# Patient Record
Sex: FEMALE | Race: BLACK OR AFRICAN AMERICAN | NOT HISPANIC OR LATINO | Employment: OTHER | URBAN - METROPOLITAN AREA
[De-identification: names, ages, dates, MRNs, and addresses within clinical notes are randomized per-mention and may not be internally consistent; named-entity substitution may affect disease eponyms.]

---

## 2017-01-01 ENCOUNTER — APPOINTMENT (OUTPATIENT)
Dept: CARDIOLOGY | Facility: HOSPITAL | Age: 67
End: 2017-01-01
Attending: SURGERY

## 2017-01-01 ENCOUNTER — ANESTHESIA (OUTPATIENT)
Dept: PERIOP | Facility: HOSPITAL | Age: 67
End: 2017-01-01

## 2017-01-01 ENCOUNTER — ANESTHESIA EVENT (OUTPATIENT)
Dept: PERIOP | Facility: HOSPITAL | Age: 67
End: 2017-01-01

## 2017-01-01 PROCEDURE — 25010000003 CEFAZOLIN IN DEXTROSE 2-4 GM/100ML-% SOLUTION: Performed by: SURGERY

## 2017-01-01 PROCEDURE — 93880 EXTRACRANIAL BILAT STUDY: CPT

## 2017-01-01 PROCEDURE — 25010000002 PROPOFOL 10 MG/ML EMULSION: Performed by: ANESTHESIOLOGY

## 2017-01-01 RX ORDER — PROPOFOL 10 MG/ML
VIAL (ML) INTRAVENOUS CONTINUOUS PRN
Status: DISCONTINUED | OUTPATIENT
Start: 2017-01-01 | End: 2017-01-01 | Stop reason: SURG

## 2017-01-01 RX ORDER — LIDOCAINE HYDROCHLORIDE 10 MG/ML
INJECTION, SOLUTION INFILTRATION; PERINEURAL AS NEEDED
Status: DISCONTINUED | OUTPATIENT
Start: 2017-01-01 | End: 2017-01-01 | Stop reason: SURG

## 2017-01-01 RX ADMIN — LIDOCAINE HYDROCHLORIDE 40 MG: 10 INJECTION, SOLUTION INFILTRATION; PERINEURAL at 09:55

## 2017-01-01 RX ADMIN — CEFAZOLIN SODIUM 2 G: 2 INJECTION, SOLUTION INTRAVENOUS at 09:53

## 2017-01-01 RX ADMIN — PROPOFOL 100 MCG/KG/MIN: 10 INJECTION, EMULSION INTRAVENOUS at 09:55

## 2017-01-01 RX ADMIN — SODIUM CHLORIDE, POTASSIUM CHLORIDE, SODIUM LACTATE AND CALCIUM CHLORIDE: 600; 310; 30; 20 INJECTION, SOLUTION INTRAVENOUS at 09:51

## 2017-01-01 NOTE — ANESTHESIA POSTPROCEDURE EVALUATION
Patient: Feli Del Toro    Procedure Summary     Date Anesthesia Start Anesthesia Stop Room / Location    01/01/17 0945 1046  [facility]       Procedure Diagnosis Surgeon Provider    LEFT SECOND TOE AMPUTATION (Left Fingers) No diagnosis on file. MD Good Webster MD          Anesthesia Type: regional  Last vitals  BP      Temp      Pulse     Resp      SpO2        Post Anesthesia Care and Evaluation    Patient location during evaluation: PACU  Patient participation: complete - patient participated  Level of consciousness: awake and alert  Anesthetic complications: no

## 2017-01-01 NOTE — ANESTHESIA PREPROCEDURE EVALUATION
Anesthesia Evaluation      No history of anesthetic complications   Airway   Mallampati: I  TM distance: >3 FB  Neck ROM: full  no difficulty expected  Dental - normal exam     Pulmonary    Cardiovascular   (+) hypertension (high vholesterol and diabetes),   (-) dysrhythmias, angina, orthopnea    Neuro/Psych  (+) numbness (diabetic neuropathy),    GI/Hepatic/Renal/Endo    (+) obesity,  chronic renal disease, hypothyroidism,     ROS Comment: Colon Ca 2 years ago    Musculoskeletal     Abdominal    Substance History      OB/GYN          Other                           Anesthesia Plan    ASA 3     regional   (Plan ankle block, low level sedation , patient had ornage juice 3 hrs ago foe low glucose)  intravenous induction   Anesthetic plan and risks discussed with patient.

## 2017-01-01 NOTE — ANESTHESIA PROCEDURE NOTES
Peripheral Block    Patient location during procedure: holding area  Start time: 1/1/2017 9:11 AM  Stop time: 1/1/2017 9:39 AM  Reason for block: at surgeon's request, post-op pain management and primary anesthetic  Performed by  Anesthesiologist: MARY MAYES  Preanesthetic Checklist  Completed: patient identified, site marked, surgical consent, pre-op evaluation, timeout performed, IV checked, risks and benefits discussed and monitors and equipment checked  Peripheral Block Prep:  Sterile barriers:cap, gloves and sterile barriers  Prep: ChloraPrep  Patient monitoring: blood pressure monitoring, continuous pulse oximetry and EKG  Peripheral Procedure  Sedation:yes  Guidance:landmark technique  Laterality:leftBlock Type:ankle  Injection Technique:single-shotNeedle Gauge:25 G    Medications  Comment:ankleblock placed , pt eexperiened no pain  Local Injected:ropivacaine 0.5% without epinephrine and lidocaine 2% Local Amount Injected:30mL  Post Assessment  Patient Tolerance:comfortable throughout block  Complications:no

## 2017-01-15 DIAGNOSIS — E11.9 DIABETES TYPE 2, CONTROLLED (HCC): ICD-10-CM

## 2017-01-16 RX ORDER — DAPAGLIFLOZIN AND METFORMIN HYDROCHLORIDE 10; 1000 MG/1; MG/1
TABLET, FILM COATED, EXTENDED RELEASE ORAL
Qty: 90 TABLET | Refills: 0 | Status: SHIPPED | OUTPATIENT
Start: 2017-01-16 | End: 2017-04-14 | Stop reason: SDUPTHER

## 2017-01-16 RX ORDER — SAXAGLIPTIN 5 MG/1
TABLET, FILM COATED ORAL
Qty: 90 TABLET | Refills: 0 | Status: SHIPPED | OUTPATIENT
Start: 2017-01-16 | End: 2017-04-14 | Stop reason: SDUPTHER

## 2017-02-02 DIAGNOSIS — E11.9 DIABETES TYPE 2, CONTROLLED (HCC): ICD-10-CM

## 2017-02-02 RX ORDER — INSULIN DETEMIR 100 [IU]/ML
INJECTION, SOLUTION SUBCUTANEOUS
Qty: 60 ML | Refills: 0 | Status: SHIPPED | OUTPATIENT
Start: 2017-02-02 | End: 2017-07-14 | Stop reason: SDUPTHER

## 2017-02-07 DIAGNOSIS — E11.9 DIABETES TYPE 2, CONTROLLED (HCC): ICD-10-CM

## 2017-02-07 RX ORDER — LANCETS 33 GAUGE
EACH MISCELLANEOUS
Qty: 120 EACH | Refills: 5 | Status: SHIPPED | OUTPATIENT
Start: 2017-02-07 | End: 2017-08-09 | Stop reason: SDUPTHER

## 2017-02-10 ENCOUNTER — APPOINTMENT (OUTPATIENT)
Dept: ONCOLOGY | Facility: HOSPITAL | Age: 67
End: 2017-02-10

## 2017-02-10 ENCOUNTER — OFFICE VISIT (OUTPATIENT)
Dept: ONCOLOGY | Facility: CLINIC | Age: 67
End: 2017-02-10

## 2017-02-10 ENCOUNTER — INFUSION (OUTPATIENT)
Dept: ONCOLOGY | Facility: HOSPITAL | Age: 67
End: 2017-02-10

## 2017-02-10 VITALS
OXYGEN SATURATION: 98 % | HEIGHT: 66 IN | WEIGHT: 258.6 LBS | RESPIRATION RATE: 14 BRPM | SYSTOLIC BLOOD PRESSURE: 138 MMHG | TEMPERATURE: 97.4 F | BODY MASS INDEX: 41.56 KG/M2 | HEART RATE: 76 BPM | DIASTOLIC BLOOD PRESSURE: 80 MMHG

## 2017-02-10 DIAGNOSIS — C18.9 PRIMARY MALIGNANT NEOPLASM OF COLON (HCC): Primary | ICD-10-CM

## 2017-02-10 DIAGNOSIS — Z45.2 ENCOUNTER FOR ADJUSTMENT OR MANAGEMENT OF VASCULAR ACCESS DEVICE: ICD-10-CM

## 2017-02-10 DIAGNOSIS — C18.9 METASTATIC COLON CANCER TO LIVER (HCC): Primary | ICD-10-CM

## 2017-02-10 DIAGNOSIS — C78.7 LIVER METASTASIS: ICD-10-CM

## 2017-02-10 DIAGNOSIS — D50.9 IRON DEFICIENCY ANEMIA, UNSPECIFIED IRON DEFICIENCY ANEMIA TYPE: ICD-10-CM

## 2017-02-10 DIAGNOSIS — C78.7 METASTATIC COLON CANCER TO LIVER (HCC): Primary | ICD-10-CM

## 2017-02-10 LAB
ALBUMIN SERPL-MCNC: 3.9 G/DL (ref 3.5–5.2)
ALBUMIN/GLOB SERPL: 1 G/DL (ref 1.1–2.4)
ALP SERPL-CCNC: 148 U/L (ref 38–116)
ALT SERPL W P-5'-P-CCNC: 52 U/L (ref 0–33)
ANION GAP SERPL CALCULATED.3IONS-SCNC: 12.6 MMOL/L
AST SERPL-CCNC: 43 U/L (ref 0–32)
BASOPHILS # BLD AUTO: 0.05 10*3/MM3 (ref 0–0.1)
BASOPHILS NFR BLD AUTO: 0.7 % (ref 0–1.1)
BILIRUB SERPL-MCNC: 0.3 MG/DL (ref 0.1–1.2)
BUN BLD-MCNC: 20 MG/DL (ref 6–20)
BUN/CREAT SERPL: 23 (ref 7.3–30)
CALCIUM SPEC-SCNC: 9.2 MG/DL (ref 8.5–10.2)
CEA SERPL-MCNC: 4.01 NG/ML
CHLORIDE SERPL-SCNC: 101 MMOL/L (ref 98–107)
CO2 SERPL-SCNC: 27.4 MMOL/L (ref 22–29)
CREAT BLD-MCNC: 0.87 MG/DL (ref 0.6–1.1)
DEPRECATED RDW RBC AUTO: 50.7 FL (ref 37–49)
EOSINOPHIL # BLD AUTO: 0.45 10*3/MM3 (ref 0–0.36)
EOSINOPHIL NFR BLD AUTO: 6.1 % (ref 1–5)
ERYTHROCYTE [DISTWIDTH] IN BLOOD BY AUTOMATED COUNT: 16.8 % (ref 11.7–14.5)
GFR SERPL CREATININE-BSD FRML MDRD: 79 ML/MIN/1.73
GLOBULIN UR ELPH-MCNC: 4 GM/DL (ref 1.8–3.5)
GLUCOSE BLD-MCNC: 104 MG/DL (ref 74–124)
HCT VFR BLD AUTO: 38.5 % (ref 34–45)
HGB BLD-MCNC: 12.6 G/DL (ref 11.5–14.9)
IMM GRANULOCYTES # BLD: 0.06 10*3/MM3 (ref 0–0.03)
IMM GRANULOCYTES NFR BLD: 0.8 % (ref 0–0.5)
LYMPHOCYTES # BLD AUTO: 1.99 10*3/MM3 (ref 1–3.5)
LYMPHOCYTES NFR BLD AUTO: 26.8 % (ref 20–49)
MCH RBC QN AUTO: 27.4 PG (ref 27–33)
MCHC RBC AUTO-ENTMCNC: 32.7 G/DL (ref 32–35)
MCV RBC AUTO: 83.7 FL (ref 83–97)
MONOCYTES # BLD AUTO: 0.99 10*3/MM3 (ref 0.25–0.8)
MONOCYTES NFR BLD AUTO: 13.3 % (ref 4–12)
NEUTROPHILS # BLD AUTO: 3.88 10*3/MM3 (ref 1.5–7)
NEUTROPHILS NFR BLD AUTO: 52.3 % (ref 39–75)
NRBC BLD MANUAL-RTO: 0 /100 WBC (ref 0–0)
PLATELET # BLD AUTO: 370 10*3/MM3 (ref 150–375)
PMV BLD AUTO: 8.9 FL (ref 8.9–12.1)
POTASSIUM BLD-SCNC: 4.1 MMOL/L (ref 3.5–4.7)
PROT SERPL-MCNC: 7.9 G/DL (ref 6.3–8)
RBC # BLD AUTO: 4.6 10*6/MM3 (ref 3.9–5)
SODIUM BLD-SCNC: 141 MMOL/L (ref 134–145)
WBC NRBC COR # BLD: 7.42 10*3/MM3 (ref 4–10)

## 2017-02-10 PROCEDURE — 25010000002 HEPARIN FLUSH (PORCINE) 100 UNIT/ML SOLUTION: Performed by: INTERNAL MEDICINE

## 2017-02-10 PROCEDURE — 82378 CARCINOEMBRYONIC ANTIGEN: CPT | Performed by: INTERNAL MEDICINE

## 2017-02-10 PROCEDURE — 96523 IRRIG DRUG DELIVERY DEVICE: CPT

## 2017-02-10 PROCEDURE — 85025 COMPLETE CBC W/AUTO DIFF WBC: CPT | Performed by: INTERNAL MEDICINE

## 2017-02-10 PROCEDURE — 80053 COMPREHEN METABOLIC PANEL: CPT | Performed by: INTERNAL MEDICINE

## 2017-02-10 PROCEDURE — 99214 OFFICE O/P EST MOD 30 MIN: CPT | Performed by: INTERNAL MEDICINE

## 2017-02-10 PROCEDURE — 36415 COLL VENOUS BLD VENIPUNCTURE: CPT | Performed by: INTERNAL MEDICINE

## 2017-02-10 RX ORDER — 0.9 % SODIUM CHLORIDE 0.9 %
10 VIAL (ML) INJECTION AS NEEDED
Status: DISCONTINUED | OUTPATIENT
Start: 2017-02-10 | End: 2017-02-10 | Stop reason: HOSPADM

## 2017-02-10 RX ORDER — FLUTICASONE PROPIONATE 50 MCG
2 SPRAY, SUSPENSION (ML) NASAL AS NEEDED
COMMUNITY
Start: 2016-12-14 | End: 2020-02-27 | Stop reason: SDDI

## 2017-02-10 RX ORDER — 0.9 % SODIUM CHLORIDE 0.9 %
10 VIAL (ML) INJECTION AS NEEDED
Status: CANCELLED | OUTPATIENT
Start: 2017-02-10

## 2017-02-10 RX ADMIN — SODIUM CHLORIDE, PRESERVATIVE FREE 500 UNITS: 5 INJECTION INTRAVENOUS at 09:17

## 2017-02-10 RX ADMIN — SODIUM CHLORIDE 10 ML: 9 INJECTION, SOLUTION INTRAMUSCULAR; INTRAVENOUS; SUBCUTANEOUS at 09:17

## 2017-02-10 NOTE — PROGRESS NOTES
Subjective     CHIEF COMPLAINT:    1. Metastatic colon cancer (KRAS mutation positive) with liver metastasis, biopsy confirmed. No resection of primary sigmoid colon cancer.   2. Recurrent Iron deficiency anemia   3. Palliative chemotherapy with FOLFOX-6 was given from June 2013 to November 2013.   4. The patient underwent left partial colectomy, splenectomy, resection of left diaphragm with repair and partial hepatectomy with microwave ablation of metastasis on 01/14/2014.   5. The patient received FOLFIRI x12 cycles between 03/07/2014 through 08/07/2014.   6. CT scan on 05/28/2015 revealed a new liver lesion. The patient was referred back to Dr. Ruano. Stereotactic radiation therapy with CyberKnife was recommended and was delivered in 3 fractions, completed on 07/14/2015.     HISTORY OF PRESENT ILLNESS:     Feli Del Toro is a 66 y.o. patient with the above medical history.  She returns today for follow-up.  She was recently hospitalized at [facility] with infected toe with suspected osteomyelitis that required surgery.  Wound cultures revealed staph aureus.  Blood cultures were negative.  She is recovering from the infection and denies having fever or chills.    Patient Is not experiencing abdominal pain.  Bowel movements are normal.  No pain at the site of the port.    Past Medical History   Diagnosis Date   • Anemia    • Cancer 06/04/2013   • Diabetes mellitus    • History of transfusion    • Hyperlipidemia    • Hypertension    • Retinopathy    • Type 2 diabetes mellitus        Past Surgical History   Procedure Laterality Date   • Cataract extraction     • Salpingo oophorectomy Bilateral    • Amputation of replicated toes       right distal second toe    • Portacath placement  06/2013   • Colectomy partial / total  01/14/2014   • Splenectomy     • Hysterectomy  1998   • Amputation digit Left 1/1/2017     Procedure: LEFT SECOND TOE AMPUTATION;  Surgeon: Farzad Nichols MD;  Location: [facility]  MAIN OR;  Service:        Cancer-related family history includes Breast cancer in her sister; Cancer in her other.    SCHEDULED MEDS:       Current Outpatient Prescriptions:   •  aspirin 81 MG tablet, Take 81 mg by mouth daily., Disp: , Rfl:   •  calcium carbonate (TUMS) 500 MG chewable tablet, Chew., Disp: , Rfl:   •  fluticasone (FLONASE) 50 MCG/ACT nasal spray, , Disp: , Rfl:   •  glucose blood test strip, Check blood glucose 3-4 times daily, Disp: 150 each, Rfl: 5  •  insulin detemir (LEVEMIR FLEXTOUCH) 100 UNIT/ML injection, Inject 22 Units under the skin Every Night., Disp: , Rfl:   •  Insulin Lispro, Human, (HUMALOG KWIKPEN) 100 UNIT/ML solution pen-injector, 15u AC and sliding scale (Patient taking differently: Takes 10 units with breakfast, 12 units with lunch, and 14 units with dinner.), Disp: 6 pen, Rfl: 1  •  Insulin Pen Needle 32G X 6 MM misc, Use as directed five times daily and as needed., Disp: 180 each, Rfl: 3  •  LEVEMIR FLEXTOUCH 100 UNIT/ML injection, INJECT 50 UNITS DAILY AND TITRATE AS INSTRUCTED, Disp: 60 mL, Rfl: 0  •  MAGNESIUM OXIDE PO, Take 400 mg by mouth daily., Disp: , Rfl:   •  meclizine (ANTIVERT) 25 MG tablet, Take 25 mg by mouth 3 (Three) Times a Day As Needed., Disp: , Rfl:   •  ONDANSETRON HCL PO, Take 8 mg by mouth every 8 (eight) hours as needed., Disp: , Rfl:   •  ONETOUCH DELICA LANCETS 33G misc, USE TO TEST BLOOD SUGAR 3 TO 4 TIMES DAILY, Disp: 120 each, Rfl: 5  •  ONGLYZA 5 MG tablet, TAKE 1 TABLET DAILY, Disp: 90 tablet, Rfl: 0  •  telmisartan-hydrochlorothiazide (MICARDIS HCT) 40-12.5 MG per tablet, Take 1 tablet by mouth daily, Disp: 90 tablet, Rfl: 3  •  vitamin D (ERGOCALCIFEROL) 93001 UNITS capsule capsule, Take 1 capsule by mouth 1 (One) Time Per Week., Disp: 13 capsule, Rfl: 3  •  XIGDUO XR  MG tablet sustained-release 24 hour, TAKE 1 TABLET DAILY, Disp: 90 tablet, Rfl: 0  •  atorvastatin (LIPITOR) 40 MG tablet, Take 1 tablet (40 mg total) by mouth daily for  "90 days, Disp: 90 tablet, Rfl: 3  No current facility-administered medications for this visit.     REVIEW OF SYSTEMS:  GENERAL:  No fever or chills.  Weight gain.    SKIN:  See history of present illness.  HEME/LYMPH: No anemia, easy bruisability or enlarged lymph nodes.  RESPIRATORY:  No cough, shortness of breath, hemoptysis or wheezing.  CVS:  No chest pain, palpitations or lower extremity swelling.  GI:  No abdominal pain, nausea, vomiting, constipation, diarrhea, melena or hematochezia.  :  No dysuria, hematuria or increased frequency.   MUSCULOSKELETAL:  See history of present illness.  NEUROLOGICAL:  See history of present illness.  No dizziness, global weakness, loss of consciousness or seizures.  PSYCHIATRIC:  No anxiety, mood changes or depression.  .  Objective   VITAL SIGNS:     Vitals:    02/10/17 0920   BP: 138/80   Pulse: 76   Resp: 14   Temp: 97.4 °F (36.3 °C)   TempSrc: Oral   SpO2: 98%   Weight: 258 lb 9.6 oz (117 kg)   Height: 66.14\" (168 cm)   PainSc: 0-No pain     Body mass index is 41.56 kg/(m^2).     Wt Readings from Last 3 Encounters:   02/10/17 258 lb 9.6 oz (117 kg)   12/30/16 251 lb 1.6 oz (114 kg)   12/31/16 251 lb (114 kg)       PHYSICAL EXAMINATION:  GENERAL:  The patient appears in good general condition, not in acute distress.  SKIN:  No skin rashes or lesions. No ecchymosis or petechiae.  HEAD:  Normocephalic.  EYES:  No jaundice or pallor.   NECK:  Supple with good ROM. No thyromegaly or masses.  LYMPHATICS:  No cervical or supraclavicular lymphadenopathy.  CHEST:  Lungs clear to auscultation. No added sounds.  Port in the left upper chest is benign with no redness or swelling.  ABDOMEN:  Soft and non-tender. No hepato- or splenomegaly. No masses.  EXTREMITIES:  No cyanosis or edema. No calf tenderness.  .  RESULT REVIEW:       Results from last 7 days  Lab Units 02/10/17  0907   WBC 10*3/mm3 7.42   NEUTROS ABS 10*3/mm3 3.88   HEMOGLOBIN g/dL 12.6   HEMATOCRIT % 38.5   PLATELETS " 10*3/mm3 370             Assessment/Plan    1.  Metastatic colon cancer. She had metastases to the liver. She received 12 cycles of FOLFOX-6 and then had resection of t he primary tumor along with metastasectomy and splenectomy with thermal ablation of lesions in the liver in January 2014. This was followed by 12 cycles of the FOLFIRI regimen completed on 08/07/2014. CT scans on 05/28/2015 revealed a new lesion in the liver.  Dr. Ruano referred the patient to Radiation Therapy and she received CyberKnife radiation that was completed on 07/14/2015.  The patient is doing well with no evidence of recurrence on the CT scan from 11/11/16.  CEA level has decreased.  I recommended a repeat CT scan in 3 months.  If there is no evidence of progression, we would plan on removing the port.    2.  Recent hospital stay for infection of a toe and suspected osteomyelitis that required amputation.  Cultures were positive for Staph aureus but blood cultures were negative.    3.  Patient has a port.  It will be flushed today and we will continue to maintain it every 6 weeks.  If the patient's upcoming scans in 3 months do not show any evidence of recurrence, we will consider removal of the port.    4.  History of iron deficiency anemia.  Her hemoglobin is currently normal.    5.  Obesity.  We discussed today the need to lose weight.    Patient will return in 6 weeks for a port flush.  We'll see her in follow-up in 3 months with a CT scan of the chest abdomen pelvis, CBC, CMP and CEA 1 week before her clinic visit.        Marivel Brown MD  02/10/17

## 2017-02-11 ENCOUNTER — HOSPITAL ENCOUNTER (EMERGENCY)
Facility: HOSPITAL | Age: 67
Discharge: HOME | End: 2017-02-11
Attending: EMERGENCY MEDICINE | Admitting: EMERGENCY MEDICINE

## 2017-02-11 ENCOUNTER — APPOINTMENT (OUTPATIENT)
Dept: GENERAL RADIOLOGY | Facility: HOSPITAL | Age: 67
End: 2017-02-11

## 2017-02-11 VITALS
OXYGEN SATURATION: 97 % | WEIGHT: 254 LBS | DIASTOLIC BLOOD PRESSURE: 74 MMHG | HEIGHT: 69 IN | HEART RATE: 91 BPM | SYSTOLIC BLOOD PRESSURE: 136 MMHG | RESPIRATION RATE: 17 BRPM | TEMPERATURE: 97.5 F | BODY MASS INDEX: 37.62 KG/M2

## 2017-02-11 DIAGNOSIS — S82.831A CLOSED FRACTURE OF DISTAL END OF RIGHT FIBULA, UNSPECIFIED FRACTURE MORPHOLOGY, INITIAL ENCOUNTER: Primary | ICD-10-CM

## 2017-02-11 LAB — GLUCOSE BLDC GLUCOMTR-MCNC: 91 MG/DL (ref 70–130)

## 2017-02-11 PROCEDURE — 73610 X-RAY EXAM OF ANKLE: CPT

## 2017-02-11 PROCEDURE — 99284 EMERGENCY DEPT VISIT MOD MDM: CPT

## 2017-02-11 PROCEDURE — 82962 GLUCOSE BLOOD TEST: CPT

## 2017-02-11 RX ORDER — OXYCODONE HYDROCHLORIDE AND ACETAMINOPHEN 5; 325 MG/1; MG/1
1 TABLET ORAL EVERY 6 HOURS PRN
Qty: 15 TABLET | Refills: 0 | Status: SHIPPED | OUTPATIENT
Start: 2017-02-11 | End: 2017-05-12

## 2017-02-11 NOTE — ED PROVIDER NOTES
" EMERGENCY DEPARTMENT ENCOUNTER    CHIEF COMPLAINT  Chief Complaint: ankle pain  History given by: patient  History limited by: none  Room Number: 23/23  PMD: MD Dr. Simone Gagnon (vascular surgeon)     HPI:  Pt is a 66 y.o. female who presents complaining of R ankle pain s/p a fall downstairs onset last night at 7:30PM. The pt states that the pain is moderate. The pt denies fever, LOC, HA, CP, SOA, V/D, abd pain or any additional injury.    Duration:  Last night  Onset: sudden   Timing: constant   Location: R ankle  Radiation: none  Quality: \"pain\"  Intensity/Severity: moderate  Progression: unchanged  Associated Symptoms: R ankle swelling  Aggravating Factors: none  Alleviating Factors: none  Previous Episodes: none  Treatment before arrival: none    PAST MEDICAL HISTORY  Active Ambulatory Problems     Diagnosis Date Noted   • Hyperlipidemia 12/31/2015   • Essential hypertension 12/31/2015   • Allergic rhinitis due to pollen 12/31/2015   • Vitamin D deficiency 03/31/2016   • Morbid obesity due to excess calories 03/31/2016   • Encounter for adjustment or management of vascular access device 04/13/2016   • Primary malignant neoplasm of colon 05/26/2016   • Iron deficiency anemia 05/26/2016   • Breast lesion 05/26/2016   • Peripheral neuropathy due to chemotherapy 05/26/2016   • Nonproliferative diabetic retinopathy 12/01/2016   • Type 2 diabetes mellitus with microalbuminuria 12/01/2016   • Osteomyelitis of toe of left foot 12/30/2016   • Sepsis 12/30/2016   • Type 2 diabetes mellitus with peripheral neuropathy 12/30/2016   • Type 2 diabetes mellitus with complication, with long-term current use of insulin 12/30/2016   • Obesity (BMI 30-39.9) 12/31/2016     Resolved Ambulatory Problems     Diagnosis Date Noted   • No Resolved Ambulatory Problems     Past Medical History   Diagnosis Date   • Anemia    • Cancer 06/04/2013   • Diabetes mellitus    • History of transfusion    • Hypertension    • Retinopathy    • " Type 2 diabetes mellitus        PAST SURGICAL HISTORY  Past Surgical History   Procedure Laterality Date   • Cataract extraction     • Salpingo oophorectomy Bilateral    • Amputation of replicated toes       right distal second toe    • Portacath placement  06/2013   • Colectomy partial / total  01/14/2014   • Splenectomy     • Hysterectomy  1998   • Amputation digit Left 1/1/2017     Procedure: LEFT SECOND TOE AMPUTATION;  Surgeon: Farzad Nichols MD;  Location: [facility] OR;  Service:        FAMILY HISTORY  Family History   Problem Relation Age of Onset   • Heart attack Other    • Cancer Other    • Diabetes Other    • Hypertension Other    • Hypertension Father    • Diabetes Father    • Breast cancer Sister    • Diabetes Sister    • Stroke Paternal Grandmother    • Diabetes Paternal Grandmother    • Lupus Paternal Grandfather    • Stroke Paternal Grandfather    • Diabetes Sister        SOCIAL HISTORY  Social History     Social History   • Marital status:      Spouse name: Jesus   • Number of children: N/A   • Years of education: College     Occupational History   • Middle School Counselor  - RETIRED      [employer]     Social History Main Topics   • Smoking status: Never Smoker   • Smokeless tobacco: Never Used   • Alcohol use No   • Drug use: No   • Sexual activity: Defer     Other Topics Concern   • Not on file     Social History Narrative       ALLERGIES  Compazine [prochlorperazine edisylate]    REVIEW OF SYSTEMS  Review of Systems   Constitutional: Negative.  Negative for fever and unexpected weight change.   HENT: Negative.    Respiratory: Negative.  Negative for shortness of breath.    Cardiovascular: Negative.  Negative for chest pain.   Gastrointestinal: Negative.  Negative for abdominal pain, diarrhea and vomiting.   Genitourinary: Negative.    Musculoskeletal: Positive for arthralgias (R ankle) and joint swelling (R ankle).   Neurological: Negative for syncope and headaches.   All other systems  reviewed and are negative.      PHYSICAL EXAM  ED Triage Vitals   Temp Heart Rate Resp BP SpO2   02/11/17 1114 02/11/17 1114 02/11/17 1114 02/11/17 1114 02/11/17 1114   97.5 °F (36.4 °C) 89 16 154/85 96 %      Temp src Heart Rate Source Patient Position BP Location FiO2 (%)   02/11/17 1114 02/11/17 1114 02/11/17 1114 -- --   Tympanic Monitor Sitting         Physical Exam   Constitutional: She is oriented to person, place, and time and well-developed, well-nourished, and in no distress. No distress.   HENT:   Head: Normocephalic and atraumatic.   Eyes: EOM are normal. Pupils are equal, round, and reactive to light.   Neck: Normal range of motion. Neck supple.   Cardiovascular: Normal rate, regular rhythm and normal heart sounds.    Pulmonary/Chest: Effort normal and breath sounds normal. No respiratory distress.   Abdominal: Soft. There is no tenderness. There is no rebound and no guarding.   Musculoskeletal: Normal range of motion.        Right ankle: She exhibits swelling (moderate to severe). Tenderness. Lateral malleolus tenderness found.        Right foot: There is no tenderness.   Neurological: She is alert and oriented to person, place, and time. She has normal sensation and normal strength.   Skin: Skin is warm and dry. Bruising (to medial right ankle) noted. No rash noted.   Psychiatric: Mood and affect normal.   Nursing note and vitals reviewed.    RADIOLOGY  XR Ankle 3+ View Right   Final Result         1330- R ankle XR shows a transverse fracture distal right fibula without displacement and a one half x 2 mm bone fragment at the tip of the medial malleolus consistent with avulsion injury.    I ordered the above noted radiological studies. Interpreted by radiologist.  Reviewed by me in PACS.       PROCEDURES  Splint Application  Date/Time: 2/11/2017 1:35 PM  Performed by: PREETHI PHAN  Authorized by: PREETHI PHAN   Consent: Verbal consent obtained.  Risks and benefits: risks, benefits and  alternatives were discussed  Consent given by: patient  Required items: required blood products, implants, devices, and special equipment available  Patient identity confirmed: verbally with patient and arm band  Location details: right ankle  Splint type: ankle stirrup  Supplies used: cotton padding and Ortho-Glass  Post-procedure: The splinted body part was neurovascularly unchanged following the procedure.  Patient tolerance: Patient tolerated the procedure well with no immediate complications            PROGRESS AND CONSULTS  ED Course     1249- Ordered XR R ankle for further evaluation. Pt refused pain medication at this time.    1332- Rechecked pt who is resting comfortably. Informed the pt of the XR results that show a fractured fibula. D/w my plan of administering a splint. Advised pt to minimize wt bearing and to use crutches. D/w pt of plan to discharge with pain meds. Pt and family understand and agree with the plan. All questions answered.       MEDICAL DECISION MAKING  Results were reviewed/discussed with the patient and they were also made aware of online access. Pt also made aware that follow up with PMD is necessary.     MDM  Number of Diagnoses or Management Options  Closed fracture of distal end of right fibula, unspecified fracture morphology, initial encounter:      Amount and/or Complexity of Data Reviewed  Tests in the radiology section of CPT®: ordered and reviewed ( R ankle XR shows a transverse fracture distal right fibula without displacement and a one half x 2 mm bone fragment at the tip of the medial malleolus consistent with avulsion injury.)  Independent visualization of images, tracings, or specimens: yes           DIAGNOSIS  Final diagnoses:   Closed fracture of distal end of right fibula, unspecified fracture morphology, initial encounter       DISPOSITION  DISCHARGE    Patient discharged in stable condition.    Reviewed implications of results, diagnosis, meds, responsibility to  follow up, warning signs and symptoms of possible worsening, potential complications and reasons to return to ER, including any worsening symptoms.    Patient/Family voiced understanding of above instructions.    Discussed plan for discharge, as there is no emergent indication for admission.  Pt/family is agreeable and understands need for follow up and repeat testing.  Pt is aware that discharge does not mean that nothing is wrong but it indicates no emergency is present that requires admission and they must continue care with follow-up as given below or physician of their choice.     FOLLOW-UP  Jesus Hernandez Jr., MD  [address] [facility] [address]  [place] [address]  [phone]    In 3 days  Call for Appointment         Medication List      New Prescriptions          oxyCODONE-acetaminophen 5-325 MG per tablet   Commonly known as:  ROXICET   Take 1 tablet by mouth Every 6 (Six) Hours As Needed for moderate pain   (4-6).         Changed          Insulin Lispro 100 UNIT/ML solution pen-injector   Commonly known as:  HUMALOG KWIKPEN   15u AC and sliding scale   What changed:  additional instructions         Stop          ONDANSETRON HCL PO               Latest Documented Vital Signs:  As of 1:53 PM  BP- 136/74 HR- 88 Temp- 97.5 °F (36.4 °C) (Tympanic) O2 sat- 97%    --  Documentation assistance provided by scribjarred Liu and Eliza Louis for Dr. Dorman.  Information recorded by the scribe was done at my direction and has been verified and validated by me.       Toby Liu  02/11/17 1348       Eliza Louis  02/11/17 [id number]       Cecil Dorman MD  02/11/17 [id number]

## 2017-02-11 NOTE — ED NOTES
Patient states she was going down her basement steps, missed a step, and fell, hurting her right ankle.      Katia Snow RN  02/11/17 [id number]

## 2017-02-13 ENCOUNTER — TELEPHONE (OUTPATIENT)
Dept: SOCIAL WORK | Facility: HOSPITAL | Age: 67
End: 2017-02-13

## 2017-02-27 ENCOUNTER — TRANSCRIBE ORDERS (OUTPATIENT)
Dept: ADMINISTRATIVE | Facility: HOSPITAL | Age: 67
End: 2017-02-27

## 2017-02-27 ENCOUNTER — HOSPITAL ENCOUNTER (OUTPATIENT)
Dept: CARDIOLOGY | Facility: HOSPITAL | Age: 67
Discharge: HOME | End: 2017-02-27
Attending: ORTHOPAEDIC SURGERY | Admitting: ORTHOPAEDIC SURGERY

## 2017-02-27 ENCOUNTER — LAB (OUTPATIENT)
Dept: LAB | Facility: HOSPITAL | Age: 67
End: 2017-02-27
Attending: ORTHOPAEDIC SURGERY

## 2017-02-27 DIAGNOSIS — Z01.818 PRE-OP EXAM: ICD-10-CM

## 2017-02-27 DIAGNOSIS — Z01.818 PRE-OP EXAM: Primary | ICD-10-CM

## 2017-02-27 LAB
ALBUMIN SERPL-MCNC: 3.9 G/DL (ref 3.5–5.2)
ALBUMIN/GLOB SERPL: 1 G/DL
ALP SERPL-CCNC: 135 U/L (ref 39–117)
ALT SERPL W P-5'-P-CCNC: 17 U/L (ref 1–33)
ANION GAP SERPL CALCULATED.3IONS-SCNC: 14.6 MMOL/L
AST SERPL-CCNC: 18 U/L (ref 1–32)
BASOPHILS # BLD AUTO: 0.04 10*3/MM3 (ref 0–0.2)
BASOPHILS NFR BLD AUTO: 0.5 % (ref 0–1.5)
BILIRUB SERPL-MCNC: 0.2 MG/DL (ref 0.1–1.2)
BUN BLD-MCNC: 28 MG/DL (ref 8–23)
BUN/CREAT SERPL: 24.6 (ref 7–25)
CALCIUM SPEC-SCNC: 9.4 MG/DL (ref 8.6–10.5)
CHLORIDE SERPL-SCNC: 101 MMOL/L (ref 98–107)
CO2 SERPL-SCNC: 25.4 MMOL/L (ref 22–29)
CREAT BLD-MCNC: 1.14 MG/DL (ref 0.57–1)
DEPRECATED RDW RBC AUTO: 51.8 FL (ref 37–54)
EOSINOPHIL # BLD AUTO: 0.45 10*3/MM3 (ref 0–0.7)
EOSINOPHIL NFR BLD AUTO: 5.7 % (ref 0.3–6.2)
ERYTHROCYTE [DISTWIDTH] IN BLOOD BY AUTOMATED COUNT: 16.7 % (ref 11.7–13)
GFR SERPL CREATININE-BSD FRML MDRD: 58 ML/MIN/1.73
GLOBULIN UR ELPH-MCNC: 4.1 GM/DL
GLUCOSE BLD-MCNC: 170 MG/DL (ref 65–99)
HCT VFR BLD AUTO: 40.3 % (ref 35.6–45.5)
HGB BLD-MCNC: 13.1 G/DL (ref 11.9–15.5)
IMM GRANULOCYTES # BLD: 0.04 10*3/MM3 (ref 0–0.03)
IMM GRANULOCYTES NFR BLD: 0.5 % (ref 0–0.5)
LYMPHOCYTES # BLD AUTO: 2.84 10*3/MM3 (ref 0.9–4.8)
LYMPHOCYTES NFR BLD AUTO: 36.2 % (ref 19.6–45.3)
MCH RBC QN AUTO: 27.6 PG (ref 26.9–32)
MCHC RBC AUTO-ENTMCNC: 32.5 G/DL (ref 32.4–36.3)
MCV RBC AUTO: 85 FL (ref 80.5–98.2)
MONOCYTES # BLD AUTO: 0.92 10*3/MM3 (ref 0.2–1.2)
MONOCYTES NFR BLD AUTO: 11.7 % (ref 5–12)
NEUTROPHILS # BLD AUTO: 3.55 10*3/MM3 (ref 1.9–8.1)
NEUTROPHILS NFR BLD AUTO: 45.4 % (ref 42.7–76)
PLATELET # BLD AUTO: 414 10*3/MM3 (ref 140–500)
PMV BLD AUTO: 9.4 FL (ref 6–12)
POTASSIUM BLD-SCNC: 4.2 MMOL/L (ref 3.5–5.2)
PROT SERPL-MCNC: 8 G/DL (ref 6–8.5)
RBC # BLD AUTO: 4.74 10*6/MM3 (ref 3.9–5.2)
SODIUM BLD-SCNC: 141 MMOL/L (ref 136–145)
WBC NRBC COR # BLD: 7.84 10*3/MM3 (ref 4.5–10.7)

## 2017-02-27 PROCEDURE — 93010 ELECTROCARDIOGRAM REPORT: CPT | Performed by: INTERNAL MEDICINE

## 2017-02-27 PROCEDURE — 80053 COMPREHEN METABOLIC PANEL: CPT

## 2017-02-27 PROCEDURE — 85025 COMPLETE CBC W/AUTO DIFF WBC: CPT

## 2017-02-27 PROCEDURE — 36415 COLL VENOUS BLD VENIPUNCTURE: CPT

## 2017-02-27 PROCEDURE — 93005 ELECTROCARDIOGRAM TRACING: CPT | Performed by: ORTHOPAEDIC SURGERY

## 2017-03-11 DIAGNOSIS — E11.9 DIABETES TYPE 2, CONTROLLED (HCC): ICD-10-CM

## 2017-03-11 DIAGNOSIS — E55.9 VITAMIN D DEFICIENCY: ICD-10-CM

## 2017-03-11 DIAGNOSIS — E78.5 HYPERLIPIDEMIA: ICD-10-CM

## 2017-03-11 DIAGNOSIS — I10 ESSENTIAL HYPERTENSION: ICD-10-CM

## 2017-03-11 DIAGNOSIS — E66.01 MORBID OBESITY DUE TO EXCESS CALORIES (HCC): ICD-10-CM

## 2017-03-13 RX ORDER — EXENATIDE 2 MG/.65ML
INJECTION, SUSPENSION, EXTENDED RELEASE SUBCUTANEOUS
Qty: 12 EACH | Refills: 2 | Status: SHIPPED | OUTPATIENT
Start: 2017-03-13 | End: 2017-04-11

## 2017-03-24 ENCOUNTER — INFUSION (OUTPATIENT)
Dept: ONCOLOGY | Facility: HOSPITAL | Age: 67
End: 2017-03-24

## 2017-03-24 DIAGNOSIS — C18.9 PRIMARY MALIGNANT NEOPLASM OF COLON (HCC): Primary | ICD-10-CM

## 2017-03-24 DIAGNOSIS — Z45.2 ENCOUNTER FOR ADJUSTMENT OR MANAGEMENT OF VASCULAR ACCESS DEVICE: ICD-10-CM

## 2017-03-24 PROCEDURE — 25010000002 HEPARIN FLUSH (PORCINE) 100 UNIT/ML SOLUTION: Performed by: INTERNAL MEDICINE

## 2017-03-24 PROCEDURE — 96523 IRRIG DRUG DELIVERY DEVICE: CPT | Performed by: INTERNAL MEDICINE

## 2017-03-24 RX ORDER — 0.9 % SODIUM CHLORIDE 0.9 %
10 VIAL (ML) INJECTION AS NEEDED
Status: CANCELLED | OUTPATIENT
Start: 2017-03-24

## 2017-03-24 RX ORDER — 0.9 % SODIUM CHLORIDE 0.9 %
10 VIAL (ML) INJECTION AS NEEDED
Status: DISCONTINUED | OUTPATIENT
Start: 2017-03-24 | End: 2017-03-24 | Stop reason: HOSPADM

## 2017-03-24 RX ADMIN — SODIUM CHLORIDE, PRESERVATIVE FREE 500 UNITS: 5 INJECTION INTRAVENOUS at 07:37

## 2017-03-24 RX ADMIN — SODIUM CHLORIDE 10 ML: 9 INJECTION, SOLUTION INTRAMUSCULAR; INTRAVENOUS; SUBCUTANEOUS at 07:36

## 2017-03-27 RX ORDER — BLOOD SUGAR DIAGNOSTIC
STRIP MISCELLANEOUS
Qty: 150 EACH | Refills: 4 | Status: SHIPPED | OUTPATIENT
Start: 2017-03-27 | End: 2017-08-24

## 2017-04-01 ENCOUNTER — RESULTS ENCOUNTER (OUTPATIENT)
Dept: ENDOCRINOLOGY | Age: 67
End: 2017-04-01

## 2017-04-01 DIAGNOSIS — E55.9 VITAMIN D DEFICIENCY: ICD-10-CM

## 2017-04-01 DIAGNOSIS — IMO0002 UNCONTROLLED TYPE 2 DIABETES MELLITUS WITH OTHER SPECIFIED COMPLICATION: ICD-10-CM

## 2017-04-01 DIAGNOSIS — I10 ESSENTIAL HYPERTENSION: ICD-10-CM

## 2017-04-01 DIAGNOSIS — E78.5 HYPERLIPIDEMIA, UNSPECIFIED HYPERLIPIDEMIA TYPE: ICD-10-CM

## 2017-04-04 ENCOUNTER — LAB (OUTPATIENT)
Dept: ENDOCRINOLOGY | Age: 67
End: 2017-04-04

## 2017-04-04 DIAGNOSIS — E11.9 DIABETES TYPE 2, CONTROLLED (HCC): ICD-10-CM

## 2017-04-05 LAB
ALBUMIN SERPL-MCNC: 4.2 G/DL (ref 3.5–5.2)
ALBUMIN/GLOB SERPL: 1.1 G/DL
ALP SERPL-CCNC: 148 U/L (ref 39–117)
ALT SERPL-CCNC: 24 U/L (ref 1–33)
AST SERPL-CCNC: 20 U/L (ref 1–32)
BILIRUB SERPL-MCNC: 0.2 MG/DL (ref 0.1–1.2)
BUN SERPL-MCNC: 21 MG/DL (ref 8–23)
BUN/CREAT SERPL: 20.4 (ref 7–25)
C PEPTIDE SERPL-MCNC: 4.5 NG/ML (ref 1.1–4.4)
CALCIUM SERPL-MCNC: 10.3 MG/DL (ref 8.6–10.5)
CHLORIDE SERPL-SCNC: 97 MMOL/L (ref 98–107)
CHOLEST SERPL-MCNC: 189 MG/DL (ref 0–200)
CO2 SERPL-SCNC: 29 MMOL/L (ref 22–29)
CREAT SERPL-MCNC: 1.03 MG/DL (ref 0.57–1)
GLOBULIN SER CALC-MCNC: 3.7 GM/DL
GLUCOSE SERPL-MCNC: 136 MG/DL (ref 65–99)
HBA1C MFR BLD: 6.3 % (ref 4.8–5.6)
HDLC SERPL-MCNC: 91 MG/DL (ref 40–60)
LDLC SERPL CALC-MCNC: 80 MG/DL (ref 0–100)
MICROALBUMIN UR-MCNC: 30 UG/ML
POTASSIUM SERPL-SCNC: 4.2 MMOL/L (ref 3.5–5.2)
PROT SERPL-MCNC: 7.9 G/DL (ref 6–8.5)
SODIUM SERPL-SCNC: 141 MMOL/L (ref 136–145)
T4 SERPL-MCNC: 9.14 MCG/DL (ref 4.5–11.7)
TRIGL SERPL-MCNC: 88 MG/DL (ref 0–150)
TSH SERPL DL<=0.005 MIU/L-ACNC: 1.8 MIU/ML (ref 0.27–4.2)
URATE SERPL-MCNC: 6.1 MG/DL (ref 2.4–5.7)
VLDLC SERPL CALC-MCNC: 17.6 MG/DL (ref 5–40)

## 2017-04-11 ENCOUNTER — OFFICE VISIT (OUTPATIENT)
Dept: ENDOCRINOLOGY | Age: 67
End: 2017-04-11

## 2017-04-11 VITALS — HEIGHT: 69 IN | SYSTOLIC BLOOD PRESSURE: 132 MMHG | DIASTOLIC BLOOD PRESSURE: 78 MMHG

## 2017-04-11 DIAGNOSIS — E55.9 VITAMIN D DEFICIENCY: ICD-10-CM

## 2017-04-11 DIAGNOSIS — Z79.4 CONTROLLED TYPE 2 DIABETES MELLITUS WITHOUT COMPLICATION, WITH LONG-TERM CURRENT USE OF INSULIN (HCC): ICD-10-CM

## 2017-04-11 DIAGNOSIS — I10 ESSENTIAL HYPERTENSION: ICD-10-CM

## 2017-04-11 DIAGNOSIS — E79.0 ELEVATED BLOOD URIC ACID LEVEL: ICD-10-CM

## 2017-04-11 DIAGNOSIS — E78.5 HYPERLIPIDEMIA, UNSPECIFIED HYPERLIPIDEMIA TYPE: ICD-10-CM

## 2017-04-11 DIAGNOSIS — E11.9 CONTROLLED TYPE 2 DIABETES MELLITUS WITHOUT COMPLICATION, WITH LONG-TERM CURRENT USE OF INSULIN (HCC): ICD-10-CM

## 2017-04-11 DIAGNOSIS — IMO0002 UNCONTROLLED TYPE 2 DIABETES MELLITUS WITH OTHER SPECIFIED COMPLICATION: Primary | ICD-10-CM

## 2017-04-11 PROCEDURE — 99214 OFFICE O/P EST MOD 30 MIN: CPT | Performed by: NURSE PRACTITIONER

## 2017-04-11 RX ORDER — ALLOPURINOL 100 MG/1
100 TABLET ORAL DAILY
Qty: 90 TABLET | Refills: 1 | Status: SHIPPED | OUTPATIENT
Start: 2017-04-11 | End: 2017-09-20 | Stop reason: SDUPTHER

## 2017-04-11 NOTE — PROGRESS NOTES
Feli Del Toro  presents to the office  for the follow-up appointment for Type 2 diabetes mellitus.     The diabete's condition location is throughout the system with the  clinical course has been stable, the severity is Mild, and the modifying/allievating factors are insulin.  Medications and  Dosages were  reviewed with Feli Del Toro and suggested that compliance most of the time.    Associated symptoms of hyperglycemia have been itchy fingers and uneasy feeling.  Associated symptoms of hypoglycemia have been jitteriness. Patient reports  hypoglycemia threshold for symptoms is 80 mg/dl .     The patient is currently on insulin.    Compliance with blood glucose monitoring: good.     Meal panning: The patient is using avoidance of concentrated sweets.    The patient is currently taking home blood tests - Blood glucose testing: 3 times daily, that are:  fasting- 1st thing in morning before eating or drinking  before each meal   basal insulIn  Levemir U100 32 units in PM  Humalog U100  10 units at breakfast, 12 units at lunch, 14 units at dinner     Last instructions given were:      in summary I saw and examined this 65-year-old female for above-mentioned problems. I reviewed her laboratory evaluation of 2016 and provided her with a hard copy of it. She is clinically and metabolically stable and therefore we will go ahead and continue all her current prescriptions. Her vitamin D level is however very low and we will go ahead and start her on 50,000 units per week.    Home blood glucose testing daily: 3  FB to 104 mg/dl  before lunch 104 to 130 mg/dl  before dinner/supper 100 to 150 mg/dl    Last reported blood glucose readings are: None.    Patterns reported per patient are that her B/S are doing pretty good right now. Patient does state she needs a refill of humalog.         The following portions of the patient's history were reviewed and updated as appropriate: current medications, past family history,  past medical history, past social history, past surgical history and problem list.      Current Outpatient Prescriptions:   •  aspirin 81 MG tablet, Take 81 mg by mouth daily., Disp: , Rfl:   •  calcium carbonate (TUMS) 500 MG chewable tablet, Chew., Disp: , Rfl:   •  fluticasone (FLONASE) 50 MCG/ACT nasal spray, , Disp: , Rfl:   •  insulin detemir (LEVEMIR FLEXTOUCH) 100 UNIT/ML injection, Inject 22 Units under the skin Every Night., Disp: , Rfl:   •  Insulin Lispro (HUMALOG KWIKPEN) 100 UNIT/ML solution pen-injector, 15u AC and sliding scale, Disp: 6 pen, Rfl: 1  •  Insulin Pen Needle 32G X 6 MM misc, Use as directed five times daily and as needed., Disp: 180 each, Rfl: 3  •  LEVEMIR FLEXTOUCH 100 UNIT/ML injection, INJECT 50 UNITS DAILY AND TITRATE AS INSTRUCTED, Disp: 60 mL, Rfl: 0  •  MAGNESIUM OXIDE PO, Take 400 mg by mouth daily., Disp: , Rfl:   •  meclizine (ANTIVERT) 25 MG tablet, Take 25 mg by mouth 3 (Three) Times a Day As Needed., Disp: , Rfl:   •  ONETOUCH DELICA LANCETS 33G misc, USE TO TEST BLOOD SUGAR 3 TO 4 TIMES DAILY, Disp: 120 each, Rfl: 5  •  ONETOUCH VERIO test strip, TEST BLOOD GLUCOSE 3 TO 4 TIMES DAILY, Disp: 150 each, Rfl: 4  •  ONGLYZA 5 MG tablet, TAKE 1 TABLET DAILY, Disp: 90 tablet, Rfl: 0  •  oxyCODONE-acetaminophen (ROXICET) 5-325 MG per tablet, Take 1 tablet by mouth Every 6 (Six) Hours As Needed for moderate pain (4-6)., Disp: 15 tablet, Rfl: 0  •  telmisartan-hydrochlorothiazide (MICARDIS HCT) 40-12.5 MG per tablet, Take 1 tablet by mouth daily, Disp: 90 tablet, Rfl: 3  •  vitamin D (ERGOCALCIFEROL) 55323 UNITS capsule capsule, Take 1 capsule by mouth 1 (One) Time Per Week., Disp: 13 capsule, Rfl: 3  •  XIGDUO XR  MG tablet sustained-release 24 hour, TAKE 1 TABLET DAILY, Disp: 90 tablet, Rfl: 0  •  allopurinol (ZYLOPRIM) 100 MG tablet, Take 1 tablet by mouth Daily., Disp: 90 tablet, Rfl: 1  •  atorvastatin (LIPITOR) 40 MG tablet, Take 1 tablet (40 mg total) by mouth daily  "for 90 days, Disp: 90 tablet, Rfl: 3    Patient Active Problem List    Diagnosis   • Obesity (BMI 30-39.9) [E66.9]   • Osteomyelitis of toe of left foot [M86.9]   • Sepsis [A41.9]   • Type 2 diabetes mellitus with peripheral neuropathy [E11.42]   • Type 2 diabetes mellitus with complication, with long-term current use of insulin [E11.8, Z79.4]   • Nonproliferative diabetic retinopathy [E11.3299]   • Type 2 diabetes mellitus with microalbuminuria [E11.29, R80.9]   • Primary malignant neoplasm of colon [C18.9]   • Iron deficiency anemia [D50.9]   • Breast lesion [N64.9]   • 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, T45.1X5A]   • Encounter for adjustment or management of vascular access device [Z45.2]   • Vitamin D deficiency [E55.9]   • Morbid obesity due to excess calories [E66.01]   • Hyperlipidemia [E78.5]   • Essential hypertension [I10]   • Allergic rhinitis due to pollen [J30.1]       Review of Systems   A comprehensive review of the 14 systems was negative except of listed below:  Endocrine: hyperglycemia     Objective:     Wt Readings from Last 3 Encounters:   02/11/17 254 lb (115 kg)   02/10/17 258 lb 9.6 oz (117 kg)   12/30/16 251 lb 1.6 oz (114 kg)     Temp Readings from Last 3 Encounters:   02/11/17 97.5 °F (36.4 °C) (Tympanic)   02/10/17 97.4 °F (36.3 °C) (Oral)   01/03/17 97.5 °F (36.4 °C) (Oral)     BP Readings from Last 3 Encounters:   04/11/17 132/78   02/11/17 136/74   02/10/17 138/80     Pulse Readings from Last 3 Encounters:   02/11/17 91   02/10/17 76   01/03/17 75        /78  Ht 69\" (175.3 cm)  LMP  (LMP Unknown)    General appearance:  alert, cooperative, delirious, fatigued, nourished\" \"appears stated age and cooperative\" \"NAD   Neck: no carotid bruit, supple, symmetrical, trachea midline and thyroid not enlarged, symmetric, no tenderness/mass/nodules   Thyroid:  no palpable nodule, no enlargement, no tenderness   Lung:  \"clear to auscultation bilaterally\" \"no abnormal breath " "sounds  \" effort was normal, no labored breath, no use of accessory muscles.   Heart: regular rate and rhythm, S1, S2 normal, no murmur, click, rub or gallop      Abdomen:  normal bowel sounds- 4 quads, soft non-tender, contour - rounded and contour - obese      Extremities: extremities normal, atraumatic, no cyanosis or edema extremities normal, atraumatic, no cyanosis or edema\" WNL - gait and station, strength and stability\"       Skin:   Pulses:  warm and dry, no hyperpigmentation, normal skin coloring, or suspicious lesions   2+ and symmetric   Neuro: Alert and oriented x3. Gait normal. Reflexes and motor strength normal and symmetric. Cranial nerves 2-12 and sensation grossly intact.      Psych: behavior - normal, judgement - normal, mood - normal, affect - normal                 Lab Review  Results for orders placed or performed in visit on 04/01/17   T4 & TSH ([facility])   Result Value Ref Range    TSH 1.800 0.270 - 4.200 mIU/mL    T4, Total 9.14 4.50 - 11.70 mcg/dL   Uric Acid   Result Value Ref Range    Uric Acid 6.1 (H) 2.4 - 5.7 mg/dL   Lipid Panel   Result Value Ref Range    Total Cholesterol 189 0 - 200 mg/dL    Triglycerides 88 0 - 150 mg/dL    HDL Cholesterol 91 (H) 40 - 60 mg/dL    VLDL Cholesterol 17.6 5 - 40 mg/dL    LDL Cholesterol  80 0 - 100 mg/dL   Hemoglobin A1c   Result Value Ref Range    Hemoglobin A1C 6.30 (H) 4.80 - 5.60 %   C-Peptide   Result Value Ref Range    C-Peptide 4.5 (H) 1.1 - 4.4 ng/mL   Comprehensive Metabolic Panel   Result Value Ref Range    Glucose 136 (H) 65 - 99 mg/dL    BUN 21 8 - 23 mg/dL    Creatinine 1.03 (H) 0.57 - 1.00 mg/dL    eGFR Non African Am 54 (L) >60 mL/min/1.73    eGFR African Am 65 >60 mL/min/1.73    BUN/Creatinine Ratio 20.4 7.0 - 25.0    Sodium 141 136 - 145 mmol/L    Potassium 4.2 3.5 - 5.2 mmol/L    Chloride 97 (L) 98 - 107 mmol/L    Total CO2 29.0 22.0 - 29.0 mmol/L    Calcium 10.3 8.6 - 10.5 mg/dL    Total Protein 7.9 6.0 - 8.5 g/dL    Albumin 4.20 3.50 " - 5.20 g/dL    Globulin 3.7 gm/dL    A/G Ratio 1.1 g/dL    Total Bilirubin 0.2 0.1 - 1.2 mg/dL    Alkaline Phosphatase 148 (H) 39 - 117 U/L    AST (SGOT) 20 1 - 32 U/L    ALT (SGPT) 24 1 - 33 U/L   MicroAlbumin, Urine, Random   Result Value Ref Range    Microalbumin, Urine 30.0 Not Estab. ug/mL           Assessment:   Feli was seen today for follow-up.    Diagnoses and all orders for this visit:    Uncontrolled type 2 diabetes mellitus with other specified complication    Essential hypertension    Vitamin D deficiency    Hyperlipidemia, unspecified hyperlipidemia type    Elevated blood uric acid level    Controlled type 2 diabetes mellitus without complication, with long-term current use of insulin  -     Insulin Lispro (HUMALOG KWIKPEN) 100 UNIT/ML solution pen-injector; 15u AC and sliding scale    Other orders  -     allopurinol (ZYLOPRIM) 100 MG tablet; Take 1 tablet by mouth Daily.          Plan:    Education:  interpretation of lab results, blood sugar goals, complications of diabetes mellitus, hypoglycemia prevention and treatment, exercise, illness management, self-monitoring of blood glucose skills, nutrition, carbohydrate counting, site rotation, use of insulin pen, insulin adjustments, self-injection of insulin, use of sliding scale/correction formula and use of insulin: carb ratio    home blood tests -  Blood glucose testin times daily, that are:  fasting- 1st thing in morning before eating or drinking  before each meal and 1 or 2 hours after meal  bedtime  anytime you feel symptoms of hyperglycemia or hypoglycemia (high or low blood sugars)         Office visit 25  minutes with additional education given 13 minutes - SMBG with goals, medication changes with purposes and s/e profiles.       Return in about 4 months (around 2017). 3- months with Tara-1 week prior for labs 7 months with Dr. Moran-one week prior for labs        Dragon transcription disclaimer     Much of this encounter note is an  electronic transcription/translation of spoken language to printed text. The electronic translation of spoken language may permit erroneous, or at times, nonsensical words or phrases to be inadvertently transcribed. Although I have reviewed the note for such errors, some may still exist.

## 2017-04-12 ENCOUNTER — TELEPHONE (OUTPATIENT)
Dept: ENDOCRINOLOGY | Age: 67
End: 2017-04-12

## 2017-04-12 NOTE — TELEPHONE ENCOUNTER
----- Message from Gege Greenberg sent at 4/11/2017 10:59 AM EDT -----  Patient has concerns about her paperwork, as she was leaving she noticed on her check out paper work that two of her medications Might be the same and would like to discuss this with either Tara Olivares or her medical assistant She would like a call on her home number

## 2017-04-14 ENCOUNTER — TELEPHONE (OUTPATIENT)
Dept: ENDOCRINOLOGY | Age: 67
End: 2017-04-14

## 2017-04-14 DIAGNOSIS — E11.9 DIABETES TYPE 2, CONTROLLED (HCC): ICD-10-CM

## 2017-04-14 RX ORDER — SAXAGLIPTIN 5 MG/1
TABLET, FILM COATED ORAL
Qty: 90 TABLET | Refills: 0 | Status: SHIPPED | OUTPATIENT
Start: 2017-04-14 | End: 2017-07-13 | Stop reason: SDUPTHER

## 2017-04-14 RX ORDER — DAPAGLIFLOZIN AND METFORMIN HYDROCHLORIDE 10; 1000 MG/1; MG/1
TABLET, FILM COATED, EXTENDED RELEASE ORAL
Qty: 90 TABLET | Refills: 0 | Status: SHIPPED | OUTPATIENT
Start: 2017-04-14 | End: 2017-07-13 | Stop reason: SDUPTHER

## 2017-04-14 NOTE — TELEPHONE ENCOUNTER
----- Message from Ting Rodas sent at 4/13/2017 11:34 AM EDT -----  Contact: patient  Returned your call

## 2017-04-27 RX ORDER — SODIUM CHLORIDE 0.9 % (FLUSH) 0.9 %
10 SYRINGE (ML) INJECTION AS NEEDED
Status: CANCELLED | OUTPATIENT
Start: 2017-05-05

## 2017-05-05 ENCOUNTER — HOSPITAL ENCOUNTER (OUTPATIENT)
Dept: PET IMAGING | Facility: HOSPITAL | Age: 67
Discharge: HOME | End: 2017-05-05
Attending: INTERNAL MEDICINE | Admitting: INTERNAL MEDICINE

## 2017-05-05 ENCOUNTER — INFUSION (OUTPATIENT)
Dept: ONCOLOGY | Facility: HOSPITAL | Age: 67
End: 2017-05-05

## 2017-05-05 DIAGNOSIS — C78.7 LIVER METASTASIS: ICD-10-CM

## 2017-05-05 DIAGNOSIS — Z45.2 ENCOUNTER FOR ADJUSTMENT OR MANAGEMENT OF VASCULAR ACCESS DEVICE: ICD-10-CM

## 2017-05-05 DIAGNOSIS — C18.9 PRIMARY MALIGNANT NEOPLASM OF COLON (HCC): ICD-10-CM

## 2017-05-05 DIAGNOSIS — C18.9 PRIMARY MALIGNANT NEOPLASM OF COLON (HCC): Primary | ICD-10-CM

## 2017-05-05 LAB
ALBUMIN SERPL-MCNC: 3.8 G/DL (ref 3.5–5.2)
ALBUMIN/GLOB SERPL: 1 G/DL (ref 1.1–2.4)
ALP SERPL-CCNC: 140 U/L (ref 38–116)
ALT SERPL W P-5'-P-CCNC: 28 U/L (ref 0–33)
ANION GAP SERPL CALCULATED.3IONS-SCNC: 13.3 MMOL/L
AST SERPL-CCNC: 30 U/L (ref 0–32)
BASOPHILS # BLD AUTO: 0.04 10*3/MM3 (ref 0–0.1)
BASOPHILS NFR BLD AUTO: 0.5 % (ref 0–1.1)
BILIRUB SERPL-MCNC: 0.3 MG/DL (ref 0.1–1.2)
BUN BLD-MCNC: 20 MG/DL (ref 6–20)
BUN/CREAT SERPL: 21.5 (ref 7.3–30)
CALCIUM SPEC-SCNC: 9.1 MG/DL (ref 8.5–10.2)
CEA SERPL-MCNC: 3.54 NG/ML
CHLORIDE SERPL-SCNC: 99 MMOL/L (ref 98–107)
CO2 SERPL-SCNC: 28.7 MMOL/L (ref 22–29)
CREAT BLD-MCNC: 0.93 MG/DL (ref 0.6–1.1)
CREAT BLDA-MCNC: 0.9 MG/DL (ref 0.6–1.3)
DEPRECATED RDW RBC AUTO: 51.5 FL (ref 37–49)
EOSINOPHIL # BLD AUTO: 0.56 10*3/MM3 (ref 0–0.36)
EOSINOPHIL NFR BLD AUTO: 6.4 % (ref 1–5)
ERYTHROCYTE [DISTWIDTH] IN BLOOD BY AUTOMATED COUNT: 16.6 % (ref 11.7–14.5)
GFR SERPL CREATININE-BSD FRML MDRD: 73 ML/MIN/1.73
GLOBULIN UR ELPH-MCNC: 3.8 GM/DL (ref 1.8–3.5)
GLUCOSE BLD-MCNC: 109 MG/DL (ref 74–124)
HCT VFR BLD AUTO: 40.2 % (ref 34–45)
HGB BLD-MCNC: 12.9 G/DL (ref 11.5–14.9)
IMM GRANULOCYTES # BLD: 0.05 10*3/MM3 (ref 0–0.03)
IMM GRANULOCYTES NFR BLD: 0.6 % (ref 0–0.5)
LYMPHOCYTES # BLD AUTO: 2.03 10*3/MM3 (ref 1–3.5)
LYMPHOCYTES NFR BLD AUTO: 23.3 % (ref 20–49)
MCH RBC QN AUTO: 27.3 PG (ref 27–33)
MCHC RBC AUTO-ENTMCNC: 32.1 G/DL (ref 32–35)
MCV RBC AUTO: 85 FL (ref 83–97)
MONOCYTES # BLD AUTO: 1.04 10*3/MM3 (ref 0.25–0.8)
MONOCYTES NFR BLD AUTO: 11.9 % (ref 4–12)
NEUTROPHILS # BLD AUTO: 5 10*3/MM3 (ref 1.5–7)
NEUTROPHILS NFR BLD AUTO: 57.3 % (ref 39–75)
NRBC BLD MANUAL-RTO: 0 /100 WBC (ref 0–0)
PLATELET # BLD AUTO: 358 10*3/MM3 (ref 150–375)
PMV BLD AUTO: 8.5 FL (ref 8.9–12.1)
POTASSIUM BLD-SCNC: 4.1 MMOL/L (ref 3.5–4.7)
PROT SERPL-MCNC: 7.6 G/DL (ref 6.3–8)
RBC # BLD AUTO: 4.73 10*6/MM3 (ref 3.9–5)
SODIUM BLD-SCNC: 141 MMOL/L (ref 134–145)
WBC NRBC COR # BLD: 8.72 10*3/MM3 (ref 4–10)

## 2017-05-05 PROCEDURE — 0 IOPAMIDOL 61 % SOLUTION: Performed by: INTERNAL MEDICINE

## 2017-05-05 PROCEDURE — 74177 CT ABD & PELVIS W/CONTRAST: CPT

## 2017-05-05 PROCEDURE — 0 DIATRIZOATE MEGLUMINE & SODIUM PER 1 ML: Performed by: INTERNAL MEDICINE

## 2017-05-05 PROCEDURE — 82378 CARCINOEMBRYONIC ANTIGEN: CPT | Performed by: INTERNAL MEDICINE

## 2017-05-05 PROCEDURE — 85025 COMPLETE CBC W/AUTO DIFF WBC: CPT | Performed by: INTERNAL MEDICINE

## 2017-05-05 PROCEDURE — 96523 IRRIG DRUG DELIVERY DEVICE: CPT | Performed by: INTERNAL MEDICINE

## 2017-05-05 PROCEDURE — 80053 COMPREHEN METABOLIC PANEL: CPT | Performed by: INTERNAL MEDICINE

## 2017-05-05 PROCEDURE — 71260 CT THORAX DX C+: CPT

## 2017-05-05 PROCEDURE — 82565 ASSAY OF CREATININE: CPT

## 2017-05-05 RX ORDER — SODIUM CHLORIDE 0.9 % (FLUSH) 0.9 %
10 SYRINGE (ML) INJECTION AS NEEDED
Status: DISCONTINUED | OUTPATIENT
Start: 2017-05-05 | End: 2017-05-05 | Stop reason: HOSPADM

## 2017-05-05 RX ORDER — SODIUM CHLORIDE 0.9 % (FLUSH) 0.9 %
10 SYRINGE (ML) INJECTION AS NEEDED
Status: CANCELLED | OUTPATIENT
Start: 2017-05-05

## 2017-05-05 RX ADMIN — IOPAMIDOL 85 ML: 612 INJECTION, SOLUTION INTRAVENOUS at 08:45

## 2017-05-05 RX ADMIN — DIATRIZOATE MEGLUMINE AND DIATRIZOATE SODIUM 30 ML: 660; 100 LIQUID ORAL; RECTAL at 08:00

## 2017-05-05 RX ADMIN — Medication 10 ML: at 07:57

## 2017-05-12 ENCOUNTER — APPOINTMENT (OUTPATIENT)
Dept: LAB | Facility: HOSPITAL | Age: 67
End: 2017-05-12

## 2017-05-12 ENCOUNTER — OFFICE VISIT (OUTPATIENT)
Dept: ONCOLOGY | Facility: CLINIC | Age: 67
End: 2017-05-12

## 2017-05-12 VITALS
WEIGHT: 262.8 LBS | HEIGHT: 66 IN | DIASTOLIC BLOOD PRESSURE: 76 MMHG | HEART RATE: 74 BPM | TEMPERATURE: 97.7 F | SYSTOLIC BLOOD PRESSURE: 140 MMHG | RESPIRATION RATE: 16 BRPM | BODY MASS INDEX: 42.23 KG/M2 | OXYGEN SATURATION: 96 %

## 2017-05-12 DIAGNOSIS — Z45.2 ENCOUNTER FOR ADJUSTMENT OR MANAGEMENT OF VASCULAR ACCESS DEVICE: ICD-10-CM

## 2017-05-12 DIAGNOSIS — D50.9 IRON DEFICIENCY ANEMIA, UNSPECIFIED IRON DEFICIENCY ANEMIA TYPE: ICD-10-CM

## 2017-05-12 DIAGNOSIS — C78.7 LIVER METASTASIS: ICD-10-CM

## 2017-05-12 DIAGNOSIS — C18.9 PRIMARY MALIGNANT NEOPLASM OF COLON (HCC): Primary | ICD-10-CM

## 2017-05-12 PROCEDURE — 99215 OFFICE O/P EST HI 40 MIN: CPT | Performed by: INTERNAL MEDICINE

## 2017-05-12 PROCEDURE — G0463 HOSPITAL OUTPT CLINIC VISIT: HCPCS | Performed by: INTERNAL MEDICINE

## 2017-05-26 ENCOUNTER — TELEPHONE (OUTPATIENT)
Dept: ONCOLOGY | Facility: CLINIC | Age: 67
End: 2017-05-26

## 2017-06-06 ENCOUNTER — TELEPHONE (OUTPATIENT)
Dept: ENDOCRINOLOGY | Age: 67
End: 2017-06-06

## 2017-06-06 NOTE — TELEPHONE ENCOUNTER
----- Message from EMMA Gaspar sent at 6/6/2017 12:39 PM EDT -----  Contact: PATIENT  Have the  patient take half of Levemir dose the night before.   the basal insulin  will not drop her blood glucose.  After she gets home she can take the other half.  The next day continue to take her full dose basal insulin as previously prescribed.      ----- Message -----     From: Christy Storm MA     Sent: 6/6/2017  11:38 AM       To: EMMA Gaspar    Please advise.     ----- Message -----     From: Ree Turcios     Sent: 6/6/2017  11:18 AM       To: Christy Storm MA    THE PATIENT IS HAVING A CT MICROWAVE ABLATION DONE AT [facility] THIS Thursday. THEY AVE TOLD HER TO NOT TAKE HER 45 UNITS OF LEVEMIR THE NIGHT BEFORE AND SHE WANTS TO MAKE SURE THIS WOULD BE OK TO DO. PLEASE CALL TO ADVISE.

## 2017-06-08 DIAGNOSIS — C18.9 PRIMARY MALIGNANT NEOPLASM OF COLON (HCC): Primary | ICD-10-CM

## 2017-06-09 ENCOUNTER — TELEPHONE (OUTPATIENT)
Dept: ONCOLOGY | Facility: CLINIC | Age: 67
End: 2017-06-09

## 2017-06-12 ENCOUNTER — TELEPHONE (OUTPATIENT)
Dept: ONCOLOGY | Facility: CLINIC | Age: 67
End: 2017-06-12

## 2017-06-12 ENCOUNTER — TELEPHONE (OUTPATIENT)
Dept: GENERAL RADIOLOGY | Facility: HOSPITAL | Age: 67
End: 2017-06-12

## 2017-06-12 NOTE — TELEPHONE ENCOUNTER
----- Message from Valery Zhang RN sent at 6/12/2017 11:56 AM EDT -----  Per Dr. Brown, reschedule tomorrows appt to next week with him.  # [phone]

## 2017-06-12 NOTE — TELEPHONE ENCOUNTER
Patient called and said her appt with Dr. Ruano got rescheduled to this Friday.  Per Dr. Brown, will reschedule tomorrows appt with him to sometime next week.  Message sent to appt desk to reschedule.

## 2017-06-13 ENCOUNTER — APPOINTMENT (OUTPATIENT)
Dept: ONCOLOGY | Facility: HOSPITAL | Age: 67
End: 2017-06-13

## 2017-06-13 ENCOUNTER — APPOINTMENT (OUTPATIENT)
Dept: ONCOLOGY | Facility: CLINIC | Age: 67
End: 2017-06-13

## 2017-06-21 ENCOUNTER — INFUSION (OUTPATIENT)
Dept: ONCOLOGY | Facility: HOSPITAL | Age: 67
End: 2017-06-21

## 2017-06-21 ENCOUNTER — OFFICE VISIT (OUTPATIENT)
Dept: ONCOLOGY | Facility: CLINIC | Age: 67
End: 2017-06-21

## 2017-06-21 VITALS
SYSTOLIC BLOOD PRESSURE: 112 MMHG | OXYGEN SATURATION: 97 % | TEMPERATURE: 98.7 F | DIASTOLIC BLOOD PRESSURE: 62 MMHG | HEART RATE: 91 BPM | BODY MASS INDEX: 41.53 KG/M2 | HEIGHT: 66 IN | WEIGHT: 258.4 LBS | RESPIRATION RATE: 16 BRPM

## 2017-06-21 DIAGNOSIS — Z45.2 ENCOUNTER FOR ADJUSTMENT OR MANAGEMENT OF VASCULAR ACCESS DEVICE: ICD-10-CM

## 2017-06-21 DIAGNOSIS — C78.7 LIVER METASTASIS: ICD-10-CM

## 2017-06-21 DIAGNOSIS — C18.9 PRIMARY MALIGNANT NEOPLASM OF COLON (HCC): Primary | ICD-10-CM

## 2017-06-21 LAB
BASOPHILS # BLD AUTO: 0.06 10*3/MM3 (ref 0–0.1)
BASOPHILS NFR BLD AUTO: 0.5 % (ref 0–1.1)
DEPRECATED RDW RBC AUTO: 53.4 FL (ref 37–49)
EOSINOPHIL # BLD AUTO: 0.27 10*3/MM3 (ref 0–0.36)
EOSINOPHIL NFR BLD AUTO: 2 % (ref 1–5)
ERYTHROCYTE [DISTWIDTH] IN BLOOD BY AUTOMATED COUNT: 16.6 % (ref 11.7–14.5)
HCT VFR BLD AUTO: 38.6 % (ref 34–45)
HGB BLD-MCNC: 12.1 G/DL (ref 11.5–14.9)
HOLD SPECIMEN: NORMAL
IMM GRANULOCYTES # BLD: 0.21 10*3/MM3 (ref 0–0.03)
IMM GRANULOCYTES NFR BLD: 1.6 % (ref 0–0.5)
LYMPHOCYTES # BLD AUTO: 2.09 10*3/MM3 (ref 1–3.5)
LYMPHOCYTES NFR BLD AUTO: 15.8 % (ref 20–49)
MCH RBC QN AUTO: 27.4 PG (ref 27–33)
MCHC RBC AUTO-ENTMCNC: 31.3 G/DL (ref 32–35)
MCV RBC AUTO: 87.3 FL (ref 83–97)
MONOCYTES # BLD AUTO: 1.74 10*3/MM3 (ref 0.25–0.8)
MONOCYTES NFR BLD AUTO: 13.2 % (ref 4–12)
NEUTROPHILS # BLD AUTO: 8.85 10*3/MM3 (ref 1.5–7)
NEUTROPHILS NFR BLD AUTO: 66.9 % (ref 39–75)
NRBC BLD MANUAL-RTO: 0.2 /100 WBC (ref 0–0)
PLATELET # BLD AUTO: 184 10*3/MM3 (ref 150–375)
PMV BLD AUTO: 9.8 FL (ref 8.9–12.1)
RBC # BLD AUTO: 4.42 10*6/MM3 (ref 3.9–5)
WBC NRBC COR # BLD: 13.22 10*3/MM3 (ref 4–10)

## 2017-06-21 PROCEDURE — 99215 OFFICE O/P EST HI 40 MIN: CPT | Performed by: INTERNAL MEDICINE

## 2017-06-21 PROCEDURE — 96523 IRRIG DRUG DELIVERY DEVICE: CPT | Performed by: INTERNAL MEDICINE

## 2017-06-21 PROCEDURE — 25010000002 HEPARIN FLUSH (PORCINE) 100 UNIT/ML SOLUTION: Performed by: INTERNAL MEDICINE

## 2017-06-21 PROCEDURE — 85025 COMPLETE CBC W/AUTO DIFF WBC: CPT | Performed by: INTERNAL MEDICINE

## 2017-06-21 RX ORDER — SODIUM CHLORIDE 9 MG/ML
250 INJECTION, SOLUTION INTRAVENOUS ONCE
Status: CANCELLED | OUTPATIENT
Start: 2017-06-29

## 2017-06-21 RX ORDER — SODIUM CHLORIDE 0.9 % (FLUSH) 0.9 %
10 SYRINGE (ML) INJECTION AS NEEDED
Status: DISCONTINUED | OUTPATIENT
Start: 2017-06-21 | End: 2017-06-21 | Stop reason: HOSPADM

## 2017-06-21 RX ORDER — SODIUM CHLORIDE 0.9 % (FLUSH) 0.9 %
10 SYRINGE (ML) INJECTION AS NEEDED
Status: CANCELLED | OUTPATIENT
Start: 2017-06-21

## 2017-06-21 RX ORDER — ATROPINE SULFATE 1 MG/ML
0.25 INJECTION, SOLUTION INTRAMUSCULAR; INTRAVENOUS; SUBCUTANEOUS
Status: CANCELLED | OUTPATIENT
Start: 2017-06-29

## 2017-06-21 RX ORDER — ONDANSETRON HYDROCHLORIDE 8 MG/1
8 TABLET, FILM COATED ORAL 3 TIMES DAILY PRN
Qty: 30 TABLET | Refills: 5 | Status: SHIPPED | OUTPATIENT
Start: 2017-06-21 | End: 2018-03-16 | Stop reason: HOSPADM

## 2017-06-21 RX ORDER — FLUOROURACIL 50 MG/ML
400 INJECTION, SOLUTION INTRAVENOUS ONCE
Status: CANCELLED | OUTPATIENT
Start: 2017-06-29

## 2017-06-21 RX ORDER — PALONOSETRON 0.05 MG/ML
0.25 INJECTION, SOLUTION INTRAVENOUS ONCE
Status: CANCELLED | OUTPATIENT
Start: 2017-06-29

## 2017-06-21 RX ADMIN — Medication 10 ML: at 08:56

## 2017-06-21 RX ADMIN — SODIUM CHLORIDE, PRESERVATIVE FREE 500 UNITS: 5 INJECTION INTRAVENOUS at 08:57

## 2017-06-21 NOTE — PROGRESS NOTES
Subjective     CHIEF COMPLAINT:    1. Metastatic colon cancer (KRAS mutation positive) with liver metastasis, biopsy confirmed. No resection of primary sigmoid colon cancer.   2. Recurrent Iron deficiency anemia   3. Palliative chemotherapy with FOLFOX-6 was given from June 2013 to November 2013.   4. The patient underwent left partial colectomy, splenectomy, resection of left diaphragm with repair and partial hepatectomy with microwave ablation of metastasis on 01/14/2014.   5. The patient received FOLFIRI x12 cycles between 03/07/2014 through 08/07/2014.   6. CT scan on 05/28/2015 revealed a new liver lesion. Stereotactic radiation therapy with CyberKnife was delivered in 3 fractions, completed on 07/14/2015.        7.  New liver lesion was identified on CT on 5/5/17. CT guided microwave ablation of liver mass on 6/16/17    HISTORY OF PRESENT ILLNESS:     Feli Del Toro is a 66 y.o. patient with the above medical history.  She returns today for follow-up.  She underwent CT-guided microwave ablation of the liver metastasis on 6/6/17.  She tolerated the procedure except for fatigue.  She did not develop abdominal pain.  She noticed a change in the urine color for 2 days but it is now back to normal.      Past Medical History:   Diagnosis Date   • Anemia    • Cancer 06/04/2013   • Diabetes mellitus    • History of transfusion    • Hyperlipidemia    • Hypertension    • Metastatic colon cancer to liver    • Retinopathy    • Type 2 diabetes mellitus        Past Surgical History:   Procedure Laterality Date   • AMPUTATION DIGIT Left 1/1/2017    Procedure: LEFT SECOND TOE AMPUTATION;  Surgeon: Farzad Nichols MD;  Location: [facility];  Service:    • AMPUTATION OF REPLICATED TOES      right distal second toe    • CATARACT EXTRACTION     • COLECTOMY PARTIAL / TOTAL  01/14/2014   • HYSTERECTOMY  1998   • PORTACATH PLACEMENT  06/2013   • SALPINGO OOPHORECTOMY Bilateral    • SPLENECTOMY         Cancer-related family  history includes Breast cancer in her sister; Cancer in her other.    SCHEDULED MEDS:       Current Outpatient Prescriptions:   •  allopurinol (ZYLOPRIM) 100 MG tablet, Take 1 tablet by mouth Daily., Disp: 90 tablet, Rfl: 1  •  aspirin 81 MG tablet, Take 81 mg by mouth daily., Disp: , Rfl:   •  calcium carbonate (TUMS) 500 MG chewable tablet, Chew., Disp: , Rfl:   •  fluticasone (FLONASE) 50 MCG/ACT nasal spray, , Disp: , Rfl:   •  insulin detemir (LEVEMIR FLEXTOUCH) 100 UNIT/ML injection, Inject 22 Units under the skin Every Night., Disp: , Rfl:   •  Insulin Lispro (HUMALOG KWIKPEN) 100 UNIT/ML solution pen-injector, 15u AC and sliding scale, Disp: 6 pen, Rfl: 1  •  Insulin Pen Needle 32G X 6 MM misc, Use as directed five times daily and as needed., Disp: 180 each, Rfl: 3  •  LEVEMIR FLEXTOUCH 100 UNIT/ML injection, INJECT 50 UNITS DAILY AND TITRATE AS INSTRUCTED, Disp: 60 mL, Rfl: 0  •  MAGNESIUM OXIDE PO, Take 400 mg by mouth daily., Disp: , Rfl:   •  meclizine (ANTIVERT) 25 MG tablet, Take 25 mg by mouth 3 (Three) Times a Day As Needed., Disp: , Rfl:   •  ONETOUCH DELICA LANCETS 33G misc, USE TO TEST BLOOD SUGAR 3 TO 4 TIMES DAILY, Disp: 120 each, Rfl: 5  •  ONETOUCH VERIO test strip, TEST BLOOD GLUCOSE 3 TO 4 TIMES DAILY, Disp: 150 each, Rfl: 4  •  ONGLYZA 5 MG tablet, TAKE 1 TABLET DAILY, Disp: 90 tablet, Rfl: 0  •  telmisartan-hydrochlorothiazide (MICARDIS HCT) 40-12.5 MG per tablet, Take 1 tablet by mouth daily, Disp: 90 tablet, Rfl: 3  •  vitamin D (ERGOCALCIFEROL) 10242 UNITS capsule capsule, Take 1 capsule by mouth 1 (One) Time Per Week., Disp: 13 capsule, Rfl: 3  •  XIGDUO XR  MG tablet sustained-release 24 hour, TAKE 1 TABLET DAILY, Disp: 90 tablet, Rfl: 0  •  atorvastatin (LIPITOR) 40 MG tablet, Take 1 tablet (40 mg total) by mouth daily for 90 days, Disp: 90 tablet, Rfl: 3  No current facility-administered medications for this visit.     REVIEW OF SYSTEMS:  GENERAL:  No fever or chills.  Weight  "gain.  Fatigue.  SKIN:  See history of present illness.  HEME/LYMPH: No anemia, easy bruisability or enlarged lymph nodes.  RESPIRATORY:  No cough, shortness of breath, hemoptysis or wheezing.  CVS:  No chest pain, palpitations or lower extremity swelling.  GI:  Occasional nausea due to the pain medicine.  No abdominal pain, vomiting, constipation, diarrhea, melena or hematochezia.  :  Urine became orange in color after the recent procedure.   MUSCULOSKELETAL:  See history of present illness.  NEUROLOGICAL:  Patient has mild numbness in the fingertips.    Objective   VITAL SIGNS:     Vitals:    06/21/17 0908   BP: 112/62   Pulse: 91   Resp: 16   Temp: 98.7 °F (37.1 °C)   TempSrc: Oral   SpO2: 97%   Weight: 258 lb 6.4 oz (117 kg)   Height: 66.14\" (168 cm)   PainSc: 0-No pain     Body mass index is 41.53 kg/(m^2).     Wt Readings from Last 3 Encounters:   06/21/17 258 lb 6.4 oz (117 kg)   05/12/17 262 lb 12.8 oz (119 kg)   02/11/17 254 lb (115 kg)       PHYSICAL EXAMINATION:  GENERAL:  The patient appears in good general condition, not in acute distress.  SKIN:  No skin rashes or lesions. No ecchymosis or petechiae.  HEAD:  Normocephalic.  EYES:  No jaundice or pallor.   NECK:  Supple with good ROM. No thyromegaly or masses.  LYMPHATICS:  No cervical or supraclavicular lymphadenopathy.  CHEST:  Lungs clear to auscultation. No added sounds.  Port in the left upper chest is benign.  ABDOMEN:  Soft and non-tender. No hepatomegaly. No masses.  No tenderness in the right upper quadrant.  EXTREMITIES:  Right leg is in a brace. No calf tenderness.  .  RESULT REVIEW:       Results from last 7 days  Lab Units 06/21/17  0843   WBC 10*3/mm3 13.22*   NEUTROS ABS 10*3/mm3 8.85*   HEMOGLOBIN g/dL 12.1   HEMATOCRIT % 38.6   PLATELETS 10*3/mm3 184           Lab Results   Component Value Date    CEA 3.54 05/05/2017    CEA 4.01 02/10/2017    CEA 3.94 11/11/2016        Assessment/Plan    1.  Metastatic colon cancer. She had metastases " to the liver.   · She received 12 cycles of FOLFOX-6 and then had resection of the primary tumor along with metastasectomy and splenectomy with thermal ablation of lesions in the liver in January 2014.   · This was followed by 12 cycles of the FOLFIRI regimen completed on 08/07/2014.   · CT scans on 05/28/2015 revealed a new lesion in the liver.  Dr. Ruano referred the patient to Radiation Therapy and she received CyberKnife radiation, completed on 07/14/2015.    · The CT scan from 5/5/17 revealed a new 2.2 cm lesion within the anterior hepatic segment.  CT guided microwave ablation of liver mass on 6/16/17    Due to the development of the new metastatic lesion in the liver, I didn't recommend a six-month course of systemic chemotherapy with FOLFIRI.  I explained the rationale to the patient which is mainly to treat any possible micrometastasis in the liver or elsewhere in the body.  She previously tolerated the FOLFIRI regimen quite well.  She did not have problems with diarrhea or significant cytopenias.  She agreed with the recommendation to start back on chemotherapy with FOLFIRI.  I recommended 12 cycles to be given over 6 months.    2.   History of iron deficiency anemia.  Her hemoglobin remains normal.    We will schedule the patient to start on file in one week.  We'll see her in follow-up in 3 weeks when she is due for cycle #2.      Marivel Brown MD  06/21/17

## 2017-06-29 ENCOUNTER — INFUSION (OUTPATIENT)
Dept: ONCOLOGY | Facility: HOSPITAL | Age: 67
End: 2017-06-29

## 2017-06-29 VITALS
BODY MASS INDEX: 41.53 KG/M2 | WEIGHT: 258.4 LBS | SYSTOLIC BLOOD PRESSURE: 128 MMHG | DIASTOLIC BLOOD PRESSURE: 74 MMHG | HEART RATE: 102 BPM

## 2017-06-29 DIAGNOSIS — C78.7 LIVER METASTASIS: ICD-10-CM

## 2017-06-29 DIAGNOSIS — C18.9 PRIMARY MALIGNANT NEOPLASM OF COLON (HCC): ICD-10-CM

## 2017-06-29 DIAGNOSIS — C78.7 LIVER METASTASIS: Primary | ICD-10-CM

## 2017-06-29 LAB
ALBUMIN SERPL-MCNC: 3 G/DL (ref 3.5–5.2)
ALBUMIN/GLOB SERPL: 0.6 G/DL (ref 1.1–2.4)
ALP SERPL-CCNC: 378 U/L (ref 38–116)
ALT SERPL W P-5'-P-CCNC: 151 U/L (ref 0–33)
ANION GAP SERPL CALCULATED.3IONS-SCNC: 14 MMOL/L
AST SERPL-CCNC: 140 U/L (ref 0–32)
BASOPHILS # BLD AUTO: 0.07 10*3/MM3 (ref 0–0.1)
BASOPHILS NFR BLD AUTO: 0.6 % (ref 0–1.1)
BILIRUB SERPL-MCNC: 0.4 MG/DL (ref 0.1–1.2)
BUN BLD-MCNC: 17 MG/DL (ref 6–20)
BUN/CREAT SERPL: 18.9 (ref 7.3–30)
CALCIUM SPEC-SCNC: 9.2 MG/DL (ref 8.5–10.2)
CEA SERPL-MCNC: 3.77 NG/ML
CHLORIDE SERPL-SCNC: 97 MMOL/L (ref 98–107)
CO2 SERPL-SCNC: 27 MMOL/L (ref 22–29)
CREAT BLD-MCNC: 0.9 MG/DL (ref 0.6–1.1)
DEPRECATED RDW RBC AUTO: 50.2 FL (ref 37–49)
EOSINOPHIL # BLD AUTO: 0.42 10*3/MM3 (ref 0–0.36)
EOSINOPHIL NFR BLD AUTO: 3.7 % (ref 1–5)
ERYTHROCYTE [DISTWIDTH] IN BLOOD BY AUTOMATED COUNT: 16.2 % (ref 11.7–14.5)
GFR SERPL CREATININE-BSD FRML MDRD: 76 ML/MIN/1.73
GLOBULIN UR ELPH-MCNC: 4.8 GM/DL (ref 1.8–3.5)
GLUCOSE BLD-MCNC: 170 MG/DL (ref 74–124)
HCT VFR BLD AUTO: 37 % (ref 34–45)
HGB BLD-MCNC: 12 G/DL (ref 11.5–14.9)
IMM GRANULOCYTES # BLD: 0.21 10*3/MM3 (ref 0–0.03)
IMM GRANULOCYTES NFR BLD: 1.9 % (ref 0–0.5)
LYMPHOCYTES # BLD AUTO: 2.46 10*3/MM3 (ref 1–3.5)
LYMPHOCYTES NFR BLD AUTO: 21.7 % (ref 20–49)
MCH RBC QN AUTO: 27.4 PG (ref 27–33)
MCHC RBC AUTO-ENTMCNC: 32.4 G/DL (ref 32–35)
MCV RBC AUTO: 84.5 FL (ref 83–97)
MONOCYTES # BLD AUTO: 1.05 10*3/MM3 (ref 0.25–0.8)
MONOCYTES NFR BLD AUTO: 9.3 % (ref 4–12)
NEUTROPHILS # BLD AUTO: 7.14 10*3/MM3 (ref 1.5–7)
NEUTROPHILS NFR BLD AUTO: 62.8 % (ref 39–75)
NRBC BLD MANUAL-RTO: 0 /100 WBC (ref 0–0)
PLATELET # BLD AUTO: 586 10*3/MM3 (ref 150–375)
PMV BLD AUTO: 8.4 FL (ref 8.9–12.1)
POTASSIUM BLD-SCNC: 4 MMOL/L (ref 3.5–4.7)
PROT SERPL-MCNC: 7.8 G/DL (ref 6.3–8)
RBC # BLD AUTO: 4.38 10*6/MM3 (ref 3.9–5)
SODIUM BLD-SCNC: 138 MMOL/L (ref 134–145)
WBC NRBC COR # BLD: 11.35 10*3/MM3 (ref 4–10)

## 2017-06-29 PROCEDURE — 25010000002 PALONOSETRON PER 25 MCG: Performed by: INTERNAL MEDICINE

## 2017-06-29 PROCEDURE — 96416 CHEMO PROLONG INFUSE W/PUMP: CPT | Performed by: INTERNAL MEDICINE

## 2017-06-29 PROCEDURE — 96411 CHEMO IV PUSH ADDL DRUG: CPT | Performed by: INTERNAL MEDICINE

## 2017-06-29 PROCEDURE — 82378 CARCINOEMBRYONIC ANTIGEN: CPT | Performed by: INTERNAL MEDICINE

## 2017-06-29 PROCEDURE — 85025 COMPLETE CBC W/AUTO DIFF WBC: CPT

## 2017-06-29 PROCEDURE — 96375 TX/PRO/DX INJ NEW DRUG ADDON: CPT | Performed by: INTERNAL MEDICINE

## 2017-06-29 PROCEDURE — 96415 CHEMO IV INFUSION ADDL HR: CPT | Performed by: INTERNAL MEDICINE

## 2017-06-29 PROCEDURE — 25010000002 DEXAMETHASONE SODIUM PHOSPHATE 10 MG/ML SOLUTION 1 ML VIAL: Performed by: INTERNAL MEDICINE

## 2017-06-29 PROCEDURE — 25010000002 FLUOROURACIL PER 500 MG: Performed by: INTERNAL MEDICINE

## 2017-06-29 PROCEDURE — 80053 COMPREHEN METABOLIC PANEL: CPT

## 2017-06-29 PROCEDURE — 96413 CHEMO IV INFUSION 1 HR: CPT | Performed by: INTERNAL MEDICINE

## 2017-06-29 PROCEDURE — 25010000002 LEUCOVORIN CALCIUM PER 50 MG: Performed by: INTERNAL MEDICINE

## 2017-06-29 PROCEDURE — 96368 THER/DIAG CONCURRENT INF: CPT | Performed by: INTERNAL MEDICINE

## 2017-06-29 PROCEDURE — 25010000002 IRINOTECAN PER 20 MG: Performed by: INTERNAL MEDICINE

## 2017-06-29 RX ORDER — ATROPINE SULFATE 0.4 MG/ML
0.25 AMPUL (ML) INJECTION
Status: DISCONTINUED | OUTPATIENT
Start: 2017-06-29 | End: 2017-06-29 | Stop reason: HOSPADM

## 2017-06-29 RX ORDER — PALONOSETRON 0.05 MG/ML
0.25 INJECTION, SOLUTION INTRAVENOUS ONCE
Status: COMPLETED | OUTPATIENT
Start: 2017-06-29 | End: 2017-06-29

## 2017-06-29 RX ORDER — FLUOROURACIL 50 MG/ML
400 INJECTION, SOLUTION INTRAVENOUS ONCE
Status: COMPLETED | OUTPATIENT
Start: 2017-06-29 | End: 2017-06-29

## 2017-06-29 RX ORDER — SODIUM CHLORIDE 9 MG/ML
250 INJECTION, SOLUTION INTRAVENOUS ONCE
Status: COMPLETED | OUTPATIENT
Start: 2017-06-29 | End: 2017-06-29

## 2017-06-29 RX ADMIN — FLUOROURACIL 890 MG: 50 INJECTION, SOLUTION INTRAVENOUS at 13:17

## 2017-06-29 RX ADMIN — FLUOROURACIL 5350 MG: 50 INJECTION, SOLUTION INTRAVENOUS at 13:17

## 2017-06-29 RX ADMIN — DEXTROSE MONOHYDRATE 400 MG: 5 INJECTION, SOLUTION INTRAVENOUS at 11:37

## 2017-06-29 RX ADMIN — SODIUM CHLORIDE 250 ML: 900 INJECTION, SOLUTION INTRAVENOUS at 11:07

## 2017-06-29 RX ADMIN — PALONOSETRON HYDROCHLORIDE 0.25 MG: 0.25 INJECTION INTRAVENOUS at 11:08

## 2017-06-29 RX ADMIN — ATROPINE SULFATE 0.25 MG: 0.4 INJECTION, SOLUTION INTRAMUSCULAR; INTRAVENOUS; SUBCUTANEOUS at 11:09

## 2017-06-29 RX ADMIN — LEUCOVORIN CALCIUM 890 MG: 350 INJECTION, POWDER, LYOPHILIZED, FOR SOLUTION INTRAMUSCULAR; INTRAVENOUS at 11:37

## 2017-06-29 RX ADMIN — DEXAMETHASONE SODIUM PHOSPHATE 12 MG: 10 INJECTION, SOLUTION INTRAMUSCULAR; INTRAVENOUS at 11:10

## 2017-07-01 ENCOUNTER — INFUSION (OUTPATIENT)
Dept: ONCOLOGY | Facility: HOSPITAL | Age: 67
End: 2017-07-01

## 2017-07-01 VITALS
RESPIRATION RATE: 18 BRPM | SYSTOLIC BLOOD PRESSURE: 112 MMHG | HEART RATE: 91 BPM | DIASTOLIC BLOOD PRESSURE: 70 MMHG | TEMPERATURE: 97.5 F | OXYGEN SATURATION: 95 %

## 2017-07-01 DIAGNOSIS — C78.7 LIVER METASTASIS: Primary | ICD-10-CM

## 2017-07-01 DIAGNOSIS — C18.9 PRIMARY MALIGNANT NEOPLASM OF COLON (HCC): ICD-10-CM

## 2017-07-01 PROCEDURE — 25010000002 HEPARIN FLUSH (PORCINE) 100 UNIT/ML SOLUTION: Performed by: INTERNAL MEDICINE

## 2017-07-01 PROCEDURE — 96523 IRRIG DRUG DELIVERY DEVICE: CPT

## 2017-07-01 RX ADMIN — Medication 500 UNITS: at 12:14

## 2017-07-06 DIAGNOSIS — E11.8 TYPE 2 DIABETES MELLITUS WITH COMPLICATION, WITH LONG-TERM CURRENT USE OF INSULIN (HCC): ICD-10-CM

## 2017-07-06 DIAGNOSIS — E55.9 VITAMIN D DEFICIENCY: ICD-10-CM

## 2017-07-06 DIAGNOSIS — I10 ESSENTIAL HYPERTENSION: ICD-10-CM

## 2017-07-06 DIAGNOSIS — E78.5 HYPERLIPIDEMIA, UNSPECIFIED HYPERLIPIDEMIA TYPE: Primary | ICD-10-CM

## 2017-07-06 DIAGNOSIS — Z79.4 TYPE 2 DIABETES MELLITUS WITH COMPLICATION, WITH LONG-TERM CURRENT USE OF INSULIN (HCC): ICD-10-CM

## 2017-07-07 ENCOUNTER — LAB (OUTPATIENT)
Dept: ENDOCRINOLOGY | Age: 67
End: 2017-07-07

## 2017-07-07 DIAGNOSIS — E55.9 VITAMIN D DEFICIENCY: ICD-10-CM

## 2017-07-07 DIAGNOSIS — E11.8 TYPE 2 DIABETES MELLITUS WITH COMPLICATION, WITH LONG-TERM CURRENT USE OF INSULIN (HCC): ICD-10-CM

## 2017-07-07 DIAGNOSIS — I10 ESSENTIAL HYPERTENSION: ICD-10-CM

## 2017-07-07 DIAGNOSIS — E78.5 HYPERLIPIDEMIA, UNSPECIFIED HYPERLIPIDEMIA TYPE: ICD-10-CM

## 2017-07-07 DIAGNOSIS — Z79.4 TYPE 2 DIABETES MELLITUS WITH COMPLICATION, WITH LONG-TERM CURRENT USE OF INSULIN (HCC): ICD-10-CM

## 2017-07-08 LAB
25(OH)D3+25(OH)D2 SERPL-MCNC: 43 NG/ML (ref 30–100)
ALBUMIN SERPL-MCNC: 3.7 G/DL (ref 3.5–5.2)
ALBUMIN/GLOB SERPL: 0.9 G/DL
ALP SERPL-CCNC: 531 U/L (ref 39–117)
ALT SERPL-CCNC: 74 U/L (ref 1–33)
AST SERPL-CCNC: 59 U/L (ref 1–32)
BILIRUB SERPL-MCNC: 0.4 MG/DL (ref 0.1–1.2)
BUN SERPL-MCNC: 17 MG/DL (ref 8–23)
BUN/CREAT SERPL: 16.7 (ref 7–25)
C PEPTIDE SERPL-MCNC: 2.8 NG/ML (ref 1.1–4.4)
CALCIUM SERPL-MCNC: 9.8 MG/DL (ref 8.6–10.5)
CHLORIDE SERPL-SCNC: 96 MMOL/L (ref 98–107)
CHOLEST SERPL-MCNC: 166 MG/DL (ref 0–200)
CO2 SERPL-SCNC: 27.4 MMOL/L (ref 22–29)
CREAT SERPL-MCNC: 1.02 MG/DL (ref 0.57–1)
GLOBULIN SER CALC-MCNC: 4.2 GM/DL
GLUCOSE SERPL-MCNC: 144 MG/DL (ref 65–99)
HBA1C MFR BLD: 7.34 % (ref 4.8–5.6)
HDLC SERPL-MCNC: 81 MG/DL (ref 40–60)
LDLC SERPL CALC-MCNC: 71 MG/DL (ref 0–100)
POTASSIUM SERPL-SCNC: 4.2 MMOL/L (ref 3.5–5.2)
PROT SERPL-MCNC: 7.9 G/DL (ref 6–8.5)
SODIUM SERPL-SCNC: 139 MMOL/L (ref 136–145)
TRIGL SERPL-MCNC: 68 MG/DL (ref 0–150)
VLDLC SERPL CALC-MCNC: 13.6 MG/DL (ref 5–40)

## 2017-07-13 ENCOUNTER — INFUSION (OUTPATIENT)
Dept: ONCOLOGY | Facility: HOSPITAL | Age: 67
End: 2017-07-13

## 2017-07-13 ENCOUNTER — OFFICE VISIT (OUTPATIENT)
Dept: ONCOLOGY | Facility: CLINIC | Age: 67
End: 2017-07-13

## 2017-07-13 VITALS
DIASTOLIC BLOOD PRESSURE: 70 MMHG | TEMPERATURE: 98 F | RESPIRATION RATE: 14 BRPM | WEIGHT: 256.2 LBS | HEIGHT: 66 IN | SYSTOLIC BLOOD PRESSURE: 124 MMHG | HEART RATE: 88 BPM | OXYGEN SATURATION: 95 % | BODY MASS INDEX: 41.17 KG/M2

## 2017-07-13 DIAGNOSIS — C18.9 PRIMARY MALIGNANT NEOPLASM OF COLON (HCC): Primary | ICD-10-CM

## 2017-07-13 DIAGNOSIS — C18.9 PRIMARY MALIGNANT NEOPLASM OF COLON (HCC): ICD-10-CM

## 2017-07-13 DIAGNOSIS — C78.7 LIVER METASTASIS: ICD-10-CM

## 2017-07-13 DIAGNOSIS — T45.1X5A CHEMOTHERAPY-INDUCED NAUSEA: ICD-10-CM

## 2017-07-13 DIAGNOSIS — C78.7 LIVER METASTASIS: Primary | ICD-10-CM

## 2017-07-13 DIAGNOSIS — E11.9 DIABETES TYPE 2, CONTROLLED (HCC): ICD-10-CM

## 2017-07-13 DIAGNOSIS — R11.0 CHEMOTHERAPY-INDUCED NAUSEA: ICD-10-CM

## 2017-07-13 LAB
ALBUMIN SERPL-MCNC: 3.1 G/DL (ref 3.5–5.2)
ALBUMIN/GLOB SERPL: 0.6 G/DL (ref 1.1–2.4)
ALP SERPL-CCNC: 519 U/L (ref 38–116)
ALT SERPL W P-5'-P-CCNC: 135 U/L (ref 0–33)
ANION GAP SERPL CALCULATED.3IONS-SCNC: 14.1 MMOL/L
AST SERPL-CCNC: 155 U/L (ref 0–32)
BASOPHILS # BLD AUTO: 0.05 10*3/MM3 (ref 0–0.1)
BASOPHILS NFR BLD AUTO: 0.6 % (ref 0–1.1)
BILIRUB SERPL-MCNC: 0.5 MG/DL (ref 0.1–1.2)
BUN BLD-MCNC: 14 MG/DL (ref 6–20)
BUN/CREAT SERPL: 15.2 (ref 7.3–30)
CALCIUM SPEC-SCNC: 9.1 MG/DL (ref 8.5–10.2)
CHLORIDE SERPL-SCNC: 98 MMOL/L (ref 98–107)
CO2 SERPL-SCNC: 27.9 MMOL/L (ref 22–29)
CREAT BLD-MCNC: 0.92 MG/DL (ref 0.6–1.1)
DEPRECATED RDW RBC AUTO: 50.1 FL (ref 37–49)
EOSINOPHIL # BLD AUTO: 0.39 10*3/MM3 (ref 0–0.36)
EOSINOPHIL NFR BLD AUTO: 4.6 % (ref 1–5)
ERYTHROCYTE [DISTWIDTH] IN BLOOD BY AUTOMATED COUNT: 16.7 % (ref 11.7–14.5)
GFR SERPL CREATININE-BSD FRML MDRD: 74 ML/MIN/1.73
GLOBULIN UR ELPH-MCNC: 4.9 GM/DL (ref 1.8–3.5)
GLUCOSE BLD-MCNC: 179 MG/DL (ref 74–124)
HCT VFR BLD AUTO: 35.4 % (ref 34–45)
HGB BLD-MCNC: 11.3 G/DL (ref 11.5–14.9)
HOLD SPECIMEN: NORMAL
IMM GRANULOCYTES # BLD: 0.07 10*3/MM3 (ref 0–0.03)
IMM GRANULOCYTES NFR BLD: 0.8 % (ref 0–0.5)
LYMPHOCYTES # BLD AUTO: 1.81 10*3/MM3 (ref 1–3.5)
LYMPHOCYTES NFR BLD AUTO: 21.2 % (ref 20–49)
MCH RBC QN AUTO: 27.2 PG (ref 27–33)
MCHC RBC AUTO-ENTMCNC: 31.9 G/DL (ref 32–35)
MCV RBC AUTO: 85.1 FL (ref 83–97)
MONOCYTES # BLD AUTO: 1.07 10*3/MM3 (ref 0.25–0.8)
MONOCYTES NFR BLD AUTO: 12.5 % (ref 4–12)
NEUTROPHILS # BLD AUTO: 5.16 10*3/MM3 (ref 1.5–7)
NEUTROPHILS NFR BLD AUTO: 60.3 % (ref 39–75)
NRBC BLD MANUAL-RTO: 0 /100 WBC (ref 0–0)
PLATELET # BLD AUTO: 453 10*3/MM3 (ref 150–375)
PMV BLD AUTO: 8.4 FL (ref 8.9–12.1)
POTASSIUM BLD-SCNC: 3.9 MMOL/L (ref 3.5–4.7)
PROT SERPL-MCNC: 8 G/DL (ref 6.3–8)
RBC # BLD AUTO: 4.16 10*6/MM3 (ref 3.9–5)
SODIUM BLD-SCNC: 140 MMOL/L (ref 134–145)
WBC NRBC COR # BLD: 8.55 10*3/MM3 (ref 4–10)

## 2017-07-13 PROCEDURE — 96415 CHEMO IV INFUSION ADDL HR: CPT | Performed by: NURSE PRACTITIONER

## 2017-07-13 PROCEDURE — 25010000002 FLUOROURACIL PER 500 MG: Performed by: NURSE PRACTITIONER

## 2017-07-13 PROCEDURE — 96411 CHEMO IV PUSH ADDL DRUG: CPT | Performed by: NURSE PRACTITIONER

## 2017-07-13 PROCEDURE — 25010000002 LEUCOVORIN CALCIUM PER 50 MG: Performed by: NURSE PRACTITIONER

## 2017-07-13 PROCEDURE — 25010000002 DEXAMETHASONE PER 1 MG: Performed by: NURSE PRACTITIONER

## 2017-07-13 PROCEDURE — 96375 TX/PRO/DX INJ NEW DRUG ADDON: CPT | Performed by: NURSE PRACTITIONER

## 2017-07-13 PROCEDURE — 96413 CHEMO IV INFUSION 1 HR: CPT | Performed by: NURSE PRACTITIONER

## 2017-07-13 PROCEDURE — 99213 OFFICE O/P EST LOW 20 MIN: CPT | Performed by: NURSE PRACTITIONER

## 2017-07-13 PROCEDURE — 96416 CHEMO PROLONG INFUSE W/PUMP: CPT | Performed by: NURSE PRACTITIONER

## 2017-07-13 PROCEDURE — 25010000002 IRINOTECAN PER 20 MG: Performed by: NURSE PRACTITIONER

## 2017-07-13 PROCEDURE — 25010000002 PALONOSETRON PER 25 MCG: Performed by: NURSE PRACTITIONER

## 2017-07-13 PROCEDURE — 85025 COMPLETE CBC W/AUTO DIFF WBC: CPT

## 2017-07-13 PROCEDURE — 96368 THER/DIAG CONCURRENT INF: CPT | Performed by: NURSE PRACTITIONER

## 2017-07-13 PROCEDURE — 80053 COMPREHEN METABOLIC PANEL: CPT

## 2017-07-13 RX ORDER — FLUOROURACIL 50 MG/ML
400 INJECTION, SOLUTION INTRAVENOUS ONCE
Status: COMPLETED | OUTPATIENT
Start: 2017-07-13 | End: 2017-07-13

## 2017-07-13 RX ORDER — FLUOROURACIL 50 MG/ML
400 INJECTION, SOLUTION INTRAVENOUS ONCE
Status: CANCELLED | OUTPATIENT
Start: 2017-07-13

## 2017-07-13 RX ORDER — PALONOSETRON 0.05 MG/ML
0.25 INJECTION, SOLUTION INTRAVENOUS ONCE
Status: CANCELLED | OUTPATIENT
Start: 2017-07-13

## 2017-07-13 RX ORDER — SODIUM CHLORIDE 9 MG/ML
250 INJECTION, SOLUTION INTRAVENOUS ONCE
Status: COMPLETED | OUTPATIENT
Start: 2017-07-13 | End: 2017-07-13

## 2017-07-13 RX ORDER — DAPAGLIFLOZIN AND METFORMIN HYDROCHLORIDE 10; 1000 MG/1; MG/1
TABLET, FILM COATED, EXTENDED RELEASE ORAL
Qty: 90 TABLET | Refills: 0 | Status: SHIPPED | OUTPATIENT
Start: 2017-07-13 | End: 2017-10-11 | Stop reason: SDUPTHER

## 2017-07-13 RX ORDER — SAXAGLIPTIN 5 MG/1
TABLET, FILM COATED ORAL
Qty: 90 TABLET | Refills: 0 | Status: SHIPPED | OUTPATIENT
Start: 2017-07-13 | End: 2017-10-11 | Stop reason: SDUPTHER

## 2017-07-13 RX ORDER — SODIUM CHLORIDE 9 MG/ML
250 INJECTION, SOLUTION INTRAVENOUS ONCE
Status: CANCELLED | OUTPATIENT
Start: 2017-07-13

## 2017-07-13 RX ORDER — ATROPINE SULFATE 1 MG/ML
0.25 INJECTION, SOLUTION INTRAMUSCULAR; INTRAVENOUS; SUBCUTANEOUS
Status: DISCONTINUED | OUTPATIENT
Start: 2017-07-13 | End: 2017-07-13 | Stop reason: HOSPADM

## 2017-07-13 RX ORDER — ATROPINE SULFATE 1 MG/ML
0.25 INJECTION, SOLUTION INTRAMUSCULAR; INTRAVENOUS; SUBCUTANEOUS
Status: CANCELLED | OUTPATIENT
Start: 2017-07-13

## 2017-07-13 RX ORDER — PALONOSETRON 0.05 MG/ML
0.25 INJECTION, SOLUTION INTRAVENOUS ONCE
Status: COMPLETED | OUTPATIENT
Start: 2017-07-13 | End: 2017-07-13

## 2017-07-13 RX ADMIN — ATROPINE SULFATE 0.25 MG: 1 INJECTION, SOLUTION INTRAMUSCULAR; INTRAVENOUS; SUBCUTANEOUS at 09:18

## 2017-07-13 RX ADMIN — PALONOSETRON HYDROCHLORIDE 0.25 MG: 0.25 INJECTION INTRAVENOUS at 09:18

## 2017-07-13 RX ADMIN — FLUOROURACIL 5350 MG: 50 INJECTION, SOLUTION INTRAVENOUS at 11:33

## 2017-07-13 RX ADMIN — SODIUM CHLORIDE 250 ML: 900 INJECTION, SOLUTION INTRAVENOUS at 09:18

## 2017-07-13 RX ADMIN — DEXTROSE MONOHYDRATE 400 MG: 5 INJECTION, SOLUTION INTRAVENOUS at 09:50

## 2017-07-13 RX ADMIN — FLUOROURACIL 890 MG: 50 INJECTION, SOLUTION INTRAVENOUS at 11:30

## 2017-07-13 RX ADMIN — DEXAMETHASONE SODIUM PHOSPHATE 12 MG: 10 INJECTION INTRAMUSCULAR; INTRAVENOUS at 09:19

## 2017-07-13 RX ADMIN — LEUCOVORIN CALCIUM 890 MG: 350 INJECTION, POWDER, LYOPHILIZED, FOR SOLUTION INTRAMUSCULAR; INTRAVENOUS at 09:50

## 2017-07-13 NOTE — PROGRESS NOTES
Subjective     CHIEF COMPLAINT:    1. Metastatic colon cancer (KRAS mutation positive) with liver metastasis, biopsy confirmed. No resection of primary sigmoid colon cancer.   2. Recurrent Iron deficiency anemia   3. Palliative chemotherapy with FOLFOX-6 was given from June 2013 to November 2013.   4. The patient underwent left partial colectomy, splenectomy, resection of left diaphragm with repair and partial hepatectomy with microwave ablation of metastasis on 01/14/2014.   5. The patient received FOLFIRI x12 cycles between 03/07/2014 through 08/07/2014.   6. CT scan on 05/28/2015 revealed a new liver lesion. Stereotactic radiation therapy with CyberKnife was delivered in 3 fractions, completed on 07/14/2015.        7.  New liver lesion was identified on CT on 5/5/17. CT guided microwave ablation of liver mass on 6/16/17   8. FOLFIRI resumed 6/29/2017 plans for 6 months of treatment     HISTORY OF PRESENT ILLNESS:     Feli Del Toro is a 66 y.o. patient with the above medical history, she returns today in anticipation of her second cycle of FOLFIRI.  She reports fair tolerance to her first cycle.  She does report feeling more fatigued, compared to the first time she received treatment in 2014.  She reports occasional nausea which did respond to ondansetron.  She reports constipation following chemotherapy, requiring laxatives.  Her bowels have normalized since.  She denies any abdominal pain.  She denies any blood in her stool.  She denies any fevers or chills.  She denies any mouth sores.    Past Medical History:   Diagnosis Date   • Anemia    • Cancer 06/04/2013   • Diabetes mellitus    • History of transfusion    • Hyperlipidemia    • Hypertension    • Metastatic colon cancer to liver    • Retinopathy    • Type 2 diabetes mellitus        Past Surgical History:   Procedure Laterality Date   • AMPUTATION DIGIT Left 1/1/2017    Procedure: LEFT SECOND TOE AMPUTATION;  Surgeon: Farzad Nichols MD;  Location: [facility]  MAIN OR;  Service:    • AMPUTATION OF REPLICATED TOES      right distal second toe    • CATARACT EXTRACTION     • COLECTOMY PARTIAL / TOTAL  01/14/2014   • HYSTERECTOMY  1998   • PORTACATH PLACEMENT  06/2013   • SALPINGO OOPHORECTOMY Bilateral    • SPLENECTOMY         Cancer-related family history includes Breast cancer in her sister; Cancer in her other.    SCHEDULED MEDS:       Current Outpatient Prescriptions:   •  allopurinol (ZYLOPRIM) 100 MG tablet, Take 1 tablet by mouth Daily., Disp: 90 tablet, Rfl: 1  •  aspirin 81 MG tablet, Take 81 mg by mouth daily., Disp: , Rfl:   •  calcium carbonate (TUMS) 500 MG chewable tablet, Chew., Disp: , Rfl:   •  fluticasone (FLONASE) 50 MCG/ACT nasal spray, , Disp: , Rfl:   •  insulin detemir (LEVEMIR FLEXTOUCH) 100 UNIT/ML injection, Inject 22 Units under the skin Every Night., Disp: , Rfl:   •  Insulin Lispro (HUMALOG KWIKPEN) 100 UNIT/ML solution pen-injector, 15u AC and sliding scale, Disp: 6 pen, Rfl: 1  •  Insulin Pen Needle 32G X 6 MM misc, Use as directed five times daily and as needed., Disp: 180 each, Rfl: 3  •  LEVEMIR FLEXTOUCH 100 UNIT/ML injection, INJECT 50 UNITS DAILY AND TITRATE AS INSTRUCTED, Disp: 60 mL, Rfl: 0  •  MAGNESIUM OXIDE PO, Take 400 mg by mouth daily., Disp: , Rfl:   •  meclizine (ANTIVERT) 25 MG tablet, Take 25 mg by mouth 3 (Three) Times a Day As Needed., Disp: , Rfl:   •  ondansetron (ZOFRAN) 8 MG tablet, Take 1 tablet by mouth 3 (Three) Times a Day As Needed for Nausea or Vomiting., Disp: 30 tablet, Rfl: 5  •  ONETOUCH DELICA LANCETS 33G misc, USE TO TEST BLOOD SUGAR 3 TO 4 TIMES DAILY, Disp: 120 each, Rfl: 5  •  ONETOUCH VERIO test strip, TEST BLOOD GLUCOSE 3 TO 4 TIMES DAILY, Disp: 150 each, Rfl: 4  •  ONGLYZA 5 MG tablet, TAKE 1 TABLET DAILY, Disp: 90 tablet, Rfl: 0  •  telmisartan-hydrochlorothiazide (MICARDIS HCT) 40-12.5 MG per tablet, Take 1 tablet by mouth daily, Disp: 90 tablet, Rfl: 3  •  vitamin D (ERGOCALCIFEROL) 82953 UNITS  capsule capsule, Take 1 capsule by mouth 1 (One) Time Per Week., Disp: 13 capsule, Rfl: 3  •  XIGDUO XR  MG tablet sustained-release 24 hour, TAKE 1 TABLET DAILY, Disp: 90 tablet, Rfl: 0  •  atorvastatin (LIPITOR) 40 MG tablet, Take 1 tablet (40 mg total) by mouth daily for 90 days, Disp: 90 tablet, Rfl: 3  No current facility-administered medications for this visit.     Facility-Administered Medications Ordered in Other Visits:   •  atropine injection 0.25 mg, 0.25 mg, Intravenous, Q15 Min PRN, EMMA Betancourt  •  dexamethasone (DECADRON) 12 mg in sodium chloride 0.9 % IVPB, 12 mg, Intravenous, Once, EMMA Betancourt  •  fluorouracil (ADRUCIL) 5,350 mg, dextrose (D5W) 5 % chemo infusion - FOR HOME USE, 2,400 mg/m2 (Treatment Plan Recorded), Intravenous, Once, EMAM Betancourt  •  fluorouracil (ADRUCIL) chemo injection 890 mg, 400 mg/m2 (Treatment Plan Recorded), Intravenous, Once, EMMA Betancourt  •  irinotecan (CAMPTOSAR) 400 mg in dextrose (D5W) 5 % 520 mL chemo IVPB, 180 mg/m2 (Treatment Plan Recorded), Intravenous, Once, EMMA Betancourt  •  leucovorin 890 mg in dextrose (D5W) 5 % 339 mL IVPB, 400 mg/m2 (Treatment Plan Recorded), Intravenous, Once, EMMA Betancourt  •  palonosetron (ALOXI) injection 0.25 mg, 0.25 mg, Intravenous, Once, EMMA Betancourt  •  sodium chloride 0.9 % infusion 250 mL, 250 mL, Intravenous, Once, EMMA Betancourt    I have reviewed the patient's medical history in detail and updated the computerized patient record.    REVIEW OF SYSTEMS:  GENERAL:  No fever or chills.  Weight gain.  Fatigue.  SKIN:  No rash or non healing lesions.  HEME/LYMPH: No anemia, easy bruisability or enlarged lymph nodes.  RESPIRATORY:  No cough, shortness of breath, hemoptysis or wheezing.  CVS:  No chest pain, palpitations or lower extremity swelling.  GI:  Occasional nausea.  No abdominal pain, vomiting, diarrhea,  "melena or hematochezia. Constipation following chemo.  :  Urine became orange in color after the recent procedure.   MUSCULOSKELETAL:  See history of present illness.  NEUROLOGICAL:  Patient has mild numbness in the fingertips.    Objective   VITAL SIGNS:     Vitals:    07/13/17 0834   BP: 124/70   Pulse: 88   Resp: 14   Temp: 98 °F (36.7 °C)   SpO2: 95%   Weight: 256 lb 3.2 oz (116 kg)   Height: 66.14\" (168 cm)   PainSc: 0-No pain     Body mass index is 41.18 kg/(m^2).     Wt Readings from Last 3 Encounters:   07/13/17 256 lb 3.2 oz (116 kg)   06/29/17 258 lb 6.4 oz (117 kg)   06/21/17 258 lb 6.4 oz (117 kg)       PHYSICAL EXAMINATION:  GENERAL:  The patient appears in good general condition, not in acute distress.  SKIN:  No skin rashes or lesions. No ecchymosis or petechiae.  HEAD:  Normocephalic.  EYES:  No jaundice or pallor.   LYMPHATICS:  No cervical or supraclavicular lymphadenopathy.  CHEST:  Lungs clear to auscultation. No added sounds.  Port in the left upper chest is benign.  ABDOMEN:  Soft and non-tender. No hepatomegaly. No masses.  No tenderness in the right upper quadrant. Bowel sounds present.  EXTREMITIES:  Right leg is in a brace. No calf tenderness.  .  RESULT REVIEW:     Results from last 7 days  Lab Units 07/13/17  0811   WBC 10*3/mm3 8.55   NEUTROS ABS 10*3/mm3 5.16   HEMOGLOBIN g/dL 11.3*   HEMATOCRIT % 35.4   PLATELETS 10*3/mm3 453*       Results from last 7 days  Lab Units 07/07/17  0832   SODIUM mmol/L 139   POTASSIUM mmol/L 4.2   CHLORIDE mmol/L 96*   TOTAL CO2, ARTERIAL mmol/L 27.4   BUN mg/dL 17   CREATININE mg/dL 1.02*   CALCIUM mg/dL 9.8   ALBUMIN g/dL 3.70   BILIRUBIN mg/dL 0.4   ALK PHOS U/L 531*   ALT (SGPT) U/L 74*   AST (SGOT) U/L 59*       Lab Results   Component Value Date    CEA 3.77 06/29/2017    CEA 3.54 05/05/2017    CEA 4.01 02/10/2017        Assessment/Plan    1.  Metastatic colon cancer. She had metastases to the liver.   · She received 12 cycles of FOLFOX-6 and then " had resection of the primary tumor along with metastasectomy and splenectomy with thermal ablation of lesions in the liver in January 2014.   · This was followed by 12 cycles of the FOLFIRI regimen completed on 08/07/2014.   · CT scans on 05/28/2015 revealed a new lesion in the liver.  Dr. Ruano referred the patient to Radiation Therapy and she received CyberKnife radiation, completed on 07/14/2015.    · The CT scan from 5/5/17 revealed a new 2.2 cm lesion within the anterior hepatic segment.  CT guided microwave ablation of liver mass on 6/16/17  · Due to the development of new metastatic lesion in the liver, it was recommended to proceed with systemic chemotherapy with FOLFIRI every 2 weeks ×12 cycles to treat any possible micrometastasis to the liver.  · Proceed with cycle 2 FOLFIRI today    2.   History of iron deficiency anemia.  Her hemoglobin is slightly decreased today at 11.3.  We will certainly continue to monitor, and provide Iron replacement as needed     3.  Mild nausea secondary to chemotherapy.  Improved with ondansetron as needed.    Return in 2 weeks for MD evaluation with Dr. Brown in anticipation of cycle 3 FOLFIRI.    Saida Baker, APRN  07/13/17

## 2017-07-14 ENCOUNTER — OFFICE VISIT (OUTPATIENT)
Dept: ENDOCRINOLOGY | Age: 67
End: 2017-07-14

## 2017-07-14 VITALS
WEIGHT: 255 LBS | BODY MASS INDEX: 40.98 KG/M2 | HEIGHT: 66 IN | SYSTOLIC BLOOD PRESSURE: 122 MMHG | OXYGEN SATURATION: 97 % | DIASTOLIC BLOOD PRESSURE: 52 MMHG | HEART RATE: 73 BPM

## 2017-07-14 DIAGNOSIS — E11.9 CONTROLLED TYPE 2 DIABETES MELLITUS WITHOUT COMPLICATION, WITH LONG-TERM CURRENT USE OF INSULIN (HCC): ICD-10-CM

## 2017-07-14 DIAGNOSIS — Z79.4 CONTROLLED TYPE 2 DIABETES MELLITUS WITHOUT COMPLICATION, WITH LONG-TERM CURRENT USE OF INSULIN (HCC): ICD-10-CM

## 2017-07-14 PROCEDURE — 99214 OFFICE O/P EST MOD 30 MIN: CPT | Performed by: NURSE PRACTITIONER

## 2017-07-14 NOTE — PROGRESS NOTES
Feli Del Toro  presents to the office  for the follow-up appointment for Type 2 diabetes mellitus.     The diabete's condition location is throughout the system with the  clinical course has been stable, the severity is Mild, and the modifying/allievating factors are insulin and oral medications.  Medications and  Dosages were  reviewed with Feli Del Toro and suggested that compliance all of the time.    Associated symptoms of hyperglycemia have been tingling .  Associated symptoms of hypoglycemia have been jitteriness and sweating. Patient reports  hypoglycemia threshold for symptoms is  65 mg/dl .     The patient is currently on oral medications and insulin     Compliance with blood glucose monitoring: good. Meal panning: The patient is using calorie counting.    The patient is currently taking home blood tests - Blood glucose testin times daily, that are:fasting- 1st thing in morning before eating or drinking, before each meal  fasting and bedtime, anytime you feel symptoms of hyperglycemia or hypoglycemia (high or low blood sugars)    Humalog U100  and 10,12,14    Last instructions given were:     home blood tests - Blood glucose testin times daily, that are:  fasting- 1st thing in morning before eating or drinking  before each meal and 1 or 2 hours after meal  bedtime  anytime you feel symptoms of hyperglycemia or hypoglycemia (high or low blood sugars)     Home blood glucose testing daily: 4  FB to 120 mg/dl  before lunch 100 to 112 mg/dl  before dinner/supper 112 to 130 mg/dl  bedtime 122 to 140 mg/dl    Last reported blood glucose readings are:     The patient is currently taking home blood tests - Blood glucose testing: 3 times daily, that are:  fasting- 1st thing in morning before eating or drinking  before each meal  basal insulIn Levemir U100 32 units in PM  Humalog U100  10 units at breakfast, 12 units at lunch, 14 units at dinner   Patterns reported per patient are          The following  portions of the patient's history were reviewed and updated as appropriate: allergies, current medications, past family history, past medical history, past social history, past surgical history and problem list.      Current Outpatient Prescriptions:   •  allopurinol (ZYLOPRIM) 100 MG tablet, Take 1 tablet by mouth Daily., Disp: 90 tablet, Rfl: 1  •  aspirin 81 MG tablet, Take 81 mg by mouth daily., Disp: , Rfl:   •  calcium carbonate (TUMS) 500 MG chewable tablet, Chew., Disp: , Rfl:   •  fluticasone (FLONASE) 50 MCG/ACT nasal spray, , Disp: , Rfl:   •  Insulin Lispro (HUMALOG KWIKPEN) 100 UNIT/ML solution pen-injector, 15u AC and sliding scale, Disp: 6 pen, Rfl: 1  •  Insulin Pen Needle 32G X 6 MM misc, Use as directed five times daily and as needed., Disp: 180 each, Rfl: 3  •  MAGNESIUM OXIDE PO, Take 400 mg by mouth daily., Disp: , Rfl:   •  meclizine (ANTIVERT) 25 MG tablet, Take 25 mg by mouth 3 (Three) Times a Day As Needed., Disp: , Rfl:   •  ondansetron (ZOFRAN) 8 MG tablet, Take 1 tablet by mouth 3 (Three) Times a Day As Needed for Nausea or Vomiting., Disp: 30 tablet, Rfl: 5  •  ONETOUCH DELICA LANCETS 33G misc, USE TO TEST BLOOD SUGAR 3 TO 4 TIMES DAILY, Disp: 120 each, Rfl: 5  •  ONETOUCH VERIO test strip, TEST BLOOD GLUCOSE 3 TO 4 TIMES DAILY, Disp: 150 each, Rfl: 4  •  ONGLYZA 5 MG tablet, TAKE 1 TABLET DAILY, Disp: 90 tablet, Rfl: 0  •  telmisartan-hydrochlorothiazide (MICARDIS HCT) 40-12.5 MG per tablet, Take 1 tablet by mouth daily, Disp: 90 tablet, Rfl: 3  •  vitamin D (ERGOCALCIFEROL) 18766 UNITS capsule capsule, Take 1 capsule by mouth 1 (One) Time Per Week., Disp: 13 capsule, Rfl: 3  •  XIGDUO XR  MG tablet sustained-release 24 hour, TAKE 1 TABLET DAILY, Disp: 90 tablet, Rfl: 0  •  atorvastatin (LIPITOR) 40 MG tablet, Take 1 tablet (40 mg total) by mouth daily for 90 days, Disp: 90 tablet, Rfl: 3  •  insulin detemir (LEVEMIR FLEXTOUCH) 100 UNIT/ML injection, In 32U in PM titrate as  "instructed with max of 100u daily, Disp: 10 pen, Rfl: 4  No current facility-administered medications for this visit.     Patient Active Problem List    Diagnosis   • Liver metastasis [C78.7]   • Obesity (BMI 30-39.9) [E66.9]   • Osteomyelitis of toe of left foot [M86.9]   • Sepsis [A41.9]   • Type 2 diabetes mellitus with peripheral neuropathy [E11.42]   • Type 2 diabetes mellitus with complication, with long-term current use of insulin [E11.8, Z79.4]   • Nonproliferative diabetic retinopathy [E11.3299]   • Type 2 diabetes mellitus with microalbuminuria [E11.29, R80.9]   • Primary malignant neoplasm of colon [C18.9]   • Iron deficiency anemia [D50.9]   • Breast lesion [N64.9]   • 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, T45.1X5A]   • Encounter for adjustment or management of vascular access device [Z45.2]   • Vitamin D deficiency [E55.9]   • Morbid obesity due to excess calories [E66.01]   • Hyperlipidemia [E78.5]   • Essential hypertension [I10]   • Allergic rhinitis due to pollen [J30.1]       Review of Systems   A comprehensive review of the 14 systems was negative except of listed below:  Gastrointestinal: positive for change in bowel habits, dyspepsia, reflux symptoms and chemo therapy     Objective:     Wt Readings from Last 3 Encounters:   07/14/17 255 lb (116 kg)   07/13/17 256 lb 3.2 oz (116 kg)   06/29/17 258 lb 6.4 oz (117 kg)     Temp Readings from Last 3 Encounters:   07/13/17 98 °F (36.7 °C)   07/01/17 97.5 °F (36.4 °C)   06/21/17 98.7 °F (37.1 °C) (Oral)     BP Readings from Last 3 Encounters:   07/14/17 122/52   07/13/17 124/70   07/01/17 112/70     Pulse Readings from Last 3 Encounters:   07/14/17 73   07/13/17 88   07/01/17 91        /52 (BP Location: Left arm, Patient Position: Sitting, Cuff Size: Large Adult)  Pulse 73  Ht 66.14\" (168 cm)  Wt 255 lb (116 kg)  LMP  (LMP Unknown)  SpO2 97%  BMI 40.98 kg/m2    General appearance:  alert, cooperative, delirious, fatigued, " "nourished\" \"appears stated age and cooperative\" \"NAD   Neck: no carotid bruit, supple, symmetrical, trachea midline and thyroid not enlarged, symmetric, no tenderness/mass/nodules   Thyroid:  no palpable nodule, no enlargement, no tenderness   Lung:  \"clear to auscultation bilaterally\" \"no abnormal breath sounds  \" effort was normal, no labored breath, no use of accessory muscles.   Heart: regular rate and rhythm, S1, S2 normal, no murmur, click, rub or gallop      Abdomen:  normal bowel sounds- 4 quads, soft non-tender, contour - rounded and contour - obese      Extremities: extremities normal, atraumatic, no cyanosis or edema extremities normal, atraumatic, no cyanosis or edema\" WNL - gait and station, strength and stability\"       Skin:   Pulses:  warm and dry, no hyperpigmentation, normal skin coloring, or suspicious lesions   2+ and symmetric   Neuro: Alert and oriented x3. Gait normal. Reflexes and motor strength normal and symmetric. Cranial nerves 2-12 and sensation grossly intact.      Psych: behavior - normal, judgement - normal, mood - normal, affect - normal                 Lab Review  Results for orders placed or performed in visit on 07/13/17   Comprehensive metabolic panel   Result Value Ref Range    Glucose 179 (H) 74 - 124 mg/dL    BUN 14 6 - 20 mg/dL    Creatinine 0.92 0.60 - 1.10 mg/dL    Sodium 140 134 - 145 mmol/L    Potassium 3.9 3.5 - 4.7 mmol/L    Chloride 98 98 - 107 mmol/L    CO2 27.9 22.0 - 29.0 mmol/L    Calcium 9.1 8.5 - 10.2 mg/dL    Total Protein 8.0 6.3 - 8.0 g/dL    Albumin 3.10 (L) 3.50 - 5.20 g/dL    ALT (SGPT) 135 (C) 0 - 33 U/L    AST (SGOT) 155 (C) 0 - 32 U/L    Alkaline Phosphatase 519 (H) 38 - 116 U/L    Total Bilirubin 0.5 0.1 - 1.2 mg/dL    eGFR  African Amer 74 >60 mL/min/1.73    Globulin 4.9 (H) 1.8 - 3.5 gm/dL    A/G Ratio 0.6 (L) 1.1 - 2.4 g/dL    BUN/Creatinine Ratio 15.2 7.3 - 30.0    Anion Gap 14.1 mmol/L   CBC Auto Differential   Result Value Ref Range    WBC 8.55 " 4.00 - 10.00 10*3/mm3    RBC 4.16 3.90 - 5.00 10*6/mm3    Hemoglobin 11.3 (L) 11.5 - 14.9 g/dL    Hematocrit 35.4 34.0 - 45.0 %    MCV 85.1 83.0 - 97.0 fL    MCH 27.2 27.0 - 33.0 pg    MCHC 31.9 (L) 32.0 - 35.0 g/dL    RDW 16.7 (H) 11.7 - 14.5 %    RDW-SD 50.1 (H) 37.0 - 49.0 fl    MPV 8.4 (L) 8.9 - 12.1 fL    Platelets 453 (H) 150 - 375 10*3/mm3    Neutrophil % 60.3 39.0 - 75.0 %    Lymphocyte % 21.2 20.0 - 49.0 %    Monocyte % 12.5 (H) 4.0 - 12.0 %    Eosinophil % 4.6 1.0 - 5.0 %    Basophil % 0.6 0.0 - 1.1 %    Immature Grans % 0.8 (H) 0.0 - 0.5 %    Neutrophils, Absolute 5.16 1.50 - 7.00 10*3/mm3    Lymphocytes, Absolute 1.81 1.00 - 3.50 10*3/mm3    Monocytes, Absolute 1.07 (H) 0.25 - 0.80 10*3/mm3    Eosinophils, Absolute 0.39 (H) 0.00 - 0.36 10*3/mm3    Basophils, Absolute 0.05 0.00 - 0.10 10*3/mm3    Immature Grans, Absolute 0.07 (H) 0.00 - 0.03 10*3/mm3    nRBC 0.0 0.0 - 0.0 /100 WBC   Gold Top - SST   Result Value Ref Range    Extra Tube Hold for add-ons.      Results for DAMARI WILSON (MRN [id number]) as of 7/14/2017 09:07   Ref. Range 7/7/2017 08:32   Glucose Latest Ref Range: 65 - 99 mg/dL 144 (H)   Sodium Latest Ref Range: 136 - 145 mmol/L 139   Potassium Latest Ref Range: 3.5 - 5.2 mmol/L 4.2   Chloride Latest Ref Range: 98 - 107 mmol/L 96 (L)   Creatinine Latest Ref Range: 0.57 - 1.00 mg/dL 1.02 (H)   BUN Latest Ref Range: 8 - 23 mg/dL 17   BUN/Creatinine Ratio Latest Ref Range: 7.0 - 25.0  16.7   Calcium Latest Ref Range: 8.6 - 10.5 mg/dL 9.8   eGFR Non African Amer Latest Ref Range: >60 mL/min/1.73 54 (L)   eGFR  African Amer Latest Ref Range: >60 mL/min/1.73 66   Alkaline Phosphatase Latest Ref Range: 39 - 117 U/L 531 (H)   Total Protein Latest Ref Range: 6.0 - 8.5 g/dL 7.9   ALT (SGPT) Latest Ref Range: 1 - 33 U/L 74 (H)   AST (SGOT) Latest Ref Range: 1 - 32 U/L 59 (H)   Total Bilirubin Latest Ref Range: 0.1 - 1.2 mg/dL 0.4   Albumin Latest Ref Range: 3.50 - 5.20 g/dL 3.70   A/G Ratio Latest  Units: g/dL 0.9   Hemoglobin A1C Latest Ref Range: 4.80 - 5.60 % 7.34 (H)   C-Peptide Latest Ref Range: 1.1 - 4.4 ng/mL 2.8   Total Cholesterol Latest Ref Range: 0 - 200 mg/dL 166   HDL Cholesterol Latest Ref Range: 40 - 60 mg/dL 81 (H)   LDL Cholesterol  Latest Ref Range: 0 - 100 mg/dL 71   VLDL Cholesterol Latest Ref Range: 5 - 40 mg/dL 13.6   Triglycerides Latest Ref Range: 0 - 150 mg/dL 68   25 Hydroxy, Vitamin D Latest Ref Range: 30.0 - 100.0 ng/mL 43.0   Globulin Latest Units: gm/dL 4.2   Total CO2 Latest Ref Range: 22.0 - 29.0 mmol/L 27.4         Assessment:   Feli was seen today for diabetes.    Diagnoses and all orders for this visit:    Controlled type 2 diabetes mellitus without complication, with long-term current use of insulin  -     Insulin Lispro (HUMALOG KWIKPEN) 100 UNIT/ML solution pen-injector; 15u AC and sliding scale  -     insulin detemir (LEVEMIR FLEXTOUCH) 100 UNIT/ML injection; In 32U in PM titrate as instructed with max of 100u daily        Plan:    Education:  interpretation of lab results, blood sugar goals, complications of diabetes mellitus, hypoglycemia prevention and treatment, exercise, illness management, self-monitoring of blood glucose skills, nutrition and carbohydrate counting    Summary:    New instructions for basal insulIn  Levemir U100 32 units in PM  Increase the PM dose 1 units every 1 days if fasting blood glucose is over 110 mg/dl     Humalog U100 -1 unit for every 20 above 120 mg/dl - this is done with each meal  10 units before breakfast, 12 units before lunch, 14 units before dinner     home blood tests -  Blood glucose testin times daily, that are: fasting- 1st thing in morning before eating or drinking, before each meal and 1 or 2 hours after meal  Bedtime, anytime you feel symptoms of hyperglycemia or hypoglycemia (high or low blood sugars)        Office visit 25 minutes with additional education given 13 minutes - SMBG with goals, medication changes with  purposes and s/e profiles. MS - ordered the Dexcom - paperwork completed with the provider.       Return in about 8 months (around 3/14/2018). keep with Dr. Moran in November-1 week prior for labs 8 months with Tara-1 week prior for labs        Dragon transcription disclaimer     Much of this encounter note is an electronic transcription/translation of spoken language to printed text. The electronic translation of spoken language may permit erroneous, or at times, nonsensical words or phrases to be inadvertently transcribed. Although I have reviewed the note for such errors, some may still exist.

## 2017-07-14 NOTE — PATIENT INSTRUCTIONS
home blood tests -  Blood glucose testin times daily, that are: fasting- 1st thing in morning before eating or drinking, before each meal and 1 or 2 hours after meal  Bedtime, anytime you feel symptoms of hyperglycemia or hypoglycemia (high or low blood sugars)    New instructions for basal insulIn  Levemir U100 32 units in PM  Increase the PM dose 1 units every 1 days if fasting blood glucose is over 110 mg/dl     Humalog U100 -1 unit for every 20 above 120 mg/dl - this is done with each meal  10 units before breakfast, 12 units before lunch, 14 units before dinner

## 2017-07-15 ENCOUNTER — INFUSION (OUTPATIENT)
Dept: ONCOLOGY | Facility: HOSPITAL | Age: 67
End: 2017-07-15

## 2017-07-15 VITALS
RESPIRATION RATE: 16 BRPM | DIASTOLIC BLOOD PRESSURE: 66 MMHG | HEART RATE: 84 BPM | TEMPERATURE: 97.8 F | SYSTOLIC BLOOD PRESSURE: 109 MMHG

## 2017-07-15 DIAGNOSIS — N64.9 BREAST LESION: ICD-10-CM

## 2017-07-15 PROCEDURE — 25010000002 HEPARIN FLUSH (PORCINE) 100 UNIT/ML SOLUTION: Performed by: INTERNAL MEDICINE

## 2017-07-15 PROCEDURE — 96523 IRRIG DRUG DELIVERY DEVICE: CPT

## 2017-07-15 RX ADMIN — HEPARIN SODIUM (PORCINE) LOCK FLUSH IV SOLN 100 UNIT/ML 500 UNITS: 100 SOLUTION at 09:59

## 2017-07-20 DIAGNOSIS — E11.9 CONTROLLED TYPE 2 DIABETES MELLITUS WITHOUT COMPLICATION, WITH LONG-TERM CURRENT USE OF INSULIN (HCC): ICD-10-CM

## 2017-07-20 DIAGNOSIS — Z79.4 CONTROLLED TYPE 2 DIABETES MELLITUS WITHOUT COMPLICATION, WITH LONG-TERM CURRENT USE OF INSULIN (HCC): ICD-10-CM

## 2017-07-26 ENCOUNTER — OFFICE VISIT (OUTPATIENT)
Dept: ONCOLOGY | Facility: CLINIC | Age: 67
End: 2017-07-26

## 2017-07-26 ENCOUNTER — INFUSION (OUTPATIENT)
Dept: ONCOLOGY | Facility: HOSPITAL | Age: 67
End: 2017-07-26

## 2017-07-26 VITALS
HEART RATE: 90 BPM | DIASTOLIC BLOOD PRESSURE: 80 MMHG | BODY MASS INDEX: 41.3 KG/M2 | WEIGHT: 257 LBS | OXYGEN SATURATION: 96 % | TEMPERATURE: 97.9 F | RESPIRATION RATE: 16 BRPM | SYSTOLIC BLOOD PRESSURE: 160 MMHG | HEIGHT: 66 IN

## 2017-07-26 DIAGNOSIS — C78.7 LIVER METASTASIS: Primary | ICD-10-CM

## 2017-07-26 DIAGNOSIS — C18.9 PRIMARY MALIGNANT NEOPLASM OF COLON (HCC): ICD-10-CM

## 2017-07-26 DIAGNOSIS — C18.9 PRIMARY MALIGNANT NEOPLASM OF COLON (HCC): Primary | ICD-10-CM

## 2017-07-26 DIAGNOSIS — R74.8 ELEVATED LIVER ENZYMES: ICD-10-CM

## 2017-07-26 DIAGNOSIS — C78.7 LIVER METASTASIS: ICD-10-CM

## 2017-07-26 DIAGNOSIS — D50.9 IRON DEFICIENCY ANEMIA, UNSPECIFIED IRON DEFICIENCY ANEMIA TYPE: ICD-10-CM

## 2017-07-26 DIAGNOSIS — L65.9 HAIR LOSS: ICD-10-CM

## 2017-07-26 LAB
ALBUMIN SERPL-MCNC: 3.5 G/DL (ref 3.5–5.2)
ALBUMIN/GLOB SERPL: 0.8 G/DL (ref 1.1–2.4)
ALP SERPL-CCNC: 486 U/L (ref 38–116)
ALT SERPL W P-5'-P-CCNC: 87 U/L (ref 0–33)
ANION GAP SERPL CALCULATED.3IONS-SCNC: 15.3 MMOL/L
AST SERPL-CCNC: 85 U/L (ref 0–32)
BASOPHILS # BLD AUTO: 0.04 10*3/MM3 (ref 0–0.1)
BASOPHILS NFR BLD AUTO: 0.5 % (ref 0–1.1)
BILIRUB SERPL-MCNC: 0.4 MG/DL (ref 0.1–1.2)
BUN BLD-MCNC: 16 MG/DL (ref 6–20)
BUN/CREAT SERPL: 17.8 (ref 7.3–30)
CALCIUM SPEC-SCNC: 9.5 MG/DL (ref 8.5–10.2)
CEA SERPL-MCNC: 4.58 NG/ML
CHLORIDE SERPL-SCNC: 98 MMOL/L (ref 98–107)
CO2 SERPL-SCNC: 27.7 MMOL/L (ref 22–29)
CREAT BLD-MCNC: 0.9 MG/DL (ref 0.6–1.1)
DEPRECATED RDW RBC AUTO: 52.4 FL (ref 37–49)
EOSINOPHIL # BLD AUTO: 0.46 10*3/MM3 (ref 0–0.36)
EOSINOPHIL NFR BLD AUTO: 5.3 % (ref 1–5)
ERYTHROCYTE [DISTWIDTH] IN BLOOD BY AUTOMATED COUNT: 18.6 % (ref 11.7–14.5)
FERRITIN SERPL-MCNC: 289.6 NG/ML
FOLATE SERPL-MCNC: >20 NG/ML (ref 4.78–24.2)
GFR SERPL CREATININE-BSD FRML MDRD: 76 ML/MIN/1.73
GLOBULIN UR ELPH-MCNC: 4.3 GM/DL (ref 1.8–3.5)
GLUCOSE BLD-MCNC: 165 MG/DL (ref 74–124)
HCT VFR BLD AUTO: 34.5 % (ref 34–45)
HGB BLD-MCNC: 11.4 G/DL (ref 11.5–14.9)
IMM GRANULOCYTES # BLD: 0.07 10*3/MM3 (ref 0–0.03)
IMM GRANULOCYTES NFR BLD: 0.8 % (ref 0–0.5)
IRON 24H UR-MRATE: 44 MCG/DL (ref 37–145)
IRON SATN MFR SERPL: 14 % (ref 14–48)
LYMPHOCYTES # BLD AUTO: 2.04 10*3/MM3 (ref 1–3.5)
LYMPHOCYTES NFR BLD AUTO: 23.6 % (ref 20–49)
MCH RBC QN AUTO: 28.1 PG (ref 27–33)
MCHC RBC AUTO-ENTMCNC: 33 G/DL (ref 32–35)
MCV RBC AUTO: 85 FL (ref 83–97)
MONOCYTES # BLD AUTO: 1.2 10*3/MM3 (ref 0.25–0.8)
MONOCYTES NFR BLD AUTO: 13.9 % (ref 4–12)
NEUTROPHILS # BLD AUTO: 4.85 10*3/MM3 (ref 1.5–7)
NEUTROPHILS NFR BLD AUTO: 55.9 % (ref 39–75)
NRBC BLD MANUAL-RTO: 0.3 /100 WBC (ref 0–0)
PLATELET # BLD AUTO: 476 10*3/MM3 (ref 150–375)
PMV BLD AUTO: 8 FL (ref 8.9–12.1)
POTASSIUM BLD-SCNC: 3.8 MMOL/L (ref 3.5–4.7)
PROT SERPL-MCNC: 7.8 G/DL (ref 6.3–8)
RBC # BLD AUTO: 4.06 10*6/MM3 (ref 3.9–5)
SODIUM BLD-SCNC: 141 MMOL/L (ref 134–145)
TIBC SERPL-MCNC: 316 MCG/DL (ref 249–505)
TRANSFERRIN SERPL-MCNC: 226 MG/DL (ref 200–360)
VIT B12 BLD-MCNC: 1298 PG/ML (ref 250–999)
WBC NRBC COR # BLD: 8.66 10*3/MM3 (ref 4–10)

## 2017-07-26 PROCEDURE — 80053 COMPREHEN METABOLIC PANEL: CPT

## 2017-07-26 PROCEDURE — 83540 ASSAY OF IRON: CPT | Performed by: INTERNAL MEDICINE

## 2017-07-26 PROCEDURE — 82607 VITAMIN B-12: CPT | Performed by: INTERNAL MEDICINE

## 2017-07-26 PROCEDURE — 96375 TX/PRO/DX INJ NEW DRUG ADDON: CPT | Performed by: INTERNAL MEDICINE

## 2017-07-26 PROCEDURE — 82378 CARCINOEMBRYONIC ANTIGEN: CPT | Performed by: INTERNAL MEDICINE

## 2017-07-26 PROCEDURE — 25010000002 IRINOTECAN PER 20 MG: Performed by: INTERNAL MEDICINE

## 2017-07-26 PROCEDURE — 85025 COMPLETE CBC W/AUTO DIFF WBC: CPT

## 2017-07-26 PROCEDURE — 25010000002 PALONOSETRON PER 25 MCG: Performed by: INTERNAL MEDICINE

## 2017-07-26 PROCEDURE — 25010000002 DEXAMETHASONE PER 1 MG: Performed by: INTERNAL MEDICINE

## 2017-07-26 PROCEDURE — 36415 COLL VENOUS BLD VENIPUNCTURE: CPT | Performed by: INTERNAL MEDICINE

## 2017-07-26 PROCEDURE — 96415 CHEMO IV INFUSION ADDL HR: CPT | Performed by: INTERNAL MEDICINE

## 2017-07-26 PROCEDURE — 99215 OFFICE O/P EST HI 40 MIN: CPT | Performed by: INTERNAL MEDICINE

## 2017-07-26 PROCEDURE — 96411 CHEMO IV PUSH ADDL DRUG: CPT | Performed by: INTERNAL MEDICINE

## 2017-07-26 PROCEDURE — 96413 CHEMO IV INFUSION 1 HR: CPT | Performed by: INTERNAL MEDICINE

## 2017-07-26 PROCEDURE — 82746 ASSAY OF FOLIC ACID SERUM: CPT | Performed by: INTERNAL MEDICINE

## 2017-07-26 PROCEDURE — 96416 CHEMO PROLONG INFUSE W/PUMP: CPT | Performed by: INTERNAL MEDICINE

## 2017-07-26 PROCEDURE — 25010000002 LEUCOVORIN CALCIUM PER 50 MG: Performed by: INTERNAL MEDICINE

## 2017-07-26 PROCEDURE — 96368 THER/DIAG CONCURRENT INF: CPT | Performed by: INTERNAL MEDICINE

## 2017-07-26 PROCEDURE — 84466 ASSAY OF TRANSFERRIN: CPT | Performed by: INTERNAL MEDICINE

## 2017-07-26 PROCEDURE — 25010000002 FLUOROURACIL PER 500 MG: Performed by: INTERNAL MEDICINE

## 2017-07-26 PROCEDURE — 82728 ASSAY OF FERRITIN: CPT | Performed by: INTERNAL MEDICINE

## 2017-07-26 RX ORDER — ATROPINE SULFATE 1 MG/ML
0.25 INJECTION, SOLUTION INTRAMUSCULAR; INTRAVENOUS; SUBCUTANEOUS
Status: CANCELLED | OUTPATIENT
Start: 2017-07-26

## 2017-07-26 RX ORDER — PALONOSETRON 0.05 MG/ML
0.25 INJECTION, SOLUTION INTRAVENOUS ONCE
Status: CANCELLED | OUTPATIENT
Start: 2017-07-26

## 2017-07-26 RX ORDER — PALONOSETRON 0.05 MG/ML
0.25 INJECTION, SOLUTION INTRAVENOUS ONCE
Status: COMPLETED | OUTPATIENT
Start: 2017-07-26 | End: 2017-07-26

## 2017-07-26 RX ORDER — ATROPINE SULFATE 0.4 MG/ML
0.25 AMPUL (ML) INJECTION
Status: DISCONTINUED | OUTPATIENT
Start: 2017-07-26 | End: 2017-07-26 | Stop reason: HOSPADM

## 2017-07-26 RX ORDER — SODIUM CHLORIDE 9 MG/ML
250 INJECTION, SOLUTION INTRAVENOUS ONCE
Status: COMPLETED | OUTPATIENT
Start: 2017-07-26 | End: 2017-07-26

## 2017-07-26 RX ORDER — SODIUM CHLORIDE 9 MG/ML
250 INJECTION, SOLUTION INTRAVENOUS ONCE
Status: CANCELLED | OUTPATIENT
Start: 2017-07-26

## 2017-07-26 RX ORDER — FLUOROURACIL 50 MG/ML
400 INJECTION, SOLUTION INTRAVENOUS ONCE
Status: CANCELLED | OUTPATIENT
Start: 2017-07-26

## 2017-07-26 RX ORDER — FLUOROURACIL 50 MG/ML
400 INJECTION, SOLUTION INTRAVENOUS ONCE
Status: COMPLETED | OUTPATIENT
Start: 2017-07-26 | End: 2017-07-26

## 2017-07-26 RX ORDER — ANTIARTHRITIC COMBINATION NO.2 900 MG
5000 TABLET ORAL DAILY
Start: 2017-07-26 | End: 2018-03-16 | Stop reason: HOSPADM

## 2017-07-26 RX ORDER — FERROUS SULFATE 325(65) MG
325 TABLET ORAL DAILY
Start: 2017-07-26 | End: 2017-09-07 | Stop reason: SDUPTHER

## 2017-07-26 RX ADMIN — FLUOROURACIL 5350 MG: 50 INJECTION, SOLUTION INTRAVENOUS at 10:49

## 2017-07-26 RX ADMIN — DEXTROSE MONOHYDRATE 400 MG: 5 INJECTION, SOLUTION INTRAVENOUS at 09:05

## 2017-07-26 RX ADMIN — LEUCOVORIN CALCIUM 890 MG: 350 INJECTION, POWDER, LYOPHILIZED, FOR SOLUTION INTRAMUSCULAR; INTRAVENOUS at 09:05

## 2017-07-26 RX ADMIN — DEXAMETHASONE SODIUM PHOSPHATE 12 MG: 10 INJECTION INTRAMUSCULAR; INTRAVENOUS at 08:46

## 2017-07-26 RX ADMIN — ATROPINE SULFATE 0.25 MG: 0.4 INJECTION, SOLUTION INTRAMUSCULAR; INTRAVENOUS; SUBCUTANEOUS at 08:43

## 2017-07-26 RX ADMIN — SODIUM CHLORIDE 250 ML: 900 INJECTION, SOLUTION INTRAVENOUS at 08:39

## 2017-07-26 RX ADMIN — PALONOSETRON HYDROCHLORIDE 0.25 MG: 0.25 INJECTION INTRAVENOUS at 08:42

## 2017-07-26 RX ADMIN — FLUOROURACIL 890 MG: 50 INJECTION, SOLUTION INTRAVENOUS at 10:40

## 2017-07-26 NOTE — PROGRESS NOTES
Subjective     CHIEF COMPLAINT:    1. Metastatic colon cancer (KRAS mutation positive) with liver metastasis, biopsy confirmed. No resection of primary sigmoid colon cancer.   2. Recurrent Iron deficiency anemia   3. Palliative chemotherapy with FOLFOX-6 was given from June 2013 to November 2013.   4. The patient underwent left partial colectomy, splenectomy, resection of left diaphragm with repair and partial hepatectomy with microwave ablation of metastasis on 01/14/2014.   5. The patient received FOLFIRI x12 cycles between 03/07/2014 through 08/07/2014.   6. CT scan on 05/28/2015 revealed a new liver lesion. Stereotactic radiation therapy with CyberKnife was delivered in 3 fractions, completed on 07/14/2015.        7.  New liver lesion was identified on CT on 5/5/17. CT guided microwave ablation of liver mass on 6/16/17    HISTORY OF PRESENT ILLNESS:     Feli Del Toro is a 66 y.o. patient with the above medical history.  She returns today for follow-up.  She started systemic chemotherapy with FOLFIRI on 6/29/17.  She developed some nausea without vomiting with the first cycle of chemotherapy.  She felt better with the second cycle of chemotherapy but had more fatigue.  She reports fatigue and she is falling asleep easily during the daytime.  She also reports some thickening of the hair and reports that her nails have become more brittle.  She denies diarrhea.  She reports occasional constipation.        Past Medical History:   Diagnosis Date   • Anemia    • Cancer 06/04/2013   • Diabetes mellitus    • History of transfusion    • Hyperlipidemia    • Hypertension    • Metastatic colon cancer to liver    • Retinopathy    • Type 2 diabetes mellitus        Past Surgical History:   Procedure Laterality Date   • ABDOMINAL SURGERY      ablation    • AMPUTATION DIGIT Left 1/1/2017    Procedure: LEFT SECOND TOE AMPUTATION;  Surgeon: Farzad Nichols MD;  Location: [facility];  Service:    • AMPUTATION OF REPLICATED  TOES      right distal second toe    • CATARACT EXTRACTION     • COLECTOMY PARTIAL / TOTAL  01/14/2014   • HYSTERECTOMY  1998   • PORTACATH PLACEMENT  06/2013   • SALPINGO OOPHORECTOMY Bilateral    • SPLENECTOMY         Cancer-related family history includes Breast cancer in her sister; Cancer in her other.    SCHEDULED MEDS:       Current Outpatient Prescriptions:   •  allopurinol (ZYLOPRIM) 100 MG tablet, Take 1 tablet by mouth Daily., Disp: 90 tablet, Rfl: 1  •  aspirin 81 MG tablet, Take 81 mg by mouth daily., Disp: , Rfl:   •  calcium carbonate (TUMS) 500 MG chewable tablet, Chew., Disp: , Rfl:   •  fluticasone (FLONASE) 50 MCG/ACT nasal spray, , Disp: , Rfl:   •  insulin detemir (LEVEMIR FLEXTOUCH) 100 UNIT/ML injection, In 32U in PM titrate as instructed with max of 100u daily, Disp: 90 mL, Rfl: 0  •  Insulin Lispro (HUMALOG KWIKPEN) 100 UNIT/ML solution pen-injector, 15u AC and sliding scale, Disp: 42 mL, Rfl: 0  •  Insulin Pen Needle 32G X 6 MM misc, Use as directed five times daily and as needed., Disp: 180 each, Rfl: 3  •  MAGNESIUM OXIDE PO, Take 400 mg by mouth daily., Disp: , Rfl:   •  meclizine (ANTIVERT) 25 MG tablet, Take 25 mg by mouth 3 (Three) Times a Day As Needed., Disp: , Rfl:   •  ondansetron (ZOFRAN) 8 MG tablet, Take 1 tablet by mouth 3 (Three) Times a Day As Needed for Nausea or Vomiting., Disp: 30 tablet, Rfl: 5  •  ONETOUCH DELICA LANCETS 33G misc, USE TO TEST BLOOD SUGAR 3 TO 4 TIMES DAILY, Disp: 120 each, Rfl: 5  •  ONETOUCH VERIO test strip, TEST BLOOD GLUCOSE 3 TO 4 TIMES DAILY, Disp: 150 each, Rfl: 4  •  ONGLYZA 5 MG tablet, TAKE 1 TABLET DAILY, Disp: 90 tablet, Rfl: 0  •  telmisartan-hydrochlorothiazide (MICARDIS HCT) 40-12.5 MG per tablet, Take 1 tablet by mouth daily, Disp: 90 tablet, Rfl: 3  •  vitamin D (ERGOCALCIFEROL) 16762 UNITS capsule capsule, Take 1 capsule by mouth 1 (One) Time Per Week., Disp: 13 capsule, Rfl: 3  •  XIGDUO XR  MG tablet sustained-release 24 hour,  TAKE 1 TABLET DAILY, Disp: 90 tablet, Rfl: 0  •  atorvastatin (LIPITOR) 40 MG tablet, Take 1 tablet (40 mg total) by mouth daily for 90 days, Disp: 90 tablet, Rfl: 3  •  Biotin 5000 MCG tablet, Take 5,000 mcg by mouth Daily., Disp: , Rfl:   •  ferrous sulfate 325 (65 FE) MG tablet, Take 1 tablet by mouth Daily., Disp: , Rfl:   No current facility-administered medications for this visit.     Facility-Administered Medications Ordered in Other Visits:   •  atropine injection 0.25 mg, 0.25 mg, Intravenous, Q15 Min PRN, Marivel Brown MD, 0.25 mg at 07/26/17 0843  •  dexamethasone (DECADRON) 12 mg in sodium chloride 0.9 % IVPB, 12 mg, Intravenous, Once, Marivel Brown MD, Last Rate: 200 mL/hr at 07/26/17 0846, 12 mg at 07/26/17 0846  •  fluorouracil (ADRUCIL) 5,350 mg, sodium chloride 0.9 % 133 mL chemo infusion - FOR HOME USE, 2,400 mg/m2 (Treatment Plan Recorded), Intravenous, Once, Marivel Brown MD  •  fluorouracil (ADRUCIL) chemo injection 890 mg, 400 mg/m2 (Treatment Plan Recorded), Intravenous, Once, Marivel Brown MD  •  irinotecan (CAMPTOSAR) 400 mg in dextrose (D5W) 5 % 520 mL chemo IVPB, 180 mg/m2 (Treatment Plan Recorded), Intravenous, Once, Marivel Brown MD  •  leucovorin 890 mg in sodium chloride 0.9 % 294.5 mL IVPB, 400 mg/m2 (Treatment Plan Recorded), Intravenous, Once, Marivel Brown MD    REVIEW OF SYSTEMS:  GENERAL:  No fever or chills.   Fatigue.  SKIN:  See history of present illness.  HEME/LYMPH: Anemia. No enlarged lymph nodes.  RESPIRATORY:  No cough, shortness of breath, hemoptysis or wheezing.  CVS:  No chest pain, palpitations or lower extremity swelling.  GI:  Nausea.  No abdominal pain, vomiting, constipation, diarrhea, melena or hematochezia.  :  Urine normal color.   MUSCULOSKELETAL:  Chronic back pain.  NEUROLOGICAL:  Patient has mild numbness in the fingertips.    Objective   VITAL SIGNS:     Vitals:    07/26/17 0753   BP: 160/80   Pulse: 90   Resp: 16   Temp:  "97.9 °F (36.6 °C)   SpO2: 96%   Weight: 257 lb (117 kg)   Height: 66.14\" (168 cm)   PainSc: 0-No pain     Body mass index is 41.31 kg/(m^2).     Wt Readings from Last 3 Encounters:   07/26/17 257 lb (117 kg)   07/14/17 255 lb (116 kg)   07/13/17 256 lb 3.2 oz (116 kg)       PHYSICAL EXAMINATION:  GENERAL:  The patient appears in good general condition, not in acute distress.  SKIN:  No skin rashes or lesions. No ecchymosis or petechiae.  HEAD:  Normocephalic.  EYES:  No jaundice or pallor.   NECK:  Supple with good ROM. No thyromegaly or masses.  LYMPHATICS:  No cervical or supraclavicular lymphadenopathy.  CHEST:  Lungs clear to auscultation. No added sounds.  Port in the left upper chest is benign.  ABDOMEN:  Soft and non-tender. No hepatomegaly. No masses.  No tenderness in the right upper quadrant.  EXTREMITIES:  Right leg is in a brace. No calf tenderness.    RESULT REVIEW:       Results from last 7 days  Lab Units 07/26/17  0722   WBC 10*3/mm3 8.66   NEUTROS ABS 10*3/mm3 4.85   HEMOGLOBIN g/dL 11.4*   HEMATOCRIT % 34.5   PLATELETS 10*3/mm3 476*       Results from last 7 days  Lab Units 07/26/17  0748   SODIUM mmol/L 141   POTASSIUM mmol/L 3.8   CHLORIDE mmol/L 98   CO2 mmol/L 27.7   BUN mg/dL 16   CREATININE mg/dL 0.90   CALCIUM mg/dL 9.5   ALBUMIN g/dL 3.50   BILIRUBIN mg/dL 0.4   ALK PHOS U/L 486*   ALT (SGPT) U/L 87*   AST (SGOT) U/L 85*   GLUCOSE mg/dL 165*       Lab Results   Component Value Date    CEA 3.77 06/29/2017    CEA 3.54 05/05/2017    CEA 4.01 02/10/2017        Assessment/Plan    1.  Metastatic colon cancer. She had metastases to the liver.   · She received 12 cycles of FOLFOX-6 and then had resection of the primary tumor along with metastasectomy and splenectomy with thermal ablation of lesions in the liver in January 2014.   · This was followed by 12 cycles of the FOLFIRI regimen completed on 08/07/2014.   · CT scans on 05/28/2015 revealed a new lesion in the liver.  Dr. Ruano referred the " patient to Radiation Therapy and she received CyberKnife radiation, completed on 07/14/2015.    · The CT scan from 5/5/17 revealed a new 2.2 cm lesion in the anterior hepatic segment.  CT guided microwave ablation of liver mass performed on 6/16/17  · FOLFIRI x 12 cycles was recommended and was started on 6/29/17.    Patient is tolerating the chemotherapy mild nausea.  He has some fatigue related to chemotherapy.  She has not developed neutropenia or lymphocytopenia.  She had a CT scan when she saw Dr. Ruano on 7/20/17 that showed evidence of a complete response to the microwave ablation.    2.  Elevated liver enzymes.  ALT is at 87 and AST is 84.  They have improved compared to the prior labs.  Bilirubin is normal.  She is likely secondary to inflammation that developed following the microwave ablation of the liver mass and it is improving.  Does not require a dose reduction of the chemotherapy.    3.   Anemia.  She has a history of iron deficiency anemia.  Her hemoglobin has decreased and she is now reporting fatigue.  She also reports thinning of the hair and reports that her nails have become brittle.  I recommended starting ferrous sulfate 325 mg daily.  I will ferritin and iron panel and I will obtain B12 and folate levels.    4.  Thinning of the hair.  As above, patient will be started on ferrous sulfate and I also asked her to start biotin 5000 units a day.        We will see the patient follow-up in 2 weeks when she is due for cycle #4.        Marivel Brown MD  07/26/17

## 2017-07-28 ENCOUNTER — INFUSION (OUTPATIENT)
Dept: ONCOLOGY | Facility: HOSPITAL | Age: 67
End: 2017-07-28

## 2017-07-28 DIAGNOSIS — Z45.2 ENCOUNTER FOR ADJUSTMENT OR MANAGEMENT OF VASCULAR ACCESS DEVICE: ICD-10-CM

## 2017-07-28 DIAGNOSIS — C78.7 LIVER METASTASIS: Primary | ICD-10-CM

## 2017-07-28 DIAGNOSIS — C18.9 PRIMARY MALIGNANT NEOPLASM OF COLON (HCC): ICD-10-CM

## 2017-07-28 PROCEDURE — 25010000002 HEPARIN FLUSH (PORCINE) 100 UNIT/ML SOLUTION: Performed by: INTERNAL MEDICINE

## 2017-07-28 PROCEDURE — 96523 IRRIG DRUG DELIVERY DEVICE: CPT | Performed by: INTERNAL MEDICINE

## 2017-07-28 RX ORDER — SODIUM CHLORIDE 0.9 % (FLUSH) 0.9 %
10 SYRINGE (ML) INJECTION AS NEEDED
Status: DISCONTINUED | OUTPATIENT
Start: 2017-07-28 | End: 2017-07-28 | Stop reason: HOSPADM

## 2017-07-28 RX ORDER — SODIUM CHLORIDE 0.9 % (FLUSH) 0.9 %
10 SYRINGE (ML) INJECTION AS NEEDED
Status: CANCELLED | OUTPATIENT
Start: 2017-07-28

## 2017-07-28 RX ORDER — LORAZEPAM 0.5 MG/1
0.5 TABLET ORAL 2 TIMES DAILY PRN
Qty: 20 TABLET | Refills: 0 | OUTPATIENT
Start: 2017-07-28 | End: 2018-03-26 | Stop reason: ALTCHOICE

## 2017-07-28 RX ADMIN — SODIUM CHLORIDE, PRESERVATIVE FREE 500 UNITS: 5 INJECTION INTRAVENOUS at 08:55

## 2017-07-28 RX ADMIN — Medication 10 ML: at 08:55

## 2017-07-28 NOTE — PROGRESS NOTES
Pt in for chemo unhook.  She c/o bad wave of nausea in the middle of the night last night.  She is tired and wants to go back to bed.  She did take a zofran at 530 am, but doesn't feel that it helped all that much.  She did not eat or drink very well yesterday.  I discussed the option of giving her something for nausea and possibly IV fluids here in the office today and she declined.  She just wants to go home and go back to bed.  She said she will call us before this afternoon if her nausea persists.  After pt left office, I called EMMA Huffman to discuss calling in another anti-emetic for pt to have at home.  With pt's severe back spasm to compazine when she was 16 years old, NP didn't want to prescribe phenergan, so wants pt to have Ativan 0.5mg BID prn nausea.  [facility]ed into [facility] and called pt to inform her and she v/u.  She will still call us with any further concerns/issues

## 2017-08-09 DIAGNOSIS — E11.9 DIABETES TYPE 2, CONTROLLED (HCC): ICD-10-CM

## 2017-08-09 RX ORDER — LANCETS 33 GAUGE
EACH MISCELLANEOUS
Qty: 120 EACH | Refills: 4 | Status: SHIPPED | OUTPATIENT
Start: 2017-08-09 | End: 2017-08-24

## 2017-08-10 ENCOUNTER — APPOINTMENT (OUTPATIENT)
Dept: ONCOLOGY | Facility: HOSPITAL | Age: 67
End: 2017-08-10

## 2017-08-10 ENCOUNTER — INFUSION (OUTPATIENT)
Dept: ONCOLOGY | Facility: HOSPITAL | Age: 67
End: 2017-08-10

## 2017-08-10 ENCOUNTER — OFFICE VISIT (OUTPATIENT)
Dept: ONCOLOGY | Facility: CLINIC | Age: 67
End: 2017-08-10

## 2017-08-10 VITALS
TEMPERATURE: 97.9 F | HEART RATE: 81 BPM | SYSTOLIC BLOOD PRESSURE: 125 MMHG | BODY MASS INDEX: 40.66 KG/M2 | WEIGHT: 253 LBS | OXYGEN SATURATION: 97 % | HEIGHT: 66 IN | DIASTOLIC BLOOD PRESSURE: 75 MMHG | RESPIRATION RATE: 18 BRPM

## 2017-08-10 DIAGNOSIS — C78.7 LIVER METASTASIS: Primary | ICD-10-CM

## 2017-08-10 DIAGNOSIS — C78.7 LIVER METASTASIS: ICD-10-CM

## 2017-08-10 DIAGNOSIS — C18.9 PRIMARY MALIGNANT NEOPLASM OF COLON (HCC): ICD-10-CM

## 2017-08-10 DIAGNOSIS — R11.2 CHEMOTHERAPY-INDUCED NAUSEA AND VOMITING: ICD-10-CM

## 2017-08-10 DIAGNOSIS — T45.1X5A CHEMOTHERAPY-INDUCED NAUSEA AND VOMITING: ICD-10-CM

## 2017-08-10 DIAGNOSIS — R74.8 ELEVATED LIVER ENZYMES: Primary | ICD-10-CM

## 2017-08-10 LAB
ALBUMIN SERPL-MCNC: 3.6 G/DL (ref 3.5–5.2)
ALBUMIN/GLOB SERPL: 0.8 G/DL
ALP SERPL-CCNC: 409 U/L (ref 39–117)
ALT SERPL W P-5'-P-CCNC: 55 U/L (ref 1–33)
ANION GAP SERPL CALCULATED.3IONS-SCNC: 12.3 MMOL/L
AST SERPL-CCNC: 76 U/L (ref 1–32)
BASOPHILS # BLD AUTO: 0.06 10*3/MM3 (ref 0–0.2)
BASOPHILS NFR BLD AUTO: 0.8 % (ref 0–1.5)
BILIRUB SERPL-MCNC: 0.4 MG/DL (ref 0.1–1.2)
BUN BLD-MCNC: 12 MG/DL (ref 8–23)
BUN/CREAT SERPL: 13 (ref 7–25)
CALCIUM SPEC-SCNC: 9.3 MG/DL (ref 8.6–10.5)
CHLORIDE SERPL-SCNC: 101 MMOL/L (ref 98–107)
CO2 SERPL-SCNC: 27.7 MMOL/L (ref 22–29)
CREAT BLD-MCNC: 0.92 MG/DL (ref 0.57–1)
DEPRECATED RDW RBC AUTO: 56.8 FL (ref 37–54)
EOSINOPHIL # BLD AUTO: 0.43 10*3/MM3 (ref 0–0.7)
EOSINOPHIL NFR BLD AUTO: 5.6 % (ref 0.3–6.2)
ERYTHROCYTE [DISTWIDTH] IN BLOOD BY AUTOMATED COUNT: 19.3 % (ref 11.7–13)
GFR SERPL CREATININE-BSD FRML MDRD: 74 ML/MIN/1.73
GLOBULIN UR ELPH-MCNC: 4.5 GM/DL
GLUCOSE BLD-MCNC: 140 MG/DL (ref 65–99)
HCT VFR BLD AUTO: 33.8 % (ref 35.6–45.5)
HGB BLD-MCNC: 11 G/DL (ref 11.9–15.5)
IMM GRANULOCYTES # BLD: 0.14 10*3/MM3 (ref 0–0.03)
IMM GRANULOCYTES NFR BLD: 1.8 % (ref 0–0.5)
LYMPHOCYTES # BLD AUTO: 1.97 10*3/MM3 (ref 0.9–4.8)
LYMPHOCYTES NFR BLD AUTO: 25.6 % (ref 19.6–45.3)
MCH RBC QN AUTO: 27.8 PG (ref 26.9–32)
MCHC RBC AUTO-ENTMCNC: 32.5 G/DL (ref 32.4–36.3)
MCV RBC AUTO: 85.6 FL (ref 80.5–98.2)
MONOCYTES # BLD AUTO: 1.09 10*3/MM3 (ref 0.2–1.2)
MONOCYTES NFR BLD AUTO: 14.1 % (ref 5–12)
NEUTROPHILS # BLD AUTO: 4.02 10*3/MM3 (ref 1.9–8.1)
NEUTROPHILS NFR BLD AUTO: 52.1 % (ref 42.7–76)
NRBC BLD MANUAL-RTO: 0.3 /100 WBC (ref 0–0)
PLATELET # BLD AUTO: 454 10*3/MM3 (ref 140–500)
PMV BLD AUTO: 8.2 FL (ref 6–12)
POTASSIUM BLD-SCNC: 3.9 MMOL/L (ref 3.5–5.2)
PROT SERPL-MCNC: 8.1 G/DL (ref 6–8.5)
RBC # BLD AUTO: 3.95 10*6/MM3 (ref 3.9–5.2)
SODIUM BLD-SCNC: 141 MMOL/L (ref 136–145)
WBC NRBC COR # BLD: 7.71 10*3/MM3 (ref 4.5–10.7)

## 2017-08-10 PROCEDURE — 96413 CHEMO IV INFUSION 1 HR: CPT | Performed by: INTERNAL MEDICINE

## 2017-08-10 PROCEDURE — 96411 CHEMO IV PUSH ADDL DRUG: CPT | Performed by: INTERNAL MEDICINE

## 2017-08-10 PROCEDURE — 96368 THER/DIAG CONCURRENT INF: CPT | Performed by: INTERNAL MEDICINE

## 2017-08-10 PROCEDURE — 25010000002 PALONOSETRON PER 25 MCG: Performed by: INTERNAL MEDICINE

## 2017-08-10 PROCEDURE — 25010000002 LEUCOVORIN CALCIUM PER 50 MG: Performed by: INTERNAL MEDICINE

## 2017-08-10 PROCEDURE — 80053 COMPREHEN METABOLIC PANEL: CPT | Performed by: INTERNAL MEDICINE

## 2017-08-10 PROCEDURE — 85025 COMPLETE CBC W/AUTO DIFF WBC: CPT | Performed by: INTERNAL MEDICINE

## 2017-08-10 PROCEDURE — 96375 TX/PRO/DX INJ NEW DRUG ADDON: CPT | Performed by: INTERNAL MEDICINE

## 2017-08-10 PROCEDURE — 99214 OFFICE O/P EST MOD 30 MIN: CPT | Performed by: INTERNAL MEDICINE

## 2017-08-10 PROCEDURE — 96416 CHEMO PROLONG INFUSE W/PUMP: CPT | Performed by: INTERNAL MEDICINE

## 2017-08-10 PROCEDURE — 25010000002 DEXAMETHASONE SODIUM PHOSPHATE 10 MG/ML SOLUTION 1 ML VIAL: Performed by: INTERNAL MEDICINE

## 2017-08-10 PROCEDURE — 25010000002 FLUOROURACIL PER 500 MG: Performed by: INTERNAL MEDICINE

## 2017-08-10 PROCEDURE — 96415 CHEMO IV INFUSION ADDL HR: CPT | Performed by: INTERNAL MEDICINE

## 2017-08-10 PROCEDURE — 25010000002 IRINOTECAN PER 20 MG: Performed by: INTERNAL MEDICINE

## 2017-08-10 RX ORDER — ATROPINE SULFATE 0.4 MG/ML
0.25 AMPUL (ML) INJECTION
Status: DISCONTINUED | OUTPATIENT
Start: 2017-08-10 | End: 2017-08-10 | Stop reason: HOSPADM

## 2017-08-10 RX ORDER — PALONOSETRON 0.05 MG/ML
0.25 INJECTION, SOLUTION INTRAVENOUS ONCE
Status: CANCELLED | OUTPATIENT
Start: 2017-08-24

## 2017-08-10 RX ORDER — PALONOSETRON 0.05 MG/ML
0.25 INJECTION, SOLUTION INTRAVENOUS ONCE
Status: COMPLETED | OUTPATIENT
Start: 2017-08-10 | End: 2017-08-10

## 2017-08-10 RX ORDER — ATROPINE SULFATE 1 MG/ML
0.25 INJECTION, SOLUTION INTRAMUSCULAR; INTRAVENOUS; SUBCUTANEOUS
Status: CANCELLED | OUTPATIENT
Start: 2017-08-24

## 2017-08-10 RX ORDER — FLUOROURACIL 50 MG/ML
400 INJECTION, SOLUTION INTRAVENOUS ONCE
Status: CANCELLED | OUTPATIENT
Start: 2017-08-10

## 2017-08-10 RX ORDER — SODIUM CHLORIDE 9 MG/ML
250 INJECTION, SOLUTION INTRAVENOUS ONCE
Status: COMPLETED | OUTPATIENT
Start: 2017-08-10 | End: 2017-08-10

## 2017-08-10 RX ORDER — SODIUM CHLORIDE 9 MG/ML
250 INJECTION, SOLUTION INTRAVENOUS ONCE
Status: CANCELLED | OUTPATIENT
Start: 2017-08-10

## 2017-08-10 RX ORDER — FLUOROURACIL 50 MG/ML
400 INJECTION, SOLUTION INTRAVENOUS ONCE
Status: CANCELLED | OUTPATIENT
Start: 2017-08-24

## 2017-08-10 RX ORDER — PALONOSETRON 0.05 MG/ML
0.25 INJECTION, SOLUTION INTRAVENOUS ONCE
Status: CANCELLED | OUTPATIENT
Start: 2017-08-10

## 2017-08-10 RX ORDER — SODIUM CHLORIDE 9 MG/ML
250 INJECTION, SOLUTION INTRAVENOUS ONCE
Status: CANCELLED | OUTPATIENT
Start: 2017-08-24

## 2017-08-10 RX ORDER — FLUOROURACIL 50 MG/ML
400 INJECTION, SOLUTION INTRAVENOUS ONCE
Status: COMPLETED | OUTPATIENT
Start: 2017-08-10 | End: 2017-08-10

## 2017-08-10 RX ORDER — ATROPINE SULFATE 1 MG/ML
0.25 INJECTION, SOLUTION INTRAMUSCULAR; INTRAVENOUS; SUBCUTANEOUS
Status: CANCELLED | OUTPATIENT
Start: 2017-08-10

## 2017-08-10 RX ADMIN — DEXAMETHASONE SODIUM PHOSPHATE 12 MG: 10 INJECTION, SOLUTION INTRAMUSCULAR; INTRAVENOUS at 09:48

## 2017-08-10 RX ADMIN — FLUOROURACIL 890 MG: 50 INJECTION, SOLUTION INTRAVENOUS at 11:52

## 2017-08-10 RX ADMIN — PALONOSETRON HYDROCHLORIDE 0.25 MG: 0.25 INJECTION INTRAVENOUS at 09:46

## 2017-08-10 RX ADMIN — SODIUM CHLORIDE 250 ML: 900 INJECTION, SOLUTION INTRAVENOUS at 09:40

## 2017-08-10 RX ADMIN — FLUOROURACIL 5350 MG: 50 INJECTION, SOLUTION INTRAVENOUS at 12:17

## 2017-08-10 RX ADMIN — DEXTROSE MONOHYDRATE 400 MG: 5 INJECTION, SOLUTION INTRAVENOUS at 10:18

## 2017-08-10 RX ADMIN — LEUCOVORIN CALCIUM 890 MG: 350 INJECTION, POWDER, LYOPHILIZED, FOR SOLUTION INTRAMUSCULAR; INTRAVENOUS at 10:18

## 2017-08-10 NOTE — PROGRESS NOTES
Subjective     CHIEF COMPLAINT:    1. Metastatic colon cancer (KRAS mutation positive) with liver metastasis, biopsy confirmed. No resection of primary sigmoid colon cancer.   2. Recurrent Iron deficiency anemia   3. Palliative chemotherapy with FOLFOX-6 was given from June 2013 to November 2013.   4. The patient underwent left partial colectomy, splenectomy, resection of left diaphragm with repair and partial hepatectomy with microwave ablation of metastasis on 01/14/2014.   5. The patient received FOLFIRI x12 cycles between 03/07/2014 through 08/07/2014.   6. CT scan on 05/28/2015 revealed a new liver lesion. Stereotactic radiation therapy with CyberKnife was delivered in 3 fractions, completed on 07/14/2015.        7.  New liver lesion was identified on CT on 5/5/17. CT guided microwave ablation of liver mass on 6/16/17    HISTORY OF PRESENT ILLNESS:     Feli Del Toro is a 66 y.o. patient with the above medical history.  She returns today for follow-up.  She started systemic chemotherapy with FOLFIRI on 6/29/17.      She developed more severe nausea associated with vomiting and dry heaves following the last cycle of chemotherapy.  The symptoms developed in the early morning of day #3.  Her  noticed that she passed out after one of the vomiting episodes.  They nausea gradually improved afterwards    Patient is taking oral iron and is not having problems with constipation from it.  She denies abdominal pain.      Past Medical History:   Diagnosis Date   • Anemia    • Cancer 06/04/2013   • Diabetes mellitus    • History of transfusion    • Hyperlipidemia    • Hypertension    • Metastatic colon cancer to liver    • Retinopathy    • Type 2 diabetes mellitus        Past Surgical History:   Procedure Laterality Date   • ABDOMINAL SURGERY      ablation    • AMPUTATION DIGIT Left 1/1/2017    Procedure: LEFT SECOND TOE AMPUTATION;  Surgeon: Farzad Nichols MD;  Location: [facility];  Service:    • AMPUTATION OF  REPLICATED TOES      right distal second toe    • CATARACT EXTRACTION     • COLECTOMY PARTIAL / TOTAL  01/14/2014   • HYSTERECTOMY  1998   • PORTACATH PLACEMENT  06/2013   • SALPINGO OOPHORECTOMY Bilateral    • SPLENECTOMY         Cancer-related family history includes Breast cancer in her sister; Cancer in her other.    SCHEDULED MEDS:       Current Outpatient Prescriptions:   •  allopurinol (ZYLOPRIM) 100 MG tablet, Take 1 tablet by mouth Daily., Disp: 90 tablet, Rfl: 1  •  aspirin 81 MG tablet, Take 81 mg by mouth daily., Disp: , Rfl:   •  atorvastatin (LIPITOR) 40 MG tablet, Take 1 tablet (40 mg total) by mouth daily for 90 days, Disp: 90 tablet, Rfl: 3  •  Biotin 5000 MCG tablet, Take 5,000 mcg by mouth Daily., Disp: , Rfl:   •  calcium carbonate (TUMS) 500 MG chewable tablet, Chew., Disp: , Rfl:   •  ferrous sulfate 325 (65 FE) MG tablet, Take 1 tablet by mouth Daily., Disp: , Rfl:   •  fluticasone (FLONASE) 50 MCG/ACT nasal spray, , Disp: , Rfl:   •  insulin detemir (LEVEMIR FLEXTOUCH) 100 UNIT/ML injection, In 32U in PM titrate as instructed with max of 100u daily, Disp: 90 mL, Rfl: 0  •  Insulin Lispro (HUMALOG KWIKPEN) 100 UNIT/ML solution pen-injector, 15u AC and sliding scale, Disp: 42 mL, Rfl: 0  •  Insulin Pen Needle 32G X 6 MM misc, Use as directed five times daily and as needed., Disp: 180 each, Rfl: 3  •  LORazepam (ATIVAN) 0.5 MG tablet, Take 1 tablet by mouth 2 (Two) Times a Day As Needed (nausea)., Disp: 20 tablet, Rfl: 0  •  MAGNESIUM OXIDE PO, Take 400 mg by mouth daily., Disp: , Rfl:   •  meclizine (ANTIVERT) 25 MG tablet, Take 25 mg by mouth 3 (Three) Times a Day As Needed., Disp: , Rfl:   •  ondansetron (ZOFRAN) 8 MG tablet, Take 1 tablet by mouth 3 (Three) Times a Day As Needed for Nausea or Vomiting., Disp: 30 tablet, Rfl: 5  •  ONETOUCH DELICA LANCETS 33G misc, USE ONE LANCET TO TEST BLOOD SUGAR THREE TO FOUR TIMES A DAY, Disp: 120 each, Rfl: 4  •  ONETOUCH VERIO test strip, TEST BLOOD  GLUCOSE 3 TO 4 TIMES DAILY, Disp: 150 each, Rfl: 4  •  ONGLYZA 5 MG tablet, TAKE 1 TABLET DAILY, Disp: 90 tablet, Rfl: 0  •  telmisartan-hydrochlorothiazide (MICARDIS HCT) 40-12.5 MG per tablet, Take 1 tablet by mouth daily, Disp: 90 tablet, Rfl: 3  •  vitamin D (ERGOCALCIFEROL) 40691 UNITS capsule capsule, Take 1 capsule by mouth 1 (One) Time Per Week., Disp: 13 capsule, Rfl: 3  •  XIGDUO XR  MG tablet sustained-release 24 hour, TAKE 1 TABLET DAILY, Disp: 90 tablet, Rfl: 0  No current facility-administered medications for this visit.     REVIEW OF SYSTEMS:  GENERAL:  No fever or chills.   Fatigue.  SKIN:  See history of present illness.  HEME/LYMPH: Anemia. No enlarged lymph nodes.  RESPIRATORY:  No cough, shortness of breath, hemoptysis or wheezing.  CVS:  No chest pain, palpitations or lower extremity swelling.  GI:  Nausea and vomiting as in history of present illness.  No abdominal pain, vomiting, constipation, diarrhea, melena or hematochezia.  :  No dysuria.   MUSCULOSKELETAL:  Chronic back pain.  NEUROLOGICAL:  Patient has mild numbness in the fingertips, unchanged.    Objective   VITAL SIGNS:   Blood pressure 125/75, heart rate 81, pulse 18, temperature 97.9 saturation 97%        Wt Readings from Last 3 Encounters:   08/10/17 253 lb (115 kg)   07/26/17 257 lb (117 kg)   07/14/17 255 lb (116 kg)       PHYSICAL EXAMINATION:  GENERAL:  The patient appears in good general condition, not in acute distress.  SKIN:  No skin rashes or lesions. No ecchymosis or petechiae.  HEAD:  Normocephalic.  EYES:  No jaundice or pallor.   NECK:  Supple with good ROM. No thyromegaly or masses.  LYMPHATICS:  No cervical or supraclavicular lymphadenopathy.  CHEST:  Lungs clear to auscultation. No added sounds.  Port in the left upper chest is benign.  ABDOMEN:  Soft and non-tender. No hepatomegaly. No masses.  No tenderness in the right upper quadrant.  EXTREMITIES:  Right leg is in a brace. No calf tenderness.    RESULT  REVIEW:       Results from last 7 days  Lab Units 08/10/17  0828   WBC 10*3/mm3 7.71   NEUTROS ABS 10*3/mm3 4.02   HEMOGLOBIN g/dL 11.0*   HEMATOCRIT % 33.8*   PLATELETS 10*3/mm3 454       Results from last 7 days  Lab Units 08/10/17  0827   SODIUM mmol/L 141   POTASSIUM mmol/L 3.9   CHLORIDE mmol/L 101   CO2 mmol/L 27.7   BUN mg/dL 12   CREATININE mg/dL 0.92   CALCIUM mg/dL 9.3   ALBUMIN g/dL 3.60   BILIRUBIN mg/dL 0.4   ALK PHOS U/L 409*   ALT (SGPT) U/L 55*   AST (SGOT) U/L 76*   GLUCOSE mg/dL 140*       Lab Results   Component Value Date    CEA 4.58 07/26/2017    CEA 3.77 06/29/2017    CEA 3.54 05/05/2017        Assessment/Plan    1.  Metastatic colon cancer. She had metastases to the liver.   · She received 12 cycles of FOLFOX-6 and then had resection of the primary tumor along with metastasectomy and splenectomy with thermal ablation of lesions in the liver in January 2014.   · This was followed by 12 cycles of the FOLFIRI regimen completed on 08/07/2014.   · CT scans on 05/28/2015 revealed a new lesion in the liver.  Dr. Ruano referred the patient to Radiation Therapy and she received CyberKnife radiation, completed on 07/14/2015.    · The CT scan from 5/5/17 revealed a new 2.2 cm lesion in the anterior hepatic segment.  CT guided microwave ablation of liver mass performed on 6/16/17 with CT evidence on 7/20/17 of a complete response  · FOLFIRI x 12 cycles was recommended and was started on 6/29/17.  Plans is to repeat scans after completion of chemotherapy.      2.  Elevated liver enzymes.  ALT is at 87 and AST is 84.  They have improved compared to the prior labs.   This is secondary to inflammation that developed following the microwave ablation of the liver mass and it is improving.  Bilirubin is normal. She does not require a dose reduction of the chemotherapy.    3.   Anemia.  She has a history of iron deficiency anemia.  She is on ferrous sulfate 325 mg daily and we will increase to twice a day.   She has adequate B12 and folate levels.    4.  Nausea and vomiting secondary to chemotherapy.  It developed in the early morning of day #3.  I asked the patient to take Zofran twice a day on days 2, 3 and 4.  If she develops nausea, she will take lorazepam PRN.    5.  Thinning of the hair.  She is on oral iron and biotin.    We will see the patient follow-up in 2 weeks when she is due for cycle #5.        Marivel Brown MD  08/10/17

## 2017-08-12 ENCOUNTER — INFUSION (OUTPATIENT)
Dept: ONCOLOGY | Facility: HOSPITAL | Age: 67
End: 2017-08-12

## 2017-08-12 VITALS
HEART RATE: 88 BPM | TEMPERATURE: 97 F | OXYGEN SATURATION: 95 % | SYSTOLIC BLOOD PRESSURE: 122 MMHG | RESPIRATION RATE: 18 BRPM | DIASTOLIC BLOOD PRESSURE: 65 MMHG

## 2017-08-12 DIAGNOSIS — C18.9 PRIMARY MALIGNANT NEOPLASM OF COLON (HCC): ICD-10-CM

## 2017-08-12 DIAGNOSIS — C78.7 LIVER METASTASIS: Primary | ICD-10-CM

## 2017-08-12 PROCEDURE — 25010000002 HEPARIN FLUSH (PORCINE) 100 UNIT/ML SOLUTION: Performed by: INTERNAL MEDICINE

## 2017-08-12 PROCEDURE — 96523 IRRIG DRUG DELIVERY DEVICE: CPT

## 2017-08-12 RX ADMIN — Medication 500 UNITS: at 10:50

## 2017-08-24 ENCOUNTER — APPOINTMENT (OUTPATIENT)
Dept: ONCOLOGY | Facility: HOSPITAL | Age: 67
End: 2017-08-24

## 2017-08-24 ENCOUNTER — INFUSION (OUTPATIENT)
Dept: ONCOLOGY | Facility: HOSPITAL | Age: 67
End: 2017-08-24

## 2017-08-24 ENCOUNTER — OFFICE VISIT (OUTPATIENT)
Dept: ONCOLOGY | Facility: CLINIC | Age: 67
End: 2017-08-24

## 2017-08-24 VITALS
RESPIRATION RATE: 16 BRPM | HEART RATE: 97 BPM | HEIGHT: 66 IN | WEIGHT: 250 LBS | OXYGEN SATURATION: 100 % | DIASTOLIC BLOOD PRESSURE: 70 MMHG | BODY MASS INDEX: 40.18 KG/M2 | TEMPERATURE: 98 F | SYSTOLIC BLOOD PRESSURE: 130 MMHG

## 2017-08-24 DIAGNOSIS — C18.9 PRIMARY MALIGNANT NEOPLASM OF COLON (HCC): Primary | ICD-10-CM

## 2017-08-24 DIAGNOSIS — D50.9 IRON DEFICIENCY ANEMIA, UNSPECIFIED IRON DEFICIENCY ANEMIA TYPE: ICD-10-CM

## 2017-08-24 DIAGNOSIS — R74.8 ELEVATED LIVER ENZYMES: ICD-10-CM

## 2017-08-24 DIAGNOSIS — C78.7 LIVER METASTASIS: Primary | ICD-10-CM

## 2017-08-24 DIAGNOSIS — T45.1X5A CHEMOTHERAPY INDUCED NAUSEA AND VOMITING: ICD-10-CM

## 2017-08-24 DIAGNOSIS — C78.7 LIVER METASTASIS: ICD-10-CM

## 2017-08-24 DIAGNOSIS — C18.9 PRIMARY MALIGNANT NEOPLASM OF COLON (HCC): ICD-10-CM

## 2017-08-24 DIAGNOSIS — R11.2 CHEMOTHERAPY INDUCED NAUSEA AND VOMITING: ICD-10-CM

## 2017-08-24 LAB
ALBUMIN SERPL-MCNC: 3.3 G/DL (ref 3.5–5.2)
ALBUMIN/GLOB SERPL: 0.7 G/DL
ALP SERPL-CCNC: 445 U/L (ref 39–117)
ALT SERPL W P-5'-P-CCNC: 51 U/L (ref 1–33)
ANION GAP SERPL CALCULATED.3IONS-SCNC: 15.5 MMOL/L
AST SERPL-CCNC: 65 U/L (ref 1–32)
BASOPHILS # BLD AUTO: 0.05 10*3/MM3 (ref 0–0.2)
BASOPHILS NFR BLD AUTO: 0.6 % (ref 0–1.5)
BILIRUB SERPL-MCNC: 0.5 MG/DL (ref 0.1–1.2)
BUN BLD-MCNC: 16 MG/DL (ref 8–23)
BUN/CREAT SERPL: 18 (ref 7–25)
CALCIUM SPEC-SCNC: 9.8 MG/DL (ref 8.6–10.5)
CEA SERPL-MCNC: 5.08 NG/ML
CHLORIDE SERPL-SCNC: 100 MMOL/L (ref 98–107)
CO2 SERPL-SCNC: 27.5 MMOL/L (ref 22–29)
CREAT BLD-MCNC: 0.89 MG/DL (ref 0.57–1)
DEPRECATED RDW RBC AUTO: 60.4 FL (ref 37–54)
EOSINOPHIL # BLD AUTO: 0.55 10*3/MM3 (ref 0–0.7)
EOSINOPHIL NFR BLD AUTO: 6.5 % (ref 0.3–6.2)
ERYTHROCYTE [DISTWIDTH] IN BLOOD BY AUTOMATED COUNT: 20.2 % (ref 11.7–13)
GFR SERPL CREATININE-BSD FRML MDRD: 77 ML/MIN/1.73
GLOBULIN UR ELPH-MCNC: 4.9 GM/DL
GLUCOSE BLD-MCNC: 121 MG/DL (ref 65–99)
HCT VFR BLD AUTO: 33.3 % (ref 35.6–45.5)
HGB BLD-MCNC: 10.9 G/DL (ref 11.9–15.5)
IMM GRANULOCYTES # BLD: 0.08 10*3/MM3 (ref 0–0.03)
IMM GRANULOCYTES NFR BLD: 0.9 % (ref 0–0.5)
LYMPHOCYTES # BLD AUTO: 1.79 10*3/MM3 (ref 0.9–4.8)
LYMPHOCYTES NFR BLD AUTO: 21.1 % (ref 19.6–45.3)
MCH RBC QN AUTO: 27.9 PG (ref 26.9–32)
MCHC RBC AUTO-ENTMCNC: 32.7 G/DL (ref 32.4–36.3)
MCV RBC AUTO: 85.4 FL (ref 80.5–98.2)
MONOCYTES # BLD AUTO: 1.44 10*3/MM3 (ref 0.2–1.2)
MONOCYTES NFR BLD AUTO: 17 % (ref 5–12)
NEUTROPHILS # BLD AUTO: 4.57 10*3/MM3 (ref 1.9–8.1)
NEUTROPHILS NFR BLD AUTO: 53.9 % (ref 42.7–76)
NRBC BLD MANUAL-RTO: 0.4 /100 WBC (ref 0–0)
PLATELET # BLD AUTO: 456 10*3/MM3 (ref 140–500)
PMV BLD AUTO: 8.5 FL (ref 6–12)
POTASSIUM BLD-SCNC: 3.6 MMOL/L (ref 3.5–5.2)
PROT SERPL-MCNC: 8.2 G/DL (ref 6–8.5)
RBC # BLD AUTO: 3.9 10*6/MM3 (ref 3.9–5.2)
SODIUM BLD-SCNC: 143 MMOL/L (ref 136–145)
WBC NRBC COR # BLD: 8.48 10*3/MM3 (ref 4.5–10.7)

## 2017-08-24 PROCEDURE — 96416 CHEMO PROLONG INFUSE W/PUMP: CPT | Performed by: INTERNAL MEDICINE

## 2017-08-24 PROCEDURE — 25010000002 LEUCOVORIN CALCIUM PER 50 MG: Performed by: INTERNAL MEDICINE

## 2017-08-24 PROCEDURE — 99214 OFFICE O/P EST MOD 30 MIN: CPT | Performed by: INTERNAL MEDICINE

## 2017-08-24 PROCEDURE — 82378 CARCINOEMBRYONIC ANTIGEN: CPT | Performed by: INTERNAL MEDICINE

## 2017-08-24 PROCEDURE — 96368 THER/DIAG CONCURRENT INF: CPT | Performed by: INTERNAL MEDICINE

## 2017-08-24 PROCEDURE — 25010000002 DEXAMETHASONE SODIUM PHOSPHATE 10 MG/ML SOLUTION 1 ML VIAL: Performed by: INTERNAL MEDICINE

## 2017-08-24 PROCEDURE — 96411 CHEMO IV PUSH ADDL DRUG: CPT | Performed by: INTERNAL MEDICINE

## 2017-08-24 PROCEDURE — 85025 COMPLETE CBC W/AUTO DIFF WBC: CPT | Performed by: INTERNAL MEDICINE

## 2017-08-24 PROCEDURE — 25010000002 FLUOROURACIL PER 500 MG: Performed by: INTERNAL MEDICINE

## 2017-08-24 PROCEDURE — 96375 TX/PRO/DX INJ NEW DRUG ADDON: CPT | Performed by: INTERNAL MEDICINE

## 2017-08-24 PROCEDURE — 25010000002 IRINOTECAN PER 20 MG: Performed by: INTERNAL MEDICINE

## 2017-08-24 PROCEDURE — 80053 COMPREHEN METABOLIC PANEL: CPT | Performed by: INTERNAL MEDICINE

## 2017-08-24 PROCEDURE — 96415 CHEMO IV INFUSION ADDL HR: CPT | Performed by: INTERNAL MEDICINE

## 2017-08-24 PROCEDURE — 25010000002 PALONOSETRON PER 25 MCG: Performed by: INTERNAL MEDICINE

## 2017-08-24 PROCEDURE — 96413 CHEMO IV INFUSION 1 HR: CPT | Performed by: INTERNAL MEDICINE

## 2017-08-24 PROCEDURE — 36415 COLL VENOUS BLD VENIPUNCTURE: CPT | Performed by: INTERNAL MEDICINE

## 2017-08-24 RX ORDER — FLUOROURACIL 50 MG/ML
400 INJECTION, SOLUTION INTRAVENOUS ONCE
Status: CANCELLED | OUTPATIENT
Start: 2017-09-07

## 2017-08-24 RX ORDER — FLUOROURACIL 50 MG/ML
400 INJECTION, SOLUTION INTRAVENOUS ONCE
Status: COMPLETED | OUTPATIENT
Start: 2017-08-24 | End: 2017-08-24

## 2017-08-24 RX ORDER — ATROPINE SULFATE 0.4 MG/ML
0.25 AMPUL (ML) INJECTION
Status: DISCONTINUED | OUTPATIENT
Start: 2017-08-24 | End: 2017-08-24 | Stop reason: HOSPADM

## 2017-08-24 RX ORDER — SODIUM CHLORIDE 9 MG/ML
250 INJECTION, SOLUTION INTRAVENOUS ONCE
Status: COMPLETED | OUTPATIENT
Start: 2017-08-24 | End: 2017-08-24

## 2017-08-24 RX ORDER — ATROPINE SULFATE 1 MG/ML
0.25 INJECTION, SOLUTION INTRAMUSCULAR; INTRAVENOUS; SUBCUTANEOUS
Status: CANCELLED | OUTPATIENT
Start: 2017-09-07

## 2017-08-24 RX ORDER — SODIUM CHLORIDE 9 MG/ML
250 INJECTION, SOLUTION INTRAVENOUS ONCE
Status: CANCELLED | OUTPATIENT
Start: 2017-09-07

## 2017-08-24 RX ORDER — ATORVASTATIN CALCIUM 40 MG/1
TABLET, FILM COATED ORAL
COMMUNITY
Start: 2017-05-28 | End: 2017-08-24 | Stop reason: SDUPTHER

## 2017-08-24 RX ORDER — PALONOSETRON 0.05 MG/ML
0.25 INJECTION, SOLUTION INTRAVENOUS ONCE
Status: COMPLETED | OUTPATIENT
Start: 2017-08-24 | End: 2017-08-24

## 2017-08-24 RX ORDER — PALONOSETRON 0.05 MG/ML
0.25 INJECTION, SOLUTION INTRAVENOUS ONCE
Status: CANCELLED | OUTPATIENT
Start: 2017-09-07

## 2017-08-24 RX ADMIN — FLUOROURACIL 5350 MG: 50 INJECTION, SOLUTION INTRAVENOUS at 11:20

## 2017-08-24 RX ADMIN — FLUOROURACIL 890 MG: 50 INJECTION, SOLUTION INTRAVENOUS at 11:17

## 2017-08-24 RX ADMIN — PALONOSETRON HYDROCHLORIDE 0.25 MG: 0.25 INJECTION INTRAVENOUS at 09:07

## 2017-08-24 RX ADMIN — LEUCOVORIN CALCIUM 890 MG: 350 INJECTION, POWDER, LYOPHILIZED, FOR SOLUTION INTRAMUSCULAR; INTRAVENOUS at 09:42

## 2017-08-24 RX ADMIN — DEXTROSE MONOHYDRATE 400 MG: 5 INJECTION, SOLUTION INTRAVENOUS at 09:42

## 2017-08-24 RX ADMIN — ATROPINE SULFATE 0.25 MG: 0.4 INJECTION, SOLUTION INTRAMUSCULAR; INTRAVENOUS; SUBCUTANEOUS at 09:42

## 2017-08-24 RX ADMIN — DEXAMETHASONE SODIUM PHOSPHATE 12 MG: 10 INJECTION, SOLUTION INTRAMUSCULAR; INTRAVENOUS at 09:07

## 2017-08-24 RX ADMIN — SODIUM CHLORIDE 250 ML: 900 INJECTION, SOLUTION INTRAVENOUS at 09:07

## 2017-08-24 NOTE — PROGRESS NOTES
Subjective     CHIEF COMPLAINT:    1. Metastatic colon cancer (KRAS mutation positive) with liver metastasis, biopsy confirmed. No resection of primary sigmoid colon cancer.   2. Recurrent Iron deficiency anemia   3. Palliative chemotherapy with FOLFOX-6 was given from June 2013 to November 2013.   4. Left partial colectomy, splenectomy, resection of left diaphragm with repair and partial hepatectomy with microwave ablation of metastasis on 1/14/2014.   5. FOLFIRI x12 cycles between 3/07/2014 and 8/07/2014.   6. CT scan on 05/28/2015 revealed a new liver lesion. Stereotactic radiation therapy with CyberKnife was delivered in 3 fractions, completed on 07/14/2015.        7.  New liver lesion was identified on CT on 5/5/17. CT guided microwave ablation of liver mass on 6/16/17    HISTORY OF PRESENT ILLNESS:     Feli Del Toro is a 66 y.o. patient with the above medical history.  She returns today for follow-up.  She started systemic chemotherapy with FOLFIRI on 6/29/17.  She took Zofran twice a day on days 2 and 3 as instructed.  She did not have episodes of nausea at home.    Patient is taking oral iron once a day.  She is taking Biotin once a day as well.    Patient denies abdominal pain.      Past Medical History:   Diagnosis Date   • Anemia    • Cancer 06/04/2013   • Diabetes mellitus    • History of transfusion    • Hyperlipidemia    • Hypertension    • Metastatic colon cancer to liver    • Retinopathy    • Type 2 diabetes mellitus        Past Surgical History:   Procedure Laterality Date   • ABDOMINAL SURGERY      ablation    • AMPUTATION DIGIT Left 1/1/2017    Procedure: LEFT SECOND TOE AMPUTATION;  Surgeon: Farzad Nichols MD;  Location: [facility];  Service:    • AMPUTATION OF REPLICATED TOES      right distal second toe    • CATARACT EXTRACTION     • COLECTOMY PARTIAL / TOTAL  01/14/2014   • HYSTERECTOMY  1998   • PORTACATH PLACEMENT  06/2013   • SALPINGO OOPHORECTOMY Bilateral    • SPLENECTOMY          Cancer-related family history includes Breast cancer in her sister; Cancer in her other.    SCHEDULED MEDS:       Current Outpatient Prescriptions:   •  allopurinol (ZYLOPRIM) 100 MG tablet, Take 1 tablet by mouth Daily., Disp: 90 tablet, Rfl: 1  •  aspirin 81 MG tablet, Take 81 mg by mouth daily., Disp: , Rfl:   •  atorvastatin (LIPITOR) 40 MG tablet, Take 1 tablet (40 mg total) by mouth daily for 90 days, Disp: 90 tablet, Rfl: 3  •  Biotin 5000 MCG tablet, Take 5,000 mcg by mouth Daily., Disp: , Rfl:   •  calcium carbonate (TUMS) 500 MG chewable tablet, Chew., Disp: , Rfl:   •  ferrous sulfate 325 (65 FE) MG tablet, Take 1 tablet by mouth Daily., Disp: , Rfl:   •  fluticasone (FLONASE) 50 MCG/ACT nasal spray, , Disp: , Rfl:   •  insulin detemir (LEVEMIR FLEXTOUCH) 100 UNIT/ML injection, In 32U in PM titrate as instructed with max of 100u daily, Disp: 90 mL, Rfl: 0  •  Insulin Lispro (HUMALOG KWIKPEN) 100 UNIT/ML solution pen-injector, 15u AC and sliding scale, Disp: 42 mL, Rfl: 0  •  LORazepam (ATIVAN) 0.5 MG tablet, Take 1 tablet by mouth 2 (Two) Times a Day As Needed (nausea)., Disp: 20 tablet, Rfl: 0  •  MAGNESIUM OXIDE PO, Take 400 mg by mouth daily., Disp: , Rfl:   •  meclizine (ANTIVERT) 25 MG tablet, Take 25 mg by mouth 3 (Three) Times a Day As Needed., Disp: , Rfl:   •  ondansetron (ZOFRAN) 8 MG tablet, Take 1 tablet by mouth 3 (Three) Times a Day As Needed for Nausea or Vomiting., Disp: 30 tablet, Rfl: 5  •  ONGLYZA 5 MG tablet, TAKE 1 TABLET DAILY, Disp: 90 tablet, Rfl: 0  •  telmisartan-hydrochlorothiazide (MICARDIS HCT) 40-12.5 MG per tablet, Take 1 tablet by mouth daily, Disp: 90 tablet, Rfl: 3  •  vitamin D (ERGOCALCIFEROL) 41152 UNITS capsule capsule, Take 1 capsule by mouth 1 (One) Time Per Week., Disp: 13 capsule, Rfl: 3  •  XIGDUO XR  MG tablet sustained-release 24 hour, TAKE 1 TABLET DAILY, Disp: 90 tablet, Rfl: 0    REVIEW OF SYSTEMS:  GENERAL:  No fever or chills.    "Fatigue.  SKIN:  See history of present illness.  HEME/LYMPH: Anemia. No enlarged lymph nodes.  RESPIRATORY:  No cough, shortness of breath, hemoptysis or wheezing.  CVS:  No chest pain, palpitations or lower extremity swelling.  GI:  See history of present illness.  No abdominal pain, vomiting, constipation, diarrhea, melena or hematochezia.  :  No dysuria.   MUSCULOSKELETAL:  Chronic back pain.  NEUROLOGICAL:  Patient has mild numbness in the fingertips, unchanged.    Objective   VITAL SIGNS:     Vitals:    08/24/17 0803   BP: 130/70   Pulse: 97   Resp: 16   Temp: 98 °F (36.7 °C)   TempSrc: Oral   SpO2: 100%   Weight: 250 lb (113 kg)   Height: 66.14\" (168 cm)   PainSc: 0-No pain     Body mass index is 40.18 kg/(m^2).     Wt Readings from Last 3 Encounters:   08/24/17 250 lb (113 kg)   08/10/17 253 lb (115 kg)   07/26/17 257 lb (117 kg)       PHYSICAL EXAMINATION:  GENERAL:  The patient appears in good general condition, not in acute distress.  SKIN:  No skin rashes or lesions. No ecchymosis or petechiae.  HEAD:  Normocephalic.  EYES:  No jaundice or pallor.   NECK:  Supple with good ROM. No thyromegaly or masses.  LYMPHATICS:  No cervical or supraclavicular lymphadenopathy.  CHEST:  Lungs clear to auscultation. No added sounds.  Port in the left upper chest is benign.  ABDOMEN:  Soft and non-tender. No hepatomegaly. No masses.  No tenderness in the right upper quadrant.  EXTREMITIES:  Right leg is in a brace. No calf tenderness.    RESULT REVIEW:       Results from last 7 days  Lab Units 08/24/17  0754   WBC 10*3/mm3 8.48   NEUTROS ABS 10*3/mm3 4.57   HEMOGLOBIN g/dL 10.9*   HEMATOCRIT % 33.3*   PLATELETS 10*3/mm3 456       Results from last 7 days  Lab Units 08/24/17  0754   SODIUM mmol/L 143   POTASSIUM mmol/L 3.6   CHLORIDE mmol/L 100   CO2 mmol/L 27.5   BUN mg/dL 16   CREATININE mg/dL 0.89   CALCIUM mg/dL 9.8   ALBUMIN g/dL 3.30*   BILIRUBIN mg/dL 0.5   ALK PHOS U/L 445*   ALT (SGPT) U/L 51*   AST (SGOT) U/L " 65*   GLUCOSE mg/dL 121*       Lab Results   Component Value Date    CEA 5.08 08/24/2017    CEA 4.58 07/26/2017    CEA 3.77 06/29/2017        Assessment/Plan    1.  Metastatic colon cancer. She had metastases to the liver.   · She received 12 cycles of FOLFOX-6 and then had resection of the primary tumor along with metastasectomy and splenectomy with thermal ablation of lesions in the liver in January 2014.   · This was followed by 12 cycles of the FOLFIRI regimen completed on 08/07/2014.   · CT scans on 05/28/2015 revealed a new lesion in the liver.  Dr. Ruano referred the patient to Radiation Therapy and she received CyberKnife radiation, completed on 07/14/2015.    · The CT scan from 5/5/17 revealed a new 2.2 cm lesion in the anterior hepatic segment.  CT guided microwave ablation of liver mass performed on 6/16/17 with CT evidence on 7/20/17 of a complete response  · FOLFIRI x 12 cycles was recommended and was started on 6/29/17.  Plans is to repeat scans after completion of chemotherapy.    CEA is 5.08 today but this is not considered to be a significant increase.  We will continue to monitor monthly.  If there is a steadu increase in the CEA, we would obtain CT scans about 3 months from the last scans.    2.  Elevated liver enzymes. It is secondary to inflammation that developed following the microwave ablation of the liver mass.  They continue to improve.  She does not require a dose reduction of the chemotherapy.    3.   Anemia due to iron deficiency.  She is on ferrous sulfate 325 mg daily.  She has adequate B12 and folate levels.    4.  Nausea and vomiting secondary to chemotherapy.  It developed in the early morning of day #3 of cycle #3.  I asked the patient to take Zofran twice a day on days 2, 3 and 4.  If she develops nausea, she will take lorazepam PRN.  The nausea did not recur with cycle #4.    5.  Thinning of the hair.  She is on oral iron and biotin.    We will see the patient follow-up in 2  weeks when she is due for cycle #6.        Marivel Brown MD  08/24/17

## 2017-08-26 ENCOUNTER — INFUSION (OUTPATIENT)
Dept: ONCOLOGY | Facility: HOSPITAL | Age: 67
End: 2017-08-26

## 2017-08-26 VITALS
HEART RATE: 100 BPM | DIASTOLIC BLOOD PRESSURE: 73 MMHG | TEMPERATURE: 97.4 F | RESPIRATION RATE: 16 BRPM | SYSTOLIC BLOOD PRESSURE: 131 MMHG

## 2017-08-26 DIAGNOSIS — C78.7 LIVER METASTASIS: Primary | ICD-10-CM

## 2017-08-26 DIAGNOSIS — C18.9 PRIMARY MALIGNANT NEOPLASM OF COLON (HCC): ICD-10-CM

## 2017-08-26 PROCEDURE — 96523 IRRIG DRUG DELIVERY DEVICE: CPT

## 2017-08-26 PROCEDURE — 25010000002 HEPARIN FLUSH (PORCINE) 100 UNIT/ML SOLUTION: Performed by: INTERNAL MEDICINE

## 2017-08-26 RX ADMIN — Medication 500 UNITS: at 09:19

## 2017-09-07 ENCOUNTER — OFFICE VISIT (OUTPATIENT)
Dept: ONCOLOGY | Facility: CLINIC | Age: 67
End: 2017-09-07

## 2017-09-07 ENCOUNTER — INFUSION (OUTPATIENT)
Dept: ONCOLOGY | Facility: HOSPITAL | Age: 67
End: 2017-09-07

## 2017-09-07 ENCOUNTER — APPOINTMENT (OUTPATIENT)
Dept: ONCOLOGY | Facility: HOSPITAL | Age: 67
End: 2017-09-07

## 2017-09-07 VITALS
WEIGHT: 252 LBS | SYSTOLIC BLOOD PRESSURE: 135 MMHG | HEIGHT: 66 IN | BODY MASS INDEX: 40.5 KG/M2 | HEART RATE: 89 BPM | DIASTOLIC BLOOD PRESSURE: 73 MMHG | OXYGEN SATURATION: 98 % | TEMPERATURE: 98.1 F | RESPIRATION RATE: 16 BRPM

## 2017-09-07 DIAGNOSIS — C18.9 PRIMARY MALIGNANT NEOPLASM OF COLON (HCC): Primary | ICD-10-CM

## 2017-09-07 DIAGNOSIS — C18.9 PRIMARY MALIGNANT NEOPLASM OF COLON (HCC): ICD-10-CM

## 2017-09-07 DIAGNOSIS — C78.7 LIVER METASTASIS: ICD-10-CM

## 2017-09-07 DIAGNOSIS — T45.1X5A PERIPHERAL NEUROPATHY DUE TO CHEMOTHERAPY (HCC): ICD-10-CM

## 2017-09-07 DIAGNOSIS — D50.9 IRON DEFICIENCY ANEMIA, UNSPECIFIED IRON DEFICIENCY ANEMIA TYPE: ICD-10-CM

## 2017-09-07 DIAGNOSIS — G62.0 PERIPHERAL NEUROPATHY DUE TO CHEMOTHERAPY (HCC): ICD-10-CM

## 2017-09-07 DIAGNOSIS — R74.8 ELEVATED LIVER ENZYMES: ICD-10-CM

## 2017-09-07 DIAGNOSIS — C78.7 LIVER METASTASIS: Primary | ICD-10-CM

## 2017-09-07 LAB
ALBUMIN SERPL-MCNC: 3.5 G/DL (ref 3.5–5.2)
ALBUMIN/GLOB SERPL: 0.8 G/DL
ALP SERPL-CCNC: 355 U/L (ref 39–117)
ALT SERPL W P-5'-P-CCNC: 40 U/L (ref 1–33)
ANION GAP SERPL CALCULATED.3IONS-SCNC: 14.5 MMOL/L
AST SERPL-CCNC: 53 U/L (ref 1–32)
BASOPHILS # BLD AUTO: 0.06 10*3/MM3 (ref 0–0.2)
BASOPHILS NFR BLD AUTO: 0.7 % (ref 0–1.5)
BILIRUB SERPL-MCNC: 0.4 MG/DL (ref 0.1–1.2)
BUN BLD-MCNC: 12 MG/DL (ref 8–23)
BUN/CREAT SERPL: 13.5 (ref 7–25)
CALCIUM SPEC-SCNC: 9.1 MG/DL (ref 8.6–10.5)
CEA SERPL-MCNC: 4.78 NG/ML
CHLORIDE SERPL-SCNC: 102 MMOL/L (ref 98–107)
CO2 SERPL-SCNC: 26.5 MMOL/L (ref 22–29)
CREAT BLD-MCNC: 0.89 MG/DL (ref 0.57–1)
DEPRECATED RDW RBC AUTO: 67.4 FL (ref 37–54)
EOSINOPHIL # BLD AUTO: 0.5 10*3/MM3 (ref 0–0.7)
EOSINOPHIL NFR BLD AUTO: 6 % (ref 0.3–6.2)
ERYTHROCYTE [DISTWIDTH] IN BLOOD BY AUTOMATED COUNT: 21.6 % (ref 11.7–13)
GFR SERPL CREATININE-BSD FRML MDRD: 77 ML/MIN/1.73
GLOBULIN UR ELPH-MCNC: 4.4 GM/DL
GLUCOSE BLD-MCNC: 116 MG/DL (ref 65–99)
HCT VFR BLD AUTO: 31.6 % (ref 35.6–45.5)
HGB BLD-MCNC: 10.3 G/DL (ref 11.9–15.5)
IMM GRANULOCYTES # BLD: 0.07 10*3/MM3 (ref 0–0.03)
IMM GRANULOCYTES NFR BLD: 0.8 % (ref 0–0.5)
LYMPHOCYTES # BLD AUTO: 2.19 10*3/MM3 (ref 0.9–4.8)
LYMPHOCYTES NFR BLD AUTO: 26.4 % (ref 19.6–45.3)
MCH RBC QN AUTO: 28.6 PG (ref 26.9–32)
MCHC RBC AUTO-ENTMCNC: 32.6 G/DL (ref 32.4–36.3)
MCV RBC AUTO: 87.8 FL (ref 80.5–98.2)
MONOCYTES # BLD AUTO: 1.16 10*3/MM3 (ref 0.2–1.2)
MONOCYTES NFR BLD AUTO: 14 % (ref 5–12)
NEUTROPHILS # BLD AUTO: 4.33 10*3/MM3 (ref 1.9–8.1)
NEUTROPHILS NFR BLD AUTO: 52.1 % (ref 42.7–76)
NRBC BLD MANUAL-RTO: 0.4 /100 WBC (ref 0–0)
PLATELET # BLD AUTO: 469 10*3/MM3 (ref 140–500)
PMV BLD AUTO: 8.5 FL (ref 6–12)
POTASSIUM BLD-SCNC: 3.7 MMOL/L (ref 3.5–5.2)
PROT SERPL-MCNC: 7.9 G/DL (ref 6–8.5)
RBC # BLD AUTO: 3.6 10*6/MM3 (ref 3.9–5.2)
SODIUM BLD-SCNC: 143 MMOL/L (ref 136–145)
WBC NRBC COR # BLD: 8.31 10*3/MM3 (ref 4.5–10.7)

## 2017-09-07 PROCEDURE — 96416 CHEMO PROLONG INFUSE W/PUMP: CPT | Performed by: INTERNAL MEDICINE

## 2017-09-07 PROCEDURE — 85025 COMPLETE CBC W/AUTO DIFF WBC: CPT | Performed by: INTERNAL MEDICINE

## 2017-09-07 PROCEDURE — 82378 CARCINOEMBRYONIC ANTIGEN: CPT | Performed by: INTERNAL MEDICINE

## 2017-09-07 PROCEDURE — 25010000002 LEUCOVORIN CALCIUM PER 50 MG: Performed by: INTERNAL MEDICINE

## 2017-09-07 PROCEDURE — 25010000002 PALONOSETRON PER 25 MCG: Performed by: INTERNAL MEDICINE

## 2017-09-07 PROCEDURE — 36415 COLL VENOUS BLD VENIPUNCTURE: CPT | Performed by: INTERNAL MEDICINE

## 2017-09-07 PROCEDURE — 96411 CHEMO IV PUSH ADDL DRUG: CPT | Performed by: INTERNAL MEDICINE

## 2017-09-07 PROCEDURE — 99214 OFFICE O/P EST MOD 30 MIN: CPT | Performed by: INTERNAL MEDICINE

## 2017-09-07 PROCEDURE — 96375 TX/PRO/DX INJ NEW DRUG ADDON: CPT | Performed by: INTERNAL MEDICINE

## 2017-09-07 PROCEDURE — 96415 CHEMO IV INFUSION ADDL HR: CPT | Performed by: INTERNAL MEDICINE

## 2017-09-07 PROCEDURE — 25010000002 FLUOROURACIL PER 500 MG: Performed by: INTERNAL MEDICINE

## 2017-09-07 PROCEDURE — 25010000002 DEXAMETHASONE SODIUM PHOSPHATE 10 MG/ML SOLUTION 1 ML VIAL: Performed by: INTERNAL MEDICINE

## 2017-09-07 PROCEDURE — 96413 CHEMO IV INFUSION 1 HR: CPT | Performed by: INTERNAL MEDICINE

## 2017-09-07 PROCEDURE — 25010000002 IRINOTECAN PER 20 MG: Performed by: INTERNAL MEDICINE

## 2017-09-07 PROCEDURE — 80053 COMPREHEN METABOLIC PANEL: CPT | Performed by: INTERNAL MEDICINE

## 2017-09-07 PROCEDURE — 96368 THER/DIAG CONCURRENT INF: CPT | Performed by: INTERNAL MEDICINE

## 2017-09-07 RX ORDER — PALONOSETRON 0.05 MG/ML
0.25 INJECTION, SOLUTION INTRAVENOUS ONCE
Status: CANCELLED | OUTPATIENT
Start: 2017-09-21

## 2017-09-07 RX ORDER — ATROPINE SULFATE 1 MG/ML
0.25 INJECTION, SOLUTION INTRAMUSCULAR; INTRAVENOUS; SUBCUTANEOUS
Status: CANCELLED | OUTPATIENT
Start: 2017-09-21

## 2017-09-07 RX ORDER — SODIUM CHLORIDE 9 MG/ML
250 INJECTION, SOLUTION INTRAVENOUS ONCE
Status: COMPLETED | OUTPATIENT
Start: 2017-09-07 | End: 2017-09-07

## 2017-09-07 RX ORDER — PALONOSETRON 0.05 MG/ML
0.25 INJECTION, SOLUTION INTRAVENOUS ONCE
Status: COMPLETED | OUTPATIENT
Start: 2017-09-07 | End: 2017-09-07

## 2017-09-07 RX ORDER — FERROUS SULFATE 325(65) MG
325 TABLET ORAL 2 TIMES DAILY
Start: 2017-09-07 | End: 2017-11-29 | Stop reason: SDUPTHER

## 2017-09-07 RX ORDER — FLUOROURACIL 50 MG/ML
400 INJECTION, SOLUTION INTRAVENOUS ONCE
Status: CANCELLED | OUTPATIENT
Start: 2017-09-21

## 2017-09-07 RX ORDER — FLUOROURACIL 50 MG/ML
400 INJECTION, SOLUTION INTRAVENOUS ONCE
Status: COMPLETED | OUTPATIENT
Start: 2017-09-07 | End: 2017-09-07

## 2017-09-07 RX ORDER — SODIUM CHLORIDE 9 MG/ML
250 INJECTION, SOLUTION INTRAVENOUS ONCE
Status: CANCELLED | OUTPATIENT
Start: 2017-09-21

## 2017-09-07 RX ORDER — ATROPINE SULFATE 1 MG/ML
0.25 INJECTION, SOLUTION INTRAMUSCULAR; INTRAVENOUS; SUBCUTANEOUS
Status: DISCONTINUED | OUTPATIENT
Start: 2017-09-07 | End: 2017-09-07 | Stop reason: HOSPADM

## 2017-09-07 RX ADMIN — LEUCOVORIN CALCIUM 890 MG: 350 INJECTION, POWDER, LYOPHILIZED, FOR SOLUTION INTRAMUSCULAR; INTRAVENOUS at 08:58

## 2017-09-07 RX ADMIN — SODIUM CHLORIDE 250 ML: 900 INJECTION, SOLUTION INTRAVENOUS at 08:25

## 2017-09-07 RX ADMIN — DEXAMETHASONE SODIUM PHOSPHATE 12 MG: 10 INJECTION, SOLUTION INTRAMUSCULAR; INTRAVENOUS at 08:32

## 2017-09-07 RX ADMIN — IRINOTECAN HYDROCHLORIDE 400 MG: 20 INJECTION, SOLUTION INTRAVENOUS at 08:58

## 2017-09-07 RX ADMIN — PALONOSETRON HYDROCHLORIDE 0.25 MG: 0.25 INJECTION INTRAVENOUS at 08:30

## 2017-09-07 RX ADMIN — FLUOROURACIL 5350 MG: 50 INJECTION, SOLUTION INTRAVENOUS at 10:34

## 2017-09-07 RX ADMIN — ATROPINE SULFATE 0.25 MG: 1 INJECTION, SOLUTION INTRAMUSCULAR; INTRAVENOUS; SUBCUTANEOUS at 08:32

## 2017-09-07 RX ADMIN — FLUOROURACIL 890 MG: 50 INJECTION, SOLUTION INTRAVENOUS at 10:32

## 2017-09-07 NOTE — PROGRESS NOTES
Subjective     CHIEF COMPLAINT:    1. Metastatic colon cancer (KRAS mutation positive) with liver metastasis, biopsy confirmed. No resection of primary sigmoid colon cancer.   2. Recurrent Iron deficiency anemia   3. Palliative chemotherapy with FOLFOX-6 was given from June 2013 to November 2013.   4. Left partial colectomy, splenectomy, resection of left diaphragm with repair and partial hepatectomy with microwave ablation of metastasis on 1/14/2014.   5. FOLFIRI x12 cycles between 3/07/2014 and 8/07/2014.   6. CT scan on 05/28/2015 revealed a new liver lesion. Stereotactic radiation therapy with CyberKnife was delivered in 3 fractions, completed on 07/14/2015.        7.  New liver lesion was identified on CT on 5/5/17. CT guided microwave ablation of liver mass on 6/16/17    HISTORY OF PRESENT ILLNESS:     Feli Del Toro is a 66 y.o. patient with the above medical history.  She returns today for follow-up.  She started systemic chemotherapy with FOLFIRI on 6/29/17.  She took Zofran twice a day on days 2 and 3 as instructed since cycle #4.  She did not have episodes of nausea since that adjustment.    Patient is taking oral iron once a day.  She is taking Biotin once a day as well. Nails are getting stronger. She is noticing some improvement in her hair.     Patient denies abdominal pain. She is not having diarrhea after chemotherapy.        Past Medical History:   Diagnosis Date   • Anemia    • Cancer 06/04/2013   • Diabetes mellitus    • History of transfusion    • Hyperlipidemia    • Hypertension    • Metastatic colon cancer to liver    • Retinopathy    • Type 2 diabetes mellitus        Past Surgical History:   Procedure Laterality Date   • ABDOMINAL SURGERY      ablation    • AMPUTATION DIGIT Left 1/1/2017    Procedure: LEFT SECOND TOE AMPUTATION;  Surgeon: Farzad Nichols MD;  Location: [facility];  Service:    • AMPUTATION OF REPLICATED TOES      right distal second toe    • CATARACT EXTRACTION     •  COLECTOMY PARTIAL / TOTAL  01/14/2014   • HYSTERECTOMY  1998   • PORTACATH PLACEMENT  06/2013   • SALPINGO OOPHORECTOMY Bilateral    • SPLENECTOMY         Cancer-related family history includes Breast cancer in her sister; Cancer in her other.    SCHEDULED MEDS:       Current Outpatient Prescriptions:   •  allopurinol (ZYLOPRIM) 100 MG tablet, Take 1 tablet by mouth Daily., Disp: 90 tablet, Rfl: 1  •  aspirin 81 MG tablet, Take 81 mg by mouth daily., Disp: , Rfl:   •  atorvastatin (LIPITOR) 40 MG tablet, Take 1 tablet (40 mg total) by mouth daily for 90 days, Disp: 90 tablet, Rfl: 3  •  Biotin 5000 MCG tablet, Take 5,000 mcg by mouth Daily., Disp: , Rfl:   •  calcium carbonate (TUMS) 500 MG chewable tablet, Chew., Disp: , Rfl:   •  ferrous sulfate 325 (65 FE) MG tablet, Take 1 tablet by mouth 2 (Two) Times a Day., Disp: , Rfl:   •  fluticasone (FLONASE) 50 MCG/ACT nasal spray, , Disp: , Rfl:   •  insulin detemir (LEVEMIR FLEXTOUCH) 100 UNIT/ML injection, In 32U in PM titrate as instructed with max of 100u daily, Disp: 90 mL, Rfl: 0  •  Insulin Lispro (HUMALOG KWIKPEN) 100 UNIT/ML solution pen-injector, 15u AC and sliding scale, Disp: 42 mL, Rfl: 0  •  LORazepam (ATIVAN) 0.5 MG tablet, Take 1 tablet by mouth 2 (Two) Times a Day As Needed (nausea)., Disp: 20 tablet, Rfl: 0  •  MAGNESIUM OXIDE PO, Take 400 mg by mouth daily., Disp: , Rfl:   •  meclizine (ANTIVERT) 25 MG tablet, Take 25 mg by mouth 3 (Three) Times a Day As Needed., Disp: , Rfl:   •  ondansetron (ZOFRAN) 8 MG tablet, Take 1 tablet by mouth 3 (Three) Times a Day As Needed for Nausea or Vomiting., Disp: 30 tablet, Rfl: 5  •  ONGLYZA 5 MG tablet, TAKE 1 TABLET DAILY, Disp: 90 tablet, Rfl: 0  •  telmisartan-hydrochlorothiazide (MICARDIS HCT) 40-12.5 MG per tablet, Take 1 tablet by mouth daily, Disp: 90 tablet, Rfl: 3  •  vitamin D (ERGOCALCIFEROL) 03984 UNITS capsule capsule, Take 1 capsule by mouth 1 (One) Time Per Week., Disp: 13 capsule, Rfl: 3  •  XIGDUO  "XR  MG tablet sustained-release 24 hour, TAKE 1 TABLET DAILY, Disp: 90 tablet, Rfl: 0  No current facility-administered medications for this visit.     Facility-Administered Medications Ordered in Other Visits:   •  atropine injection 0.25 mg, 0.25 mg, Intravenous, Q15 Min PRN, Marivel Brown MD  •  dexamethasone sodium phosphate 12 mg in sodium chloride 0.9 % IVPB, 12 mg, Intravenous, Once, Marivel Brown MD  •  fluorouracil (ADRUCIL) 5,350 mg, sodium chloride 0.9 % chemo infusion - FOR HOME USE, 2,400 mg/m2 (Treatment Plan Recorded), Intravenous, Once, Marivel Brown MD  •  fluorouracil (ADRUCIL) chemo injection 890 mg, 400 mg/m2 (Treatment Plan Recorded), Intravenous, Once, Marivel Brown MD  •  irinotecan (CAMPTOSAR) 400 mg in dextrose (D5W) 5 % 520 mL chemo IVPB, 180 mg/m2 (Treatment Plan Recorded), Intravenous, Once, Marivel Brown MD  •  leucovorin 890 mg in dextrose (D5W) 5 % 294.5 mL IVPB, 400 mg/m2 (Treatment Plan Recorded), Intravenous, Once, Marivel Brown MD  •  palonosetron (ALOXI) injection 0.25 mg, 0.25 mg, Intravenous, Once, Marivel Brown MD  •  sodium chloride 0.9 % infusion 250 mL, 250 mL, Intravenous, Once, Marivel Brown MD    REVIEW OF SYSTEMS:  GENERAL:  No fever or chills.   Fatigue.  SKIN:  See history of present illness.  HEME/LYMPH: Anemia. No enlarged lymph nodes.  RESPIRATORY:  No shortness of breath.  CVS:  No chest pain.  GI:  See history of present illness.  No abdominal pain, vomiting, constipation, diarrhea, melena or hematochezia.  :  No dysuria.   MUSCULOSKELETAL:  Chronic back pain.  NEUROLOGICAL:  Patient has mild numbness in the fingertips, unchanged.    Objective   VITAL SIGNS:     Vitals:    09/07/17 0747   BP: 135/73   Pulse: 89   Resp: 16   Temp: 98.1 °F (36.7 °C)   TempSrc: Oral   SpO2: 98%   Weight: 252 lb (114 kg)   Height: 66.14\" (168 cm)   PainSc: 0-No pain     Body mass index is 40.5 kg/(m^2).     Wt Readings from Last 3 " Encounters:   09/07/17 252 lb (114 kg)   08/24/17 250 lb (113 kg)   08/10/17 253 lb (115 kg)       PHYSICAL EXAMINATION:  GENERAL:  The patient appears in good general condition, not in acute distress.  SKIN:  No skin rashes or lesions. No ecchymosis or petechiae.  HEAD:  Normocephalic.  EYES:  No jaundice or pallor.   NECK:  Supple with good ROM. No thyromegaly or masses.  LYMPHATICS:  No cervical or supraclavicular lymphadenopathy.  CHEST:  Lungs clear to auscultation. No added sounds.  Port in the left upper chest is benign.  ABDOMEN:  Soft and non-tender. No hepatomegaly. No masses.  No tenderness in the right upper quadrant.  EXTREMITIES:  No edema. No calf tenderness.    RESULT REVIEW:       Results from last 7 days  Lab Units 09/07/17  0739   WBC 10*3/mm3 8.31   NEUTROS ABS 10*3/mm3 4.33   HEMOGLOBIN g/dL 10.3*   HEMATOCRIT % 31.6*   PLATELETS 10*3/mm3 469       Results from last 7 days  Lab Units 09/07/17  0739   SODIUM mmol/L 143   POTASSIUM mmol/L 3.7   CHLORIDE mmol/L 102   CO2 mmol/L 26.5   BUN mg/dL 12   CREATININE mg/dL 0.89   CALCIUM mg/dL 9.1   ALBUMIN g/dL 3.50   BILIRUBIN mg/dL 0.4   ALK PHOS U/L 355*   ALT (SGPT) U/L 40*   AST (SGOT) U/L 53*   GLUCOSE mg/dL 116*       Lab Results   Component Value Date    CEA 5.08 08/24/2017    CEA 4.58 07/26/2017    CEA 3.77 06/29/2017        Assessment/Plan    1.  Metastatic colon cancer. She had metastases to the liver.   · She received 12 cycles of FOLFOX-6 and then had resection of the primary tumor along with metastasectomy and splenectomy with thermal ablation of lesions in the liver in January 2014.   · This was followed by 12 cycles of the FOLFIRI regimen completed on 08/07/2014.   · CT scans on 05/28/2015 revealed a new lesion in the liver.  Dr. Ruano referred the patient to Radiation Therapy and she received CyberKnife radiation, completed on 07/14/2015.    · The CT scan from 5/5/17 revealed a new 2.2 cm lesion in the anterior hepatic segment.  CT  guided microwave ablation of liver mass performed on 6/16/17 with CT evidence on 7/20/17 of a complete response  · FOLFIRI x 12 cycles was recommended and was started on 6/29/17.  Plans is to repeat scans after completion of chemotherapy.    CEA was 5.08 two weeks ago but this is not considered to be a significant increase.  We will repeat CEA today.    2.  Elevated liver enzymes. It is secondary to inflammation that developed following the microwave ablation of the liver mass.  They continue to improve.      3.   Anemia due to iron deficiency.  She is on ferrous sulfate 325 mg daily.  She has adequate B12 and folate levels.  Since the transferrin saturation was 14%, I asked her to increase the iron to twice a day.    4.  Nausea and vomiting secondary to chemotherapy.  It developed in the early morning of day #3 of cycle #3.  The patient is now taking Zofran twice a day on days 2, 3 and 4.  If she develops nausea, she will take lorazepam PRN.  She has done well with this regimen.    5.  Thinning of the hair.  She is on oral iron and biotin and she is improving.    Patient will receive cycle #6 today.  She'll return in 2 weeks for cycle #7.  I will see in follow-up 4 weeks and she is due for cycle #8.  12 cycles are planned.      Marivel Brown MD  09/07/17

## 2017-09-08 DIAGNOSIS — Z79.4 CONTROLLED TYPE 2 DIABETES MELLITUS WITHOUT COMPLICATION, WITH LONG-TERM CURRENT USE OF INSULIN (HCC): ICD-10-CM

## 2017-09-08 DIAGNOSIS — E11.9 CONTROLLED TYPE 2 DIABETES MELLITUS WITHOUT COMPLICATION, WITH LONG-TERM CURRENT USE OF INSULIN (HCC): ICD-10-CM

## 2017-09-09 ENCOUNTER — INFUSION (OUTPATIENT)
Dept: ONCOLOGY | Facility: HOSPITAL | Age: 67
End: 2017-09-09

## 2017-09-09 VITALS
TEMPERATURE: 97.4 F | DIASTOLIC BLOOD PRESSURE: 68 MMHG | SYSTOLIC BLOOD PRESSURE: 118 MMHG | RESPIRATION RATE: 18 BRPM | HEART RATE: 85 BPM | OXYGEN SATURATION: 98 %

## 2017-09-09 DIAGNOSIS — C78.7 LIVER METASTASIS: Primary | ICD-10-CM

## 2017-09-09 DIAGNOSIS — C18.9 PRIMARY MALIGNANT NEOPLASM OF COLON (HCC): ICD-10-CM

## 2017-09-09 PROCEDURE — 96523 IRRIG DRUG DELIVERY DEVICE: CPT

## 2017-09-09 PROCEDURE — 25010000002 HEPARIN FLUSH (PORCINE) 100 UNIT/ML SOLUTION: Performed by: INTERNAL MEDICINE

## 2017-09-09 RX ADMIN — Medication 500 UNITS: at 08:38

## 2017-09-19 RX ORDER — BLOOD SUGAR DIAGNOSTIC
STRIP MISCELLANEOUS
Qty: 100 EACH | Refills: 5 | Status: SHIPPED | OUTPATIENT
Start: 2017-09-19 | End: 2018-03-16 | Stop reason: HOSPADM

## 2017-09-20 RX ORDER — ALLOPURINOL 100 MG/1
TABLET ORAL
Qty: 90 TABLET | Refills: 1 | Status: SHIPPED | OUTPATIENT
Start: 2017-09-20 | End: 2018-03-07

## 2017-09-21 ENCOUNTER — INFUSION (OUTPATIENT)
Dept: ONCOLOGY | Facility: HOSPITAL | Age: 67
End: 2017-09-21

## 2017-09-21 VITALS
WEIGHT: 252.6 LBS | BODY MASS INDEX: 40.6 KG/M2 | TEMPERATURE: 98.4 F | SYSTOLIC BLOOD PRESSURE: 136 MMHG | HEART RATE: 82 BPM | DIASTOLIC BLOOD PRESSURE: 79 MMHG

## 2017-09-21 DIAGNOSIS — C18.9 PRIMARY MALIGNANT NEOPLASM OF COLON (HCC): ICD-10-CM

## 2017-09-21 DIAGNOSIS — C78.7 LIVER METASTASIS: ICD-10-CM

## 2017-09-21 DIAGNOSIS — C78.7 LIVER METASTASIS: Primary | ICD-10-CM

## 2017-09-21 LAB
ALBUMIN SERPL-MCNC: 3.4 G/DL (ref 3.5–5.2)
ALBUMIN/GLOB SERPL: 0.8 G/DL (ref 1.1–2.4)
ALP SERPL-CCNC: 374 U/L (ref 38–116)
ALT SERPL W P-5'-P-CCNC: 57 U/L (ref 0–33)
ANION GAP SERPL CALCULATED.3IONS-SCNC: 12.8 MMOL/L
AST SERPL-CCNC: 69 U/L (ref 0–32)
BASOPHILS # BLD AUTO: 0.05 10*3/MM3 (ref 0–0.1)
BASOPHILS NFR BLD AUTO: 0.6 % (ref 0–1.1)
BILIRUB SERPL-MCNC: 0.4 MG/DL (ref 0.1–1.2)
BUN BLD-MCNC: 18 MG/DL (ref 6–20)
BUN/CREAT SERPL: 20.2 (ref 7.3–30)
CALCIUM SPEC-SCNC: 9.4 MG/DL (ref 8.5–10.2)
CEA SERPL-MCNC: 5.21 NG/ML
CHLORIDE SERPL-SCNC: 99 MMOL/L (ref 98–107)
CO2 SERPL-SCNC: 28.2 MMOL/L (ref 22–29)
CREAT BLD-MCNC: 0.89 MG/DL (ref 0.6–1.1)
DEPRECATED RDW RBC AUTO: 71 FL (ref 37–49)
EOSINOPHIL # BLD AUTO: 0.47 10*3/MM3 (ref 0–0.36)
EOSINOPHIL NFR BLD AUTO: 5.8 % (ref 1–5)
ERYTHROCYTE [DISTWIDTH] IN BLOOD BY AUTOMATED COUNT: 22.5 % (ref 11.7–14.5)
GFR SERPL CREATININE-BSD FRML MDRD: 77 ML/MIN/1.73
GLOBULIN UR ELPH-MCNC: 4.3 GM/DL (ref 1.8–3.5)
GLUCOSE BLD-MCNC: 131 MG/DL (ref 74–124)
HCT VFR BLD AUTO: 33.7 % (ref 34–45)
HGB BLD-MCNC: 10.9 G/DL (ref 11.5–14.9)
IMM GRANULOCYTES # BLD: 0.15 10*3/MM3 (ref 0–0.03)
IMM GRANULOCYTES NFR BLD: 1.8 % (ref 0–0.5)
LYMPHOCYTES # BLD AUTO: 2.02 10*3/MM3 (ref 1–3.5)
LYMPHOCYTES NFR BLD AUTO: 24.8 % (ref 20–49)
MCH RBC QN AUTO: 29 PG (ref 27–33)
MCHC RBC AUTO-ENTMCNC: 32.3 G/DL (ref 32–35)
MCV RBC AUTO: 89.6 FL (ref 83–97)
MONOCYTES # BLD AUTO: 1.28 10*3/MM3 (ref 0.25–0.8)
MONOCYTES NFR BLD AUTO: 15.7 % (ref 4–12)
NEUTROPHILS # BLD AUTO: 4.16 10*3/MM3 (ref 1.5–7)
NEUTROPHILS NFR BLD AUTO: 51.3 % (ref 39–75)
NRBC BLD MANUAL-RTO: 0.5 /100 WBC (ref 0–0)
PLATELET # BLD AUTO: 477 10*3/MM3 (ref 150–375)
PMV BLD AUTO: 8 FL (ref 8.9–12.1)
POTASSIUM BLD-SCNC: 3.6 MMOL/L (ref 3.5–4.7)
PROT SERPL-MCNC: 7.7 G/DL (ref 6.3–8)
RBC # BLD AUTO: 3.76 10*6/MM3 (ref 3.9–5)
SODIUM BLD-SCNC: 140 MMOL/L (ref 134–145)
WBC NRBC COR # BLD: 8.13 10*3/MM3 (ref 4–10)

## 2017-09-21 PROCEDURE — 96415 CHEMO IV INFUSION ADDL HR: CPT | Performed by: INTERNAL MEDICINE

## 2017-09-21 PROCEDURE — 82378 CARCINOEMBRYONIC ANTIGEN: CPT | Performed by: INTERNAL MEDICINE

## 2017-09-21 PROCEDURE — 96416 CHEMO PROLONG INFUSE W/PUMP: CPT | Performed by: INTERNAL MEDICINE

## 2017-09-21 PROCEDURE — 80053 COMPREHEN METABOLIC PANEL: CPT

## 2017-09-21 PROCEDURE — 25010000002 LEUCOVORIN CALCIUM PER 50 MG: Performed by: INTERNAL MEDICINE

## 2017-09-21 PROCEDURE — 85025 COMPLETE CBC W/AUTO DIFF WBC: CPT

## 2017-09-21 PROCEDURE — 96368 THER/DIAG CONCURRENT INF: CPT | Performed by: INTERNAL MEDICINE

## 2017-09-21 PROCEDURE — 96375 TX/PRO/DX INJ NEW DRUG ADDON: CPT | Performed by: INTERNAL MEDICINE

## 2017-09-21 PROCEDURE — 25010000002 DEXAMETHASONE SODIUM PHOSPHATE 10 MG/ML SOLUTION 1 ML VIAL: Performed by: INTERNAL MEDICINE

## 2017-09-21 PROCEDURE — 25010000002 IRINOTECAN PER 20 MG: Performed by: INTERNAL MEDICINE

## 2017-09-21 PROCEDURE — 96413 CHEMO IV INFUSION 1 HR: CPT | Performed by: INTERNAL MEDICINE

## 2017-09-21 PROCEDURE — 25010000002 FLUOROURACIL PER 500 MG: Performed by: INTERNAL MEDICINE

## 2017-09-21 PROCEDURE — 25010000002 PALONOSETRON PER 25 MCG: Performed by: INTERNAL MEDICINE

## 2017-09-21 PROCEDURE — 96411 CHEMO IV PUSH ADDL DRUG: CPT | Performed by: INTERNAL MEDICINE

## 2017-09-21 RX ORDER — SODIUM CHLORIDE 9 MG/ML
250 INJECTION, SOLUTION INTRAVENOUS ONCE
Status: COMPLETED | OUTPATIENT
Start: 2017-09-21 | End: 2017-09-21

## 2017-09-21 RX ORDER — PALONOSETRON 0.05 MG/ML
0.25 INJECTION, SOLUTION INTRAVENOUS ONCE
Status: COMPLETED | OUTPATIENT
Start: 2017-09-21 | End: 2017-09-21

## 2017-09-21 RX ORDER — FLUOROURACIL 50 MG/ML
400 INJECTION, SOLUTION INTRAVENOUS ONCE
Status: COMPLETED | OUTPATIENT
Start: 2017-09-21 | End: 2017-09-21

## 2017-09-21 RX ORDER — ATROPINE SULFATE 0.4 MG/ML
0.25 AMPUL (ML) INJECTION
Status: DISCONTINUED | OUTPATIENT
Start: 2017-09-21 | End: 2017-09-21 | Stop reason: HOSPADM

## 2017-09-21 RX ADMIN — IRINOTECAN HYDROCHLORIDE 400 MG: 20 INJECTION, SOLUTION INTRAVENOUS at 09:10

## 2017-09-21 RX ADMIN — FLUOROURACIL 5350 MG: 50 INJECTION, SOLUTION INTRAVENOUS at 10:43

## 2017-09-21 RX ADMIN — DEXAMETHASONE SODIUM PHOSPHATE 12 MG: 10 INJECTION, SOLUTION INTRAMUSCULAR; INTRAVENOUS at 08:47

## 2017-09-21 RX ADMIN — PALONOSETRON HYDROCHLORIDE 0.25 MG: 0.25 INJECTION INTRAVENOUS at 08:47

## 2017-09-21 RX ADMIN — FLUOROURACIL 890 MG: 50 INJECTION, SOLUTION INTRAVENOUS at 10:43

## 2017-09-21 RX ADMIN — SODIUM CHLORIDE 250 ML: 900 INJECTION, SOLUTION INTRAVENOUS at 08:47

## 2017-09-21 RX ADMIN — ATROPINE SULFATE 0.25 MG: 0.4 INJECTION, SOLUTION INTRAMUSCULAR; INTRAVENOUS; SUBCUTANEOUS at 08:47

## 2017-09-21 RX ADMIN — LEUCOVORIN CALCIUM 890 MG: 350 INJECTION, POWDER, LYOPHILIZED, FOR SOLUTION INTRAMUSCULAR; INTRAVENOUS at 09:10

## 2017-09-23 ENCOUNTER — INFUSION (OUTPATIENT)
Dept: ONCOLOGY | Facility: HOSPITAL | Age: 67
End: 2017-09-23

## 2017-09-23 VITALS
SYSTOLIC BLOOD PRESSURE: 117 MMHG | DIASTOLIC BLOOD PRESSURE: 63 MMHG | RESPIRATION RATE: 18 BRPM | OXYGEN SATURATION: 98 % | TEMPERATURE: 97.3 F | HEART RATE: 82 BPM

## 2017-09-23 DIAGNOSIS — C78.7 LIVER METASTASIS: Primary | ICD-10-CM

## 2017-09-23 DIAGNOSIS — C18.9 PRIMARY MALIGNANT NEOPLASM OF COLON (HCC): ICD-10-CM

## 2017-10-05 ENCOUNTER — INFUSION (OUTPATIENT)
Dept: ONCOLOGY | Facility: HOSPITAL | Age: 67
End: 2017-10-05

## 2017-10-05 ENCOUNTER — TELEPHONE (OUTPATIENT)
Dept: ONCOLOGY | Facility: HOSPITAL | Age: 67
End: 2017-10-05

## 2017-10-05 ENCOUNTER — OFFICE VISIT (OUTPATIENT)
Dept: ONCOLOGY | Facility: CLINIC | Age: 67
End: 2017-10-05

## 2017-10-05 VITALS
WEIGHT: 250.9 LBS | SYSTOLIC BLOOD PRESSURE: 134 MMHG | HEART RATE: 84 BPM | DIASTOLIC BLOOD PRESSURE: 83 MMHG | HEIGHT: 66 IN | TEMPERATURE: 97.5 F | OXYGEN SATURATION: 97 % | RESPIRATION RATE: 16 BRPM | BODY MASS INDEX: 40.32 KG/M2

## 2017-10-05 DIAGNOSIS — C18.9 PRIMARY MALIGNANT NEOPLASM OF COLON (HCC): ICD-10-CM

## 2017-10-05 DIAGNOSIS — C78.7 LIVER METASTASIS: ICD-10-CM

## 2017-10-05 DIAGNOSIS — C78.7 LIVER METASTASIS: Primary | ICD-10-CM

## 2017-10-05 LAB
ALBUMIN SERPL-MCNC: 3.4 G/DL (ref 3.5–5.2)
ALBUMIN/GLOB SERPL: 0.8 G/DL
ALP SERPL-CCNC: 426 U/L (ref 39–117)
ALT SERPL W P-5'-P-CCNC: 76 U/L (ref 1–33)
ANION GAP SERPL CALCULATED.3IONS-SCNC: 13.4 MMOL/L
AST SERPL-CCNC: 83 U/L (ref 1–32)
BASOPHILS # BLD AUTO: 0.05 10*3/MM3 (ref 0–0.2)
BASOPHILS NFR BLD AUTO: 0.6 % (ref 0–1.5)
BILIRUB SERPL-MCNC: 0.4 MG/DL (ref 0.1–1.2)
BUN BLD-MCNC: 18 MG/DL (ref 8–23)
BUN/CREAT SERPL: 20.2 (ref 7–25)
CALCIUM SPEC-SCNC: 9.6 MG/DL (ref 8.6–10.5)
CHLORIDE SERPL-SCNC: 100 MMOL/L (ref 98–107)
CO2 SERPL-SCNC: 27.6 MMOL/L (ref 22–29)
CREAT BLD-MCNC: 0.89 MG/DL (ref 0.57–1)
DEPRECATED RDW RBC AUTO: 76 FL (ref 37–54)
EOSINOPHIL # BLD AUTO: 0.4 10*3/MM3 (ref 0–0.7)
EOSINOPHIL NFR BLD AUTO: 4.9 % (ref 0.3–6.2)
ERYTHROCYTE [DISTWIDTH] IN BLOOD BY AUTOMATED COUNT: 23.5 % (ref 11.7–13)
GFR SERPL CREATININE-BSD FRML MDRD: 77 ML/MIN/1.73
GLOBULIN UR ELPH-MCNC: 4.3 GM/DL
GLUCOSE BLD-MCNC: 176 MG/DL (ref 65–99)
HCT VFR BLD AUTO: 33.4 % (ref 35.6–45.5)
HGB BLD-MCNC: 10.9 G/DL (ref 11.9–15.5)
IMM GRANULOCYTES # BLD: 0.1 10*3/MM3 (ref 0–0.03)
IMM GRANULOCYTES NFR BLD: 1.2 % (ref 0–0.5)
LYMPHOCYTES # BLD AUTO: 1.8 10*3/MM3 (ref 0.9–4.8)
LYMPHOCYTES NFR BLD AUTO: 21.9 % (ref 19.6–45.3)
MCH RBC QN AUTO: 29.3 PG (ref 26.9–32)
MCHC RBC AUTO-ENTMCNC: 32.6 G/DL (ref 32.4–36.3)
MCV RBC AUTO: 89.8 FL (ref 80.5–98.2)
MONOCYTES # BLD AUTO: 1.21 10*3/MM3 (ref 0.2–1.2)
MONOCYTES NFR BLD AUTO: 14.7 % (ref 5–12)
NEUTROPHILS # BLD AUTO: 4.67 10*3/MM3 (ref 1.9–8.1)
NEUTROPHILS NFR BLD AUTO: 56.7 % (ref 42.7–76)
NRBC BLD MANUAL-RTO: 0.4 /100 WBC (ref 0–0)
PLATELET # BLD AUTO: 454 10*3/MM3 (ref 140–500)
PMV BLD AUTO: 8.7 FL (ref 6–12)
POTASSIUM BLD-SCNC: 4.1 MMOL/L (ref 3.5–5.2)
PROT SERPL-MCNC: 7.7 G/DL (ref 6–8.5)
RBC # BLD AUTO: 3.72 10*6/MM3 (ref 3.9–5.2)
SODIUM BLD-SCNC: 141 MMOL/L (ref 136–145)
WBC NRBC COR # BLD: 8.23 10*3/MM3 (ref 4.5–10.7)

## 2017-10-05 PROCEDURE — 99214 OFFICE O/P EST MOD 30 MIN: CPT | Performed by: INTERNAL MEDICINE

## 2017-10-05 PROCEDURE — 36415 COLL VENOUS BLD VENIPUNCTURE: CPT | Performed by: INTERNAL MEDICINE

## 2017-10-05 PROCEDURE — 80053 COMPREHEN METABOLIC PANEL: CPT | Performed by: INTERNAL MEDICINE

## 2017-10-05 PROCEDURE — 25010000002 IRINOTECAN PER 20 MG: Performed by: INTERNAL MEDICINE

## 2017-10-05 PROCEDURE — 25010000002 FLUOROURACIL PER 500 MG: Performed by: INTERNAL MEDICINE

## 2017-10-05 PROCEDURE — 96411 CHEMO IV PUSH ADDL DRUG: CPT | Performed by: INTERNAL MEDICINE

## 2017-10-05 PROCEDURE — 96368 THER/DIAG CONCURRENT INF: CPT | Performed by: INTERNAL MEDICINE

## 2017-10-05 PROCEDURE — 96413 CHEMO IV INFUSION 1 HR: CPT | Performed by: INTERNAL MEDICINE

## 2017-10-05 PROCEDURE — 25010000002 LEUCOVORIN CALCIUM PER 50 MG: Performed by: INTERNAL MEDICINE

## 2017-10-05 PROCEDURE — 85025 COMPLETE CBC W/AUTO DIFF WBC: CPT | Performed by: INTERNAL MEDICINE

## 2017-10-05 PROCEDURE — 25010000002 PALONOSETRON PER 25 MCG: Performed by: INTERNAL MEDICINE

## 2017-10-05 PROCEDURE — 96375 TX/PRO/DX INJ NEW DRUG ADDON: CPT | Performed by: INTERNAL MEDICINE

## 2017-10-05 PROCEDURE — 96416 CHEMO PROLONG INFUSE W/PUMP: CPT | Performed by: INTERNAL MEDICINE

## 2017-10-05 PROCEDURE — 96415 CHEMO IV INFUSION ADDL HR: CPT | Performed by: INTERNAL MEDICINE

## 2017-10-05 PROCEDURE — 25010000002 DEXAMETHASONE SODIUM PHOSPHATE 10 MG/ML SOLUTION 1 ML VIAL: Performed by: INTERNAL MEDICINE

## 2017-10-05 RX ORDER — PALONOSETRON 0.05 MG/ML
0.25 INJECTION, SOLUTION INTRAVENOUS ONCE
Status: CANCELLED | OUTPATIENT
Start: 2017-10-05

## 2017-10-05 RX ORDER — PALONOSETRON 0.05 MG/ML
0.25 INJECTION, SOLUTION INTRAVENOUS ONCE
Status: COMPLETED | OUTPATIENT
Start: 2017-10-05 | End: 2017-10-05

## 2017-10-05 RX ORDER — ATROPINE SULFATE 1 MG/ML
0.25 INJECTION, SOLUTION INTRAMUSCULAR; INTRAVENOUS; SUBCUTANEOUS
Status: CANCELLED | OUTPATIENT
Start: 2017-10-05

## 2017-10-05 RX ORDER — SODIUM CHLORIDE 9 MG/ML
250 INJECTION, SOLUTION INTRAVENOUS ONCE
Status: CANCELLED | OUTPATIENT
Start: 2017-10-19

## 2017-10-05 RX ORDER — SODIUM CHLORIDE 9 MG/ML
250 INJECTION, SOLUTION INTRAVENOUS ONCE
Status: COMPLETED | OUTPATIENT
Start: 2017-10-05 | End: 2017-10-05

## 2017-10-05 RX ORDER — ATROPINE SULFATE 1 MG/ML
0.25 INJECTION, SOLUTION INTRAMUSCULAR; INTRAVENOUS; SUBCUTANEOUS
Status: CANCELLED | OUTPATIENT
Start: 2017-10-19

## 2017-10-05 RX ORDER — PALONOSETRON 0.05 MG/ML
0.25 INJECTION, SOLUTION INTRAVENOUS ONCE
Status: CANCELLED | OUTPATIENT
Start: 2017-10-19

## 2017-10-05 RX ORDER — FLUOROURACIL 50 MG/ML
400 INJECTION, SOLUTION INTRAVENOUS ONCE
Status: CANCELLED | OUTPATIENT
Start: 2017-10-05

## 2017-10-05 RX ORDER — ATROPINE SULFATE 0.4 MG/ML
0.25 AMPUL (ML) INJECTION
Status: DISCONTINUED | OUTPATIENT
Start: 2017-10-05 | End: 2017-10-05 | Stop reason: HOSPADM

## 2017-10-05 RX ORDER — FLUOROURACIL 50 MG/ML
400 INJECTION, SOLUTION INTRAVENOUS ONCE
Status: COMPLETED | OUTPATIENT
Start: 2017-10-05 | End: 2017-10-05

## 2017-10-05 RX ORDER — FLUOROURACIL 50 MG/ML
400 INJECTION, SOLUTION INTRAVENOUS ONCE
Status: CANCELLED | OUTPATIENT
Start: 2017-10-19

## 2017-10-05 RX ORDER — SODIUM CHLORIDE 9 MG/ML
250 INJECTION, SOLUTION INTRAVENOUS ONCE
Status: CANCELLED | OUTPATIENT
Start: 2017-10-05

## 2017-10-05 RX ADMIN — DEXAMETHASONE SODIUM PHOSPHATE 12 MG: 10 INJECTION, SOLUTION INTRAMUSCULAR; INTRAVENOUS at 08:56

## 2017-10-05 RX ADMIN — FLUOROURACIL 890 MG: 50 INJECTION, SOLUTION INTRAVENOUS at 10:51

## 2017-10-05 RX ADMIN — PALONOSETRON HYDROCHLORIDE 0.25 MG: 0.25 INJECTION INTRAVENOUS at 08:56

## 2017-10-05 RX ADMIN — IRINOTECAN HYDROCHLORIDE 400 MG: 20 INJECTION, SOLUTION INTRAVENOUS at 09:13

## 2017-10-05 RX ADMIN — ATROPINE SULFATE 0.25 MG: 0.4 INJECTION, SOLUTION INTRAMUSCULAR; INTRAVENOUS; SUBCUTANEOUS at 09:11

## 2017-10-05 RX ADMIN — FLUOROURACIL 5350 MG: 50 INJECTION, SOLUTION INTRAVENOUS at 10:56

## 2017-10-05 RX ADMIN — SODIUM CHLORIDE 250 ML: 900 INJECTION, SOLUTION INTRAVENOUS at 08:55

## 2017-10-05 RX ADMIN — LEUCOVORIN CALCIUM 890 MG: 350 INJECTION, POWDER, LYOPHILIZED, FOR SOLUTION INTRAMUSCULAR; INTRAVENOUS at 09:12

## 2017-10-05 NOTE — TELEPHONE ENCOUNTER
Patient calling to clarify what Dr Brown told her to take for constipation. Per MD note he told patient to start metamucil daily. Pt v/u

## 2017-10-05 NOTE — PROGRESS NOTES
Subjective     CHIEF COMPLAINT:    1. Metastatic colon cancer (KRAS mutation positive) with liver metastasis, biopsy confirmed. No resection of primary sigmoid colon cancer.   2. Recurrent Iron deficiency anemia   3. Palliative chemotherapy with FOLFOX-6 was given from June 2013 to November 2013.   4. Left partial colectomy, splenectomy, resection of left diaphragm with repair and partial hepatectomy with microwave ablation of metastasis on 1/14/2014.   5. FOLFIRI x12 cycles between 3/07/2014 and 8/07/2014.   6. CT scan on 05/28/2015 revealed a new liver lesion. Stereotactic radiation therapy with CyberKnife was delivered in 3 fractions, completed on 07/14/2015.        7.  New liver lesion was identified on CT on 5/5/17. CT guided microwave ablation of liver mass on 6/16/17.       8.  Started Folfiri on 6/29/17. 12 cycles planned.     HISTORY OF PRESENT ILLNESS:     Feli Del Toro is a 66 y.o. patient with the above medical history.  She returns today for follow-up.  She started systemic chemotherapy with FOLFIRI on 6/29/17.  She is now taking Zofran twice a day on days #2 and 3.  She is not having episodes of nausea or vomiting.    Patient is now taking oral iron twice a day.  She is taking Biotin once a day as well.  She reports some constipation since the dose of iron was increased to twice a day.  Patient denies abdominal pain. She is not having diarrhea after chemotherapy.        Past Medical History:   Diagnosis Date   • Anemia    • Cancer 06/04/2013   • Diabetes mellitus    • History of transfusion    • Hyperlipidemia    • Hypertension    • Metastatic colon cancer to liver    • Retinopathy    • Type 2 diabetes mellitus        Past Surgical History:   Procedure Laterality Date   • ABDOMINAL SURGERY      ablation    • AMPUTATION DIGIT Left 1/1/2017    Procedure: LEFT SECOND TOE AMPUTATION;  Surgeon: Farzad Nichols MD;  Location: [facility];  Service:    • AMPUTATION OF REPLICATED TOES      right distal  second toe    • CATARACT EXTRACTION     • COLECTOMY PARTIAL / TOTAL  01/14/2014   • HYSTERECTOMY  1998   • PORTACATH PLACEMENT  06/2013   • SALPINGO OOPHORECTOMY Bilateral    • SPLENECTOMY         Cancer-related family history includes Breast cancer in her sister; Cancer in her other.    SCHEDULED MEDS:       Current Outpatient Prescriptions:   •  allopurinol (ZYLOPRIM) 100 MG tablet, TAKE 1 TABLET DAILY, Disp: 90 tablet, Rfl: 1  •  aspirin 81 MG tablet, Take 81 mg by mouth daily., Disp: , Rfl:   •  Biotin 5000 MCG tablet, Take 5,000 mcg by mouth Daily., Disp: , Rfl:   •  calcium carbonate (TUMS) 500 MG chewable tablet, Chew., Disp: , Rfl:   •  ferrous sulfate 325 (65 FE) MG tablet, Take 1 tablet by mouth 2 (Two) Times a Day., Disp: , Rfl:   •  fluticasone (FLONASE) 50 MCG/ACT nasal spray, , Disp: , Rfl:   •  insulin detemir (LEVEMIR FLEXTOUCH) 100 UNIT/ML injection, In 32U in PM titrate as instructed with max of 100u daily, Disp: 90 mL, Rfl: 0  •  Insulin Lispro (HUMALOG KWIKPEN) 100 UNIT/ML solution pen-injector, 15u AC and sliding scale, Disp: 42 mL, Rfl: 0  •  LORazepam (ATIVAN) 0.5 MG tablet, Take 1 tablet by mouth 2 (Two) Times a Day As Needed (nausea)., Disp: 20 tablet, Rfl: 0  •  MAGNESIUM OXIDE PO, Take 400 mg by mouth daily., Disp: , Rfl:   •  meclizine (ANTIVERT) 25 MG tablet, Take 25 mg by mouth 3 (Three) Times a Day As Needed., Disp: , Rfl:   •  ondansetron (ZOFRAN) 8 MG tablet, Take 1 tablet by mouth 3 (Three) Times a Day As Needed for Nausea or Vomiting., Disp: 30 tablet, Rfl: 5  •  ONETOUCH VERIO test strip, TEST 3-4 TIMES A DAY, Disp: 100 each, Rfl: 5  •  ONGLYZA 5 MG tablet, TAKE 1 TABLET DAILY, Disp: 90 tablet, Rfl: 0  •  telmisartan-hydrochlorothiazide (MICARDIS HCT) 40-12.5 MG per tablet, Take 1 tablet by mouth daily, Disp: 90 tablet, Rfl: 3  •  vitamin D (ERGOCALCIFEROL) 88938 UNITS capsule capsule, Take 1 capsule by mouth 1 (One) Time Per Week., Disp: 13 capsule, Rfl: 3  •  XIGDUO XR   MG tablet sustained-release 24 hour, TAKE 1 TABLET DAILY, Disp: 90 tablet, Rfl: 0  •  atorvastatin (LIPITOR) 40 MG tablet, Take 1 tablet (40 mg total) by mouth daily for 90 days, Disp: 90 tablet, Rfl: 3    Current Facility-Administered Medications:   •  heparin flush (porcine) 100 UNIT/ML injection 500 Units, 500 Units, Intravenous, Once, Dirk Nails MD    Facility-Administered Medications Ordered in Other Visits:   •  atropine injection 0.25 mg, 0.25 mg, Intravenous, Q15 Min PRN, Marivel Brown MD  •  dexamethasone (DECADRON) 12 mg in sodium chloride 0.9 % IVPB, 12 mg, Intravenous, Once, Marivel Brown MD  •  fluorouracil (ADRUCIL) 5,350 mg, dextrose (D5W) 5 % chemo infusion - FOR HOME USE, 2,400 mg/m2 (Treatment Plan Recorded), Intravenous, Once, Marivel Brown MD  •  fluorouracil (ADRUCIL) chemo injection 890 mg, 400 mg/m2 (Treatment Plan Recorded), Intravenous, Once, Marivel Brown MD  •  irinotecan (CAMPTOSAR) 400 mg in dextrose (D5W) 5 % 520 mL chemo IVPB, 180 mg/m2 (Treatment Plan Recorded), Intravenous, Once, Marivel Brown MD  •  leucovorin 890 mg in dextrose (D5W) 5 % 339 mL IVPB, 400 mg/m2 (Treatment Plan Recorded), Intravenous, Once, Marivel Brown MD  •  palonosetron (ALOXI) injection 0.25 mg, 0.25 mg, Intravenous, Once, Marivel Brown MD  •  sodium chloride 0.9 % infusion 250 mL, 250 mL, Intravenous, Once, Marivel Brown MD    REVIEW OF SYSTEMS:  GENERAL:  No fever or chills.   Fatigue.  SKIN:  See history of present illness.  HEME/LYMPH: Anemia. No enlarged lymph nodes.  RESPIRATORY:  No shortness of breath.  CVS:  No chest pain.  GI:  No abdominal pain, vomiting, diarrhea, melena or hematochezia.  Some constipation due to the iron.  :  No dysuria.   MUSCULOSKELETAL:  Chronic back pain.  NEUROLOGICAL:  Patient has mild numbness in the fingertips, unchanged.    Objective   VITAL SIGNS:     Vitals:    10/05/17 0811   BP: 134/83   Pulse: 84   Resp: 16   Temp: 97.5  "°F (36.4 °C)   TempSrc: Oral   SpO2: 97%   Weight: 250 lb 14.4 oz (114 kg)   Height: 66.14\" (168 cm)   PainSc: 0-No pain     Body mass index is 40.33 kg/(m^2).     Wt Readings from Last 3 Encounters:   10/05/17 250 lb 14.4 oz (114 kg)   09/21/17 252 lb 9.6 oz (115 kg)   09/07/17 252 lb (114 kg)       PHYSICAL EXAMINATION:  GENERAL:  The patient appears in good general condition, not in acute distress.  SKIN:  No skin rashes or lesions. No ecchymosis or petechiae.  HEAD:  Normocephalic.  EYES:  No jaundice or pallor.   NECK:  Supple with good ROM. No thyromegaly or masses.  LYMPHATICS:  No cervical or supraclavicular lymphadenopathy.  CHEST:  Lungs clear to auscultation. No added sounds.  Port in the left upper chest is benign.  ABDOMEN:  Soft and non-tender. No hepatomegaly. No masses.  No tenderness in the right upper quadrant.  EXTREMITIES:  No edema. No calf tenderness.    RESULT REVIEW:       Results from last 7 days  Lab Units 10/05/17  0758   WBC 10*3/mm3 8.23   NEUTROS ABS 10*3/mm3 4.67   HEMOGLOBIN g/dL 10.9*   HEMATOCRIT % 33.4*   PLATELETS 10*3/mm3 454       Results from last 7 days  Lab Units 10/05/17  0758   SODIUM mmol/L 141   POTASSIUM mmol/L 4.1   CHLORIDE mmol/L 100   CO2 mmol/L 27.6   BUN mg/dL 18   CREATININE mg/dL 0.89   CALCIUM mg/dL 9.6   ALBUMIN g/dL 3.40*   BILIRUBIN mg/dL 0.4   ALK PHOS U/L 426*   ALT (SGPT) U/L 76*   AST (SGOT) U/L 83*   GLUCOSE mg/dL 176*       Lab Results   Component Value Date    CEA 5.21 09/21/2017    CEA 4.78 09/07/2017    CEA 5.08 08/24/2017        Assessment/Plan    1.  Metastatic colon cancer. She had metastases to the liver.   · She received 12 cycles of FOLFOX-6 and then had resection of the primary tumor along with metastasectomy and splenectomy with thermal ablation of lesions in the liver in January 2014.   · This was followed by 12 cycles of the FOLFIRI regimen completed on 08/07/2014.   · CT scans on 05/28/2015 revealed a new lesion in the liver.  Dr. Ruano " referred the patient to Radiation Therapy and she received CyberKnife radiation, completed on 07/14/2015.    · The CT scan from 5/5/17 revealed a new 2.2 cm lesion in the anterior hepatic segment.  CT guided microwave ablation of liver mass performed on 6/16/17 with CT evidence on 7/20/17 of a complete response  · FOLFIRI x 12 cycles was recommended and was started on 6/29/17.     CEA is between 4.78 and 5.21.  She is going to have a scan at Dr. Ruano office on 10/19/17.    2.  Elevated liver enzymes. It is secondary to inflammation that developed following the microwave ablation of the liver mass.      3.   Anemia due to iron deficiency.  She is on ferrous sulfate 325 mg twice daily.  She has adequate B12 and folate levels.   she developed constipation due to the oral iron and I asked him to start Metamucil daily.     4.  Nausea and vomiting secondary to chemotherapy.  It developed in the early morning of day #3 of cycle #3.  The patient is now taking Zofran twice a day on days 2, 3 and 4.  If she develops nausea, she will take lorazepam PRN.  She has done well with this regimen.    Patient will receive cycle #8 today.  She will return in 2 weeks for cycle #9.  She will be seen in follow-up in 4 weeks when she is in for cycle #10.        Marivel Brown MD  10/05/17

## 2017-10-07 ENCOUNTER — INFUSION (OUTPATIENT)
Dept: ONCOLOGY | Facility: HOSPITAL | Age: 67
End: 2017-10-07

## 2017-10-07 VITALS
HEART RATE: 74 BPM | RESPIRATION RATE: 18 BRPM | TEMPERATURE: 98 F | SYSTOLIC BLOOD PRESSURE: 100 MMHG | DIASTOLIC BLOOD PRESSURE: 63 MMHG | OXYGEN SATURATION: 97 %

## 2017-10-07 DIAGNOSIS — Z79.4 CONTROLLED TYPE 2 DIABETES MELLITUS WITHOUT COMPLICATION, WITH LONG-TERM CURRENT USE OF INSULIN (HCC): ICD-10-CM

## 2017-10-07 DIAGNOSIS — C78.7 LIVER METASTASIS: Primary | ICD-10-CM

## 2017-10-07 DIAGNOSIS — C18.9 PRIMARY MALIGNANT NEOPLASM OF COLON (HCC): ICD-10-CM

## 2017-10-07 DIAGNOSIS — E11.9 CONTROLLED TYPE 2 DIABETES MELLITUS WITHOUT COMPLICATION, WITH LONG-TERM CURRENT USE OF INSULIN (HCC): ICD-10-CM

## 2017-10-07 PROCEDURE — 25010000002 HEPARIN FLUSH (PORCINE) 100 UNIT/ML SOLUTION: Performed by: INTERNAL MEDICINE

## 2017-10-07 PROCEDURE — 96523 IRRIG DRUG DELIVERY DEVICE: CPT

## 2017-10-07 RX ADMIN — Medication 500 UNITS: at 09:19

## 2017-10-09 RX ORDER — INSULIN LISPRO 100 [IU]/ML
INJECTION, SOLUTION INTRAVENOUS; SUBCUTANEOUS
Qty: 15 ML | Refills: 0 | Status: SHIPPED | OUTPATIENT
Start: 2017-10-09 | End: 2018-03-07

## 2017-10-11 DIAGNOSIS — E11.9 DIABETES TYPE 2, CONTROLLED (HCC): ICD-10-CM

## 2017-10-11 RX ORDER — DAPAGLIFLOZIN AND METFORMIN HYDROCHLORIDE 10; 1000 MG/1; MG/1
TABLET, FILM COATED, EXTENDED RELEASE ORAL
Qty: 90 TABLET | Refills: 0 | Status: SHIPPED | OUTPATIENT
Start: 2017-10-11 | End: 2017-11-30

## 2017-10-11 RX ORDER — SAXAGLIPTIN 5 MG/1
TABLET, FILM COATED ORAL
Qty: 90 TABLET | Refills: 0 | Status: SHIPPED | OUTPATIENT
Start: 2017-10-11 | End: 2018-01-09 | Stop reason: SDUPTHER

## 2017-10-14 DIAGNOSIS — E55.9 VITAMIN D DEFICIENCY: ICD-10-CM

## 2017-10-16 RX ORDER — ERGOCALCIFEROL 1.25 MG/1
CAPSULE ORAL
Qty: 12 CAPSULE | Refills: 2 | Status: SHIPPED | OUTPATIENT
Start: 2017-10-16 | End: 2017-11-30 | Stop reason: SDUPTHER

## 2017-10-17 DIAGNOSIS — E11.9 DIABETES TYPE 2, CONTROLLED (HCC): ICD-10-CM

## 2017-10-17 DIAGNOSIS — E11.9 CONTROLLED TYPE 2 DIABETES MELLITUS WITHOUT COMPLICATION, WITH LONG-TERM CURRENT USE OF INSULIN (HCC): ICD-10-CM

## 2017-10-17 DIAGNOSIS — Z79.4 CONTROLLED TYPE 2 DIABETES MELLITUS WITHOUT COMPLICATION, WITH LONG-TERM CURRENT USE OF INSULIN (HCC): ICD-10-CM

## 2017-10-18 RX ORDER — INSULIN LISPRO 100 [IU]/ML
INJECTION, SOLUTION INTRAVENOUS; SUBCUTANEOUS
Qty: 30 ML | Refills: 0 | Status: SHIPPED | OUTPATIENT
Start: 2017-10-18 | End: 2017-11-30 | Stop reason: SDUPTHER

## 2017-10-19 ENCOUNTER — INFUSION (OUTPATIENT)
Dept: ONCOLOGY | Facility: HOSPITAL | Age: 67
End: 2017-10-19

## 2017-10-19 VITALS
DIASTOLIC BLOOD PRESSURE: 68 MMHG | TEMPERATURE: 98 F | BODY MASS INDEX: 39.89 KG/M2 | WEIGHT: 248.2 LBS | HEART RATE: 108 BPM | SYSTOLIC BLOOD PRESSURE: 121 MMHG

## 2017-10-19 DIAGNOSIS — C78.7 LIVER METASTASIS: Primary | ICD-10-CM

## 2017-10-19 DIAGNOSIS — C18.9 PRIMARY MALIGNANT NEOPLASM OF COLON (HCC): ICD-10-CM

## 2017-10-19 DIAGNOSIS — C78.7 LIVER METASTASIS: ICD-10-CM

## 2017-10-19 LAB
ALBUMIN SERPL-MCNC: 3.5 G/DL (ref 3.5–5.2)
ALBUMIN/GLOB SERPL: 0.8 G/DL (ref 1.1–2.4)
ALP SERPL-CCNC: 409 U/L (ref 38–116)
ALT SERPL W P-5'-P-CCNC: 51 U/L (ref 0–33)
ANION GAP SERPL CALCULATED.3IONS-SCNC: 12.4 MMOL/L
AST SERPL-CCNC: 56 U/L (ref 0–32)
BASOPHILS # BLD AUTO: 0.04 10*3/MM3 (ref 0–0.1)
BASOPHILS NFR BLD AUTO: 0.6 % (ref 0–1.1)
BILIRUB SERPL-MCNC: 0.5 MG/DL (ref 0.1–1.2)
BUN BLD-MCNC: 16 MG/DL (ref 6–20)
BUN/CREAT SERPL: 19.8 (ref 7.3–30)
CALCIUM SPEC-SCNC: 9.6 MG/DL (ref 8.5–10.2)
CHLORIDE SERPL-SCNC: 97 MMOL/L (ref 98–107)
CO2 SERPL-SCNC: 28.6 MMOL/L (ref 22–29)
CREAT BLD-MCNC: 0.81 MG/DL (ref 0.6–1.1)
DEPRECATED RDW RBC AUTO: 76.5 FL (ref 37–49)
EOSINOPHIL # BLD AUTO: 0.32 10*3/MM3 (ref 0–0.36)
EOSINOPHIL NFR BLD AUTO: 4.5 % (ref 1–5)
ERYTHROCYTE [DISTWIDTH] IN BLOOD BY AUTOMATED COUNT: 23 % (ref 11.7–14.5)
GFR SERPL CREATININE-BSD FRML MDRD: 86 ML/MIN/1.73
GLOBULIN UR ELPH-MCNC: 4.2 GM/DL (ref 1.8–3.5)
GLUCOSE BLD-MCNC: 109 MG/DL (ref 74–124)
HCT VFR BLD AUTO: 34.6 % (ref 34–45)
HGB BLD-MCNC: 11.4 G/DL (ref 11.5–14.9)
IMM GRANULOCYTES # BLD: 0.07 10*3/MM3 (ref 0–0.03)
IMM GRANULOCYTES NFR BLD: 1 % (ref 0–0.5)
LYMPHOCYTES # BLD AUTO: 2.21 10*3/MM3 (ref 1–3.5)
LYMPHOCYTES NFR BLD AUTO: 31 % (ref 20–49)
MCH RBC QN AUTO: 30.2 PG (ref 27–33)
MCHC RBC AUTO-ENTMCNC: 32.9 G/DL (ref 32–35)
MCV RBC AUTO: 91.8 FL (ref 83–97)
MONOCYTES # BLD AUTO: 0.96 10*3/MM3 (ref 0.25–0.8)
MONOCYTES NFR BLD AUTO: 13.4 % (ref 4–12)
NEUTROPHILS # BLD AUTO: 3.54 10*3/MM3 (ref 1.5–7)
NEUTROPHILS NFR BLD AUTO: 49.5 % (ref 39–75)
NRBC BLD MANUAL-RTO: 0.3 /100 WBC (ref 0–0)
PLATELET # BLD AUTO: 427 10*3/MM3 (ref 150–375)
PMV BLD AUTO: 8.1 FL (ref 8.9–12.1)
POTASSIUM BLD-SCNC: 3.7 MMOL/L (ref 3.5–4.7)
PROT SERPL-MCNC: 7.7 G/DL (ref 6.3–8)
RBC # BLD AUTO: 3.77 10*6/MM3 (ref 3.9–5)
SODIUM BLD-SCNC: 138 MMOL/L (ref 134–145)
WBC NRBC COR # BLD: 7.14 10*3/MM3 (ref 4–10)

## 2017-10-19 PROCEDURE — 96375 TX/PRO/DX INJ NEW DRUG ADDON: CPT | Performed by: INTERNAL MEDICINE

## 2017-10-19 PROCEDURE — 25010000002 DEXAMETHASONE PER 1 MG: Performed by: INTERNAL MEDICINE

## 2017-10-19 PROCEDURE — 96368 THER/DIAG CONCURRENT INF: CPT | Performed by: INTERNAL MEDICINE

## 2017-10-19 PROCEDURE — 25010000002 IRINOTECAN PER 20 MG: Performed by: INTERNAL MEDICINE

## 2017-10-19 PROCEDURE — 96416 CHEMO PROLONG INFUSE W/PUMP: CPT | Performed by: INTERNAL MEDICINE

## 2017-10-19 PROCEDURE — 25010000002 FLUOROURACIL PER 500 MG: Performed by: INTERNAL MEDICINE

## 2017-10-19 PROCEDURE — 80053 COMPREHEN METABOLIC PANEL: CPT

## 2017-10-19 PROCEDURE — 25010000002 LEUCOVORIN CALCIUM PER 50 MG: Performed by: INTERNAL MEDICINE

## 2017-10-19 PROCEDURE — 96411 CHEMO IV PUSH ADDL DRUG: CPT | Performed by: INTERNAL MEDICINE

## 2017-10-19 PROCEDURE — 85025 COMPLETE CBC W/AUTO DIFF WBC: CPT

## 2017-10-19 PROCEDURE — 25010000002 PALONOSETRON PER 25 MCG: Performed by: INTERNAL MEDICINE

## 2017-10-19 PROCEDURE — 96415 CHEMO IV INFUSION ADDL HR: CPT | Performed by: INTERNAL MEDICINE

## 2017-10-19 PROCEDURE — 96413 CHEMO IV INFUSION 1 HR: CPT | Performed by: INTERNAL MEDICINE

## 2017-10-19 RX ORDER — PALONOSETRON 0.05 MG/ML
0.25 INJECTION, SOLUTION INTRAVENOUS ONCE
Status: COMPLETED | OUTPATIENT
Start: 2017-10-19 | End: 2017-10-19

## 2017-10-19 RX ORDER — FLUOROURACIL 50 MG/ML
400 INJECTION, SOLUTION INTRAVENOUS ONCE
Status: COMPLETED | OUTPATIENT
Start: 2017-10-19 | End: 2017-10-19

## 2017-10-19 RX ORDER — ATROPINE SULFATE 1 MG/ML
0.25 INJECTION, SOLUTION INTRAMUSCULAR; INTRAVENOUS; SUBCUTANEOUS
Status: DISCONTINUED | OUTPATIENT
Start: 2017-10-19 | End: 2017-10-19 | Stop reason: HOSPADM

## 2017-10-19 RX ORDER — SODIUM CHLORIDE 9 MG/ML
250 INJECTION, SOLUTION INTRAVENOUS ONCE
Status: COMPLETED | OUTPATIENT
Start: 2017-10-19 | End: 2017-10-19

## 2017-10-19 RX ADMIN — DEXAMETHASONE SODIUM PHOSPHATE 12 MG: 4 INJECTION, SOLUTION INTRA-ARTICULAR; INTRALESIONAL; INTRAMUSCULAR; INTRAVENOUS; SOFT TISSUE at 12:52

## 2017-10-19 RX ADMIN — ATROPINE SULFATE 0.25 MG: 1 INJECTION, SOLUTION INTRAMUSCULAR; INTRAVENOUS; SUBCUTANEOUS at 12:47

## 2017-10-19 RX ADMIN — FLUOROURACIL 5350 MG: 50 INJECTION, SOLUTION INTRAVENOUS at 14:54

## 2017-10-19 RX ADMIN — PALONOSETRON HYDROCHLORIDE 0.25 MG: 0.25 INJECTION INTRAVENOUS at 12:42

## 2017-10-19 RX ADMIN — IRINOTECAN HYDROCHLORIDE 400 MG: 20 INJECTION, SOLUTION INTRAVENOUS at 13:13

## 2017-10-19 RX ADMIN — LEUCOVORIN CALCIUM 890 MG: 350 INJECTION, POWDER, LYOPHILIZED, FOR SOLUTION INTRAMUSCULAR; INTRAVENOUS at 13:12

## 2017-10-19 RX ADMIN — FLUOROURACIL 890 MG: 50 INJECTION, SOLUTION INTRAVENOUS at 14:51

## 2017-10-19 RX ADMIN — SODIUM CHLORIDE 250 ML: 900 INJECTION, SOLUTION INTRAVENOUS at 12:40

## 2017-10-20 LAB — CEA SERPL-MCNC: 5.4 NG/ML (ref 0–4.7)

## 2017-10-21 ENCOUNTER — INFUSION (OUTPATIENT)
Dept: ONCOLOGY | Facility: HOSPITAL | Age: 67
End: 2017-10-21

## 2017-10-21 VITALS
RESPIRATION RATE: 18 BRPM | SYSTOLIC BLOOD PRESSURE: 120 MMHG | OXYGEN SATURATION: 99 % | DIASTOLIC BLOOD PRESSURE: 65 MMHG | HEART RATE: 83 BPM | TEMPERATURE: 97.7 F

## 2017-10-21 DIAGNOSIS — C78.7 LIVER METASTASIS: Primary | ICD-10-CM

## 2017-10-21 DIAGNOSIS — C18.9 PRIMARY MALIGNANT NEOPLASM OF COLON (HCC): ICD-10-CM

## 2017-10-21 PROCEDURE — 96523 IRRIG DRUG DELIVERY DEVICE: CPT

## 2017-11-02 ENCOUNTER — INFUSION (OUTPATIENT)
Dept: ONCOLOGY | Facility: HOSPITAL | Age: 67
End: 2017-11-02

## 2017-11-02 ENCOUNTER — OFFICE VISIT (OUTPATIENT)
Dept: ONCOLOGY | Facility: CLINIC | Age: 67
End: 2017-11-02

## 2017-11-02 VITALS
SYSTOLIC BLOOD PRESSURE: 146 MMHG | WEIGHT: 251.1 LBS | HEIGHT: 66 IN | HEART RATE: 89 BPM | TEMPERATURE: 98.6 F | DIASTOLIC BLOOD PRESSURE: 79 MMHG | OXYGEN SATURATION: 96 % | BODY MASS INDEX: 40.36 KG/M2 | RESPIRATION RATE: 16 BRPM

## 2017-11-02 DIAGNOSIS — C78.7 LIVER METASTASIS: Primary | ICD-10-CM

## 2017-11-02 DIAGNOSIS — C18.9 PRIMARY MALIGNANT NEOPLASM OF COLON (HCC): ICD-10-CM

## 2017-11-02 DIAGNOSIS — C78.7 LIVER METASTASIS: ICD-10-CM

## 2017-11-02 LAB
ALBUMIN SERPL-MCNC: 3.5 G/DL (ref 3.5–5.2)
ALBUMIN/GLOB SERPL: 0.9 G/DL
ALP SERPL-CCNC: 345 U/L (ref 39–117)
ALT SERPL W P-5'-P-CCNC: 33 U/L (ref 1–33)
ANION GAP SERPL CALCULATED.3IONS-SCNC: 14.1 MMOL/L
AST SERPL-CCNC: 46 U/L (ref 1–32)
BASOPHILS # BLD AUTO: 0.04 10*3/MM3 (ref 0–0.2)
BASOPHILS NFR BLD AUTO: 0.6 % (ref 0–1.5)
BILIRUB SERPL-MCNC: 0.4 MG/DL (ref 0.1–1.2)
BUN BLD-MCNC: 12 MG/DL (ref 8–23)
BUN/CREAT SERPL: 14.6 (ref 7–25)
CALCIUM SPEC-SCNC: 9.2 MG/DL (ref 8.6–10.5)
CHLORIDE SERPL-SCNC: 100 MMOL/L (ref 98–107)
CO2 SERPL-SCNC: 25.9 MMOL/L (ref 22–29)
CREAT BLD-MCNC: 0.82 MG/DL (ref 0.57–1)
DEPRECATED RDW RBC AUTO: 77.5 FL (ref 37–54)
EOSINOPHIL # BLD AUTO: 0.21 10*3/MM3 (ref 0–0.7)
EOSINOPHIL NFR BLD AUTO: 2.9 % (ref 0.3–6.2)
ERYTHROCYTE [DISTWIDTH] IN BLOOD BY AUTOMATED COUNT: 23 % (ref 11.7–13)
GFR SERPL CREATININE-BSD FRML MDRD: 85 ML/MIN/1.73
GLOBULIN UR ELPH-MCNC: 3.7 GM/DL
GLUCOSE BLD-MCNC: 132 MG/DL (ref 65–99)
HCT VFR BLD AUTO: 32.9 % (ref 35.6–45.5)
HGB BLD-MCNC: 10.8 G/DL (ref 11.9–15.5)
IMM GRANULOCYTES # BLD: 0.08 10*3/MM3 (ref 0–0.03)
IMM GRANULOCYTES NFR BLD: 1.1 % (ref 0–0.5)
LYMPHOCYTES # BLD AUTO: 1.67 10*3/MM3 (ref 0.9–4.8)
LYMPHOCYTES NFR BLD AUTO: 23.3 % (ref 19.6–45.3)
MCH RBC QN AUTO: 30.2 PG (ref 26.9–32)
MCHC RBC AUTO-ENTMCNC: 32.8 G/DL (ref 32.4–36.3)
MCV RBC AUTO: 91.9 FL (ref 80.5–98.2)
MONOCYTES # BLD AUTO: 0.92 10*3/MM3 (ref 0.2–1.2)
MONOCYTES NFR BLD AUTO: 12.8 % (ref 5–12)
NEUTROPHILS # BLD AUTO: 4.26 10*3/MM3 (ref 1.9–8.1)
NEUTROPHILS NFR BLD AUTO: 59.3 % (ref 42.7–76)
NRBC BLD MANUAL-RTO: 0.7 /100 WBC (ref 0–0)
PLATELET # BLD AUTO: 483 10*3/MM3 (ref 140–500)
PMV BLD AUTO: 8.7 FL (ref 6–12)
POTASSIUM BLD-SCNC: 3.9 MMOL/L (ref 3.5–5.2)
PROT SERPL-MCNC: 7.2 G/DL (ref 6–8.5)
RBC # BLD AUTO: 3.58 10*6/MM3 (ref 3.9–5.2)
SODIUM BLD-SCNC: 140 MMOL/L (ref 136–145)
WBC NRBC COR # BLD: 7.18 10*3/MM3 (ref 4.5–10.7)

## 2017-11-02 PROCEDURE — 25010000002 FLUOROURACIL PER 500 MG: Performed by: INTERNAL MEDICINE

## 2017-11-02 PROCEDURE — 25010000002 LEUCOVORIN CALCIUM PER 50 MG: Performed by: INTERNAL MEDICINE

## 2017-11-02 PROCEDURE — 96416 CHEMO PROLONG INFUSE W/PUMP: CPT | Performed by: INTERNAL MEDICINE

## 2017-11-02 PROCEDURE — 96375 TX/PRO/DX INJ NEW DRUG ADDON: CPT | Performed by: INTERNAL MEDICINE

## 2017-11-02 PROCEDURE — 25010000002 PALONOSETRON PER 25 MCG: Performed by: INTERNAL MEDICINE

## 2017-11-02 PROCEDURE — 80053 COMPREHEN METABOLIC PANEL: CPT | Performed by: INTERNAL MEDICINE

## 2017-11-02 PROCEDURE — 25010000002 DEXAMETHASONE SODIUM PHOSPHATE 10 MG/ML SOLUTION 1 ML VIAL: Performed by: INTERNAL MEDICINE

## 2017-11-02 PROCEDURE — 25010000002 IRINOTECAN PER 20 MG: Performed by: INTERNAL MEDICINE

## 2017-11-02 PROCEDURE — 96411 CHEMO IV PUSH ADDL DRUG: CPT | Performed by: INTERNAL MEDICINE

## 2017-11-02 PROCEDURE — 36415 COLL VENOUS BLD VENIPUNCTURE: CPT | Performed by: INTERNAL MEDICINE

## 2017-11-02 PROCEDURE — 96368 THER/DIAG CONCURRENT INF: CPT | Performed by: INTERNAL MEDICINE

## 2017-11-02 PROCEDURE — 96413 CHEMO IV INFUSION 1 HR: CPT | Performed by: INTERNAL MEDICINE

## 2017-11-02 PROCEDURE — 96415 CHEMO IV INFUSION ADDL HR: CPT | Performed by: INTERNAL MEDICINE

## 2017-11-02 PROCEDURE — 99215 OFFICE O/P EST HI 40 MIN: CPT | Performed by: INTERNAL MEDICINE

## 2017-11-02 PROCEDURE — 85025 COMPLETE CBC W/AUTO DIFF WBC: CPT | Performed by: INTERNAL MEDICINE

## 2017-11-02 RX ORDER — PALONOSETRON 0.05 MG/ML
0.25 INJECTION, SOLUTION INTRAVENOUS ONCE
Status: CANCELLED | OUTPATIENT
Start: 2017-11-16

## 2017-11-02 RX ORDER — SODIUM CHLORIDE 9 MG/ML
250 INJECTION, SOLUTION INTRAVENOUS ONCE
Status: COMPLETED | OUTPATIENT
Start: 2017-11-02 | End: 2017-11-02

## 2017-11-02 RX ORDER — PALONOSETRON 0.05 MG/ML
0.25 INJECTION, SOLUTION INTRAVENOUS ONCE
Status: COMPLETED | OUTPATIENT
Start: 2017-11-02 | End: 2017-11-02

## 2017-11-02 RX ORDER — SODIUM CHLORIDE 9 MG/ML
250 INJECTION, SOLUTION INTRAVENOUS ONCE
Status: CANCELLED | OUTPATIENT
Start: 2017-11-02

## 2017-11-02 RX ORDER — SODIUM CHLORIDE 9 MG/ML
250 INJECTION, SOLUTION INTRAVENOUS ONCE
Status: CANCELLED | OUTPATIENT
Start: 2017-11-16

## 2017-11-02 RX ORDER — ATROPINE SULFATE 0.4 MG/ML
0.25 AMPUL (ML) INJECTION
Status: DISCONTINUED | OUTPATIENT
Start: 2017-11-02 | End: 2017-11-02 | Stop reason: HOSPADM

## 2017-11-02 RX ORDER — ATROPINE SULFATE 1 MG/ML
0.25 INJECTION, SOLUTION INTRAMUSCULAR; INTRAVENOUS; SUBCUTANEOUS
Status: CANCELLED | OUTPATIENT
Start: 2017-11-16

## 2017-11-02 RX ORDER — FLUOROURACIL 50 MG/ML
400 INJECTION, SOLUTION INTRAVENOUS ONCE
Status: CANCELLED | OUTPATIENT
Start: 2017-11-02

## 2017-11-02 RX ORDER — FLUOROURACIL 50 MG/ML
400 INJECTION, SOLUTION INTRAVENOUS ONCE
Status: COMPLETED | OUTPATIENT
Start: 2017-11-02 | End: 2017-11-02

## 2017-11-02 RX ORDER — PALONOSETRON 0.05 MG/ML
0.25 INJECTION, SOLUTION INTRAVENOUS ONCE
Status: CANCELLED | OUTPATIENT
Start: 2017-11-02

## 2017-11-02 RX ORDER — FLUOROURACIL 50 MG/ML
400 INJECTION, SOLUTION INTRAVENOUS ONCE
Status: CANCELLED | OUTPATIENT
Start: 2017-11-16

## 2017-11-02 RX ORDER — ATROPINE SULFATE 1 MG/ML
0.25 INJECTION, SOLUTION INTRAMUSCULAR; INTRAVENOUS; SUBCUTANEOUS
Status: CANCELLED | OUTPATIENT
Start: 2017-11-02

## 2017-11-02 RX ADMIN — FLUOROURACIL 890 MG: 50 INJECTION, SOLUTION INTRAVENOUS at 12:11

## 2017-11-02 RX ADMIN — IRINOTECAN HYDROCHLORIDE 400 MG: 20 INJECTION, SOLUTION INTRAVENOUS at 10:19

## 2017-11-02 RX ADMIN — PALONOSETRON HYDROCHLORIDE 0.25 MG: 0.25 INJECTION INTRAVENOUS at 09:49

## 2017-11-02 RX ADMIN — DEXAMETHASONE SODIUM PHOSPHATE 12 MG: 10 INJECTION, SOLUTION INTRAMUSCULAR; INTRAVENOUS at 09:49

## 2017-11-02 RX ADMIN — LEUCOVORIN CALCIUM 890 MG: 350 INJECTION, POWDER, LYOPHILIZED, FOR SOLUTION INTRAMUSCULAR; INTRAVENOUS at 10:19

## 2017-11-02 RX ADMIN — ATROPINE SULFATE 0.25 MG: 0.4 INJECTION, SOLUTION INTRAMUSCULAR; INTRAVENOUS; SUBCUTANEOUS at 10:25

## 2017-11-02 RX ADMIN — FLUOROURACIL 5350 MG: 50 INJECTION, SOLUTION INTRAVENOUS at 12:13

## 2017-11-02 RX ADMIN — SODIUM CHLORIDE 250 ML: 900 INJECTION, SOLUTION INTRAVENOUS at 09:49

## 2017-11-02 NOTE — PROGRESS NOTES
Subjective     CHIEF COMPLAINT:    1. Metastatic colon cancer (KRAS mutation positive) with liver metastasis, biopsy confirmed. No resection of primary sigmoid colon cancer.   2. Recurrent Iron deficiency anemia   3. Palliative chemotherapy with FOLFOX-6 was given from June 2013 to November 2013.   4. Left partial colectomy, splenectomy, resection of left diaphragm with repair and partial hepatectomy with microwave ablation of metastasis on 1/14/2014.   5. FOLFIRI x12 cycles between 3/07/2014 and 8/07/2014.   6. CT scan on 05/28/2015 revealed a new liver lesion. Stereotactic radiation therapy with CyberKnife was delivered in 3 fractions, completed on 07/14/2015.        7.  New liver lesion was identified on CT on 5/5/17. CT guided microwave ablation of liver mass on 6/16/17.       8.  Started Folfiri on 6/29/17. 12 cycles planned.     HISTORY OF PRESENT ILLNESS:     Feli Del Toro is a 66 y.o. patient with the above medical history.  She returns today for follow-up.  She started systemic chemotherapy with FOLFIRI on 6/29/17.  She has received 9 cycles of this regimen.  She continues to tolerate the treatment.  Nausea is no longer problem with her taking the Zofran preventatively.    Patient continues to take oral iron twice a day.  She developed constipation that improved with Metamucil.    Patient denies abdominal pain.      Past Medical History:   Diagnosis Date   • Anemia    • Cancer 06/04/2013   • Diabetes mellitus    • History of transfusion    • Hyperlipidemia    • Hypertension    • Metastatic colon cancer to liver    • Retinopathy    • Type 2 diabetes mellitus        Past Surgical History:   Procedure Laterality Date   • ABDOMINAL SURGERY      ablation    • AMPUTATION DIGIT Left 1/1/2017    Procedure: LEFT SECOND TOE AMPUTATION;  Surgeon: Farzad Nichols MD;  Location: [facility];  Service:    • AMPUTATION OF REPLICATED TOES      right distal second toe    • CATARACT EXTRACTION     • COLECTOMY PARTIAL /  TOTAL  01/14/2014   • HYSTERECTOMY  1998   • PORTACATH PLACEMENT  06/2013   • SALPINGO OOPHORECTOMY Bilateral    • SPLENECTOMY         Cancer-related family history includes Breast cancer in her sister; Cancer in her other.    SCHEDULED MEDS:       Current Outpatient Prescriptions:   •  allopurinol (ZYLOPRIM) 100 MG tablet, TAKE 1 TABLET DAILY, Disp: 90 tablet, Rfl: 1  •  aspirin 81 MG tablet, Take 81 mg by mouth daily., Disp: , Rfl:   •  Biotin 5000 MCG tablet, Take 5,000 mcg by mouth Daily., Disp: , Rfl:   •  calcium carbonate (TUMS) 500 MG chewable tablet, Chew., Disp: , Rfl:   •  ferrous sulfate 325 (65 FE) MG tablet, Take 1 tablet by mouth 2 (Two) Times a Day., Disp: , Rfl:   •  fluticasone (FLONASE) 50 MCG/ACT nasal spray, , Disp: , Rfl:   •  HUMALOG KWIKPEN 100 UNIT/ML solution pen-injector, INJECT 15 UNITS BEFORE MEALS AND SLIDING SCALE AS DIRECTED, Disp: 15 mL, Rfl: 0  •  HUMALOG KWIKPEN 100 UNIT/ML solution pen-injector, INJECT 15 UNITS BEFORE MEALS AND SLIDING SCALE, Disp: 30 mL, Rfl: 0  •  insulin detemir (LEVEMIR FLEXTOUCH) 100 UNIT/ML injection, In 32U in PM titrate as instructed with max of 100u daily, Disp: 90 mL, Rfl: 0  •  LORazepam (ATIVAN) 0.5 MG tablet, Take 1 tablet by mouth 2 (Two) Times a Day As Needed (nausea)., Disp: 20 tablet, Rfl: 0  •  MAGNESIUM OXIDE PO, Take 400 mg by mouth daily., Disp: , Rfl:   •  meclizine (ANTIVERT) 25 MG tablet, Take 25 mg by mouth 3 (Three) Times a Day As Needed., Disp: , Rfl:   •  NOVOFINE 32G X 6 MM misc, USE AS DIRECTED 5 TIMES DAILY AND AS NEEDED, Disp: 200 each, Rfl: 3  •  ondansetron (ZOFRAN) 8 MG tablet, Take 1 tablet by mouth 3 (Three) Times a Day As Needed for Nausea or Vomiting., Disp: 30 tablet, Rfl: 5  •  ONETOUCH VERIO test strip, TEST 3-4 TIMES A DAY, Disp: 100 each, Rfl: 5  •  ONGLYZA 5 MG tablet, TAKE 1 TABLET DAILY, Disp: 90 tablet, Rfl: 0  •  telmisartan-hydrochlorothiazide (MICARDIS HCT) 40-12.5 MG per tablet, Take 1 tablet by mouth daily,  "Disp: 90 tablet, Rfl: 3  •  vitamin D (ERGOCALCIFEROL) 39963 UNITS capsule capsule, Take 1 capsule by mouth 1 (One) Time Per Week., Disp: 13 capsule, Rfl: 3  •  vitamin D (ERGOCALCIFEROL) 50895 units capsule capsule, TAKE ONE CAPSULE BY MOUTH ONCE WEEKLY, Disp: 12 capsule, Rfl: 2  •  XIGDUO XR  MG tablet sustained-release 24 hour, TAKE 1 TABLET DAILY, Disp: 90 tablet, Rfl: 0    Current Facility-Administered Medications:   •  heparin flush (porcine) 100 UNIT/ML injection 500 Units, 500 Units, Intravenous, Once, Dirk Nails MD  •  heparin flush (porcine) 100 UNIT/ML injection 500 Units, 500 Units, Intravenous, Once, Marivel Brown MD    REVIEW OF SYSTEMS:  GENERAL:  No fever or chills.   Fatigue.  SKIN:  See history of present illness.  HEME/LYMPH: Anemia has worsened. No enlarged lymph nodes.  RESPIRATORY:  No shortness of breath.  CVS:  No chest pain.  GI:  No abdominal pain, vomiting, diarrhea, melena or hematochezia.  Constipation due to the iron.  :  No dysuria.   MUSCULOSKELETAL:  Chronic back pain.  NEUROLOGICAL:  Patient has mild numbness in the fingertips, unchanged.    Objective   VITAL SIGNS:     Vitals:    11/02/17 0903   BP: 146/79   Pulse: 89   Resp: 16   Temp: 98.6 °F (37 °C)   TempSrc: Oral   SpO2: 96%   Weight: 251 lb 1.6 oz (114 kg)   Height: 66.14\" (168 cm)   PainSc: 0-No pain     Body mass index is 40.36 kg/(m^2).     Wt Readings from Last 3 Encounters:   10/19/17 248 lb 3.2 oz (113 kg)   10/05/17 250 lb 14.4 oz (114 kg)   09/21/17 252 lb 9.6 oz (115 kg)       PHYSICAL EXAMINATION:  GENERAL:  The patient appears in good general condition, not in acute distress.  SKIN:  No skin rashes or lesions. No ecchymosis or petechiae.  HEAD:  Normocephalic.  EYES:  No jaundice or pallor.   NECK:  Supple with good ROM. No thyromegaly or masses.  LYMPHATICS:  No cervical or supraclavicular lymphadenopathy.  CHEST:  Lungs clear to auscultation. No added sounds.  ABDOMEN:  Soft and non-tender. " No hepatomegaly. No masses.  No tenderness in the right upper quadrant.  EXTREMITIES:  No edema. No calf tenderness.    RESULT REVIEW:       Results from last 7 days  Lab Units 11/02/17  0831   WBC 10*3/mm3 7.18   NEUTROS ABS 10*3/mm3 4.26   HEMOGLOBIN g/dL 10.8*   HEMATOCRIT % 32.9*   PLATELETS 10*3/mm3 483       Results from last 7 days  Lab Units 11/02/17  0831   SODIUM mmol/L 140   POTASSIUM mmol/L 3.9   CHLORIDE mmol/L 100   CO2 mmol/L 25.9   BUN mg/dL 12   CREATININE mg/dL 0.82   CALCIUM mg/dL 9.2   ALBUMIN g/dL 3.50   BILIRUBIN mg/dL 0.4   ALK PHOS U/L 345*   ALT (SGPT) U/L 33   AST (SGOT) U/L 46*   GLUCOSE mg/dL 132*       Lab Results   Component Value Date    CEA 5.4 (H) 10/19/2017    CEA 5.21 09/21/2017    CEA 4.78 09/07/2017     EXAMINATION:     1. CT abdomen without and with contrast.  2. CT pelvis with contrast.    DATE: 10/19/2017        HISTORY: Colon cancer with metastatic disease to the liver. Status post ablation therapy. Restaging study.        TECHNIQUE: Triple phase protocol with imaging of the abdomen without and with intravenous contrast with continuation of imaging through the pelvis during the portal venous phase of enhancement. Correlation is made with July 20, 2017.    Dose reduction techniques were employed per ALARA protocol.      FINDINGS: The bilobed zone of ablation in segment 8 of the liver is slightly increased in size measuring 3.9 cm maximum transverse diameter. There is heterogeneous internal contents but no definite internal enhancement. The small wedge-shaped area of   hypoenhancement extending superolaterally from the ablation defect is slightly smaller and may be an area of infarcted parenchyma. There are small tubular hypoattenuating foci radiating inferiorly from the ablation defect associated with hyperemia of the   involved parenchyma in the arterial phase. This suggests that they may be thrombosed peripheral portal vein branches though dilated biliary ducts are not  excluded. There has been a previous left partial hepatectomy. No new suspicious liver lesions are   identified. The anterior division of the right portal vein is no longer seen and passes through the zone of ablation.    There is cholelithiasis. There has been a previous splenectomy with small residual splenules. The pancreas, left kidney, and right adrenal gland are unremarkable. There are simple right renal cysts. There is a very small left adrenal adenoma. There is   evidence of previous surgery at the splenic flexure of the colon. Bowel loops are otherwise unremarkable. There is no significant adenopathy. There is no ascites. The aorta is normal caliber.    The lung bases are clear. There are no suspicious bone lesions.    IMPRESSION:  1. Slight increase in size of the zone of ablation in segment 8 of the liver with slight decrease in size of the associated wedge-shaped hypoenhancing region of the hepatic parenchyma suspicious for a peripheral infarct.  2. No sign of active tumor is identified in the zone of ablation.  3. No new hepatic metastases or evidence of extrahepatic metastatic disease.  4. The anterior division of the right portal vein is likely occluded as it runs through the zone of ablation and is no longer seen. Small tubular hypoattenuating foci radiating inferiorly from the zone of ablation may be thrombosed peripheral branches of   the portal vein or, less likely, dilated biliary ducts.  5. Cholelithiasis.  6. Small left adrenal adenoma.  7. Simple right renal cyst.       Assessment/Plan    1.  Metastatic colon cancer. She had metastases to the liver.   · She received 12 cycles of FOLFOX-6 and then had resection of the primary tumor along with metastasectomy and splenectomy with thermal ablation of lesions in the liver in January 2014.   · This was followed by 12 cycles of the FOLFIRI regimen completed on 08/07/2014.   · CT scans on 05/28/2015 revealed a new lesion in the liver.  Dr. Ruano  referred the patient to Radiation Therapy and she received CyberKnife radiation, completed on 07/14/2015.    · The CT scan from 5/5/17 revealed a new 2.2 cm lesion in the anterior hepatic segment.  CT guided microwave ablation of liver mass performed on 6/16/17 with CT evidence on 7/20/17 of a complete response  · FOLFIRI x 12 cycles was recommended and was started on 6/29/17.     CEA reached 5.4 last month.  She had a CT scan on 10/19/17 . There are no new lesions.  Some changes of infarction of an area of the treated liver were seen.    2.  Elevated liver enzymes attributed  to inflammation that developed following the microwave ablation of the liver mass.      3.   Anemia due to iron deficiency.  She is on ferrous sulfate 325 mg twice daily.  She has adequate B12 and folate levels.   acid continue the oral iron twice a day and continue Metamucil.    4.  Changes of infarction seen in the liver secondary to the microwave ablation.  The patient is asymptomatic.  No new masses are seen.      PLAN:    1.  Patient will receive cycle #10 today.      2.  She will return in 2 weeks for cycle #11.  She will be seen in follow-up in 4 weeks when she is in for cycle #12.     3.  I recommended repeating a CT scan of the chest along with a CT scan of the abdomen pelvis in 3 months.         Marivel Brown MD  11/02/17

## 2017-11-04 ENCOUNTER — INFUSION (OUTPATIENT)
Dept: ONCOLOGY | Facility: HOSPITAL | Age: 67
End: 2017-11-04

## 2017-11-04 VITALS
DIASTOLIC BLOOD PRESSURE: 74 MMHG | TEMPERATURE: 98.6 F | HEART RATE: 82 BPM | SYSTOLIC BLOOD PRESSURE: 125 MMHG | OXYGEN SATURATION: 99 % | RESPIRATION RATE: 16 BRPM

## 2017-11-04 DIAGNOSIS — C78.7 LIVER METASTASIS: Primary | ICD-10-CM

## 2017-11-04 DIAGNOSIS — C18.9 PRIMARY MALIGNANT NEOPLASM OF COLON (HCC): ICD-10-CM

## 2017-11-04 PROCEDURE — 96523 IRRIG DRUG DELIVERY DEVICE: CPT

## 2017-11-04 PROCEDURE — 25010000002 HEPARIN FLUSH (PORCINE) 100 UNIT/ML SOLUTION

## 2017-11-04 RX ADMIN — Medication 500 UNITS: at 11:05

## 2017-11-06 DIAGNOSIS — E55.9 VITAMIN D DEFICIENCY: ICD-10-CM

## 2017-11-06 DIAGNOSIS — E11.29 TYPE 2 DIABETES MELLITUS WITH MICROALBUMINURIA, UNSPECIFIED LONG TERM INSULIN USE STATUS: ICD-10-CM

## 2017-11-06 DIAGNOSIS — E78.5 HYPERLIPIDEMIA, UNSPECIFIED HYPERLIPIDEMIA TYPE: Primary | ICD-10-CM

## 2017-11-06 DIAGNOSIS — R80.9 TYPE 2 DIABETES MELLITUS WITH MICROALBUMINURIA, UNSPECIFIED LONG TERM INSULIN USE STATUS: ICD-10-CM

## 2017-11-16 ENCOUNTER — INFUSION (OUTPATIENT)
Dept: ONCOLOGY | Facility: HOSPITAL | Age: 67
End: 2017-11-16

## 2017-11-16 ENCOUNTER — LAB (OUTPATIENT)
Dept: ENDOCRINOLOGY | Age: 67
End: 2017-11-16

## 2017-11-16 VITALS — WEIGHT: 251.8 LBS | TEMPERATURE: 98.3 F | BODY MASS INDEX: 40.47 KG/M2 | HEART RATE: 88 BPM | OXYGEN SATURATION: 97 %

## 2017-11-16 DIAGNOSIS — E78.5 HYPERLIPIDEMIA, UNSPECIFIED HYPERLIPIDEMIA TYPE: ICD-10-CM

## 2017-11-16 DIAGNOSIS — C78.7 LIVER METASTASIS: Primary | ICD-10-CM

## 2017-11-16 DIAGNOSIS — R80.9 TYPE 2 DIABETES MELLITUS WITH MICROALBUMINURIA, UNSPECIFIED LONG TERM INSULIN USE STATUS: ICD-10-CM

## 2017-11-16 DIAGNOSIS — E55.9 VITAMIN D DEFICIENCY: ICD-10-CM

## 2017-11-16 DIAGNOSIS — C18.9 PRIMARY MALIGNANT NEOPLASM OF COLON (HCC): ICD-10-CM

## 2017-11-16 DIAGNOSIS — E11.29 TYPE 2 DIABETES MELLITUS WITH MICROALBUMINURIA, UNSPECIFIED LONG TERM INSULIN USE STATUS: ICD-10-CM

## 2017-11-16 LAB
ALBUMIN SERPL-MCNC: 3.7 G/DL (ref 3.5–5.2)
ALBUMIN/GLOB SERPL: 0.9 G/DL
ALP SERPL-CCNC: 326 U/L (ref 39–117)
ALT SERPL W P-5'-P-CCNC: 38 U/L (ref 1–33)
ANION GAP SERPL CALCULATED.3IONS-SCNC: 10.9 MMOL/L
AST SERPL-CCNC: 47 U/L (ref 1–32)
BASOPHILS # BLD AUTO: 0.04 10*3/MM3 (ref 0–0.2)
BASOPHILS NFR BLD AUTO: 0.6 % (ref 0–1.5)
BILIRUB SERPL-MCNC: 0.4 MG/DL (ref 0.1–1.2)
BUN BLD-MCNC: 11 MG/DL (ref 8–23)
BUN/CREAT SERPL: 12.9 (ref 7–25)
CALCIUM SPEC-SCNC: 9.8 MG/DL (ref 8.6–10.5)
CEA SERPL-MCNC: 5.66 NG/ML
CHLORIDE SERPL-SCNC: 101 MMOL/L (ref 98–107)
CO2 SERPL-SCNC: 30.1 MMOL/L (ref 22–29)
CREAT BLD-MCNC: 0.85 MG/DL (ref 0.57–1)
DEPRECATED RDW RBC AUTO: 74.6 FL (ref 37–54)
EOSINOPHIL # BLD AUTO: 0.41 10*3/MM3 (ref 0–0.7)
EOSINOPHIL NFR BLD AUTO: 6.3 % (ref 0.3–6.2)
ERYTHROCYTE [DISTWIDTH] IN BLOOD BY AUTOMATED COUNT: 22.2 % (ref 11.7–13)
GFR SERPL CREATININE-BSD FRML MDRD: 81 ML/MIN/1.73
GLOBULIN UR ELPH-MCNC: 3.9 GM/DL
GLUCOSE BLD-MCNC: 151 MG/DL (ref 65–99)
HCT VFR BLD AUTO: 34.3 % (ref 35.6–45.5)
HGB BLD-MCNC: 11.5 G/DL (ref 11.9–15.5)
IMM GRANULOCYTES # BLD: 0.07 10*3/MM3 (ref 0–0.03)
IMM GRANULOCYTES NFR BLD: 1.1 % (ref 0–0.5)
LYMPHOCYTES # BLD AUTO: 1.69 10*3/MM3 (ref 0.9–4.8)
LYMPHOCYTES NFR BLD AUTO: 25.9 % (ref 19.6–45.3)
MCH RBC QN AUTO: 30.7 PG (ref 26.9–32)
MCHC RBC AUTO-ENTMCNC: 33.5 G/DL (ref 32.4–36.3)
MCV RBC AUTO: 91.7 FL (ref 80.5–98.2)
MONOCYTES # BLD AUTO: 1.16 10*3/MM3 (ref 0.2–1.2)
MONOCYTES NFR BLD AUTO: 17.8 % (ref 5–12)
NEUTROPHILS # BLD AUTO: 3.15 10*3/MM3 (ref 1.9–8.1)
NEUTROPHILS NFR BLD AUTO: 48.3 % (ref 42.7–76)
NRBC BLD MANUAL-RTO: 0.5 /100 WBC (ref 0–0)
PLATELET # BLD AUTO: 414 10*3/MM3 (ref 140–500)
PMV BLD AUTO: 8.5 FL (ref 6–12)
POTASSIUM BLD-SCNC: 3.7 MMOL/L (ref 3.5–5.2)
PROT SERPL-MCNC: 7.6 G/DL (ref 6–8.5)
RBC # BLD AUTO: 3.74 10*6/MM3 (ref 3.9–5.2)
SODIUM BLD-SCNC: 142 MMOL/L (ref 136–145)
WBC NRBC COR # BLD: 6.52 10*3/MM3 (ref 4.5–10.7)

## 2017-11-16 PROCEDURE — 96415 CHEMO IV INFUSION ADDL HR: CPT | Performed by: INTERNAL MEDICINE

## 2017-11-16 PROCEDURE — 82378 CARCINOEMBRYONIC ANTIGEN: CPT | Performed by: INTERNAL MEDICINE

## 2017-11-16 PROCEDURE — 25010000002 IRINOTECAN PER 20 MG: Performed by: INTERNAL MEDICINE

## 2017-11-16 PROCEDURE — 96375 TX/PRO/DX INJ NEW DRUG ADDON: CPT | Performed by: INTERNAL MEDICINE

## 2017-11-16 PROCEDURE — 96368 THER/DIAG CONCURRENT INF: CPT | Performed by: INTERNAL MEDICINE

## 2017-11-16 PROCEDURE — 25010000002 DEXAMETHASONE SODIUM PHOSPHATE 10 MG/ML SOLUTION 1 ML VIAL: Performed by: INTERNAL MEDICINE

## 2017-11-16 PROCEDURE — 36415 COLL VENOUS BLD VENIPUNCTURE: CPT | Performed by: INTERNAL MEDICINE

## 2017-11-16 PROCEDURE — 25010000002 FLUOROURACIL PER 500 MG: Performed by: INTERNAL MEDICINE

## 2017-11-16 PROCEDURE — 96416 CHEMO PROLONG INFUSE W/PUMP: CPT | Performed by: INTERNAL MEDICINE

## 2017-11-16 PROCEDURE — 96413 CHEMO IV INFUSION 1 HR: CPT | Performed by: INTERNAL MEDICINE

## 2017-11-16 PROCEDURE — 25010000002 PALONOSETRON PER 25 MCG: Performed by: INTERNAL MEDICINE

## 2017-11-16 PROCEDURE — 85025 COMPLETE CBC W/AUTO DIFF WBC: CPT | Performed by: INTERNAL MEDICINE

## 2017-11-16 PROCEDURE — 25010000002 LEUCOVORIN CALCIUM PER 50 MG: Performed by: INTERNAL MEDICINE

## 2017-11-16 PROCEDURE — 96411 CHEMO IV PUSH ADDL DRUG: CPT | Performed by: INTERNAL MEDICINE

## 2017-11-16 PROCEDURE — 80053 COMPREHEN METABOLIC PANEL: CPT | Performed by: INTERNAL MEDICINE

## 2017-11-16 RX ORDER — SODIUM CHLORIDE 9 MG/ML
250 INJECTION, SOLUTION INTRAVENOUS ONCE
Status: COMPLETED | OUTPATIENT
Start: 2017-11-16 | End: 2017-11-16

## 2017-11-16 RX ORDER — ATROPINE SULFATE 0.4 MG/ML
0.25 AMPUL (ML) INJECTION
Status: DISCONTINUED | OUTPATIENT
Start: 2017-11-16 | End: 2017-11-16 | Stop reason: HOSPADM

## 2017-11-16 RX ORDER — FLUOROURACIL 50 MG/ML
400 INJECTION, SOLUTION INTRAVENOUS ONCE
Status: COMPLETED | OUTPATIENT
Start: 2017-11-16 | End: 2017-11-16

## 2017-11-16 RX ORDER — PALONOSETRON 0.05 MG/ML
0.25 INJECTION, SOLUTION INTRAVENOUS ONCE
Status: COMPLETED | OUTPATIENT
Start: 2017-11-16 | End: 2017-11-16

## 2017-11-16 RX ADMIN — PALONOSETRON HYDROCHLORIDE 0.25 MG: 0.25 INJECTION INTRAVENOUS at 13:57

## 2017-11-16 RX ADMIN — FLUOROURACIL 890 MG: 50 INJECTION, SOLUTION INTRAVENOUS at 16:08

## 2017-11-16 RX ADMIN — SODIUM CHLORIDE 250 ML: 900 INJECTION, SOLUTION INTRAVENOUS at 13:56

## 2017-11-16 RX ADMIN — IRINOTECAN HYDROCHLORIDE 400 MG: 20 INJECTION, SOLUTION INTRAVENOUS at 14:36

## 2017-11-16 RX ADMIN — ATROPINE SULFATE 0.25 MG: 0.4 INJECTION, SOLUTION INTRAMUSCULAR; INTRAVENOUS; SUBCUTANEOUS at 14:14

## 2017-11-16 RX ADMIN — FLUOROURACIL 5350 MG: 50 INJECTION, SOLUTION INTRAVENOUS at 16:11

## 2017-11-16 RX ADMIN — LEUCOVORIN CALCIUM 890 MG: 350 INJECTION, POWDER, LYOPHILIZED, FOR SOLUTION INTRAMUSCULAR; INTRAVENOUS at 14:36

## 2017-11-16 RX ADMIN — DEXAMETHASONE SODIUM PHOSPHATE 12 MG: 10 INJECTION, SOLUTION INTRAMUSCULAR; INTRAVENOUS at 13:57

## 2017-11-17 LAB
25(OH)D3+25(OH)D2 SERPL-MCNC: 50.5 NG/ML (ref 30–100)
ALBUMIN SERPL-MCNC: 3.9 G/DL (ref 3.5–5.2)
ALBUMIN/GLOB SERPL: 1.1 G/DL
ALP SERPL-CCNC: 354 U/L (ref 39–117)
ALT SERPL-CCNC: 42 U/L (ref 1–33)
AST SERPL-CCNC: 50 U/L (ref 1–32)
BILIRUB SERPL-MCNC: 0.5 MG/DL (ref 0.1–1.2)
BUN SERPL-MCNC: 11 MG/DL (ref 8–23)
BUN/CREAT SERPL: 13.6 (ref 7–25)
C PEPTIDE SERPL-MCNC: 2.5 NG/ML (ref 1.1–4.4)
CALCIUM SERPL-MCNC: 10 MG/DL (ref 8.6–10.5)
CHLORIDE SERPL-SCNC: 100 MMOL/L (ref 98–107)
CHOLEST SERPL-MCNC: 190 MG/DL (ref 0–200)
CO2 SERPL-SCNC: 27.9 MMOL/L (ref 22–29)
CREAT SERPL-MCNC: 0.81 MG/DL (ref 0.57–1)
GFR SERPLBLD CREATININE-BSD FMLA CKD-EPI: 71 ML/MIN/1.73
GFR SERPLBLD CREATININE-BSD FMLA CKD-EPI: 86 ML/MIN/1.73
GLOBULIN SER CALC-MCNC: 3.7 GM/DL
GLUCOSE SERPL-MCNC: 90 MG/DL (ref 65–99)
HBA1C MFR BLD: 6.94 % (ref 4.8–5.6)
HDLC SERPL-MCNC: 98 MG/DL (ref 40–60)
LDLC SERPL CALC-MCNC: 78 MG/DL (ref 0–100)
MICROALBUMIN UR-MCNC: 128 UG/ML
POTASSIUM SERPL-SCNC: 4.2 MMOL/L (ref 3.5–5.2)
PROT SERPL-MCNC: 7.6 G/DL (ref 6–8.5)
SODIUM SERPL-SCNC: 143 MMOL/L (ref 136–145)
T4 SERPL-MCNC: 10.47 MCG/DL (ref 4.5–11.7)
TRIGL SERPL-MCNC: 69 MG/DL (ref 0–150)
TSH SERPL DL<=0.005 MIU/L-ACNC: 1.03 MIU/ML (ref 0.27–4.2)
URATE SERPL-MCNC: 4.5 MG/DL (ref 2.4–5.7)
VLDLC SERPL CALC-MCNC: 13.8 MG/DL (ref 5–40)

## 2017-11-18 ENCOUNTER — INFUSION (OUTPATIENT)
Dept: ONCOLOGY | Facility: HOSPITAL | Age: 67
End: 2017-11-18

## 2017-11-18 VITALS
RESPIRATION RATE: 18 BRPM | HEART RATE: 79 BPM | TEMPERATURE: 97.3 F | OXYGEN SATURATION: 96 % | DIASTOLIC BLOOD PRESSURE: 68 MMHG | SYSTOLIC BLOOD PRESSURE: 109 MMHG

## 2017-11-18 DIAGNOSIS — C78.7 LIVER METASTASIS: ICD-10-CM

## 2017-11-18 DIAGNOSIS — C18.9 PRIMARY MALIGNANT NEOPLASM OF COLON (HCC): ICD-10-CM

## 2017-11-18 DIAGNOSIS — N64.9 BREAST LESION: Primary | ICD-10-CM

## 2017-11-18 PROCEDURE — 25010000002 HEPARIN FLUSH (PORCINE) 100 UNIT/ML SOLUTION: Performed by: INTERNAL MEDICINE

## 2017-11-18 PROCEDURE — 96374 THER/PROPH/DIAG INJ IV PUSH: CPT

## 2017-11-18 PROCEDURE — 96523 IRRIG DRUG DELIVERY DEVICE: CPT

## 2017-11-18 RX ADMIN — Medication 500 UNITS: at 14:26

## 2017-11-22 DIAGNOSIS — C78.7 LIVER METASTASIS: ICD-10-CM

## 2017-11-22 DIAGNOSIS — C18.9 PRIMARY MALIGNANT NEOPLASM OF COLON (HCC): ICD-10-CM

## 2017-11-29 ENCOUNTER — INFUSION (OUTPATIENT)
Dept: ONCOLOGY | Facility: HOSPITAL | Age: 67
End: 2017-11-29

## 2017-11-29 ENCOUNTER — OFFICE VISIT (OUTPATIENT)
Dept: ONCOLOGY | Facility: CLINIC | Age: 67
End: 2017-11-29

## 2017-11-29 VITALS
HEIGHT: 66 IN | SYSTOLIC BLOOD PRESSURE: 148 MMHG | BODY MASS INDEX: 39.92 KG/M2 | OXYGEN SATURATION: 96 % | DIASTOLIC BLOOD PRESSURE: 86 MMHG | TEMPERATURE: 98.1 F | HEART RATE: 84 BPM | WEIGHT: 248.4 LBS | RESPIRATION RATE: 16 BRPM

## 2017-11-29 DIAGNOSIS — T45.1X5A CHEMOTHERAPY INDUCED NAUSEA AND VOMITING: ICD-10-CM

## 2017-11-29 DIAGNOSIS — D50.9 IRON DEFICIENCY ANEMIA, UNSPECIFIED IRON DEFICIENCY ANEMIA TYPE: ICD-10-CM

## 2017-11-29 DIAGNOSIS — J02.9 ACUTE PHARYNGITIS, UNSPECIFIED ETIOLOGY: ICD-10-CM

## 2017-11-29 DIAGNOSIS — C18.9 PRIMARY MALIGNANT NEOPLASM OF COLON (HCC): ICD-10-CM

## 2017-11-29 DIAGNOSIS — C78.7 LIVER METASTASIS: Primary | ICD-10-CM

## 2017-11-29 DIAGNOSIS — C78.7 LIVER METASTASIS: ICD-10-CM

## 2017-11-29 DIAGNOSIS — R11.2 CHEMOTHERAPY INDUCED NAUSEA AND VOMITING: ICD-10-CM

## 2017-11-29 DIAGNOSIS — C18.9 PRIMARY MALIGNANT NEOPLASM OF COLON (HCC): Primary | ICD-10-CM

## 2017-11-29 LAB
ALBUMIN SERPL-MCNC: 3.5 G/DL (ref 3.5–5.2)
ALBUMIN/GLOB SERPL: 0.9 G/DL (ref 1.1–2.4)
ALP SERPL-CCNC: 328 U/L (ref 38–116)
ALT SERPL W P-5'-P-CCNC: 42 U/L (ref 0–33)
ANION GAP SERPL CALCULATED.3IONS-SCNC: 10.8 MMOL/L
AST SERPL-CCNC: 46 U/L (ref 0–32)
BASOPHILS # BLD AUTO: 0.06 10*3/MM3 (ref 0–0.1)
BASOPHILS NFR BLD AUTO: 0.8 % (ref 0–1.1)
BILIRUB SERPL-MCNC: 0.4 MG/DL (ref 0.1–1.2)
BUN BLD-MCNC: 17 MG/DL (ref 6–20)
BUN/CREAT SERPL: 18.3 (ref 7.3–30)
CALCIUM SPEC-SCNC: 9.5 MG/DL (ref 8.5–10.2)
CEA SERPL-MCNC: 5.18 NG/ML
CHLORIDE SERPL-SCNC: 100 MMOL/L (ref 98–107)
CO2 SERPL-SCNC: 29.2 MMOL/L (ref 22–29)
CREAT BLD-MCNC: 0.93 MG/DL (ref 0.6–1.1)
DEPRECATED RDW RBC AUTO: 69.5 FL (ref 37–49)
EOSINOPHIL # BLD AUTO: 0.78 10*3/MM3 (ref 0–0.36)
EOSINOPHIL NFR BLD AUTO: 10.1 % (ref 1–5)
ERYTHROCYTE [DISTWIDTH] IN BLOOD BY AUTOMATED COUNT: 20.6 % (ref 11.7–14.5)
GFR SERPL CREATININE-BSD FRML MDRD: 73 ML/MIN/1.73
GLOBULIN UR ELPH-MCNC: 3.8 GM/DL (ref 1.8–3.5)
GLUCOSE BLD-MCNC: 155 MG/DL (ref 74–124)
HCT VFR BLD AUTO: 35.5 % (ref 34–45)
HGB BLD-MCNC: 11.9 G/DL (ref 11.5–14.9)
IMM GRANULOCYTES # BLD: 0.08 10*3/MM3 (ref 0–0.03)
IMM GRANULOCYTES NFR BLD: 1 % (ref 0–0.5)
LYMPHOCYTES # BLD AUTO: 1.63 10*3/MM3 (ref 1–3.5)
LYMPHOCYTES NFR BLD AUTO: 21.1 % (ref 20–49)
MCH RBC QN AUTO: 31 PG (ref 27–33)
MCHC RBC AUTO-ENTMCNC: 33.5 G/DL (ref 32–35)
MCV RBC AUTO: 92.4 FL (ref 83–97)
MONOCYTES # BLD AUTO: 1.47 10*3/MM3 (ref 0.25–0.8)
MONOCYTES NFR BLD AUTO: 19 % (ref 4–12)
NEUTROPHILS # BLD AUTO: 3.72 10*3/MM3 (ref 1.5–7)
NEUTROPHILS NFR BLD AUTO: 48 % (ref 39–75)
NRBC BLD MANUAL-RTO: 0.4 /100 WBC (ref 0–0)
PLATELET # BLD AUTO: 393 10*3/MM3 (ref 150–375)
PMV BLD AUTO: 9 FL (ref 8.9–12.1)
POTASSIUM BLD-SCNC: 3.6 MMOL/L (ref 3.5–4.7)
PROT SERPL-MCNC: 7.3 G/DL (ref 6.3–8)
RBC # BLD AUTO: 3.84 10*6/MM3 (ref 3.9–5)
SODIUM BLD-SCNC: 140 MMOL/L (ref 134–145)
WBC NRBC COR # BLD: 7.74 10*3/MM3 (ref 4–10)

## 2017-11-29 PROCEDURE — 96413 CHEMO IV INFUSION 1 HR: CPT | Performed by: INTERNAL MEDICINE

## 2017-11-29 PROCEDURE — 25010000002 DEXAMETHASONE PER 1 MG: Performed by: INTERNAL MEDICINE

## 2017-11-29 PROCEDURE — 99215 OFFICE O/P EST HI 40 MIN: CPT | Performed by: INTERNAL MEDICINE

## 2017-11-29 PROCEDURE — 36415 COLL VENOUS BLD VENIPUNCTURE: CPT | Performed by: INTERNAL MEDICINE

## 2017-11-29 PROCEDURE — 96368 THER/DIAG CONCURRENT INF: CPT | Performed by: INTERNAL MEDICINE

## 2017-11-29 PROCEDURE — 96411 CHEMO IV PUSH ADDL DRUG: CPT | Performed by: INTERNAL MEDICINE

## 2017-11-29 PROCEDURE — 96375 TX/PRO/DX INJ NEW DRUG ADDON: CPT | Performed by: INTERNAL MEDICINE

## 2017-11-29 PROCEDURE — 82378 CARCINOEMBRYONIC ANTIGEN: CPT | Performed by: INTERNAL MEDICINE

## 2017-11-29 PROCEDURE — 25010000002 IRINOTECAN PER 20 MG: Performed by: INTERNAL MEDICINE

## 2017-11-29 PROCEDURE — 96416 CHEMO PROLONG INFUSE W/PUMP: CPT | Performed by: INTERNAL MEDICINE

## 2017-11-29 PROCEDURE — 36593 DECLOT VASCULAR DEVICE: CPT

## 2017-11-29 PROCEDURE — 25010000002 LEUCOVORIN CALCIUM PER 50 MG: Performed by: INTERNAL MEDICINE

## 2017-11-29 PROCEDURE — 85025 COMPLETE CBC W/AUTO DIFF WBC: CPT

## 2017-11-29 PROCEDURE — 25010000002 FLUOROURACIL PER 500 MG: Performed by: INTERNAL MEDICINE

## 2017-11-29 PROCEDURE — 80053 COMPREHEN METABOLIC PANEL: CPT

## 2017-11-29 PROCEDURE — 25010000002 PALONOSETRON PER 25 MCG: Performed by: INTERNAL MEDICINE

## 2017-11-29 RX ORDER — FLUOROURACIL 50 MG/ML
400 INJECTION, SOLUTION INTRAVENOUS ONCE
Status: CANCELLED | OUTPATIENT
Start: 2017-11-29

## 2017-11-29 RX ORDER — AMOXICILLIN 500 MG/1
500 TABLET, FILM COATED ORAL
COMMUNITY
Start: 2017-11-28 | End: 2017-12-08

## 2017-11-29 RX ORDER — PALONOSETRON 0.05 MG/ML
0.25 INJECTION, SOLUTION INTRAVENOUS ONCE
Status: COMPLETED | OUTPATIENT
Start: 2017-11-29 | End: 2017-11-29

## 2017-11-29 RX ORDER — FLUOROURACIL 50 MG/ML
400 INJECTION, SOLUTION INTRAVENOUS ONCE
Status: COMPLETED | OUTPATIENT
Start: 2017-11-29 | End: 2017-11-29

## 2017-11-29 RX ORDER — PALONOSETRON 0.05 MG/ML
0.25 INJECTION, SOLUTION INTRAVENOUS ONCE
Status: CANCELLED | OUTPATIENT
Start: 2017-11-29

## 2017-11-29 RX ORDER — ATROPINE SULFATE 0.4 MG/ML
0.25 AMPUL (ML) INJECTION
Status: DISCONTINUED | OUTPATIENT
Start: 2017-11-29 | End: 2017-11-29 | Stop reason: HOSPADM

## 2017-11-29 RX ORDER — FERROUS SULFATE 325(65) MG
325 TABLET ORAL DAILY
Start: 2017-11-29 | End: 2018-06-28

## 2017-11-29 RX ORDER — ATROPINE SULFATE 1 MG/ML
0.25 INJECTION, SOLUTION INTRAMUSCULAR; INTRAVENOUS; SUBCUTANEOUS
Status: CANCELLED | OUTPATIENT
Start: 2017-11-29

## 2017-11-29 RX ORDER — SODIUM CHLORIDE 9 MG/ML
250 INJECTION, SOLUTION INTRAVENOUS ONCE
Status: COMPLETED | OUTPATIENT
Start: 2017-11-29 | End: 2017-11-29

## 2017-11-29 RX ORDER — SODIUM CHLORIDE 9 MG/ML
250 INJECTION, SOLUTION INTRAVENOUS ONCE
Status: CANCELLED | OUTPATIENT
Start: 2017-11-29

## 2017-11-29 RX ADMIN — LEUCOVORIN CALCIUM 890 MG: 350 INJECTION, POWDER, LYOPHILIZED, FOR SOLUTION INTRAMUSCULAR; INTRAVENOUS at 10:34

## 2017-11-29 RX ADMIN — IRINOTECAN HYDROCHLORIDE 400 MG: 20 INJECTION, SOLUTION INTRAVENOUS at 10:34

## 2017-11-29 RX ADMIN — SODIUM CHLORIDE 250 ML: 900 INJECTION, SOLUTION INTRAVENOUS at 09:40

## 2017-11-29 RX ADMIN — DEXAMETHASONE SODIUM PHOSPHATE 12 MG: 10 INJECTION INTRAMUSCULAR; INTRAVENOUS at 09:42

## 2017-11-29 RX ADMIN — FLUOROURACIL 890 MG: 50 INJECTION, SOLUTION INTRAVENOUS at 12:09

## 2017-11-29 RX ADMIN — PALONOSETRON HYDROCHLORIDE 0.25 MG: 0.25 INJECTION INTRAVENOUS at 09:41

## 2017-11-29 RX ADMIN — ATROPINE SULFATE 0.25 MG: 0.4 INJECTION, SOLUTION INTRAMUSCULAR; INTRAVENOUS; SUBCUTANEOUS at 09:57

## 2017-11-29 RX ADMIN — FLUOROURACIL 5350 MG: 50 INJECTION, SOLUTION INTRAVENOUS at 12:16

## 2017-11-29 NOTE — PROGRESS NOTES
Subjective     CHIEF COMPLAINT:    1. Metastatic colon cancer (KRAS mutation positive) with liver metastasis, biopsy confirmed. No resection of primary sigmoid colon cancer.   2. Recurrent Iron deficiency anemia   3. Palliative chemotherapy with FOLFOX-6 was given from June 2013 to November 2013.   4. Left partial colectomy, splenectomy, resection of left diaphragm with repair and partial hepatectomy with microwave ablation of metastasis on 1/14/2014.   5. FOLFIRI x12 cycles between 3/07/2014 and 8/07/2014.   6. CT scan on 05/28/2015 revealed a new liver lesion. Stereotactic radiation therapy with CyberKnife was delivered in 3 fractions, completed on 07/14/2015.        7.  New liver lesion was identified on CT on 5/5/17. CT guided microwave ablation of liver mass on 6/16/17.       8.  Started Folfiri on 6/29/17. 12 cycles planned.     HISTORY OF PRESENT ILLNESS:     Feli Del Toro is a 66 y.o. patient with the above medical history.  She started systemic chemotherapy with FOLFIRI on 6/29/17.  She has received 11 cycles of this regimen.  She continues to tolerate the treatment.  She is on oral iron twice a day.  She has some constipation that's improved with Metamucil.  Patient denies abdominal pain.    Patient reports inflammation of the throat.  She was seen by her primary care physician nurse practitioner and was started on amoxicillin.  No fever or chills.  No shortness breath.      Past Medical History:   Diagnosis Date   • Anemia    • Cancer 06/04/2013   • Diabetes mellitus    • History of transfusion    • Hyperlipidemia    • Hypertension    • Metastatic colon cancer to liver    • Retinopathy    • Type 2 diabetes mellitus        Past Surgical History:   Procedure Laterality Date   • ABDOMINAL SURGERY      ablation    • AMPUTATION DIGIT Left 1/1/2017    Procedure: LEFT SECOND TOE AMPUTATION;  Surgeon: Farzad Nichols MD;  Location: [facility];  Service:    • AMPUTATION OF REPLICATED TOES      right distal  second toe    • CATARACT EXTRACTION     • COLECTOMY PARTIAL / TOTAL  01/14/2014   • HYSTERECTOMY  1998   • PORTACATH PLACEMENT  06/2013   • SALPINGO OOPHORECTOMY Bilateral    • SPLENECTOMY         Cancer-related family history includes Breast cancer in her sister; Cancer in her other.    SCHEDULED MEDS:       Current Outpatient Prescriptions:   •  allopurinol (ZYLOPRIM) 100 MG tablet, TAKE 1 TABLET DAILY, Disp: 90 tablet, Rfl: 1  •  amoxicillin (AMOXIL) 500 MG tablet, Take 500 mg by mouth., Disp: , Rfl:   •  aspirin 81 MG tablet, Take 81 mg by mouth daily., Disp: , Rfl:   •  Biotin 5000 MCG tablet, Take 5,000 mcg by mouth Daily., Disp: , Rfl:   •  calcium carbonate (TUMS) 500 MG chewable tablet, Chew., Disp: , Rfl:   •  ferrous sulfate 325 (65 FE) MG tablet, Take 1 tablet by mouth 2 (Two) Times a Day., Disp: , Rfl:   •  fluticasone (FLONASE) 50 MCG/ACT nasal spray, , Disp: , Rfl:   •  HUMALOG KWIKPEN 100 UNIT/ML solution pen-injector, INJECT 15 UNITS BEFORE MEALS AND SLIDING SCALE AS DIRECTED, Disp: 15 mL, Rfl: 0  •  HUMALOG KWIKPEN 100 UNIT/ML solution pen-injector, INJECT 15 UNITS BEFORE MEALS AND SLIDING SCALE, Disp: 30 mL, Rfl: 0  •  insulin detemir (LEVEMIR FLEXTOUCH) 100 UNIT/ML injection, In 32U in PM titrate as instructed with max of 100u daily, Disp: 90 mL, Rfl: 0  •  LORazepam (ATIVAN) 0.5 MG tablet, Take 1 tablet by mouth 2 (Two) Times a Day As Needed (nausea)., Disp: 20 tablet, Rfl: 0  •  MAGNESIUM OXIDE PO, Take 400 mg by mouth daily., Disp: , Rfl:   •  meclizine (ANTIVERT) 25 MG tablet, Take 25 mg by mouth 3 (Three) Times a Day As Needed., Disp: , Rfl:   •  NOVOFINE 32G X 6 MM misc, USE AS DIRECTED 5 TIMES DAILY AND AS NEEDED, Disp: 200 each, Rfl: 3  •  ondansetron (ZOFRAN) 8 MG tablet, Take 1 tablet by mouth 3 (Three) Times a Day As Needed for Nausea or Vomiting., Disp: 30 tablet, Rfl: 5  •  ONETOUCH VERIO test strip, TEST 3-4 TIMES A DAY, Disp: 100 each, Rfl: 5  •  ONGLYZA 5 MG tablet, TAKE 1 TABLET  "DAILY, Disp: 90 tablet, Rfl: 0  •  telmisartan-hydrochlorothiazide (MICARDIS HCT) 40-12.5 MG per tablet, Take 1 tablet by mouth daily, Disp: 90 tablet, Rfl: 3  •  vitamin D (ERGOCALCIFEROL) 71488 UNITS capsule capsule, Take 1 capsule by mouth 1 (One) Time Per Week., Disp: 13 capsule, Rfl: 3  •  vitamin D (ERGOCALCIFEROL) 69975 units capsule capsule, TAKE ONE CAPSULE BY MOUTH ONCE WEEKLY, Disp: 12 capsule, Rfl: 2  •  XIGDUO XR  MG tablet sustained-release 24 hour, TAKE 1 TABLET DAILY, Disp: 90 tablet, Rfl: 0    Current Facility-Administered Medications:   •  heparin flush (porcine) 100 UNIT/ML injection 500 Units, 500 Units, Intravenous, Once, Dirk Nails MD  •  heparin flush (porcine) 100 UNIT/ML injection 500 Units, 500 Units, Intravenous, Once, Marivel Brown MD    REVIEW OF SYSTEMS:  GENERAL:  No fever or chills.   Fatigue.  SKIN:  No skin rash.  ENT: Sore throat.  Cough.  HEME/LYMPH: Anemia has improved.  RESPIRATORY:  No shortness of breath.  CVS:  No chest pain.  GI:  No abdominal pain, vomiting, diarrhea, melena or hematochezia.  Constipation.  :  No dysuria.   MUSCULOSKELETAL:  Chronic back pain.  NEUROLOGICAL:  Patient has mild numbness in the fingertips, unchanged.    Objective   VITAL SIGNS:     Vitals:    11/29/17 0826   BP: 148/86   Pulse: 84   Resp: 16   Temp: 98.1 °F (36.7 °C)   TempSrc: Oral   SpO2: 96%   Weight: 248 lb 6.4 oz (113 kg)   Height: 66.14\" (168 cm)   PainSc: 0-No pain     Body mass index is 39.92 kg/(m^2).     Wt Readings from Last 3 Encounters:   11/29/17 248 lb 6.4 oz (113 kg)   11/16/17 251 lb 12.8 oz (114 kg)   11/02/17 251 lb 1.6 oz (114 kg)       PHYSICAL EXAMINATION:  GENERAL:  The patient appears in good general condition, not in acute distress.  SKIN:  No skin rashes or lesions. No ecchymosis or petechiae.  HEAD:  Normocephalic.  EYES:  No jaundice or pallor.   NECK:  Supple with good ROM. No thyromegaly or masses.  LYMPHATICS:  No cervical or supraclavicular " lymphadenopathy.  CHEST:  Lungs clear to auscultation. No added sounds.  EXTREMITIES:  No edema. No calf tenderness.    RESULT REVIEW:       Results from last 7 days  Lab Units 11/29/17  0758   WBC 10*3/mm3 7.74   NEUTROS ABS 10*3/mm3 3.72   HEMOGLOBIN g/dL 11.9   HEMATOCRIT % 35.5   PLATELETS 10*3/mm3 393*       Results from last 7 days  Lab Units 11/29/17  0922   SODIUM mmol/L 140   POTASSIUM mmol/L 3.6   CHLORIDE mmol/L 100   CO2 mmol/L 29.2*   BUN mg/dL 17   CREATININE mg/dL 0.93   CALCIUM mg/dL 9.5   ALBUMIN g/dL 3.50   BILIRUBIN mg/dL 0.4   ALK PHOS U/L 328*   ALT (SGPT) U/L 42*   AST (SGOT) U/L 46*   GLUCOSE mg/dL 155*       Lab Results   Component Value Date    CEA 5.18 11/29/2017    CEA 5.66 11/16/2017    CEA 5.4 (H) 10/19/2017     EXAMINATION:     1. CT abdomen without and with contrast.  2. CT pelvis with contrast.    DATE: 10/19/2017        HISTORY: Colon cancer with metastatic disease to the liver. Status post ablation therapy. Restaging study.        TECHNIQUE: Triple phase protocol with imaging of the abdomen without and with intravenous contrast with continuation of imaging through the pelvis during the portal venous phase of enhancement. Correlation is made with July 20, 2017.    Dose reduction techniques were employed per ALARA protocol.      FINDINGS: The bilobed zone of ablation in segment 8 of the liver is slightly increased in size measuring 3.9 cm maximum transverse diameter. There is heterogeneous internal contents but no definite internal enhancement. The small wedge-shaped area of   hypoenhancement extending superolaterally from the ablation defect is slightly smaller and may be an area of infarcted parenchyma. There are small tubular hypoattenuating foci radiating inferiorly from the ablation defect associated with hyperemia of the   involved parenchyma in the arterial phase. This suggests that they may be thrombosed peripheral portal vein branches though dilated biliary ducts are not  excluded. There has been a previous left partial hepatectomy. No new suspicious liver lesions are   identified. The anterior division of the right portal vein is no longer seen and passes through the zone of ablation.    There is cholelithiasis. There has been a previous splenectomy with small residual splenules. The pancreas, left kidney, and right adrenal gland are unremarkable. There are simple right renal cysts. There is a very small left adrenal adenoma. There is   evidence of previous surgery at the splenic flexure of the colon. Bowel loops are otherwise unremarkable. There is no significant adenopathy. There is no ascites. The aorta is normal caliber.    The lung bases are clear. There are no suspicious bone lesions.    IMPRESSION:  1. Slight increase in size of the zone of ablation in segment 8 of the liver with slight decrease in size of the associated wedge-shaped hypoenhancing region of the hepatic parenchyma suspicious for a peripheral infarct.  2. No sign of active tumor is identified in the zone of ablation.  3. No new hepatic metastases or evidence of extrahepatic metastatic disease.  4. The anterior division of the right portal vein is likely occluded as it runs through the zone of ablation and is no longer seen. Small tubular hypoattenuating foci radiating inferiorly from the zone of ablation may be thrombosed peripheral branches of   the portal vein or, less likely, dilated biliary ducts.  5. Cholelithiasis.  6. Small left adrenal adenoma.  7. Simple right renal cyst.       Assessment/Plan    1.  Metastatic colon cancer. She had metastases to the liver.   · She received 12 cycles of FOLFOX-6 and then had resection of the primary tumor along with metastasectomy and splenectomy with thermal ablation of lesions in the liver in January 2014.   · This was followed by 12 cycles of the FOLFIRI regimen completed on 08/07/2014.   · CT scans on 05/28/2015 revealed a new lesion in the liver.  Dr. Ruano  referred the patient to Radiation Therapy and she received CyberKnife radiation, completed on 07/14/2015.    · The CT scan from 5/5/17 revealed a new 2.2 cm lesion in the anterior hepatic segment.  CT guided microwave ablation of liver mass performed on 6/16/17 with CT evidence on 7/20/17 of a complete response  · FOLFIRI x 12 cycles was recommended and was started on 6/29/17.     CT scan on 10/19/17 revealed no new lesions.  Some changes of infarction of an area of the treated liver were seen.    2.  Elevated liver enzymes attributed  to inflammation that developed following the microwave ablation of the liver mass.  Liver enzymes are improving.      3.   Anemia due to iron deficiency.  She is on ferrous sulfate 325 mg twice daily.  She has adequate B12 and folate levels.  Hemoglobin has improved.  I asked her to reduce the iron to once a day.    4.  Sore throat likely representing pharyngitis.  Patient is on antibiotic therapy through her primary care physician's office which was started yesterday.  She is afebrile.  Lung examination is clear.  She is cleared to proceed with the chemotherapy treatment today.    PLAN:    1.  Patient will receive cycle #12 today.      2.  We will schedule the patient to return for a Port flush every 6 weeks.     3.  Obtain CBC, CMP and CEA in 6 and 11 weeks.    4.  CT scan of the chest will be scheduled in 3 months.  We will see in follow-up afterwards.    5.  She is going to have a CT scan of abdomen and pelvis through Dr. Ruano office in April 2018.             Marivel Brown MD  11/29/17

## 2017-11-30 ENCOUNTER — OFFICE VISIT (OUTPATIENT)
Dept: ENDOCRINOLOGY | Age: 67
End: 2017-11-30

## 2017-11-30 VITALS
DIASTOLIC BLOOD PRESSURE: 80 MMHG | BODY MASS INDEX: 39.54 KG/M2 | RESPIRATION RATE: 16 BRPM | SYSTOLIC BLOOD PRESSURE: 128 MMHG | WEIGHT: 246 LBS

## 2017-11-30 DIAGNOSIS — E11.8 TYPE 2 DIABETES MELLITUS WITH COMPLICATION, WITH LONG-TERM CURRENT USE OF INSULIN (HCC): ICD-10-CM

## 2017-11-30 DIAGNOSIS — E55.9 VITAMIN D DEFICIENCY: ICD-10-CM

## 2017-11-30 DIAGNOSIS — E78.2 MIXED HYPERLIPIDEMIA: ICD-10-CM

## 2017-11-30 DIAGNOSIS — I10 ESSENTIAL HYPERTENSION: ICD-10-CM

## 2017-11-30 DIAGNOSIS — E11.3299 NONPROLIFERATIVE DIABETIC RETINOPATHY (HCC): Primary | ICD-10-CM

## 2017-11-30 DIAGNOSIS — Z79.4 TYPE 2 DIABETES MELLITUS WITH COMPLICATION, WITH LONG-TERM CURRENT USE OF INSULIN (HCC): ICD-10-CM

## 2017-11-30 DIAGNOSIS — E66.01 MORBID OBESITY DUE TO EXCESS CALORIES (HCC): ICD-10-CM

## 2017-11-30 PROCEDURE — 99214 OFFICE O/P EST MOD 30 MIN: CPT | Performed by: INTERNAL MEDICINE

## 2017-11-30 RX ORDER — DAPAGLIFLOZIN AND METFORMIN HYDROCHLORIDE 5; 1000 MG/1; MG/1
2 TABLET, FILM COATED, EXTENDED RELEASE ORAL DAILY
Qty: 180 TABLET | Refills: 3 | Status: SHIPPED | OUTPATIENT
Start: 2017-11-30 | End: 2018-08-15 | Stop reason: SDUPTHER

## 2017-11-30 RX ORDER — INSULIN GLARGINE 300 U/ML
45 INJECTION, SOLUTION SUBCUTANEOUS DAILY
Qty: 18 PEN | Refills: 3 | Status: SHIPPED | OUTPATIENT
Start: 2017-11-30 | End: 2017-11-30 | Stop reason: SDUPTHER

## 2017-11-30 RX ORDER — INSULIN GLARGINE 300 U/ML
45 INJECTION, SOLUTION SUBCUTANEOUS DAILY
Qty: 3 PEN | Refills: 0 | Status: SHIPPED | OUTPATIENT
Start: 2017-11-30 | End: 2018-08-15

## 2017-11-30 NOTE — PROGRESS NOTES
"Subjective   Feli Del Toro is a 66 y.o. female seen for follow up for DM2, vit d deficiency, lab review. Patient is checking BG 4 times a day. Patient is taking Amoxil for her inflammation in her throat. She denies any problems or concerns.   This is a 66-year-old female known patient with type II diabetes hypertension and dyslipidemia as well as the vitamin D deficiency and hyperuricemia.  Over the course of last 4 months she has had no significant health problems for which to go to the emergency room or hospital.  History of Present Illness./80  Resp 16  Wt 246 lb (112 kg)  LMP  (LMP Unknown)  BMI 39.54 kg/m2  Allergies   Allergen Reactions   • Compazine [Prochlorperazine Edisylate]    • Prochlorperazine Maleate Other (See Comments)     \"BACK MUSCLE RETRACTION\"       Current Outpatient Prescriptions:   •  allopurinol (ZYLOPRIM) 100 MG tablet, TAKE 1 TABLET DAILY, Disp: 90 tablet, Rfl: 1  •  amoxicillin (AMOXIL) 500 MG tablet, Take 500 mg by mouth., Disp: , Rfl:   •  aspirin 81 MG tablet, Take 81 mg by mouth daily., Disp: , Rfl:   •  Biotin 5000 MCG tablet, Take 5,000 mcg by mouth Daily., Disp: , Rfl:   •  calcium carbonate (TUMS) 500 MG chewable tablet, Chew., Disp: , Rfl:   •  ferrous sulfate 325 (65 FE) MG tablet, Take 1 tablet by mouth Daily., Disp: , Rfl:   •  fluticasone (FLONASE) 50 MCG/ACT nasal spray, , Disp: , Rfl:   •  HUMALOG KWIKPEN 100 UNIT/ML solution pen-injector, INJECT 15 UNITS BEFORE MEALS AND SLIDING SCALE AS DIRECTED, Disp: 15 mL, Rfl: 0  •  insulin detemir (LEVEMIR FLEXTOUCH) 100 UNIT/ML injection, In 32U in PM titrate as instructed with max of 100u daily, Disp: 90 mL, Rfl: 0  •  LORazepam (ATIVAN) 0.5 MG tablet, Take 1 tablet by mouth 2 (Two) Times a Day As Needed (nausea)., Disp: 20 tablet, Rfl: 0  •  MAGNESIUM OXIDE PO, Take 400 mg by mouth daily., Disp: , Rfl:   •  meclizine (ANTIVERT) 25 MG tablet, Take 25 mg by mouth 3 (Three) Times a Day As Needed., Disp: , Rfl:   •  NOVOFINE " 32G X 6 MM misc, USE AS DIRECTED 5 TIMES DAILY AND AS NEEDED, Disp: 200 each, Rfl: 3  •  ondansetron (ZOFRAN) 8 MG tablet, Take 1 tablet by mouth 3 (Three) Times a Day As Needed for Nausea or Vomiting., Disp: 30 tablet, Rfl: 5  •  ONETOUCH VERIO test strip, TEST 3-4 TIMES A DAY, Disp: 100 each, Rfl: 5  •  ONGLYZA 5 MG tablet, TAKE 1 TABLET DAILY, Disp: 90 tablet, Rfl: 0  •  telmisartan-hydrochlorothiazide (MICARDIS HCT) 40-12.5 MG per tablet, Take 1 tablet by mouth daily, Disp: 90 tablet, Rfl: 3  •  vitamin D (ERGOCALCIFEROL) 41029 UNITS capsule capsule, Take 1 capsule by mouth 1 (One) Time Per Week., Disp: 13 capsule, Rfl: 3  •  XIGDUO XR  MG tablet sustained-release 24 hour, TAKE 1 TABLET DAILY, Disp: 90 tablet, Rfl: 0    Current Facility-Administered Medications:   •  heparin flush (porcine) 100 UNIT/ML injection 500 Units, 500 Units, Intravenous, Once, Dirk Nails MD  •  heparin flush (porcine) 100 UNIT/ML injection 500 Units, 500 Units, Intravenous, Once, Marivel Brown MD      The following portions of the patient's history were reviewed and updated as appropriate: allergies, current medications, past family history, past medical history, past social history, past surgical history and problem list.    Review of Systems   Constitutional: Negative.    HENT: Positive for congestion and sore throat.    Eyes: Negative.    Respiratory: Positive for cough.    Cardiovascular: Negative.    Gastrointestinal: Negative.    Endocrine: Negative.    Genitourinary: Negative.    Musculoskeletal: Negative.    Skin: Negative.    Allergic/Immunologic: Negative.    Neurological: Negative.    Hematological: Negative.    Psychiatric/Behavioral: Negative.        Objective   Physical Exam   Constitutional: She is oriented to person, place, and time. She appears well-developed and well-nourished. No distress.   HENT:   Head: Normocephalic and atraumatic.   Right Ear: External ear normal.   Left Ear: External ear  normal.   Nose: Nose normal.   Mouth/Throat: Oropharynx is clear and moist. No oropharyngeal exudate.   Eyes: Conjunctivae and EOM are normal. Pupils are equal, round, and reactive to light. Right eye exhibits no discharge. Left eye exhibits no discharge. No scleral icterus.   Neck: Normal range of motion. Neck supple. No JVD present. No tracheal deviation present. No thyromegaly present.   Cardiovascular: Normal rate, regular rhythm, normal heart sounds and intact distal pulses.  Exam reveals no gallop and no friction rub.    No murmur heard.  Pulmonary/Chest: Effort normal and breath sounds normal. No stridor. No respiratory distress. She has no wheezes. She has no rales. She exhibits no tenderness.   Abdominal: Soft. Bowel sounds are normal. She exhibits no distension and no mass. There is no tenderness. There is no rebound and no guarding. No hernia.   Musculoskeletal: Normal range of motion. She exhibits no edema, tenderness or deformity.   Lymphadenopathy:     She has no cervical adenopathy.   Neurological: She is alert and oriented to person, place, and time. She has normal reflexes. She displays normal reflexes. No cranial nerve deficit. She exhibits normal muscle tone. Coordination normal.   Skin: Skin is warm and dry. No rash noted. She is not diaphoretic. No erythema. No pallor.   Psychiatric: She has a normal mood and affect. Her behavior is normal. Judgment and thought content normal.   Nursing note and vitals reviewed.    Lab Results   Component Value Date    GLUCOSE 155 (H) 11/29/2017    BUN 17 11/29/2017    CREATININE 0.93 11/29/2017    EGFRIFNONA 71 11/16/2017    EGFRIFAFRI 73 11/29/2017    BCR 18.3 11/29/2017    K 3.6 11/29/2017    CO2 29.2 (H) 11/29/2017    CALCIUM 9.5 11/29/2017    PROTENTOTREF 7.6 11/16/2017    ALBUMIN 3.50 11/29/2017    LABIL2 0.9 (L) 11/29/2017    AST 46 (H) 11/29/2017    ALT 42 (H) 11/29/2017     Lab Results   Component Value Date    HGBA1C 6.94 (H) 11/16/2017     Lab Results    Component Value Date    CHLPL 190 11/16/2017    CHLPL 166 07/07/2017    CHLPL 189 04/04/2017     Lab Results   Component Value Date    TRIG 69 11/16/2017    TRIG 68 07/07/2017    TRIG 88 04/04/2017     Lab Results   Component Value Date    HDL 98 (H) 11/16/2017    HDL 81 (H) 07/07/2017    HDL 91 (H) 04/04/2017     Lab Results   Component Value Date    LDL 78 11/16/2017    LDL 71 07/07/2017    LDL 80 04/04/2017     Lab Results   Component Value Date    TSH 1.030 11/16/2017         ssessment/Plan   Diagnoses and all orders for this visit:    Nonproliferative diabetic retinopathy  -     T4 & TSH ([facility]); Future  -     Uric Acid; Future  -     Vitamin D 25 Hydroxy; Future  -     Comprehensive Metabolic Panel; Future  -     C-Peptide; Future  -     Hemoglobin A1c; Future  -     Lipid Panel; Future  -     MicroAlbumin, Urine, Random - Urine, Clean Catch; Future    Type 2 diabetes mellitus with complication, with long-term current use of insulin  -     T4 & TSH ([facility]); Future  -     Uric Acid; Future  -     Vitamin D 25 Hydroxy; Future  -     Comprehensive Metabolic Panel; Future  -     C-Peptide; Future  -     Hemoglobin A1c; Future  -     Lipid Panel; Future  -     MicroAlbumin, Urine, Random - Urine, Clean Catch; Future    Mixed hyperlipidemia  -     T4 & TSH ([facility]); Future  -     Uric Acid; Future  -     Vitamin D 25 Hydroxy; Future  -     Comprehensive Metabolic Panel; Future  -     C-Peptide; Future  -     Hemoglobin A1c; Future  -     Lipid Panel; Future  -     MicroAlbumin, Urine, Random - Urine, Clean Catch; Future    Essential hypertension  -     T4 & TSH ([facility]); Future  -     Uric Acid; Future  -     Vitamin D 25 Hydroxy; Future  -     Comprehensive Metabolic Panel; Future  -     C-Peptide; Future  -     Hemoglobin A1c; Future  -     Lipid Panel; Future  -     MicroAlbumin, Urine, Random - Urine, Clean Catch; Future    Vitamin D deficiency  -     T4 & TSH ([facility]); Future  -     Uric Acid;  Future  -     Vitamin D 25 Hydroxy; Future  -     Comprehensive Metabolic Panel; Future  -     C-Peptide; Future  -     Hemoglobin A1c; Future  -     Lipid Panel; Future  -     MicroAlbumin, Urine, Random - Urine, Clean Catch; Future    Morbid obesity due to excess calories  -     T4 & TSH ([facility]); Future  -     Uric Acid; Future  -     Vitamin D 25 Hydroxy; Future  -     Comprehensive Metabolic Panel; Future  -     C-Peptide; Future  -     Hemoglobin A1c; Future  -     Lipid Panel; Future  -     MicroAlbumin, Urine, Random - Urine, Clean Catch; Future    Other orders  -     XIGDUO XR 5-1000 MG tablet sustained-release 24 hour; Take 2 tablets by mouth Daily.  -     TOUJEO SOLOSTAR 300 UNIT/ML solution pen-injector; Inject 45 Units under the skin Daily.               In his summary I saw and examined this 66-year-old female for above-mentioned problems.  I reviewed her laboratory evaluation of 11/16/2017 and provided her with a hard copy of it.  Overall she is clinically and metabolically stable and therefore we will go ahead and continue all her current prescriptions.  She will see Ms. Tara Olivares in 4 months or sooner if needed with laboratory evaluation prior to each office visit.

## 2017-12-01 ENCOUNTER — INFUSION (OUTPATIENT)
Dept: ONCOLOGY | Facility: HOSPITAL | Age: 67
End: 2017-12-01

## 2017-12-01 DIAGNOSIS — C78.7 LIVER METASTASIS: Primary | ICD-10-CM

## 2017-12-01 DIAGNOSIS — C18.9 PRIMARY MALIGNANT NEOPLASM OF COLON (HCC): ICD-10-CM

## 2017-12-01 DIAGNOSIS — Z45.2 ENCOUNTER FOR ADJUSTMENT OR MANAGEMENT OF VASCULAR ACCESS DEVICE: ICD-10-CM

## 2017-12-01 PROCEDURE — 25010000002 HEPARIN FLUSH (PORCINE) 100 UNIT/ML SOLUTION: Performed by: INTERNAL MEDICINE

## 2017-12-01 PROCEDURE — 96523 IRRIG DRUG DELIVERY DEVICE: CPT | Performed by: INTERNAL MEDICINE

## 2017-12-01 RX ORDER — SODIUM CHLORIDE 0.9 % (FLUSH) 0.9 %
10 SYRINGE (ML) INJECTION AS NEEDED
Status: CANCELLED | OUTPATIENT
Start: 2017-12-01

## 2017-12-01 RX ORDER — SODIUM CHLORIDE 0.9 % (FLUSH) 0.9 %
10 SYRINGE (ML) INJECTION AS NEEDED
Status: DISCONTINUED | OUTPATIENT
Start: 2017-12-01 | End: 2017-12-01 | Stop reason: HOSPADM

## 2017-12-01 RX ADMIN — SODIUM CHLORIDE, PRESERVATIVE FREE 500 UNITS: 5 INJECTION INTRAVENOUS at 10:14

## 2017-12-01 RX ADMIN — Medication 10 ML: at 10:14

## 2018-01-09 DIAGNOSIS — E11.9 DIABETES TYPE 2, CONTROLLED (HCC): ICD-10-CM

## 2018-01-09 RX ORDER — SAXAGLIPTIN 5 MG/1
TABLET, FILM COATED ORAL
Qty: 90 TABLET | Refills: 0 | Status: SHIPPED | OUTPATIENT
Start: 2018-01-09 | End: 2018-03-07

## 2018-01-10 ENCOUNTER — INFUSION (OUTPATIENT)
Dept: ONCOLOGY | Facility: HOSPITAL | Age: 68
End: 2018-01-10

## 2018-01-10 DIAGNOSIS — D50.9 IRON DEFICIENCY ANEMIA, UNSPECIFIED IRON DEFICIENCY ANEMIA TYPE: ICD-10-CM

## 2018-01-10 DIAGNOSIS — C78.7 LIVER METASTASIS: ICD-10-CM

## 2018-01-10 DIAGNOSIS — C18.9 PRIMARY MALIGNANT NEOPLASM OF COLON (HCC): Primary | ICD-10-CM

## 2018-01-10 DIAGNOSIS — R11.2 CHEMOTHERAPY INDUCED NAUSEA AND VOMITING: ICD-10-CM

## 2018-01-10 DIAGNOSIS — T45.1X5A CHEMOTHERAPY INDUCED NAUSEA AND VOMITING: ICD-10-CM

## 2018-01-10 DIAGNOSIS — Z45.2 ENCOUNTER FOR ADJUSTMENT OR MANAGEMENT OF VASCULAR ACCESS DEVICE: ICD-10-CM

## 2018-01-10 LAB
ALBUMIN SERPL-MCNC: 3.5 G/DL (ref 3.5–5.2)
ALBUMIN/GLOB SERPL: 0.9 G/DL (ref 1.1–2.4)
ALP SERPL-CCNC: 569 U/L (ref 38–116)
ALT SERPL W P-5'-P-CCNC: 102 U/L (ref 0–33)
ANION GAP SERPL CALCULATED.3IONS-SCNC: 9 MMOL/L
AST SERPL-CCNC: 115 U/L (ref 0–32)
BASOPHILS # BLD AUTO: 0.09 10*3/MM3 (ref 0–0.1)
BASOPHILS NFR BLD AUTO: 1.2 % (ref 0–1.1)
BILIRUB SERPL-MCNC: 0.3 MG/DL (ref 0.1–1.2)
BUN BLD-MCNC: 16 MG/DL (ref 6–20)
BUN/CREAT SERPL: 19.8 (ref 7.3–30)
CALCIUM SPEC-SCNC: 9.7 MG/DL (ref 8.5–10.2)
CEA SERPL-MCNC: 5.41 NG/ML
CHLORIDE SERPL-SCNC: 100 MMOL/L (ref 98–107)
CO2 SERPL-SCNC: 29 MMOL/L (ref 22–29)
CREAT BLD-MCNC: 0.81 MG/DL (ref 0.6–1.1)
DEPRECATED RDW RBC AUTO: 61 FL (ref 37–49)
EOSINOPHIL # BLD AUTO: 0.74 10*3/MM3 (ref 0–0.36)
EOSINOPHIL NFR BLD AUTO: 10.1 % (ref 1–5)
ERYTHROCYTE [DISTWIDTH] IN BLOOD BY AUTOMATED COUNT: 17.7 % (ref 11.7–14.5)
GFR SERPL CREATININE-BSD FRML MDRD: 85 ML/MIN/1.73
GLOBULIN UR ELPH-MCNC: 4.1 GM/DL (ref 1.8–3.5)
GLUCOSE BLD-MCNC: 248 MG/DL (ref 74–124)
HCT VFR BLD AUTO: 35.4 % (ref 34–45)
HGB BLD-MCNC: 11.8 G/DL (ref 11.5–14.9)
IMM GRANULOCYTES # BLD: 0.05 10*3/MM3 (ref 0–0.03)
IMM GRANULOCYTES NFR BLD: 0.7 % (ref 0–0.5)
LYMPHOCYTES # BLD AUTO: 2.2 10*3/MM3 (ref 1–3.5)
LYMPHOCYTES NFR BLD AUTO: 30.1 % (ref 20–49)
MCH RBC QN AUTO: 31.3 PG (ref 27–33)
MCHC RBC AUTO-ENTMCNC: 33.3 G/DL (ref 32–35)
MCV RBC AUTO: 93.9 FL (ref 83–97)
MONOCYTES # BLD AUTO: 1.2 10*3/MM3 (ref 0.25–0.8)
MONOCYTES NFR BLD AUTO: 16.4 % (ref 4–12)
NEUTROPHILS # BLD AUTO: 3.03 10*3/MM3 (ref 1.5–7)
NEUTROPHILS NFR BLD AUTO: 41.5 % (ref 39–75)
NRBC BLD MANUAL-RTO: 0 /100 WBC (ref 0–0)
PLATELET # BLD AUTO: 368 10*3/MM3 (ref 150–375)
PMV BLD AUTO: 10.1 FL (ref 8.9–12.1)
POTASSIUM BLD-SCNC: 4 MMOL/L (ref 3.5–4.7)
PROT SERPL-MCNC: 7.6 G/DL (ref 6.3–8)
RBC # BLD AUTO: 3.77 10*6/MM3 (ref 3.9–5)
SODIUM BLD-SCNC: 138 MMOL/L (ref 134–145)
WBC NRBC COR # BLD: 7.31 10*3/MM3 (ref 4–10)

## 2018-01-10 PROCEDURE — 36415 COLL VENOUS BLD VENIPUNCTURE: CPT | Performed by: INTERNAL MEDICINE

## 2018-01-10 PROCEDURE — 25010000002 HEPARIN FLUSH (PORCINE) 100 UNIT/ML SOLUTION: Performed by: INTERNAL MEDICINE

## 2018-01-10 PROCEDURE — 96523 IRRIG DRUG DELIVERY DEVICE: CPT | Performed by: INTERNAL MEDICINE

## 2018-01-10 PROCEDURE — 80053 COMPREHEN METABOLIC PANEL: CPT | Performed by: INTERNAL MEDICINE

## 2018-01-10 PROCEDURE — 82378 CARCINOEMBRYONIC ANTIGEN: CPT | Performed by: INTERNAL MEDICINE

## 2018-01-10 PROCEDURE — 85025 COMPLETE CBC W/AUTO DIFF WBC: CPT | Performed by: INTERNAL MEDICINE

## 2018-01-10 RX ORDER — SODIUM CHLORIDE 0.9 % (FLUSH) 0.9 %
10 SYRINGE (ML) INJECTION AS NEEDED
Status: DISCONTINUED | OUTPATIENT
Start: 2018-01-10 | End: 2018-01-10 | Stop reason: HOSPADM

## 2018-01-10 RX ORDER — SODIUM CHLORIDE 0.9 % (FLUSH) 0.9 %
10 SYRINGE (ML) INJECTION AS NEEDED
Status: CANCELLED | OUTPATIENT
Start: 2018-01-10

## 2018-01-10 RX ADMIN — Medication 10 ML: at 09:39

## 2018-01-10 RX ADMIN — SODIUM CHLORIDE, PRESERVATIVE FREE 500 UNITS: 5 INJECTION INTRAVENOUS at 09:39

## 2018-01-11 ENCOUNTER — TELEPHONE (OUTPATIENT)
Dept: ONCOLOGY | Facility: HOSPITAL | Age: 68
End: 2018-01-11

## 2018-01-11 NOTE — TELEPHONE ENCOUNTER
----- Message from Marivel Brown MD sent at 1/11/2018  1:48 PM EST -----  Please inform the patient that the liver enzymes were elevated on 1/10/18.  Please schedule her to return in 1 week for a repeat CMP.  Please advise her to increase fluid intake and avoid alcohol.    Thank you

## 2018-01-11 NOTE — TELEPHONE ENCOUNTER
Contacted pt and advised pt of Dr. Hernandez note below. Pt v/u.   Advised pt to increase intake of hydrating fluids such as water and gatorade. Pt advised to avoid alcohol, tea, coffee, soda, etc.   Pt v/u.   Pt advised that scheduling will be calling to set up appt to return for recheck of CMP.

## 2018-01-18 ENCOUNTER — LAB (OUTPATIENT)
Dept: LAB | Facility: HOSPITAL | Age: 68
End: 2018-01-18

## 2018-01-18 DIAGNOSIS — C18.9 PRIMARY MALIGNANT NEOPLASM OF COLON (HCC): ICD-10-CM

## 2018-01-18 DIAGNOSIS — C78.7 LIVER METASTASIS: ICD-10-CM

## 2018-01-18 DIAGNOSIS — R11.2 CHEMOTHERAPY INDUCED NAUSEA AND VOMITING: ICD-10-CM

## 2018-01-18 DIAGNOSIS — T45.1X5A CHEMOTHERAPY INDUCED NAUSEA AND VOMITING: ICD-10-CM

## 2018-01-18 DIAGNOSIS — D50.9 IRON DEFICIENCY ANEMIA, UNSPECIFIED IRON DEFICIENCY ANEMIA TYPE: ICD-10-CM

## 2018-01-18 LAB
ALBUMIN SERPL-MCNC: 3.4 G/DL (ref 3.5–5.2)
ALBUMIN/GLOB SERPL: 0.8 G/DL (ref 1.1–2.4)
ALP SERPL-CCNC: 536 U/L (ref 38–116)
ALT SERPL W P-5'-P-CCNC: 84 U/L (ref 0–33)
ANION GAP SERPL CALCULATED.3IONS-SCNC: 11.8 MMOL/L
AST SERPL-CCNC: 85 U/L (ref 0–32)
BILIRUB SERPL-MCNC: 0.3 MG/DL (ref 0.1–1.2)
BUN BLD-MCNC: 15 MG/DL (ref 6–20)
BUN/CREAT SERPL: 19.7 (ref 7.3–30)
CALCIUM SPEC-SCNC: 9.3 MG/DL (ref 8.5–10.2)
CHLORIDE SERPL-SCNC: 100 MMOL/L (ref 98–107)
CO2 SERPL-SCNC: 27.2 MMOL/L (ref 22–29)
CREAT BLD-MCNC: 0.76 MG/DL (ref 0.6–1.1)
GFR SERPL CREATININE-BSD FRML MDRD: 92 ML/MIN/1.73
GLOBULIN UR ELPH-MCNC: 4.1 GM/DL (ref 1.8–3.5)
GLUCOSE BLD-MCNC: 181 MG/DL (ref 74–124)
POTASSIUM BLD-SCNC: 4 MMOL/L (ref 3.5–4.7)
PROT SERPL-MCNC: 7.5 G/DL (ref 6.3–8)
SODIUM BLD-SCNC: 139 MMOL/L (ref 134–145)

## 2018-01-18 PROCEDURE — 36415 COLL VENOUS BLD VENIPUNCTURE: CPT | Performed by: INTERNAL MEDICINE

## 2018-01-18 PROCEDURE — 80053 COMPREHEN METABOLIC PANEL: CPT | Performed by: INTERNAL MEDICINE

## 2018-01-30 ENCOUNTER — TELEPHONE (OUTPATIENT)
Dept: ONCOLOGY | Facility: HOSPITAL | Age: 68
End: 2018-01-30

## 2018-01-30 NOTE — TELEPHONE ENCOUNTER
Patient calling about her cmp results.  Wanted to know if they were better.  Liver enzymes still elevated but better.  Instructed to continue increasing her fluid intake.  Message sent to Dr. Brown to see if any further intervention is needed.

## 2018-02-01 ENCOUNTER — TELEPHONE (OUTPATIENT)
Dept: ONCOLOGY | Facility: HOSPITAL | Age: 68
End: 2018-02-01

## 2018-02-01 NOTE — TELEPHONE ENCOUNTER
----- Message from Marivel Brown MD sent at 1/30/2018  5:40 PM EST -----  Yes. They improved. No need to repeat labs prior to 2/14.    Thank you    ----- Message -----     From: Valery Zhang RN     Sent: 1/30/2018   3:39 PM       To: Marivel Brown MD    Patient is calling about her CMP results on 1-18.  Wanted to know if they were better.  Looks like they are, do we need to do anything further?    Next lab appt is 2-14

## 2018-02-12 DIAGNOSIS — C18.9 PRIMARY MALIGNANT NEOPLASM OF COLON (HCC): Primary | ICD-10-CM

## 2018-02-14 ENCOUNTER — HOSPITAL ENCOUNTER (OUTPATIENT)
Dept: PET IMAGING | Facility: HOSPITAL | Age: 68
Discharge: HOME | End: 2018-02-14
Attending: INTERNAL MEDICINE | Admitting: INTERNAL MEDICINE

## 2018-02-14 ENCOUNTER — INFUSION (OUTPATIENT)
Dept: ONCOLOGY | Facility: HOSPITAL | Age: 68
End: 2018-02-14

## 2018-02-14 DIAGNOSIS — Z45.2 ENCOUNTER FOR ADJUSTMENT OR MANAGEMENT OF VASCULAR ACCESS DEVICE: ICD-10-CM

## 2018-02-14 DIAGNOSIS — C78.7 LIVER METASTASIS: ICD-10-CM

## 2018-02-14 DIAGNOSIS — D50.9 IRON DEFICIENCY ANEMIA, UNSPECIFIED IRON DEFICIENCY ANEMIA TYPE: ICD-10-CM

## 2018-02-14 DIAGNOSIS — T45.1X5A CHEMOTHERAPY INDUCED NAUSEA AND VOMITING: ICD-10-CM

## 2018-02-14 DIAGNOSIS — C18.9 PRIMARY MALIGNANT NEOPLASM OF COLON (HCC): Primary | ICD-10-CM

## 2018-02-14 DIAGNOSIS — C18.9 PRIMARY MALIGNANT NEOPLASM OF COLON (HCC): ICD-10-CM

## 2018-02-14 DIAGNOSIS — R11.2 CHEMOTHERAPY INDUCED NAUSEA AND VOMITING: ICD-10-CM

## 2018-02-14 LAB
ALBUMIN SERPL-MCNC: 3.5 G/DL (ref 3.5–5.2)
ALBUMIN/GLOB SERPL: 0.9 G/DL (ref 1.1–2.4)
ALP SERPL-CCNC: 567 U/L (ref 38–116)
ALT SERPL W P-5'-P-CCNC: 83 U/L (ref 0–33)
ANION GAP SERPL CALCULATED.3IONS-SCNC: 9.7 MMOL/L
AST SERPL-CCNC: 77 U/L (ref 0–32)
BASOPHILS # BLD AUTO: 0.06 10*3/MM3 (ref 0–0.1)
BASOPHILS NFR BLD AUTO: 0.8 % (ref 0–1.1)
BILIRUB SERPL-MCNC: 0.6 MG/DL (ref 0.1–1.2)
BUN BLD-MCNC: 14 MG/DL (ref 6–20)
BUN/CREAT SERPL: 18.7 (ref 7.3–30)
CALCIUM SPEC-SCNC: 9.1 MG/DL (ref 8.5–10.2)
CEA SERPL-MCNC: 5.31 NG/ML
CHLORIDE SERPL-SCNC: 101 MMOL/L (ref 98–107)
CO2 SERPL-SCNC: 28.3 MMOL/L (ref 22–29)
CREAT BLD-MCNC: 0.75 MG/DL (ref 0.6–1.1)
CREAT BLDA-MCNC: 0.8 MG/DL (ref 0.6–1.3)
DEPRECATED RDW RBC AUTO: 51 FL (ref 37–49)
EOSINOPHIL # BLD AUTO: 0.28 10*3/MM3 (ref 0–0.36)
EOSINOPHIL NFR BLD AUTO: 3.5 % (ref 1–5)
ERYTHROCYTE [DISTWIDTH] IN BLOOD BY AUTOMATED COUNT: 15.7 % (ref 11.7–14.5)
FERRITIN SERPL-MCNC: 192.6 NG/ML
GFR SERPL CREATININE-BSD FRML MDRD: 93 ML/MIN/1.73
GLOBULIN UR ELPH-MCNC: 3.9 GM/DL (ref 1.8–3.5)
GLUCOSE BLD-MCNC: 183 MG/DL (ref 74–124)
HCT VFR BLD AUTO: 39.8 % (ref 34–45)
HGB BLD-MCNC: 13.3 G/DL (ref 11.5–14.9)
IMM GRANULOCYTES # BLD: 0.05 10*3/MM3 (ref 0–0.03)
IMM GRANULOCYTES NFR BLD: 0.6 % (ref 0–0.5)
IRON 24H UR-MRATE: 82 MCG/DL (ref 37–145)
IRON SATN MFR SERPL: 27 % (ref 14–48)
LYMPHOCYTES # BLD AUTO: 2.1 10*3/MM3 (ref 1–3.5)
LYMPHOCYTES NFR BLD AUTO: 26.3 % (ref 20–49)
MCH RBC QN AUTO: 30.2 PG (ref 27–33)
MCHC RBC AUTO-ENTMCNC: 33.4 G/DL (ref 32–35)
MCV RBC AUTO: 90.5 FL (ref 83–97)
MONOCYTES # BLD AUTO: 1.09 10*3/MM3 (ref 0.25–0.8)
MONOCYTES NFR BLD AUTO: 13.7 % (ref 4–12)
NEUTROPHILS # BLD AUTO: 4.4 10*3/MM3 (ref 1.5–7)
NEUTROPHILS NFR BLD AUTO: 55.1 % (ref 39–75)
NRBC BLD MANUAL-RTO: 0 /100 WBC (ref 0–0)
PLATELET # BLD AUTO: 325 10*3/MM3 (ref 150–375)
PMV BLD AUTO: 10.5 FL (ref 8.9–12.1)
POTASSIUM BLD-SCNC: 4 MMOL/L (ref 3.5–4.7)
PROT SERPL-MCNC: 7.4 G/DL (ref 6.3–8)
RBC # BLD AUTO: 4.4 10*6/MM3 (ref 3.9–5)
SODIUM BLD-SCNC: 139 MMOL/L (ref 134–145)
TIBC SERPL-MCNC: 309 MCG/DL (ref 249–505)
TRANSFERRIN SERPL-MCNC: 221 MG/DL (ref 200–360)
WBC NRBC COR # BLD: 7.98 10*3/MM3 (ref 4–10)

## 2018-02-14 PROCEDURE — 82378 CARCINOEMBRYONIC ANTIGEN: CPT | Performed by: INTERNAL MEDICINE

## 2018-02-14 PROCEDURE — 0 IOPAMIDOL 61 % SOLUTION: Performed by: INTERNAL MEDICINE

## 2018-02-14 PROCEDURE — 85025 COMPLETE CBC W/AUTO DIFF WBC: CPT | Performed by: INTERNAL MEDICINE

## 2018-02-14 PROCEDURE — 80053 COMPREHEN METABOLIC PANEL: CPT | Performed by: INTERNAL MEDICINE

## 2018-02-14 PROCEDURE — 82728 ASSAY OF FERRITIN: CPT | Performed by: INTERNAL MEDICINE

## 2018-02-14 PROCEDURE — 96523 IRRIG DRUG DELIVERY DEVICE: CPT | Performed by: INTERNAL MEDICINE

## 2018-02-14 PROCEDURE — 82565 ASSAY OF CREATININE: CPT

## 2018-02-14 PROCEDURE — 83540 ASSAY OF IRON: CPT | Performed by: INTERNAL MEDICINE

## 2018-02-14 PROCEDURE — 84466 ASSAY OF TRANSFERRIN: CPT | Performed by: INTERNAL MEDICINE

## 2018-02-14 PROCEDURE — 71260 CT THORAX DX C+: CPT

## 2018-02-14 PROCEDURE — 25010000002 HEPARIN FLUSH (PORCINE) 100 UNIT/ML SOLUTION: Performed by: INTERNAL MEDICINE

## 2018-02-14 RX ORDER — SODIUM CHLORIDE 0.9 % (FLUSH) 0.9 %
10 SYRINGE (ML) INJECTION AS NEEDED
Status: DISCONTINUED | OUTPATIENT
Start: 2018-02-14 | End: 2018-02-14 | Stop reason: HOSPADM

## 2018-02-14 RX ORDER — SODIUM CHLORIDE 0.9 % (FLUSH) 0.9 %
10 SYRINGE (ML) INJECTION AS NEEDED
Status: CANCELLED | OUTPATIENT
Start: 2018-02-14

## 2018-02-14 RX ADMIN — Medication 10 ML: at 09:45

## 2018-02-14 RX ADMIN — IOPAMIDOL 75 ML: 612 INJECTION, SOLUTION INTRAVENOUS at 10:15

## 2018-02-14 RX ADMIN — SODIUM CHLORIDE, PRESERVATIVE FREE 500 UNITS: 5 INJECTION INTRAVENOUS at 09:46

## 2018-02-21 ENCOUNTER — APPOINTMENT (OUTPATIENT)
Dept: LAB | Facility: HOSPITAL | Age: 68
End: 2018-02-21

## 2018-02-21 ENCOUNTER — OFFICE VISIT (OUTPATIENT)
Dept: ONCOLOGY | Facility: CLINIC | Age: 68
End: 2018-02-21

## 2018-02-21 VITALS
OXYGEN SATURATION: 96 % | DIASTOLIC BLOOD PRESSURE: 68 MMHG | TEMPERATURE: 98.3 F | HEART RATE: 90 BPM | HEIGHT: 66 IN | BODY MASS INDEX: 41.05 KG/M2 | SYSTOLIC BLOOD PRESSURE: 128 MMHG | RESPIRATION RATE: 16 BRPM | WEIGHT: 255.4 LBS

## 2018-02-21 DIAGNOSIS — C18.9 PRIMARY MALIGNANT NEOPLASM OF COLON (HCC): Primary | ICD-10-CM

## 2018-02-21 DIAGNOSIS — D50.9 IRON DEFICIENCY ANEMIA, UNSPECIFIED IRON DEFICIENCY ANEMIA TYPE: ICD-10-CM

## 2018-02-21 DIAGNOSIS — C78.7 LIVER METASTASIS: ICD-10-CM

## 2018-02-21 DIAGNOSIS — R74.8 ELEVATED LIVER ENZYMES: ICD-10-CM

## 2018-02-21 PROCEDURE — 99214 OFFICE O/P EST MOD 30 MIN: CPT | Performed by: INTERNAL MEDICINE

## 2018-02-21 PROCEDURE — G0463 HOSPITAL OUTPT CLINIC VISIT: HCPCS | Performed by: INTERNAL MEDICINE

## 2018-02-21 NOTE — PROGRESS NOTES
Subjective     CHIEF COMPLAINT:    1. Metastatic colon cancer (KRAS mutation positive) with liver metastasis, biopsy confirmed. No resection of primary sigmoid colon cancer.   2. Recurrent Iron deficiency anemia   3. Palliative chemotherapy with FOLFOX-6 was given from June 2013 to November 2013.   4. Left partial colectomy, splenectomy, resection of left diaphragm with repair and partial hepatectomy with microwave ablation of metastasis on 1/14/2014.   5. FOLFIRI x12 cycles between 3/07/2014 and 8/07/2014.   6. CT scan on 05/28/2015 revealed a new liver lesion. Stereotactic radiation therapy with CyberKnife was delivered in 3 fractions, completed on 07/14/2015.        7.  New liver lesion was identified on CT on 5/5/17. CT guided microwave ablation of liver mass on 6/16/17.       8.  Patient re-treated with Folfiri on 6/29/17-11/29/17.     HISTORY OF PRESENT ILLNESS:     Feli Del Toro is a 67 y.o. patient with the above medical history.  She returns today for follow-up and reports no abdominal pain nausea or vomiting.  She did not notice any jaundice.  Her appetite is good.  Her weight is stable.       Past Medical History:   Diagnosis Date   • Anemia    • Cancer 06/04/2013   • Diabetes mellitus    • History of transfusion    • Hyperlipidemia    • Hypertension    • Metastatic colon cancer to liver    • Retinopathy    • Type 2 diabetes mellitus        Past Surgical History:   Procedure Laterality Date   • ABDOMINAL SURGERY      ablation    • AMPUTATION DIGIT Left 1/1/2017    Procedure: LEFT SECOND TOE AMPUTATION;  Surgeon: Farzad Nichols MD;  Location: [facility];  Service:    • AMPUTATION OF REPLICATED TOES      right distal second toe    • CATARACT EXTRACTION     • COLECTOMY PARTIAL / TOTAL  01/14/2014   • HYSTERECTOMY  1998   • PORTACATH PLACEMENT  06/2013   • SALPINGO OOPHORECTOMY Bilateral    • SPLENECTOMY         Cancer-related family history includes Breast cancer in her sister; Cancer in her  other.    SCHEDULED MEDS:       Current Outpatient Prescriptions:   •  allopurinol (ZYLOPRIM) 100 MG tablet, TAKE 1 TABLET DAILY, Disp: 90 tablet, Rfl: 1  •  aspirin 81 MG tablet, Take 81 mg by mouth daily., Disp: , Rfl:   •  Biotin 5000 MCG tablet, Take 5,000 mcg by mouth Daily., Disp: , Rfl:   •  calcium carbonate (TUMS) 500 MG chewable tablet, Chew., Disp: , Rfl:   •  ferrous sulfate 325 (65 FE) MG tablet, Take 1 tablet by mouth Daily., Disp: , Rfl:   •  fluticasone (FLONASE) 50 MCG/ACT nasal spray, , Disp: , Rfl:   •  HUMALOG KWIKPEN 100 UNIT/ML solution pen-injector, INJECT 15 UNITS BEFORE MEALS AND SLIDING SCALE AS DIRECTED, Disp: 15 mL, Rfl: 0  •  LORazepam (ATIVAN) 0.5 MG tablet, Take 1 tablet by mouth 2 (Two) Times a Day As Needed (nausea)., Disp: 20 tablet, Rfl: 0  •  MAGNESIUM OXIDE PO, Take 400 mg by mouth daily., Disp: , Rfl:   •  meclizine (ANTIVERT) 25 MG tablet, Take 25 mg by mouth 3 (Three) Times a Day As Needed., Disp: , Rfl:   •  NOVOFINE 32G X 6 MM misc, USE AS DIRECTED 5 TIMES DAILY AND AS NEEDED, Disp: 200 each, Rfl: 3  •  ondansetron (ZOFRAN) 8 MG tablet, Take 1 tablet by mouth 3 (Three) Times a Day As Needed for Nausea or Vomiting., Disp: 30 tablet, Rfl: 5  •  ONETOUCH VERIO test strip, TEST 3-4 TIMES A DAY, Disp: 100 each, Rfl: 5  •  ONGLYZA 5 MG tablet, TAKE 1 TABLET DAILY, Disp: 90 tablet, Rfl: 0  •  telmisartan-hydrochlorothiazide (MICARDIS HCT) 40-12.5 MG per tablet, Take 1 tablet by mouth daily, Disp: 90 tablet, Rfl: 3  •  TOUJEO SOLOSTAR 300 UNIT/ML solution pen-injector, Inject 45 Units under the skin Daily., Disp: 3 pen, Rfl: 0  •  vitamin D (ERGOCALCIFEROL) 47895 UNITS capsule capsule, Take 1 capsule by mouth 1 (One) Time Per Week., Disp: 13 capsule, Rfl: 3  •  XIGDUO XR 5-1000 MG tablet sustained-release 24 hour, Take 2 tablets by mouth Daily., Disp: 180 tablet, Rfl: 3  No current facility-administered medications for this visit.     REVIEW OF SYSTEMS:    GENERAL:  No Fever or  "chills. No weight change.  No Fatigue.   SKIN:  No skin rashes or lesions.  HEME/LYMPH:  Anemia. No Easy bruisability. No Enlarged lymph nodes.  RESPIRATORY:  No Shortness of breath.    CVS:  No Chest pain.    GI:  No Abdominal pain. No Nausea. No Vomiting. No Constipation. No Diarrhea. No Melena. No Hematochezia.  :  No Hematuria.   MUSCULOSKELETAL:  No Back pain.   NEUROLOGICAL:  No Headaches. No Dizziness.   PSYCHIATRIC:  No Anxiety. No Depression.     Objective   VITAL SIGNS:     Vitals:    02/21/18 1002   BP: 128/68   Pulse: 90   Resp: 16   Temp: 98.3 °F (36.8 °C)   TempSrc: Oral   SpO2: 96%   Weight: 116 kg (255 lb 6.4 oz)   Height: 168 cm (66.14\")   PainSc: 0-No pain     Body mass index is 41.05 kg/(m^2).     Wt Readings from Last 3 Encounters:   02/21/18 116 kg (255 lb 6.4 oz)   11/30/17 112 kg (246 lb)   11/29/17 113 kg (248 lb 6.4 oz)       PHYSICAL EXAMINATION:  GENERAL:  The patient appears in good general condition, not in acute distress.  SKIN:  No skin rashes or lesions. No Ecchymosis or Petechiae.  HEAD:  Normocephalic.  EYES:  No Pallor. No Jaundice.   NECK:  Supple with no Thyromegaly or Masses.  LYMPHATICS:  No cervical or supraclavicular Lymphadenopathy.  CHEST:  Lungs clear to auscultation bilaterally. No added sounds.  CARDIAC:  Normal S1 & S2. No Murmurs.  ABDOMEN:  Soft. No tenderness. No Hepatomegaly. No Splenomegaly. No masses.  EXTREMITIES:  No Edema. No Calf tenderness. No Cyanosis.   NEUROLOGICAL:  No Focal neurological deficits.       RESULT REVIEW:     Results for orders placed or performed in visit on 02/14/18   Comprehensive Metabolic Panel   Result Value Ref Range    Glucose 183 (H) 74 - 124 mg/dL    BUN 14 6 - 20 mg/dL    Creatinine 0.75 0.60 - 1.10 mg/dL    Sodium 139 134 - 145 mmol/L    Potassium 4.0 3.5 - 4.7 mmol/L    Chloride 101 98 - 107 mmol/L    CO2 28.3 22.0 - 29.0 mmol/L    Calcium 9.1 8.5 - 10.2 mg/dL    Total Protein 7.4 6.3 - 8.0 g/dL    Albumin 3.50 3.50 - 5.20 g/dL "    ALT (SGPT) 83 (H) 0 - 33 U/L    AST (SGOT) 77 (C) 0 - 32 U/L    Alkaline Phosphatase 567 (H) 38 - 116 U/L    Total Bilirubin 0.6 0.1 - 1.2 mg/dL    eGFR  African Amer 93 >60 mL/min/1.73    Globulin 3.9 (H) 1.8 - 3.5 gm/dL    A/G Ratio 0.9 (L) 1.1 - 2.4 g/dL    BUN/Creatinine Ratio 18.7 7.3 - 30.0    Anion Gap 9.7 mmol/L   CBC Auto Differential   Result Value Ref Range    WBC 7.98 4.00 - 10.00 10*3/mm3    RBC 4.40 3.90 - 5.00 10*6/mm3    Hemoglobin 13.3 11.5 - 14.9 g/dL    Hematocrit 39.8 34.0 - 45.0 %    MCV 90.5 83.0 - 97.0 fL    MCH 30.2 27.0 - 33.0 pg    MCHC 33.4 32.0 - 35.0 g/dL    RDW 15.7 (H) 11.7 - 14.5 %    RDW-SD 51.0 (H) 37.0 - 49.0 fl    MPV 10.5 8.9 - 12.1 fL    Platelets 325 150 - 375 10*3/mm3    Neutrophil % 55.1 39.0 - 75.0 %    Lymphocyte % 26.3 20.0 - 49.0 %    Monocyte % 13.7 (H) 4.0 - 12.0 %    Eosinophil % 3.5 1.0 - 5.0 %    Basophil % 0.8 0.0 - 1.1 %    Immature Grans % 0.6 (H) 0.0 - 0.5 %    Neutrophils, Absolute 4.40 1.50 - 7.00 10*3/mm3    Lymphocytes, Absolute 2.10 1.00 - 3.50 10*3/mm3    Monocytes, Absolute 1.09 (H) 0.25 - 0.80 10*3/mm3    Eosinophils, Absolute 0.28 0.00 - 0.36 10*3/mm3    Basophils, Absolute 0.06 0.00 - 0.10 10*3/mm3    Immature Grans, Absolute 0.05 (H) 0.00 - 0.03 10*3/mm3    nRBC 0.0 0.0 - 0.0 /100 WBC   Ferritin   Result Value Ref Range    Ferritin 192.60 ng/mL   Iron Profile   Result Value Ref Range    Iron 82 37 - 145 mcg/dL    Iron Saturation 27 14 - 48 %    Transferrin 221 200 - 360 mg/dL    TIBC 309 249 - 505 mcg/dL   CEA   Result Value Ref Range    CEA 5.31 ng/mL        Lab Results   Component Value Date    CEA 5.31 02/14/2018    CEA 5.41 01/10/2018    CEA 5.18 11/29/2017     CHEST CT WITH CONTRAST 2/14/18      HISTORY: Follow-up colon cancer.      TECHNIQUE: CT of the chest with IV contrast is provided and correlated  with chest CT dated 05/05/2017 and numerous other prior exams.      FINDINGS: There is a left Mediport catheter. No thoracic  lymphadenopathy  is present. Images showing the upper abdomen demonstrate a fairly well  demarcated 5 cm x 4.4 cm diameter lesion around the fiducial markers in  the liver. No other lesion is identified within the visualized portion  of the liver. The clinic note dated 11/29/2017 describes microwave  ablation of a liver lesion was performed in June 2017 with subsequent CT  10/19/2017 reportedly showing post treatment changes in the liver. Those  exams are not available for comparison.      There are multiple small gallstones. The other visualized upper  abdominal solid organs appear normal. There has been previous  splenectomy. Some chain sutures are observed at the colon in the left  upper quadrant.      The lungs are clear. There is no bone or body wall lesion.      IMPRESSION:  There is a well-demarcated low-attenuation lesion in the  liver, 5 x 4.4 cm diameter, surrounding fiducial markers. Reportedly  stereotactic ablation of a liver lesion has been performed elsewhere. No  other hepatic abnormality is present. No metastatic disease is  identified in the chest.           Assessment/Plan    1.  Metastatic colon cancer. She had metastases to the liver.   · She received 12 cycles of FOLFOX-6 and then had resection of the primary tumor along with metastasectomy and splenectomy with thermal ablation of lesions in the liver in January 2014.   · This was followed by 12 cycles of the FOLFIRI regimen completed on 08/07/2014.   · CT scans on 05/28/2015 revealed a new lesion in the liver.  Dr. Ruano referred the patient to Radiation Therapy and she received CyberKnife radiation, completed on 07/14/2015.    · The CT scan from 5/5/17 revealed a new 2.2 cm lesion in the anterior hepatic segment.  CT guided microwave ablation of liver mass performed on 6/16/17 with CT evidence on 7/20/17 of a complete response  · FOLFIRI ×12 cycles given between 6/29/17 and 11/29/17.       We obtained a CT scan of the chest.  It did not  show evidence LUNG metastasis.  However, the liver mass was measuring larger than what it measured on the scans from [facility].  It measured 5 x 4.4 cm on 2/14/18 but measured 3.9 cm on 11/29/17.  This is concerning for disease progression.    2.  Elevated liver enzymes.  This may be attributed to inflammation of the liver secondary to radiofrequency ablation but it is concerning for disease progression.  On review of the CT scan from [facility] from 10/19/17, there was narrowing of right portal vein.  Since concerning for possible portal vein thrombosis.    3.  Anemia skin with iron deficiency, improved with oral iron.        PLAN:    1.  I will obtain a CT scan of the abdomen pelvis.  We will ask radiology Department to compare to the CT scan from 10/19/17.    2.  I'll see the patient follow-up in 2 weeks.  I explained to her that if there is evidence of portal vein thrombosis, we would start warfarin with Lovenox used for bridging.    3.  CMP will be repeated in one week.    4.  Continue oral iron once daily.           Marivel Brown MD  02/21/18

## 2018-02-28 ENCOUNTER — LAB (OUTPATIENT)
Dept: LAB | Facility: HOSPITAL | Age: 68
End: 2018-02-28

## 2018-02-28 ENCOUNTER — HOSPITAL ENCOUNTER (OUTPATIENT)
Dept: PET IMAGING | Facility: HOSPITAL | Age: 68
Discharge: HOME | End: 2018-02-28
Attending: INTERNAL MEDICINE | Admitting: INTERNAL MEDICINE

## 2018-02-28 DIAGNOSIS — C18.9 PRIMARY MALIGNANT NEOPLASM OF COLON (HCC): ICD-10-CM

## 2018-02-28 DIAGNOSIS — D50.9 IRON DEFICIENCY ANEMIA, UNSPECIFIED IRON DEFICIENCY ANEMIA TYPE: ICD-10-CM

## 2018-02-28 DIAGNOSIS — R74.8 ELEVATED LIVER ENZYMES: ICD-10-CM

## 2018-02-28 DIAGNOSIS — C78.7 LIVER METASTASIS: ICD-10-CM

## 2018-02-28 LAB
ALBUMIN SERPL-MCNC: 3.2 G/DL (ref 3.5–5.2)
ALBUMIN/GLOB SERPL: 0.6 G/DL (ref 1.1–2.4)
ALP SERPL-CCNC: 447 U/L (ref 38–116)
ALT SERPL W P-5'-P-CCNC: 58 U/L (ref 0–33)
ANION GAP SERPL CALCULATED.3IONS-SCNC: 13.9 MMOL/L
AST SERPL-CCNC: 75 U/L (ref 0–32)
BASOPHILS # BLD AUTO: 0.05 10*3/MM3 (ref 0–0.1)
BASOPHILS NFR BLD AUTO: 0.4 % (ref 0–1.1)
BILIRUB SERPL-MCNC: 1.4 MG/DL (ref 0.1–1.2)
BUN BLD-MCNC: 24 MG/DL (ref 6–20)
BUN/CREAT SERPL: 25.5 (ref 7.3–30)
CALCIUM SPEC-SCNC: 9.4 MG/DL (ref 8.5–10.2)
CHLORIDE SERPL-SCNC: 94 MMOL/L (ref 98–107)
CO2 SERPL-SCNC: 28.1 MMOL/L (ref 22–29)
CREAT BLD-MCNC: 0.94 MG/DL (ref 0.6–1.1)
CREAT BLDA-MCNC: 1 MG/DL (ref 0.6–1.3)
DEPRECATED RDW RBC AUTO: 48 FL (ref 37–49)
EOSINOPHIL # BLD AUTO: 0.15 10*3/MM3 (ref 0–0.36)
EOSINOPHIL NFR BLD AUTO: 1.1 % (ref 1–5)
ERYTHROCYTE [DISTWIDTH] IN BLOOD BY AUTOMATED COUNT: 14.6 % (ref 11.7–14.5)
GFR SERPL CREATININE-BSD FRML MDRD: 72 ML/MIN/1.73
GLOBULIN UR ELPH-MCNC: 5 GM/DL (ref 1.8–3.5)
GLUCOSE BLD-MCNC: 92 MG/DL (ref 74–124)
HCT VFR BLD AUTO: 36.5 % (ref 34–45)
HGB BLD-MCNC: 12 G/DL (ref 11.5–14.9)
IMM GRANULOCYTES # BLD: 0.13 10*3/MM3 (ref 0–0.03)
IMM GRANULOCYTES NFR BLD: 1 % (ref 0–0.5)
LYMPHOCYTES # BLD AUTO: 1.87 10*3/MM3 (ref 1–3.5)
LYMPHOCYTES NFR BLD AUTO: 14.1 % (ref 20–49)
MCH RBC QN AUTO: 29.9 PG (ref 27–33)
MCHC RBC AUTO-ENTMCNC: 32.9 G/DL (ref 32–35)
MCV RBC AUTO: 90.8 FL (ref 83–97)
MONOCYTES # BLD AUTO: 2.01 10*3/MM3 (ref 0.25–0.8)
MONOCYTES NFR BLD AUTO: 15.1 % (ref 4–12)
NEUTROPHILS # BLD AUTO: 9.07 10*3/MM3 (ref 1.5–7)
NEUTROPHILS NFR BLD AUTO: 68.3 % (ref 39–75)
NRBC BLD MANUAL-RTO: 0 /100 WBC (ref 0–0)
PLATELET # BLD AUTO: 439 10*3/MM3 (ref 150–375)
PMV BLD AUTO: 10 FL (ref 8.9–12.1)
POTASSIUM BLD-SCNC: 4.1 MMOL/L (ref 3.5–4.7)
PROT SERPL-MCNC: 8.2 G/DL (ref 6.3–8)
RBC # BLD AUTO: 4.02 10*6/MM3 (ref 3.9–5)
SODIUM BLD-SCNC: 136 MMOL/L (ref 134–145)
WBC NRBC COR # BLD: 13.28 10*3/MM3 (ref 4–10)

## 2018-02-28 PROCEDURE — 0 DIATRIZOATE MEGLUMINE & SODIUM PER 1 ML: Performed by: INTERNAL MEDICINE

## 2018-02-28 PROCEDURE — 82565 ASSAY OF CREATININE: CPT

## 2018-02-28 PROCEDURE — 74177 CT ABD & PELVIS W/CONTRAST: CPT

## 2018-02-28 PROCEDURE — 36415 COLL VENOUS BLD VENIPUNCTURE: CPT | Performed by: INTERNAL MEDICINE

## 2018-02-28 PROCEDURE — 85025 COMPLETE CBC W/AUTO DIFF WBC: CPT | Performed by: INTERNAL MEDICINE

## 2018-02-28 PROCEDURE — 0 IOPAMIDOL 61 % SOLUTION: Performed by: INTERNAL MEDICINE

## 2018-02-28 PROCEDURE — 80053 COMPREHEN METABOLIC PANEL: CPT | Performed by: INTERNAL MEDICINE

## 2018-02-28 RX ADMIN — IOPAMIDOL 85 ML: 612 INJECTION, SOLUTION INTRAVENOUS at 08:18

## 2018-02-28 RX ADMIN — DIATRIZOATE MEGLUMINE AND DIATRIZOATE SODIUM 30 ML: 660; 100 LIQUID ORAL; RECTAL at 07:15

## 2018-03-01 ENCOUNTER — TELEPHONE (OUTPATIENT)
Dept: ONCOLOGY | Facility: HOSPITAL | Age: 68
End: 2018-03-01

## 2018-03-01 ENCOUNTER — TELEPHONE (OUTPATIENT)
Dept: GENERAL RADIOLOGY | Facility: HOSPITAL | Age: 68
End: 2018-03-01

## 2018-03-01 DIAGNOSIS — I31.39 PERICARDIAL EFFUSION: Primary | ICD-10-CM

## 2018-03-01 NOTE — TELEPHONE ENCOUNTER
----- Message from Marivel Brown MD sent at 3/1/2018 11:22 AM EST -----  Please inform the patient the CT scan showed a large pericardial effusion. No liver mets. Please refer to Dr. Landry of [facility].    Thank you    Called and informed pt. Pt v/u. At this time pt does not see cardio, informed pt that order was placed for referral and that our office would be in touch with an appt. Pt v/u

## 2018-03-01 NOTE — TELEPHONE ENCOUNTER
----- Message from Leslie Ching RN sent at 3/1/2018 11:54 AM EST -----  Referral placed for Dr. Prakash for pericardial effusion. thanks

## 2018-03-02 ENCOUNTER — TELEPHONE (OUTPATIENT)
Dept: ONCOLOGY | Facility: HOSPITAL | Age: 68
End: 2018-03-02

## 2018-03-02 NOTE — TELEPHONE ENCOUNTER
Patient called to make sure there was nothing she needed to be doing for herself prior to cardiology appnt/. Also asking if she needed to keep appnt with  next week. Told her to keep appnt so she could follow up further on CT scan results and he can give better details on what everything means and why exactly she has been referred to cardiology. Nothing to be doing at this time. Pt v/u.

## 2018-03-07 ENCOUNTER — APPOINTMENT (OUTPATIENT)
Dept: LAB | Facility: HOSPITAL | Age: 68
End: 2018-03-07

## 2018-03-07 ENCOUNTER — APPOINTMENT (OUTPATIENT)
Dept: CARDIOLOGY | Facility: HOSPITAL | Age: 68
End: 2018-03-07
Attending: EMERGENCY MEDICINE

## 2018-03-07 ENCOUNTER — HOSPITAL ENCOUNTER (INPATIENT)
Facility: HOSPITAL | Age: 68
LOS: 9 days | Discharge: HOME | End: 2018-03-16
Attending: EMERGENCY MEDICINE | Admitting: HOSPITALIST

## 2018-03-07 ENCOUNTER — TELEPHONE (OUTPATIENT)
Dept: CARDIOLOGY | Facility: CLINIC | Age: 68
End: 2018-03-07

## 2018-03-07 ENCOUNTER — APPOINTMENT (OUTPATIENT)
Dept: GENERAL RADIOLOGY | Facility: HOSPITAL | Age: 68
End: 2018-03-07

## 2018-03-07 ENCOUNTER — OFFICE VISIT (OUTPATIENT)
Dept: ONCOLOGY | Facility: CLINIC | Age: 68
End: 2018-03-07

## 2018-03-07 VITALS
OXYGEN SATURATION: 97 % | HEART RATE: 105 BPM | TEMPERATURE: 98.2 F | WEIGHT: 258.2 LBS | DIASTOLIC BLOOD PRESSURE: 72 MMHG | HEIGHT: 66 IN | SYSTOLIC BLOOD PRESSURE: 128 MMHG | BODY MASS INDEX: 41.5 KG/M2 | RESPIRATION RATE: 14 BRPM

## 2018-03-07 DIAGNOSIS — R26.2 DIFFICULTY WALKING: ICD-10-CM

## 2018-03-07 DIAGNOSIS — G47.36: ICD-10-CM

## 2018-03-07 DIAGNOSIS — G47.34 NOCTURNAL HYPOXEMIA: ICD-10-CM

## 2018-03-07 DIAGNOSIS — D50.9 IRON DEFICIENCY ANEMIA, UNSPECIFIED IRON DEFICIENCY ANEMIA TYPE: ICD-10-CM

## 2018-03-07 DIAGNOSIS — R74.8 ELEVATED LIVER ENZYMES: ICD-10-CM

## 2018-03-07 DIAGNOSIS — C78.7 LIVER METASTASIS: ICD-10-CM

## 2018-03-07 DIAGNOSIS — I27.0: ICD-10-CM

## 2018-03-07 DIAGNOSIS — I31.39 PERICARDIAL EFFUSION: ICD-10-CM

## 2018-03-07 DIAGNOSIS — J90 PLEURAL EFFUSION: ICD-10-CM

## 2018-03-07 DIAGNOSIS — R06.02 SHORTNESS OF BREATH: Primary | ICD-10-CM

## 2018-03-07 DIAGNOSIS — C18.9 PRIMARY MALIGNANT NEOPLASM OF COLON (HCC): ICD-10-CM

## 2018-03-07 DIAGNOSIS — R06.02 SHORTNESS OF BREATH: ICD-10-CM

## 2018-03-07 DIAGNOSIS — C18.9 PRIMARY MALIGNANT NEOPLASM OF COLON (HCC): Primary | ICD-10-CM

## 2018-03-07 DIAGNOSIS — G47.30 SLEEP APNEA, UNSPECIFIED TYPE: ICD-10-CM

## 2018-03-07 LAB
ALBUMIN SERPL-MCNC: 3 G/DL (ref 3.5–5.2)
ALBUMIN/GLOB SERPL: 0.6 G/DL
ALP SERPL-CCNC: 453 U/L (ref 39–117)
ALT SERPL W P-5'-P-CCNC: 52 U/L (ref 1–33)
ANION GAP SERPL CALCULATED.3IONS-SCNC: 10.4 MMOL/L
AORTIC ARCH: 2.3 CM
AORTIC DIMENSIONLESS INDEX: 0.8 (DI)
ASCENDING AORTA: 2.8 CM
AST SERPL-CCNC: 52 U/L (ref 1–32)
BASOPHILS # BLD AUTO: 0.05 10*3/MM3 (ref 0–0.2)
BASOPHILS NFR BLD AUTO: 0.4 % (ref 0–1.5)
BH CV ECHO MEAS - ACS: 2 CM
BH CV ECHO MEAS - AO ARCH DIAM (PROXIMAL TRANS.): 2.3 CM
BH CV ECHO MEAS - AO MAX PG (FULL): 2.6 MMHG
BH CV ECHO MEAS - AO MAX PG: 5.9 MMHG
BH CV ECHO MEAS - AO MEAN PG (FULL): 2 MMHG
BH CV ECHO MEAS - AO MEAN PG: 4 MMHG
BH CV ECHO MEAS - AO ROOT AREA (BSA CORRECTED): 1.4
BH CV ECHO MEAS - AO ROOT AREA: 8 CM^2
BH CV ECHO MEAS - AO ROOT DIAM: 3.2 CM
BH CV ECHO MEAS - AO V2 MAX: 121 CM/SEC
BH CV ECHO MEAS - AO V2 MEAN: 88.7 CM/SEC
BH CV ECHO MEAS - AO V2 VTI: 22.4 CM
BH CV ECHO MEAS - ASC AORTA: 2.8 CM
BH CV ECHO MEAS - AVA(I,A): 2.9 CM^2
BH CV ECHO MEAS - AVA(I,D): 2.9 CM^2
BH CV ECHO MEAS - AVA(V,A): 2.6 CM^2
BH CV ECHO MEAS - AVA(V,D): 2.6 CM^2
BH CV ECHO MEAS - BSA(HAYCOCK): 2.4 M^2
BH CV ECHO MEAS - BSA: 2.3 M^2
BH CV ECHO MEAS - BZI_BMI: 37.7 KILOGRAMS/M^2
BH CV ECHO MEAS - BZI_METRIC_HEIGHT: 175.3 CM
BH CV ECHO MEAS - BZI_METRIC_WEIGHT: 115.7 KG
BH CV ECHO MEAS - CONTRAST EF (2CH): 59 ML/M^2
BH CV ECHO MEAS - CONTRAST EF 4CH: 68.3 ML/M^2
BH CV ECHO MEAS - EDV(CUBED): 85.2 ML
BH CV ECHO MEAS - EDV(MOD-SP2): 78 ML
BH CV ECHO MEAS - EDV(MOD-SP4): 101 ML
BH CV ECHO MEAS - EDV(TEICH): 87.7 ML
BH CV ECHO MEAS - EF(CUBED): 71.4 %
BH CV ECHO MEAS - EF(MOD-SP2): 59 %
BH CV ECHO MEAS - EF(MOD-SP4): 68.3 %
BH CV ECHO MEAS - EF(TEICH): 63.3 %
BH CV ECHO MEAS - ESV(CUBED): 24.4 ML
BH CV ECHO MEAS - ESV(MOD-SP2): 32 ML
BH CV ECHO MEAS - ESV(MOD-SP4): 32 ML
BH CV ECHO MEAS - ESV(TEICH): 32.2 ML
BH CV ECHO MEAS - FS: 34.1 %
BH CV ECHO MEAS - IVS/LVPW: 1.1
BH CV ECHO MEAS - IVSD: 1.1 CM
BH CV ECHO MEAS - LAT PEAK E' VEL: 7 CM/SEC
BH CV ECHO MEAS - LV DIASTOLIC VOL/BSA (35-75): 44.1 ML/M^2
BH CV ECHO MEAS - LV MASS(C)D: 158.2 GRAMS
BH CV ECHO MEAS - LV MASS(C)DI: 69.1 GRAMS/M^2
BH CV ECHO MEAS - LV MAX PG: 3.3 MMHG
BH CV ECHO MEAS - LV MEAN PG: 2 MMHG
BH CV ECHO MEAS - LV SYSTOLIC VOL/BSA (12-30): 14 ML/M^2
BH CV ECHO MEAS - LV V1 MAX: 90.8 CM/SEC
BH CV ECHO MEAS - LV V1 MEAN: 72.2 CM/SEC
BH CV ECHO MEAS - LV V1 VTI: 18.8 CM
BH CV ECHO MEAS - LVIDD: 4.4 CM
BH CV ECHO MEAS - LVIDS: 2.9 CM
BH CV ECHO MEAS - LVLD AP2: 8.3 CM
BH CV ECHO MEAS - LVLD AP4: 8.8 CM
BH CV ECHO MEAS - LVLS AP2: 6.4 CM
BH CV ECHO MEAS - LVLS AP4: 6.8 CM
BH CV ECHO MEAS - LVOT AREA (M): 3.5 CM^2
BH CV ECHO MEAS - LVOT AREA: 3.5 CM^2
BH CV ECHO MEAS - LVOT DIAM: 2.1 CM
BH CV ECHO MEAS - LVPWD: 1 CM
BH CV ECHO MEAS - MED PEAK E' VEL: 8 CM/SEC
BH CV ECHO MEAS - MV A DUR: 0.1 SEC
BH CV ECHO MEAS - MV A MAX VEL: 76.6 CM/SEC
BH CV ECHO MEAS - MV DEC SLOPE: 655 CM/SEC^2
BH CV ECHO MEAS - MV DEC TIME: 0.14 SEC
BH CV ECHO MEAS - MV E MAX VEL: 80.4 CM/SEC
BH CV ECHO MEAS - MV E/A: 1
BH CV ECHO MEAS - MV MAX PG: 3.4 MMHG
BH CV ECHO MEAS - MV MEAN PG: 2 MMHG
BH CV ECHO MEAS - MV P1/2T MAX VEL: 90.9 CM/SEC
BH CV ECHO MEAS - MV P1/2T: 40.6 MSEC
BH CV ECHO MEAS - MV V2 MAX: 91.8 CM/SEC
BH CV ECHO MEAS - MV V2 MEAN: 72.5 CM/SEC
BH CV ECHO MEAS - MV V2 VTI: 19.5 CM
BH CV ECHO MEAS - MVA P1/2T LCG: 2.4 CM^2
BH CV ECHO MEAS - MVA(P1/2T): 5.4 CM^2
BH CV ECHO MEAS - MVA(VTI): 3.3 CM^2
BH CV ECHO MEAS - PA ACC TIME: 0.06 SEC
BH CV ECHO MEAS - PA MAX PG (FULL): 2.3 MMHG
BH CV ECHO MEAS - PA MAX PG: 5.2 MMHG
BH CV ECHO MEAS - PA PR(ACCEL): 52 MMHG
BH CV ECHO MEAS - PA V2 MAX: 114 CM/SEC
BH CV ECHO MEAS - PULM A REVS DUR: 0.1 SEC
BH CV ECHO MEAS - PULM A REVS VEL: 25.7 CM/SEC
BH CV ECHO MEAS - PULM DIAS VEL: 22.6 CM/SEC
BH CV ECHO MEAS - PULM S/D: 1.7
BH CV ECHO MEAS - PULM SYS VEL: 38.8 CM/SEC
BH CV ECHO MEAS - PVA(V,A): 2.6 CM^2
BH CV ECHO MEAS - PVA(V,D): 2.6 CM^2
BH CV ECHO MEAS - QP/QS: 0.98
BH CV ECHO MEAS - RV MAX PG: 2.9 MMHG
BH CV ECHO MEAS - RV MEAN PG: 2 MMHG
BH CV ECHO MEAS - RV V1 MAX: 85.8 CM/SEC
BH CV ECHO MEAS - RV V1 MEAN: 62.4 CM/SEC
BH CV ECHO MEAS - RV V1 VTI: 18.4 CM
BH CV ECHO MEAS - RVOT AREA: 3.5 CM^2
BH CV ECHO MEAS - RVOT DIAM: 2.1 CM
BH CV ECHO MEAS - SI(AO): 78.7 ML/M^2
BH CV ECHO MEAS - SI(CUBED): 26.5 ML/M^2
BH CV ECHO MEAS - SI(LVOT): 28.4 ML/M^2
BH CV ECHO MEAS - SI(MOD-SP2): 20.1 ML/M^2
BH CV ECHO MEAS - SI(MOD-SP4): 30.1 ML/M^2
BH CV ECHO MEAS - SI(TEICH): 24.2 ML/M^2
BH CV ECHO MEAS - SV(AO): 180.2 ML
BH CV ECHO MEAS - SV(CUBED): 60.8 ML
BH CV ECHO MEAS - SV(LVOT): 65.1 ML
BH CV ECHO MEAS - SV(MOD-SP2): 46 ML
BH CV ECHO MEAS - SV(MOD-SP4): 69 ML
BH CV ECHO MEAS - SV(RVOT): 63.7 ML
BH CV ECHO MEAS - SV(TEICH): 55.5 ML
BH CV ECHO MEAS - TAPSE (>1.6): 1.8 CM2
BH CV VAS BP RIGHT ARM: NORMAL MMHG
BH CV XLRA - RV BASE: 3.39 CM
BH CV XLRA - TDI S': 12.7 CM/SEC
BILIRUB SERPL-MCNC: 0.9 MG/DL (ref 0.1–1.2)
BUN BLD-MCNC: 11 MG/DL (ref 8–23)
BUN/CREAT SERPL: 11.1 (ref 7–25)
CALCIUM SPEC-SCNC: 9.5 MG/DL (ref 8.6–10.5)
CHLORIDE SERPL-SCNC: 98 MMOL/L (ref 98–107)
CO2 SERPL-SCNC: 28.6 MMOL/L (ref 22–29)
CREAT BLD-MCNC: 0.99 MG/DL (ref 0.57–1)
DEPRECATED RDW RBC AUTO: 48.8 FL (ref 37–54)
E/E' RATIO: 10
EOSINOPHIL # BLD AUTO: 0.21 10*3/MM3 (ref 0–0.7)
EOSINOPHIL NFR BLD AUTO: 1.6 % (ref 0.3–6.2)
ERYTHROCYTE [DISTWIDTH] IN BLOOD BY AUTOMATED COUNT: 14.9 % (ref 11.7–13)
GFR SERPL CREATININE-BSD FRML MDRD: 68 ML/MIN/1.73
GLOBULIN UR ELPH-MCNC: 5.4 GM/DL
GLUCOSE BLD-MCNC: 89 MG/DL (ref 65–99)
GLUCOSE BLDC GLUCOMTR-MCNC: 89 MG/DL (ref 70–130)
HBA1C MFR BLD: 7.7 % (ref 4.8–5.6)
HCT VFR BLD AUTO: 34.7 % (ref 35.6–45.5)
HGB BLD-MCNC: 11.7 G/DL (ref 11.9–15.5)
HOLD SPECIMEN: NORMAL
HOLD SPECIMEN: NORMAL
IMM GRANULOCYTES # BLD: 0.31 10*3/MM3 (ref 0–0.03)
IMM GRANULOCYTES NFR BLD: 2.3 % (ref 0–0.5)
INR PPP: 1.05 (ref 0.9–1.1)
LEFT ATRIUM VOLUME INDEX: 22 ML/M2
LV EF 2D ECHO EST: 68 %
LYMPHOCYTES # BLD AUTO: 2.33 10*3/MM3 (ref 0.9–4.8)
LYMPHOCYTES NFR BLD AUTO: 17.4 % (ref 19.6–45.3)
MAXIMAL PREDICTED HEART RATE: 153 BPM
MCH RBC QN AUTO: 30.5 PG (ref 26.9–32)
MCHC RBC AUTO-ENTMCNC: 33.7 G/DL (ref 32.4–36.3)
MCV RBC AUTO: 90.4 FL (ref 80.5–98.2)
MONOCYTES # BLD AUTO: 2.21 10*3/MM3 (ref 0.2–1.2)
MONOCYTES NFR BLD AUTO: 16.5 % (ref 5–12)
NEUTROPHILS # BLD AUTO: 8.25 10*3/MM3 (ref 1.9–8.1)
NEUTROPHILS NFR BLD AUTO: 61.8 % (ref 42.7–76)
NT-PROBNP SERPL-MCNC: 267.8 PG/ML (ref 0–900)
PLATELET # BLD AUTO: 556 10*3/MM3 (ref 140–500)
PMV BLD AUTO: 8.7 FL (ref 6–12)
POTASSIUM BLD-SCNC: 3.6 MMOL/L (ref 3.5–5.2)
PROT SERPL-MCNC: 8.4 G/DL (ref 6–8.5)
PROTHROMBIN TIME: 13.5 SECONDS (ref 11.7–14.2)
RBC # BLD AUTO: 3.84 10*6/MM3 (ref 3.9–5.2)
SODIUM BLD-SCNC: 137 MMOL/L (ref 136–145)
STRESS TARGET HR: 130 BPM
TROPONIN T SERPL-MCNC: <0.01 NG/ML (ref 0–0.03)
WBC NRBC COR # BLD: 13.36 10*3/MM3 (ref 4.5–10.7)
WHOLE BLOOD HOLD SPECIMEN: NORMAL
WHOLE BLOOD HOLD SPECIMEN: NORMAL

## 2018-03-07 PROCEDURE — 82962 GLUCOSE BLOOD TEST: CPT

## 2018-03-07 PROCEDURE — 84484 ASSAY OF TROPONIN QUANT: CPT | Performed by: EMERGENCY MEDICINE

## 2018-03-07 PROCEDURE — 99215 OFFICE O/P EST HI 40 MIN: CPT | Performed by: INTERNAL MEDICINE

## 2018-03-07 PROCEDURE — 83036 HEMOGLOBIN GLYCOSYLATED A1C: CPT | Performed by: HOSPITALIST

## 2018-03-07 PROCEDURE — 83880 ASSAY OF NATRIURETIC PEPTIDE: CPT | Performed by: EMERGENCY MEDICINE

## 2018-03-07 PROCEDURE — 85610 PROTHROMBIN TIME: CPT | Performed by: EMERGENCY MEDICINE

## 2018-03-07 PROCEDURE — 99284 EMERGENCY DEPT VISIT MOD MDM: CPT

## 2018-03-07 PROCEDURE — 85025 COMPLETE CBC W/AUTO DIFF WBC: CPT | Performed by: EMERGENCY MEDICINE

## 2018-03-07 PROCEDURE — 63710000001 INSULIN DETEMER PER 5 UNITS: Performed by: HOSPITALIST

## 2018-03-07 PROCEDURE — 93005 ELECTROCARDIOGRAM TRACING: CPT | Performed by: EMERGENCY MEDICINE

## 2018-03-07 PROCEDURE — 80053 COMPREHEN METABOLIC PANEL: CPT | Performed by: EMERGENCY MEDICINE

## 2018-03-07 PROCEDURE — 93010 ELECTROCARDIOGRAM REPORT: CPT | Performed by: INTERNAL MEDICINE

## 2018-03-07 PROCEDURE — 71046 X-RAY EXAM CHEST 2 VIEWS: CPT

## 2018-03-07 PROCEDURE — 93306 TTE W/DOPPLER COMPLETE: CPT | Performed by: INTERNAL MEDICINE

## 2018-03-07 PROCEDURE — 36415 COLL VENOUS BLD VENIPUNCTURE: CPT | Performed by: EMERGENCY MEDICINE

## 2018-03-07 PROCEDURE — 93306 TTE W/DOPPLER COMPLETE: CPT

## 2018-03-07 RX ORDER — MAGNESIUM OXIDE 400 MG/1
400 TABLET ORAL DAILY
Status: DISCONTINUED | OUTPATIENT
Start: 2018-03-08 | End: 2018-03-16 | Stop reason: HOSPADM

## 2018-03-07 RX ORDER — ONDANSETRON 4 MG/1
4 TABLET, ORALLY DISINTEGRATING ORAL EVERY 6 HOURS PRN
Status: DISCONTINUED | OUTPATIENT
Start: 2018-03-07 | End: 2018-03-16 | Stop reason: HOSPADM

## 2018-03-07 RX ORDER — HYDROCHLOROTHIAZIDE 12.5 MG/1
12.5 CAPSULE, GELATIN COATED ORAL DAILY
Status: DISCONTINUED | OUTPATIENT
Start: 2018-03-08 | End: 2018-03-16 | Stop reason: HOSPADM

## 2018-03-07 RX ORDER — ACETAMINOPHEN 325 MG/1
650 TABLET ORAL EVERY 4 HOURS PRN
Status: DISCONTINUED | OUTPATIENT
Start: 2018-03-07 | End: 2018-03-16 | Stop reason: HOSPADM

## 2018-03-07 RX ORDER — DEXTROSE MONOHYDRATE 25 G/50ML
25 INJECTION, SOLUTION INTRAVENOUS
Status: DISCONTINUED | OUTPATIENT
Start: 2018-03-07 | End: 2018-03-16 | Stop reason: HOSPADM

## 2018-03-07 RX ORDER — FERROUS SULFATE 325(65) MG
325 TABLET ORAL DAILY
Status: DISCONTINUED | OUTPATIENT
Start: 2018-03-08 | End: 2018-03-16 | Stop reason: HOSPADM

## 2018-03-07 RX ORDER — NICOTINE POLACRILEX 4 MG
15 LOZENGE BUCCAL
Status: DISCONTINUED | OUTPATIENT
Start: 2018-03-07 | End: 2018-03-16 | Stop reason: HOSPADM

## 2018-03-07 RX ORDER — LOSARTAN POTASSIUM 100 MG/1
100 TABLET ORAL
Status: DISCONTINUED | OUTPATIENT
Start: 2018-03-08 | End: 2018-03-16 | Stop reason: HOSPADM

## 2018-03-07 RX ORDER — ONDANSETRON 2 MG/ML
4 INJECTION INTRAMUSCULAR; INTRAVENOUS EVERY 6 HOURS PRN
Status: DISCONTINUED | OUTPATIENT
Start: 2018-03-07 | End: 2018-03-16 | Stop reason: HOSPADM

## 2018-03-07 RX ORDER — ONDANSETRON 4 MG/1
4 TABLET, FILM COATED ORAL EVERY 6 HOURS PRN
Status: DISCONTINUED | OUTPATIENT
Start: 2018-03-07 | End: 2018-03-16 | Stop reason: HOSPADM

## 2018-03-07 RX ORDER — FLUTICASONE PROPIONATE 50 MCG
2 SPRAY, SUSPENSION (ML) NASAL DAILY
Status: DISCONTINUED | OUTPATIENT
Start: 2018-03-08 | End: 2018-03-16 | Stop reason: HOSPADM

## 2018-03-07 RX ORDER — ALLOPURINOL 100 MG/1
100 TABLET ORAL DAILY
COMMUNITY
End: 2018-03-19 | Stop reason: SDUPTHER

## 2018-03-07 RX ORDER — ONDANSETRON HYDROCHLORIDE 8 MG/1
8 TABLET, FILM COATED ORAL 3 TIMES DAILY PRN
Status: DISCONTINUED | OUTPATIENT
Start: 2018-03-07 | End: 2018-03-11

## 2018-03-07 RX ORDER — SODIUM CHLORIDE 0.9 % (FLUSH) 0.9 %
1-10 SYRINGE (ML) INJECTION AS NEEDED
Status: DISCONTINUED | OUTPATIENT
Start: 2018-03-07 | End: 2018-03-16 | Stop reason: HOSPADM

## 2018-03-07 RX ORDER — MAGNESIUM OXIDE 400 MG/1
400 TABLET ORAL DAILY
COMMUNITY
End: 2018-09-20

## 2018-03-07 RX ORDER — ALLOPURINOL 100 MG/1
100 TABLET ORAL DAILY
Status: DISCONTINUED | OUTPATIENT
Start: 2018-03-08 | End: 2018-03-16 | Stop reason: HOSPADM

## 2018-03-07 RX ORDER — MECLIZINE HYDROCHLORIDE 25 MG/1
25 TABLET ORAL 3 TIMES DAILY PRN
Status: DISCONTINUED | OUTPATIENT
Start: 2018-03-07 | End: 2018-03-16 | Stop reason: HOSPADM

## 2018-03-07 RX ORDER — LORAZEPAM 0.5 MG/1
0.5 TABLET ORAL 2 TIMES DAILY PRN
Status: DISCONTINUED | OUTPATIENT
Start: 2018-03-07 | End: 2018-03-16 | Stop reason: HOSPADM

## 2018-03-07 RX ORDER — TELMISARTAN AND HYDROCHLORTHIAZIDE 80; 12.5 MG/1; MG/1
1 TABLET ORAL
Status: DISCONTINUED | OUTPATIENT
Start: 2018-03-08 | End: 2018-03-07 | Stop reason: CLARIF

## 2018-03-07 RX ORDER — SODIUM CHLORIDE 0.9 % (FLUSH) 0.9 %
10 SYRINGE (ML) INJECTION AS NEEDED
Status: DISCONTINUED | OUTPATIENT
Start: 2018-03-07 | End: 2018-03-16 | Stop reason: HOSPADM

## 2018-03-07 RX ADMIN — INSULIN DETEMIR 45 UNITS: 100 INJECTION, SOLUTION SUBCUTANEOUS at 23:05

## 2018-03-07 NOTE — ED PROVIDER NOTES
EMERGENCY DEPARTMENT ENCOUNTER    CHIEF COMPLAINT  Chief Complaint: SOA  History given by: Patient  History limited by: N/A  Room Number: 52/52  PMD: Paola Onofre MD      HPI:  Pt is a 67 y.o. female who presents complaining of SOA which began 1 week ago.  Pt states the SOA worsens with exertion.  Pt also c/o dry cough and 15 lb weight gain in past year.  Pt reports hx of colon cancer which metastasized to her liver, but states she finished chemo in 2017.  Pt also states she was recently dx with pericardial effusion.  Seen today by ADRIANA/Alejandra and sent to ER for further eval of SOA and pericardial effusion.    Duration/Onset/Timin week  Location: respiratory  Radiation: none  Quality: SOA  Intensity/Severity: moderate  Associated Symptoms: dry cough and 15 lb weight gain in past year  Aggravating or Alleviating Factors: exertion worsens the SOA  Previous Episodes: none      PAST MEDICAL HISTORY  Active Ambulatory Problems     Diagnosis Date Noted   • Hyperlipidemia 2015   • Essential hypertension 2015   • Allergic rhinitis due to pollen 2015   • Vitamin D deficiency 2016   • Morbid obesity due to excess calories 2016   • Encounter for adjustment or management of vascular access device 2016   • Primary malignant neoplasm of colon 2016   • Iron deficiency anemia 2016   • Breast lesion 2016   • Peripheral neuropathy due to chemotherapy 2016   • Nonproliferative diabetic retinopathy 2016   • Type 2 diabetes mellitus with microalbuminuria 2016   • Osteomyelitis of toe of left foot 2016   • Sepsis 2016   • Type 2 diabetes mellitus with peripheral neuropathy 2016   • Type 2 diabetes mellitus with complication, with long-term current use of insulin 2016   • Obesity (BMI 30-39.9) 2016   • Liver metastasis 2017   • Elevated liver enzymes 2017   • Chemotherapy induced nausea and vomiting 2017      Resolved Ambulatory Problems     Diagnosis Date Noted   • No Resolved Ambulatory Problems     Past Medical History:   Diagnosis Date   • Anemia    • Cancer 06/04/2013   • Diabetes mellitus    • History of transfusion    • Hyperlipidemia    • Hypertension    • Metastatic colon cancer to liver    • Retinopathy    • Type 2 diabetes mellitus        PAST SURGICAL HISTORY  Past Surgical History:   Procedure Laterality Date   • ABDOMINAL SURGERY      ablation    • AMPUTATION DIGIT Left 1/1/2017    Procedure: LEFT SECOND TOE AMPUTATION;  Surgeon: Farzad Nichols MD;  Location: [facility] OR;  Service:    • AMPUTATION OF REPLICATED TOES      right distal second toe    • CATARACT EXTRACTION     • COLECTOMY PARTIAL / TOTAL  01/14/2014   • HYSTERECTOMY  1998   • PORTACATH PLACEMENT  06/2013   • SALPINGO OOPHORECTOMY Bilateral    • SPLENECTOMY         FAMILY HISTORY  Family History   Problem Relation Age of Onset   • Heart attack Other    • Cancer Other    • Diabetes Other    • Hypertension Other    • Hypertension Father    • Diabetes Father    • Breast cancer Sister    • Diabetes Sister    • Stroke Paternal Grandmother    • Diabetes Paternal Grandmother    • Lupus Paternal Grandfather    • Stroke Paternal Grandfather    • Diabetes Sister        SOCIAL HISTORY  Social History     Social History   • Marital status:      Spouse name: Jesus   • Number of children: N/A   • Years of education: College     Occupational History   • Middle School Counselor   Retired     [employer]     Social History Main Topics   • Smoking status: Never Smoker   • Smokeless tobacco: Never Used   • Alcohol use No   • Drug use: No   • Sexual activity: Defer     Other Topics Concern   • Not on file     Social History Narrative       ALLERGIES  Compazine [prochlorperazine edisylate] and Prochlorperazine maleate    REVIEW OF SYSTEMS  Review of Systems   Constitutional: Positive for unexpected weight change (15lbs in past month). Negative for chills and  fever.   HENT: Negative.  Negative for sore throat.    Eyes: Negative.    Respiratory: Positive for cough (dry) and shortness of breath (began 1 week ago).    Cardiovascular: Negative.  Negative for chest pain.   Gastrointestinal: Negative.    Genitourinary: Negative.  Negative for dysuria.   Musculoskeletal: Negative.  Negative for back pain.   Skin: Negative.  Negative for rash.   Neurological: Negative.  Negative for headaches.       PHYSICAL EXAM  ED Triage Vitals   Temp Heart Rate Resp BP SpO2   03/07/18 1445 03/07/18 1445 03/07/18 1445 03/07/18 1452 03/07/18 1445   98.3 °F (36.8 °C) 101 16 148/86 95 %      Temp src Heart Rate Source Patient Position BP Location FiO2 (%)   -- -- -- -- --              Physical Exam   Constitutional: No distress.   HENT:   Head: Normocephalic and atraumatic.   Mouth/Throat: Oropharynx is clear and moist.   Eyes:   Unremarkable   Cardiovascular: Normal rate and regular rhythm.    Pulmonary/Chest: Breath sounds normal. No respiratory distress.   Abdominal: There is no tenderness.   Musculoskeletal: She exhibits edema (mild chronic BLE). She exhibits no tenderness.   Neurological: She is alert.   Skin: No rash noted.   Nursing note and vitals reviewed.      LAB RESULTS  Lab Results (last 24 hours)     Procedure Component Value Units Date/Time    CBC & Differential [[id number]] Collected:  03/07/18 1509    Specimen:  Blood Updated:  03/07/18 9661    Narrative:       The following orders were created for panel order CBC & Differential.  Procedure                               Abnormality         Status                     ---------                               -----------         ------                     CBC Auto Differential[[id number]]        Abnormal            Final result                 Please view results for these tests on the individual orders.    Comprehensive Metabolic Panel [[id number]]  (Abnormal) Collected:  03/07/18 1509    Specimen:  Blood Updated:  03/07/18 6279      Glucose 89 mg/dL      BUN 11 mg/dL      Creatinine 0.99 mg/dL      Sodium 137 mmol/L      Potassium 3.6 mmol/L      Chloride 98 mmol/L      CO2 28.6 mmol/L      Calcium 9.5 mg/dL      Total Protein 8.4 g/dL      Albumin 3.00 (L) g/dL      ALT (SGPT) 52 (H) U/L      AST (SGOT) 52 (H) U/L      Alkaline Phosphatase 453 (H) U/L      Total Bilirubin 0.9 mg/dL      eGFR  African Amer 68 mL/min/1.73      Globulin 5.4 gm/dL      A/G Ratio 0.6 g/dL      BUN/Creatinine Ratio 11.1     Anion Gap 10.4 mmol/L     BNP [[id number]]  (Normal) Collected:  03/07/18 1509    Specimen:  Blood Updated:  03/07/18 1542     proBNP 267.8 pg/mL     Narrative:       Among patients with dyspnea, NT-proBNP is highly sensitive for the detection of acute congestive heart failure. In addition NT-proBNP of <300 pg/ml effectively rules out acute congestive heart failure with 99% negative predictive value.    Troponin [[id number]]  (Normal) Collected:  03/07/18 1509    Specimen:  Blood Updated:  03/07/18 1542     Troponin T <0.010 ng/mL     Narrative:       Troponin T Reference Ranges:  Less than 0.03 ng/mL:    Negative for AMI  0.03 to 0.09 ng/mL:      Indeterminant for AMI  Greater than 0.09 ng/mL: Positive for AMI    CBC Auto Differential [[id number]]  (Abnormal) Collected:  03/07/18 1509    Specimen:  Blood Updated:  03/07/18 1519     WBC 13.36 (H) 10*3/mm3      RBC 3.84 (L) 10*6/mm3      Hemoglobin 11.7 (L) g/dL      Hematocrit 34.7 (L) %      MCV 90.4 fL      MCH 30.5 pg      MCHC 33.7 g/dL      RDW 14.9 (H) %      RDW-SD 48.8 fl      MPV 8.7 fL      Platelets 556 (H) 10*3/mm3      Neutrophil % 61.8 %      Lymphocyte % 17.4 (L) %      Monocyte % 16.5 (H) %      Eosinophil % 1.6 %      Basophil % 0.4 %      Immature Grans % 2.3 (H) %      Neutrophils, Absolute 8.25 (H) 10*3/mm3      Lymphocytes, Absolute 2.33 10*3/mm3      Monocytes, Absolute 2.21 (H) 10*3/mm3      Eosinophils, Absolute 0.21 10*3/mm3      Basophils, Absolute 0.05 10*3/mm3       Immature Grans, Absolute 0.31 (H) 10*3/mm3     Protime-INR [[id number]]  (Normal) Collected:  03/07/18 1509    Specimen:  Blood Updated:  03/07/18 1737     Protime 13.5 Seconds      INR 1.05          I ordered the above labs and reviewed the results    RADIOLOGY  XR Chest 2 View         CXR shows The lungs are moderately expanded with a moderate-sized left pleural effusion and some adjacent localized atelectasis. There is a very small right pleural effusion. The lungs are otherwise clear. Allowing for the lung expansion, the heart appears slightly enlarged. A  left-sided MediPort catheter ends in the SVC.     I ordered the above noted radiological studies. Interpreted by radiologist. Reviewed by me in PACS.       PROCEDURES  Procedures    EKG           EKG time: 1715  Rhythm/Rate: S tachy, 100  P waves and MA: normal  QRS, axis: normal axis, Q wave in III   ST and T waves: diffuse T wave flattening     Interpreted Contemporaneously by me, independently viewed  Changed compared to prior 2017, T wave changes are new.    PROGRESS AND CONSULTS  ED Course     1459  Ordered CXR, CMP, BNP, Troponin, CBC.    1612  Discussed unremarkable lab results and plan to consult cardiology.  Pt understands and agrees with the plan, all questions answered.    1615  Placed call to cardiology.    1700  Rechecked pt, who is resting comfortably.  Notified I am still waiting for call from cardiology for consult. Pt understands and agrees with the plan, all questions answered.      1714  Discussed pt's case with Dr. Hodge (cardiology) who agrees that the pt should be admitted and receive an echo tonight.     1716  Rechecked pt, who is resting comfortably.  Discussed CXR results showing pleural effusion and plan to admit the pt for further evaluation and an inpatient echo.  Pt understands and agrees with the plan, all questions answered.     1720  Placed call to A.  Ordered echo.    1746  Discussed pt's case with Dr. Morgan ([facility]) who agrees to  admit the pt to a tele bed.      MEDICAL DECISION MAKING  Results were reviewed/discussed with the patient and they were also made aware of online access. Pt also made aware that some labs, such as cultures, will not be resulted during ER visit and follow up with PMD is necessary.     MDM  Number of Diagnoses or Management Options     Amount and/or Complexity of Data Reviewed  Clinical lab tests: ordered and reviewed (Lab work unremarkable.)  Tests in the radiology section of CPT®: ordered and reviewed (CXR shows pleural effusion.)  Tests in the medicine section of CPT®: ordered and reviewed (See EKG in procedure note.)  Decide to obtain previous medical records or to obtain history from someone other than the patient: yes  Review and summarize past medical records: yes (Reviewing note today from Dr. Brown (oncology).  CT Chest on 2/28/18 showed prominent pericardial effusion. )  Discuss the patient with other providers: yes (Dr. Hodge (cardiology).  Dr. Morgan ([facility]))  Independent visualization of images, tracings, or specimens: yes           DIAGNOSIS  Final diagnoses:   Shortness of breath   Pericardial effusion   Pleural effusion   Primary malignant neoplasm of colon       DISPOSITION  ADMISSION    Discussed treatment plan and reason for admission with pt/family and admitting physician.  Pt/family voiced understanding of the plan for admission for further testing/treatment as needed.       Latest Documented Vital Signs:  As of 5:47 PM  BP- 153/83 HR- 108 Temp- 98.3 °F (36.8 °C) O2 sat- 95%    --  Documentation assistance provided by soco Royal for Dr. Droman.  Information recorded by the scribe was done at my direction and has been verified and validated by me.     Valarie Royal  03/07/18 9662       Cecil Dorman MD  03/07/18 5849

## 2018-03-07 NOTE — PROGRESS NOTES
Clinical Pharmacy Services: Medication History    Feli Del Toro is a 67 y.o. female presenting to [facility] for   Chief Complaint   Patient presents with   • Shortness of Breath     X1 WK AFTER HAVING CT SCAN DONE       She  has a past medical history of Anemia; Cancer (06/04/2013); Diabetes mellitus; History of transfusion; Hyperlipidemia; Hypertension; Metastatic colon cancer to liver; Retinopathy; and Type 2 diabetes mellitus.    Allergies as of 03/07/2018 - Hitesh as Reviewed 03/07/2018   Allergen Reaction Noted   • Compazine [prochlorperazine edisylate]  12/31/2015   • Prochlorperazine maleate Other (See Comments) 01/09/2014       Medication information was obtained from: Patient provided medication list from provider's office  Pharmacy and Phone Number: [facility] [phone]    Prior to Admission Medications     Prescriptions Last Dose Informant Patient Reported? Taking?    allopurinol (ZYLOPRIM) 100 MG tablet  Spouse/Significant Other Yes Yes    Take 100 mg by mouth Daily.    aspirin 81 MG tablet  Spouse/Significant Other Yes Yes    Take 81 mg by mouth daily.    Biotin 5000 MCG tablet  Spouse/Significant Other No Yes    Take 5,000 mcg by mouth Daily.    calcium carbonate (TUMS) 500 MG chewable tablet  Spouse/Significant Other Yes Yes    Chew 2 tablets As Needed.    ferrous sulfate 325 (65 FE) MG tablet  Spouse/Significant Other No Yes    Take 1 tablet by mouth Daily.    fluticasone (FLONASE) 50 MCG/ACT nasal spray  Spouse/Significant Other Yes Yes    2 sprays into each nostril Daily.    Insulin Lispro (HUMALOG KWIKPEN) 100 UNIT/ML solution pen-injector  Spouse/Significant Other Yes Yes    Inject 15 Units under the skin 3 (Three) Times a Day Before Meals. And sliding scale as directed    LORazepam (ATIVAN) 0.5 MG tablet  Spouse/Significant Other No Yes    Take 1 tablet by mouth 2 (Two) Times a Day As Needed (nausea).    magnesium oxide (MAG-OX) 400 MG tablet  Spouse/Significant Other Yes Yes     Take 400 mg by mouth Daily.    meclizine (ANTIVERT) 25 MG tablet  Spouse/Significant Other Yes Yes    Take 25 mg by mouth 3 (Three) Times a Day As Needed.    NOVOFINE 32G X 6 MM misc  Spouse/Significant Other No Yes    USE AS DIRECTED 5 TIMES DAILY AND AS NEEDED    ondansetron (ZOFRAN) 8 MG tablet  Spouse/Significant Other No Yes    Take 1 tablet by mouth 3 (Three) Times a Day As Needed for Nausea or Vomiting.    ONETOUCH VERIO test strip  Spouse/Significant Other No Yes    TEST 3-4 TIMES A DAY    SAXagliptin (ONGLYZA) 5 MG tablet  Spouse/Significant Other Yes Yes    Take 5 mg by mouth Daily.    telmisartan-hydrochlorothiazide (MICARDIS HCT) 40-12.5 MG per tablet  Spouse/Significant Other No Yes    Take 1 tablet by mouth daily    TOUJEO SOLOSTAR 300 UNIT/ML solution pen-injector  Spouse/Significant Other No Yes    Inject 45 Units under the skin Daily.    vitamin D (ERGOCALCIFEROL) 33398 UNITS capsule capsule  Spouse/Significant Other No Yes    Take 1 capsule by mouth 1 (One) Time Per Week.    XIGDUO XR 5-1000 MG tablet sustained-release 24 hour  Spouse/Significant Other No Yes    Take 2 tablets by mouth Daily.            Medication notes: None    This medication list is complete to the best of my knowledge as of 3/7/2018    Please call if questions.    Julieta Farah, Medication History Technician  3/7/2018 6:10 PM

## 2018-03-07 NOTE — ED NOTES
"Patient states \"I've been having difficulty breathing for the last week. I was following up with Dr. Brown for a CT scan I had 2/28 and he said I had a pocket of fluid built up around my heart and he didn't like the way my lungs sounded so he told me to come here.\"     Emma Greene RN  03/07/18 [id number]    "

## 2018-03-07 NOTE — TELEPHONE ENCOUNTER
03/07/18  Feli Del Toro  1950    Home Phone [phone]   Mobile [phone]     Dr. Brown has referred this patient to [facility] for pericardial effusion seen on CT performed 2/28/18. Dr. Brown feels that patient's condition warrants that she be seen before end of week.    I spoke with Dr. Hoover about this patient. He feels she should be seen in ER. I called this advice to [facility]    Nandini ALEXANDER RN

## 2018-03-07 NOTE — PROGRESS NOTES
Subjective     CHIEF COMPLAINT:    1. Metastatic colon cancer (KRAS mutation positive) with liver metastasis, biopsy confirmed. No resection of primary sigmoid colon cancer.   2. Recurrent Iron deficiency anemia   3. Palliative chemotherapy with FOLFOX-6 was given from June 2013 to November 2013.   4. Left partial colectomy, splenectomy, resection of left diaphragm with repair and partial hepatectomy with microwave ablation of metastasis on 1/14/2014.   5. FOLFIRI x12 cycles between 3/07/2014 and 8/07/2014.   6. CT scan on 05/28/2015 revealed a new liver lesion. Stereotactic radiation therapy with CyberKnife was delivered in 3 fractions, completed on 07/14/2015.        7.  New liver lesion was identified on CT on 5/5/17. CT guided microwave ablation of liver mass on 6/16/17.       8.  Patient re-treated with Folfiri on 6/29/17-11/29/17.     HISTORY OF PRESENT ILLNESS:     Feli Del Toro is a 67 y.o. patient with the above medical history.  She returns today for follow-up.  She reports shortness of breath that started about a week ago.  It has been slowly progressive.  It worsens with ambulation.  She is having difficulty lying flat.  No chest pain.    Patient reports a recent upper respiratory tract infection.  She developed sore throat.      Past Medical History:   Diagnosis Date   • Anemia    • Cancer 06/04/2013   • Diabetes mellitus    • History of transfusion    • Hyperlipidemia    • Hypertension    • Metastatic colon cancer to liver    • Retinopathy    • Type 2 diabetes mellitus        Past Surgical History:   Procedure Laterality Date   • ABDOMINAL SURGERY      ablation    • AMPUTATION DIGIT Left 1/1/2017    Procedure: LEFT SECOND TOE AMPUTATION;  Surgeon: Farzad Nichols MD;  Location: [facility];  Service:    • AMPUTATION OF REPLICATED TOES      right distal second toe    • CATARACT EXTRACTION     • COLECTOMY PARTIAL / TOTAL  01/14/2014   • HYSTERECTOMY  1998   • PORTACATH PLACEMENT  06/2013   • SALPINGO  OOPHORECTOMY Bilateral    • SPLENECTOMY         Cancer-related family history includes Breast cancer in her sister; Cancer in her other.    SCHEDULED MEDS:       Current Outpatient Prescriptions:   •  allopurinol (ZYLOPRIM) 100 MG tablet, TAKE 1 TABLET DAILY, Disp: 90 tablet, Rfl: 1  •  aspirin 81 MG tablet, Take 81 mg by mouth daily., Disp: , Rfl:   •  Biotin 5000 MCG tablet, Take 5,000 mcg by mouth Daily., Disp: , Rfl:   •  calcium carbonate (TUMS) 500 MG chewable tablet, Chew., Disp: , Rfl:   •  ferrous sulfate 325 (65 FE) MG tablet, Take 1 tablet by mouth Daily., Disp: , Rfl:   •  fluticasone (FLONASE) 50 MCG/ACT nasal spray, , Disp: , Rfl:   •  HUMALOG KWIKPEN 100 UNIT/ML solution pen-injector, INJECT 15 UNITS BEFORE MEALS AND SLIDING SCALE AS DIRECTED, Disp: 15 mL, Rfl: 0  •  LORazepam (ATIVAN) 0.5 MG tablet, Take 1 tablet by mouth 2 (Two) Times a Day As Needed (nausea)., Disp: 20 tablet, Rfl: 0  •  MAGNESIUM OXIDE PO, Take 400 mg by mouth daily., Disp: , Rfl:   •  meclizine (ANTIVERT) 25 MG tablet, Take 25 mg by mouth 3 (Three) Times a Day As Needed., Disp: , Rfl:   •  NOVOFINE 32G X 6 MM misc, USE AS DIRECTED 5 TIMES DAILY AND AS NEEDED, Disp: 200 each, Rfl: 3  •  ondansetron (ZOFRAN) 8 MG tablet, Take 1 tablet by mouth 3 (Three) Times a Day As Needed for Nausea or Vomiting., Disp: 30 tablet, Rfl: 5  •  ONETOUCH VERIO test strip, TEST 3-4 TIMES A DAY, Disp: 100 each, Rfl: 5  •  ONGLYZA 5 MG tablet, TAKE 1 TABLET DAILY, Disp: 90 tablet, Rfl: 0  •  telmisartan-hydrochlorothiazide (MICARDIS HCT) 40-12.5 MG per tablet, Take 1 tablet by mouth daily, Disp: 90 tablet, Rfl: 3  •  TOUJEO SOLOSTAR 300 UNIT/ML solution pen-injector, Inject 45 Units under the skin Daily., Disp: 3 pen, Rfl: 0  •  vitamin D (ERGOCALCIFEROL) 13461 UNITS capsule capsule, Take 1 capsule by mouth 1 (One) Time Per Week., Disp: 13 capsule, Rfl: 3  •  XIGDUO XR 5-1000 MG tablet sustained-release 24 hour, Take 2 tablets by mouth Daily., Disp: 180  "tablet, Rfl: 3    REVIEW OF SYSTEMS:  GENERAL:  No Fever or chills. No weight change.  Fatigue.   SKIN:  No skin rashes or lesions.  HEME/LYMPH: History of Anemia. No Easy bruisability. No Enlarged lymph nodes.  EYES:  No Vision changes. No Diplopia.  ENT:  Recent sore throat.   RESPIRATORY:  Shortness of breath. No Cough.   CVS:  PND. Orthopnea.  No Chest pain. No Lower extremity swelling.   GI:  No Abdominal pain. No Nausea. No Vomiting. No Constipation. No Diarrhea. No Melena. No Hematochezia.  :  Recent change in the urine color to orange.   MUSCULOSKELETAL: Chronic Back pain. No Joint pain or stiffness.  NEUROLOGICAL:  Dizziness and lightheadedness.  PSYCHIATRIC:  No Anxiety. No Depression.     Objective   VITAL SIGNS:     Vitals:    03/07/18 1337   BP: 128/72   Pulse: 105   Resp: 14   Temp: 98.2 °F (36.8 °C)   TempSrc: Oral   SpO2: 97%   Weight: 117 kg (258 lb 3.2 oz)   Height: 168 cm (66.14\")   PainSc: 7  Comment: Breathing discomfort     Body mass index is 41.5 kg/(m^2).     Wt Readings from Last 3 Encounters:   03/07/18 117 kg (258 lb 3.2 oz)   03/07/18 116 kg (255 lb)   02/21/18 116 kg (255 lb 6.4 oz)       PHYSICAL EXAMINATION:  GENERAL:  The patient appears weak and dyspneic, not in acute distress.  SKIN:  No skin rashes or lesions. No Ecchymosis or Petechiae.  HEAD:  Normocephalic.  EYES:  No Pallor. No Jaundice.   NECK:  JVD.  LYMPHATICS:  No cervical or supraclavicular Lymphadenopathy.  CHEST: Decreased breath sounds at the bases.  CARDIAC:  Normal S1 & S2. No Murmurs.  ABDOMEN:  Soft. No tenderness. No Hepatomegaly. No Splenomegaly. No masses.  EXTREMITIES:  No Edema. No Calf tenderness. No Cyanosis.   NEUROLOGICAL:  No Focal neurological deficits.       RESULT REVIEW:     Results for orders placed or performed in visit on 02/28/18   Comprehensive Metabolic Panel   Result Value Ref Range    Glucose 92 74 - 124 mg/dL    BUN 24 (H) 6 - 20 mg/dL    Creatinine 0.94 0.60 - 1.10 mg/dL    Sodium 136 134 - " 145 mmol/L    Potassium 4.1 3.5 - 4.7 mmol/L    Chloride 94 (L) 98 - 107 mmol/L    CO2 28.1 22.0 - 29.0 mmol/L    Calcium 9.4 8.5 - 10.2 mg/dL    Total Protein 8.2 (H) 6.3 - 8.0 g/dL    Albumin 3.20 (L) 3.50 - 5.20 g/dL    ALT (SGPT) 58 (H) 0 - 33 U/L    AST (SGOT) 75 (C) 0 - 32 U/L    Alkaline Phosphatase 447 (H) 38 - 116 U/L    Total Bilirubin 1.4 (H) 0.1 - 1.2 mg/dL    eGFR  African Amer 72 >60 mL/min/1.73    Globulin 5.0 (H) 1.8 - 3.5 gm/dL    A/G Ratio 0.6 (L) 1.1 - 2.4 g/dL    BUN/Creatinine Ratio 25.5 7.3 - 30.0    Anion Gap 13.9 mmol/L   CBC Auto Differential   Result Value Ref Range    WBC 13.28 (H) 4.00 - 10.00 10*3/mm3    RBC 4.02 3.90 - 5.00 10*6/mm3    Hemoglobin 12.0 11.5 - 14.9 g/dL    Hematocrit 36.5 34.0 - 45.0 %    MCV 90.8 83.0 - 97.0 fL    MCH 29.9 27.0 - 33.0 pg    MCHC 32.9 32.0 - 35.0 g/dL    RDW 14.6 (H) 11.7 - 14.5 %    RDW-SD 48.0 37.0 - 49.0 fl    MPV 10.0 8.9 - 12.1 fL    Platelets 439 (H) 150 - 375 10*3/mm3    Neutrophil % 68.3 39.0 - 75.0 %    Lymphocyte % 14.1 (L) 20.0 - 49.0 %    Monocyte % 15.1 (H) 4.0 - 12.0 %    Eosinophil % 1.1 1.0 - 5.0 %    Basophil % 0.4 0.0 - 1.1 %    Immature Grans % 1.0 (H) 0.0 - 0.5 %    Neutrophils, Absolute 9.07 (H) 1.50 - 7.00 10*3/mm3    Lymphocytes, Absolute 1.87 1.00 - 3.50 10*3/mm3    Monocytes, Absolute 2.01 (H) 0.25 - 0.80 10*3/mm3    Eosinophils, Absolute 0.15 0.00 - 0.36 10*3/mm3    Basophils, Absolute 0.05 0.00 - 0.10 10*3/mm3    Immature Grans, Absolute 0.13 (H) 0.00 - 0.03 10*3/mm3    nRBC 0.0 0.0 - 0.0 /100 WBC        Lab Results   Component Value Date    CEA 5.31 02/14/2018    CEA 5.41 01/10/2018    CEA 5.18 11/29/2017     POSTCONTRAST CT ABDOMEN AND PELVIS 2/28/18:      HISTORY: Female who is 67 years-old, with a history of  morbid obesity,  diabetes and hypertension presenting with stage IV colon cancer, liver  metastases and elevated liver function tests presenting for follow-up.      PROTOCOL: Imaging was performed with standard  technique.      Radiation dose reduction techniques were utilized including automated  exposure control and exposure modulation based on body size.      COMPARISON: 10/19/2017      FINDINGS:  1. Interval development of prominent pericardial effusion 3.2 cm.  2. Relative stability of metastatic liver disease.  3. Gallstones, contracted gallbladder, no acute process.  4. Stable benign right renal cyst otherwise normal kidneys, adrenal  glands, pancreas.  5. Prior splenectomy with apparent residual splenule.  6. No obstruction, free air nor dilatation of bowel, the appendix is  normal.  7. Surgical absence of the uterus, no adnexal mass nor free fluid.  8. Stable prominent degenerative changes lumbar spine L4-L5, no evidence  of bone lesion no change.  9. New mild bibasilar atelectasis and minimal left effusion.      IMPRESSION:  1. Stable metastatic liver disease.  2. New large pericardial effusion.  3. No other significant change since prior study      I personally reviewed the CT scan with the findings with     Assessment/Plan    1.  Metastatic colon cancer. She had metastases to the liver.   · She received 12 cycles of FOLFOX-6 and then had resection of the primary tumor along with metastasectomy and splenectomy with thermal ablation of lesions in the liver in January 2014.   · This was followed by 12 cycles of the FOLFIRI regimen completed on 08/07/2014.   · CT scans on 05/28/2015 revealed a new lesion in the liver.  Dr. Ruano referred the patient to Radiation Therapy and she received CyberKnife radiation, completed on 07/14/2015.    · The CT scan from 5/5/17 revealed a new 2.2 cm lesion in the anterior hepatic segment.  CT guided microwave ablation of liver mass performed on 6/16/17 with CT evidence on 7/20/17 of a complete response  · FOLFIRI ×12 cycles given between 6/29/17 and 11/29/17.       The CT scan is not showing evidence of lung metastases.  The liver mass is stable and there is no evidence of  disease progression in the liver.    2.  Elevated liver enzymes, improving.  There is no evidence of portal vein thrombosis (concern based on CT scan from [facility] from 10/19/17).  Elevated liver enzymes may be attributed to hepatic congestion related to pericardial effusion.    3.  New pericardial effusion.  The patient is symptomatic.  She is short of breath.  She is tachycardic.  We made a referral to cardiology with her appointment in 3 weeks from now.    4.  Iron deficiency anemia.  She is on oral iron once a day.    5.  The patient has a port in place.    PLAN:    1.  We discussed the case with the cardiology office.  Due to the patient's symptoms, we recommended sending the patient to the ER.    I contacted Dr. Koroma in the Emergency Department and discussed the patient's case with him.  Patient was taken to the emergency department by the medical assistant in a wheelchair.    2.  After the patient's discharge from the hospital, we will arrange for a follow-up in 5 weeks with a CBC, CMP, CEA and a port flush.      Marivel Brown MD  03/07/18

## 2018-03-08 ENCOUNTER — APPOINTMENT (OUTPATIENT)
Dept: GENERAL RADIOLOGY | Facility: HOSPITAL | Age: 68
End: 2018-03-08
Attending: INTERNAL MEDICINE

## 2018-03-08 ENCOUNTER — APPOINTMENT (OUTPATIENT)
Dept: CT IMAGING | Facility: HOSPITAL | Age: 68
End: 2018-03-08

## 2018-03-08 LAB
ANION GAP SERPL CALCULATED.3IONS-SCNC: 11.5 MMOL/L
ANISOCYTOSIS BLD QL: NORMAL
APPEARANCE FLD: ABNORMAL
APPEARANCE FLD: ABNORMAL
BASOPHILS # BLD AUTO: 0.04 10*3/MM3 (ref 0–0.2)
BASOPHILS NFR BLD AUTO: 0.4 % (ref 0–1.5)
BUN BLD-MCNC: 12 MG/DL (ref 8–23)
BUN/CREAT SERPL: 14 (ref 7–25)
CALCIUM SPEC-SCNC: 8.7 MG/DL (ref 8.6–10.5)
CHLORIDE SERPL-SCNC: 100 MMOL/L (ref 98–107)
CO2 SERPL-SCNC: 24.5 MMOL/L (ref 22–29)
COLOR FLD: YELLOW
COLOR FLD: YELLOW
CREAT BLD-MCNC: 0.86 MG/DL (ref 0.57–1)
DEPRECATED RDW RBC AUTO: 50.3 FL (ref 37–54)
EOSINOPHIL # BLD AUTO: 0.16 10*3/MM3 (ref 0–0.7)
EOSINOPHIL NFR BLD AUTO: 1.4 % (ref 0.3–6.2)
ERYTHROCYTE [DISTWIDTH] IN BLOOD BY AUTOMATED COUNT: 15.3 % (ref 11.7–13)
GFR SERPL CREATININE-BSD FRML MDRD: 80 ML/MIN/1.73
GLUCOSE BLD-MCNC: 83 MG/DL (ref 65–99)
GLUCOSE BLDC GLUCOMTR-MCNC: 115 MG/DL (ref 70–130)
GLUCOSE BLDC GLUCOMTR-MCNC: 195 MG/DL (ref 70–130)
GLUCOSE BLDC GLUCOMTR-MCNC: 56 MG/DL (ref 70–130)
GLUCOSE BLDC GLUCOMTR-MCNC: 58 MG/DL (ref 70–130)
GLUCOSE BLDC GLUCOMTR-MCNC: 77 MG/DL (ref 70–130)
GLUCOSE BLDC GLUCOMTR-MCNC: 98 MG/DL (ref 70–130)
GLUCOSE FLD-MCNC: 69 MG/DL
GLUCOSE FLD-MCNC: 69 MG/DL
HCT VFR BLD AUTO: 34 % (ref 35.6–45.5)
HGB BLD-MCNC: 10.9 G/DL (ref 11.9–15.5)
IMM GRANULOCYTES # BLD: 0.16 10*3/MM3 (ref 0–0.03)
IMM GRANULOCYTES NFR BLD: 1.4 % (ref 0–0.5)
LYMPHOCYTES # BLD AUTO: 1.68 10*3/MM3 (ref 0.9–4.8)
LYMPHOCYTES NFR BLD AUTO: 14.9 % (ref 19.6–45.3)
LYMPHOCYTES NFR FLD MANUAL: 41 %
LYMPHOCYTES NFR FLD MANUAL: 56 %
MCH RBC QN AUTO: 29.2 PG (ref 26.9–32)
MCHC RBC AUTO-ENTMCNC: 32.1 G/DL (ref 32.4–36.3)
MCV RBC AUTO: 91.2 FL (ref 80.5–98.2)
MONOCYTES # BLD AUTO: 1.93 10*3/MM3 (ref 0.2–1.2)
MONOCYTES NFR BLD AUTO: 17.1 % (ref 5–12)
MONOCYTES NFR FLD: 13 %
MONOCYTES NFR FLD: 2 %
MONOS+MACROS NFR FLD: 16 %
MONOS+MACROS NFR FLD: 51 %
NEUTROPHILS # BLD AUTO: 7.29 10*3/MM3 (ref 1.9–8.1)
NEUTROPHILS NFR BLD AUTO: 64.8 % (ref 42.7–76)
NEUTROPHILS NFR FLD MANUAL: 15 %
NEUTROPHILS NFR FLD MANUAL: 6 %
NRBC BLD MANUAL-RTO: 0 /100 WBC (ref 0–0)
OTHER CELLS FLUID PER 100/WBCS: 10 /100 WBCS
PLAT MORPH BLD: NORMAL
PLATELET # BLD AUTO: 375 10*3/MM3 (ref 140–500)
PMV BLD AUTO: 9.5 FL (ref 6–12)
POTASSIUM BLD-SCNC: 4.4 MMOL/L (ref 3.5–5.2)
PROT FLD-MCNC: 4.5 G/DL
PROT FLD-MCNC: 4.5 G/DL
RBC # BLD AUTO: 3.73 10*6/MM3 (ref 3.9–5.2)
RBC # FLD AUTO: 2058 /MM3
RBC # FLD AUTO: ABNORMAL /MM3
SODIUM BLD-SCNC: 136 MMOL/L (ref 136–145)
TARGETS BLD QL SMEAR: NORMAL
WBC # FLD: 1383 /MM3
WBC # FLD: 705 /MM3
WBC MORPH BLD: NORMAL
WBC NRBC COR # BLD: 11.26 10*3/MM3 (ref 4.5–10.7)

## 2018-03-08 PROCEDURE — 87205 SMEAR GRAM STAIN: CPT | Performed by: INTERNAL MEDICINE

## 2018-03-08 PROCEDURE — C1729 CATH, DRAINAGE: HCPCS | Performed by: INTERNAL MEDICINE

## 2018-03-08 PROCEDURE — 87015 SPECIMEN INFECT AGNT CONCNTJ: CPT | Performed by: INTERNAL MEDICINE

## 2018-03-08 PROCEDURE — 32555 ASPIRATE PLEURA W/ IMAGING: CPT | Performed by: INTERNAL MEDICINE

## 2018-03-08 PROCEDURE — 85007 BL SMEAR W/DIFF WBC COUNT: CPT | Performed by: HOSPITALIST

## 2018-03-08 PROCEDURE — 0W9B3ZX DRAINAGE OF LEFT PLEURAL CAVITY, PERCUTANEOUS APPROACH, DIAGNOSTIC: ICD-10-PCS | Performed by: INTERNAL MEDICINE

## 2018-03-08 PROCEDURE — 87070 CULTURE OTHR SPECIMN AEROBIC: CPT | Performed by: INTERNAL MEDICINE

## 2018-03-08 PROCEDURE — 0 IOPAMIDOL PER 1 ML: Performed by: INTERNAL MEDICINE

## 2018-03-08 PROCEDURE — 99222 1ST HOSP IP/OBS MODERATE 55: CPT | Performed by: INTERNAL MEDICINE

## 2018-03-08 PROCEDURE — 82945 GLUCOSE OTHER FLUID: CPT | Performed by: INTERNAL MEDICINE

## 2018-03-08 PROCEDURE — 89050 BODY FLUID CELL COUNT: CPT | Performed by: INTERNAL MEDICINE

## 2018-03-08 PROCEDURE — 88305 TISSUE EXAM BY PATHOLOGIST: CPT | Performed by: INTERNAL MEDICINE

## 2018-03-08 PROCEDURE — 99153 MOD SED SAME PHYS/QHP EA: CPT | Performed by: INTERNAL MEDICINE

## 2018-03-08 PROCEDURE — C1894 INTRO/SHEATH, NON-LASER: HCPCS | Performed by: INTERNAL MEDICINE

## 2018-03-08 PROCEDURE — 88112 CYTOPATH CELL ENHANCE TECH: CPT | Performed by: INTERNAL MEDICINE

## 2018-03-08 PROCEDURE — 63710000001 INSULIN DETEMER PER 5 UNITS: Performed by: HOSPITALIST

## 2018-03-08 PROCEDURE — 84157 ASSAY OF PROTEIN OTHER: CPT | Performed by: INTERNAL MEDICINE

## 2018-03-08 PROCEDURE — 71046 X-RAY EXAM CHEST 2 VIEWS: CPT

## 2018-03-08 PROCEDURE — 71250 CT THORAX DX C-: CPT

## 2018-03-08 PROCEDURE — 33010 HC PERICARDIOCENTESIS INITIAL: CPT | Performed by: INTERNAL MEDICINE

## 2018-03-08 PROCEDURE — 25010000002 MIDAZOLAM PER 1 MG: Performed by: INTERNAL MEDICINE

## 2018-03-08 PROCEDURE — 80048 BASIC METABOLIC PNL TOTAL CA: CPT | Performed by: HOSPITALIST

## 2018-03-08 PROCEDURE — 76930: CPT | Performed by: INTERNAL MEDICINE

## 2018-03-08 PROCEDURE — 85025 COMPLETE CBC W/AUTO DIFF WBC: CPT | Performed by: HOSPITALIST

## 2018-03-08 PROCEDURE — 89051 BODY FLUID CELL COUNT: CPT | Performed by: INTERNAL MEDICINE

## 2018-03-08 PROCEDURE — 63710000001 INSULIN ASPART PER 5 UNITS: Performed by: HOSPITALIST

## 2018-03-08 PROCEDURE — 99152 MOD SED SAME PHYS/QHP 5/>YRS: CPT | Performed by: INTERNAL MEDICINE

## 2018-03-08 PROCEDURE — 87206 SMEAR FLUORESCENT/ACID STAI: CPT | Performed by: INTERNAL MEDICINE

## 2018-03-08 PROCEDURE — 82962 GLUCOSE BLOOD TEST: CPT

## 2018-03-08 PROCEDURE — 25010000002 FENTANYL CITRATE (PF) 100 MCG/2ML SOLUTION: Performed by: INTERNAL MEDICINE

## 2018-03-08 PROCEDURE — 83615 LACTATE (LD) (LDH) ENZYME: CPT | Performed by: INTERNAL MEDICINE

## 2018-03-08 PROCEDURE — 0WJD3ZZ INSPECTION OF PERICARDIAL CAVITY, PERCUTANEOUS APPROACH: ICD-10-PCS | Performed by: INTERNAL MEDICINE

## 2018-03-08 PROCEDURE — C1769 GUIDE WIRE: HCPCS | Performed by: INTERNAL MEDICINE

## 2018-03-08 RX ORDER — ONDANSETRON 4 MG/1
4 TABLET, ORALLY DISINTEGRATING ORAL EVERY 6 HOURS PRN
Status: DISCONTINUED | OUTPATIENT
Start: 2018-03-08 | End: 2018-03-11

## 2018-03-08 RX ORDER — NALOXONE HCL 0.4 MG/ML
0.4 VIAL (ML) INJECTION
Status: DISCONTINUED | OUTPATIENT
Start: 2018-03-08 | End: 2018-03-11

## 2018-03-08 RX ORDER — ONDANSETRON 4 MG/1
4 TABLET, FILM COATED ORAL EVERY 6 HOURS PRN
Status: DISCONTINUED | OUTPATIENT
Start: 2018-03-08 | End: 2018-03-11

## 2018-03-08 RX ORDER — HYDROCODONE BITARTRATE AND ACETAMINOPHEN 5; 325 MG/1; MG/1
1 TABLET ORAL EVERY 4 HOURS PRN
Status: DISCONTINUED | OUTPATIENT
Start: 2018-03-08 | End: 2018-03-11

## 2018-03-08 RX ORDER — MIDAZOLAM HYDROCHLORIDE 1 MG/ML
INJECTION INTRAMUSCULAR; INTRAVENOUS AS NEEDED
Status: DISCONTINUED | OUTPATIENT
Start: 2018-03-08 | End: 2018-03-08 | Stop reason: HOSPADM

## 2018-03-08 RX ORDER — SODIUM CHLORIDE 9 MG/ML
125 INJECTION, SOLUTION INTRAVENOUS CONTINUOUS
Status: ACTIVE | OUTPATIENT
Start: 2018-03-08 | End: 2018-03-09

## 2018-03-08 RX ORDER — FENTANYL CITRATE 50 UG/ML
INJECTION, SOLUTION INTRAMUSCULAR; INTRAVENOUS AS NEEDED
Status: DISCONTINUED | OUTPATIENT
Start: 2018-03-08 | End: 2018-03-08 | Stop reason: HOSPADM

## 2018-03-08 RX ORDER — MORPHINE SULFATE 2 MG/ML
1 INJECTION, SOLUTION INTRAMUSCULAR; INTRAVENOUS EVERY 4 HOURS PRN
Status: DISCONTINUED | OUTPATIENT
Start: 2018-03-08 | End: 2018-03-11

## 2018-03-08 RX ORDER — LIDOCAINE HYDROCHLORIDE 20 MG/ML
INJECTION, SOLUTION INFILTRATION; PERINEURAL AS NEEDED
Status: DISCONTINUED | OUTPATIENT
Start: 2018-03-08 | End: 2018-03-08 | Stop reason: HOSPADM

## 2018-03-08 RX ORDER — ONDANSETRON 2 MG/ML
4 INJECTION INTRAMUSCULAR; INTRAVENOUS EVERY 6 HOURS PRN
Status: DISCONTINUED | OUTPATIENT
Start: 2018-03-08 | End: 2018-03-11

## 2018-03-08 RX ADMIN — DEXTROSE MONOHYDRATE 25 G: 25 INJECTION, SOLUTION INTRAVENOUS at 08:15

## 2018-03-08 RX ADMIN — LOSARTAN POTASSIUM 100 MG: 100 TABLET ORAL at 08:25

## 2018-03-08 RX ADMIN — ALLOPURINOL 100 MG: 100 TABLET ORAL at 08:25

## 2018-03-08 RX ADMIN — SODIUM CHLORIDE 125 ML/HR: 9 INJECTION, SOLUTION INTRAVENOUS at 15:15

## 2018-03-08 RX ADMIN — Medication 400 MG: at 08:25

## 2018-03-08 RX ADMIN — INSULIN ASPART 2 UNITS: 100 INJECTION, SOLUTION INTRAVENOUS; SUBCUTANEOUS at 21:21

## 2018-03-08 RX ADMIN — FLUTICASONE PROPIONATE 2 SPRAY: 50 SPRAY, METERED NASAL at 08:25

## 2018-03-08 RX ADMIN — HYDROCHLOROTHIAZIDE 12.5 MG: 12.5 CAPSULE, GELATIN COATED ORAL at 08:25

## 2018-03-08 RX ADMIN — INSULIN DETEMIR 45 UNITS: 100 INJECTION, SOLUTION SUBCUTANEOUS at 21:22

## 2018-03-08 RX ADMIN — FERROUS SULFATE TAB 325 MG (65 MG ELEMENTAL FE) 325 MG: 325 (65 FE) TAB at 08:25

## 2018-03-08 NOTE — CONSULTS
Patient Name: Feli Del Toro  Age/Sex: 67 y.o. female  : 1950  MRN: [id number]    Date of Admission: 3/7/2018  Date of Encounter Visit: 18  Encounter Provider: Timothy Yepez MD  Place of Service: [facility] CARDIOLOGY      Referring Provider: No ref. provider found  Patient Care Team:  Paola Onofre MD as PCP - General (Family Medicine)  ROBBIE Black MD as Consulting Physician (Hematology and Oncology)  Chela Fuller MD as Referring Physician (Internal Medicine)    Subjective:     Consulted for: Pericardial effusion    Chief Complaint: Shortness of breath      History of Present Illness:  Feli Del Toro is a 67 y.o. female with a history of hyperlipidemia, HTN, metastatic colon cancer with liver metastasis (surgery 2014), DM. She presented to the ED yesterday per request of Dr. Brown (oncology) after reviewing CT scan from 18 that showed a new large pericardial effusion of 3.2cm. She reports shortness of breath that started a week ago, worse with exertion and lying flat. This is reportedly moderate to severe in intensity.  She denies chest pain.  No recent fever or chills.  The CT scan did not show any significant pleural effusion or pneumonia.    The patient came in for evaluation of those symptoms and was noted on chest x-ray to have a moderate left-sided pleural effusion which was new.  Echocardiogram was performed. Echo done last night showed EF 68% and 1-2cm circumferential pericardial effusion. Doppler assessment is indeterminate for tamponade however there is no 2-D evidence of tamponade.       Cardiac testing:   Echo 3/7/18  · Left ventricular systolic function is normal. Calculated EF = 68.3%. Estimated EF was in agreement with the calculated EF. Estimated EF = 68%. Normal left ventricular cavity size noted. All left ventricular wall segments contract normally. Left ventricular wall thickness is  consistent with mild concentric hypertrophy. Left ventricular diastolic function is normal.  · Right ventricle is not well seen. The walls appear thickened.  · There is a moderate (1-2cm) circumferential pericardial effusion. The the effusion is fluid filled. Doppler assessment is indeterminate for tamponade however there is no 2-D evidence of tamponade. The respiratory variation of mitral valve inflow is less than 30%. The respiratory variation of tricuspid valve inflow is greater than 30%. There is a moderate size left pleural effusion.    Past Medical History:  Past Medical History:   Diagnosis Date   • Anemia    • Cancer 06/04/2013   • Diabetes mellitus    • History of transfusion    • Hyperlipidemia    • Hypertension    • Metastatic colon cancer to liver    • Retinopathy    • Type 2 diabetes mellitus        Past Surgical History:   Procedure Laterality Date   • ABDOMINAL SURGERY      ablation    • AMPUTATION DIGIT Left 1/1/2017    Procedure: LEFT SECOND TOE AMPUTATION;  Surgeon: Farzad Nichols MD;  Location: [facility];  Service:    • AMPUTATION OF REPLICATED TOES      right distal second toe    • CATARACT EXTRACTION     • COLECTOMY PARTIAL / TOTAL  01/14/2014   • HYSTERECTOMY  1998   • PORTACATH PLACEMENT  06/2013   • SALPINGO OOPHORECTOMY Bilateral    • SPLENECTOMY         Home Medications:   Prescriptions Prior to Admission   Medication Sig Dispense Refill Last Dose   • allopurinol (ZYLOPRIM) 100 MG tablet Take 100 mg by mouth Daily.      • aspirin 81 MG tablet Take 81 mg by mouth daily.   Taking   • Biotin 5000 MCG tablet Take 5,000 mcg by mouth Daily.   Taking   • calcium carbonate (TUMS) 500 MG chewable tablet Chew 2 tablets As Needed.   Taking   • ferrous sulfate 325 (65 FE) MG tablet Take 1 tablet by mouth Daily.   Taking   • fluticasone (FLONASE) 50 MCG/ACT nasal spray 2 sprays into each nostril Daily.   Taking   • Insulin Lispro (HUMALOG KWIKPEN) 100 UNIT/ML solution pen-injector Inject 15 Units  "under the skin 3 (Three) Times a Day Before Meals. And sliding scale as directed      • LORazepam (ATIVAN) 0.5 MG tablet Take 1 tablet by mouth 2 (Two) Times a Day As Needed (nausea). 20 tablet 0 Taking   • magnesium oxide (MAG-OX) 400 MG tablet Take 400 mg by mouth Daily.      • meclizine (ANTIVERT) 25 MG tablet Take 25 mg by mouth 3 (Three) Times a Day As Needed.   Taking   • NOVOFINE 32G X 6 MM misc USE AS DIRECTED 5 TIMES DAILY AND AS NEEDED 200 each 3 Taking   • ondansetron (ZOFRAN) 8 MG tablet Take 1 tablet by mouth 3 (Three) Times a Day As Needed for Nausea or Vomiting. 30 tablet 5 Taking   • ONETOUCH VERIO test strip TEST 3-4 TIMES A  each 5 Taking   • SAXagliptin (ONGLYZA) 5 MG tablet Take 5 mg by mouth Daily.      • telmisartan-hydrochlorothiazide (MICARDIS HCT) 40-12.5 MG per tablet Take 1 tablet by mouth daily 90 tablet 3 Taking   • TOUJEO SOLOSTAR 300 UNIT/ML solution pen-injector Inject 45 Units under the skin Daily. 3 pen 0 Taking   • vitamin D (ERGOCALCIFEROL) 23978 UNITS capsule capsule Take 1 capsule by mouth 1 (One) Time Per Week. 13 capsule 3 Taking   • XIGDUO XR 5-1000 MG tablet sustained-release 24 hour Take 2 tablets by mouth Daily. 180 tablet 3 Taking       Allergies:  Allergies   Allergen Reactions   • Compazine [Prochlorperazine Edisylate] Other (See Comments)     Complete arch in the back    • Prochlorperazine Maleate Other (See Comments)     \"BACK MUSCLE RETRACTION\"       Past Social History:  Social History     Social History   • Marital status:      Spouse name: Jesus   • Number of children: N/A   • Years of education: College     Occupational History   • Middle School Counselor   Retired     [employer]     Social History Main Topics   • Smoking status: Never Smoker   • Smokeless tobacco: Never Used   • Alcohol use No   • Drug use: No   • Sexual activity: Defer     Other Topics Concern   • Not on file     Social History Narrative        Past Family History:  Family History "   Problem Relation Age of Onset   • Heart attack Other    • Cancer Other    • Diabetes Other    • Hypertension Other    • Hypertension Father    • Diabetes Father    • Breast cancer Sister    • Diabetes Sister    • Stroke Paternal Grandmother    • Diabetes Paternal Grandmother    • Lupus Paternal Grandfather    • Stroke Paternal Grandfather    • Diabetes Sister          Review of Systems:  All systems reviewed. Pertinent positives identified in HPI. All other systems are negative.         Objective:     Objective:  Temp:  [97.8 °F (36.6 °C)-99.2 °F (37.3 °C)] 99.2 °F (37.3 °C)  Heart Rate:  [101-108] 104  Resp:  [14-17] 16  BP: (126-161)/(72-96) 126/74    Intake/Output Summary (Last 24 hours) at 03/08/18 1038  Last data filed at 03/08/18 0300   Gross per 24 hour   Intake                0 ml   Output              300 ml   Net             -300 ml     Body mass index is 37.66 kg/(m^2).  Last 3 weights    03/07/18  1445 03/07/18  1743   Weight: 116 kg (255 lb) 116 kg (255 lb)           Physical Exam:   General Appearance Well developed, cooperative and well nourished and no acute distress   Head Normocephalic, without abnormality, atraumatic   Ears Ears appear intact with no abnormalities noted   Throat No oral lesions, no thrush, oral mucosa moist   Neck No adenopathy, supple, trachea midline, no thyromegaly, no carotid bruit, no JVD   Back No skin lesions, erythema or scars, no tenderness to percussion or palptaion,range of motion is normal   Lungs Clear to auscultation,respirations regular, even and unlabored   Heart Regular rhythm and normal rate, normal S1 and S2, no murmur, no gallop, no rub, no click   Chest wall No abnormalities observed   Abdomen Normal bowel sounds, no masses, no hepatomegaly,    Extremities Moves all extremities well, no edema, no cyanosis, no redness   Pulses Pulses palpable and equal bilaterally. Normal radial, carotid, femoral, dorsalis pedis and posterior tibial pulses bilaterally. Normal  abdominal aorta   Skin No bleeding, bruising or rash   Psyhiatric Alert and oriented x 3, normal mood and affect       Lab Review:       Results from last 7 days  Lab Units 03/08/18  0357 03/07/18  1509   SODIUM mmol/L 136 137   POTASSIUM mmol/L 4.4 3.6   CHLORIDE mmol/L 100 98   CO2 mmol/L 24.5 28.6   BUN mg/dL 12 11   CREATININE mg/dL 0.86 0.99   GLUCOSE mg/dL 83 89   CALCIUM mg/dL 8.7 9.5         Results from last 7 days  Lab Units 03/07/18  1509   TROPONIN T ng/mL <0.010       Results from last 7 days  Lab Units 03/08/18  0357   WBC 10*3/mm3 11.26*   HEMOGLOBIN g/dL 10.9*   HEMATOCRIT % 34.0*   PLATELETS 10*3/mm3 375       Results from last 7 days  Lab Units 03/07/18  1509   INR  1.05                   Results from last 7 days  Lab Units 03/07/18  1509   PROBNP pg/mL 267.8               Imaging:    Imaging Results (most recent)     Procedure Component Value Units Date/Time    XR Chest 2 View [[id number]] Collected:  03/07/18 1600     Updated:  03/07/18 1600    Narrative:       TWO-VIEW CHEST     HISTORY: Shortness of breath. Stage IV colon cancer.     FINDINGS: The lungs are moderately expanded with a moderate-sized left  pleural effusion and some adjacent localized atelectasis. There is a  very small right pleural effusion. The lungs are otherwise clear.  Allowing for the lung expansion, the heart appears slightly enlarged. A  left-sided MediPort catheter ends in the SVC.                   Results for orders placed during the hospital encounter of 03/07/18   ADULT TRANSTHORACIC ECHO COMPLETE W/ CONT IF NECESSARY PER PROTOCOL    Narrative · Left ventricular systolic function is normal. Calculated EF = 68.3%.   Estimated EF was in agreement with the calculated EF. Estimated EF = 68%.   Normal left ventricular cavity size noted. All left ventricular wall   segments contract normally. Left ventricular wall thickness is consistent   with mild concentric hypertrophy. Left ventricular diastolic function is   normal.  ·  Right ventricle is not well seen. The walls appear thickened.  · There is a moderate (1-2cm) circumferential pericardial effusion. The   the effusion is fluid filled. Doppler assessment is indeterminate for   tamponade however there is no 2-D evidence of tamponade. The respiratory   variation of mitral valve inflow is less than 30%. The respiratory   variation of tricuspid valve inflow is greater than 30%. There is a   moderate size left pleural effusion.          EKG:           BASELINE:       I personally viewed and interpreted the patient's EKG/Telemetry data.    Assessment:   Assessment/Plan   1.  Pericardial effusion: Moderate  2.  Dyspnea  3.  Colon cancer with metastases  4.  Diabetes mellitus  5.  Essential hypertension    Plan:   -Patient does present with a pericardial effusion that is moderate.  There is no evidence of tamponade.  This clearly could be contributing to her dyspnea, however I do also worried that she has a moderate-sized left pleural effusion that may be contributing as well.  This was not present on CT scan which was recently done.    -I do think a pericardiocentesis is reasonable considering her symptoms of dyspnea, but also considering that she has stable liver mass in the setting of her metastatic disease.  Pericardial involvement of the malignancy may change things as well, as far as how we are considering her cancer progression    -Consider additional evaluation of the pleural fluid for possible thoracentesis        Thank you for allowing me to participate in the care of Feli Del Toro. Feel free to contact me directly with any further questions or concerns.    Timothy Yepez MD  [facility]  03/08/18  10:38 AM

## 2018-03-08 NOTE — PROGRESS NOTES
Discharge Planning Assessment  [facility]     Patient Name: Feli Del Toro  MRN: [id number]  Today's Date: 3/8/2018    Admit Date: 3/7/2018          Discharge Needs Assessment       03/08/18 1618    Living Environment    Lives With spouse    Living Arrangements house    Home Accessibility no concerns    Transportation Available car;family or friend will provide    Living Environment    Quality Of Family Relationships supportive    Able to Return to Prior Living Arrangements yes    Discharge Needs Assessment    Concerns To Be Addressed no discharge needs identified    Readmission Within The Last 30 Days no previous admission in last 30 days    Equipment Currently Used at Home glucometer    Equipment Needed After Discharge none            Discharge Plan       03/08/18 1618    Case Management/Social Work Plan    Plan Home with spouse    Additional Comments Spoke with patient at bedside, facesheet verified.  Patient lives in a single level   house  with a basement with her spouse. Patient IADLs prior to admit. She does not use any DME and does not have a Home Health  or Rehab  history. Denies any discharge  needs and plans to return home.Farnaz Barth RN        Discharge Placement     No information found                Demographic Summary       03/08/18 1616    Referral Information    Admission Type inpatient            Functional Status       03/08/18 1617    Functional Status Current    Ambulation 3-->assistive equipment and person    Transferring 3-->assistive equipment and person    Toileting 3-->assistive equipment and person    Bathing 0-->independent    Dressing 0-->independent    Eating 0-->independent    Communication 0-->understands/communicates without difficulty    Swallowing (if score 2 or more for any item, consult Rehab Services) 0-->swallows foods/liquids without difficulty    IADL    Medications independent    Meal Preparation independent    Housekeeping independent    Laundry independent     Shopping independent    Oral Care independent            Psychosocial     None            Abuse/Neglect     None            Legal     None            Substance Abuse     None            Patient Forms     None          Farnaz Barth RN

## 2018-03-08 NOTE — PROGRESS NOTES
Name: Feli Del Toro ADMIT: 3/7/2018   : 1950  PCP: Paola Onofre MD    MRN: [id number] LOS: 1 days   AGE/SEX: 67 y.o. female  ROOM: 2207/1   Subjective   Subjective  Required 2L O2 overnight but improved this morning. Dyspnea is stable and worse with exertion. No change with position and no worsening edema. No CP or palpitations. No NVD. She reports being followed by Dr Brown, [facility], for her metastatic colon cancer. Outpatient follow up already arranged and she was seen yesterday in clinic prior to ER transfer.    Objective   Vital Signs  Temp:  [97.8 °F (36.6 °C)-99.2 °F (37.3 °C)] 99.2 °F (37.3 °C)  Heart Rate:  [101-108] 104  Resp:  [14-17] 16  BP: (126-161)/(72-96) 126/74  SpO2:  [94 %-97 %] 97 %  on  Flow (L/min):  [2] 2;   O2 Device: nasal cannula  Body mass index is 37.66 kg/(m^2).    Physical Exam   Constitutional: She is oriented to person, place, and time. She appears well-developed. No distress.   HENT:   Head: Normocephalic and atraumatic.   Eyes: EOM are normal. Pupils are equal, round, and reactive to light. No scleral icterus.   Neck: Normal range of motion. Neck supple.   Cardiovascular: Normal rate, regular rhythm and intact distal pulses.    No murmur heard.  Pulmonary/Chest: Effort normal. She has no wheezes. She has no rales.   Abdominal: Soft. There is no tenderness.   Musculoskeletal: Normal range of motion. Edema: trace.   Neurological: She is alert and oriented to person, place, and time.   Skin: Skin is warm and dry. She is not diaphoretic.   Psychiatric: She has a normal mood and affect. Her behavior is normal.   Nursing note and vitals reviewed.      Results Review:       I reviewed the patient's new clinical results. Reviewed imaging, agree with interpretation. Reviewed telemetry, tachycardia/sinus. Reviewed prior records.    Results from last 7 days  Lab Units 18  0357 18  1509   WBC 10*3/mm3 11.26* 13.36*   HEMOGLOBIN g/dL 10.9* 11.7*   PLATELETS  10*3/mm3 375 556*     Results from last 7 days  Lab Units 03/08/18  0357 03/07/18  1509   SODIUM mmol/L 136 137   POTASSIUM mmol/L 4.4 3.6   CHLORIDE mmol/L 100 98   CO2 mmol/L 24.5 28.6   BUN mg/dL 12 11   CREATININE mg/dL 0.86 0.99   GLUCOSE mg/dL 83 89   Estimated Creatinine Clearance: 86.3 mL/min (by C-G formula based on Cr of 0.86).  Results from last 7 days  Lab Units 03/08/18  0357 03/07/18  1509   CALCIUM mg/dL 8.7 9.5   ALBUMIN g/dL  --  3.00*         allopurinol 100 mg Oral Daily   ferrous sulfate 325 mg Oral Daily   fluticasone 2 spray Nasal Daily   losartan 100 mg Oral Q24H   And      Hydrochlorothiazide Oral 12.5 mg Oral Daily   insulin aspart 0-7 Units Subcutaneous 4x Daily With Meals & Nightly   insulin aspart 15 Units Subcutaneous TID With Meals   insulin detemir 45 Units Subcutaneous Nightly   linagliptin 5 mg Oral Daily   magnesium oxide 400 mg Oral Daily      NPO Diet      Assessment/Plan   Active Hospital Problems (** Indicates Principal Problem)    Diagnosis Date Noted   • **Pericardial effusion [I31.3] 03/07/2018   • Shortness of breath [R06.02] 03/07/2018   • Pleural effusion [J90] 03/07/2018   • Liver metastasis [C78.7] 06/21/2017   • Type 2 diabetes mellitus with complication, with long-term current use of insulin [E11.8, Z79.4] 12/30/2016   • Primary malignant neoplasm of colon [C18.9] 05/26/2016   • Essential hypertension [I10] 12/31/2015      Resolved Hospital Problems    Diagnosis Date Noted Date Resolved   No resolved problems to display.     - Pericardial Effusion: No tamponade reported on Echo. Dyspnea and BARRERA are stable. May need pericardiocentesis for fluid studies. Cardiology consulted.  - Colon Cancer/Metastatic to Liver: sp FOLFOX and resection of primary tumor, splenectomy, thermal ablation 2014, FOLFIRI regimen course, Radiation therapy and cyberknife 2015. Microwave ablation of liver mass and FOLFIRI regimen 06/2017. Follow up in 5 weeks with [facility] after discharge.  - DM2:  A1c 7.7. Monitor with insulin therapy.  - HTN: HCTZ/Losartan  - Disposition: TBD    Foster Delong MD  [facility]  03/08/18  9:17 AM

## 2018-03-08 NOTE — ED NOTES
Called [address] to give report and was told the nurse is busy and would call me back, extension provided. Informed US that patient was coming up from her ECHO and transport had already been arranged to floor, the nurse stated that was fine she will call back for report.      Lucie Fallon RN  03/07/18 6416

## 2018-03-08 NOTE — H&P
HISTORY AND PHYSICAL   [facility]        Patient Identification:  Name: Feli Del Toro  Age: 67 y.o.  Sex: female  :  1950  MRN: [id number]                     Primary Care Physician: Paola Onofre MD    Chief Complaint: Short of air    History of Present Illness:           The patient is a 67-year-old -American female with history of metastatic colon cancer with liver metastases, diabetes, hypertension, hyperlipidemia and history of retinopathy who is admitted with one-week history of shortness of breath with increasing dyspnea.  She denies having any chest pain.  She had CT scan done a few days earlier which showed a 2-3 cm pericardial effusion.  CT scan also noted some pleural fluid.  The patient was evaluated in the ER echocardiogram was done which showed a moderate pericardial effusion of 1-2 cm and the patient's being admitted for further evaluation treatment of this.  She's not had any fevers or chills.  She's not had any chest pain.    Past Medical History:  Past Medical History:   Diagnosis Date   • Anemia    • Cancer 2013   • Diabetes mellitus    • History of transfusion    • Hyperlipidemia    • Hypertension    • Metastatic colon cancer to liver    • Retinopathy    • Type 2 diabetes mellitus      Past Surgical History:  Past Surgical History:   Procedure Laterality Date   • ABDOMINAL SURGERY      ablation    • AMPUTATION DIGIT Left 2017    Procedure: LEFT SECOND TOE AMPUTATION;  Surgeon: Farzad Nichols MD;  Location: [facility];  Service:    • AMPUTATION OF REPLICATED TOES      right distal second toe    • CATARACT EXTRACTION     • COLECTOMY PARTIAL / TOTAL  2014   • HYSTERECTOMY     • PORTACATH PLACEMENT  2013   • SALPINGO OOPHORECTOMY Bilateral    • SPLENECTOMY        Home Meds:  Prescriptions Prior to Admission   Medication Sig Dispense Refill Last Dose   • allopurinol (ZYLOPRIM) 100 MG tablet Take 100 mg by mouth Daily.      • aspirin 81  "MG tablet Take 81 mg by mouth daily.   Taking   • Biotin 5000 MCG tablet Take 5,000 mcg by mouth Daily.   Taking   • calcium carbonate (TUMS) 500 MG chewable tablet Chew 2 tablets As Needed.   Taking   • ferrous sulfate 325 (65 FE) MG tablet Take 1 tablet by mouth Daily.   Taking   • fluticasone (FLONASE) 50 MCG/ACT nasal spray 2 sprays into each nostril Daily.   Taking   • Insulin Lispro (HUMALOG KWIKPEN) 100 UNIT/ML solution pen-injector Inject 15 Units under the skin 3 (Three) Times a Day Before Meals. And sliding scale as directed      • LORazepam (ATIVAN) 0.5 MG tablet Take 1 tablet by mouth 2 (Two) Times a Day As Needed (nausea). 20 tablet 0 Taking   • magnesium oxide (MAG-OX) 400 MG tablet Take 400 mg by mouth Daily.      • meclizine (ANTIVERT) 25 MG tablet Take 25 mg by mouth 3 (Three) Times a Day As Needed.   Taking   • NOVOFINE 32G X 6 MM misc USE AS DIRECTED 5 TIMES DAILY AND AS NEEDED 200 each 3 Taking   • ondansetron (ZOFRAN) 8 MG tablet Take 1 tablet by mouth 3 (Three) Times a Day As Needed for Nausea or Vomiting. 30 tablet 5 Taking   • ONETOUCH VERIO test strip TEST 3-4 TIMES A  each 5 Taking   • SAXagliptin (ONGLYZA) 5 MG tablet Take 5 mg by mouth Daily.      • telmisartan-hydrochlorothiazide (MICARDIS HCT) 40-12.5 MG per tablet Take 1 tablet by mouth daily 90 tablet 3 Taking   • TOUJEO SOLOSTAR 300 UNIT/ML solution pen-injector Inject 45 Units under the skin Daily. 3 pen 0 Taking   • vitamin D (ERGOCALCIFEROL) 49788 UNITS capsule capsule Take 1 capsule by mouth 1 (One) Time Per Week. 13 capsule 3 Taking   • XIGDUO XR 5-1000 MG tablet sustained-release 24 hour Take 2 tablets by mouth Daily. 180 tablet 3 Taking       Allergies:  Allergies   Allergen Reactions   • Compazine [Prochlorperazine Edisylate] Other (See Comments)     Complete arch in the back    • Prochlorperazine Maleate Other (See Comments)     \"BACK MUSCLE RETRACTION\"     Immunizations:  Immunization History   Administered Date(s) " Administered   • Pneumococcal Polysaccharide (PPSV23) 2017     Social History:   Social History     Social History Narrative     Social History     Social History   • Marital status:      Spouse name: Jesus   • Number of children: N/A   • Years of education: College     Occupational History   • Middle School Counselor   Retired     [employer]     Social History Main Topics   • Smoking status: Never Smoker   • Smokeless tobacco: Never Used   • Alcohol use No   • Drug use: No   • Sexual activity: Defer     Other Topics Concern   • Not on file     Social History Narrative       Family History:  Family History   Problem Relation Age of Onset   • Heart attack Other    • Cancer Other    • Diabetes Other    • Hypertension Other    • Hypertension Father    • Diabetes Father    • Breast cancer Sister    • Diabetes Sister    • Stroke Paternal Grandmother    • Diabetes Paternal Grandmother    • Lupus Paternal Grandfather    • Stroke Paternal Grandfather    • Diabetes Sister         Review of Systems  See history of present illness and past medical history.  Patient denies headache, dizziness, syncope, falls, trauma, change in vision, change in hearing, change in taste, changes in weight, changes in appetite, focal weakness, numbness, or paresthesia.  Patient denies chest pain, palpitations,  orthopnea, PND, cough, sinus pressure, rhinorrhea, epistaxis, hemoptysis, nausea, vomiting,hematemesis, diarrhea, constipation or hematchezia.  Denies cold or heat intolerance, polydipsia, polyuria, polyphagia. Denies hematuria, pyuria, dysuria, hesitancy, frequency or urgency. Denies consumption of raw and under cooked meats foods or change in water source.  Denies fever, chills, sweats, night sweats.  Denies missing any routine medications. Remainder of ROS is negative.    Objective:  tMax 24 hrs: Temp (24hrs), Av.1 °F (36.7 °C), Min:97.8 °F (36.6 °C), Max:98.3 °F (36.8 °C)    Vitals Ranges:   Temp:  [97.8 °F (36.6 °C)-98.3 °F  "(36.8 °C)] 97.8 °F (36.6 °C)  Heart Rate:  [101-108] 102  Resp:  [14-17] 17  BP: (128-161)/(72-96) 152/85      Exam:  /85 (BP Location: Left arm, Patient Position: Lying)  Pulse 102  Temp 97.8 °F (36.6 °C) (Oral)   Resp 17  Ht 175.3 cm (69\")  Wt 116 kg (255 lb)  LMP  (LMP Unknown)  SpO2 94%  BMI 37.66 kg/m2    General Appearance:    Alert, cooperative, no distress, appears stated age   Head:    Normocephalic, without obvious abnormality, atraumatic   Eyes:    PERRL, conjunctiva/corneas clear, EOM's intact, both eyes   Ears:    Normal external ear canals, both ears   Nose:   Nares normal, septum midline, mucosa normal, no drainage    or sinus tenderness   Throat:   Lips, mucosa, and tongue normal   Neck:   Supple, symmetrical, trachea midline, no adenopathy;     thyroid:  no enlargement/tenderness/nodules; no carotid    bruit or JVD   Back:     Symmetric, no curvature, ROM normal, no CVA tenderness   Lungs:     Clear to auscultation bilaterally, respirations unlabored   Chest Wall:    No tenderness or deformity    Heart:    Regular rate and rhythm, S1 and S2 normal, no murmur, rub   or gallop   Abdomen:     Soft, non-tender, bowel sounds active all four quadrants,     no masses, no hepatomegaly, no splenomegaly   Extremities:   Extremities normal, atraumatic, no cyanosis or edema   Pulses:   2+ and symmetric all extremities   Skin:   Skin color, texture, turgor normal, no rashes or lesions   Lymph nodes:   Cervical, supraclavicular, and axillary nodes normal   Neurologic:   CNII-XII intact, normal strength, sensation intact throughout      .    Data Review:  Lab Results (last 72 hours)     Procedure Component Value Units Date/Time    CBC & Differential [[id number]] Collected:  03/07/18 1509    Specimen:  Blood Updated:  03/07/18 1519    Narrative:       The following orders were created for panel order CBC & Differential.  Procedure                               Abnormality         Status                 "     ---------                               -----------         ------                     CBC Auto Differential[[id number]]        Abnormal            Final result                 Please view results for these tests on the individual orders.    CBC Auto Differential [[id number]]  (Abnormal) Collected:  03/07/18 1509    Specimen:  Blood Updated:  03/07/18 1519     WBC 13.36 (H) 10*3/mm3      RBC 3.84 (L) 10*6/mm3      Hemoglobin 11.7 (L) g/dL      Hematocrit 34.7 (L) %      MCV 90.4 fL      MCH 30.5 pg      MCHC 33.7 g/dL      RDW 14.9 (H) %      RDW-SD 48.8 fl      MPV 8.7 fL      Platelets 556 (H) 10*3/mm3      Neutrophil % 61.8 %      Lymphocyte % 17.4 (L) %      Monocyte % 16.5 (H) %      Eosinophil % 1.6 %      Basophil % 0.4 %      Immature Grans % 2.3 (H) %      Neutrophils, Absolute 8.25 (H) 10*3/mm3      Lymphocytes, Absolute 2.33 10*3/mm3      Monocytes, Absolute 2.21 (H) 10*3/mm3      Eosinophils, Absolute 0.21 10*3/mm3      Basophils, Absolute 0.05 10*3/mm3      Immature Grans, Absolute 0.31 (H) 10*3/mm3     Comprehensive Metabolic Panel [[id number]]  (Abnormal) Collected:  03/07/18 1509    Specimen:  Blood Updated:  03/07/18 1541     Glucose 89 mg/dL      BUN 11 mg/dL      Creatinine 0.99 mg/dL      Sodium 137 mmol/L      Potassium 3.6 mmol/L      Chloride 98 mmol/L      CO2 28.6 mmol/L      Calcium 9.5 mg/dL      Total Protein 8.4 g/dL      Albumin 3.00 (L) g/dL      ALT (SGPT) 52 (H) U/L      AST (SGOT) 52 (H) U/L      Alkaline Phosphatase 453 (H) U/L      Total Bilirubin 0.9 mg/dL      eGFR  African Amer 68 mL/min/1.73      Globulin 5.4 gm/dL      A/G Ratio 0.6 g/dL      BUN/Creatinine Ratio 11.1     Anion Gap 10.4 mmol/L     BNP [[id number]]  (Normal) Collected:  03/07/18 1509    Specimen:  Blood Updated:  03/07/18 1542     proBNP 267.8 pg/mL     Narrative:       Among patients with dyspnea, NT-proBNP is highly sensitive for the detection of acute congestive heart failure. In addition NT-proBNP of <300  pg/ml effectively rules out acute congestive heart failure with 99% negative predictive value.    Troponin [[id number]]  (Normal) Collected:  03/07/18 1509    Specimen:  Blood Updated:  03/07/18 1542     Troponin T <0.010 ng/mL     Narrative:       Troponin T Reference Ranges:  Less than 0.03 ng/mL:    Negative for AMI  0.03 to 0.09 ng/mL:      Indeterminant for AMI  Greater than 0.09 ng/mL: Positive for AMI    [place] Draw [[id number]] Collected:  03/07/18 1509    Specimen:  Blood Updated:  03/07/18 1616    Narrative:       The following orders were created for panel order [place] Draw.  Procedure                               Abnormality         Status                     ---------                               -----------         ------                     Light Blue Top[[id number]]                                   Final result               Green Top (Gel)[[id number]]                                  Final result               Lavender Top[[id number]]                                     Final result               Gold Top - SST[[id number]]                                   Final result                 Please view results for these tests on the individual orders.    Light Blue Top [[id number]] Collected:  03/07/18 1509    Specimen:  Blood Updated:  03/07/18 1616     Extra Tube hold for add-on      Auto resulted       Green Top (Gel) [[id number]] Collected:  03/07/18 1509    Specimen:  Blood Updated:  03/07/18 1616     Extra Tube Hold for add-ons.      Auto resulted.       Lavender Top [[id number]] Collected:  03/07/18 1509    Specimen:  Blood Updated:  03/07/18 1616     Extra Tube hold for add-on      Auto resulted       Gold Top - SST [[id number]] Collected:  03/07/18 1509    Specimen:  Blood Updated:  03/07/18 1616     Extra Tube Hold for add-ons.      Auto resulted.       Protime-INR [[id number]]  (Normal) Collected:  03/07/18 1509    Specimen:  Blood Updated:  03/07/18 1737     Protime 13.5 Seconds      INR 1.05    POC  Glucose Once [[id number]]  (Normal) Collected:  03/07/18 2105    Specimen:  Blood Updated:  03/07/18 2107     Glucose 89 mg/dL     Narrative:       Meter: [id number] : [id number] [id number]                   Imaging Results (all)     Procedure Component Value Units Date/Time    XR Chest 2 View [[id number]] Collected:  03/07/18 1600     Updated:  03/07/18 1600    Narrative:       TWO-VIEW CHEST     HISTORY: Shortness of breath. Stage IV colon cancer.     FINDINGS: The lungs are moderately expanded with a moderate-sized left  pleural effusion and some adjacent localized atelectasis. There is a  very small right pleural effusion. The lungs are otherwise clear.  Allowing for the lung expansion, the heart appears slightly enlarged. A  left-sided MediPort catheter ends in the SVC.                 Patient Active Problem List   Diagnosis Code   • Hyperlipidemia E78.5   • Essential hypertension I10   • Allergic rhinitis due to pollen J30.1   • Vitamin D deficiency E55.9   • Morbid obesity due to excess calories E66.01   • Encounter for adjustment or management of vascular access device Z45.2   • Primary malignant neoplasm of colon C18.9   • Iron deficiency anemia D50.9   • Breast lesion N64.9   • Peripheral neuropathy due to chemotherapy G62.0, T45.1X5A   • Nonproliferative diabetic retinopathy E11.3299   • Type 2 diabetes mellitus with microalbuminuria E11.29, R80.9   • Osteomyelitis of toe of left foot M86.9   • Sepsis A41.9   • Type 2 diabetes mellitus with peripheral neuropathy E11.42   • Type 2 diabetes mellitus with complication, with long-term current use of insulin E11.8, Z79.4   • Obesity (BMI 30-39.9) E66.9   • Liver metastasis C78.7   • Elevated liver enzymes R74.8   • Chemotherapy induced nausea and vomiting R11.2, T45.1X5A   • Shortness of breath R06.02   • Pericardial effusion I31.3   • Pleural effusion J90       Assessment:  Active Hospital Problems (** Indicates Principal Problem)    Diagnosis  Date Noted   • **Shortness of breath [R06.02] 03/07/2018   • Pleural effusion [J90] 03/07/2018   • Pericardial effusion [I31.3] 03/07/2018   • Liver metastasis [C78.7] 06/21/2017   • Type 2 diabetes mellitus with complication, with long-term current use of insulin [E11.8, Z79.4] 12/30/2016   • Primary malignant neoplasm of colon [C18.9] 05/26/2016   • Essential hypertension [I10] 12/31/2015      Resolved Hospital Problems    Diagnosis Date Noted Date Resolved   No resolved problems to display.       Plan:  The patient's admitted to hospital and will consult cardiology for further wished treatment of her pericardial effusion.  She likely is going to need to have it drained and fluid sent for analysis with her history.    Jacob Morgan MD  3/7/2018  9:54 PM

## 2018-03-08 NOTE — PLAN OF CARE
Problem: Patient Care Overview (Adult)  Goal: Plan of Care Review  Outcome: Ongoing (interventions implemented as appropriate)   03/08/18 4403   Coping/Psychosocial Response Interventions   Plan Of Care Reviewed With patient   Patient Care Overview   Progress no change   Outcome Evaluation   Outcome Summary/Follow up Plan VSS. HR remains tachy, still 100's. Pt still reports SOA, maura with exertion. Echo read, plan for cardio to see today re: moderate pericardial effusion. Pt remains comfortable, will monitor closely.

## 2018-03-08 NOTE — NURSING NOTE
Called ER nurse Lucie Silva back to get report at 1854 but there was no answer. Patient was already on the floor since 1840 without report or acceptance of patient. Called ER main line, no answer. Called ER nurse back at 1904 and she answered and stated she wasn't able to tell me much because she could no longer pull the patient up in [facility]. [place]

## 2018-03-09 LAB
ALBUMIN SERPL-MCNC: 2.9 G/DL (ref 3.5–5.2)
ALBUMIN/GLOB SERPL: 0.6 G/DL
ALP SERPL-CCNC: 365 U/L (ref 39–117)
ALT SERPL W P-5'-P-CCNC: 31 U/L (ref 1–33)
ANION GAP SERPL CALCULATED.3IONS-SCNC: 10.9 MMOL/L
ANISOCYTOSIS BLD QL: NORMAL
AST SERPL-CCNC: 32 U/L (ref 1–32)
BASOPHILS # BLD AUTO: 0.04 10*3/MM3 (ref 0–0.2)
BASOPHILS NFR BLD AUTO: 0.4 % (ref 0–1.5)
BILIRUB SERPL-MCNC: 0.7 MG/DL (ref 0.1–1.2)
BUN BLD-MCNC: 12 MG/DL (ref 8–23)
BUN/CREAT SERPL: 14.5 (ref 7–25)
CALCIUM SPEC-SCNC: 8.6 MG/DL (ref 8.6–10.5)
CHLORIDE SERPL-SCNC: 103 MMOL/L (ref 98–107)
CO2 SERPL-SCNC: 25.1 MMOL/L (ref 22–29)
CREAT BLD-MCNC: 0.83 MG/DL (ref 0.57–1)
DEPRECATED RDW RBC AUTO: 49 FL (ref 37–54)
EOSINOPHIL # BLD AUTO: 0.21 10*3/MM3 (ref 0–0.7)
EOSINOPHIL NFR BLD AUTO: 2.2 % (ref 0.3–6.2)
ERYTHROCYTE [DISTWIDTH] IN BLOOD BY AUTOMATED COUNT: 15 % (ref 11.7–13)
GFR SERPL CREATININE-BSD FRML MDRD: 83 ML/MIN/1.73
GLOBULIN UR ELPH-MCNC: 5.1 GM/DL
GLUCOSE BLD-MCNC: 77 MG/DL (ref 65–99)
GLUCOSE BLDC GLUCOMTR-MCNC: 144 MG/DL (ref 70–130)
GLUCOSE BLDC GLUCOMTR-MCNC: 183 MG/DL (ref 70–130)
GLUCOSE BLDC GLUCOMTR-MCNC: 192 MG/DL (ref 70–130)
GLUCOSE BLDC GLUCOMTR-MCNC: 241 MG/DL (ref 70–130)
GLUCOSE BLDC GLUCOMTR-MCNC: 61 MG/DL (ref 70–130)
GLUCOSE BLDC GLUCOMTR-MCNC: 65 MG/DL (ref 70–130)
HCT VFR BLD AUTO: 34.2 % (ref 35.6–45.5)
HGB BLD-MCNC: 11.1 G/DL (ref 11.9–15.5)
IMM GRANULOCYTES # BLD: 0.24 10*3/MM3 (ref 0–0.03)
IMM GRANULOCYTES NFR BLD: 2.5 % (ref 0–0.5)
LDH FLD-CCNC: 159 U/L
LDH SERPL-CCNC: 247 U/L (ref 135–214)
LYMPHOCYTES # BLD AUTO: 1.85 10*3/MM3 (ref 0.9–4.8)
LYMPHOCYTES NFR BLD AUTO: 19.4 % (ref 19.6–45.3)
MCH RBC QN AUTO: 29.2 PG (ref 26.9–32)
MCHC RBC AUTO-ENTMCNC: 32.5 G/DL (ref 32.4–36.3)
MCV RBC AUTO: 90 FL (ref 80.5–98.2)
MONOCYTES # BLD AUTO: 1.97 10*3/MM3 (ref 0.2–1.2)
MONOCYTES NFR BLD AUTO: 20.6 % (ref 5–12)
NEUTROPHILS # BLD AUTO: 5.25 10*3/MM3 (ref 1.9–8.1)
NEUTROPHILS NFR BLD AUTO: 54.9 % (ref 42.7–76)
NIGHT BLUE STAIN TISS: NORMAL
NIGHT BLUE STAIN TISS: NORMAL
NRBC BLD MANUAL-RTO: 0 /100 WBC (ref 0–0)
PLAT MORPH BLD: NORMAL
PLATELET # BLD AUTO: 552 10*3/MM3 (ref 140–500)
PMV BLD AUTO: 8.9 FL (ref 6–12)
POTASSIUM BLD-SCNC: 3.9 MMOL/L (ref 3.5–5.2)
PROT SERPL-MCNC: 8 G/DL (ref 6–8.5)
RBC # BLD AUTO: 3.8 10*6/MM3 (ref 3.9–5.2)
SODIUM BLD-SCNC: 139 MMOL/L (ref 136–145)
TARGETS BLD QL SMEAR: NORMAL
WBC MORPH BLD: NORMAL
WBC NRBC COR # BLD: 9.56 10*3/MM3 (ref 4.5–10.7)

## 2018-03-09 PROCEDURE — 82962 GLUCOSE BLOOD TEST: CPT

## 2018-03-09 PROCEDURE — 99221 1ST HOSP IP/OBS SF/LOW 40: CPT | Performed by: NURSE PRACTITIONER

## 2018-03-09 PROCEDURE — 63710000001 INSULIN ASPART PER 5 UNITS: Performed by: HOSPITALIST

## 2018-03-09 PROCEDURE — 83615 LACTATE (LD) (LDH) ENZYME: CPT | Performed by: INTERNAL MEDICINE

## 2018-03-09 PROCEDURE — 85007 BL SMEAR W/DIFF WBC COUNT: CPT | Performed by: INTERNAL MEDICINE

## 2018-03-09 PROCEDURE — 80053 COMPREHEN METABOLIC PANEL: CPT | Performed by: INTERNAL MEDICINE

## 2018-03-09 PROCEDURE — 85025 COMPLETE CBC W/AUTO DIFF WBC: CPT | Performed by: INTERNAL MEDICINE

## 2018-03-09 PROCEDURE — 99232 SBSQ HOSP IP/OBS MODERATE 35: CPT | Performed by: INTERNAL MEDICINE

## 2018-03-09 RX ORDER — SODIUM CHLORIDE 9 MG/ML
75 INJECTION, SOLUTION INTRAVENOUS ONCE
Status: COMPLETED | OUTPATIENT
Start: 2018-03-10 | End: 2018-03-10

## 2018-03-09 RX ORDER — SODIUM CHLORIDE 0.9 % (FLUSH) 0.9 %
10 SYRINGE (ML) INJECTION EVERY 12 HOURS SCHEDULED
Status: DISCONTINUED | OUTPATIENT
Start: 2018-03-10 | End: 2018-03-16 | Stop reason: HOSPADM

## 2018-03-09 RX ORDER — CEFAZOLIN SODIUM 2 G/100ML
2 INJECTION, SOLUTION INTRAVENOUS
Status: ACTIVE | OUTPATIENT
Start: 2018-03-10 | End: 2018-03-11

## 2018-03-09 RX ORDER — SODIUM CHLORIDE 0.9 % (FLUSH) 0.9 %
10 SYRINGE (ML) INJECTION AS NEEDED
Status: DISCONTINUED | OUTPATIENT
Start: 2018-03-09 | End: 2018-03-16 | Stop reason: HOSPADM

## 2018-03-09 RX ADMIN — LINAGLIPTIN 5 MG: 5 TABLET, FILM COATED ORAL at 08:50

## 2018-03-09 RX ADMIN — LOSARTAN POTASSIUM 100 MG: 100 TABLET ORAL at 08:50

## 2018-03-09 RX ADMIN — Medication 400 MG: at 08:50

## 2018-03-09 RX ADMIN — Medication 10 ML: at 08:50

## 2018-03-09 RX ADMIN — FERROUS SULFATE TAB 325 MG (65 MG ELEMENTAL FE) 325 MG: 325 (65 FE) TAB at 08:50

## 2018-03-09 RX ADMIN — INSULIN ASPART 2 UNITS: 100 INJECTION, SOLUTION INTRAVENOUS; SUBCUTANEOUS at 12:59

## 2018-03-09 RX ADMIN — HYDROCHLOROTHIAZIDE 12.5 MG: 12.5 CAPSULE, GELATIN COATED ORAL at 08:50

## 2018-03-09 RX ADMIN — INSULIN ASPART 2 UNITS: 100 INJECTION, SOLUTION INTRAVENOUS; SUBCUTANEOUS at 17:29

## 2018-03-09 RX ADMIN — INSULIN ASPART 3 UNITS: 100 INJECTION, SOLUTION INTRAVENOUS; SUBCUTANEOUS at 21:00

## 2018-03-09 RX ADMIN — INSULIN DETEMIR 20 UNITS: 100 INJECTION, SOLUTION SUBCUTANEOUS at 21:00

## 2018-03-09 RX ADMIN — ALLOPURINOL 100 MG: 100 TABLET ORAL at 08:50

## 2018-03-09 NOTE — CONSULTS
Inpatient Thoracic Surgery Consult  Consult performed by: KEISHA NAVARRO  Consult ordered by: ROCIO MARRERO          Patient Care Team:  Paola Onofre MD as PCP - General (Family Medicine)  Silvina Ty, ROBBIE Brown MD as Consulting Physician (Hematology and Oncology)  Chela Fuller MD as Referring Physician (Internal Medicine)    Chief Complaint   Patient presents with   • Shortness of Breath     X1 WK AFTER HAVING CT SCAN DONE       Subjective     History of Present Illness  Feli Del Toro is a pleasant 67 year old female with prior history of colon cancer with liver mets.  She is s/p colon resection and completed second round of chemo December 2017.  She is followed per Dr. Brown with [facility].  Patient states she has been doing well until approximately a week ago, she noticed she had worsening shortness of breath.  Shortness of breath, worsened with any exertion.  She also would develop heart palpitations.  The symptoms would resolve with rest after about 5 minutes.  She updated Dr. Brown, who ordered a CT scan of chest , which showed pericardial effusion along with left pleural effusion.  She was instructed to present to the hospital for admission for further testing. Echocardiogram was performed. Echo showed EF 68% and 1-2cm circumferential pericardial effusion. Doppler assessment is indeterminate for tamponade however there is no 2-D evidence of tamponade.  She underwent pericardiocentesis yesterday per cardiology which was unsuccessful.  We were consulted for surgical evaluation.   At time of evaluation today, she states she is doing well.  She denies any complaints of chest or pleuritic pain, fever, chills, cough, hemoptysis.  She denies any shortness of breath with rest at this time.      Review of Systems   Constitutional: Negative.    HENT: Negative.    Respiratory: Positive for shortness of breath (worsens with exertion). Negative for cough and chest tightness.     Cardiovascular: Positive for palpitations. Negative for chest pain.   Gastrointestinal: Negative.    Endocrine: Negative.    Genitourinary: Negative.    Musculoskeletal: Negative.    Skin: Negative.    Neurological: Negative.    Hematological: Negative.    Psychiatric/Behavioral: Negative.         Patient Active Problem List   Diagnosis   • Hyperlipidemia   • Essential hypertension   • Allergic rhinitis due to pollen   • Vitamin D deficiency   • Morbid obesity due to excess calories   • Encounter for adjustment or management of vascular access device   • Primary malignant neoplasm of colon   • Iron deficiency anemia   • Breast lesion   • Peripheral neuropathy due to chemotherapy   • Nonproliferative diabetic retinopathy   • Type 2 diabetes mellitus with microalbuminuria   • Osteomyelitis of toe of left foot   • Sepsis   • Type 2 diabetes mellitus with peripheral neuropathy   • Type 2 diabetes mellitus with complication, with long-term current use of insulin   • Obesity (BMI 30-39.9)   • Liver metastasis   • Elevated liver enzymes   • Chemotherapy induced nausea and vomiting   • Shortness of breath   • Pericardial effusion   • Pleural effusion     Past Medical History:   Diagnosis Date   • Anemia    • Cancer 06/04/2013   • Diabetes mellitus    • History of transfusion    • Hyperlipidemia    • Hypertension    • Metastatic colon cancer to liver    • Retinopathy    • Type 2 diabetes mellitus      Past Surgical History:   Procedure Laterality Date   • ABDOMINAL SURGERY      ablation    • AMPUTATION DIGIT Left 1/1/2017    Procedure: LEFT SECOND TOE AMPUTATION;  Surgeon: Farzad Nichols MD;  Location: [facility];  Service:    • AMPUTATION OF REPLICATED TOES      right distal second toe    • CATARACT EXTRACTION     • COLECTOMY PARTIAL / TOTAL  01/14/2014   • HYSTERECTOMY  1998   • PORTACATH PLACEMENT  06/2013   • SALPINGO OOPHORECTOMY Bilateral    • SPLENECTOMY       Family History   Problem Relation Age of Onset   •  Heart attack Other    • Cancer Other    • Diabetes Other    • Hypertension Other    • Hypertension Father    • Diabetes Father    • Breast cancer Sister    • Diabetes Sister    • Stroke Paternal Grandmother    • Diabetes Paternal Grandmother    • Lupus Paternal Grandfather    • Stroke Paternal Grandfather    • Diabetes Sister      Social History     Social History   • Marital status:      Spouse name: Jesus   • Number of children: N/A   • Years of education: College     Occupational History   • Middle School Counselor   Retired     [employer]     Social History Main Topics   • Smoking status: Never Smoker   • Smokeless tobacco: Never Used   • Alcohol use No   • Drug use: No   • Sexual activity: Defer     Other Topics Concern   • Not on file     Social History Narrative     Prescriptions Prior to Admission   Medication Sig Dispense Refill Last Dose   • allopurinol (ZYLOPRIM) 100 MG tablet Take 100 mg by mouth Daily.      • aspirin 81 MG tablet Take 81 mg by mouth daily.   Taking   • Biotin 5000 MCG tablet Take 5,000 mcg by mouth Daily.   Taking   • calcium carbonate (TUMS) 500 MG chewable tablet Chew 2 tablets As Needed.   Taking   • ferrous sulfate 325 (65 FE) MG tablet Take 1 tablet by mouth Daily.   Taking   • fluticasone (FLONASE) 50 MCG/ACT nasal spray 2 sprays into each nostril Daily.   Taking   • Insulin Lispro (HUMALOG KWIKPEN) 100 UNIT/ML solution pen-injector Inject 15 Units under the skin 3 (Three) Times a Day Before Meals. And sliding scale as directed      • LORazepam (ATIVAN) 0.5 MG tablet Take 1 tablet by mouth 2 (Two) Times a Day As Needed (nausea). 20 tablet 0 Taking   • magnesium oxide (MAG-OX) 400 MG tablet Take 400 mg by mouth Daily.      • meclizine (ANTIVERT) 25 MG tablet Take 25 mg by mouth 3 (Three) Times a Day As Needed.   Taking   • NOVOFINE 32G X 6 MM misc USE AS DIRECTED 5 TIMES DAILY AND AS NEEDED 200 each 3 Taking   • ondansetron (ZOFRAN) 8 MG tablet Take 1 tablet by mouth 3 (Three)  "Times a Day As Needed for Nausea or Vomiting. 30 tablet 5 Taking   • ONETOUCH VERIO test strip TEST 3-4 TIMES A  each 5 Taking   • SAXagliptin (ONGLYZA) 5 MG tablet Take 5 mg by mouth Daily.      • telmisartan-hydrochlorothiazide (MICARDIS HCT) 40-12.5 MG per tablet Take 1 tablet by mouth daily 90 tablet 3 Taking   • TOUJEO SOLOSTAR 300 UNIT/ML solution pen-injector Inject 45 Units under the skin Daily. 3 pen 0 Taking   • vitamin D (ERGOCALCIFEROL) 45425 UNITS capsule capsule Take 1 capsule by mouth 1 (One) Time Per Week. 13 capsule 3 Taking   • XIGDUO XR 5-1000 MG tablet sustained-release 24 hour Take 2 tablets by mouth Daily. 180 tablet 3 Taking     Allergies   Allergen Reactions   • Compazine [Prochlorperazine Edisylate] Other (See Comments)     Complete arch in the back    • Prochlorperazine Maleate Other (See Comments)     \"BACK MUSCLE RETRACTION\"       Objective      Vital Signs  Temp:  [97.9 °F (36.6 °C)-99.7 °F (37.6 °C)] 98.4 °F (36.9 °C)  Heart Rate:  [] 95  Resp:  [16-18] 18  BP: (114-158)/(64-99) 128/79    Intake & Output (last day)       03/08 0701 - 03/09 0700 03/09 0701 - 03/10 0700    Urine (mL/kg/hr)      Total Output        Net                    Physical Exam   Constitutional: She is oriented to person, place, and time. Vital signs are normal. She appears well-developed. No distress.   HENT:   Head: Normocephalic and atraumatic.   Neck: Normal range of motion. Neck supple.   Cardiovascular: Normal rate, regular rhythm, normal heart sounds and intact distal pulses.    No murmur heard.  Pulmonary/Chest: Effort normal. No respiratory distress. She has decreased breath sounds in the left lower field. She has no wheezes. She has no rhonchi. She has no rales. She exhibits no tenderness.   Abdominal: Soft. There is no tenderness.   Musculoskeletal: Normal range of motion. She exhibits edema (slight lower extremity bilaterally). She exhibits no tenderness.   Neurological: She is alert and " oriented to person, place, and time. She has normal strength.   Skin: Skin is warm and dry. No rash noted. No cyanosis or erythema.   Psychiatric: She has a normal mood and affect. Her behavior is normal.       Results Review:    I reviewed the patient's new clinical results.  I reviewed the patient's new imaging results and agree with the interpretation.    Imaging Results (last 24 hours)     Procedure Component Value Units Date/Time    CT Chest Without Contrast [[id number]] Collected:  03/08/18 2034     Updated:  03/08/18 2041    Narrative:       NONCONTRAST CT CHEST     HISTORY: Female who is 67 years-old, with a history of  breast cancer  presenting for evaluation of complex left pleural effusion.     PROTOCOL: Imaging was performed with standard technique.     Radiation dose reduction techniques were utilized including automated  exposure control and exposure modulation based on body size.     COMPARISON: 2/14/2018     FINDINGS:  1. Pericardial effusion new since previous study measuring up to 2 cm  thick.  2. Interval development of complex fluid collection at the left base  which appears partially loculated associated with atelectasis.  3. Minimal right effusion and atelectasis.  4. No other significant pulmonary abnormality.  5. No change in appearance of liver mass with evidence of prior therapy,  gallstones.     This report was finalized on 3/8/2018 8:38 PM by Dr. Ernesto Caputo MD.       XR Chest 2 View [[id number]] Collected:  03/08/18 1601     Updated:  03/09/18 0806    Narrative:       PA AND LATERAL VIEWS OF THE CHEST      HISTORY: Pericardial effusion, pleural effusion, colon cancer.     FINDINGS: PA and lateral views of the chest demonstrate a Mediport in  place with the tip of the Mediport in the superior vena cava at the  junction of the superior vena cava and right HM. A large left pleural  fluid collection is appreciated, increased in volume as compared to the  previous examination. There is  increased density in the retrocardiac  region suggesting left lower lobe consolidation. There is a small mild  pleural fluid present on the right with blunting of the costophrenic  angle posteriorly, new. There is mild prominence of the pulmonary  interstitium and perihilar regions bilaterally, exaggerated by  inspiratory effort. There is no evidence of pneumothorax.       Impression:       Increasing pleural fluid on the left as well as increased  density in the retrocardiac region suggesting increasing atelectasis or  consolidation. There is a small mild pleural fluid on the right  increased versus 3/7/2018.     The above information was called to the patient's nurse at the time of  the dictation. The patient's nurse is to relay the information to the  clinical service.     This report was finalized on 3/9/2018 8:03 AM by Dr. Dirk Colvin MD.             Lab Results:  Lab Results (last 24 hours)     Procedure Component Value Units Date/Time    AFB Stain - Body Fluid, Pleural Cavity [[id number]] Collected:  03/08/18 1400    Specimen:  Body Fluid from Pleural Cavity Updated:  03/08/18 1602    Non-gynecologic Cytology [[id number]] Collected:  03/08/18 1400    Specimen:  Body Fluid from Pleural Cavity Updated:  03/08/18 1602    Non-gynecologic Cytology [[id number]] Collected:  03/08/18 1400    Specimen:  Body Fluid from Pleural Cavity Updated:  03/08/18 1608    AFB Stain - Body Fluid, Pleural Cavity [[id number]] Collected:  03/08/18 1400    Specimen:  Body Fluid from Pleural Cavity Updated:  03/08/18 1610    Protein, Body Fluid - Body Fluid, Pleural Cavity [[id number]] Collected:  03/08/18 1400    Specimen:  Body Fluid from Pleural Cavity Updated:  03/08/18 1658     Protein, Total, Fluid 4.5 g/dL     Narrative:       No Reference Ranges Established.    A serous fluid total fluid (TP) greater than 50 percent of the serum TP suggests the fluid is an exudate.      1. Pleural TP/Serum TP >0.5  2. Pleural LD/Serum LD >0.6  3.  Pleural LD >2/3 of the upper limit of normal for serum LDH    This test was developed, it performance characteristics determined and judged suitable for clinical purposes by [facility].  It has not been cleared or approved by the FDA.  The laboratory is regulated under CLIA as qualified to perform high-complexity testing.     Glucose, Body Fluid - Body Fluid, Pleural Cavity [[id number]] Collected:  03/08/18 1400    Specimen:  Body Fluid from Pleural Cavity Updated:  03/08/18 1658     Glucose, Fluid 69 mg/dL     Narrative:       No Reference Ranges Established.    Serous fluid glucose less than 60 mg/dL or less than 30 mg/dL below serum glucose suggests an infectious or malignant exudate.     This test was developed, it performance characteristics determined and judged suitable for clinical purposes by [facility].  It has not been cleared or approved by the FDA.  The laboratory is regulated under CLIA as qualified to perform high-complexity testing.     Body fluid cell count - Body Fluid, [[id number]]  (Abnormal) Collected:  03/08/18 1400    Specimen:  Body Fluid from Pleural Cavity Updated:  03/08/18 1701     Color, Fluid Yellow     Appearance, Fluid Hazy (A)     WBC, Fluid 1383 /mm3       Estimated count due to clots present in specimen.         RBC, Fluid 2058 /mm3       Estimated count due to clots present in specimen.        Body fluid cell count - Body Fluid, [[id number]]  (Abnormal) Collected:  03/08/18 1400    Specimen:  Body Fluid from Pleural Cavity Updated:  03/08/18 1704     Color, Fluid Yellow     Appearance, Fluid Cloudy (A)     WBC, Fluid 705 /mm3       Estimated count due to clots present in specimen.         RBC, Fluid 66509 /mm3       Estimated count due to clots present in specimen.        POC Glucose Once [[id number]]  (Normal) Collected:  03/08/18 1702    Specimen:  Blood Updated:  03/08/18 1704     Glucose 115 mg/dL     Narrative:       Meter:  [id number] : [id number] Gabriel BARRIGA    Body Fluid Cell Count With Differential - Body Fluid, Pleural Cavity [[id number]] Collected:  03/08/18 1400    Specimen:  Body Fluid from Pleural Cavity Updated:  03/08/18 1733    Narrative:       The following orders were created for panel order Body Fluid Cell Count With Differential - Body Fluid, Pleural Cavity.  Procedure                               Abnormality         Status                     ---------                               -----------         ------                     Body fluid cell count - ...[[id number]]  Abnormal            Final result               Body fluid differential ...[[id number]]                      Final result                 Please view results for these tests on the individual orders.    Body fluid differential - Body Fluid, [[id number]] Collected:  03/08/18 1400    Specimen:  Body Fluid from Pleural Cavity Updated:  03/08/18 1733     Neutrophils, Fluid 15 %      Lymphocytes, Fluid 56 %      Monocytes, Fluid 13 %      Mononuclear, Fluid 16 %     Body fluid differential - Body Fluid, [[id number]] Collected:  03/08/18 1400    Specimen:  Body Fluid from Pleural Cavity Updated:  03/08/18 1802     Neutrophils, Fluid 6 %      Lymphocytes, Fluid 41 %      Monocytes, Fluid 2 %      Mononuclear, Fluid 51 %      Other Cells/100 WBCs, Fluid 10 /100 WBCs     Body Fluid Cell Count With Differential - Body Fluid, Pleural Cavity [[id number]] Collected:  03/08/18 1400    Specimen:  Body Fluid from Pleural Cavity Updated:  03/08/18 1802    Narrative:       The following orders were created for panel order Body Fluid Cell Count With Differential - Body Fluid, Pleural Cavity.  Procedure                               Abnormality         Status                     ---------                               -----------         ------                     Body fluid cell count - ...[[id number]]  Abnormal            Final result               Body fluid  differential ...[[id number]]                      Final result                 Please view results for these tests on the individual orders.    Glucose, Body Fluid - Body Fluid, Pleural Cavity [[id number]] Collected:  03/08/18 1400    Specimen:  Body Fluid from Pleural Cavity Updated:  03/08/18 1824     Glucose, Fluid 69 mg/dL     Narrative:       No Reference Ranges Established.    Serous fluid glucose less than 60 mg/dL or less than 30 mg/dL below serum glucose suggests an infectious or malignant exudate.     This test was developed, it performance characteristics determined and judged suitable for clinical purposes by [facility].  It has not been cleared or approved by the FDA.  The laboratory is regulated under CLIA as qualified to perform high-complexity testing.     Protein, Body Fluid - Body Fluid, Pleural Cavity [[id number]] Collected:  03/08/18 1400    Specimen:  Body Fluid from Pleural Cavity Updated:  03/08/18 1824     Protein, Total, Fluid 4.5 g/dL     Narrative:       No Reference Ranges Established.    A serous fluid total fluid (TP) greater than 50 percent of the serum TP suggests the fluid is an exudate.      1. Pleural TP/Serum TP >0.5  2. Pleural LD/Serum LD >0.6  3. Pleural LD >2/3 of the upper limit of normal for serum LDH    This test was developed, it performance characteristics determined and judged suitable for clinical purposes by [facility].  It has not been cleared or approved by the FDA.  The laboratory is regulated under CLIA as qualified to perform high-complexity testing.     POC Glucose Once [[id number]]  (Abnormal) Collected:  03/08/18 2102    Specimen:  Blood Updated:  03/08/18 2104     Glucose 195 (H) mg/dL     Narrative:       Meter: [id number] : [id number] Brock CHE    CBC & Differential [[id number]] Collected:  03/09/18 0408    Specimen:  Blood Updated:  03/09/18 0511    Narrative:       The following orders were created  for panel order CBC & Differential.  Procedure                               Abnormality         Status                     ---------                               -----------         ------                     Scan Slide[[id number]]                                       Final result               CBC Auto Differential[[id number]]        Abnormal            Final result                 Please view results for these tests on the individual orders.    CBC Auto Differential [[id number]]  (Abnormal) Collected:  03/09/18 0408    Specimen:  Blood Updated:  03/09/18 0511     WBC 9.56 10*3/mm3      RBC 3.80 (L) 10*6/mm3      Hemoglobin 11.1 (L) g/dL      Hematocrit 34.2 (L) %      MCV 90.0 fL      MCH 29.2 pg      MCHC 32.5 g/dL      RDW 15.0 (H) %      RDW-SD 49.0 fl      MPV 8.9 fL      Platelets 552 (H) 10*3/mm3      Neutrophil % 54.9 %      Lymphocyte % 19.4 (L) %      Monocyte % 20.6 (H) %      Eosinophil % 2.2 %      Basophil % 0.4 %      Immature Grans % 2.5 (H) %      Neutrophils, Absolute 5.25 10*3/mm3      Lymphocytes, Absolute 1.85 10*3/mm3      Monocytes, Absolute 1.97 (H) 10*3/mm3      Eosinophils, Absolute 0.21 10*3/mm3      Basophils, Absolute 0.04 10*3/mm3      Immature Grans, Absolute 0.24 (H) 10*3/mm3      nRBC 0.0 /100 WBC     Scan Slide [[id number]] Collected:  03/09/18 0408    Specimen:  Blood Updated:  03/09/18 0511     Anisocytosis Mod/2+     Target Cells Slight/1+     WBC Morphology Normal     Platelet Morphology Normal    Comprehensive Metabolic Panel [[id number]]  (Abnormal) Collected:  03/09/18 0408    Specimen:  Blood Updated:  03/09/18 0513     Glucose 77 mg/dL      BUN 12 mg/dL      Creatinine 0.83 mg/dL      Sodium 139 mmol/L      Potassium 3.9 mmol/L      Chloride 103 mmol/L      CO2 25.1 mmol/L      Calcium 8.6 mg/dL      Total Protein 8.0 g/dL      Albumin 2.90 (L) g/dL      ALT (SGPT) 31 U/L      AST (SGOT) 32 U/L      Alkaline Phosphatase 365 (H) U/L      Total Bilirubin 0.7 mg/dL      eGFR    Amer 83 mL/min/1.73      Globulin 5.1 gm/dL      A/G Ratio 0.6 g/dL      BUN/Creatinine Ratio 14.5     Anion Gap 10.9 mmol/L     Body Fluid Culture - Body Fluid, Pleural Cavity [[id number]]  (Normal) Collected:  03/08/18 1400    Specimen:  Body Fluid from Pleural Cavity Updated:  03/09/18 0654     BF Culture No growth     Gram Stain Result Many (4+) WBCs seen      No organisms seen    Body Fluid Culture - Body Fluid, Pleural Cavity [[id number]]  (Normal) Collected:  03/08/18 1400    Specimen:  Body Fluid from Pleural Cavity Updated:  03/09/18 0655     BF Culture No growth     Gram Stain Result Many (4+) WBCs seen      No organisms seen    POC Glucose Once [[id number]]  (Abnormal) Collected:  03/09/18 0756    Specimen:  Blood Updated:  03/09/18 0757     Glucose 61 (L) mg/dL     Narrative:       Meter: [id number] : [id number] Penelope Delatorre    POC Glucose Once [[id number]]  (Abnormal) Collected:  03/09/18 0845    Specimen:  Blood Updated:  03/09/18 0857     Glucose 65 (L) mg/dL     Narrative:       Meter: [id number] : [id number] Chad Hogue RN    POC Glucose Once [[id number]]  (Abnormal) Collected:  03/09/18 0938    Specimen:  Blood Updated:  03/09/18 0939     Glucose 144 (H) mg/dL     Narrative:       Meter: [id number] : [id number] Penelope Delatorre    Lactate Dehydrogenase [[id number]]  (Abnormal) Collected:  03/09/18 0408    Specimen:  Blood Updated:  03/09/18 0955      (H) U/L     Lactate Dehydrogenase, Body Fluid - Body Fluid, Pleural Cavity [[id number]] Collected:  03/08/18 1400    Specimen:  Body Fluid from Pleural Cavity Updated:  03/09/18 0955     Lactate Dehydrogenase (LD), Fluid 159 U/L     Narrative:       No Reference Ranges Established.    Serous fluid LDH greater than 60 percent of the serum LDH or serous fluid LDH two-thirds of the upper limit of normal for serum LDH suggests the fluid is an exudate.     1. Pleural TP/Serum TP >0.5  2. Pleural LD/Serum LD  >0.6  3. Pleural LD >2/3 of the upper limit of normal for serum LDH    This test was developed, it performance characteristics determined and judged suitable for clinical purposes by [facility].  It has not been cleared or approved by the FDA.  The laboratory is regulated under CLIA as qualified to perform high-complexity testing.     POC Glucose Once [[id number]]  (Abnormal) Collected:  03/09/18 1206    Specimen:  Blood Updated:  03/09/18 1207     Glucose 183 (H) mg/dL     Narrative:       Meter: [id number] : [id number] Penelope Delatorre              Assessment/Plan     Principal Problem:    Pericardial effusion  Active Problems:    Essential hypertension    Primary malignant neoplasm of colon    Type 2 diabetes mellitus with complication, with long-term current use of insulin    Liver metastasis    Shortness of breath    Pleural effusion      Assessment:    Condition: In serious condition.   (Pericardial effusion  Left pleural effusion  History of stage IV colon cancer with metastases to liver  Dyspnea with exertion).     Plan:   (After evaluation of patient and review of recent diagnostic studies, feel patient will need to undergo a left video-assisted thoracoscopy with decortication along with pericardial window due to left pleural effusion, pericardial effusion.  Patient is scheduled for surgery for tomorrow.  See preop orders.  Nothing by mouth after midnight.  Procedure explained to patient.  Dr. Patel will also see later today to answer any further questions.).       I discussed the patients findings and our recommendations with patient    Thank you for this consult and allowing us to participate in the care of your patient.  We will follow along with you during this hospitalization.       Ting Hill, APRN  [facility]  03/09/18  12:55 PM

## 2018-03-09 NOTE — PROGRESS NOTES
Name: Feli Del Toro ADMIT: 3/7/2018   : 1950  PCP: Paola Onofre MD    MRN: [id number] LOS: 2 days   AGE/SEX: 67 y.o. female  ROOM: 2207/1   Subjective   Subjective  Attempted pericardiocentesis yesterday. Unable to get pericardial fluid but did obtain left pleural fluid.    She needed O2 at night while here, reports no hx of ERICA symptoms. She does have fatigue during day but has been related to cancer treatments. No CP NVD.    Discussed additional consultations for possible repeat procedure and expected timing of results from her pleural studies.    Objective   Vital Signs  Temp:  [97.9 °F (36.6 °C)-99.7 °F (37.6 °C)] 97.9 °F (36.6 °C)  Heart Rate:  [] 98  Resp:  [16-18] 18  BP: (114-158)/(64-99) 144/76  SpO2:  [92 %-95 %] 92 %  on  Flow (L/min):  [1-3] 1;   O2 Device: nasal cannula  Body mass index is 37.66 kg/(m^2).    Physical Exam   Constitutional: She is oriented to person, place, and time. She appears well-developed. No distress.   HENT:   Head: Normocephalic and atraumatic.   Eyes: EOM are normal. Pupils are equal, round, and reactive to light. No scleral icterus.   Neck: Normal range of motion. Neck supple.   Cardiovascular: Normal rate, regular rhythm and intact distal pulses.    No murmur heard.  Pulmonary/Chest: Effort normal. She has decreased breath sounds. She has no wheezes.   Abdominal: Soft. There is no tenderness.   Musculoskeletal: Normal range of motion. Edema: trace.   Neurological: She is alert and oriented to person, place, and time.   Skin: Skin is warm and dry. She is not diaphoretic.   Psychiatric: She has a normal mood and affect. Her behavior is normal.   Nursing note and vitals reviewed.      Results Review:       I reviewed the patient's new clinical results. Reviewed imaging, agree with interpretation. Reviewed telemetry, sinus rhythm.    Results from last 7 days  Lab Units 18  0408 18  0357 18  1509   WBC 10*3/mm3 9.56 11.26* 13.36*    HEMOGLOBIN g/dL 11.1* 10.9* 11.7*   PLATELETS 10*3/mm3 552* 375 556*     Results from last 7 days  Lab Units 03/09/18  0408 03/08/18  0357 03/07/18  1509   SODIUM mmol/L 139 136 137   POTASSIUM mmol/L 3.9 4.4 3.6   CHLORIDE mmol/L 103 100 98   CO2 mmol/L 25.1 24.5 28.6   BUN mg/dL 12 12 11   CREATININE mg/dL 0.83 0.86 0.99   GLUCOSE mg/dL 77 83 89   Estimated Creatinine Clearance: 89.4 mL/min (by C-G formula based on Cr of 0.83).  Results from last 7 days  Lab Units 03/09/18  0408 03/08/18 0357 03/07/18  1509   CALCIUM mg/dL 8.6 8.7 9.5   ALBUMIN g/dL 2.90*  --  3.00*         allopurinol 100 mg Oral Daily   ferrous sulfate 325 mg Oral Daily   fluticasone 2 spray Nasal Daily   losartan 100 mg Oral Q24H   And      Hydrochlorothiazide Oral 12.5 mg Oral Daily   insulin aspart 0-7 Units Subcutaneous 4x Daily With Meals & Nightly   insulin aspart 15 Units Subcutaneous TID With Meals   insulin detemir 45 Units Subcutaneous Nightly   linagliptin 5 mg Oral Daily   magnesium oxide 400 mg Oral Daily      Diet Regular      Assessment/Plan   Active Hospital Problems (** Indicates Principal Problem)    Diagnosis Date Noted   • **Pericardial effusion [I31.3] 03/07/2018   • Shortness of breath [R06.02] 03/07/2018   • Pleural effusion [J90] 03/07/2018   • Liver metastasis [C78.7] 06/21/2017   • Type 2 diabetes mellitus with complication, with long-term current use of insulin [E11.8, Z79.4] 12/30/2016   • Primary malignant neoplasm of colon [C18.9] 05/26/2016   • Essential hypertension [I10] 12/31/2015      Resolved Hospital Problems    Diagnosis Date Noted Date Resolved   No resolved problems to display.     - Pericardial/Pleural Effusions: Pleural effusion loculated on CT. Will add LDH and flow cytometry to pleural studies. Cytology, AFB, and Cx pending. Exudate by protein ratio. Cardiology discussed with Cardiac Surgery yesterday. Consult Thoracic Surgery. Cardiology following.  - Colon Cancer/Metastatic to Liver: sp FOLFOX and  resection of primary tumor, splenectomy, thermal ablation 2014, FOLFIRI regimen course, Radiation therapy and cyberknife 2015. Microwave ablation of liver mass and FOLFIRI regimen 06/2017. Follow up in 5 weeks with CBC group after discharge.  - DM2: A1c 7.7. Hypoglycemic this morning. Stop premeal insulin. Decrease Levemir to 20 units nightly. Continue SSI.  - HTN: HCTZ/Losartan  - Disposition: Home/TBD    Foster Delong MD  [facility]  03/09/18  9:26 AM

## 2018-03-09 NOTE — PLAN OF CARE
Problem: Cardiac Output, Decreased (Adult)  Goal: Identify Related Risk Factors and Signs and Symptoms  Outcome: Ongoing (interventions implemented as appropriate)    Goal: Adequate Cardiac Output/Effective Tissue Perfusion  Outcome: Ongoing (interventions implemented as appropriate)      Problem: Respiratory Insufficiency (Adult)  Goal: Identify Related Risk Factors and Signs and Symptoms  Outcome: Ongoing (interventions implemented as appropriate)    Goal: Acid/Base Balance  Outcome: Ongoing (interventions implemented as appropriate)    Goal: Effective Ventilation  Outcome: Ongoing (interventions implemented as appropriate)      Problem: Patient Care Overview (Adult)  Goal: Plan of Care Review   03/09/18 0344   Coping/Psychosocial Response Interventions   Plan Of Care Reviewed With patient;spouse   Patient Care Overview   Progress no change   Outcome Evaluation   Outcome Summary/Follow up Plan VSS. Pt. has no complaints of pain or SOA. O2 sats dropped to mid 80s while sleeping-applied 2L NC. Will continue to monitor.      Goal: Adult Individualization and Mutuality  Outcome: Ongoing (interventions implemented as appropriate)    Goal: Discharge Needs Assessment  Outcome: Ongoing (interventions implemented as appropriate)

## 2018-03-09 NOTE — PROGRESS NOTES
"Patient Care Team:  Paola Onofre MD as PCP - General (Family Medicine)  Silvina Ty, ROBBIE Brown MD as Consulting Physician (Hematology and Oncology)  Chela Fuller MD as Referring Physician (Internal Medicine)    Chief Complaint:   Follow-up pericardial effusion    Interval History:   Unsuccessful attempt pericardiocentesis yesterday.  No chest pain.  Shortness of breath continues.      Objective   Vital Signs  Temp:  [97.9 °F (36.6 °C)-99.7 °F (37.6 °C)] 97.9 °F (36.6 °C)  Heart Rate:  [] 91  Resp:  [16-18] 18  BP: (114-158)/(64-99) 144/76  No intake or output data in the 24 hours ending 03/09/18 0755  Flowsheet Rows         First Filed Value    Admission Height  175.3 cm (69\") Documented at 03/07/2018 1445    Admission Weight  116 kg (255 lb) Documented at 03/07/2018 1445          General Appearance:    Alert, cooperative, in no acute distress   Head:    Normocephalic, without obvious abnormality, atraumatic       Neck:   No adenopathy, supple, no thyromegaly, no carotid bruit, no    JVD   Lungs:     Decreased breath sounds left base.      Heart:    Normal rate, regular rhythm,  No murmur, rub, or gallop   Chest Wall:    No abnormalities observed   Abdomen:     Normal bowel sounds, soft, non-tender, non-distended,            no rebound tenderness   Extremities:   No cyanosis, clubbing, or edema   Pulses:   Pulses palpable and equal bilaterally   Skin:   No bleeding or rash       Neurologic:   Cranial nerves 2 - 12 grossly intact, sensation intact             allopurinol 100 mg Oral Daily   ferrous sulfate 325 mg Oral Daily   fluticasone 2 spray Nasal Daily   losartan 100 mg Oral Q24H   And      Hydrochlorothiazide Oral 12.5 mg Oral Daily   insulin aspart 0-7 Units Subcutaneous 4x Daily With Meals & Nightly   insulin aspart 15 Units Subcutaneous TID With Meals   insulin detemir 45 Units Subcutaneous Nightly   linagliptin 5 mg Oral Daily   magnesium oxide 400 mg Oral Daily        "     Results Review:      Results from last 7 days  Lab Units 03/09/18  0408   SODIUM mmol/L 139   POTASSIUM mmol/L 3.9   CHLORIDE mmol/L 103   CO2 mmol/L 25.1   BUN mg/dL 12   CREATININE mg/dL 0.83   GLUCOSE mg/dL 77   CALCIUM mg/dL 8.6       Results from last 7 days  Lab Units 03/07/18  1509   TROPONIN T ng/mL <0.010       Results from last 7 days  Lab Units 03/09/18  0408   WBC 10*3/mm3 9.56   HEMOGLOBIN g/dL 11.1*   HEMATOCRIT % 34.2*   PLATELETS 10*3/mm3 552*       Results from last 7 days  Lab Units 03/07/18  1509   INR  1.05                 @LABNT(bnp)@  I reviewed the patient's new clinical results.  I personally viewed and interpreted the patient's EKG/Telemetry data          Assessment/Plan   1. Pericardial effusion: Moderate  2. Pleural effusion: left  3. Colon cancer with liver mets.   4. DM  5. Dyspnea    -Procedure with Dr. Dobbins yesterday.  Unable to access pericardial fluid via subxiphoid approach.  I have reviewed the films.  I do not think we can safely approach this apically as the degree of apical fluid is more towards small  -I discussed this with Dr. Cardoza and he is discussing this with Dr. Patel.  Surgical approach would be necessary to drain   -I will await their recommendation

## 2018-03-10 ENCOUNTER — ANESTHESIA EVENT (OUTPATIENT)
Dept: PERIOP | Facility: HOSPITAL | Age: 68
End: 2018-03-10

## 2018-03-10 ENCOUNTER — APPOINTMENT (OUTPATIENT)
Dept: GENERAL RADIOLOGY | Facility: HOSPITAL | Age: 68
End: 2018-03-10

## 2018-03-10 ENCOUNTER — ANESTHESIA (OUTPATIENT)
Dept: PERIOP | Facility: HOSPITAL | Age: 68
End: 2018-03-10

## 2018-03-10 LAB
ABO GROUP BLD: NORMAL
ANION GAP SERPL CALCULATED.3IONS-SCNC: 10.4 MMOL/L
APPEARANCE FLD: ABNORMAL
APTT PPP: 52.9 SECONDS (ref 22.7–35.4)
BASOPHILS # BLD AUTO: 0.05 10*3/MM3 (ref 0–0.2)
BASOPHILS NFR BLD AUTO: 0.4 % (ref 0–1.5)
BLD GP AB SCN SERPL QL: NEGATIVE
BUN BLD-MCNC: 12 MG/DL (ref 8–23)
BUN/CREAT SERPL: 13.8 (ref 7–25)
CALCIUM SPEC-SCNC: 8.4 MG/DL (ref 8.6–10.5)
CHLORIDE SERPL-SCNC: 98 MMOL/L (ref 98–107)
CO2 SERPL-SCNC: 25.6 MMOL/L (ref 22–29)
COLOR FLD: ABNORMAL
CREAT BLD-MCNC: 0.87 MG/DL (ref 0.57–1)
DEPRECATED RDW RBC AUTO: 49.5 FL (ref 37–54)
EOSINOPHIL # BLD AUTO: 0.26 10*3/MM3 (ref 0–0.7)
EOSINOPHIL NFR BLD AUTO: 2 % (ref 0.3–6.2)
ERYTHROCYTE [DISTWIDTH] IN BLOOD BY AUTOMATED COUNT: 15.1 % (ref 11.7–13)
GFR SERPL CREATININE-BSD FRML MDRD: 79 ML/MIN/1.73
GIE STN SPEC: NORMAL
GIE STN SPEC: NORMAL
GLUCOSE BLD-MCNC: 193 MG/DL (ref 65–99)
GLUCOSE BLDC GLUCOMTR-MCNC: 215 MG/DL (ref 70–130)
GLUCOSE BLDC GLUCOMTR-MCNC: 217 MG/DL (ref 70–130)
GLUCOSE BLDC GLUCOMTR-MCNC: 219 MG/DL (ref 70–130)
GLUCOSE FLD-MCNC: 178 MG/DL
HCT VFR BLD AUTO: 34.2 % (ref 35.6–45.5)
HGB BLD-MCNC: 11.2 G/DL (ref 11.9–15.5)
IMM GRANULOCYTES # BLD: 0.28 10*3/MM3 (ref 0–0.03)
IMM GRANULOCYTES NFR BLD: 2.1 % (ref 0–0.5)
INR PPP: 1.16 (ref 0.9–1.1)
LDH FLD-CCNC: 665 U/L
LYMPHOCYTES # BLD AUTO: 2.25 10*3/MM3 (ref 0.9–4.8)
LYMPHOCYTES NFR BLD AUTO: 17.1 % (ref 19.6–45.3)
LYMPHOCYTES NFR FLD MANUAL: 88 %
MAGNESIUM SERPL-MCNC: 2.1 MG/DL (ref 1.6–2.4)
MCH RBC QN AUTO: 29.9 PG (ref 26.9–32)
MCHC RBC AUTO-ENTMCNC: 32.7 G/DL (ref 32.4–36.3)
MCV RBC AUTO: 91.2 FL (ref 80.5–98.2)
MONOCYTES # BLD AUTO: 2.48 10*3/MM3 (ref 0.2–1.2)
MONOCYTES NFR BLD AUTO: 18.8 % (ref 5–12)
MONOCYTES NFR FLD: 1 %
NEUTROPHILS # BLD AUTO: 7.86 10*3/MM3 (ref 1.9–8.1)
NEUTROPHILS NFR BLD AUTO: 59.6 % (ref 42.7–76)
NEUTROPHILS NFR FLD MANUAL: 11 %
PH FLD: 8 [PH]
PLATELET # BLD AUTO: 577 10*3/MM3 (ref 140–500)
PMV BLD AUTO: 9.5 FL (ref 6–12)
POTASSIUM BLD-SCNC: 4 MMOL/L (ref 3.5–5.2)
PROT FLD-MCNC: 5.9 G/DL
PROTHROMBIN TIME: 14.6 SECONDS (ref 11.7–14.2)
RBC # BLD AUTO: 3.75 10*6/MM3 (ref 3.9–5.2)
RBC # FLD AUTO: ABNORMAL /MM3
RH BLD: POSITIVE
SODIUM BLD-SCNC: 134 MMOL/L (ref 136–145)
WBC # FLD: 5143 /MM3
WBC NRBC COR # BLD: 13.18 10*3/MM3 (ref 4.5–10.7)

## 2018-03-10 PROCEDURE — 0BJ08ZZ INSPECTION OF TRACHEOBRONCHIAL TREE, VIA NATURAL OR ARTIFICIAL OPENING ENDOSCOPIC: ICD-10-PCS | Performed by: THORACIC SURGERY (CARDIOTHORACIC VASCULAR SURGERY)

## 2018-03-10 PROCEDURE — P9041 ALBUMIN (HUMAN),5%, 50ML: HCPCS | Performed by: NURSE ANESTHETIST, CERTIFIED REGISTERED

## 2018-03-10 PROCEDURE — C1729 CATH, DRAINAGE: HCPCS | Performed by: THORACIC SURGERY (CARDIOTHORACIC VASCULAR SURGERY)

## 2018-03-10 PROCEDURE — 84157 ASSAY OF PROTEIN OTHER: CPT | Performed by: INTERNAL MEDICINE

## 2018-03-10 PROCEDURE — 25010000002 DEXAMETHASONE PER 1 MG: Performed by: NURSE ANESTHETIST, CERTIFIED REGISTERED

## 2018-03-10 PROCEDURE — 25010000002 KETOROLAC TROMETHAMINE PER 15 MG: Performed by: THORACIC SURGERY (CARDIOTHORACIC VASCULAR SURGERY)

## 2018-03-10 PROCEDURE — 88112 CYTOPATH CELL ENHANCE TECH: CPT | Performed by: THORACIC SURGERY (CARDIOTHORACIC VASCULAR SURGERY)

## 2018-03-10 PROCEDURE — 83735 ASSAY OF MAGNESIUM: CPT | Performed by: INTERNAL MEDICINE

## 2018-03-10 PROCEDURE — 87102 FUNGUS ISOLATION CULTURE: CPT | Performed by: THORACIC SURGERY (CARDIOTHORACIC VASCULAR SURGERY)

## 2018-03-10 PROCEDURE — 25010000002 HEPARIN FLUSH (PORCINE) 100 UNIT/ML SOLUTION: Performed by: INTERNAL MEDICINE

## 2018-03-10 PROCEDURE — 87176 TISSUE HOMOGENIZATION CULTR: CPT | Performed by: THORACIC SURGERY (CARDIOTHORACIC VASCULAR SURGERY)

## 2018-03-10 PROCEDURE — 94799 UNLISTED PULMONARY SVC/PX: CPT

## 2018-03-10 PROCEDURE — 87206 SMEAR FLUORESCENT/ACID STAI: CPT | Performed by: THORACIC SURGERY (CARDIOTHORACIC VASCULAR SURGERY)

## 2018-03-10 PROCEDURE — 83615 LACTATE (LD) (LDH) ENZYME: CPT | Performed by: THORACIC SURGERY (CARDIOTHORACIC VASCULAR SURGERY)

## 2018-03-10 PROCEDURE — 25010000002 VANCOMYCIN PER 500 MG

## 2018-03-10 PROCEDURE — 86850 RBC ANTIBODY SCREEN: CPT | Performed by: NURSE PRACTITIONER

## 2018-03-10 PROCEDURE — 86900 BLOOD TYPING SEROLOGIC ABO: CPT | Performed by: NURSE PRACTITIONER

## 2018-03-10 PROCEDURE — 87015 SPECIMEN INFECT AGNT CONCNTJ: CPT | Performed by: THORACIC SURGERY (CARDIOTHORACIC VASCULAR SURGERY)

## 2018-03-10 PROCEDURE — 82945 GLUCOSE OTHER FLUID: CPT | Performed by: INTERNAL MEDICINE

## 2018-03-10 PROCEDURE — 25010000002 HYDROMORPHONE PER 4 MG: Performed by: NURSE ANESTHETIST, CERTIFIED REGISTERED

## 2018-03-10 PROCEDURE — 83986 ASSAY PH BODY FLUID NOS: CPT | Performed by: THORACIC SURGERY (CARDIOTHORACIC VASCULAR SURGERY)

## 2018-03-10 PROCEDURE — 25010000002 PHENYLEPHRINE PER 1 ML: Performed by: NURSE ANESTHETIST, CERTIFIED REGISTERED

## 2018-03-10 PROCEDURE — 25010000002 FENTANYL CITRATE (PF) 100 MCG/2ML SOLUTION: Performed by: NURSE ANESTHETIST, CERTIFIED REGISTERED

## 2018-03-10 PROCEDURE — 87205 SMEAR GRAM STAIN: CPT | Performed by: THORACIC SURGERY (CARDIOTHORACIC VASCULAR SURGERY)

## 2018-03-10 PROCEDURE — 25010000002 FENTANYL CITRATE (PF) 100 MCG/2ML SOLUTION: Performed by: ANESTHESIOLOGY

## 2018-03-10 PROCEDURE — 87070 CULTURE OTHR SPECIMN AEROBIC: CPT | Performed by: THORACIC SURGERY (CARDIOTHORACIC VASCULAR SURGERY)

## 2018-03-10 PROCEDURE — 33025 INCISION OF HEART SAC: CPT | Performed by: THORACIC SURGERY (CARDIOTHORACIC VASCULAR SURGERY)

## 2018-03-10 PROCEDURE — 63710000001 INSULIN DETEMER PER 5 UNITS: Performed by: INTERNAL MEDICINE

## 2018-03-10 PROCEDURE — 25010000002 KETOROLAC TROMETHAMINE PER 15 MG: Performed by: NURSE ANESTHETIST, CERTIFIED REGISTERED

## 2018-03-10 PROCEDURE — 87116 MYCOBACTERIA CULTURE: CPT | Performed by: THORACIC SURGERY (CARDIOTHORACIC VASCULAR SURGERY)

## 2018-03-10 PROCEDURE — 89051 BODY FLUID CELL COUNT: CPT | Performed by: THORACIC SURGERY (CARDIOTHORACIC VASCULAR SURGERY)

## 2018-03-10 PROCEDURE — 25010000003 CEFAZOLIN IN DEXTROSE 2-4 GM/100ML-% SOLUTION: Performed by: THORACIC SURGERY (CARDIOTHORACIC VASCULAR SURGERY)

## 2018-03-10 PROCEDURE — 86901 BLOOD TYPING SEROLOGIC RH(D): CPT | Performed by: NURSE PRACTITIONER

## 2018-03-10 PROCEDURE — 71045 X-RAY EXAM CHEST 1 VIEW: CPT

## 2018-03-10 PROCEDURE — 80048 BASIC METABOLIC PNL TOTAL CA: CPT | Performed by: INTERNAL MEDICINE

## 2018-03-10 PROCEDURE — 31622 DX BRONCHOSCOPE/WASH: CPT | Performed by: THORACIC SURGERY (CARDIOTHORACIC VASCULAR SURGERY)

## 2018-03-10 PROCEDURE — 0W9D30Z DRAINAGE OF PERICARDIAL CAVITY WITH DRAINAGE DEVICE, PERCUTANEOUS APPROACH: ICD-10-PCS | Performed by: THORACIC SURGERY (CARDIOTHORACIC VASCULAR SURGERY)

## 2018-03-10 PROCEDURE — 85730 THROMBOPLASTIN TIME PARTIAL: CPT | Performed by: NURSE PRACTITIONER

## 2018-03-10 PROCEDURE — 85610 PROTHROMBIN TIME: CPT | Performed by: NURSE PRACTITIONER

## 2018-03-10 PROCEDURE — 63710000001 INSULIN ASPART PER 5 UNITS: Performed by: HOSPITALIST

## 2018-03-10 PROCEDURE — 25010000002 NEOSTIGMINE PER 0.5 MG: Performed by: NURSE ANESTHETIST, CERTIFIED REGISTERED

## 2018-03-10 PROCEDURE — 25010000002 ALBUMIN HUMAN 5% PER 50 ML: Performed by: NURSE ANESTHETIST, CERTIFIED REGISTERED

## 2018-03-10 PROCEDURE — 25010000002 HYDROMORPHONE HCL PF 500 MG/50ML SOLUTION: Performed by: THORACIC SURGERY (CARDIOTHORACIC VASCULAR SURGERY)

## 2018-03-10 PROCEDURE — 25010000002 PROPOFOL 10 MG/ML EMULSION: Performed by: NURSE ANESTHETIST, CERTIFIED REGISTERED

## 2018-03-10 PROCEDURE — 85025 COMPLETE CBC W/AUTO DIFF WBC: CPT | Performed by: INTERNAL MEDICINE

## 2018-03-10 PROCEDURE — 88305 TISSUE EXAM BY PATHOLOGIST: CPT | Performed by: THORACIC SURGERY (CARDIOTHORACIC VASCULAR SURGERY)

## 2018-03-10 PROCEDURE — 25010000002 MIDAZOLAM PER 1 MG: Performed by: ANESTHESIOLOGY

## 2018-03-10 PROCEDURE — 82962 GLUCOSE BLOOD TEST: CPT

## 2018-03-10 RX ORDER — KETOROLAC TROMETHAMINE 30 MG/ML
15 INJECTION, SOLUTION INTRAMUSCULAR; INTRAVENOUS EVERY 6 HOURS SCHEDULED
Status: DISCONTINUED | OUTPATIENT
Start: 2018-03-10 | End: 2018-03-10

## 2018-03-10 RX ORDER — LABETALOL HYDROCHLORIDE 5 MG/ML
5 INJECTION, SOLUTION INTRAVENOUS
Status: DISCONTINUED | OUTPATIENT
Start: 2018-03-10 | End: 2018-03-10 | Stop reason: HOSPADM

## 2018-03-10 RX ORDER — ALBUMIN, HUMAN INJ 5% 5 %
SOLUTION INTRAVENOUS
Status: COMPLETED
Start: 2018-03-10 | End: 2018-03-10

## 2018-03-10 RX ORDER — MIDAZOLAM HYDROCHLORIDE 1 MG/ML
2 INJECTION INTRAMUSCULAR; INTRAVENOUS
Status: DISCONTINUED | OUTPATIENT
Start: 2018-03-10 | End: 2018-03-10 | Stop reason: HOSPADM

## 2018-03-10 RX ORDER — HYDROCODONE BITARTRATE AND ACETAMINOPHEN 7.5; 325 MG/1; MG/1
2 TABLET ORAL EVERY 4 HOURS PRN
Status: DISCONTINUED | OUTPATIENT
Start: 2018-03-10 | End: 2018-03-16 | Stop reason: HOSPADM

## 2018-03-10 RX ORDER — MAGNESIUM HYDROXIDE 1200 MG/15ML
LIQUID ORAL AS NEEDED
Status: DISCONTINUED | OUTPATIENT
Start: 2018-03-10 | End: 2018-03-10 | Stop reason: HOSPADM

## 2018-03-10 RX ORDER — NALOXONE HCL 0.4 MG/ML
0.2 VIAL (ML) INJECTION AS NEEDED
Status: DISCONTINUED | OUTPATIENT
Start: 2018-03-10 | End: 2018-03-10 | Stop reason: HOSPADM

## 2018-03-10 RX ORDER — NALOXONE HCL 0.4 MG/ML
0.4 VIAL (ML) INJECTION
Status: DISCONTINUED | OUTPATIENT
Start: 2018-03-10 | End: 2018-03-11

## 2018-03-10 RX ORDER — MIDAZOLAM HYDROCHLORIDE 1 MG/ML
1 INJECTION INTRAMUSCULAR; INTRAVENOUS
Status: DISCONTINUED | OUTPATIENT
Start: 2018-03-10 | End: 2018-03-10 | Stop reason: HOSPADM

## 2018-03-10 RX ORDER — CEFAZOLIN SODIUM 2 G/100ML
2 INJECTION, SOLUTION INTRAVENOUS EVERY 8 HOURS
Status: COMPLETED | OUTPATIENT
Start: 2018-03-10 | End: 2018-03-11

## 2018-03-10 RX ORDER — HYDRALAZINE HYDROCHLORIDE 20 MG/ML
5 INJECTION INTRAMUSCULAR; INTRAVENOUS
Status: DISCONTINUED | OUTPATIENT
Start: 2018-03-10 | End: 2018-03-10 | Stop reason: HOSPADM

## 2018-03-10 RX ORDER — BUPIVACAINE HYDROCHLORIDE 2.5 MG/ML
INJECTION, SOLUTION EPIDURAL; INFILTRATION; INTRACAUDAL AS NEEDED
Status: DISCONTINUED | OUTPATIENT
Start: 2018-03-10 | End: 2018-03-10 | Stop reason: HOSPADM

## 2018-03-10 RX ORDER — EPHEDRINE SULFATE 50 MG/ML
5 INJECTION, SOLUTION INTRAVENOUS ONCE AS NEEDED
Status: DISCONTINUED | OUTPATIENT
Start: 2018-03-10 | End: 2018-03-10 | Stop reason: HOSPADM

## 2018-03-10 RX ORDER — KETOROLAC TROMETHAMINE 30 MG/ML
INJECTION, SOLUTION INTRAMUSCULAR; INTRAVENOUS AS NEEDED
Status: DISCONTINUED | OUTPATIENT
Start: 2018-03-10 | End: 2018-03-10 | Stop reason: SURG

## 2018-03-10 RX ORDER — SODIUM CHLORIDE 9 MG/ML
75 INJECTION, SOLUTION INTRAVENOUS CONTINUOUS
Status: ACTIVE | OUTPATIENT
Start: 2018-03-10 | End: 2018-03-11

## 2018-03-10 RX ORDER — HYDROCODONE BITARTRATE AND ACETAMINOPHEN 7.5; 325 MG/1; MG/1
1 TABLET ORAL EVERY 4 HOURS PRN
Status: DISCONTINUED | OUTPATIENT
Start: 2018-03-10 | End: 2018-03-16 | Stop reason: HOSPADM

## 2018-03-10 RX ORDER — LIDOCAINE HYDROCHLORIDE 10 MG/ML
0.5 INJECTION, SOLUTION EPIDURAL; INFILTRATION; INTRACAUDAL; PERINEURAL ONCE AS NEEDED
Status: DISCONTINUED | OUTPATIENT
Start: 2018-03-10 | End: 2018-03-10 | Stop reason: HOSPADM

## 2018-03-10 RX ORDER — DOCUSATE SODIUM 100 MG/1
100 CAPSULE, LIQUID FILLED ORAL 2 TIMES DAILY
Status: DISCONTINUED | OUTPATIENT
Start: 2018-03-10 | End: 2018-03-16 | Stop reason: HOSPADM

## 2018-03-10 RX ORDER — DEXAMETHASONE SODIUM PHOSPHATE 10 MG/ML
INJECTION INTRAMUSCULAR; INTRAVENOUS AS NEEDED
Status: DISCONTINUED | OUTPATIENT
Start: 2018-03-10 | End: 2018-03-10 | Stop reason: SURG

## 2018-03-10 RX ORDER — DIPHENHYDRAMINE HYDROCHLORIDE 50 MG/ML
12.5 INJECTION INTRAMUSCULAR; INTRAVENOUS
Status: DISCONTINUED | OUTPATIENT
Start: 2018-03-10 | End: 2018-03-10 | Stop reason: HOSPADM

## 2018-03-10 RX ORDER — SENNA AND DOCUSATE SODIUM 50; 8.6 MG/1; MG/1
2 TABLET, FILM COATED ORAL NIGHTLY
Status: DISCONTINUED | OUTPATIENT
Start: 2018-03-10 | End: 2018-03-16 | Stop reason: HOSPADM

## 2018-03-10 RX ORDER — ALBUMIN, HUMAN INJ 5% 5 %
SOLUTION INTRAVENOUS CONTINUOUS PRN
Status: DISCONTINUED | OUTPATIENT
Start: 2018-03-10 | End: 2018-03-10 | Stop reason: SURG

## 2018-03-10 RX ORDER — SODIUM CHLORIDE, SODIUM LACTATE, POTASSIUM CHLORIDE, CALCIUM CHLORIDE 600; 310; 30; 20 MG/100ML; MG/100ML; MG/100ML; MG/100ML
9 INJECTION, SOLUTION INTRAVENOUS CONTINUOUS
Status: DISCONTINUED | OUTPATIENT
Start: 2018-03-10 | End: 2018-03-13

## 2018-03-10 RX ORDER — FAMOTIDINE 10 MG/ML
20 INJECTION, SOLUTION INTRAVENOUS ONCE
Status: COMPLETED | OUTPATIENT
Start: 2018-03-10 | End: 2018-03-10

## 2018-03-10 RX ORDER — FLUMAZENIL 0.1 MG/ML
0.2 INJECTION INTRAVENOUS AS NEEDED
Status: DISCONTINUED | OUTPATIENT
Start: 2018-03-10 | End: 2018-03-10 | Stop reason: HOSPADM

## 2018-03-10 RX ORDER — LIDOCAINE HYDROCHLORIDE 20 MG/ML
INJECTION, SOLUTION INFILTRATION; PERINEURAL AS NEEDED
Status: DISCONTINUED | OUTPATIENT
Start: 2018-03-10 | End: 2018-03-10 | Stop reason: SURG

## 2018-03-10 RX ORDER — NALOXONE HCL 0.4 MG/ML
0.1 VIAL (ML) INJECTION
Status: DISCONTINUED | OUTPATIENT
Start: 2018-03-10 | End: 2018-03-16 | Stop reason: HOSPADM

## 2018-03-10 RX ORDER — ROCURONIUM BROMIDE 10 MG/ML
INJECTION, SOLUTION INTRAVENOUS AS NEEDED
Status: DISCONTINUED | OUTPATIENT
Start: 2018-03-10 | End: 2018-03-10 | Stop reason: SURG

## 2018-03-10 RX ORDER — HEPARIN SODIUM 5000 [USP'U]/ML
5000 INJECTION, SOLUTION INTRAVENOUS; SUBCUTANEOUS EVERY 8 HOURS SCHEDULED
Status: DISCONTINUED | OUTPATIENT
Start: 2018-03-11 | End: 2018-03-16 | Stop reason: HOSPADM

## 2018-03-10 RX ORDER — PROPOFOL 10 MG/ML
VIAL (ML) INTRAVENOUS AS NEEDED
Status: DISCONTINUED | OUTPATIENT
Start: 2018-03-10 | End: 2018-03-10 | Stop reason: SURG

## 2018-03-10 RX ORDER — ONDANSETRON 2 MG/ML
4 INJECTION INTRAMUSCULAR; INTRAVENOUS ONCE AS NEEDED
Status: DISCONTINUED | OUTPATIENT
Start: 2018-03-10 | End: 2018-03-10 | Stop reason: HOSPADM

## 2018-03-10 RX ORDER — SODIUM CHLORIDE, SODIUM LACTATE, POTASSIUM CHLORIDE, CALCIUM CHLORIDE 600; 310; 30; 20 MG/100ML; MG/100ML; MG/100ML; MG/100ML
INJECTION, SOLUTION INTRAVENOUS CONTINUOUS PRN
Status: DISCONTINUED | OUTPATIENT
Start: 2018-03-10 | End: 2018-03-10 | Stop reason: SURG

## 2018-03-10 RX ORDER — FENTANYL CITRATE 50 UG/ML
INJECTION, SOLUTION INTRAMUSCULAR; INTRAVENOUS
Status: COMPLETED
Start: 2018-03-10 | End: 2018-03-10

## 2018-03-10 RX ORDER — BISACODYL 10 MG
10 SUPPOSITORY, RECTAL RECTAL DAILY PRN
Status: DISCONTINUED | OUTPATIENT
Start: 2018-03-10 | End: 2018-03-16 | Stop reason: HOSPADM

## 2018-03-10 RX ORDER — OXYCODONE AND ACETAMINOPHEN 7.5; 325 MG/1; MG/1
1 TABLET ORAL ONCE AS NEEDED
Status: DISCONTINUED | OUTPATIENT
Start: 2018-03-10 | End: 2018-03-10 | Stop reason: HOSPADM

## 2018-03-10 RX ORDER — SODIUM CHLORIDE 0.9 % (FLUSH) 0.9 %
1-10 SYRINGE (ML) INJECTION AS NEEDED
Status: DISCONTINUED | OUTPATIENT
Start: 2018-03-10 | End: 2018-03-10 | Stop reason: HOSPADM

## 2018-03-10 RX ORDER — HYDROMORPHONE HCL 110MG/55ML
0.5 PATIENT CONTROLLED ANALGESIA SYRINGE INTRAVENOUS
Status: DISCONTINUED | OUTPATIENT
Start: 2018-03-10 | End: 2018-03-10 | Stop reason: HOSPADM

## 2018-03-10 RX ORDER — HYDROCODONE BITARTRATE AND ACETAMINOPHEN 7.5; 325 MG/1; MG/1
1 TABLET ORAL ONCE AS NEEDED
Status: DISCONTINUED | OUTPATIENT
Start: 2018-03-10 | End: 2018-03-10 | Stop reason: HOSPADM

## 2018-03-10 RX ORDER — FENTANYL CITRATE 50 UG/ML
50 INJECTION, SOLUTION INTRAMUSCULAR; INTRAVENOUS
Status: DISCONTINUED | OUTPATIENT
Start: 2018-03-10 | End: 2018-03-10 | Stop reason: HOSPADM

## 2018-03-10 RX ORDER — HYDROMORPHONE HCL IN 0.9% NACL 10 MG/50ML
PATIENT CONTROLLED ANALGESIA SYRINGE INTRAVENOUS CONTINUOUS
Status: DISCONTINUED | OUTPATIENT
Start: 2018-03-10 | End: 2018-03-13

## 2018-03-10 RX ORDER — GLYCOPYRROLATE 0.2 MG/ML
INJECTION INTRAMUSCULAR; INTRAVENOUS AS NEEDED
Status: DISCONTINUED | OUTPATIENT
Start: 2018-03-10 | End: 2018-03-10 | Stop reason: SURG

## 2018-03-10 RX ORDER — ACETAMINOPHEN 325 MG/1
650 TABLET ORAL EVERY 4 HOURS PRN
Status: DISCONTINUED | OUTPATIENT
Start: 2018-03-10 | End: 2018-03-11

## 2018-03-10 RX ORDER — SODIUM CHLORIDE 0.9 % (FLUSH) 0.9 %
1-10 SYRINGE (ML) INJECTION AS NEEDED
Status: DISCONTINUED | OUTPATIENT
Start: 2018-03-10 | End: 2018-03-16 | Stop reason: HOSPADM

## 2018-03-10 RX ORDER — NITROGLYCERIN 0.4 MG/1
0.4 TABLET SUBLINGUAL
Status: DISCONTINUED | OUTPATIENT
Start: 2018-03-10 | End: 2018-03-16 | Stop reason: HOSPADM

## 2018-03-10 RX ORDER — FENTANYL CITRATE 50 UG/ML
INJECTION, SOLUTION INTRAMUSCULAR; INTRAVENOUS AS NEEDED
Status: DISCONTINUED | OUTPATIENT
Start: 2018-03-10 | End: 2018-03-10 | Stop reason: SURG

## 2018-03-10 RX ORDER — LIDOCAINE HYDROCHLORIDE 40 MG/ML
SOLUTION TOPICAL AS NEEDED
Status: DISCONTINUED | OUTPATIENT
Start: 2018-03-10 | End: 2018-03-10 | Stop reason: SURG

## 2018-03-10 RX ORDER — IPRATROPIUM BROMIDE AND ALBUTEROL SULFATE 2.5; .5 MG/3ML; MG/3ML
3 SOLUTION RESPIRATORY (INHALATION)
Status: COMPLETED | OUTPATIENT
Start: 2018-03-10 | End: 2018-03-12

## 2018-03-10 RX ADMIN — DEXAMETHASONE SODIUM PHOSPHATE 6 MG: 10 INJECTION INTRAMUSCULAR; INTRAVENOUS at 07:45

## 2018-03-10 RX ADMIN — PHENYLEPHRINE HYDROCHLORIDE 100 MCG: 10 INJECTION INTRAVENOUS at 08:15

## 2018-03-10 RX ADMIN — Medication 10 ML: at 05:14

## 2018-03-10 RX ADMIN — ROCURONIUM BROMIDE 50 MG: 10 INJECTION INTRAVENOUS at 07:41

## 2018-03-10 RX ADMIN — LIDOCAINE HYDROCHLORIDE 1 EACH: 40 SPRAY LARYNGEAL; TRANSTRACHEAL at 07:43

## 2018-03-10 RX ADMIN — SODIUM CHLORIDE, POTASSIUM CHLORIDE, SODIUM LACTATE AND CALCIUM CHLORIDE: 600; 310; 30; 20 INJECTION, SOLUTION INTRAVENOUS at 06:45

## 2018-03-10 RX ADMIN — ALBUMIN (HUMAN): 0.05 SOLUTION INTRAVENOUS at 08:00

## 2018-03-10 RX ADMIN — CEFAZOLIN SODIUM 2 G: 2 INJECTION, SOLUTION INTRAVENOUS at 16:00

## 2018-03-10 RX ADMIN — NEOSTIGMINE METHYLSULFATE 2 MG: 1 INJECTION INTRAMUSCULAR; INTRAVENOUS; SUBCUTANEOUS at 09:13

## 2018-03-10 RX ADMIN — DOCUSATE SODIUM 100 MG: 100 CAPSULE, LIQUID FILLED ORAL at 20:18

## 2018-03-10 RX ADMIN — METOPROLOL TARTRATE 5 MG: 5 INJECTION, SOLUTION INTRAVENOUS at 22:10

## 2018-03-10 RX ADMIN — SODIUM CHLORIDE, POTASSIUM CHLORIDE, SODIUM LACTATE AND CALCIUM CHLORIDE: 600; 310; 30; 20 INJECTION, SOLUTION INTRAVENOUS at 06:30

## 2018-03-10 RX ADMIN — IPRATROPIUM BROMIDE AND ALBUTEROL SULFATE 3 ML: .5; 3 SOLUTION RESPIRATORY (INHALATION) at 21:17

## 2018-03-10 RX ADMIN — LIDOCAINE HYDROCHLORIDE 60 MG: 20 INJECTION, SOLUTION INFILTRATION; PERINEURAL at 07:41

## 2018-03-10 RX ADMIN — FENTANYL CITRATE 50 MCG: 50 INJECTION, SOLUTION INTRAMUSCULAR; INTRAVENOUS at 07:15

## 2018-03-10 RX ADMIN — SODIUM CHLORIDE 500 ML: 9 INJECTION, SOLUTION INTRAVENOUS at 22:53

## 2018-03-10 RX ADMIN — INSULIN ASPART 3 UNITS: 100 INJECTION, SOLUTION INTRAVENOUS; SUBCUTANEOUS at 20:41

## 2018-03-10 RX ADMIN — PROPOFOL 150 MG: 10 INJECTION, EMULSION INTRAVENOUS at 07:41

## 2018-03-10 RX ADMIN — FENTANYL CITRATE 100 MCG: 50 INJECTION INTRAMUSCULAR; INTRAVENOUS at 07:41

## 2018-03-10 RX ADMIN — SODIUM CHLORIDE 75 ML/HR: 9 INJECTION, SOLUTION INTRAVENOUS at 00:19

## 2018-03-10 RX ADMIN — Medication 10 ML: at 20:26

## 2018-03-10 RX ADMIN — FENTANYL CITRATE 50 MCG: 50 INJECTION INTRAMUSCULAR; INTRAVENOUS at 09:08

## 2018-03-10 RX ADMIN — IPRATROPIUM BROMIDE AND ALBUTEROL SULFATE 3 ML: .5; 3 SOLUTION RESPIRATORY (INHALATION) at 15:46

## 2018-03-10 RX ADMIN — INSULIN DETEMIR 20 UNITS: 100 INJECTION, SOLUTION SUBCUTANEOUS at 20:42

## 2018-03-10 RX ADMIN — KETOROLAC TROMETHAMINE 30 MG: 30 INJECTION, SOLUTION INTRAMUSCULAR; INTRAVENOUS at 07:45

## 2018-03-10 RX ADMIN — INSULIN ASPART 3 UNITS: 100 INJECTION, SOLUTION INTRAVENOUS; SUBCUTANEOUS at 17:39

## 2018-03-10 RX ADMIN — FAMOTIDINE 20 MG: 10 INJECTION INTRAVENOUS at 07:14

## 2018-03-10 RX ADMIN — PHENYLEPHRINE HYDROCHLORIDE 100 MCG: 10 INJECTION INTRAVENOUS at 08:10

## 2018-03-10 RX ADMIN — Medication 500 UNITS: at 22:11

## 2018-03-10 RX ADMIN — SODIUM CHLORIDE 75 ML/HR: 9 INJECTION, SOLUTION INTRAVENOUS at 10:23

## 2018-03-10 RX ADMIN — PROPOFOL 50 MG: 10 INJECTION, EMULSION INTRAVENOUS at 09:15

## 2018-03-10 RX ADMIN — HYDROMORPHONE HYDROCHLORIDE: 10 INJECTION INTRAMUSCULAR; INTRAVENOUS; SUBCUTANEOUS at 10:16

## 2018-03-10 RX ADMIN — MIDAZOLAM 1 MG: 1 INJECTION INTRAMUSCULAR; INTRAVENOUS at 07:15

## 2018-03-10 RX ADMIN — Medication 500 UNITS: at 02:45

## 2018-03-10 RX ADMIN — GLYCOPYRROLATE 0.4 MG: 0.2 INJECTION INTRAMUSCULAR; INTRAVENOUS at 09:13

## 2018-03-10 RX ADMIN — KETOROLAC TROMETHAMINE 15 MG: 30 INJECTION, SOLUTION INTRAMUSCULAR at 17:18

## 2018-03-10 RX ADMIN — HYDROMORPHONE HYDROCHLORIDE 0.5 MG: 2 INJECTION INTRAMUSCULAR; INTRAVENOUS; SUBCUTANEOUS at 10:10

## 2018-03-10 NOTE — ANESTHESIA POSTPROCEDURE EVALUATION
Patient: Feli Del Toro    Procedure Summary     Date:  03/10/18 Room / Location:  [facility] OR [address] [place] [address] MAIN OR    Anesthesia Start:  0735 Anesthesia Stop:  0947    Procedures:       BRONCHOSCOPY, LEFT THORACOSCOPY VIDEO ASSISTED, SUBPLEURAL CATHETERS FOR PAIN (Left Chest)      PERICARDIAL WINDOW (N/A Chest) Diagnosis:       Pericardial effusion      Pleural effusion      (Pericardial effusion [I31.3])      (Pleural effusion [J90])    Surgeon:  Nomi Patel III, MD Provider:  Jay Méndez MD    Anesthesia Type:  general ASA Status:  3          Anesthesia Type: general  Last vitals  BP   122/65 (03/10/18 1050)   Temp   36.5 °C (97.7 °F) (03/10/18 0946)   Pulse   95 (03/10/18 1050)   Resp   14 (03/10/18 1050)     SpO2   94 % (03/10/18 1050)     Post Anesthesia Care and Evaluation    Patient location during evaluation: PACU  Patient participation: complete - patient participated  Level of consciousness: awake and alert  Pain management: adequate  Airway patency: patent  Anesthetic complications: No anesthetic complications    Cardiovascular status: acceptable  Respiratory status: acceptable  Hydration status: acceptable

## 2018-03-10 NOTE — ANESTHESIA PROCEDURE NOTES
Airway  Urgency: elective    Date/Time: 3/10/2018 7:43 AM  Airway not difficult    General Information and Staff    Patient location during procedure: OR  Anesthesiologist: MACK CRAIG  CRNA: PHIL MORELAND    Indications and Patient Condition  Indications for airway management: airway protection    Preoxygenated: yes  MILS maintained throughout  Mask difficulty assessment: 1 - vent by mask    Final Airway Details  Final airway type: endotracheal airway      Successful airway: ETT - double lumen left  Cuffed: yes   Successful intubation technique: direct laryngoscopy  Endotracheal tube insertion site: oral  Blade: Erasto  Blade size: #3  ETT DL size: 35 fr  Cormack-Lehane Classification: grade I - full view of glottis  Placement verified by: chest auscultation, bronchoscopy and capnometry   Measured from: teeth  ETT to teeth (cm): 27  Number of attempts at approach: 1

## 2018-03-10 NOTE — OP NOTE
THORACOSCOPY VIDEO ASSISTED, PERICARDIAL WINDOW  Progress Note    Feli Del Toro  3/7/2018 - 3/10/2018    Pre-op Diagnosis:   Pericardial effusion [I31.3]  Pleural effusion [J90]       Post-Op Diagnosis Codes:     * Pericardial effusion [I31.3]     * Pleural effusion [J90]    Procedure/CPT® Codes:      Procedure(s):  BRONCHOSCOPY, LEFT THORACOSCOPY VIDEO ASSISTED WITH PERICARDIAL WINDOW, SUBPLEURAL CATHETERS FOR PAIN CONTROL      Surgeon(s):  Nomi Patel III, MD    Anesthesia: General    Staff:   Cell Saver : Darya Carter  Circulator: Shannon Spurling, RN  Scrub Person: Ector Lr  Assistant: GRACIA Brenner    Estimated Blood Loss: 150 mL    Urine Voided: 150 mL    Specimens:                ID Type Source Tests Collected by Time   1 :  Tissue Pericardium FUNGAL CULTURE, TISSUE / BONE CULTURE, AFB CULTURE, FUNGUS SMEAR Nomi Patel III, MD 3/10/2018 0852   A :  Body Fluid Pericardium NON-GYNECOLOGIC CYTOLOGY, PH, BODY FLUID, PROTEIN, TOTAL, LACTATE DEHYDROGENASE, BODY FLUID, BODY FLUID CELL COUNT WITH DIFFERENTIAL, FUNGAL CULTURE, BODY FLUID CULTURE, AFB CULTURE, FUNGUS SMEAR, OR GLUCOSE Nomi Patel III, MD 3/10/2018 0835   B :  Tissue Pericardium TISSUE PATHOLOGY EXAM Nomi Patel III, MD 3/10/2018 0843         Drains:  2 #28 pleural drainage catheters  1 #24 pericardial drain    Findings: Approximately 300 cc of serosanguineous fluid in the pleural space.  Pericardium was very thickened.  Approximately 500 cc of bloody fluid was aspirated from the pericardium.  Shaggy exudate over the entire epicardium.  Pericardial fluid sent for chemistries cultures and cytology.  Pericardium sent for histology    Complications: None    Summary of procedure: Mrs. Del Toro was brought to the operating room and placed on the operating table in the supine position.  Following the induction of adequate general endotracheal anesthesia, the flexible bronchoscope was passed down the endotracheal tube.   Distal trachea and akash appeared to be normal.  Akash was sharp and nondisplaced.  Right mainstem bronchus, right upper lobe, right middle lobe, and right lower lobe bronchi showed no endobronchial lesions or mucosal abnormalities.  Scope was then passed down the left main bronchus.  Left upper lobe and left lower lobe bronchi showed no endobronchial lesions or mucosal abnormalities.  The scope was then used to position the double-lumen tube in the left main bronchus.  Once the tube was in good position, the patient was turned into the right lateral decubitus position.  All pressure points were padded.  A roll was placed in the right axilla.  The patient was secured to the operating table with 3 inch adhesive tape and Velcro safety strap.  The left chest was then prepped and draped in usual sterile manner.  The left lung was deflated.    3 port sites were created.  Scope was introduced into the pleural space.  Approximately 300 cc of serosanguineous fluid was aspirated from the pleural space.  The visceral pleura and parietal pleura showed no specific abnormalities.  The lung looked normal.  The pericardium was very thickened and tense.  The pericardium was then opened with the electrocautery and approximately 500 cc of bloody fluid was aspirated.  Some this fluid was collected for cell count, chemistries, cultures, and cytology.  A large pericardial window was cut in the pericardium.  Again the pericardium was quite thickened and very difficult to cut.  Grossly it only appeared inflamed.  I do not see any evidence for malignancy.  A small piece of the pericardium was sent for cultures.  The remainder was sent for histology.  A #24 Carroll drain was then placed within the pericardium and brought out through one of the port sites and secured to the chest wall with a suture 0 silk.  2 subpleural catheters were tunneled anterior to posterior and positioned in the paraspinous gutter. These catheters were primed with  0.25% Marcaine and then connected to a pump containing 0.25% Marcaine.  The cut edges of the pericardium and the pleura were sprayed with platelet rich plasma with vancomycin.  2 #28 pleural tubes were placed.  One anterior to the lung and one posterior and these tubes were brought out the other port sites and secured to the chest wall with sutures of 0 silk.  All port sites were closed in layers with 2-0 Vicryl and the skin with 4-0 Vicryl.  Dry sterile dressings were applied.  The patient was awakened and extubated in the operating room and transported to the recovery room in satisfactory condition having tolerated the procedure well.    Sponge instrument and needle counts were correct ×2 at the end of the procedure      .        Dictated utilizing Dragon dictation    Nomi Patel III, MD     Date: 3/10/2018  Time: 9:32 AM

## 2018-03-10 NOTE — PROGRESS NOTES
Name: Feli Del Toro ADMIT: 3/7/2018   : 1950  PCP: Paola Onofre MD    MRN: [id number] LOS: 3 days   AGE/SEX: 67 y.o. female  ROOM: [facility]/   Subjective   Pain well controlled. L chest tube in place. No CP SOA NVD reported currently. Still a bit drowsy from procedure but easily awakened.    Objective   Vital Signs  Temp:  [97.7 °F (36.5 °C)-98.5 °F (36.9 °C)] 98.3 °F (36.8 °C)  Heart Rate:  [] 94  Resp:  [14-22] 16  BP: (115-146)/(63-87) 122/73  Arterial Line BP: ()/(52-60) 115/54  SpO2:  [91 %-95 %] 94 %  on  Flow (L/min):  [2-5] 4;   Device (Oxygen Therapy): nasal cannula  Body mass index is 37.27 kg/m².    Physical Exam   Constitutional: She is oriented to person, place, and time. She appears well-developed. No distress.   HENT:   Head: Normocephalic and atraumatic.   Eyes: EOM are normal. Pupils are equal, round, and reactive to light. No scleral icterus.   Neck: Normal range of motion. Neck supple.   Cardiovascular: Normal rate, regular rhythm and intact distal pulses.    No murmur heard.  Pulmonary/Chest: Effort normal. She has decreased breath sounds. She has no wheezes.   L chest tube   Abdominal: Soft. There is no tenderness.   Musculoskeletal: Normal range of motion. Edema: trace.   Neurological: She is alert and oriented to person, place, and time.   Skin: Skin is warm and dry. She is not diaphoretic.   Psychiatric: She has a normal mood and affect. Her behavior is normal.   Nursing note and vitals reviewed.      Results Review:       I reviewed the patient's new clinical results. Reviewed imaging, agree with interpretation.     Results from last 7 days  Lab Units 03/10/18  0440 18  0408 18  0357 18  1509   WBC 10*3/mm3 13.18* 9.56 11.26* 13.36*   HEMOGLOBIN g/dL 11.2* 11.1* 10.9* 11.7*   PLATELETS 10*3/mm3 577* 552* 375 556*     Results from last 7 days  Lab Units 03/10/18  0440 18  0408 18  0357 18  1509   SODIUM mmol/L 134* 139 136 137    POTASSIUM mmol/L 4.0 3.9 4.4 3.6   CHLORIDE mmol/L 98 103 100 98   CO2 mmol/L 25.6 25.1 24.5 28.6   BUN mg/dL 12 12 12 11   CREATININE mg/dL 0.87 0.83 0.86 0.99   GLUCOSE mg/dL 193* 77 83 89   Estimated Creatinine Clearance: 84.5 mL/min (by C-G formula based on SCr of 0.87 mg/dL).  Results from last 7 days  Lab Units 03/10/18  0440 03/09/18  0408 03/08/18  0357 03/07/18  1509   CALCIUM mg/dL 8.4* 8.6 8.7 9.5   ALBUMIN g/dL  --  2.90*  --  3.00*   MAGNESIUM mg/dL 2.1  --   --   --          allopurinol 100 mg Oral Daily   ceFAZolin 2 g Intravenous On Call to OR   ceFAZolin 2 g Intravenous Q8H   docusate sodium 100 mg Oral BID   ferrous sulfate 325 mg Oral Daily   fluticasone 2 spray Nasal Daily   [START ON 3/11/2018] heparin (porcine) 5,000 Units Subcutaneous Q8H   heparin flush (porcine) 5 mL Intracatheter Q12H   losartan 100 mg Oral Q24H   And      Hydrochlorothiazide Oral 12.5 mg Oral Daily   insulin aspart 0-7 Units Subcutaneous 4x Daily With Meals & Nightly   insulin detemir 20 Units Subcutaneous Nightly   ipratropium-albuterol 3 mL Nebulization Q8H - RT   ketorolac 15 mg Intravenous Q6H   linagliptin 5 mg Oral Daily   magnesium oxide 400 mg Oral Daily   metoprolol tartrate 25 mg Oral Q12H   Or      metoprolol tartrate 5 mg Intravenous Q12H   polyethylene glycol 17 g Oral Daily   sennosides-docusate sodium 2 tablet Oral Nightly   sodium chloride 10 mL Intracatheter Q12H       bupivacaine (MARCAINE) 0.25% in elastomeric 4 mL/hr (ON-Q PUMP) 300 mL 4 mL/hr Last Rate: 4 mL/hr (03/10/18 1203)   HYDROmorphone HCl-NaCl     lactated ringers 9 mL/hr Last Rate: Stopped (03/10/18 1022)   sodium chloride 75 mL/hr Last Rate: 75 mL/hr (03/10/18 1023)   Diet Clear Liquid      Assessment/Plan   Active Hospital Problems (** Indicates Principal Problem)    Diagnosis Date Noted   • **Pericardial effusion [I31.3] 03/07/2018   • Shortness of breath [R06.02] 03/07/2018   • Pleural effusion [J90] 03/07/2018   • Liver metastasis  [C78.7] 06/21/2017   • Type 2 diabetes mellitus with complication, with long-term current use of insulin [E11.8, Z79.4] 12/30/2016   • Primary malignant neoplasm of colon [C18.9] 05/26/2016   • Essential hypertension [I10] 12/31/2015      Resolved Hospital Problems    Diagnosis Date Noted Date Resolved   No resolved problems to display.     - Pericardial/Pleural Effusion (Loculated): sp L VATS, decortication and pericardial window 03/10. Chest tube in place. PCA for pain control. Pleural Cx from 3/8 ngtd. Pathology and repeat Cx pending. Cardiology and CT Surgery following.  - Colon Cancer/Metastatic to Liver: sp FOLFOX and resection of primary tumor, splenectomy, thermal ablation 2014, FOLFIRI regimen course, Radiation therapy and cyberknife 2015. Microwave ablation of liver mass and FOLFIRI regimen 06/2017. Follow up in 5 weeks with CBC group after discharge.  - DM2: A1c 7.7. Levemir and SSI. No changes today with procedure and NPO status this morning.  - HTN: HCTZ/Losartan  - Disposition: Home/TBD    Foster Delong MD  [facility]  03/10/18  1:03 PM

## 2018-03-10 NOTE — ANESTHESIA PREPROCEDURE EVALUATION
Anesthesia Evaluation     Patient summary reviewed and Nursing notes reviewed   NPO Solid Status: > 8 hours  NPO Liquid Status: > 8 hours           Airway   Mallampati: II  TM distance: >3 FB  Neck ROM: full  no difficulty expected  Dental - normal exam     Pulmonary - normal exam   (+) pleural effusion, shortness of breath,   Cardiovascular - normal exam  Exercise tolerance: poor (<4 METS)    ECG reviewed  Rhythm: regular  Rate: normal    (+) hypertension, pericardial effusion, hyperlipidemia,       Neuro/Psych  (+) numbness,     GI/Hepatic/Renal/Endo    (+) obesity,   liver disease, diabetes mellitus type 2,     Musculoskeletal (-) negative ROS    Abdominal  - normal exam   Substance History - negative use     OB/GYN          Other      history of cancer active                    Anesthesia Plan    ASA 3     general   (A line)  intravenous induction   Anesthetic plan and risks discussed with patient.

## 2018-03-10 NOTE — ANESTHESIA PROCEDURE NOTES
Arterial Line     Line placed for hemodynamic monitoring and ABGs/Labs/ISTAT.  Performed By   Anesthesiologist: MACK CRAIG  Preanesthetic Checklist  Completed: patient identified, site marked, surgical consent, pre-op evaluation, timeout performed, IV checked, risks and benefits discussed and monitors and equipment checked  Arterial Line Prep   Sterile Tech: cap and gloves  Prep: ChloraPrep  Patient monitoring: blood pressure monitoring, continuous pulse oximetry and EKG  Arterial Line Procedure   Laterality:right  Location:  radial artery  Catheter size: 20 G   Guidance: ultrasound guided and palpation technique  Successful placement: yes          Post Assessment   Dressing Type: occlusive dressing applied and wrist guard applied.   Complications no  Circ/Move/Sens Assessment: unchanged.   Patient Tolerance: patient tolerated the procedure well with no apparent complications

## 2018-03-10 NOTE — PLAN OF CARE
Problem: Cardiac Output, Decreased (Adult)  Goal: Identify Related Risk Factors and Signs and Symptoms  Outcome: Outcome(s) achieved Date Met: 03/10/18    Goal: Adequate Cardiac Output/Effective Tissue Perfusion  Outcome: Ongoing (interventions implemented as appropriate)      Problem: Respiratory Insufficiency (Adult)  Goal: Identify Related Risk Factors and Signs and Symptoms  Outcome: Outcome(s) achieved Date Met: 03/10/18    Goal: Acid/Base Balance  Outcome: Ongoing (interventions implemented as appropriate)    Goal: Effective Ventilation  Outcome: Ongoing (interventions implemented as appropriate)      Problem: Patient Care Overview (Adult)  Goal: Plan of Care Review  Outcome: Ongoing (interventions implemented as appropriate)   03/10/18 050   Coping/Psychosocial Response Interventions   Plan Of Care Reviewed With patient   Patient Care Overview   Progress improving   Outcome Evaluation   Outcome Summary/Follow up Plan Pt. VS WNL. No c/o pain. Put 2L O2 on Pt. while sleeping. Pt. to have VATS this a.m. surgical baths complete, IVFS started, will send abx for pre-op. Pt. NPO for surgery. Pt. resting comfortably at present, will continue to monitor closely.     Goal: Adult Individualization and Mutuality  Outcome: Ongoing (interventions implemented as appropriate)    Goal: Discharge Needs Assessment  Outcome: Ongoing (interventions implemented as appropriate)      Problem: Diabetes, Type 2 (Adult)  Goal: Signs and Symptoms of Listed Potential Problems Will be Absent or Manageable (Diabetes, Type 2)  Outcome: Ongoing (interventions implemented as appropriate)

## 2018-03-11 ENCOUNTER — APPOINTMENT (OUTPATIENT)
Dept: GENERAL RADIOLOGY | Facility: HOSPITAL | Age: 68
End: 2018-03-11

## 2018-03-11 LAB
ANION GAP SERPL CALCULATED.3IONS-SCNC: 9.3 MMOL/L
BACTERIA FLD CULT: NO GROWTH
BUN BLD-MCNC: 15 MG/DL (ref 8–23)
BUN/CREAT SERPL: 16.9 (ref 7–25)
CALCIUM SPEC-SCNC: 7.8 MG/DL (ref 8.6–10.5)
CHLORIDE SERPL-SCNC: 100 MMOL/L (ref 98–107)
CO2 SERPL-SCNC: 24.7 MMOL/L (ref 22–29)
CREAT BLD-MCNC: 0.89 MG/DL (ref 0.57–1)
DEPRECATED RDW RBC AUTO: 49.9 FL (ref 37–54)
ERYTHROCYTE [DISTWIDTH] IN BLOOD BY AUTOMATED COUNT: 15 % (ref 11.7–13)
GFR SERPL CREATININE-BSD FRML MDRD: 77 ML/MIN/1.73
GLUCOSE BLD-MCNC: 170 MG/DL (ref 65–99)
GLUCOSE BLDC GLUCOMTR-MCNC: 160 MG/DL (ref 70–130)
GLUCOSE BLDC GLUCOMTR-MCNC: 216 MG/DL (ref 70–130)
GLUCOSE BLDC GLUCOMTR-MCNC: 235 MG/DL (ref 70–130)
GLUCOSE BLDC GLUCOMTR-MCNC: 239 MG/DL (ref 70–130)
GRAM STN SPEC: NORMAL
GRAM STN SPEC: NORMAL
HCT VFR BLD AUTO: 32.4 % (ref 35.6–45.5)
HGB BLD-MCNC: 10.4 G/DL (ref 11.9–15.5)
MCH RBC QN AUTO: 29.4 PG (ref 26.9–32)
MCHC RBC AUTO-ENTMCNC: 32.1 G/DL (ref 32.4–36.3)
MCV RBC AUTO: 91.5 FL (ref 80.5–98.2)
PLATELET # BLD AUTO: 484 10*3/MM3 (ref 140–500)
PMV BLD AUTO: 8.6 FL (ref 6–12)
POTASSIUM BLD-SCNC: 4.3 MMOL/L (ref 3.5–5.2)
RBC # BLD AUTO: 3.54 10*6/MM3 (ref 3.9–5.2)
SODIUM BLD-SCNC: 134 MMOL/L (ref 136–145)
WBC NRBC COR # BLD: 14.7 10*3/MM3 (ref 4.5–10.7)

## 2018-03-11 PROCEDURE — 63710000001 INSULIN ASPART PER 5 UNITS: Performed by: HOSPITALIST

## 2018-03-11 PROCEDURE — 94799 UNLISTED PULMONARY SVC/PX: CPT

## 2018-03-11 PROCEDURE — 71045 X-RAY EXAM CHEST 1 VIEW: CPT

## 2018-03-11 PROCEDURE — 97110 THERAPEUTIC EXERCISES: CPT

## 2018-03-11 PROCEDURE — 97162 PT EVAL MOD COMPLEX 30 MIN: CPT

## 2018-03-11 PROCEDURE — 80048 BASIC METABOLIC PNL TOTAL CA: CPT | Performed by: THORACIC SURGERY (CARDIOTHORACIC VASCULAR SURGERY)

## 2018-03-11 PROCEDURE — 25010000003 CEFAZOLIN IN DEXTROSE 2-4 GM/100ML-% SOLUTION: Performed by: THORACIC SURGERY (CARDIOTHORACIC VASCULAR SURGERY)

## 2018-03-11 PROCEDURE — 85027 COMPLETE CBC AUTOMATED: CPT | Performed by: THORACIC SURGERY (CARDIOTHORACIC VASCULAR SURGERY)

## 2018-03-11 PROCEDURE — 82962 GLUCOSE BLOOD TEST: CPT

## 2018-03-11 PROCEDURE — 99231 SBSQ HOSP IP/OBS SF/LOW 25: CPT | Performed by: NURSE PRACTITIONER

## 2018-03-11 PROCEDURE — 25010000002 HEPARIN FLUSH (PORCINE) 100 UNIT/ML SOLUTION: Performed by: INTERNAL MEDICINE

## 2018-03-11 PROCEDURE — 25010000002 HEPARIN (PORCINE) PER 1000 UNITS: Performed by: THORACIC SURGERY (CARDIOTHORACIC VASCULAR SURGERY)

## 2018-03-11 RX ADMIN — INSULIN ASPART 3 UNITS: 100 INJECTION, SOLUTION INTRAVENOUS; SUBCUTANEOUS at 13:00

## 2018-03-11 RX ADMIN — Medication 500 UNITS: at 21:01

## 2018-03-11 RX ADMIN — INSULIN ASPART 3 UNITS: 100 INJECTION, SOLUTION INTRAVENOUS; SUBCUTANEOUS at 21:01

## 2018-03-11 RX ADMIN — INSULIN ASPART 3 UNITS: 100 INJECTION, SOLUTION INTRAVENOUS; SUBCUTANEOUS at 17:08

## 2018-03-11 RX ADMIN — INSULIN DETEMIR 20 UNITS: 100 INJECTION, SOLUTION SUBCUTANEOUS at 21:02

## 2018-03-11 RX ADMIN — HEPARIN SODIUM 5000 UNITS: 5000 INJECTION, SOLUTION INTRAVENOUS; SUBCUTANEOUS at 06:19

## 2018-03-11 RX ADMIN — METOPROLOL TARTRATE 25 MG: 25 TABLET ORAL at 21:01

## 2018-03-11 RX ADMIN — SODIUM CHLORIDE 75 ML/HR: 9 INJECTION, SOLUTION INTRAVENOUS at 03:55

## 2018-03-11 RX ADMIN — HEPARIN SODIUM 5000 UNITS: 5000 INJECTION, SOLUTION INTRAVENOUS; SUBCUTANEOUS at 15:00

## 2018-03-11 RX ADMIN — IPRATROPIUM BROMIDE AND ALBUTEROL SULFATE 3 ML: .5; 3 SOLUTION RESPIRATORY (INHALATION) at 15:23

## 2018-03-11 RX ADMIN — Medication 10 ML: at 10:00

## 2018-03-11 RX ADMIN — DOCUSATE SODIUM -SENNOSIDES 2 TABLET: 50; 8.6 TABLET, COATED ORAL at 21:01

## 2018-03-11 RX ADMIN — DOCUSATE SODIUM 100 MG: 100 CAPSULE, LIQUID FILLED ORAL at 10:00

## 2018-03-11 RX ADMIN — DOCUSATE SODIUM 100 MG: 100 CAPSULE, LIQUID FILLED ORAL at 21:01

## 2018-03-11 RX ADMIN — Medication 400 MG: at 10:00

## 2018-03-11 RX ADMIN — CEFAZOLIN SODIUM 2 G: 2 INJECTION, SOLUTION INTRAVENOUS at 00:16

## 2018-03-11 RX ADMIN — HYDROCODONE BITARTRATE AND ACETAMINOPHEN 1 TABLET: 5; 325 TABLET ORAL at 05:44

## 2018-03-11 RX ADMIN — POLYETHYLENE GLYCOL 3350 17 G: 17 POWDER, FOR SOLUTION ORAL at 10:00

## 2018-03-11 RX ADMIN — FERROUS SULFATE TAB 325 MG (65 MG ELEMENTAL FE) 325 MG: 325 (65 FE) TAB at 10:00

## 2018-03-11 RX ADMIN — ALLOPURINOL 100 MG: 100 TABLET ORAL at 10:00

## 2018-03-11 RX ADMIN — IPRATROPIUM BROMIDE AND ALBUTEROL SULFATE 3 ML: .5; 3 SOLUTION RESPIRATORY (INHALATION) at 07:39

## 2018-03-11 RX ADMIN — HYDROCHLOROTHIAZIDE 12.5 MG: 12.5 CAPSULE, GELATIN COATED ORAL at 10:00

## 2018-03-11 RX ADMIN — LINAGLIPTIN 5 MG: 5 TABLET, FILM COATED ORAL at 10:00

## 2018-03-11 RX ADMIN — HEPARIN SODIUM 5000 UNITS: 5000 INJECTION, SOLUTION INTRAVENOUS; SUBCUTANEOUS at 22:52

## 2018-03-11 RX ADMIN — METOPROLOL TARTRATE 25 MG: 25 TABLET ORAL at 10:00

## 2018-03-11 RX ADMIN — LOSARTAN POTASSIUM 100 MG: 100 TABLET ORAL at 10:00

## 2018-03-11 RX ADMIN — Medication 500 UNITS: at 10:00

## 2018-03-11 RX ADMIN — Medication 10 ML: at 21:01

## 2018-03-11 RX ADMIN — INSULIN ASPART 2 UNITS: 100 INJECTION, SOLUTION INTRAVENOUS; SUBCUTANEOUS at 08:00

## 2018-03-11 NOTE — THERAPY EVALUATION
Acute Care - Physical Therapy Initial Evaluation  [facility]     Patient Name: Feli Del Toro  : 1950  MRN: [id number]  Today's Date: 3/11/2018   Onset of Illness/Injury or Date of Surgery: 18            Admit Date: 3/7/2018    Visit Dx:     ICD-10-CM ICD-9-CM   1. Shortness of breath R06.02 786.05   2. Pericardial effusion I31.3 423.9   3. Pleural effusion J90 511.9   4. Primary malignant neoplasm of colon C18.9 153.9   5. Difficulty walking R26.2 719.7     Patient Active Problem List   Diagnosis   • Hyperlipidemia   • Essential hypertension   • Allergic rhinitis due to pollen   • Vitamin D deficiency   • Morbid obesity due to excess calories   • Encounter for adjustment or management of vascular access device   • Primary malignant neoplasm of colon   • Iron deficiency anemia   • Breast lesion   • Peripheral neuropathy due to chemotherapy   • Nonproliferative diabetic retinopathy   • Type 2 diabetes mellitus with microalbuminuria   • Osteomyelitis of toe of left foot   • Sepsis   • Type 2 diabetes mellitus with peripheral neuropathy   • Type 2 diabetes mellitus with complication, with long-term current use of insulin   • Obesity (BMI 30-39.9)   • Liver metastasis   • Elevated liver enzymes   • Chemotherapy induced nausea and vomiting   • Shortness of breath   • Pericardial effusion   • Pleural effusion     Past Medical History:   Diagnosis Date   • Anemia    • Cancer 2013   • Diabetes mellitus    • History of transfusion    • Hyperlipidemia    • Hypertension    • Metastatic colon cancer to liver    • Retinopathy    • Type 2 diabetes mellitus      Past Surgical History:   Procedure Laterality Date   • ABDOMINAL SURGERY      ablation    • AMPUTATION DIGIT Left 2017    Procedure: LEFT SECOND TOE AMPUTATION;  Surgeon: Farzad Nichols MD;  Location: [facility];  Service:    • AMPUTATION OF REPLICATED TOES      right distal second toe    • CARDIAC CATHETERIZATION N/A 3/8/2018    Procedure:  Pericardiocentesis;  Surgeon: Andrew Dobbins MD;  Location:  [facility]AS[facility];  Service:    • CATARACT EXTRACTION     • COLECTOMY PARTIAL / TOTAL  01/14/2014   • HYSTERECTOMY  1998   • PORTACATH PLACEMENT  06/2013   • SALPINGO OOPHORECTOMY Bilateral    • SPLENECTOMY          PT ASSESSMENT (last 72 hours)      Physical Therapy Evaluation     Row Name 03/11/18 1140          PT Evaluation Time/Intention    Subjective Information complains of;pain  -CH     Document Type evaluation  -     Mode of Treatment physical therapy  -     Patient Effort good  -CH     Symptoms Noted During/After Treatment fatigue  -CH     Row Name 03/11/18 1140          General Information    Onset of Illness/Injury or Date of Surgery 03/07/18  -     Patient Observations alert;cooperative;agree to therapy  -     Patient/Family Observations pt supine in bed, no acute distress noted at rest, pt with 3 chest tubes to suctions, family present  -     Prior Level of Function independent:;gait;transfer;bed mobility;ADL's   pt reports limited activity PTA secondary to SOA  -CH     Equipment Currently Used at Home none  -     Pertinent History of Current Functional Problem pt is post-op L VAT with pericardial window  -     Existing Precautions/Restrictions fall;oxygen therapy device and L/min   pt with 3 chest tubes to suction  -CH     Barriers to Rehab medically complex  -CH     Row Name 03/11/18 1140          Relationship/Environment    Lives With spouse  -     Row Name 03/11/18 1140          Resource/Environmental Concerns    Current Living Arrangements home/apartment/condo  -     Row Name 03/11/18 1140          Cognitive Assessment/Intervention- PT/OT    Orientation Status (Cognition) oriented x 4  -CH     Follows Commands (Cognition) WNL  -CH     Row Name 03/11/18 1140          Safety Issues, Functional Mobility    Impairments Affecting Function (Mobility) endurance/activity tolerance;pain;strength   multiple lines,  tubes, and equipment  -     Comment, Safety Issues/Impairments (Mobility) no skid slipper donned  -     Row Name 03/11/18 1140          Bed Mobility Assessment/Treatment    Bed Mobility Assessment/Treatment bed mobility (all) activities;supine-sit;sit-supine  -     Supine-Sit [place] (Bed Mobility) verbal cues;nonverbal cues (demo/gesture);contact guard  -     Sit-Supine [place] (Bed Mobility) not tested   sitting in chair  -     Row Name 03/11/18 1140          Transfer Assessment/Treatment    Transfer Assessment/Treatment sit-stand transfer;stand-sit transfer  -     Sit-Stand [place] (Transfers) verbal cues;nonverbal cues (demo/gesture);contact guard;1 person assist;1 person to manage equipment  -     Stand-Sit [place] (Transfers) verbal cues;nonverbal cues (demo/gesture);contact guard;1 person assist;1 person to manage equipment  -[facility] Name 03/11/18 1140          Sit-Stand Transfer    Assistive Device (Sit-Stand Transfers) walker, rolling platform  -[facility] Name 03/11/18 1140          Stand-Sit Transfer    Assistive Device (Stand-Sit Transfers) walker, rolling platform  -[facility] Name 03/11/18 1140          Gait/Stairs Assessment/Training    [place] Level (Gait) verbal cues;nonverbal cues (demo/gesture);contact guard;1 person assist;1 person to manage equipment  -     Assistive Device (Gait) walker, rolling platform  -     Distance in Feet (Gait) 150  -CH     Deviations/Abnormal Patterns (Gait) antalgic;gait speed decreased  -     Comment (Gait/Stairs) pt required some assistance navigating walker due to chest tube walker having malfunctioning wheel  -     Row Name 03/11/18 1140          General ROM    GENERAL ROM COMMENTS ROM grossly WFL for age, use of L UE slightly limited due to pain p/o VAT  -     Row Name 03/11/18 1140          General Assessment (Manual Muscle Testing)    Comment, General Manual Muscle Testing (MMT) Assessment some generalized  weakness noted with functional moblity post-op VAT  -CH     Row Name 03/11/18 1140          Pain Assessment    Additional Documentation Pain Scale: Numbers Pre/Post-Treatment (Group)  -CH     Row Name 03/11/18 1140          Pain Scale: Numbers Pre/Post-Treatment    Pain Scale: Numbers, Pretreatment 4/10  -CH     Pain Location - Side Left  -CH     Pain Location chest  -CH     Pre/Post Treatment Pain Comment chest tube site  -CH     Pain Intervention(s) Repositioned  -CH     Row Name             Wound 03/08/18 1349 midline chest puncture;surgical    Wound - Properties Group Date first assessed: 03/08/18  -CW Time first assessed: 1349  -CW Orientation: midline  -CW Location: chest  -CW Type: puncture;surgical  -CW    Row Name             Wound 03/10/18 0920 chest incision    Wound - Properties Group Date first assessed: 03/10/18  -SS Time first assessed: 0920  -SS Location: chest  -SS Type: incision  -SS    Row Name 03/11/18 1140          Plan of Care Review    Plan of Care Reviewed With patient  -     Row Name 03/11/18 1140          Physical Therapy Goals    Bed Mobility Goal Selection (PT) bed mobility, PT goal 1  -CH     Transfer Goal Selection (PT) transfer, PT goal 1  -CH     Gait Training Goal Selection (PT) gait training, PT goal 1  -     Row Name 03/11/18 1140          Bed Mobility Goal 1 (PT)    Activity/Assistive Device (Bed Mobility Goal 1, PT) bed mobility activities, all  -CH     [place] Level/Cues Needed (Bed Mobility Goal 1, PT) conditional independence  -     Time Frame (Bed Mobility Goal 1, PT) 1 week  -     Row Name 03/11/18 1140          Transfer Goal 1 (PT)    Activity/Assistive Device (Transfer Goal 1, PT) transfers, all  -CH     [place] Level/Cues Needed (Transfer Goal 1, PT) supervision required  -     Time Frame (Transfer Goal 1, PT) 1 week  -     Row Name 03/11/18 1140          Gait Training Goal 1 (PT)    [place] Level (Gait Training Goal 1, PT) supervision required   -     Distance (Gait Goal 1, PT) 300  -     Time Frame (Gait Training Goal 1, PT) 1 week  -     Row Name 03/11/18 1140          Positioning and Restraints    Pre-Treatment Position in bed  -     Post Treatment Position chair  -     In Chair reclined;call light within reach;encouraged to call for assist;with family/caregiver  -     Row Name 03/11/18 1140          Living Environment    Home Accessibility --   no concerns  -       User Key  (r) = Recorded By, (t) = Taken By, (c) = Cosigned By    Initials Name Provider Type     Julia Sandhu, PT Physical Therapist    SS Shannon Spurling, RN Registered Nurse    ANEL Sanchez, RN Registered Nurse          Physical Therapy Education     Title: PT OT SLP Therapies (Active)     Topic: Physical Therapy (Active)     Point: Mobility training (Done)    Learning Progress Summary     Learner Status Readiness Method Response Comment Documented by    Patient Done Acceptance E,TB,D VU,NR   03/11/18 1156    Family Done Acceptance E,TB,D VU,NR   03/11/18 1156          Point: Body mechanics (Done)    Learning Progress Summary     Learner Status Readiness Method Response Comment Documented by    Patient Done Acceptance E,TB,D VU,NR   03/11/18 1156    Family Done Acceptance E,TB,D VU,NR   03/11/18 1156          Point: Precautions (Done)    Learning Progress Summary     Learner Status Readiness Method Response Comment Documented by    Patient Done Acceptance E,TB,D VU,NR   03/11/18 1156    Family Done Acceptance E,TB,D VU,NR   03/11/18 1156                      User Key     Initials Effective Dates Name Provider Type [facility] 12/01/15 -  Julia Sandhu, PT Physical Therapist PT                PT Recommendation and Plan  Planned Therapy Interventions (PT Eval): balance training, bed mobility training, gait training, home exercise program, patient/family education, transfer training  Plan of Care Reviewed With: patient  Outcome Summary: Pt  presents with some impaired functional mobility and gait secondary to generalized weakness, pain, and decresaed activity tolerance post-op L VAT. Pt may benefit from skilled PT to address strength, mobility, and functional independence.  Plan of Care Reviewed With: patient          Outcome Measures     Row Name 03/11/18 1100             How much help from another person do you currently need...    Turning from your back to your side while in flat bed without using bedrails? 3  -CH      Moving from lying on back to sitting on the side of a flat bed without bedrails? 3  -CH      Moving to and from a bed to a chair (including a wheelchair)? 3  -CH      Standing up from a chair using your arms (e.g., wheelchair, bedside chair)? 3  -CH      Climbing 3-5 steps with a railing? 2  -CH      To walk in hospital room? 3  -CH      AM-PAC 6 Clicks Score 17  -         Functional Assessment    Outcome Measure Options AM-PAC 6 Clicks Basic Mobility (PT)  -        User Key  (r) = Recorded By, (t) = Taken By, (c) = Cosigned By    Initials Name Provider Type     Julia Sandhu PT Physical Therapist           Time Calculation:         PT Charges     Row Name 03/11/18 1158             Time Calculation    Start Time 1118  -      Stop Time 1140  -      Time Calculation (min) 22 min  -      PT Received On 03/11/18  -      PT - Next Appointment 03/12/18  -      PT Goal Re-Cert Due Date 03/18/18  -        User Key  (r) = Recorded By, (t) = Taken By, (c) = Cosigned By    Initials Name Provider Type     Julia Sandhu PT Physical Therapist          Therapy Charges for Today     Code Description Service Date Service Provider Modifiers Qty    17121764123  PT EVAL MOD COMPLEXITY 2 3/11/2018 Julia Sandhu, PT GP 1    84006073652 HC PT THER PROC EA 15 MIN 3/11/2018 Julia Sandhu, PT GP 1    11973321273 HC PT THER SUPP EA 15 MIN 3/11/2018 Julia Sandhu, PT GP 1          PT G-Codes  Outcome Measure Options:  AM-PAC 6 Clicks Basic Mobility (PT)      Julia Sandhu, PT  3/11/2018

## 2018-03-11 NOTE — PLAN OF CARE
Problem: Patient Care Overview  Goal: Plan of Care Review  Outcome: Ongoing (interventions implemented as appropriate)   03/11/18 7339   Coping/Psychosocial   Plan of Care Reviewed With patient   OTHER   Outcome Summary Pt presents with some impaired functional mobility and gait secondary to generalized weakness, pain, and decresaed activity tolerance post-op L VAT. Pt may benefit from skilled PT to address strength, mobility, and functional independence.

## 2018-03-11 NOTE — PROGRESS NOTES
Postoperative day 1 left VATS drainage of pleural effusion, pericardial window and biopsy, drainage of pericardial effusion.  Patient had an uneventful night.  500 cc fluid bolus was given for low urine output.  Afebrile vital signs stable.  No air leak from chest tubes.  Low output.  Creatinine 0.89.  Hemoglobin 10.4.  Chest tube output nonbloody.    Routine postoperative care plan chest x-ray and BMP tomorrow

## 2018-03-11 NOTE — PROGRESS NOTES
Name: Feli Del Toro ADMIT: 3/7/2018   : 1950  PCP: Paola Onofre MD    MRN: [id number] LOS: 4 days   AGE/SEX: 67 y.o. female  ROOM: [facility]   Subjective   No CP SOA NVD reported today. Pain is well controlled with PCA. Some mild dicomfort with chest tubes reported.    Objective   Vital Signs  Temp:  [97.7 °F (36.5 °C)-98.5 °F (36.9 °C)] 98 °F (36.7 °C)  Heart Rate:  [] 92  Resp:  [14-18] 16  BP: ()/(42-85) 129/74  Arterial Line BP: ()/(43-65) 104/65  SpO2:  [93 %-99 %] 96 %  on  Flow (L/min):  [3-4] 3;   Device (Oxygen Therapy): nasal cannula  Body mass index is 37.27 kg/m².    Physical Exam   Constitutional: She is oriented to person, place, and time. She appears well-developed and well-nourished. No distress.   HENT:   Head: Normocephalic and atraumatic.   Eyes: Conjunctivae and EOM are normal.   Neck: Normal range of motion. Neck supple.   Cardiovascular: Normal rate, regular rhythm and intact distal pulses.    No murmur heard.  Pulmonary/Chest: Effort normal. She has decreased breath sounds. She has no wheezes.   L chest tubes   Abdominal: Soft. There is no tenderness.   Musculoskeletal: Normal range of motion. She exhibits no tenderness.   Neurological: She is alert and oriented to person, place, and time.   Skin: Skin is warm and dry. She is not diaphoretic.   Psychiatric: She has a normal mood and affect. Her behavior is normal.   Nursing note and vitals reviewed.      Results Review:       I reviewed the patient's new clinical results. Reviewed imaging, agree with interpretation. Reviewed telemetry, sinus rhythm.    Results from last 7 days  Lab Units 18  0513 03/10/18  0440 18  0408 18  0357   WBC 10*3/mm3 14.70* 13.18* 9.56 11.26*   HEMOGLOBIN g/dL 10.4* 11.2* 11.1* 10.9*   PLATELETS 10*3/mm3 484 577* 552* 375     Results from last 7 days  Lab Units 18  0513 03/10/18  0440 18  0408 18  0357   SODIUM mmol/L 134* 134* 139 136   POTASSIUM  mmol/L 4.3 4.0 3.9 4.4   CHLORIDE mmol/L 100 98 103 100   CO2 mmol/L 24.7 25.6 25.1 24.5   BUN mg/dL 15 12 12 12   CREATININE mg/dL 0.89 0.87 0.83 0.86   GLUCOSE mg/dL 170* 193* 77 83   Estimated Creatinine Clearance: 82.6 mL/min (by C-G formula based on SCr of 0.89 mg/dL).  Results from last 7 days  Lab Units 03/11/18  0513 03/10/18  0440 03/09/18  0408 03/08/18  0357 03/07/18  1509   CALCIUM mg/dL 7.8* 8.4* 8.6 8.7 9.5   ALBUMIN g/dL  --   --  2.90*  --  3.00*   MAGNESIUM mg/dL  --  2.1  --   --   --          allopurinol 100 mg Oral Daily   docusate sodium 100 mg Oral BID   ferrous sulfate 325 mg Oral Daily   fluticasone 2 spray Nasal Daily   heparin (porcine) 5,000 Units Subcutaneous Q8H   heparin flush (porcine) 5 mL Intracatheter Q12H   losartan 100 mg Oral Q24H   And      Hydrochlorothiazide Oral 12.5 mg Oral Daily   insulin aspart 0-7 Units Subcutaneous 4x Daily With Meals & Nightly   insulin detemir 20 Units Subcutaneous Nightly   ipratropium-albuterol 3 mL Nebulization Q8H - RT   linagliptin 5 mg Oral Daily   magnesium oxide 400 mg Oral Daily   metoprolol tartrate 25 mg Oral Q12H   Or      metoprolol tartrate 5 mg Intravenous Q12H   polyethylene glycol 17 g Oral Daily   sennosides-docusate sodium 2 tablet Oral Nightly   sodium chloride 10 mL Intracatheter Q12H       bupivacaine (MARCAINE) 0.25% in elastomeric 4 mL/hr (ON-Q PUMP) 300 mL 4 mL/hr Last Rate: 4 mL/hr (03/10/18 1203)   HYDROmorphone HCl-NaCl     lactated ringers 9 mL/hr Last Rate: Stopped (03/10/18 1022)   Diet Regular; Consistent Carbohydrate      Assessment/Plan      Active Hospital Problems (** Indicates Principal Problem)    Diagnosis Date Noted   • **Pericardial effusion [I31.3] 03/07/2018   • Shortness of breath [R06.02] 03/07/2018   • Pleural effusion [J90] 03/07/2018   • Liver metastasis [C78.7] 06/21/2017   • Type 2 diabetes mellitus with complication, with long-term current use of insulin [E11.8, Z79.4] 12/30/2016   • Primary malignant  neoplasm of colon [C18.9] 05/26/2016   • Essential hypertension [I10] 12/31/2015      Resolved Hospital Problems    Diagnosis Date Noted Date Resolved   No resolved problems to display.     - Pericardial/Pleural Effusion (Loculated): sp L VATS, decortication, and pericardial window 03/10. Chest tube in place. PCA for pain control. Cx and fungal smears are ok so far. Pathology pending. WBC still elevated but afebrile. Repeat CXR in AM. Cardiology and CT Surgery following.  - Colon Cancer/Metastatic to Liver: Follow up in 5 weeks with CBC group after discharge. Can consult if cytology results warrant or issues arise.  - DM2: A1c 7.7. Levemir and SSI. Only required 6 units SSI yesterday, will monitor.  - HTN: HCTZ/Losartan  - Disposition: Home/TBD    Foster Delong MD  [facility]  03/11/18  11:32 AM

## 2018-03-11 NOTE — PROGRESS NOTES
"Patient Care Team:  Paola Onofre MD as PCP - General (Family Medicine)  iSlvina Ty, ROBBIE Brown MD as Consulting Physician (Hematology and Oncology)  Chela Fuller MD as Referring Physician (Internal Medicine)    Chief Complaint:   Follow-up pericardial effusion    Interval History:   Unsuccessful attempt pericardiocentesis 3/8. S/p L VATS, Decortication, and pericardial window 3/10.      Subjective: sitting up in chair.  Breathing easier today.  Using PCA for pain control.  Using IS some.  Denies n/v.  Was able to eat a few bites of food today.  CTs remain in place      Objective   Vital Signs  Temp:  [97.8 °F (36.6 °C)-98.5 °F (36.9 °C)] 98.2 °F (36.8 °C)  Heart Rate:  [] 85  Resp:  [12-18] 16  BP: ()/(42-85) 121/65  Arterial Line BP: ()/(43-65) 104/65    Intake/Output Summary (Last 24 hours) at 03/11/18 1700  Last data filed at 03/11/18 1500   Gross per 24 hour   Intake             2698 ml   Output              432 ml   Net             2266 ml     Flowsheet Rows         First Filed Value    Admission Height  175.3 cm (69\") Documented at 03/07/2018 1445    Admission Weight  116 kg (255 lb) Documented at 03/07/2018 1445          Intake/Output Summary (Last 24 hours) at 03/11/18 1702  Last data filed at 03/11/18 1500   Gross per 24 hour   Intake             2698 ml   Output              432 ml   Net             2266 ml       Tele SR    General Appearance:    Alert, cooperative, in no acute distress   Head:    Normocephalic, without obvious abnormality, atraumatic       Neck:   No adenopathy, supple, no thyromegaly, no carotid bruit, no    JVD   Lungs:     Decreased breath sounds to bases, resp even and unlabored.  02 per NC    Heart:    Normal rate, regular rhythm,  No murmur, rub, or gallop       Abdomen:     Normal bowel sounds, soft, non-tender, non-distended,            no rebound tenderness   Extremities:   No cyanosis, clubbing, 1+ edema with TEDS in place   Pulses: "   Pulses palpable and equal bilaterally                   allopurinol 100 mg Oral Daily   docusate sodium 100 mg Oral BID   ferrous sulfate 325 mg Oral Daily   fluticasone 2 spray Nasal Daily   heparin (porcine) 5,000 Units Subcutaneous Q8H   heparin flush (porcine) 5 mL Intracatheter Q12H   losartan 100 mg Oral Q24H   And      Hydrochlorothiazide Oral 12.5 mg Oral Daily   insulin aspart 0-7 Units Subcutaneous 4x Daily With Meals & Nightly   insulin detemir 20 Units Subcutaneous Nightly   ipratropium-albuterol 3 mL Nebulization Q8H - RT   linagliptin 5 mg Oral Daily   magnesium oxide 400 mg Oral Daily   metoprolol tartrate 25 mg Oral Q12H   Or      metoprolol tartrate 5 mg Intravenous Q12H   polyethylene glycol 17 g Oral Daily   sennosides-docusate sodium 2 tablet Oral Nightly   sodium chloride 10 mL Intracatheter Q12H         bupivacaine (MARCAINE) 0.25% in elastomeric 4 mL/hr (ON-Q PUMP) 300 mL 4 mL/hr Last Rate: 4 mL/hr (03/10/18 1203)   HYDROmorphone HCl-NaCl     lactated ringers 9 mL/hr Last Rate: Stopped (03/10/18 1022)     3/7/18 echo  Interpretation Summary     · Left ventricular systolic function is normal. Calculated EF = 68.3%. Estimated EF was in agreement with the calculated EF. Estimated EF = 68%. Normal left ventricular cavity size noted. All left ventricular wall segments contract normally. Left ventricular wall thickness is consistent with mild concentric hypertrophy. Left ventricular diastolic function is normal.  · Right ventricle is not well seen. The walls appear thickened.  · There is a moderate (1-2cm) circumferential pericardial effusion. The the effusion is fluid filled. Doppler assessment is indeterminate for tamponade however there is no 2-D evidence of tamponade. The respiratory variation of mitral valve inflow is less than 30%. The respiratory variation of tricuspid valve inflow is greater than 30%. There is a moderate size left pleural effusion     Study Result     NONCONTRAST CT  CHEST     HISTORY: Female who is 67 years-old, with a history of  breast cancer  presenting for evaluation of complex left pleural effusion.     PROTOCOL: Imaging was performed with standard technique.     Radiation dose reduction techniques were utilized including automated  exposure control and exposure modulation based on body size.     COMPARISON: 2/14/2018     FINDINGS:  1. Pericardial effusion new since previous study measuring up to 2 cm  thick.  2. Interval development of complex fluid collection at the left base  which appears partially loculated associated with atelectasis.  3. Minimal right effusion and atelectasis.  4. No other significant pulmonary abnormality.  5. No change in appearance of liver mass with evidence of prior therapy,  gallstones.         Results Review:      Results from last 7 days  Lab Units 03/11/18  0513   SODIUM mmol/L 134*   POTASSIUM mmol/L 4.3   CHLORIDE mmol/L 100   CO2 mmol/L 24.7   BUN mg/dL 15   CREATININE mg/dL 0.89   GLUCOSE mg/dL 170*   CALCIUM mg/dL 7.8*       Results from last 7 days  Lab Units 03/07/18  1509   TROPONIN T ng/mL <0.010       Results from last 7 days  Lab Units 03/11/18  0513   WBC 10*3/mm3 14.70*   HEMOGLOBIN g/dL 10.4*   HEMATOCRIT % 32.4*   PLATELETS 10*3/mm3 484       Results from last 7 days  Lab Units 03/10/18  0440 03/07/18  1509   INR  1.16* 1.05   APTT seconds 52.9*  --            Results from last 7 days  Lab Units 03/10/18  0440   MAGNESIUM mg/dL 2.1             Study Result     ONE VIEW PORTABLE CHEST AT 5:39 AM     HISTORY: Recent left-sided chest surgery with chest tubes and  pericardial tube. Breast cancer.     FINDINGS:  There are 2 left-sided chest tubes in place as well as a  pericardial tube and showing no change from yesterday's exam. There  remains moderate atelectasis, particularly at the left base as well as  some possible associated consolidation at the left base as also  suggested on the CT scan performed 3 days ago. The heart  remains  enlarged.     There is no evidence of pneumothorax. A left-sided MediPort catheter  ends in the SVC without change.       I reviewed the patient's new clinical results.  I personally viewed and interpreted the patient's EKG/Telemetry data          Assessment/Plan   1. Pericardial effusion: Moderate s/p L VATS, decortication, and pericardial window 3/10.    2. Pleural effusion: left   3. Colon cancer with liver mets.   4. DM  5. Dyspnea  6.  Leukocytosis:  3/11 is 14.70 (13.18)  7.  Anemia: stable Hgb 10.4 (11.2)    Looks goods. CV stable. Is on metoprolol 25 mg BID.  Continue supportive care.  Pushed kirk Whitaker, EMMA  Weekend cardiolgy coverage  3/11/18 7: 20 pm

## 2018-03-11 NOTE — PLAN OF CARE
Problem: Cardiac Output, Decreased (Adult)  Goal: Adequate Cardiac Output/Effective Tissue Perfusion  Outcome: Ongoing (interventions implemented as appropriate)      Problem: Respiratory Insufficiency (Adult)  Goal: Acid/Base Balance  Outcome: Ongoing (interventions implemented as appropriate)      Problem: Diabetes, Type 2 (Adult)  Goal: Signs and Symptoms of Listed Potential Problems Will be Absent or Manageable (Diabetes, Type 2)  Outcome: Ongoing (interventions implemented as appropriate)      Problem: Patient Care Overview  Goal: Plan of Care Review  Outcome: Ongoing (interventions implemented as appropriate)   03/11/18 0520   Coping/Psychosocial   Plan of Care Reviewed With patient   Plan of Care Review   Progress improving   OTHER   Outcome Summary VSS, pain controlled with PCA and PO meds. Urine output low earlier in shift - 500cc bolus ordered and toradol dc/d per MD. Currently 30-35cc/hr. Patient in good spirits. Awaiting labs - Will continue to monitor.      Goal: Individualization and Mutuality  Outcome: Ongoing (interventions implemented as appropriate)    Goal: Discharge Needs Assessment  Outcome: Ongoing (interventions implemented as appropriate)      Problem: Skin Injury Risk (Adult)  Goal: Identify Related Risk Factors and Signs and Symptoms  Outcome: Ongoing (interventions implemented as appropriate)      Problem: Fall Risk (Adult)  Goal: Identify Related Risk Factors and Signs and Symptoms  Outcome: Ongoing (interventions implemented as appropriate)

## 2018-03-12 ENCOUNTER — APPOINTMENT (OUTPATIENT)
Dept: GENERAL RADIOLOGY | Facility: HOSPITAL | Age: 68
End: 2018-03-12

## 2018-03-12 LAB
ANION GAP SERPL CALCULATED.3IONS-SCNC: 11.8 MMOL/L
BASOPHILS # BLD AUTO: 0.02 10*3/MM3 (ref 0–0.2)
BASOPHILS NFR BLD AUTO: 0.1 % (ref 0–1.5)
BUN BLD-MCNC: 15 MG/DL (ref 8–23)
BUN/CREAT SERPL: 17 (ref 7–25)
CALCIUM SPEC-SCNC: 7.9 MG/DL (ref 8.6–10.5)
CHLORIDE SERPL-SCNC: 100 MMOL/L (ref 98–107)
CO2 SERPL-SCNC: 24.2 MMOL/L (ref 22–29)
CREAT BLD-MCNC: 0.88 MG/DL (ref 0.57–1)
CYTO UR: NORMAL
DEPRECATED RDW RBC AUTO: 49.6 FL (ref 37–54)
EOSINOPHIL # BLD AUTO: 0.36 10*3/MM3 (ref 0–0.7)
EOSINOPHIL NFR BLD AUTO: 2.5 % (ref 0.3–6.2)
ERYTHROCYTE [DISTWIDTH] IN BLOOD BY AUTOMATED COUNT: 14.9 % (ref 11.7–13)
GFR SERPL CREATININE-BSD FRML MDRD: 78 ML/MIN/1.73
GLUCOSE BLD-MCNC: 183 MG/DL (ref 65–99)
GLUCOSE BLDC GLUCOMTR-MCNC: 163 MG/DL (ref 70–130)
GLUCOSE BLDC GLUCOMTR-MCNC: 164 MG/DL (ref 70–130)
GLUCOSE BLDC GLUCOMTR-MCNC: 212 MG/DL (ref 70–130)
GLUCOSE BLDC GLUCOMTR-MCNC: 235 MG/DL (ref 70–130)
HCT VFR BLD AUTO: 33.4 % (ref 35.6–45.5)
HGB BLD-MCNC: 10.7 G/DL (ref 11.9–15.5)
IMM GRANULOCYTES # BLD: 0.18 10*3/MM3 (ref 0–0.03)
IMM GRANULOCYTES NFR BLD: 1.3 % (ref 0–0.5)
LAB AP CASE REPORT: NORMAL
LAB AP DIAGNOSIS COMMENT: NORMAL
LYMPHOCYTES # BLD AUTO: 1.19 10*3/MM3 (ref 0.9–4.8)
LYMPHOCYTES NFR BLD AUTO: 8.4 % (ref 19.6–45.3)
Lab: NORMAL
MCH RBC QN AUTO: 29.2 PG (ref 26.9–32)
MCHC RBC AUTO-ENTMCNC: 32 G/DL (ref 32.4–36.3)
MCV RBC AUTO: 91 FL (ref 80.5–98.2)
MONOCYTES # BLD AUTO: 2.25 10*3/MM3 (ref 0.2–1.2)
MONOCYTES NFR BLD AUTO: 15.9 % (ref 5–12)
NEUTROPHILS # BLD AUTO: 10.16 10*3/MM3 (ref 1.9–8.1)
NEUTROPHILS NFR BLD AUTO: 71.8 % (ref 42.7–76)
NRBC BLD MANUAL-RTO: 0 /100 WBC (ref 0–0)
PATH REPORT.FINAL DX SPEC: NORMAL
PATH REPORT.GROSS SPEC: NORMAL
PLATELET # BLD AUTO: 505 10*3/MM3 (ref 140–500)
PMV BLD AUTO: 8.8 FL (ref 6–12)
POTASSIUM BLD-SCNC: 4.4 MMOL/L (ref 3.5–5.2)
RBC # BLD AUTO: 3.67 10*6/MM3 (ref 3.9–5.2)
SODIUM BLD-SCNC: 136 MMOL/L (ref 136–145)
WBC NRBC COR # BLD: 14.16 10*3/MM3 (ref 4.5–10.7)

## 2018-03-12 PROCEDURE — 94799 UNLISTED PULMONARY SVC/PX: CPT

## 2018-03-12 PROCEDURE — 99232 SBSQ HOSP IP/OBS MODERATE 35: CPT | Performed by: NURSE PRACTITIONER

## 2018-03-12 PROCEDURE — 82962 GLUCOSE BLOOD TEST: CPT

## 2018-03-12 PROCEDURE — 99223 1ST HOSP IP/OBS HIGH 75: CPT | Performed by: INTERNAL MEDICINE

## 2018-03-12 PROCEDURE — 84145 PROCALCITONIN (PCT): CPT | Performed by: HOSPITALIST

## 2018-03-12 PROCEDURE — 71045 X-RAY EXAM CHEST 1 VIEW: CPT

## 2018-03-12 PROCEDURE — 80048 BASIC METABOLIC PNL TOTAL CA: CPT | Performed by: THORACIC SURGERY (CARDIOTHORACIC VASCULAR SURGERY)

## 2018-03-12 PROCEDURE — 25010000002 HEPARIN (PORCINE) PER 1000 UNITS: Performed by: THORACIC SURGERY (CARDIOTHORACIC VASCULAR SURGERY)

## 2018-03-12 PROCEDURE — 25010000002 HEPARIN FLUSH (PORCINE) 100 UNIT/ML SOLUTION: Performed by: INTERNAL MEDICINE

## 2018-03-12 PROCEDURE — 63710000001 INSULIN ASPART PER 5 UNITS: Performed by: HOSPITALIST

## 2018-03-12 PROCEDURE — 84443 ASSAY THYROID STIM HORMONE: CPT | Performed by: HOSPITALIST

## 2018-03-12 PROCEDURE — 97110 THERAPEUTIC EXERCISES: CPT | Performed by: PHYSICAL THERAPIST

## 2018-03-12 PROCEDURE — 85025 COMPLETE CBC W/AUTO DIFF WBC: CPT | Performed by: THORACIC SURGERY (CARDIOTHORACIC VASCULAR SURGERY)

## 2018-03-12 PROCEDURE — 99024 POSTOP FOLLOW-UP VISIT: CPT | Performed by: NURSE PRACTITIONER

## 2018-03-12 PROCEDURE — 86431 RHEUMATOID FACTOR QUANT: CPT | Performed by: HOSPITALIST

## 2018-03-12 PROCEDURE — 84439 ASSAY OF FREE THYROXINE: CPT | Performed by: HOSPITALIST

## 2018-03-12 RX ADMIN — Medication 500 UNITS: at 08:06

## 2018-03-12 RX ADMIN — INSULIN ASPART 3 UNITS: 100 INJECTION, SOLUTION INTRAVENOUS; SUBCUTANEOUS at 17:23

## 2018-03-12 RX ADMIN — ALLOPURINOL 100 MG: 100 TABLET ORAL at 08:06

## 2018-03-12 RX ADMIN — HEPARIN SODIUM 5000 UNITS: 5000 INJECTION, SOLUTION INTRAVENOUS; SUBCUTANEOUS at 20:42

## 2018-03-12 RX ADMIN — INSULIN ASPART 2 UNITS: 100 INJECTION, SOLUTION INTRAVENOUS; SUBCUTANEOUS at 08:06

## 2018-03-12 RX ADMIN — HYDROCODONE BITARTRATE AND ACETAMINOPHEN 1 TABLET: 7.5; 325 TABLET ORAL at 05:05

## 2018-03-12 RX ADMIN — INSULIN ASPART 2 UNITS: 100 INJECTION, SOLUTION INTRAVENOUS; SUBCUTANEOUS at 21:02

## 2018-03-12 RX ADMIN — HEPARIN SODIUM 5000 UNITS: 5000 INJECTION, SOLUTION INTRAVENOUS; SUBCUTANEOUS at 14:00

## 2018-03-12 RX ADMIN — Medication 400 MG: at 08:06

## 2018-03-12 RX ADMIN — IPRATROPIUM BROMIDE AND ALBUTEROL SULFATE 3 ML: .5; 3 SOLUTION RESPIRATORY (INHALATION) at 06:59

## 2018-03-12 RX ADMIN — LINAGLIPTIN 5 MG: 5 TABLET, FILM COATED ORAL at 08:06

## 2018-03-12 RX ADMIN — DOCUSATE SODIUM 100 MG: 100 CAPSULE, LIQUID FILLED ORAL at 08:05

## 2018-03-12 RX ADMIN — IPRATROPIUM BROMIDE AND ALBUTEROL SULFATE 3 ML: .5; 3 SOLUTION RESPIRATORY (INHALATION) at 00:04

## 2018-03-12 RX ADMIN — HEPARIN SODIUM 5000 UNITS: 5000 INJECTION, SOLUTION INTRAVENOUS; SUBCUTANEOUS at 06:30

## 2018-03-12 RX ADMIN — FERROUS SULFATE TAB 325 MG (65 MG ELEMENTAL FE) 325 MG: 325 (65 FE) TAB at 08:06

## 2018-03-12 RX ADMIN — INSULIN ASPART 3 UNITS: 100 INJECTION, SOLUTION INTRAVENOUS; SUBCUTANEOUS at 11:30

## 2018-03-12 RX ADMIN — Medication 10 ML: at 08:06

## 2018-03-12 RX ADMIN — POLYETHYLENE GLYCOL 3350 17 G: 17 POWDER, FOR SOLUTION ORAL at 08:06

## 2018-03-12 RX ADMIN — HYDROCHLOROTHIAZIDE 12.5 MG: 12.5 CAPSULE, GELATIN COATED ORAL at 08:05

## 2018-03-12 RX ADMIN — METOPROLOL TARTRATE 25 MG: 25 TABLET ORAL at 20:42

## 2018-03-12 RX ADMIN — DOCUSATE SODIUM -SENNOSIDES 2 TABLET: 50; 8.6 TABLET, COATED ORAL at 20:42

## 2018-03-12 RX ADMIN — LOSARTAN POTASSIUM 100 MG: 100 TABLET ORAL at 08:06

## 2018-03-12 RX ADMIN — METOPROLOL TARTRATE 25 MG: 25 TABLET ORAL at 08:06

## 2018-03-12 NOTE — PLAN OF CARE
Problem: Cardiac Output, Decreased (Adult)  Goal: Adequate Cardiac Output/Effective Tissue Perfusion  Outcome: Ongoing (interventions implemented as appropriate)      Problem: Respiratory Insufficiency (Adult)  Goal: Acid/Base Balance  Outcome: Ongoing (interventions implemented as appropriate)      Problem: Diabetes, Type 2 (Adult)  Goal: Signs and Symptoms of Listed Potential Problems Will be Absent or Manageable (Diabetes, Type 2)  Outcome: Ongoing (interventions implemented as appropriate)      Problem: Patient Care Overview  Goal: Plan of Care Review  Outcome: Ongoing (interventions implemented as appropriate)   03/12/18 0621   Coping/Psychosocial   Plan of Care Reviewed With patient   Plan of Care Review   Progress improving   OTHER   Outcome Summary VSS, 2L O2 experiencing increased coughing requiring PO pain meds for discomfort. Using PCA pump occasionally. Watching urine output - urine remains dark. 3 chest tubes in place on -40 suction, serosanguineous drainage. Will continue to monitor.      Goal: Individualization and Mutuality  Outcome: Ongoing (interventions implemented as appropriate)      Problem: Skin Injury Risk (Adult)  Goal: Identify Related Risk Factors and Signs and Symptoms  Outcome: Ongoing (interventions implemented as appropriate)      Problem: Fall Risk (Adult)  Goal: Identify Related Risk Factors and Signs and Symptoms  Outcome: Ongoing (interventions implemented as appropriate)    Goal: Absence of Fall  Outcome: Ongoing (interventions implemented as appropriate)

## 2018-03-12 NOTE — CONSULTS
.     REASON FOR CONSULTATION:   History of colon cancer.  Pleural/pericardial effusion, status post surgery.  Provide an opinion on any further workup or treatment                             REQUESTING PHYSICIAN: No ref. provider found  RECORDS OBTAINED:  Records of the patients history including those obtained from the referring provider were reviewed and summarized in detail.    HISTORY OF PRESENT ILLNESS:  The patient is a 67 y.o. year old female  who is here for follow-up with the above-mentioned history.    Last seen by Dr. Brown in our office 3/7/18.  He discussed the new finding of pericardial effusion with cardiology.  Because the patient was symptomatic, she was directed to the emergency room and admitted.  Has since had VATS/decortication.  Pathology is pending.  We were asked to see her.    States she's doing fine since her surgery.  No significant problems with shortness of air.    Past Medical History:   Diagnosis Date   • Anemia    • Cancer 06/04/2013   • Diabetes mellitus    • History of transfusion    • Hyperlipidemia    • Hypertension    • Metastatic colon cancer to liver    • Retinopathy    • Type 2 diabetes mellitus      Past Surgical History:   Procedure Laterality Date   • ABDOMINAL SURGERY      ablation    • AMPUTATION DIGIT Left 1/1/2017    Procedure: LEFT SECOND TOE AMPUTATION;  Surgeon: Farzad Nichols MD;  Location: [facility] OR;  Service:    • AMPUTATION OF REPLICATED TOES      right distal second toe    • CARDIAC CATHETERIZATION N/A 3/8/2018    Procedure: Pericardiocentesis;  Surgeon: Andrew Dobbins MD;  Location: [facility] INVASIVE LOCATION;  Service:    • CATARACT EXTRACTION     • COLECTOMY PARTIAL / TOTAL  01/14/2014   • HYSTERECTOMY  1998   • PORTACATH PLACEMENT  06/2013   • SALPINGO OOPHORECTOMY Bilateral    • SPLENECTOMY         HEMATOLOGIC/ONCOLOGIC HISTORY:  (History from previous dates can be found in the separate document.)    MEDICATIONS    Current  Facility-Administered Medications:   •  acetaminophen (TYLENOL) tablet 650 mg, 650 mg, Oral, Q4H PRN, Jacob Morgan MD  •  allopurinol (ZYLOPRIM) tablet 100 mg, 100 mg, Oral, Daily, Jacob Morgan MD, 100 mg at 03/12/18 0806  •  bisacodyl (DULCOLAX) suppository 10 mg, 10 mg, Rectal, Daily PRN, Nomi Patel III, MD  •  bupivacaine PF (MARCAINE) 0.25 % in elastomeric 4 mL/hr reservoir (ON-Q PAINBUSTER) 1 each 400 mL infusion, 4 mL/hr, Intradermal, Continuous, Nomi Patel III, MD, Last Rate: 4 mL/hr at 03/10/18 1203, 4 mL/hr at 03/10/18 1203  •  dextrose (D50W) solution 25 g, 25 g, Intravenous, Q15 Min PRN, Jacob Morgan MD, 25 g at 03/08/18 0815  •  dextrose (GLUTOSE) oral gel 15 g, 15 g, Oral, Q15 Min PRN, Jacob Morgan MD  •  docusate sodium (COLACE) capsule 100 mg, 100 mg, Oral, BID, Nomi Patel III, MD, 100 mg at 03/12/18 0805  •  ferrous sulfate tablet 325 mg, 325 mg, Oral, Daily, Jacob Morgan MD, 325 mg at 03/12/18 0806  •  fluticasone (FLONASE) 50 MCG/ACT nasal spray 2 spray, 2 spray, Nasal, Daily, Jacob Morgan MD, 2 spray at 03/08/18 0825  •  glucagon (human recombinant) (GLUCAGEN DIAGNOSTIC) injection 1 mg, 1 mg, Subcutaneous, PRN, Jacob Morgan MD  •  heparin (porcine) 5000 UNIT/ML injection 5,000 Units, 5,000 Units, Subcutaneous, Q8H, Nomi Patel III, MD, 5,000 Units at 03/12/18 0630  •  heparin flush (porcine) 100 UNIT/ML injection 500 Units, 5 mL, Intracatheter, Q12H, Trung Thomas MD, 500 Units at 03/12/18 0806  •  heparin flush (porcine) 100 UNIT/ML injection 500 Units, 5 mL, Intracatheter, PRN, Trung Thomas MD, 500 Units at 03/10/18 0245  •  losartan (COZAAR) tablet 100 mg, 100 mg, Oral, Q24H, 100 mg at 03/12/18 0806 **AND** hydrochlorothiazide (MICROZIDE) capsule 12.5 mg, 12.5 mg, Oral, Daily, Jacob Morgan MD, 12.5 mg at 03/12/18 0805  •  HYDROcodone-acetaminophen (NORCO) 7.5-325 MG per tablet 1 tablet, 1 tablet, Oral, Q4H PRN, Nomi Patel III, MD, 1 tablet at 03/12/18 0505  •   HYDROcodone-acetaminophen (NORCO) 7.5-325 MG per tablet 2 tablet, 2 tablet, Oral, Q4H PRN, Nomi Patel III, MD  •  HYDROmorphone (DILAUDID) PCA 0.2 mg/ml 50 mL syringe, , Intravenous, Continuous, Nomi Patel III, MD  •  insulin aspart (novoLOG) injection 0-7 Units, 0-7 Units, Subcutaneous, 4x Daily With Meals & Nightly, Jacob Morgan MD, 2 Units at 03/12/18 0806  •  insulin detemir (LEVEMIR) injection 25 Units, 25 Units, Subcutaneous, Nightly, Dirk Mullins MD  •  lactated ringers infusion, 9 mL/hr, Intravenous, Continuous, Jay Méndez MD, Stopped at 03/10/18 1022  •  linagliptin (TRADJENTA) tablet 5 mg, 5 mg, Oral, Daily, Jacob Moragn MD, 5 mg at 03/12/18 0806  •  LORazepam (ATIVAN) tablet 0.5 mg, 0.5 mg, Oral, BID PRN, Jacob Morgan MD  •  magnesium hydroxide (MILK OF MAGNESIA) suspension 2400 mg/10mL 10 mL, 10 mL, Oral, Daily PRN, Nomi Patel III, MD  •  magnesium oxide (MAG-OX) tablet 400 mg, 400 mg, Oral, Daily, Jacob Morgan MD, 400 mg at 03/12/18 0806  •  meclizine (ANTIVERT) tablet 25 mg, 25 mg, Oral, TID PRN, Jacob Morgan MD  •  metoprolol tartrate (LOPRESSOR) tablet 25 mg, 25 mg, Oral, Q12H, 25 mg at 03/12/18 0806 **OR** metoprolol tartrate (LOPRESSOR) injection 5 mg, 5 mg, Intravenous, Q12H, Nomi Patel III, MD, 5 mg at 03/10/18 2210  •  naloxone (NARCAN) injection 0.1 mg, 0.1 mg, Intravenous, Q5 Min PRN, Nomi Patel III, MD  •  nitroglycerin (NITROSTAT) SL tablet 0.4 mg, 0.4 mg, Sublingual, Q5 Min PRN, Nomi Patel III, MD  •  ondansetron (ZOFRAN) tablet 4 mg, 4 mg, Oral, Q6H PRN **OR** ondansetron ODT (ZOFRAN-ODT) disintegrating tablet 4 mg, 4 mg, Oral, Q6H PRN **OR** ondansetron (ZOFRAN) injection 4 mg, 4 mg, Intravenous, Q6H PRN, Jacob Morgan MD  •  polyethylene glycol 3350 powder (packet), 17 g, Oral, Daily, Nomi Patel III, MD, 17 g at 03/12/18 0806  •  sennosides-docusate sodium (SENOKOT-S) 8.6-50 MG tablet 2 tablet, 2 tablet, Oral, Nightly, Nomi ESPARZA  "Jorge CHOUDHURY MD, 2 tablet at 03/11/18 2101  •  sodium chloride 0.9 % flush 1-10 mL, 1-10 mL, Intravenous, PRN, Jacob Morgan MD  •  sodium chloride 0.9 % flush 1-10 mL, 1-10 mL, Intravenous, PRN, Nomi Patel III, MD  •  sodium chloride 0.9 % flush 10 mL, 10 mL, Intravenous, PRN, Cecil Dorman MD, 10 mL at 03/09/18 0850  •  sodium chloride 0.9 % flush 10 mL, 10 mL, Intracatheter, Q12H, Trung Thomas MD, 10 mL at 03/12/18 0806  •  sodium chloride 0.9 % flush 10 mL, 10 mL, Intracatheter, PRN, Trung Thomas MD    ALLERGIES:     Allergies   Allergen Reactions   • Compazine [Prochlorperazine Edisylate] Other (See Comments)     Complete arch in the back    • Prochlorperazine Maleate Other (See Comments)     \"BACK MUSCLE RETRACTION\"       SOCIAL HISTORY:       Social History     Social History   • Marital status:      Spouse name: Jesus   • Number of children: N/A   • Years of education: College     Occupational History   • Middle School Counselor   Retired     [employer]     Social History Main Topics   • Smoking status: Never Smoker   • Smokeless tobacco: Never Used   • Alcohol use No   • Drug use: No   • Sexual activity: Defer     Other Topics Concern   • Not on file     Social History Narrative   • No narrative on file         FAMILY HISTORY:  Family History   Problem Relation Age of Onset   • Heart attack Other    • Cancer Other    • Diabetes Other    • Hypertension Other    • Hypertension Father    • Diabetes Father    • Breast cancer Sister    • Diabetes Sister    • Stroke Paternal Grandmother    • Diabetes Paternal Grandmother    • Lupus Paternal Grandfather    • Stroke Paternal Grandfather    • Diabetes Sister        REVIEW OF SYSTEMS:  Review of Systems   Constitutional: Negative for activity change.   HENT: Negative for nosebleeds and trouble swallowing.    Respiratory: Negative for shortness of breath and wheezing.    Cardiovascular: Negative for palpitations and leg swelling.   Gastrointestinal: Negative " for constipation, diarrhea and nausea.   Genitourinary: Negative for dysuria and hematuria.   Musculoskeletal: Negative for arthralgias and myalgias.   Skin: Negative for rash and wound.   Neurological: Negative for seizures and syncope.   Hematological: Negative for adenopathy. Does not bruise/bleed easily.   Psychiatric/Behavioral: Negative for confusion.              Vitals:    03/12/18 0004 03/12/18 0659 03/12/18 0708 03/12/18 0745   BP:    153/76   BP Location:    Left arm   Patient Position:    Lying   Pulse: 89 92  107   Resp: 18 18 18 18   Temp:    98.3 °F (36.8 °C)   TempSrc:    Oral   SpO2: 95% 95%  92%   Weight:       Height:         Current Status 3/7/2018   ECOG score 0      PHYSICAL EXAM:      CONSTITUTIONAL:  Vital signs reviewed.  No distress, looks comfortable.  EYES:  Conjunctiva and lids unremarkable.  PERRLA  EARS,NOSE,MOUTH,THROAT:  Ears and nose appear unremarkable.  Lips, teeth, gums appear unremarkable.  RESPIRATORY:  Normal respiratory effort.  Lungs clear to auscultation bilaterally.  CARDIOVASCULAR:  Normal S1, S2.  No murmurs rubs or gallops.  No significant lower extremity edema.  GASTROINTESTINAL: Abdomen appears unremarkable.  Nontender.  No hepatomegaly.  No splenomegaly.  LYMPHATIC:  No cervical, supraclavicular, axillary lymphadenopathy.  MUSCULOSKELETAL:  Unremarkable gait and station.  Unremarkable digits/nails.  No cyanosis or clubbing.  SKIN:  Warm.  No rashes.  PSYCHIATRIC:  Normal judgment and insight.  Normal mood and affect.      RECENT LABS:        WBC   Date Value Ref Range Status   03/12/2018 14.16 (H) 4.50 - 10.70 10*3/mm3 Final   03/11/2018 14.70 (H) 4.50 - 10.70 10*3/mm3 Final   03/10/2018 13.18 (H) 4.50 - 10.70 10*3/mm3 Final     Hemoglobin   Date Value Ref Range Status   03/12/2018 10.7 (L) 11.9 - 15.5 g/dL Final   03/11/2018 10.4 (L) 11.9 - 15.5 g/dL Final   03/10/2018 11.2 (L) 11.9 - 15.5 g/dL Final     Platelets   Date Value Ref Range Status   03/12/2018 505 (H)  140 - 500 10*3/mm3 Final   03/11/2018 484 140 - 500 10*3/mm3 Final   03/10/2018 577 (H) 140 - 500 10*3/mm3 Final       Assessment/Plan     *Metastatic colon cancer.  Metastasis to liver.  FOLFOX ×12 cycles, then resection of primary tumor and metastatic to me and splenectomy with thermal ablation of liver lesions January 2014.  Subsequent FOLFIRI ×12, completed 8/7/14.  New liver lesion 5/28/15.  Subsequent CyberKnife radiation completed 7/14/15.  New liver lesion 5/5/17.  Microwave ablation 6/16/17.  Complete response.  FOLFIRI ×12 completed 11/29/17.  Appears to remain in remission from cancer.(Unless we find evidence of malignancy as the cause of the effusions).    *Pericardial/pleural effusion (loculated).  Left VATS, decortication, pericardial window 3/10/18.    Plan  · Await pathology from Left VATS, decortication, pericardial window 3/10/18.  · will follow peripherally until pathology returns.  · usually sees Dr. Brown in our office.  · I will follow peripherally while awaiting pathology.      CT images 3/8/18 personally reviewed by me.

## 2018-03-12 NOTE — PROGRESS NOTES
[facility]     LOS: 5 days     Subjective:  Chest pain is stable  soa is better  Appetite is not gret but eating some    Objective:    Vital Signs:  Temp:  [98.2 °F (36.8 °C)-98.6 °F (37 °C)] 98.6 °F (37 °C)  Heart Rate:  [85-97] 92  Resp:  [12-20] 18  BP: (115-126)/(65-78) 116/65    SpO2:  [89 %-97 %] 95 %  on  Flow (L/min):  [2-3] 2;   Device (Oxygen Therapy): nasal cannula  Body mass index is 37.27 kg/m².    Physical Exam   Constitutional: She is oriented to person, place, and time. She appears well-developed and well-nourished.   HENT:   Head: Normocephalic and atraumatic.   Eyes: EOM are normal. Pupils are equal, round, and reactive to light.   Neck: Normal range of motion. Neck supple. No tracheal deviation present. No thyromegaly present.   Cardiovascular: Normal rate and regular rhythm.  Exam reveals no gallop and no friction rub.    No murmur heard.  Pulmonary/Chest: Effort normal and breath sounds normal. No respiratory distress. She has no wheezes.   Anteriorly lungs clear, tubes cover on the left, 3 tubes in place   Abdominal: Soft. She exhibits no distension. There is no tenderness.   Musculoskeletal: Normal range of motion. She exhibits no edema or deformity.   Neurological: She is alert and oriented to person, place, and time.   Skin: Skin is warm and dry. No erythema. No pallor.   Psychiatric: She has a normal mood and affect. Her behavior is normal.       Results Review:    Glucose   Date Value Ref Range Status   03/12/2018 183 (H) 65 - 99 mg/dL Final   03/11/2018 170 (H) 65 - 99 mg/dL Final   03/10/2018 193 (H) 65 - 99 mg/dL Final       Results from last 7 days  Lab Units 03/12/18  0545   WBC 10*3/mm3 14.16*   HEMOGLOBIN g/dL 10.7*   HEMATOCRIT % 33.4*   PLATELETS 10*3/mm3 505*       Results from last 7 days  Lab Units 03/12/18  0545  03/09/18  0408   SODIUM mmol/L 136  < > 139   POTASSIUM mmol/L 4.4  < > 3.9   CHLORIDE mmol/L 100  < > 103   CO2 mmol/L 24.2  < > 25.1    BUN mg/dL 15  < > 12   CREATININE mg/dL 0.88  < > 0.83   CALCIUM mg/dL 7.9*  < > 8.6   BILIRUBIN mg/dL  --   --  0.7   ALK PHOS U/L  --   --  365*   ALT (SGPT) U/L  --   --  31   AST (SGOT) U/L  --   --  32   GLUCOSE mg/dL 183*  < > 77   < > = values in this interval not displayed.    Results from last 7 days  Lab Units 03/10/18  0440   INR  1.16*   APTT seconds 52.9*       Results from last 7 days  Lab Units 03/10/18  0440   MAGNESIUM mg/dL 2.1       Results from last 7 days  Lab Units 03/07/18  1509   TROPONIN T ng/mL <0.010     Cultures:       I have reviewed daily medications and changes in CPOE    Scheduled meds    allopurinol 100 mg Oral Daily   docusate sodium 100 mg Oral BID   ferrous sulfate 325 mg Oral Daily   fluticasone 2 spray Nasal Daily   heparin (porcine) 5,000 Units Subcutaneous Q8H   heparin flush (porcine) 5 mL Intracatheter Q12H   losartan 100 mg Oral Q24H   And      Hydrochlorothiazide Oral 12.5 mg Oral Daily   insulin aspart 0-7 Units Subcutaneous 4x Daily With Meals & Nightly   insulin detemir 20 Units Subcutaneous Nightly   linagliptin 5 mg Oral Daily   magnesium oxide 400 mg Oral Daily   metoprolol tartrate 25 mg Oral Q12H   Or      metoprolol tartrate 5 mg Intravenous Q12H   polyethylene glycol 17 g Oral Daily   sennosides-docusate sodium 2 tablet Oral Nightly   sodium chloride 10 mL Intracatheter Q12H         bupivacaine (MARCAINE) 0.25% in elastomeric 4 mL/hr (ON-Q PUMP) 300 mL 4 mL/hr Last Rate: 4 mL/hr (03/10/18 1203)   HYDROmorphone HCl-NaCl     lactated ringers 9 mL/hr Last Rate: Stopped (03/10/18 1022)     PRN meds  •  acetaminophen  •  bisacodyl  •  dextrose  •  dextrose  •  glucagon (human recombinant)  •  heparin flush (porcine)  •  HYDROcodone-acetaminophen  •  HYDROcodone-acetaminophen  •  LORazepam  •  magnesium hydroxide  •  meclizine  •  naloxone  •  nitroglycerin  •  ondansetron **OR** ondansetron ODT **OR** ondansetron  •  sodium chloride  •  sodium chloride  •  sodium  chloride  •  sodium chloride      Principal Problem:    Pericardial effusion  Active Problems:    Essential hypertension    Primary malignant neoplasm of colon    Type 2 diabetes mellitus with complication, with long-term current use of insulin    Liver metastasis    Shortness of breath    Pleural effusion        Assessment/Plan:  - Pericardial/Pleural Effusion (Loculated): sp L VATS, decortication, and pericardial window 03/10. Chest tubes in place. PCA for pain control. Cx and fungal smears are ok so far. Pathology pending. WBC still elevated but afebrile. Repeat CXR this am is reviewed and stable. Cardiology and CT Surgery following.    - Colon Cancer/Metastatic to Liver: Follow up in 5 weeks with CBC group after discharge. Will ask the CBC group to see, currently oncologically appears stable; wbc and and plts high    - DM2: A1c 7.7. Levemir and SSI. 160- 230, increase the levemir    - HTN: HCTZ/Losartan; currently stable     - Disposition: Home/TBD      Dirk Mullins MD  03/12/18  7:54 AM

## 2018-03-12 NOTE — PLAN OF CARE
Problem: Cardiac Output, Decreased (Adult)  Goal: Adequate Cardiac Output/Effective Tissue Perfusion  Outcome: Ongoing (interventions implemented as appropriate)   03/12/18 1701   Cardiac Output, Decreased (Adult)   Adequate Cardiac Output/Effective Tissue Perfusion making progress toward outcome       Problem: Respiratory Insufficiency (Adult)  Goal: Acid/Base Balance  Outcome: Ongoing (interventions implemented as appropriate)   03/12/18 1701   Respiratory Insufficiency (Adult)   Acid/Base Balance making progress toward outcome     Goal: Effective Ventilation  Outcome: Ongoing (interventions implemented as appropriate)   03/12/18 1701   Respiratory Insufficiency (Adult)   Effective Ventilation making progress toward outcome       Problem: Patient Care Overview (Adult)  Goal: Plan of Care Review  Outcome: Ongoing (interventions implemented as appropriate)   03/12/18 1701   Coping/Psychosocial Response Interventions   Plan Of Care Reviewed With patient   Patient Care Overview   Progress improving   Outcome Evaluation   Outcome Summary/Follow up Plan patient vss. pain well controlled. patient on PCA for pain. mininmal use. chest tubes to -20 cm suction. will monitor.       Problem: Diabetes, Type 2 (Adult)  Goal: Signs and Symptoms of Listed Potential Problems Will be Absent or Manageable (Diabetes, Type 2)  Outcome: Ongoing (interventions implemented as appropriate)   03/12/18 1701   Diabetes, Type 2   Problems Assessed (Type 2 Diabetes) all   Problems Present (Type 2 Diabetes) impaired glycemic control       Problem: Skin Injury Risk (Adult)  Goal: Skin Health and Integrity  Outcome: Ongoing (interventions implemented as appropriate)   03/12/18 1701   Skin Injury Risk (Adult)   Skin Health and Integrity making progress toward outcome       Problem: Fall Risk (Adult)  Goal: Absence of Fall  Outcome: Ongoing (interventions implemented as appropriate)   03/12/18 1701   Fall Risk (Adult)   Absence of Fall making  progress toward outcome

## 2018-03-12 NOTE — PROGRESS NOTES
"Patient Care Team:  Paola Onofre MD as PCP - General (Family Medicine)  Silvina Ty, ROBBIE Brown MD as Consulting Physician (Hematology and Oncology)  Chela Fuller MD as Referring Physician (Internal Medicine)    Chief Complaint:   Follow-up pericardial effusion    Interval History: Pain well controlled with PCA. She is currently sitting on the bedside commode. No cardiac complaints.         Objective   Vital Signs  Temp:  [98.2 °F (36.8 °C)-98.6 °F (37 °C)] 98.3 °F (36.8 °C)  Heart Rate:  [] 107  Resp:  [12-20] 18  BP: (115-153)/(65-76) 153/76    Intake/Output Summary (Last 24 hours) at 03/12/18 1129  Last data filed at 03/12/18 0600   Gross per 24 hour   Intake              720 ml   Output             1063 ml   Net             -343 ml     Flowsheet Rows         First Filed Value    Admission Height  175.3 cm (69\") Documented at 03/07/2018 1445    Admission Weight  116 kg (255 lb) Documented at 03/07/2018 1445          Intake/Output Summary (Last 24 hours) at 03/12/18 1129  Last data filed at 03/12/18 0600   Gross per 24 hour   Intake              720 ml   Output             1063 ml   Net             -343 ml       Tele SR    General Appearance:    Alert, cooperative, in no acute distress   Head:    Normocephalic, without obvious abnormality, atraumatic       Neck:   No adenopathy, supple, no thyromegaly, no carotid bruit, no    JVD   Lungs:     Decreased breath sounds to bases, resp even and unlabored.  02 per NC    Heart:    Normal rate, regular rhythm,  No murmur, rub, or gallop       Abdomen:     Normal bowel sounds, soft, non-tender, non-distended,            no rebound tenderness   Extremities:   No cyanosis, clubbing, 1+ edema with TEDS in place   Pulses:   Pulses palpable and equal bilaterally                   allopurinol 100 mg Oral Daily   docusate sodium 100 mg Oral BID   ferrous sulfate 325 mg Oral Daily   fluticasone 2 spray Nasal Daily   heparin (porcine) 5,000 Units " Subcutaneous Q8H   heparin flush (porcine) 5 mL Intracatheter Q12H   losartan 100 mg Oral Q24H   And      Hydrochlorothiazide Oral 12.5 mg Oral Daily   insulin aspart 0-7 Units Subcutaneous 4x Daily With Meals & Nightly   insulin detemir 25 Units Subcutaneous Nightly   linagliptin 5 mg Oral Daily   magnesium oxide 400 mg Oral Daily   metoprolol tartrate 25 mg Oral Q12H   Or      metoprolol tartrate 5 mg Intravenous Q12H   polyethylene glycol 17 g Oral Daily   sennosides-docusate sodium 2 tablet Oral Nightly   sodium chloride 10 mL Intracatheter Q12H         bupivacaine (MARCAINE) 0.25% in elastomeric 4 mL/hr (ON-Q PUMP) 300 mL 4 mL/hr Last Rate: 4 mL/hr (03/10/18 1203)   HYDROmorphone HCl-NaCl     lactated ringers 9 mL/hr Last Rate: Stopped (03/10/18 1022)       Results Review:      Results from last 7 days  Lab Units 03/12/18  0545   SODIUM mmol/L 136   POTASSIUM mmol/L 4.4   CHLORIDE mmol/L 100   CO2 mmol/L 24.2   BUN mg/dL 15   CREATININE mg/dL 0.88   GLUCOSE mg/dL 183*   CALCIUM mg/dL 7.9*       Results from last 7 days  Lab Units 03/07/18  1509   TROPONIN T ng/mL <0.010       Results from last 7 days  Lab Units 03/12/18  0545   WBC 10*3/mm3 14.16*   HEMOGLOBIN g/dL 10.7*   HEMATOCRIT % 33.4*   PLATELETS 10*3/mm3 505*       Results from last 7 days  Lab Units 03/10/18  0440 03/07/18  1509   INR  1.16* 1.05   APTT seconds 52.9*  --            Results from last 7 days  Lab Units 03/10/18  0440   MAGNESIUM mg/dL 2.1                 I reviewed the patient's new clinical results.  I personally viewed and interpreted the patient's EKG/Telemetry data          Assessment/Plan    1. Loculated pleural effusion + moderate pericardial effusion s/p VATS + decortication + pericardial window (3/10) . Unsuccessful pericadriocentesis (3/9). Pain well controlled with PCA. Tube management per thoracic surgery. Encourage pulmonary toilet.     2. Colon cancer with liver metastasis - pathology results from window are pending. She  follows with Dr. Brown as an outpatient.    3. DM - on insulin    4. HTN - stable on current medications    Tona Franco, APRN  03/12/18  11:38 AM

## 2018-03-12 NOTE — PROGRESS NOTES
Continued Stay Note  [facility]     Patient Name: Feli Del Toro  MRN: [id number]  Today's Date: 3/12/2018    Admit Date: 3/7/2018          Discharge Plan     Row Name 03/12/18 1312       Plan    Plan Home    Patient/Family in Agreement with Plan yes    Plan Comments Met with pt at bedside who confirms plan to return home at discharge, no known needs.  CCP will continue to follow.  NEO Mckeon RN              Discharge Codes    No documentation.           Jenni Mckeon

## 2018-03-12 NOTE — PROGRESS NOTES
"    Chief Complaint: Postoperative management  S/P: BRONCHOSCOPY, LEFT THORACOSCOPY VIDEO ASSISTED WITH PERICARDIAL WINDOW, SUBPLEURAL CATHETERS FOR PAIN CONTROL  POD # 2    Subjective:  Symptoms:  No shortness of breath.  (Patient doing well this morning.  She currently rates her pain at 3 on a 0-to-10 scale.  No worsening shortness breath.  Currently maintaining O2 sats of 98% on O2 at 2 L/m per nasal cannula.).    Diet:  Adequate intake.    Activity level: Returning to normal.    Pain:  She complains of pain that is mild.  Pain is well controlled.        Vital Signs:  Temp:  [98.2 °F (36.8 °C)-98.6 °F (37 °C)] 98.3 °F (36.8 °C)  Heart Rate:  [] 107  Resp:  [12-20] 18  BP: (115-153)/(65-78) 153/76    Intake & Output (last day)       03/11 0701 - 03/12 0700 03/12 0701 - 03/13 0700    P.O. 1560     I.V. (mL/kg)      IV Piggyback      Total Intake(mL/kg) 1560 (13.7)     Urine (mL/kg/hr) 825 (0.3)     Drains 26 (0)     Blood      Chest Tube 212 (0.1)     Total Output 1063      Net +497            Unmeasured Urine Occurrence 1 x           Objective:  General Appearance:  Comfortable and in no acute distress.    Vital signs: (most recent): Blood pressure 153/76, pulse 107, temperature 98.3 °F (36.8 °C), temperature source Oral, resp. rate 18, height 175.3 cm (69\"), weight 114 kg (252 lb 6.4 oz), SpO2 92 %, not currently breastfeeding.  Vital signs are normal.  No fever.    Lungs:  Normal effort and normal respiratory rate.  There are decreased breath sounds.    Heart: Normal rate.  Regular rhythm.  No murmur.   Abdomen: Abdomen is soft and non-distended.    Extremities: Normal range of motion.  There is no dependent edema.    Neurological: Patient is alert and oriented to person, place and time.    Skin:  Warm and dry.  (Incision sites are clean, dry and intact)            Chest tube:   Site: Left, Clean, Dry and Intact  Suction: -40 cm  Air Leak: negative  Level: 350/100/230  24 Hour Total: 126/86    Pericardial " drain: 26    Results Review:     I reviewed the patient's new clinical results.  I reviewed the patient's new imaging results and agree with the interpretation.    Imaging Results (last 24 hours)     Procedure Component Value Units Date/Time    XR Chest 1 View [[id number]] Collected:  03/12/18 0810     Updated:  03/12/18 0956    Narrative:       CLINICAL HISTORY: 67-year-old female 2 days postop left video-assisted  thoracoscopy with pericardial window. Follow-up with left chest tube.     PORTABLE AP ERECT CHEST DATED 03/12/2018 AT 0556 HOURS     FINDINGS: When compared to the most recent available prior chest  radiograph dated 03/11/2018 at 0539 hours, there are similar lung  volumes with persistent patchy to dense bibasilar atelectasis or  infiltrate. Right costophrenic angle is sharp. Left costophrenic angle  is minimally blunted. Left chest tube remains. Subcutaneous air in the  left lower neck and a trace of 2-3 mm apical to superolateral left  pneumothorax are present. Cardiac silhouette remains enlarged.  Degenerative changes in the spine and shoulders are again demonstrated.  Oxygen cannula tubing is present about the neck and left supraclavicular  area. Left subclavian port catheter again terminates in the distribution  of superior vena cava. Monitoring lead wires are present.     CONCLUSION: Persistent bibasilar atelectasis or infiltrate with similar  lung volumes. Small left apical pneumothorax as described. Left chest  tube remains.     This report was finalized on 3/12/2018 9:53 AM by Dr. Reji Zavala MD.             Lab Results:     Lab Results (last 24 hours)     Procedure Component Value Units Date/Time    POC Glucose Once [[id number]]  (Abnormal) Collected:  03/12/18 0742    Specimen:  Blood Updated:  03/12/18 0744     Glucose 164 (H) mg/dL     Narrative:       Meter: [id number] : [id number] Sawyer Miya NITHYA    Basic Metabolic Panel [[id number]]  (Abnormal) Collected:  03/12/18 0545    Specimen:   Blood Updated:  03/12/18 0713     Glucose 183 (H) mg/dL      BUN 15 mg/dL      Creatinine 0.88 mg/dL      Sodium 136 mmol/L      Potassium 4.4 mmol/L      Chloride 100 mmol/L      CO2 24.2 mmol/L      Calcium 7.9 (L) mg/dL      eGFR  African Amer 78 mL/min/1.73      BUN/Creatinine Ratio 17.0     Anion Gap 11.8 mmol/L     Narrative:       GFR Normal >60  Chronic Kidney Disease <60  Kidney Failure <15    CBC & Differential [[id number]] Collected:  03/12/18 0545    Specimen:  Blood Updated:  03/12/18 0656    Narrative:       The following orders were created for panel order CBC & Differential.  Procedure                               Abnormality         Status                     ---------                               -----------         ------                     Scan Slide[[id number]]                                                                  CBC Auto Differential[[id number]]        Abnormal            Final result                 Please view results for these tests on the individual orders.    CBC Auto Differential [[id number]]  (Abnormal) Collected:  03/12/18 0545    Specimen:  Blood Updated:  03/12/18 0656     WBC 14.16 (H) 10*3/mm3      RBC 3.67 (L) 10*6/mm3      Hemoglobin 10.7 (L) g/dL      Hematocrit 33.4 (L) %      MCV 91.0 fL      MCH 29.2 pg      MCHC 32.0 (L) g/dL      RDW 14.9 (H) %      RDW-SD 49.6 fl      MPV 8.8 fL      Platelets 505 (H) 10*3/mm3      Neutrophil % 71.8 %      Lymphocyte % 8.4 (L) %      Monocyte % 15.9 (H) %      Eosinophil % 2.5 %      Basophil % 0.1 %      Immature Grans % 1.3 (H) %      Neutrophils, Absolute 10.16 (H) 10*3/mm3      Lymphocytes, Absolute 1.19 10*3/mm3      Monocytes, Absolute 2.25 (H) 10*3/mm3      Eosinophils, Absolute 0.36 10*3/mm3      Basophils, Absolute 0.02 10*3/mm3      Immature Grans, Absolute 0.18 (H) 10*3/mm3      nRBC 0.0 /100 WBC     Body Fluid Culture - Body Fluid, Pericardium [[id number]]  (Normal) Collected:  03/10/18 0835    Specimen:  Body Fluid  from Pericardium Updated:  03/12/18 0643     BF Culture No growth at 2 days     Gram Stain Result Many (4+) Red blood cells      Rare (1+) WBCs seen      No organisms seen    Body Fluid Culture - Body Fluid, Pleural Cavity [[id number]]  (Normal) Collected:  03/08/18 1400    Specimen:  Body Fluid from Pleural Cavity Updated:  03/12/18 0639     BF Culture No growth at 4 days     Gram Stain Result Many (4+) WBCs seen      No organisms seen    Tissue / Bone Culture - Tissue, Pericardium [[id number]]  (Normal) Collected:  03/10/18 0852    Specimen:  Tissue from Pericardium Updated:  03/12/18 0635     Tissue Culture No growth at 2 days     Gram Stain Result Moderate (3+) Red blood cells      No organisms seen    POC Glucose Once [[id number]]  (Abnormal) Collected:  03/11/18 2042    Specimen:  Blood Updated:  03/11/18 2043     Glucose 235 (H) mg/dL     Narrative:       Meter: [id number] : [id number] Trevor Pearson CNA    POC Glucose Once [[id number]]  (Abnormal) Collected:  03/11/18 1616    Specimen:  Blood Updated:  03/11/18 1617     Glucose 216 (H) mg/dL     Narrative:       Meter: [id number] : [id number] Jameson Fairchild    POC Glucose Once [[id number]]  (Abnormal) Collected:  03/11/18 1213    Specimen:  Blood Updated:  03/11/18 1214     Glucose 239 (H) mg/dL     Narrative:       Meter: [id number] : [id number] Abhijeet Gaona CNA           Assessment/Plan     Principal Problem:    Pericardial effusion  Active Problems:    Essential hypertension    Primary malignant neoplasm of colon    Type 2 diabetes mellitus with complication, with long-term current use of insulin    Liver metastasis    Shortness of breath    Pleural effusion       Assessment:    Condition: In stable condition.  Improving.   (Stable post-op course.  HD stable. ).     Plan:   Encourage ambulation.  Start/continue incentive spirometry.  X-rays as ordered.  (Chest x-ray stable.  No airleak noted.  Decrease chest tube to -20 cm suction.  Repeat CXR in am.   Continue current treatment plan.  Pulmonary hygiene, incentive spirometer, increase activity, and pain management.  ).       Ting Hill, APRN  [facility]  03/12/18  10:06 AM

## 2018-03-12 NOTE — THERAPY TREATMENT NOTE
Acute Care - Physical Therapy Treatment Note  [facility]     Patient Name: Feli Del Toro  : 1950  MRN: [id number]  Today's Date: 3/12/2018  Onset of Illness/Injury or Date of Surgery: 18          Admit Date: 3/7/2018    Visit Dx:    ICD-10-CM ICD-9-CM   1. Shortness of breath R06.02 786.05   2. Pericardial effusion I31.3 423.9   3. Pleural effusion J90 511.9   4. Primary malignant neoplasm of colon C18.9 153.9   5. Difficulty walking R26.2 719.7     Patient Active Problem List   Diagnosis   • Hyperlipidemia   • Essential hypertension   • Allergic rhinitis due to pollen   • Vitamin D deficiency   • Morbid obesity due to excess calories   • Encounter for adjustment or management of vascular access device   • Primary malignant neoplasm of colon   • Iron deficiency anemia   • Breast lesion   • Peripheral neuropathy due to chemotherapy   • Nonproliferative diabetic retinopathy   • Type 2 diabetes mellitus with microalbuminuria   • Osteomyelitis of toe of left foot   • Sepsis   • Type 2 diabetes mellitus with peripheral neuropathy   • Type 2 diabetes mellitus with complication, with long-term current use of insulin   • Obesity (BMI 30-39.9)   • Liver metastasis   • Elevated liver enzymes   • Chemotherapy induced nausea and vomiting   • Shortness of breath   • Pericardial effusion   • Pleural effusion       Therapy Treatment    Therapy Treatment / Health Promotion    Treatment Time/Intention  Discipline: (P) physical therapist (Student)  Document Type: (P) therapy note (daily note)  Subjective Information: (P) complains of, pain  Reason Treatment Not Performed: patient/family declined treatment (pt requested for PT to check back in pm. About to start eating lunch at this time. )    Vitals/Pain/Safety  Vital Signs  Pre SpO2 (%): (P) 93  O2 Delivery Pre Treatment: (P) supplemental O2 (2L O2)  O2 Delivery Intra Treatment: (P) supplemental O2 (2L O2)  Post SpO2 (%): (P) 92  O2 Delivery Post Treatment: (P)  supplemental O2 (2L O2)  Pain Scale: Numbers Pre/Post-Treatment  Pain Scale: Numbers, Pretreatment: (P) 2/10  Pain Location - Side: (P) Left  Pain Location: (P) chest  Pain Intervention(s): (P) Repositioned  Pain Scale: Word Pre/Post-Treatment  Pain Location - Side: (P) Left  Pain Location: (P) chest  Pain Intervention(s): (P) Repositioned  Pain Scale: FACES Pre/Post-Treatment  Pain Location - Side: (P) Left  Pain Location: (P) chest  Pain Intervention(s): (P) Repositioned    Mobility,ADL,Motor, Modality  Bed Mobility Assessment/Treatment  Bed Mobility Assessment/Treatment: (P) sit-supine  Supine-Sit [place] (Bed Mobility): (P) not tested (Pt found sitting in chair prior to treatment)  Sit-Supine [place] (Bed Mobility): (P) minimum assist (75% patient effort)  Transfer Assessment/Treatment  Transfer Assessment/Treatment: (P) sit-stand transfer, stand-sit transfer  Sit-Stand Transfer  Sit-Stand [place] (Transfers): (P) supervision  Stand-Sit Transfer  Stand-Sit [place] (Transfers): (P) supervision  Gait/Stairs Assessment/Training  [place] Level (Gait): (P) supervision  Assistive Device (Gait): (P) walker, platform, other (see comments) (pt pushed platform walker with tubes and O2 on it)  Distance in Feet (Gait): (P) 200  Pattern (Gait): (P) step-through  Deviations/Abnormal Patterns (Gait): (P) stride length decreased                 ROM/MMT             Sensory, Edema, Orthotics          Cognition, Communication, Swallow  Speaking Valve  Pre SpO2 (%): (P) 93  Post SpO2 (%): (P) 92  General Eating/Swallowing Observations  Pre SpO2 (%): (P) 93  Post SpO2 (%): (P) 92    Outcome Summary               PT Rehab Goals     Row Name 03/11/18 1140             Bed Mobility Goal 1 (PT)    Activity/Assistive Device (Bed Mobility Goal 1, PT) bed mobility activities, all  -CH      [place] Level/Cues Needed (Bed Mobility Goal 1, PT) conditional independence  -CH      Time Frame (Bed Mobility Goal 1, PT)  1 week  -CH         Transfer Goal 1 (PT)    Activity/Assistive Device (Transfer Goal 1, PT) transfers, all  -CH      [place] Level/Cues Needed (Transfer Goal 1, PT) supervision required  -CH      Time Frame (Transfer Goal 1, PT) 1 week  -CH         Gait Training Goal 1 (PT)    [place] Level (Gait Training Goal 1, PT) supervision required  -CH      Distance (Gait Goal 1, PT) 300  -CH      Time Frame (Gait Training Goal 1, PT) 1 week  -CH        User Key  (r) = Recorded By, (t) = Taken By, (c) = Cosigned By    Initials Name Provider Type    CH Julia Sandhu, PT Physical Therapist          Physical Therapy Education     Title: PT OT SLP Therapies (Active)     Topic: Physical Therapy (Active)     Point: Mobility training (Done)    Learning Progress Summary     Learner Status Readiness Method Response Comment Documented by    Patient Done Acceptance E,D VU,DU   03/12/18 1449     Done Acceptance E,TB,D VU,NR   03/11/18 1156    Family Done Acceptance E,TB,D VU,NR   03/11/18 1156          Point: Body mechanics (Done)    Learning Progress Summary     Learner Status Readiness Method Response Comment Documented by    Patient Done Acceptance E,D VU,DU   03/12/18 1449     Done Acceptance E,TB,D VU,NR   03/11/18 1156    Family Done Acceptance E,TB,D VU,NR   03/11/18 1156          Point: Precautions (Done)    Learning Progress Summary     Learner Status Readiness Method Response Comment Documented by    Patient Done Acceptance E,D VU,DU   03/12/18 1449     Done Acceptance E,TB,D VU,NR   03/11/18 1156    Family Done Acceptance E,TB,D VU,NR   03/11/18 1156                      User Key     Initials Effective Dates Name Provider Type Discipline     12/01/15 -  Julia Sandhu, PT Physical Therapist PT    PV 03/07/18 -  Howard Hawthorne, DARIANA Student PT Student PT                    PT Recommendation and Plan  Anticipated Discharge Disposition (PT): home or self care  Planned Therapy Interventions (PT  Eval): balance training, bed mobility training, gait training, home exercise program, patient/family education, transfer training  Therapy Frequency (PT Clinical Impression): daily             Outcome Measures     Row Name 03/12/18 1400 03/11/18 1100          How much help from another person do you currently need...    Turning from your back to your side while in flat bed without using bedrails? (P)  3  -PV 3  -CH     Moving from lying on back to sitting on the side of a flat bed without bedrails? (P)  3  -PV 3  -CH     Moving to and from a bed to a chair (including a wheelchair)? (P)  3  -PV 3  -CH     Standing up from a chair using your arms (e.g., wheelchair, bedside chair)? (P)  4  -PV 3  -CH     Climbing 3-5 steps with a railing? (P)  2  -PV 2  -CH     To walk in hospital room? (P)  4  -PV 3  -CH     AM-PAC 6 Clicks Score (P)  19  -PV 17  -CH        Functional Assessment    Outcome Measure Options (P)  AM-PAC 6 Clicks Basic Mobility (PT)  -PV AM-PAC 6 Clicks Basic Mobility (PT)  -CH       User Key  (r) = Recorded By, (t) = Taken By, (c) = Cosigned By    Initials Name Provider Type     Julia Sandhu, PT Physical Therapist    BESSIE Hawthorne, PT Student PT Student           Time Calculation:         PT Charges     Row Name 03/12/18 1455 03/12/18 1135          Time Calculation    Start Time (P)  1416  -PV  --     Stop Time (P)  1435  -PV  --     Time Calculation (min) (P)  19 min  -PV  --     PT Received On (P)  03/12/18  -PV  --     PT - Next Appointment (P)  03/13/18  -PV 03/12/18  -       User Key  (r) = Recorded By, (t) = Taken By, (c) = Cosigned By    Initials Name Provider Type    RW Erica Paige, PTA Physical Therapy Assistant    BESSIE Hawthorne, PT Student PT Student          Therapy Charges for Today     Code Description Service Date Service Provider Modifiers Qty    80386281463 HC PT THER PROC EA 15 MIN 3/12/2018 Howard Hawthorne, PT Student GP 1    11639613607 HC PT THER SUPP EA 15 MIN 3/12/2018  Howard Hawthorne, PT Student GP 1          PT G-Codes  Outcome Measure Options: (P) AM-PAC 6 Clicks Basic Mobility (PT)    Howard Hawthorne, PT Student  3/12/2018

## 2018-03-12 NOTE — SIGNIFICANT NOTE
03/12/18 1135   Rehab Treatment   Discipline physical therapy assistant   Reason Treatment Not Performed patient/family declined treatment  (pt requested for PT to check back in pm. About to start eating lunch at this time. )   Recommendation   PT - Next Appointment 03/12/18

## 2018-03-12 NOTE — PLAN OF CARE
Problem: Patient Care Overview (Adult)  Goal: Plan of Care Review  Outcome: Ongoing (interventions implemented as appropriate)   03/12/18 9860   Coping/Psychosocial Response Interventions   Plan Of Care Reviewed With patient   Patient Care Overview   Progress improving   Outcome Evaluation   Outcome Summary/Follow up Plan Pt with decreased pain today and good activity tolerance. Pt was able to ambulate 200 ft with supervision. Pt has good function and mobility but has difficulty with mobility due to 3 chest tubes and an IV. Pt will continue to be seen by PT to track progress and help with mobility, function, and management of wires and tubes.

## 2018-03-13 ENCOUNTER — APPOINTMENT (OUTPATIENT)
Dept: GENERAL RADIOLOGY | Facility: HOSPITAL | Age: 68
End: 2018-03-13

## 2018-03-13 LAB
B PERT DNA SPEC QL NAA+PROBE: NOT DETECTED
BACTERIA FLD CULT: NORMAL
BACTERIA FLD CULT: NORMAL
BACTERIA SPEC AEROBE CULT: NORMAL
BASOPHILS # BLD AUTO: 0.05 10*3/MM3 (ref 0–0.2)
BASOPHILS NFR BLD AUTO: 0.4 % (ref 0–1.5)
C PNEUM DNA NPH QL NAA+NON-PROBE: NOT DETECTED
CHROMATIN AB SERPL-ACNC: 13.4 IU/ML (ref 0–14)
DEPRECATED RDW RBC AUTO: 48.5 FL (ref 37–54)
EOSINOPHIL # BLD AUTO: 0.54 10*3/MM3 (ref 0–0.7)
EOSINOPHIL NFR BLD AUTO: 4.1 % (ref 0.3–6.2)
ERYTHROCYTE [DISTWIDTH] IN BLOOD BY AUTOMATED COUNT: 14.8 % (ref 11.7–13)
FLUAV H1 2009 PAND RNA NPH QL NAA+PROBE: NOT DETECTED
FLUAV H1 HA GENE NPH QL NAA+PROBE: NOT DETECTED
FLUAV H3 RNA NPH QL NAA+PROBE: NOT DETECTED
FLUAV SUBTYP SPEC NAA+PROBE: NOT DETECTED
FLUBV RNA ISLT QL NAA+PROBE: NOT DETECTED
GLUCOSE BLDC GLUCOMTR-MCNC: 130 MG/DL (ref 70–130)
GLUCOSE BLDC GLUCOMTR-MCNC: 140 MG/DL (ref 70–130)
GLUCOSE BLDC GLUCOMTR-MCNC: 201 MG/DL (ref 70–130)
GLUCOSE BLDC GLUCOMTR-MCNC: 95 MG/DL (ref 70–130)
GRAM STN SPEC: NORMAL
HADV DNA SPEC NAA+PROBE: NOT DETECTED
HCOV 229E RNA SPEC QL NAA+PROBE: NOT DETECTED
HCOV HKU1 RNA SPEC QL NAA+PROBE: NOT DETECTED
HCOV NL63 RNA SPEC QL NAA+PROBE: NOT DETECTED
HCOV OC43 RNA SPEC QL NAA+PROBE: NOT DETECTED
HCT VFR BLD AUTO: 33.4 % (ref 35.6–45.5)
HGB BLD-MCNC: 10.9 G/DL (ref 11.9–15.5)
HMPV RNA NPH QL NAA+NON-PROBE: NOT DETECTED
HPIV1 RNA SPEC QL NAA+PROBE: NOT DETECTED
HPIV2 RNA SPEC QL NAA+PROBE: NOT DETECTED
HPIV3 RNA NPH QL NAA+PROBE: NOT DETECTED
HPIV4 P GENE NPH QL NAA+PROBE: NOT DETECTED
IMM GRANULOCYTES # BLD: 0.22 10*3/MM3 (ref 0–0.03)
IMM GRANULOCYTES NFR BLD: 1.7 % (ref 0–0.5)
LYMPHOCYTES # BLD AUTO: 2.03 10*3/MM3 (ref 0.9–4.8)
LYMPHOCYTES NFR BLD AUTO: 15.3 % (ref 19.6–45.3)
M PNEUMO IGG SER IA-ACNC: NOT DETECTED
MCH RBC QN AUTO: 29.6 PG (ref 26.9–32)
MCHC RBC AUTO-ENTMCNC: 32.6 G/DL (ref 32.4–36.3)
MCV RBC AUTO: 90.8 FL (ref 80.5–98.2)
MONOCYTES # BLD AUTO: 1.91 10*3/MM3 (ref 0.2–1.2)
MONOCYTES NFR BLD AUTO: 14.4 % (ref 5–12)
NEUTROPHILS # BLD AUTO: 8.56 10*3/MM3 (ref 1.9–8.1)
NEUTROPHILS NFR BLD AUTO: 64.1 % (ref 42.7–76)
PLATELET # BLD AUTO: 520 10*3/MM3 (ref 140–500)
PMV BLD AUTO: 8.7 FL (ref 6–12)
PROCALCITONIN SERPL-MCNC: 0.21 NG/ML (ref 0.1–0.25)
RBC # BLD AUTO: 3.68 10*6/MM3 (ref 3.9–5.2)
RHINOVIRUS RNA SPEC NAA+PROBE: NOT DETECTED
RSV RNA NPH QL NAA+NON-PROBE: NOT DETECTED
T4 FREE SERPL-MCNC: 1.36 NG/DL (ref 0.93–1.7)
TSH SERPL DL<=0.05 MIU/L-ACNC: 1.08 MIU/ML (ref 0.27–4.2)
WBC NRBC COR # BLD: 13.31 10*3/MM3 (ref 4.5–10.7)

## 2018-03-13 PROCEDURE — 85025 COMPLETE CBC W/AUTO DIFF WBC: CPT | Performed by: HOSPITALIST

## 2018-03-13 PROCEDURE — 87486 CHLMYD PNEUM DNA AMP PROBE: CPT | Performed by: HOSPITALIST

## 2018-03-13 PROCEDURE — 86200 CCP ANTIBODY: CPT | Performed by: HOSPITALIST

## 2018-03-13 PROCEDURE — 86481 TB AG RESPONSE T-CELL SUSP: CPT | Performed by: HOSPITALIST

## 2018-03-13 PROCEDURE — 82962 GLUCOSE BLOOD TEST: CPT

## 2018-03-13 PROCEDURE — 25010000002 HEPARIN (PORCINE) PER 1000 UNITS: Performed by: THORACIC SURGERY (CARDIOTHORACIC VASCULAR SURGERY)

## 2018-03-13 PROCEDURE — 99233 SBSQ HOSP IP/OBS HIGH 50: CPT | Performed by: INTERNAL MEDICINE

## 2018-03-13 PROCEDURE — 99223 1ST HOSP IP/OBS HIGH 75: CPT | Performed by: INTERNAL MEDICINE

## 2018-03-13 PROCEDURE — 87633 RESP VIRUS 12-25 TARGETS: CPT | Performed by: HOSPITALIST

## 2018-03-13 PROCEDURE — 87581 M.PNEUMON DNA AMP PROBE: CPT | Performed by: HOSPITALIST

## 2018-03-13 PROCEDURE — 99024 POSTOP FOLLOW-UP VISIT: CPT | Performed by: NURSE PRACTITIONER

## 2018-03-13 PROCEDURE — 87798 DETECT AGENT NOS DNA AMP: CPT | Performed by: HOSPITALIST

## 2018-03-13 PROCEDURE — 25010000002 HEPARIN FLUSH (PORCINE) 100 UNIT/ML SOLUTION: Performed by: INTERNAL MEDICINE

## 2018-03-13 PROCEDURE — 63710000001 INSULIN ASPART PER 5 UNITS: Performed by: HOSPITALIST

## 2018-03-13 PROCEDURE — 99232 SBSQ HOSP IP/OBS MODERATE 35: CPT | Performed by: INTERNAL MEDICINE

## 2018-03-13 PROCEDURE — 71045 X-RAY EXAM CHEST 1 VIEW: CPT

## 2018-03-13 PROCEDURE — 97110 THERAPEUTIC EXERCISES: CPT

## 2018-03-13 RX ADMIN — FERROUS SULFATE TAB 325 MG (65 MG ELEMENTAL FE) 325 MG: 325 (65 FE) TAB at 08:24

## 2018-03-13 RX ADMIN — Medication 10 ML: at 12:54

## 2018-03-13 RX ADMIN — DOCUSATE SODIUM -SENNOSIDES 2 TABLET: 50; 8.6 TABLET, COATED ORAL at 21:00

## 2018-03-13 RX ADMIN — DOCUSATE SODIUM 100 MG: 100 CAPSULE, LIQUID FILLED ORAL at 22:13

## 2018-03-13 RX ADMIN — DOCUSATE SODIUM 100 MG: 100 CAPSULE, LIQUID FILLED ORAL at 08:24

## 2018-03-13 RX ADMIN — Medication 10 ML: at 22:14

## 2018-03-13 RX ADMIN — Medication 500 UNITS: at 21:01

## 2018-03-13 RX ADMIN — LINAGLIPTIN 5 MG: 5 TABLET, FILM COATED ORAL at 08:24

## 2018-03-13 RX ADMIN — Medication 500 UNITS: at 12:54

## 2018-03-13 RX ADMIN — Medication 400 MG: at 08:24

## 2018-03-13 RX ADMIN — HEPARIN SODIUM 5000 UNITS: 5000 INJECTION, SOLUTION INTRAVENOUS; SUBCUTANEOUS at 05:46

## 2018-03-13 RX ADMIN — METOPROLOL TARTRATE 25 MG: 25 TABLET ORAL at 08:23

## 2018-03-13 RX ADMIN — HYDROCHLOROTHIAZIDE 12.5 MG: 12.5 CAPSULE, GELATIN COATED ORAL at 08:23

## 2018-03-13 RX ADMIN — HEPARIN SODIUM 5000 UNITS: 5000 INJECTION, SOLUTION INTRAVENOUS; SUBCUTANEOUS at 15:59

## 2018-03-13 RX ADMIN — INSULIN ASPART 3 UNITS: 100 INJECTION, SOLUTION INTRAVENOUS; SUBCUTANEOUS at 22:13

## 2018-03-13 RX ADMIN — METOPROLOL TARTRATE 25 MG: 25 TABLET ORAL at 20:58

## 2018-03-13 RX ADMIN — HEPARIN SODIUM 5000 UNITS: 5000 INJECTION, SOLUTION INTRAVENOUS; SUBCUTANEOUS at 22:13

## 2018-03-13 RX ADMIN — LOSARTAN POTASSIUM 100 MG: 100 TABLET ORAL at 08:24

## 2018-03-13 RX ADMIN — ALLOPURINOL 100 MG: 100 TABLET ORAL at 08:24

## 2018-03-13 NOTE — PLAN OF CARE
Problem: Patient Care Overview (Adult)  Goal: Adult Individualization and Mutuality  Outcome: Ongoing (interventions implemented as appropriate)      Problem: Diabetes, Type 2 (Adult)  Goal: Signs and Symptoms of Listed Potential Problems Will be Absent or Manageable (Diabetes, Type 2)  Outcome: Ongoing (interventions implemented as appropriate)   03/13/18 1656   Diabetes, Type 2   Problems Assessed (Type 2 Diabetes) all   Problems Present (Type 2 Diabetes) impaired glycemic control       Problem: Patient Care Overview  Goal: Interprofessional Rounds/Family Conf  Outcome: Ongoing (interventions implemented as appropriate)   03/13/18 1656   Interdisciplinary Rounds/Family Conf   Summary vss, pain controlled with onqpump, dilaudid pca discontinued, denies need for oral pain medication,ct placed to waterseal, chest xray in am,resp panel neg, labs pending for elevated wbc, infectious dz following. will monitor    Participants nursing       Problem: Skin Injury Risk (Adult)  Goal: Identify Related Risk Factors and Signs and Symptoms  Outcome: Ongoing (interventions implemented as appropriate)   03/13/18 1656   Skin Injury Risk (Adult)   Related Risk Factors (Skin Injury Risk) medical devices;mobility impaired       Problem: Fall Risk (Adult)  Goal: Identify Related Risk Factors and Signs and Symptoms  Outcome: Ongoing (interventions implemented as appropriate)   03/13/18 1656   Fall Risk (Adult)   Related Risk Factors (Fall Risk) environment unfamiliar   Signs and Symptoms (Fall Risk) presence of risk factors

## 2018-03-13 NOTE — PROGRESS NOTES
"    Chief Complaint: Postoperative management  S/P: BRONCHOSCOPY, LEFT THORACOSCOPY VIDEO ASSISTED WITH PERICARDIAL WINDOW, SUBPLEURAL CATHETERS FOR PAIN CONTROL  POD # 3    Subjective:  Symptoms:  Stable.  She reports cough.  No shortness of breath or chest pain.    Diet:  Adequate intake.  No nausea or vomiting.    Activity level: Returning to normal.    Pain:  She reports no pain.  (Has not been using PCA).       Vital Signs:  Temp:  [97.8 °F (36.6 °C)-98.5 °F (36.9 °C)] 98.5 °F (36.9 °C)  Heart Rate:  [82-91] 82  Resp:  [18-20] 20  BP: (100-141)/(63-79) 127/68    Intake & Output (last day)       03/12 0701 - 03/13 0700 03/13 0701 - 03/14 0700    P.O.      I.V. (mL/kg) 242 (2.1)     Total Intake(mL/kg) 242 (2.1)     Urine (mL/kg/hr) 2950 (1.1)     Drains 50 (0)     Stool 0 (0)     Chest Tube 124 (0)     Total Output 3124      Net -2882            Unmeasured Stool Occurrence 1 x           Objective:  General Appearance:  Comfortable and in no acute distress.    Vital signs: (most recent): Blood pressure 127/68, pulse 82, temperature 98.5 °F (36.9 °C), temperature source Oral, resp. rate 20, height 175.3 cm (69\"), weight 114 kg (252 lb 6.4 oz), SpO2 95 %, not currently breastfeeding.  Vital signs are normal.  No fever.    Lungs:  Normal effort and normal respiratory rate.    Heart: Normal rate.  Regular rhythm.    Abdomen: Abdomen is soft and non-distended.    Extremities: Normal range of motion.  There is no dependent edema.    Neurological: Patient is alert and oriented to person, place and time.    Skin:  Warm and dry.  (Incision sites are C/D/I.  )            Chest tube:   Site: Left, Clean, Dry and Intact  Suction: -20 cm  Air Leak: negative  Level: 450/130/290  24 Hour Total: 100/24    Pericardial drain:  50    Results Review:     I reviewed the patient's new clinical results.  I reviewed the patient's new imaging results and agree with the interpretation.    Imaging Results (last 24 hours)     Procedure " Component Value Units Date/Time    XR Chest 1 View [[id number]] Collected:  03/13/18 0825     Updated:  03/13/18 0825    Narrative:       CLINICAL HISTORY: 67-year-old female 3 days postop left video-assisted  thoracoscopy with pericardial window.     PORTABLE AP ERECT CHEST DATED 03/13/2018 AT 0533 HOURS     FINDINGS: When compared to the exam dated 03/12/2018 at 0556 hours, a  subcentimeter trace of left apical pneumothorax may persist but is not  as clearly demonstrated. There are again 3 left chest tubes present.  Left subclavian port catheter terminating in superior vena cava  distribution remains. Cardiac silhouette remains top normal to mildly  enlarged caliber. Some patchy to discoid right basilar and left mid and  lower lung opacity remain. No acute change in bony thorax is seen with  degenerative change noted in the shoulders and spine. Monitoring lead  wires are present. Probable oxygen cannula tubing at the apical left  chest and lower neck is noted. Correlate clinically.     CONCLUSION: Similar appearance of the heart and lungs. Possible minimal  trace left apical pneumothorax, but without demonstration of a large  pneumothorax. The 3 left chest tubes remain.       XR Chest 1 View [[id number]] Collected:  03/12/18 0810     Updated:  03/12/18 0956    Narrative:       CLINICAL HISTORY: 67-year-old female 2 days postop left video-assisted  thoracoscopy with pericardial window. Follow-up with left chest tube.     PORTABLE AP ERECT CHEST DATED 03/12/2018 AT 0556 HOURS     FINDINGS: When compared to the most recent available prior chest  radiograph dated 03/11/2018 at 0539 hours, there are similar lung  volumes with persistent patchy to dense bibasilar atelectasis or  infiltrate. Right costophrenic angle is sharp. Left costophrenic angle  is minimally blunted. Left chest tube remains. Subcutaneous air in the  left lower neck and a trace of 2-3 mm apical to superolateral left  pneumothorax are present. Cardiac  silhouette remains enlarged.  Degenerative changes in the spine and shoulders are again demonstrated.  Oxygen cannula tubing is present about the neck and left supraclavicular  area. Left subclavian port catheter again terminates in the distribution  of superior vena cava. Monitoring lead wires are present.     CONCLUSION: Persistent bibasilar atelectasis or infiltrate with similar  lung volumes. Small left apical pneumothorax as described. Left chest  tube remains.     This report was finalized on 3/12/2018 9:53 AM by Dr. Reji Zavala MD.             Lab Results:     Lab Results (last 24 hours)     Procedure Component Value Units Date/Time    Tissue / Bone Culture - Tissue, Pericardium [[id number]]  (Normal) Collected:  03/10/18 0852    Specimen:  Tissue from Pericardium Updated:  03/13/18 0833     Tissue Culture No growth at 3 days     Gram Stain Result Moderate (3+) Red blood cells      No organisms seen    Body Fluid Culture - Body Fluid, Pericardium [[id number]]  (Normal) Collected:  03/10/18 0835    Specimen:  Body Fluid from Pericardium Updated:  03/13/18 0831     BF Culture No growth at 3 days     Gram Stain Result Many (4+) Red blood cells      Rare (1+) WBCs seen      No organisms seen    Body Fluid Culture - Body Fluid, Pleural Cavity [[id number]]  (Normal) Collected:  03/08/18 1400    Specimen:  Body Fluid from Pleural Cavity Updated:  03/13/18 0823     BF Culture No growth at 5 days     Gram Stain Result Many (4+) WBCs seen      No organisms seen    POC Glucose Once [[id number]]  (Normal) Collected:  03/13/18 0757    Specimen:  Blood Updated:  03/13/18 0759     Glucose 95 mg/dL     Narrative:       Meter: [id number] : [id number] Milly BARRIGA    POC Glucose Once [[id number]]  (Abnormal) Collected:  03/12/18 2051    Specimen:  Blood Updated:  03/12/18 2057     Glucose 163 (H) mg/dL     Narrative:       Meter: [id number] : [id number] Pasquale Hurt CNA    POC Glucose Once [[id number]]  (Abnormal)  "Collected:  03/12/18 1638    Specimen:  Blood Updated:  03/12/18 1639     Glucose 212 (H) mg/dL     Narrative:       Meter: [id number] : [id number] Silverio BARRIGA    Tissue Pathology Exam [[id number]] Collected:  03/10/18 0843    Specimen:  Tissue from Pericardium Updated:  03/12/18 1448     Case Report --     Surgical Pathology Report                         Case: [id number]                                  Authorizing Provider:  Nomi Patel III, MD   Collected:           03/10/2018 08:43 AM          Ordering Location:     [facility]  Received:            03/10/2018 10:59 AM                                 MAIN OR                                                                      Pathologist:           Timothy Loco MD                                                                           Specimen:    Pericardium                                                                                 Final Diagnosis --     PERICARDIUM:   PERICARDIUM WITH MARKED MIXED ACUTE AND CHRONIC INFLAMMATION, ORGANIZING    HEMORRHAGE.    COMMENT: Tumor and granuloma are not identified.    CMK/pkm  IHC/THM    CPT CODES:  1. 42018         Gross Description --     Received in formalin designated \"pericardium\" are two sheet-like portions of gray white pink hemorrhagic tissue consistent with pericardium.  They are 20 and 30 mm in greatest dimension. The Each is up to 6 mm in thickness.  The smaller is sectioned and submitted A and B and the larger is submitted in C - E.  CMK/pkm         Microscopic Description --     Performed, incorporated in diagnosis.       Embedded Images --    Non-gynecologic Cytology [[id number]] Collected:  03/10/18 0835    Specimen:  Body Fluid from Pericardium Updated:  03/12/18 130     Case Report --     Non-gynecologic Cytology                          Case: [id number]                                "   Authorizing Provider:  Nomi Patel III, MD   Collected:           03/10/2018 08:35 AM          Ordering Location:     [facility]  Received:            03/10/2018 10:58 AM                                 MAIN OR                                                                      Pathologist:           Hitesh Sherwood MD                                                        Specimen:    Pericardium, PERICARDIAL FLUID                                                              Final Diagnosis --     1. PERICARDIAL FLUID (THIN PREP AND CELL BLOCK):   PREDOMINANTLY BLOOD.   SCATTERED LYMPHOCYTES.    COMMENT: Correlation with flow cytometric results is recommended.    MALDONADO/pkm    CPT CODES:  1. 54993, 47551         Gross Description --     17 ML DARK RED THIN FLUID WITH CLOT.1 THIN PREP, 1 CELL BLOCK. CELL BLOCK PLACED IN FORMALIN  @11:10.       Microscopic Description --     Performed, incorporated in diagnosis.       Embedded Images --    Non-gynecologic Cytology [[id number]] Collected:  03/08/18 1400    Specimen:  Body Fluid from Pleural Cavity Updated:  03/12/18 1305     Case Report --     Non-gynecologic Cytology                          Case: [id number]                                  Authorizing Provider:  Andrew Dobbins MD         Collected:           03/08/2018 02:00 PM          Ordering Location:     [facility]  Received:            03/08/2018 04:08 PM                                 [address] [place] [address]                                                                  Pathologist:           Hitesh Sherwood MD                                                        Specimens:   1) - Pleural Cavity, Lt. pleural - 10 mL serous                                                     2) - Pleural Cavity, Lt. pleural - 60 mL bloody                                             Final Diagnosis --     1. LEFT PLEURAL FLUID (THIN PREP AND CELL BLOCK):   MESOTHELIAL CELLS AND PROTEINACEOUS  DEBRIS.    2. LEFT PLEURAL FLUID (THIN PREP AND CELL BLOCK):   MESOTHELIAL CELLS WITH REACTIVE CHANGE.   PROTEINACEOUS DEBRIS, HISTIOCYTES, AND MIXED LEUKOCYTES.    COMMENT: Correlation with flow cytometric results is recommended.    MALDONADO/pkm    CPT CODES:  1. 32231, 01435  2. 76992, 98696         Comment --     Spoke w/ Dr. Dobbins on 3/9/18. He confirmed this was a pleural fluid, not a pericardial fluid. - RG       Gross Description --     #1 - 1 syringe submitted w/ 1ml of yellow fluid. 1 thin prep, and 1 cellblock. In formalin @ 14:10.    #2 - 1 syringe submitted w/ 10 ml of dark red fluid. 1 thin prep, and 1 cellblock. In formalin @ 14:10.       Microscopic Description --     Performed, incorporated in diagnosis.       Embedded Images --    POC Glucose Once [[id number]]  (Abnormal) Collected:  03/12/18 1122    Specimen:  Blood Updated:  03/12/18 1123     Glucose 235 (H) mg/dL     Narrative:       Meter: [id number] : [id number] Silverio BARRIGA           Assessment/Plan     Principal Problem:    Pericardial effusion  Active Problems:    Essential hypertension    Primary malignant neoplasm of colon    Type 2 diabetes mellitus with complication, with long-term current use of insulin    Liver metastasis    Shortness of breath    Pleural effusion       Assessment:    Condition: In stable condition.  Improving.   (Stable post-op course.  HD stable. ).     Plan:   Encourage ambulation.  Start/continue incentive spirometry.  X-rays as ordered.  (CXR stable.  No air leak.  Chest tube to water seal today.  Repeat CXR in am. D/C PCA.  Pain control with po meds. Continue current treatment plan.  Pulmonary hygiene, incentive spirometer, increase activity, and pain management.  D/W Dr. Mullins and updated on current recommendations.  ).       Ting Hill, APRN  [facility]  03/13/18  9:47 AM

## 2018-03-13 NOTE — THERAPY TREATMENT NOTE
Acute Care - Physical Therapy Treatment Note  [facility]     Patient Name: Feli Del Toro  : 1950  MRN: [id number]  Today's Date: 3/13/2018  Onset of Illness/Injury or Date of Surgery: 18          Admit Date: 3/7/2018    Visit Dx:    ICD-10-CM ICD-9-CM   1. Shortness of breath R06.02 786.05   2. Pericardial effusion I31.3 423.9   3. Pleural effusion J90 511.9   4. Primary malignant neoplasm of colon C18.9 153.9   5. Difficulty walking R26.2 719.7     Patient Active Problem List   Diagnosis   • Hyperlipidemia   • Essential hypertension   • Allergic rhinitis due to pollen   • Vitamin D deficiency   • Morbid obesity due to excess calories   • Encounter for adjustment or management of vascular access device   • Primary malignant neoplasm of colon   • Iron deficiency anemia   • Breast lesion   • Peripheral neuropathy due to chemotherapy   • Nonproliferative diabetic retinopathy   • Type 2 diabetes mellitus with microalbuminuria   • Osteomyelitis of toe of left foot   • Sepsis   • Type 2 diabetes mellitus with peripheral neuropathy   • Type 2 diabetes mellitus with complication, with long-term current use of insulin   • Obesity (BMI 30-39.9)   • Liver metastasis   • Elevated liver enzymes   • Chemotherapy induced nausea and vomiting   • Shortness of breath   • Pericardial effusion   • Pleural effusion       Therapy Treatment    Therapy Treatment / Health Promotion    Treatment Time/Intention  Discipline: physical therapy assistant  Document Type: therapy note (daily note)  Subjective Information: no complaints  Therapy Frequency (PT Clinical Impression): 3 times/wk  Patient Effort: good  Plan of Care Review  Plan of Care Reviewed With: patient    Vitals/Pain/Safety  Vital Signs  Pre SpO2 (%): 92  O2 Delivery Pre Treatment: room air  Intra SpO2 (%): 84 (after ambulation)  O2 Delivery Intra Treatment: room air  Post SpO2 (%): 93  O2 Delivery Post Treatment: room air  Recovery Time: 1 minute  Pain Scale:  Numbers Pre/Post-Treatment  Pain Scale: Numbers, Pretreatment: 0/10 - no pain  Safety Issues, Functional Mobility  Impairments Affecting Function (Mobility): endurance/activity tolerance, strength  Positioning and Restraints  Pre-Treatment Position: sitting in chair/recliner  Post Treatment Position: chair  In Chair: reclined, call light within reach, encouraged to call for assist, notified nsg    Mobility,ADL,Motor, Modality  Bed Mobility Assessment/Treatment  Supine-Sit [place] (Bed Mobility): not tested  Sit-Supine [place] (Bed Mobility): not tested  Comment (Bed Mobility): Pt up in chair  Transfer Assessment/Treatment  Transfer Assessment/Treatment: sit-stand transfer, stand-sit transfer  Sit-Stand Transfer  Sit-Stand [place] (Transfers): supervision  Assistive Device (Sit-Stand Transfers): walker, front-wheeled  Stand-Sit Transfer  Stand-Sit [place] (Transfers): supervision  Assistive Device (Stand-Sit Transfers): walker, front-wheeled  Gait/Stairs Assessment/Training  [place] Level (Gait): supervision  Assistive Device (Gait): walker, front-wheeled  Distance in Feet (Gait): 200  Pattern (Gait): swing-through  Deviations/Abnormal Patterns (Gait): bilateral deviations, bang decreased, stride length decreased  Comment (Gait/Stairs): Pt c/o dyspnea and increased fatigue w/ ambulation                 ROM/MMT             Sensory, Edema, Orthotics          Cognition, Communication, Swallow  Cognitive Assessment/Intervention- PT/OT  Orientation Status (Cognition): oriented x 4  Follows Commands (Cognition): WNL  Personal Safety Interventions: fall prevention program maintained, nonskid shoes/slippers when out of bed  Speaking Valve  Pre SpO2 (%): 92  Post SpO2 (%): 93  General Eating/Swallowing Observations  Pre SpO2 (%): 92  Post SpO2 (%): 93    Outcome Summary               PT Rehab Goals     Row Name 03/11/18 7881             Bed Mobility Goal 1 (PT)    Activity/Assistive Device (Bed  Mobility Goal 1, PT) bed mobility activities, all  -CH      Andrew Level/Cues Needed (Bed Mobility Goal 1, PT) conditional independence  -CH      Time Frame (Bed Mobility Goal 1, PT) 1 week  -CH         Transfer Goal 1 (PT)    Activity/Assistive Device (Transfer Goal 1, PT) transfers, all  -CH      [place] Level/Cues Needed (Transfer Goal 1, PT) supervision required  -CH      Time Frame (Transfer Goal 1, PT) 1 week  -CH         Gait Training Goal 1 (PT)    [place] Level (Gait Training Goal 1, PT) supervision required  -CH      Distance (Gait Goal 1, PT) 300  -CH      Time Frame (Gait Training Goal 1, PT) 1 week  -CH        User Key  (r) = Recorded By, (t) = Taken By, (c) = Cosigned By    Initials Name Provider Type    KARRIE Sandhu, PT Physical Therapist          Physical Therapy Education     Title: PT OT SLP Therapies (Active)     Topic: Physical Therapy (Active)     Point: Mobility training (Done)    Learning Progress Summary     Learner Status Readiness Method Response Comment Documented by    Patient Done Acceptance E,TB,D VU   03/13/18 1329     Done Acceptance E,D VU,DU   03/12/18 1449     Done Acceptance E,TB,D VU,NR   03/11/18 1156    Family Done Acceptance E,TB,D VU,NR   03/11/18 1156          Point: Body mechanics (Done)    Learning Progress Summary     Learner Status Readiness Method Response Comment Documented by    Patient Done Acceptance E,TB,D VU   03/13/18 1329     Done Acceptance E,D VU,DU  PV 03/12/18 1449     Done Acceptance E,TB,D VU,NR   03/11/18 1156    Family Done Acceptance E,TB,D VU,NR   03/11/18 1156          Point: Precautions (Done)    Learning Progress Summary     Learner Status Readiness Method Response Comment Documented by    Patient Done Acceptance E,TB,D VU   03/13/18 1329     Done Acceptance E,D VU,DU  PV 03/12/18 1449     Done Acceptance E,TB,D VU,NR   03/11/18 1156    Family Done Acceptance E,TB,D VU,NR   03/11/18 1156                       User Key     Initials Effective Dates Name Provider Type Discipline     12/01/15 -  Julia Sandhu, PT Physical Therapist PT    RW 03/07/18 -  Erica Paige PTA Physical Therapy Assistant PT    PV 03/07/18 -  Howard Hawthorne, PT Student PT Student PT                    PT Recommendation and Plan  Anticipated Discharge Disposition (PT): home or self care  Planned Therapy Interventions (PT Eval): balance training, bed mobility training, gait training, home exercise program, patient/family education, transfer training  Therapy Frequency (PT Clinical Impression): 3 times/wk  Plan of Care Reviewed With: patient  Outcome Summary: Pt tolerated mobility well. Decreased O2 sats w/ ambulation, but quickly recovered w/ seated rest. Encouraged mobility w/ nsg staff. Pt requested use of RW for safety, but will attempt w/o AD next session. Will change frequency to 3-5x per week as pt is close to baseline at this time. Discussed w/ PT.           Outcome Measures     Row Name 03/13/18 1320 03/12/18 1400 03/11/18 1100       How much help from another person do you currently need...    Turning from your back to your side while in flat bed without using bedrails? 3  -RW 3  -KH (r) PV (t) KH (c) 3  -CH    Moving from lying on back to sitting on the side of a flat bed without bedrails? 3  -RW 3  -KH (r) PV (t) KH (c) 3  -CH    Moving to and from a bed to a chair (including a wheelchair)? 4  -RW 3  -KH (r) PV (t) KH (c) 3  -CH    Standing up from a chair using your arms (e.g., wheelchair, bedside chair)? 4  -RW 4  -KH (r) PV (t) KH (c) 3  -CH    Climbing 3-5 steps with a railing? 3  -RW 2  -KH (r) PV (t) KH (c) 2  -CH    To walk in hospital room? 4  -RW 4  -KH (r) PV (t) KH (c) 3  -CH    AM-PAC 6 Clicks Score 21  -RW 19  -KH (r) PV (t) 17  -CH       Functional Assessment    Outcome Measure Options AM-PAC 6 Clicks Basic Mobility (PT)  -RW AM-PAC 6 Clicks Basic Mobility (PT)  -KH (r) PV (t) KH (c) AM-PAC 6 Clicks Basic Mobility (PT)   -      User Key  (r) = Recorded By, (t) = Taken By, (c) = Cosigned By    Initials Name Provider Type     Louise Barraza, PT Physical Therapist    CH Julia Sandhu, PT Physical Therapist    RW Erica Paige PTA Physical Therapy Assistant    BESSIE Hawthorne, PT Student PT Student           Time Calculation:         PT Charges     Row Name 03/13/18 1326             Time Calculation    Start Time 1313  -RW      Stop Time 1325  -RW      Time Calculation (min) 12 min  -RW      PT Received On 03/13/18  -RW      PT - Next Appointment 03/15/18  -        User Key  (r) = Recorded By, (t) = Taken By, (c) = Cosigned By    Initials Name Provider Type    RW Erica Paige PTA Physical Therapy Assistant          Therapy Charges for Today     Code Description Service Date Service Provider Modifiers Qty    34904524741 HC PT THER PROC EA 15 MIN 3/13/2018 Erica Paige PTA GP 1          PT G-Codes  Outcome Measure Options: AM-PAC 6 Clicks Basic Mobility (PT)    Erica Paige PTA  3/13/2018

## 2018-03-13 NOTE — PROGRESS NOTES
"        REASON FOR FOLLOWUP/CHIEF COMPLAINT:  Colon cancer    HISTORY OF PRESENT ILLNESS:   Nothing new overnight.  Eating some.  Glad her biopsy is negative.    Past Medical History, Past Surgical History, Social History, Family History have been reviewed and are without significant changes except as mentioned.    Review of Systems   Review of Systems   Constitutional: Negative for activity change.   HENT: Negative for nosebleeds and trouble swallowing.    Respiratory: Negative for shortness of breath and wheezing.    Cardiovascular: Negative for chest pain and palpitations.   Gastrointestinal: Negative for constipation, diarrhea and nausea.   Genitourinary: Negative for dysuria and hematuria.   Musculoskeletal: Negative for arthralgias and myalgias.   Skin: Negative for rash and wound.   Neurological: Negative for seizures and syncope.   Hematological: Negative for adenopathy. Does not bruise/bleed easily.   Psychiatric/Behavioral: Negative for confusion.       Medications:  The current medication list was reviewed in the EMR    ALLERGIES:    Allergies   Allergen Reactions   • Compazine [Prochlorperazine Edisylate] Other (See Comments)     Complete arch in the back    • Prochlorperazine Maleate Other (See Comments)     \"BACK MUSCLE RETRACTION\"              Vitals:    03/12/18 1505 03/12/18 1900 03/12/18 2225 03/13/18 0700   BP: 141/73 133/79 100/63 127/68   BP Location: Left arm Left arm Left arm Left arm   Patient Position: Lying Lying Lying Lying   Pulse: 90 91 82    Resp: 18 18 18 20   Temp: 97.8 °F (36.6 °C) 98 °F (36.7 °C) 98.4 °F (36.9 °C) 98.5 °F (36.9 °C)   TempSrc: Oral Oral Oral Oral   SpO2: 97% 96% 95%    Weight:       Height:         Physical Exam    CONSTITUTIONAL:  Vital signs reviewed.  No distress, looks comfortable.  EYES:  Conjunctiva and lids unremarkable.  PERRLA  EARS,NOSE,MOUTH,THROAT:  Ears and nose appear unremarkable.  Lips, teeth, gums appear unremarkable.  RESPIRATORY:  Normal " respiratory effort.  Lungs clear to auscultation bilaterally.  CARDIOVASCULAR:  Normal S1, S2.  No murmurs rubs or gallops.  No significant lower extremity edema.  GASTROINTESTINAL: Abdomen appears unremarkable.  Nontender.  No hepatomegaly.  No splenomegaly.  NEURO: cranial nerves 2-12 grossly intact.  No focal deficits.  Appears to have equal strength all 4 extremities.  MUSCULOSKELETAL:  Unremarkable digits/nails.  No cyanosis or clubbing.  SKIN:  Warm.  No rashes.  PSYCHIATRIC:  Normal judgment and insight.  Normal mood and affect.            RECENT LABS:  WBC   Date Value Ref Range Status   03/12/2018 14.16 (H) 4.50 - 10.70 10*3/mm3 Final   03/11/2018 14.70 (H) 4.50 - 10.70 10*3/mm3 Final     Hemoglobin   Date Value Ref Range Status   03/12/2018 10.7 (L) 11.9 - 15.5 g/dL Final   03/11/2018 10.4 (L) 11.9 - 15.5 g/dL Final     Platelets   Date Value Ref Range Status   03/12/2018 505 (H) 140 - 500 10*3/mm3 Final   03/11/2018 484 140 - 500 10*3/mm3 Final                 ASSESSMENT/PLAN:     *Metastatic colon cancer.  Metastasis to liver.  FOLFOX ×12 cycles, then resection of primary tumor and metastatic to me and splenectomy with thermal ablation of liver lesions January 2014.  Subsequent FOLFIRI ×12, completed 8/7/14.  New liver lesion 5/28/15.  Subsequent CyberKnife radiation completed 7/14/15.  New liver lesion 5/5/17.  Microwave ablation 6/16/17.  Complete response.  FOLFIRI ×12 completed 11/29/17.  Appears to remain in remission from cancer.       *Pericardial/pleural effusion (loculated).  Left VATS, decortication, pericardial window 3/10/18.  Pathology negative for malignancy.     *Leukocytosis.  Mild.  Probably related to recent surgery.    *Anemia.  Suspect this is anemia of inflammation (recent surgery).    *Thrombocytosis.  Suspect this is reactive due to recent surgery.  No signs of an underlying hematologic disorder.    Plan  · Keep already scheduled appointment with Dr. Brown in our office  4/11/18.    Case discussed with Dr. Mullins.     I don't think I'm adding much at this point.  Will sign off.  Please call if I can be of any further assistance.  Thanks for the opportunity to help.

## 2018-03-13 NOTE — PLAN OF CARE
Problem: Respiratory Insufficiency (Adult)  Goal: Acid/Base Balance  Outcome: Ongoing (interventions implemented as appropriate)    Goal: Effective Ventilation  Outcome: Ongoing (interventions implemented as appropriate)      Problem: Patient Care Overview  Goal: Plan of Care Review  Outcome: Ongoing (interventions implemented as appropriate)   03/13/18 7997   Coping/Psychosocial   Plan of Care Reviewed With patient   Plan of Care Review   Progress improving   OTHER   Outcome Summary VSS, PCA for pain control but not using it much, urine is dark, encourage fluids, 3 chest tubes to -20cm suction, will continue to montior.

## 2018-03-13 NOTE — PROGRESS NOTES
[facility]     LOS: 6 days     Subjective:  No Chest pain  soa is better  Appetite is not great but eating some  Some coughing    Objective:    Vital Signs:  Temp:  [97.8 °F (36.6 °C)-98.5 °F (36.9 °C)] 98.5 °F (36.9 °C)  Heart Rate:  [82-91] 82  Resp:  [18-20] 20  BP: (100-141)/(63-79) 127/68    SpO2:  [93 %-97 %] 95 %  on  Flow (L/min):  [1-2] 1;   Device (Oxygen Therapy): nasal cannula  Body mass index is 37.27 kg/m².    Physical Exam   Constitutional: She is oriented to person, place, and time. She appears well-developed and well-nourished.   HENT:   Head: Normocephalic and atraumatic.   Eyes: EOM are normal. Pupils are equal, round, and reactive to light.   Neck: Normal range of motion. Neck supple. No tracheal deviation present. No thyromegaly present.   Cardiovascular: Normal rate and regular rhythm.  Exam reveals no gallop and no friction rub.    No murmur heard.  Pulmonary/Chest: Effort normal and breath sounds normal. No respiratory distress. She has no wheezes.   Anteriorly lungs clear, tubes cover on the left, 3 tubes in place   Abdominal: Soft. She exhibits no distension. There is no tenderness.   Musculoskeletal: Normal range of motion. She exhibits no edema or deformity.   Neurological: She is alert and oriented to person, place, and time.   Skin: Skin is warm and dry. No erythema. No pallor.   Psychiatric: She has a normal mood and affect. Her behavior is normal.       Results Review:    Glucose   Date Value Ref Range Status   03/12/2018 183 (H) 65 - 99 mg/dL Final   03/11/2018 170 (H) 65 - 99 mg/dL Final       Results from last 7 days  Lab Units 03/12/18  0545   WBC 10*3/mm3 14.16*   HEMOGLOBIN g/dL 10.7*   HEMATOCRIT % 33.4*   PLATELETS 10*3/mm3 505*       Results from last 7 days  Lab Units 03/12/18  0545  03/09/18  0408   SODIUM mmol/L 136  < > 139   POTASSIUM mmol/L 4.4  < > 3.9   CHLORIDE mmol/L 100  < > 103   CO2 mmol/L 24.2  < > 25.1   BUN mg/dL 15  < > 12    CREATININE mg/dL 0.88  < > 0.83   CALCIUM mg/dL 7.9*  < > 8.6   BILIRUBIN mg/dL  --   --  0.7   ALK PHOS U/L  --   --  365*   ALT (SGPT) U/L  --   --  31   AST (SGOT) U/L  --   --  32   GLUCOSE mg/dL 183*  < > 77   < > = values in this interval not displayed.    Results from last 7 days  Lab Units 03/10/18  0440   INR  1.16*   APTT seconds 52.9*       Results from last 7 days  Lab Units 03/10/18  0440   MAGNESIUM mg/dL 2.1       Results from last 7 days  Lab Units 03/07/18  1509   TROPONIN T ng/mL <0.010     Cultures:       I have reviewed daily medications and changes in CPOE    Scheduled meds    allopurinol 100 mg Oral Daily   docusate sodium 100 mg Oral BID   ferrous sulfate 325 mg Oral Daily   fluticasone 2 spray Nasal Daily   heparin (porcine) 5,000 Units Subcutaneous Q8H   heparin flush (porcine) 5 mL Intracatheter Q12H   losartan 100 mg Oral Q24H   And      Hydrochlorothiazide Oral 12.5 mg Oral Daily   insulin aspart 0-7 Units Subcutaneous 4x Daily With Meals & Nightly   insulin detemir 25 Units Subcutaneous Nightly   linagliptin 5 mg Oral Daily   magnesium oxide 400 mg Oral Daily   metoprolol tartrate 25 mg Oral Q12H   Or      metoprolol tartrate 5 mg Intravenous Q12H   polyethylene glycol 17 g Oral Daily   sennosides-docusate sodium 2 tablet Oral Nightly   sodium chloride 10 mL Intracatheter Q12H         bupivacaine (MARCAINE) 0.25% in elastomeric 4 mL/hr (ON-Q PUMP) 300 mL 4 mL/hr Last Rate: 4 mL/hr (03/10/18 1203)   HYDROmorphone HCl-NaCl     lactated ringers 9 mL/hr Last Rate: Stopped (03/10/18 1022)     PRN meds  •  acetaminophen  •  bisacodyl  •  dextrose  •  dextrose  •  glucagon (human recombinant)  •  heparin flush (porcine)  •  HYDROcodone-acetaminophen  •  HYDROcodone-acetaminophen  •  LORazepam  •  magnesium hydroxide  •  meclizine  •  naloxone  •  nitroglycerin  •  ondansetron **OR** ondansetron ODT **OR** ondansetron  •  sodium chloride  •  sodium chloride  •  sodium chloride  •  sodium  chloride      Principal Problem:    Pericardial effusion  Active Problems:    Essential hypertension    Primary malignant neoplasm of colon    Type 2 diabetes mellitus with complication, with long-term current use of insulin    Liver metastasis    Shortness of breath    Pleural effusion        Assessment/Plan:  - Pericardial/Pleural Effusion (Loculated): sp L VATS, decortication, and pericardial window 03/10. Chest tubes in place. PCA for pain control will be d/c'd today per CTS. Cx and fungal smears are no growth. Pathology shows acute on chronic inflammation. WBC still elevated but afebrile. Repeat CXR this am is reviewed and stable. Cardiology and CT Surgery following.  -Further w/u, tspot, viky, ccp, RF, Id consult  -d/w hem/onc and the chemo not a likely cause of the effusion    -leukocytosis, check a procal, temps normal, not currently on antibiotics    - Colon Cancer/Metastatic to Liver: Follow up in 5 weeks with CBC group after discharge. Will ask the CBC group to see, currently oncologically appears stable; wbc and and plts high    - DM2: A1c 7.7. Levemir and SSI. 160- 230, increase the levemir    - HTN: HCTZ/Losartan; currently stable     - Disposition: Home/TBD    >35 minutes spent, > 1/2 time spent counseling and coordination of care effusion    Dirk Mullins MD  03/13/18  9:41 AM

## 2018-03-13 NOTE — PROGRESS NOTES
"Patient Care Team:  Paola Onofre MD as PCP - General (Family Medicine)  Silvina Ty, ROBBIE Brown MD as Consulting Physician (Hematology and Oncology)  Chela Fuller MD as Referring Physician (Internal Medicine)    Chief Complaint: Follow-up pericardial/pleural effusion    Interval History: No complaints of chest pain or shortness of air.      Objective   Vital Signs  Temp:  [97.8 °F (36.6 °C)-98.5 °F (36.9 °C)] 98.5 °F (36.9 °C)  Heart Rate:  [82-91] 82  Resp:  [18-20] 20  BP: (100-141)/(63-79) 127/68    Intake/Output Summary (Last 24 hours) at 03/13/18 0923  Last data filed at 03/13/18 0600   Gross per 24 hour   Intake              242 ml   Output             3124 ml   Net            -2882 ml     Flowsheet Rows    Flowsheet Row First Filed Value   Admission Height 175.3 cm (69\") Documented at 03/07/2018 1445   Admission Weight 116 kg (255 lb) Documented at 03/07/2018 1445          General Appearance:    Alert, cooperative, in no acute distress   Head:    Normocephalic, without obvious abnormality, atraumatic       Neck:   No adenopathy, supple, no thyromegaly, no carotid bruit, no    JVD   Lungs:     Clear to auscultation bilaterally, no wheezes, rales, or     rhonchi    Heart:    Normal rate, regular rhythm,  No murmur, rub, or gallop   Chest Wall:    No abnormalities observed. +chest tubes   Abdomen:     Normal bowel sounds, soft, non-tender, non-distended,            no rebound tenderness   Extremities:   No cyanosis, clubbing, or edema   Pulses:   Pulses palpable and equal bilaterally   Skin:   No bleeding or rash       Neurologic:   Cranial nerves 2 - 12 grossly intact, sensation intact             allopurinol 100 mg Oral Daily   docusate sodium 100 mg Oral BID   ferrous sulfate 325 mg Oral Daily   fluticasone 2 spray Nasal Daily   heparin (porcine) 5,000 Units Subcutaneous Q8H   heparin flush (porcine) 5 mL Intracatheter Q12H   losartan 100 mg Oral Q24H   And      Hydrochlorothiazide " Oral 12.5 mg Oral Daily   insulin aspart 0-7 Units Subcutaneous 4x Daily With Meals & Nightly   insulin detemir 25 Units Subcutaneous Nightly   linagliptin 5 mg Oral Daily   magnesium oxide 400 mg Oral Daily   metoprolol tartrate 25 mg Oral Q12H   Or      metoprolol tartrate 5 mg Intravenous Q12H   polyethylene glycol 17 g Oral Daily   sennosides-docusate sodium 2 tablet Oral Nightly   sodium chloride 10 mL Intracatheter Q12H         bupivacaine (MARCAINE) 0.25% in elastomeric 4 mL/hr (ON-Q PUMP) 300 mL 4 mL/hr Last Rate: 4 mL/hr (03/10/18 1203)   HYDROmorphone HCl-NaCl     lactated ringers 9 mL/hr Last Rate: Stopped (03/10/18 1022)       Results Review:      Results from last 7 days  Lab Units 03/12/18  0545   SODIUM mmol/L 136   POTASSIUM mmol/L 4.4   CHLORIDE mmol/L 100   CO2 mmol/L 24.2   BUN mg/dL 15   CREATININE mg/dL 0.88   GLUCOSE mg/dL 183*   CALCIUM mg/dL 7.9*       Results from last 7 days  Lab Units 03/07/18  1509   TROPONIN T ng/mL <0.010       Results from last 7 days  Lab Units 03/12/18  0545   WBC 10*3/mm3 14.16*   HEMOGLOBIN g/dL 10.7*   HEMATOCRIT % 33.4*   PLATELETS 10*3/mm3 505*       Results from last 7 days  Lab Units 03/10/18  0440 03/07/18  1509   INR  1.16* 1.05   APTT seconds 52.9*  --            Results from last 7 days  Lab Units 03/10/18  0440   MAGNESIUM mg/dL 2.1         @LABNT(bnp)@  I reviewed the patient's new clinical results.  I personally viewed and interpreted the patient's EKG/Telemetry data        Assessment/Plan   1. Loculated pleural effusion + moderate pericardial effusion s/p VATS + decortication + pericardial window (3/10) . Unsuccessful pericadriocentesis (3/9). Tube management per thoracic surgery.    -Window tissue and fluid appeared to be inflammatory.  No evidence of malignancy     2. Colon cancer with liver metastasis - pathology results from window are pending. She follows with Dr. Brown as an outpatient.     3. DM - on insulin     4. HTN - stable on current  medications      -No additional cardiac evaluation at this time.  I will follow-up in 4 weeks after discharge.  -I will hold off on colchicine at this time

## 2018-03-13 NOTE — CONSULTS
Referring Provider: Dr. Mullins  Reason for Consultation: Leukocytosis     Subjective   History of present illness:    Very nice 67-year-old we are asked to evaluate for leukocytosis.    Per review the records, history of metastatic colon cancer to liver for which she initially received FOLFOX ×12 cycles as well as splenectomy and thermal ablation of the liver lesions in January 2014 followed by subsequent FOLFIRI ×12 which was completed in August 2014.  A new liver lesion was detected in May 2015 for which she was treated with CyberKnife radiation through July 2015 and a another lesion was found in May 2017 treated with microwave ablation and FOLFIRI through November 2017.  Her cancer is now thought to be in remission, but was found to have pericardial and pleural effusions on evaluation and heme on clinic on 3/7/18.  Echo confirmed pericardial effusion she was taken to the cath lab for pericardiocentesis which was unsuccessful.  Dr. Patel later took her to the operating room on 3/10/18  for vats and pericardial window.  Pathology was negative for malignancy granuloma and cultures have been negative.  ID is asked to comment on leukocytosis    Patient reports that she was feeling unwell for about a week prior to admission marked by mild shortness of breath that began to progress.  It is now improved with surgery.  She denies any associated fevers or chills or night sweats or sick contacts.    PMH: Metastatic colon cancer status post colon resection and splenectomy followed by chemoradiation and liver ablation, diabetes mellitus type 2, hyperlipidemia, hypertension, osteomyelitis status post left second toe amputation, hysterectomy, Port-A-Cath placement, VATS and pericardial window    No family history of infectious disease.  Lupus in father.    Social history: Lives in [place], [place] with .  No tobacco or ethanol use.  No animal contacts or recent travel.  No time spent in prisons or homeless  shelters or known tuberculosis exposures  All: Compazine    Review of Systems  Pertinent items are noted in HPI, all other systems reviewed and negative    Objective     Physical Exam:   Vital Signs   Temp:  [97.8 °F (36.6 °C)-98.5 °F (36.9 °C)] 98.5 °F (36.9 °C)  Heart Rate:  [82-91] 82  Resp:  [18-20] 20  BP: (100-141)/(63-79) 127/68    GENERAL: Awake and alert, in no acute distress.   HEENT: Oropharynx is clear. Hearing is grossly normal.   EYES: PERRL. No conjunctival injection. No lid lag.   LYMPHATICS: No lymphadenopathy of the neck or inguinal regions.   HEART: Regular rate and rhythm. 1+ peripheral edema.   LUNGS: Basilar rales with normal respiratory effort.   GI: Soft, nontender, nondistended. No appreciable organomegaly.   SKIN: Warm and dry without cutaneous eruptions   PSYCHIATRIC: Appropriate mood, affect, insight, and judgment.     Results Review:   I reviewed the patient's new clinical results.  WBC 14.16 (p72, l 8, M 16, e3)  H/H 10.7/33    Cr 0.88  glc     Microbiology:  3/8/18 Pleural Cx: neg; AFB ngtd  3/10/18 VATS cx: Neg; AFB/fungal ngtd  3/10/18 Pericardial cx: Neg; AFB/fungal ngtd  3/13 RVP requested    Radiology:  CT abdomen and pelvis: Stable metastatic liver disease, large pericardial effusion  CT chest: Pericardial effusion, complex fluid collection in the left which is loculated  Today's chest X-ray, personally interpreted: Chest tubes in place on the left with low lung volumes and atelectasis and some haziness along the hilar structures  Transthoracic echocardiogram: Ejection fraction 68 percent, right ventricle not visualized well, moderate 1-2 cm circumferential pericardial effusion    Assessment/Plan   Leukocytosis  Pericardial and pleural effusions, path negative.  Follow cultures  Metastatic Colon cancer, in remission  Splenectomy    Looks wonderful.  Certainly not septic. WBC high since admission and probably is acute stress reaction to surgery/illness exacerbated by  splenectomy.  All bacterial cultures negative. No fevers  RVP pending and will check CBC with differential Procalcitonin in the morning.  If WBC increasing and procal negative, we'll check doppler to assess for LE DVT as that is common cause of sterile leukocytosis.    Thank you for this consult.  We will continue to follow along and tailor antibiotics as the patient's clinical course evolves.    Dez Butterfield MD  03/13/18  10:48 AM

## 2018-03-13 NOTE — PLAN OF CARE
Problem: Patient Care Overview  Goal: Plan of Care Review  Outcome: Ongoing (interventions implemented as appropriate)   03/13/18 2205   Coping/Psychosocial   Plan of Care Reviewed With patient   OTHER   Outcome Summary Pt tolerated mobility well. Decreased O2 sats w/ ambulation, but quickly recovered w/ seated rest. Encouraged mobility w/ nsg staff. Pt requested use of RW for safety, but will attempt w/o AD next session. Will change frequency to 3-5x per week as pt is close to baseline at this time. Discussed w/ PT.

## 2018-03-14 ENCOUNTER — APPOINTMENT (OUTPATIENT)
Dept: GENERAL RADIOLOGY | Facility: HOSPITAL | Age: 68
End: 2018-03-14

## 2018-03-14 LAB
ANION GAP SERPL CALCULATED.3IONS-SCNC: 6.9 MMOL/L
BASOPHILS # BLD AUTO: 0.04 10*3/MM3 (ref 0–0.2)
BASOPHILS NFR BLD AUTO: 0.3 % (ref 0–1.5)
BUN BLD-MCNC: 13 MG/DL (ref 8–23)
BUN/CREAT SERPL: 17.8 (ref 7–25)
CALCIUM SPEC-SCNC: 8.1 MG/DL (ref 8.6–10.5)
CHLORIDE SERPL-SCNC: 100 MMOL/L (ref 98–107)
CO2 SERPL-SCNC: 29.1 MMOL/L (ref 22–29)
CREAT BLD-MCNC: 0.73 MG/DL (ref 0.57–1)
DEPRECATED RDW RBC AUTO: 49.7 FL (ref 37–54)
EOSINOPHIL # BLD AUTO: 0.69 10*3/MM3 (ref 0–0.7)
EOSINOPHIL NFR BLD AUTO: 5.1 % (ref 0.3–6.2)
ERYTHROCYTE [DISTWIDTH] IN BLOOD BY AUTOMATED COUNT: 15 % (ref 11.7–13)
GFR SERPL CREATININE-BSD FRML MDRD: 96 ML/MIN/1.73
GLUCOSE BLD-MCNC: 153 MG/DL (ref 65–99)
GLUCOSE BLDC GLUCOMTR-MCNC: 165 MG/DL (ref 70–130)
GLUCOSE BLDC GLUCOMTR-MCNC: 189 MG/DL (ref 70–130)
GLUCOSE BLDC GLUCOMTR-MCNC: 206 MG/DL (ref 70–130)
GLUCOSE BLDC GLUCOMTR-MCNC: 210 MG/DL (ref 70–130)
HCT VFR BLD AUTO: 31.4 % (ref 35.6–45.5)
HGB BLD-MCNC: 10.2 G/DL (ref 11.9–15.5)
IMM GRANULOCYTES # BLD: 0.31 10*3/MM3 (ref 0–0.03)
IMM GRANULOCYTES NFR BLD: 2.3 % (ref 0–0.5)
LYMPHOCYTES # BLD AUTO: 2.25 10*3/MM3 (ref 0.9–4.8)
LYMPHOCYTES NFR BLD AUTO: 16.7 % (ref 19.6–45.3)
MCH RBC QN AUTO: 29.7 PG (ref 26.9–32)
MCHC RBC AUTO-ENTMCNC: 32.5 G/DL (ref 32.4–36.3)
MCV RBC AUTO: 91.3 FL (ref 80.5–98.2)
MONOCYTES # BLD AUTO: 2.26 10*3/MM3 (ref 0.2–1.2)
MONOCYTES NFR BLD AUTO: 16.8 % (ref 5–12)
NEUTROPHILS # BLD AUTO: 7.91 10*3/MM3 (ref 1.9–8.1)
NEUTROPHILS NFR BLD AUTO: 58.8 % (ref 42.7–76)
PLATELET # BLD AUTO: 609 10*3/MM3 (ref 140–500)
PMV BLD AUTO: 9.1 FL (ref 6–12)
POTASSIUM BLD-SCNC: 3.6 MMOL/L (ref 3.5–5.2)
PROCALCITONIN SERPL-MCNC: 0.15 NG/ML (ref 0.1–0.25)
RBC # BLD AUTO: 3.44 10*6/MM3 (ref 3.9–5.2)
SODIUM BLD-SCNC: 136 MMOL/L (ref 136–145)
WBC NRBC COR # BLD: 13.46 10*3/MM3 (ref 4.5–10.7)

## 2018-03-14 PROCEDURE — 25010000002 HEPARIN (PORCINE) PER 1000 UNITS: Performed by: THORACIC SURGERY (CARDIOTHORACIC VASCULAR SURGERY)

## 2018-03-14 PROCEDURE — 80048 BASIC METABOLIC PNL TOTAL CA: CPT | Performed by: HOSPITALIST

## 2018-03-14 PROCEDURE — 63710000001 INSULIN ASPART PER 5 UNITS: Performed by: HOSPITALIST

## 2018-03-14 PROCEDURE — 82962 GLUCOSE BLOOD TEST: CPT

## 2018-03-14 PROCEDURE — 85025 COMPLETE CBC W/AUTO DIFF WBC: CPT | Performed by: HOSPITALIST

## 2018-03-14 PROCEDURE — 99024 POSTOP FOLLOW-UP VISIT: CPT | Performed by: NURSE PRACTITIONER

## 2018-03-14 PROCEDURE — 99232 SBSQ HOSP IP/OBS MODERATE 35: CPT | Performed by: INTERNAL MEDICINE

## 2018-03-14 PROCEDURE — 84145 PROCALCITONIN (PCT): CPT | Performed by: INTERNAL MEDICINE

## 2018-03-14 PROCEDURE — 71045 X-RAY EXAM CHEST 1 VIEW: CPT

## 2018-03-14 RX ADMIN — METOPROLOL TARTRATE 25 MG: 25 TABLET ORAL at 09:26

## 2018-03-14 RX ADMIN — HEPARIN SODIUM 5000 UNITS: 5000 INJECTION, SOLUTION INTRAVENOUS; SUBCUTANEOUS at 16:43

## 2018-03-14 RX ADMIN — LOSARTAN POTASSIUM 100 MG: 100 TABLET ORAL at 09:26

## 2018-03-14 RX ADMIN — INSULIN ASPART 3 UNITS: 100 INJECTION, SOLUTION INTRAVENOUS; SUBCUTANEOUS at 16:43

## 2018-03-14 RX ADMIN — FERROUS SULFATE TAB 325 MG (65 MG ELEMENTAL FE) 325 MG: 325 (65 FE) TAB at 09:26

## 2018-03-14 RX ADMIN — INSULIN ASPART 2 UNITS: 100 INJECTION, SOLUTION INTRAVENOUS; SUBCUTANEOUS at 09:27

## 2018-03-14 RX ADMIN — HEPARIN SODIUM 5000 UNITS: 5000 INJECTION, SOLUTION INTRAVENOUS; SUBCUTANEOUS at 09:27

## 2018-03-14 RX ADMIN — INSULIN ASPART 2 UNITS: 100 INJECTION, SOLUTION INTRAVENOUS; SUBCUTANEOUS at 12:05

## 2018-03-14 RX ADMIN — HYDROCHLOROTHIAZIDE 12.5 MG: 12.5 CAPSULE, GELATIN COATED ORAL at 09:26

## 2018-03-14 RX ADMIN — Medication 10 ML: at 09:30

## 2018-03-14 RX ADMIN — BUPIVACAINE HYDROCHLORIDE 4 ML/HR: 2.5 INJECTION, SOLUTION EPIDURAL; INFILTRATION; INTRACAUDAL; PERINEURAL at 14:39

## 2018-03-14 RX ADMIN — ALLOPURINOL 100 MG: 100 TABLET ORAL at 09:26

## 2018-03-14 RX ADMIN — INSULIN ASPART 3 UNITS: 100 INJECTION, SOLUTION INTRAVENOUS; SUBCUTANEOUS at 20:51

## 2018-03-14 RX ADMIN — LINAGLIPTIN 5 MG: 5 TABLET, FILM COATED ORAL at 09:26

## 2018-03-14 RX ADMIN — METOPROLOL TARTRATE 25 MG: 25 TABLET ORAL at 20:52

## 2018-03-14 RX ADMIN — DOCUSATE SODIUM 100 MG: 100 CAPSULE, LIQUID FILLED ORAL at 09:26

## 2018-03-14 RX ADMIN — Medication 400 MG: at 09:26

## 2018-03-14 NOTE — PLAN OF CARE
Problem: Patient Care Overview  Goal: Plan of Care Review  Outcome: Ongoing (interventions implemented as appropriate)   03/13/18 9632   Coping/Psychosocial   Plan of Care Reviewed With patient   Plan of Care Review   Progress improving

## 2018-03-14 NOTE — PROGRESS NOTES
"    Chief Complaint: Postoperative management  S/P: BRONCHOSCOPY, LEFT THORACOSCOPY VIDEO ASSISTED WITH PERICARDIAL WINDOW, SUBPLEURAL CATHETERS FOR PAIN CONTROL  POD # 4    Subjective:  Symptoms:  Stable.  She reports cough.  No shortness of breath or chest pain.  (Patient is doing well this morning except for increased coughing which is more related to irritation.  Pain is controlled.  No worsening shortness of breath.).    Diet:  Adequate intake.  No nausea or vomiting.    Activity level: Returning to normal.    Pain:  She complains of pain that is mild.  Pain is well controlled.        Vital Signs:  Temp:  [98.5 °F (36.9 °C)-99.1 °F (37.3 °C)] 98.5 °F (36.9 °C)  Heart Rate:  [] 80  Resp:  [18-20] 20  BP: (111-131)/(62-74) 131/65    Intake & Output (last day)       03/13 0701 - 03/14 0700 03/14 0701 - 03/15 0700    P.O. 740     I.V. (mL/kg)      Total Intake(mL/kg) 740 (6.5)     Urine (mL/kg/hr) 400 (0.1)     Drains 4 (0)     Stool 0 (0)     Chest Tube 110 (0)     Total Output 514      Net +226            Unmeasured Urine Occurrence 1 x     Unmeasured Stool Occurrence 0 x           Objective:  General Appearance:  Comfortable and in no acute distress.    Vital signs: (most recent): Blood pressure 129/73, pulse 88, temperature 98.5 °F (36.9 °C), temperature source Oral, resp. rate 20, height 175.3 cm (69\"), weight 114 kg (252 lb 6.4 oz), SpO2 95 %, not currently breastfeeding.  Vital signs are normal.  No fever.    Lungs:  Normal effort and normal respiratory rate.  There are decreased breath sounds.  No wheezes or rhonchi.    Heart: Normal rate.  Regular rhythm.  No murmur.   Abdomen: Abdomen is soft and non-distended.    Extremities: Normal range of motion.  There is no dependent edema.    Neurological: Patient is alert and oriented to person, place and time.    Skin:  Warm and dry.              Chest tube:   Site: Left, Clean, Dry and Intact  Suction: waterseal  Air Leak: negative  24 Hour Total: " 80/30    Pericardial drain:  4    Results Review:     I reviewed the patient's new clinical results.  I reviewed the patient's new imaging results and agree with the interpretation.    Imaging Results (last 24 hours)     Procedure Component Value Units Date/Time    XR Chest 1 View [[id number]] Collected:  03/14/18 0715     Updated:  03/14/18 0715    Narrative:       PORTABLE CHEST X-RAY     CLINICAL HISTORY: Chest tube management; R06.02-Shortness of breath;  I31.3-Pericardial effusion (noninflammatory); X68-J[place]muq effusion, not  elsewhere classified; C18.9-Malignant neoplasm of colon, unspecified;  R26.2-Difficulty in walking, not elsewhere classified.     COMPARISON: 03/13/2018.     FINDINGS: Portable AP view of the chest was obtained with overlying  monitor leads in place. Life support lines are unchanged. There is some  mild subcutaneous emphysema on the left side but a definite significant  pneumothorax is not appreciated. Lungs are poorly aerated with basilar  atelectasis and probable small effusions. Borderline cardiomegaly which  may be accentuated by poor inspiration. Vascularity felt to be normal  considering poor inspiration.               Impression:          Stable appearance of the chest by portable radiography.                XR Chest 1 View [[id number]] Collected:  03/13/18 0825     Updated:  03/13/18 1156    Narrative:       CLINICAL HISTORY: 67-year-old female 3 days postop left video-assisted  thoracoscopy with pericardial window.     PORTABLE AP ERECT CHEST DATED 03/13/2018 AT 0533 HOURS     FINDINGS: When compared to the exam dated 03/12/2018 at 0556 hours, a  subcentimeter trace of left apical pneumothorax may persist but is not  as clearly demonstrated. There are again 3 left chest tubes present.  Left subclavian port catheter terminating in superior vena cava  distribution remains. Cardiac silhouette remains top normal to mildly  enlarged caliber. Some patchy to discoid right basilar and left  mid and  lower lung opacity remain. No acute change in bony thorax is seen with  degenerative change noted in the shoulders and spine. Monitoring lead  wires are present. Probable oxygen cannula tubing at the apical left  chest and lower neck is noted. Correlate clinically.     CONCLUSION: Similar appearance of the heart and lungs. Possible minimal  trace left apical pneumothorax, but without demonstration of a large  pneumothorax. The 3 left chest tubes remain.     This report was finalized on 3/13/2018 11:52 AM by Dr. Reji Zavala MD.             Lab Results:     Lab Results (last 24 hours)     Procedure Component Value Units Date/Time    POC Glucose Once [[id number]]  (Abnormal) Collected:  03/14/18 0741    Specimen:  Blood Updated:  03/14/18 0746     Glucose 165 (H) mg/dL     Narrative:       Meter: [id number] : [id number] Milly BARRIGA    Basic Metabolic Panel [[id number]]  (Abnormal) Collected:  03/14/18 0517    Specimen:  Blood Updated:  03/14/18 0613     Glucose 153 (H) mg/dL      BUN 13 mg/dL      Creatinine 0.73 mg/dL      Sodium 136 mmol/L      Potassium 3.6 mmol/L      Chloride 100 mmol/L      CO2 29.1 (H) mmol/L      Calcium 8.1 (L) mg/dL      eGFR  African Amer 96 mL/min/1.73      BUN/Creatinine Ratio 17.8     Anion Gap 6.9 mmol/L     Narrative:       GFR Normal >60  Chronic Kidney Disease <60  Kidney Failure <15    Procalcitonin [[id number]]  (Normal) Collected:  03/14/18 0517    Specimen:  Blood Updated:  03/14/18 0612     Procalcitonin 0.15 ng/mL     Narrative:       As a Marker for Sepsis (Non-Neonates):   1. <0.5 ng/mL represents a low risk of severe sepsis and/or septic shock.  1. >2 ng/mL represents a high risk of severe sepsis and/or septic shock.    As a Marker for Lower Respiratory Tract Infections that require antibiotic therapy:  PCT on Admission     Antibiotic Therapy             6-12 Hrs later  > 0.5                Strongly Recommended            >0.25 - <0.5         Recommended  0.1  "- 0.25           Discouraged                   Remeasure/reassess PCT  <0.1                 Strongly Discouraged          Remeasure/reassess PCT      As 28 day mortality risk marker: \"Change in Procalcitonin Result\" (> 80 % or <=80 %) if Day 0 (or Day 1) and Day 4 values are available. Refer to http://www.[email or web address] Digital[facility]calculator.SAW Instrument/   Change in PCT <=80 %   A decrease of PCT levels below or equal to 80 % defines a positive change in PCT test result representing a higher risk for 28-day all-cause mortality of patients diagnosed with severe sepsis or septic shock.  Change in PCT > 80 %   A decrease of PCT levels of more than 80 % defines a negative change in PCT result representing a lower risk for 28-day all-cause mortality of patients diagnosed with severe sepsis or septic shock.                CBC & Differential [[id number]] Collected:  03/14/18 0517    Specimen:  Blood Updated:  03/14/18 0611    Narrative:       The following orders were created for panel order CBC & Differential.  Procedure                               Abnormality         Status                     ---------                               -----------         ------                     Scan Slide[[id number]]                                                                  CBC Auto Differential[[id number]]        Abnormal            Final result                 Please view results for these tests on the individual orders.    CBC Auto Differential [[id number]]  (Abnormal) Collected:  03/14/18 0517    Specimen:  Blood Updated:  03/14/18 0611     WBC 13.46 (H) 10*3/mm3      RBC 3.44 (L) 10*6/mm3      Hemoglobin 10.2 (L) g/dL      Hematocrit 31.4 (L) %      MCV 91.3 fL      MCH 29.7 pg      MCHC 32.5 g/dL      RDW 15.0 (H) %      RDW-SD 49.7 fl      MPV 9.1 fL      Platelets 609 (H) 10*3/mm3      Neutrophil % 58.8 %      Lymphocyte % 16.7 (L) %      Monocyte % 16.8 (H) %      Eosinophil % 5.1 %      Basophil % 0.3 %      Immature Grans % 2.3 (H) %      " Neutrophils, Absolute 7.91 10*3/mm3      Lymphocytes, Absolute 2.25 10*3/mm3      Monocytes, Absolute 2.26 (H) 10*3/mm3      Eosinophils, Absolute 0.69 10*3/mm3      Basophils, Absolute 0.04 10*3/mm3      Immature Grans, Absolute 0.31 (H) 10*3/mm3     POC Glucose Once [[id number]]  (Abnormal) Collected:  03/13/18 2122    Specimen:  Blood Updated:  03/13/18 2123     Glucose 201 (H) mg/dL     Narrative:       Meter: [id number] : [id number] Pasquale Hurt CNA    POC Glucose Once [[id number]]  (Abnormal) Collected:  03/13/18 1630    Specimen:  Blood Updated:  03/13/18 1632     Glucose 140 (H) mg/dL     Narrative:       Meter: [id number] : [id number] Grant AGUILAR Tech    TSPOT [[id number]] Collected:  03/13/18 1601    Specimen:  Blood Updated:  03/13/18 1623    Respiratory Panel, PCR - Swab, Nasopharynx [[id number]]  (Normal) Collected:  03/13/18 1243    Specimen:  Swab from Nasopharynx Updated:  03/13/18 1613     ADENOVIRUS, PCR Not Detected     Coronavirus 229E Not Detected     Coronavirus HKU1 Not Detected     Coronavirus NL63 Not Detected     Coronavirus OC43 Not Detected     Human Metapneumovirus Not Detected     Human Rhinovirus/Enterovirus Not Detected     Influenza B PCR Not Detected     Parainfluenza Virus 1 Not Detected     Parainfluenza Virus 2 Not Detected     Parainfluenza Virus 3 Not Detected     Parainfluenza Virus 4 Not Detected     Bordetella pertussis pcr Not Detected     Influenza A H1 2009 PCR Not Detected     Chlamydophila pneumoniae PCR Not Detected     Mycoplasma pneumo by PCR Not Detected     Influenza A PCR Not Detected     Influenza A H3 Not Detected     Influenza A H1 Not Detected     RSV, PCR Not Detected    Rheumatoid Factor, Quant [[id number]]  (Normal) Collected:  03/12/18 0545    Specimen:  Blood Updated:  03/13/18 1520     Rheumatoid Factor Quantitative 13.4 IU/mL     TSH [[id number]]  (Normal) Collected:  03/12/18 0545    Specimen:  Blood Updated:  03/13/18 1341     TSH 1.080  mIU/mL     T4, Free [[id number]]  (Normal) Collected:  03/12/18 0545    Specimen:  Blood Updated:  03/13/18 1341     Free T4 1.36 ng/dL     CBC & Differential [[id number]] Collected:  03/13/18 1243    Specimen:  Blood Updated:  03/13/18 1312    Narrative:       The following orders were created for panel order CBC & Differential.  Procedure                               Abnormality         Status                     ---------                               -----------         ------                     CBC Auto Differential[[id number]]        Abnormal            Final result                 Please view results for these tests on the individual orders.    CBC Auto Differential [[id number]]  (Abnormal) Collected:  03/13/18 1243    Specimen:  Blood Updated:  03/13/18 1312     WBC 13.31 (H) 10*3/mm3      RBC 3.68 (L) 10*6/mm3      Hemoglobin 10.9 (L) g/dL      Hematocrit 33.4 (L) %      MCV 90.8 fL      MCH 29.6 pg      MCHC 32.6 g/dL      RDW 14.8 (H) %      RDW-SD 48.5 fl      MPV 8.7 fL      Platelets 520 (H) 10*3/mm3      Neutrophil % 64.1 %      Lymphocyte % 15.3 (L) %      Monocyte % 14.4 (H) %      Eosinophil % 4.1 %      Basophil % 0.4 %      Immature Grans % 1.7 (H) %      Neutrophils, Absolute 8.56 (H) 10*3/mm3      Lymphocytes, Absolute 2.03 10*3/mm3      Monocytes, Absolute 1.91 (H) 10*3/mm3      Eosinophils, Absolute 0.54 10*3/mm3      Basophils, Absolute 0.05 10*3/mm3      Immature Grans, Absolute 0.22 (H) 10*3/mm3     Cyclic Citrul Peptide Antibody, IgG / IgA [[id number]] Collected:  03/13/18 1242    Specimen:  Blood Updated:  03/13/18 1303    POC Glucose Once [[id number]]  (Normal) Collected:  03/13/18 1123    Specimen:  Blood Updated:  03/13/18 1126     Glucose 130 mg/dL     Narrative:       Meter: [id number] : [id number] Milly Ford NITHYA    Procalcitonin [[id number]]  (Normal) Collected:  03/12/18 0545    Specimen:  Blood Updated:  03/13/18 1020     Procalcitonin 0.21 ng/mL     Narrative:       As a Marker  "for Sepsis (Non-Neonates):   1. <0.5 ng/mL represents a low risk of severe sepsis and/or septic shock.  1. >2 ng/mL represents a high risk of severe sepsis and/or septic shock.    As a Marker for Lower Respiratory Tract Infections that require antibiotic therapy:  PCT on Admission     Antibiotic Therapy             6-12 Hrs later  > 0.5                Strongly Recommended            >0.25 - <0.5         Recommended  0.1 - 0.25           Discouraged                   Remeasure/reassess PCT  <0.1                 Strongly Discouraged          Remeasure/reassess PCT      As 28 day mortality risk marker: \"Change in Procalcitonin Result\" (> 80 % or <=80 %) if Day 0 (or Day 1) and Day 4 values are available. Refer to http://www.[facility] System[facility]ct-calculator.com/   Change in PCT <=80 %   A decrease of PCT levels below or equal to 80 % defines a positive change in PCT test result representing a higher risk for 28-day all-cause mortality of patients diagnosed with severe sepsis or septic shock.  Change in PCT > 80 %   A decrease of PCT levels of more than 80 % defines a negative change in PCT result representing a lower risk for 28-day all-cause mortality of patients diagnosed with severe sepsis or septic shock.                Tissue / Bone Culture - Tissue, Pericardium [[id number]]  (Normal) Collected:  03/10/18 0852    Specimen:  Tissue from Pericardium Updated:  03/13/18 0833     Tissue Culture No growth at 3 days     Gram Stain Result Moderate (3+) Red blood cells      No organisms seen    Body Fluid Culture - Body Fluid, Pericardium [[id number]]  (Normal) Collected:  03/10/18 0835    Specimen:  Body Fluid from Pericardium Updated:  03/13/18 0831     BF Culture No growth at 3 days     Gram Stain Result Many (4+) Red blood cells      Rare (1+) WBCs seen      No organisms seen    Body Fluid Culture - Body Fluid, Pleural Cavity [[id number]]  (Normal) Collected:  03/08/18 1400    Specimen:  Body Fluid from Pleural Cavity Updated:  " 03/13/18 0823     BF Culture No growth at 5 days     Gram Stain Result Many (4+) WBCs seen      No organisms seen    POC Glucose Once [[id number]]  (Normal) Collected:  03/13/18 0757    Specimen:  Blood Updated:  03/13/18 0759     Glucose 95 mg/dL     Narrative:       Meter: [id number] : [id number] Milly Ford NITHYA           Assessment/Plan     Principal Problem:    Pericardial effusion  Active Problems:    Essential hypertension    Primary malignant neoplasm of colon    Type 2 diabetes mellitus with complication, with long-term current use of insulin    Liver metastasis    Shortness of breath    Pleural effusion       Assessment:    Condition: In stable condition.  Improving.   (Stable post-op course.  HD stable. ).     Plan:   Encourage ambulation.  Start/continue incentive spirometry.  X-rays as ordered.  (Chest x-ray remained stable this morning.  Continues to have no airleak noted from both chest tubes.  Minimal drainage from pericardial drain.  Removed anterior chest tube today and Carroll drain.  Repeat CXR in am.  Refill On-Q pump.  Continue current treatment plan.  Pulmonary hygiene, incentive spirometer, increase activity, and pain management.  ).       Ting Hill, APRN  [facility]  03/14/18  7:55 AM

## 2018-03-14 NOTE — PLAN OF CARE
Problem: Cardiac Output, Decreased (Adult)  Goal: Adequate Cardiac Output/Effective Tissue Perfusion  Outcome: Ongoing (interventions implemented as appropriate)      Problem: Respiratory Insufficiency (Adult)  Goal: Acid/Base Balance  Outcome: Ongoing (interventions implemented as appropriate)    Goal: Effective Ventilation  Outcome: Ongoing (interventions implemented as appropriate)      Problem: Diabetes, Type 2 (Adult)  Goal: Signs and Symptoms of Listed Potential Problems Will be Absent or Manageable (Diabetes, Type 2)  Outcome: Outcome(s) achieved Date Met: 03/14/18      Problem: Patient Care Overview  Goal: Plan of Care Review  Outcome: Ongoing (interventions implemented as appropriate)   03/13/18 8880 03/14/18 1612   Coping/Psychosocial   Plan of Care Reviewed With patient --    Plan of Care Review   Progress improving --    OTHER   Outcome Summary --  VSS, no c/o pain, Ct #1 and Carroll drain (#2) removed, On-Q refilled, walked with spouse, encouraging pulmonary hygeine and increased activity     Goal: Individualization and Mutuality  Outcome: Ongoing (interventions implemented as appropriate)      Problem: Skin Injury Risk (Adult)  Goal: Identify Related Risk Factors and Signs and Symptoms  Outcome: Outcome(s) achieved Date Met: 03/14/18    Goal: Skin Health and Integrity  Outcome: Outcome(s) achieved Date Met: 03/14/18      Problem: Fall Risk (Adult)  Goal: Identify Related Risk Factors and Signs and Symptoms  Outcome: Ongoing (interventions implemented as appropriate)    Goal: Absence of Fall  Outcome: Ongoing (interventions implemented as appropriate)

## 2018-03-14 NOTE — PROGRESS NOTES
[facility]     LOS: 7 days     Subjective:  No Chest pain  No soa  Appetite is better  Some coughing    Objective:    Vital Signs:  Temp:  [98.5 °F (36.9 °C)-99.1 °F (37.3 °C)] 98.5 °F (36.9 °C)  Heart Rate:  [] 88  Resp:  [18-20] 20  BP: (111-131)/(62-74) 129/73    SpO2:  [92 %-95 %] 95 %  on  Flow (L/min):  [2-2.5] 2.5;   Device (Oxygen Therapy): nasal cannula  Body mass index is 37.27 kg/m².    Physical Exam   Constitutional: She is oriented to person, place, and time. She appears well-developed and well-nourished.   HENT:   Head: Normocephalic and atraumatic.   Eyes: EOM are normal. Pupils are equal, round, and reactive to light.   Neck: Normal range of motion. Neck supple. No tracheal deviation present. No thyromegaly present.   Cardiovascular: Normal rate and regular rhythm.  Exam reveals no gallop and no friction rub.    No murmur heard.  Pulmonary/Chest: Effort normal and breath sounds normal. No respiratory distress. She has no wheezes.   Anteriorly lungs clear, tubes cover on the left, 1 tubes in place   Abdominal: Soft. She exhibits no distension. There is no tenderness.   Musculoskeletal: Normal range of motion. She exhibits no edema or deformity.   Neurological: She is alert and oriented to person, place, and time.   Skin: Skin is warm and dry. No erythema. No pallor.   Psychiatric: She has a normal mood and affect. Her behavior is normal.       Results Review:    Glucose   Date Value Ref Range Status   03/14/2018 153 (H) 65 - 99 mg/dL Final   03/12/2018 183 (H) 65 - 99 mg/dL Final       Results from last 7 days  Lab Units 03/14/18  0517   WBC 10*3/mm3 13.46*   HEMOGLOBIN g/dL 10.2*   HEMATOCRIT % 31.4*   PLATELETS 10*3/mm3 609*       Results from last 7 days  Lab Units 03/14/18  0517  03/09/18  0408   SODIUM mmol/L 136  < > 139   POTASSIUM mmol/L 3.6  < > 3.9   CHLORIDE mmol/L 100  < > 103   CO2 mmol/L 29.1*  < > 25.1   BUN mg/dL 13  < > 12   CREATININE mg/dL 0.73  <  > 0.83   CALCIUM mg/dL 8.1*  < > 8.6   BILIRUBIN mg/dL  --   --  0.7   ALK PHOS U/L  --   --  365*   ALT (SGPT) U/L  --   --  31   AST (SGOT) U/L  --   --  32   GLUCOSE mg/dL 153*  < > 77   < > = values in this interval not displayed.    Results from last 7 days  Lab Units 03/10/18  0440   INR  1.16*   APTT seconds 52.9*       Results from last 7 days  Lab Units 03/10/18  0440   MAGNESIUM mg/dL 2.1       Results from last 7 days  Lab Units 03/07/18  1509   TROPONIN T ng/mL <0.010     Cultures:       I have reviewed daily medications and changes in CPOE    Scheduled meds    allopurinol 100 mg Oral Daily   docusate sodium 100 mg Oral BID   ferrous sulfate 325 mg Oral Daily   fluticasone 2 spray Nasal Daily   heparin (porcine) 5,000 Units Subcutaneous Q8H   losartan 100 mg Oral Q24H   And      Hydrochlorothiazide Oral 12.5 mg Oral Daily   insulin aspart 0-7 Units Subcutaneous 4x Daily With Meals & Nightly   insulin detemir 25 Units Subcutaneous Nightly   linagliptin 5 mg Oral Daily   magnesium oxide 400 mg Oral Daily   metoprolol tartrate 25 mg Oral Q12H   Or      metoprolol tartrate 5 mg Intravenous Q12H   polyethylene glycol 17 g Oral Daily   sennosides-docusate sodium 2 tablet Oral Nightly   sodium chloride 10 mL Intracatheter Q12H         bupivacaine (MARCAINE) 0.25% in elastomeric 4 mL/hr (ON-Q PUMP) 300 mL 4 mL/hr Last Rate: 4 mL/hr (03/10/18 1203)     PRN meds  •  acetaminophen  •  bisacodyl  •  dextrose  •  dextrose  •  glucagon (human recombinant)  •  heparin flush (porcine)  •  HYDROcodone-acetaminophen  •  HYDROcodone-acetaminophen  •  LORazepam  •  magnesium hydroxide  •  meclizine  •  naloxone  •  nitroglycerin  •  ondansetron **OR** ondansetron ODT **OR** ondansetron  •  sodium chloride  •  sodium chloride  •  sodium chloride  •  sodium chloride      Principal Problem:    Pericardial effusion  Active Problems:    Essential hypertension    Primary malignant neoplasm of colon    Type 2 diabetes mellitus  with complication, with long-term current use of insulin    Liver metastasis    Shortness of breath    Pleural effusion        Assessment/Plan:  - Pericardial/Pleural Effusion (Loculated): sp L VATS, decortication, and pericardial window 03/10. Chest tube in place. PCA for pain control.   Cx and fungal smears are no growth.   Pathology shows acute on chronic inflammation.   WBC still elevated but afebrile.   Repeat CXR this am is reviewed and stable.   Cardiology and CT Surgery following.  -Further w/u, tspot, viky, ccp, RF, Id consult  -d/w hem/onc and the chemo not a likely cause of the effusion    -leukocytosis, check a procal, temps normal, not currently on antibiotics    - Colon Cancer/Metastatic to Liver: Follow up in 5 weeks with CBC group after discharge. Will ask the CBC group to see, currently oncologically appears stable; wbc and and plts high    - DM2: A1c 7.7. Levemir and SSI. 160- 230, increase the levemir    - HTN: HCTZ/Losartan; currently stable     - Disposition: maybe tomorrow per CTS        Dirk Mullins MD  03/14/18  10:11 AM

## 2018-03-14 NOTE — PROGRESS NOTES
INFECTIOUS DISEASES PROGRESS NOTE    CC: f/u WBC    S:   Feels fine  No sig pain  No f/c/ns    O:  Physical Exam:  Temp:  [98.5 °F (36.9 °C)-99.1 °F (37.3 °C)] 98.5 °F (36.9 °C)  Heart Rate:  [] 80  Resp:  [18-20] 20  BP: (111-131)/(62-74) 131/65  Physical Exam   Constitutional: She appears well-developed. No distress ( sitting in chair).   Pulmonary/Chest: Effort normal. She has rales (basilar).   Abdominal: Soft. She exhibits no distension. There is no tenderness.   Neurological: She is alert.   Skin: Skin is warm and dry.   Psychiatric: She has a normal mood and affect. Thought content normal.        Diagnostics:    WBC 13.46 (P59, L 17, M 17, e5)  H/H 10/31    Cr 0.73     Microbiology:  3/8/18 Pleural Cx: neg; AFB ngtd  3/10/18 VATS cx: Neg; AFB/fungal ngtd  3/10/18 Pericardial cx: Neg; AFB/fungal ngtd  3/13 RVP neg    Assessment/Plan   Leukocytosis  Pericardial and pleural effusions, path negative.  Follow cultures  Metastatic Colon cancer, in remission  Splenectomy    WBC stable (slightly better 14-->13) than yesterday and PCT negative.  I think this is a luekemoid reaction in setting of surgery, illness, splenectomy.  All cultures negative and looks wonderful.  Would be happy to reevaluate anytime you need.      Dez Butterfield MD  7:40 AM  03/14/18

## 2018-03-15 ENCOUNTER — APPOINTMENT (OUTPATIENT)
Dept: GENERAL RADIOLOGY | Facility: HOSPITAL | Age: 68
End: 2018-03-15

## 2018-03-15 LAB
ANION GAP SERPL CALCULATED.3IONS-SCNC: 6.9 MMOL/L
BASOPHILS # BLD AUTO: 0.06 10*3/MM3 (ref 0–0.2)
BASOPHILS NFR BLD AUTO: 0.5 % (ref 0–1.5)
BUN BLD-MCNC: 11 MG/DL (ref 8–23)
BUN/CREAT SERPL: 15.5 (ref 7–25)
CALCIUM SPEC-SCNC: 8.3 MG/DL (ref 8.6–10.5)
CCP IGA+IGG SERPL IA-ACNC: 7 UNITS (ref 0–19)
CHLORIDE SERPL-SCNC: 101 MMOL/L (ref 98–107)
CO2 SERPL-SCNC: 29.1 MMOL/L (ref 22–29)
CREAT BLD-MCNC: 0.71 MG/DL (ref 0.57–1)
DEPRECATED RDW RBC AUTO: 49.9 FL (ref 37–54)
EOSINOPHIL # BLD AUTO: 0.72 10*3/MM3 (ref 0–0.7)
EOSINOPHIL NFR BLD AUTO: 6.3 % (ref 0.3–6.2)
ERYTHROCYTE [DISTWIDTH] IN BLOOD BY AUTOMATED COUNT: 14.9 % (ref 11.7–13)
GFR SERPL CREATININE-BSD FRML MDRD: 100 ML/MIN/1.73
GLUCOSE BLD-MCNC: 137 MG/DL (ref 65–99)
GLUCOSE BLDC GLUCOMTR-MCNC: 142 MG/DL (ref 70–130)
GLUCOSE BLDC GLUCOMTR-MCNC: 230 MG/DL (ref 70–130)
GLUCOSE BLDC GLUCOMTR-MCNC: 252 MG/DL (ref 70–130)
GLUCOSE BLDC GLUCOMTR-MCNC: 260 MG/DL (ref 70–130)
HCT VFR BLD AUTO: 32.3 % (ref 35.6–45.5)
HGB BLD-MCNC: 10.3 G/DL (ref 11.9–15.5)
IMM GRANULOCYTES # BLD: 0.37 10*3/MM3 (ref 0–0.03)
IMM GRANULOCYTES NFR BLD: 3.2 % (ref 0–0.5)
LYMPHOCYTES # BLD AUTO: 2.37 10*3/MM3 (ref 0.9–4.8)
LYMPHOCYTES NFR BLD AUTO: 20.8 % (ref 19.6–45.3)
MCH RBC QN AUTO: 29 PG (ref 26.9–32)
MCHC RBC AUTO-ENTMCNC: 31.9 G/DL (ref 32.4–36.3)
MCV RBC AUTO: 91 FL (ref 80.5–98.2)
MONOCYTES # BLD AUTO: 1.78 10*3/MM3 (ref 0.2–1.2)
MONOCYTES NFR BLD AUTO: 15.6 % (ref 5–12)
NEUTROPHILS # BLD AUTO: 6.09 10*3/MM3 (ref 1.9–8.1)
NEUTROPHILS NFR BLD AUTO: 53.6 % (ref 42.7–76)
PLATELET # BLD AUTO: 632 10*3/MM3 (ref 140–500)
PMV BLD AUTO: 9.1 FL (ref 6–12)
POTASSIUM BLD-SCNC: 3.6 MMOL/L (ref 3.5–5.2)
RBC # BLD AUTO: 3.55 10*6/MM3 (ref 3.9–5.2)
SODIUM BLD-SCNC: 137 MMOL/L (ref 136–145)
TSPOT INTERPRETATION: NEGATIVE
TSPOT NIL CONTROL: 0
TSPOT PANEL A: 0
TSPOT PANEL B: 0
TSPOT POS CONTROL: 87
WBC NRBC COR # BLD: 11.39 10*3/MM3 (ref 4.5–10.7)

## 2018-03-15 PROCEDURE — 25010000002 HEPARIN (PORCINE) PER 1000 UNITS: Performed by: THORACIC SURGERY (CARDIOTHORACIC VASCULAR SURGERY)

## 2018-03-15 PROCEDURE — 82962 GLUCOSE BLOOD TEST: CPT

## 2018-03-15 PROCEDURE — 85025 COMPLETE CBC W/AUTO DIFF WBC: CPT | Performed by: HOSPITALIST

## 2018-03-15 PROCEDURE — 99024 POSTOP FOLLOW-UP VISIT: CPT | Performed by: NURSE PRACTITIONER

## 2018-03-15 PROCEDURE — 63710000001 INSULIN ASPART PER 5 UNITS: Performed by: HOSPITALIST

## 2018-03-15 PROCEDURE — 71045 X-RAY EXAM CHEST 1 VIEW: CPT

## 2018-03-15 PROCEDURE — 94799 UNLISTED PULMONARY SVC/PX: CPT

## 2018-03-15 PROCEDURE — 94762 N-INVAS EAR/PLS OXIMTRY CONT: CPT

## 2018-03-15 PROCEDURE — 80048 BASIC METABOLIC PNL TOTAL CA: CPT | Performed by: HOSPITALIST

## 2018-03-15 PROCEDURE — 97110 THERAPEUTIC EXERCISES: CPT

## 2018-03-15 RX ADMIN — FERROUS SULFATE TAB 325 MG (65 MG ELEMENTAL FE) 325 MG: 325 (65 FE) TAB at 08:18

## 2018-03-15 RX ADMIN — LINAGLIPTIN 5 MG: 5 TABLET, FILM COATED ORAL at 08:18

## 2018-03-15 RX ADMIN — HEPARIN SODIUM 5000 UNITS: 5000 INJECTION, SOLUTION INTRAVENOUS; SUBCUTANEOUS at 16:09

## 2018-03-15 RX ADMIN — HEPARIN SODIUM 5000 UNITS: 5000 INJECTION, SOLUTION INTRAVENOUS; SUBCUTANEOUS at 08:17

## 2018-03-15 RX ADMIN — HYDROCHLOROTHIAZIDE 12.5 MG: 12.5 CAPSULE, GELATIN COATED ORAL at 08:18

## 2018-03-15 RX ADMIN — METOPROLOL TARTRATE 25 MG: 25 TABLET ORAL at 21:40

## 2018-03-15 RX ADMIN — Medication 400 MG: at 08:18

## 2018-03-15 RX ADMIN — LOSARTAN POTASSIUM 100 MG: 100 TABLET ORAL at 08:17

## 2018-03-15 RX ADMIN — Medication 10 ML: at 21:40

## 2018-03-15 RX ADMIN — HEPARIN SODIUM 5000 UNITS: 5000 INJECTION, SOLUTION INTRAVENOUS; SUBCUTANEOUS at 22:42

## 2018-03-15 RX ADMIN — INSULIN ASPART 4 UNITS: 100 INJECTION, SOLUTION INTRAVENOUS; SUBCUTANEOUS at 11:55

## 2018-03-15 RX ADMIN — ALLOPURINOL 100 MG: 100 TABLET ORAL at 08:18

## 2018-03-15 RX ADMIN — Medication 10 ML: at 08:18

## 2018-03-15 RX ADMIN — METOPROLOL TARTRATE 25 MG: 25 TABLET ORAL at 08:18

## 2018-03-15 RX ADMIN — INSULIN ASPART 4 UNITS: 100 INJECTION, SOLUTION INTRAVENOUS; SUBCUTANEOUS at 17:49

## 2018-03-15 RX ADMIN — HEPARIN SODIUM 5000 UNITS: 5000 INJECTION, SOLUTION INTRAVENOUS; SUBCUTANEOUS at 00:00

## 2018-03-15 RX ADMIN — INSULIN ASPART 3 UNITS: 100 INJECTION, SOLUTION INTRAVENOUS; SUBCUTANEOUS at 21:37

## 2018-03-15 NOTE — PLAN OF CARE
Problem: Patient Care Overview  Goal: Plan of Care Review  Outcome: Ongoing (interventions implemented as appropriate)   03/15/18 1039   Coping/Psychosocial   Plan of Care Reviewed With patient   OTHER   Outcome Summary Pt increased ambulation distance. Able to ambulate w/o use of AD. Plans for d/c home w/ family and has no questions for PT at this time. Pt has been up ambulating w/ family.

## 2018-03-15 NOTE — PROGRESS NOTES
[facility]     LOS: 8 days     Subjective:  No Chest pain  last chest removed  No soa  Appetite is better  Some coughing    Objective:    Vital Signs:  Temp:  [97.8 °F (36.6 °C)-98.4 °F (36.9 °C)] 97.9 °F (36.6 °C)  Heart Rate:  [0-119] 86  Resp:  [16-18] 18  BP: (114-131)/(64-81) 114/69    SpO2:  [88 %-96 %] 94 %  on  Flow (L/min):  [2] 2;   Device (Oxygen Therapy): room air  Body mass index is 37.27 kg/m².    Physical Exam   Constitutional: She is oriented to person, place, and time. She appears well-developed and well-nourished.   HENT:   Head: Normocephalic and atraumatic.   Eyes: EOM are normal. Pupils are equal, round, and reactive to light.   Neck: Normal range of motion. Neck supple. No tracheal deviation present. No thyromegaly present.   Cardiovascular: Normal rate and regular rhythm.  Exam reveals no gallop and no friction rub.    No murmur heard.  Pulmonary/Chest: Effort normal and breath sounds normal. No respiratory distress. She has no wheezes.   Abdominal: Soft. She exhibits no distension. There is no tenderness.   Musculoskeletal: Normal range of motion. She exhibits no edema or deformity.   Neurological: She is alert and oriented to person, place, and time.   Skin: Skin is warm and dry. No erythema. No pallor.   Psychiatric: She has a normal mood and affect. Her behavior is normal.       Results Review:    Glucose   Date Value Ref Range Status   03/15/2018 137 (H) 65 - 99 mg/dL Final   03/14/2018 153 (H) 65 - 99 mg/dL Final       Results from last 7 days  Lab Units 03/15/18  0617   WBC 10*3/mm3 11.39*   HEMOGLOBIN g/dL 10.3*   HEMATOCRIT % 32.3*   PLATELETS 10*3/mm3 632*       Results from last 7 days  Lab Units 03/15/18  0617  03/09/18  0408   SODIUM mmol/L 137  < > 139   POTASSIUM mmol/L 3.6  < > 3.9   CHLORIDE mmol/L 101  < > 103   CO2 mmol/L 29.1*  < > 25.1   BUN mg/dL 11  < > 12   CREATININE mg/dL 0.71  < > 0.83   CALCIUM mg/dL 8.3*  < > 8.6   BILIRUBIN mg/dL  --    --  0.7   ALK PHOS U/L  --   --  365*   ALT (SGPT) U/L  --   --  31   AST (SGOT) U/L  --   --  32   GLUCOSE mg/dL 137*  < > 77   < > = values in this interval not displayed.    Results from last 7 days  Lab Units 03/10/18  0440   INR  1.16*   APTT seconds 52.9*       Results from last 7 days  Lab Units 03/10/18  0440   MAGNESIUM mg/dL 2.1         Cultures:       I have reviewed daily medications and changes in CPOE    Scheduled meds    allopurinol 100 mg Oral Daily   docusate sodium 100 mg Oral BID   ferrous sulfate 325 mg Oral Daily   fluticasone 2 spray Nasal Daily   heparin (porcine) 5,000 Units Subcutaneous Q8H   losartan 100 mg Oral Q24H   And      Hydrochlorothiazide Oral 12.5 mg Oral Daily   insulin aspart 0-7 Units Subcutaneous 4x Daily With Meals & Nightly   insulin detemir 25 Units Subcutaneous Nightly   linagliptin 5 mg Oral Daily   magnesium oxide 400 mg Oral Daily   metoprolol tartrate 25 mg Oral Q12H   Or      metoprolol tartrate 5 mg Intravenous Q12H   polyethylene glycol 17 g Oral Daily   sennosides-docusate sodium 2 tablet Oral Nightly   sodium chloride 10 mL Intracatheter Q12H         bupivacaine (MARCAINE) 0.25% in elastomeric 4 mL/hr (ON-Q PUMP) 300 mL 4 mL/hr Last Rate: 4 mL/hr (03/14/18 1439)     PRN meds  •  acetaminophen  •  bisacodyl  •  dextrose  •  dextrose  •  glucagon (human recombinant)  •  heparin flush (porcine)  •  HYDROcodone-acetaminophen  •  HYDROcodone-acetaminophen  •  LORazepam  •  magnesium hydroxide  •  meclizine  •  naloxone  •  nitroglycerin  •  ondansetron **OR** ondansetron ODT **OR** ondansetron  •  sodium chloride  •  sodium chloride  •  sodium chloride  •  sodium chloride      Principal Problem:    Pericardial effusion  Active Problems:    Essential hypertension    Primary malignant neoplasm of colon    Type 2 diabetes mellitus with complication, with long-term current use of insulin    Liver metastasis    Shortness of breath    Pleural  effusion        Assessment/Plan:  - Pericardial/Pleural Effusion (Loculated): sp L VATS, decortication, and pericardial window 03/10. Chest tube in place. PCA for pain control.   Cx and fungal smears are no growth.   Pathology shows acute on chronic inflammation.   WBC still elevated but afebrile.   Repeat CXR this am is reviewed and stable.   Cardiology and CT Surgery following.  -Further w/u: tspot negative, ccp negative,  Id consult  -d/w hem/onc and the chemo not a likely cause of the effusion    -leukocytosis, check a procal, temps normal, not currently on antibiotics    - Colon Cancer/Metastatic to Liver: Follow up in 5 weeks with CBC group after discharge.  Currently oncologically appears stable; wbc and and plts high    - DM2: A1c 7.7. Levemir and SSI. bs elevated, increase the levemir    - HTN: HCTZ/Losartan; currently stable     - Disposition: maybe tomorrow per CTS        Dirk Mullins MD  03/15/18  6:24 PM

## 2018-03-15 NOTE — PROGRESS NOTES
"      Chief Complaint: Postoperative management  S/P: BRONCHOSCOPY, LEFT THORACOSCOPY VIDEO ASSISTED WITH PERICARDIAL WINDOW, SUBPLEURAL CATHETERS FOR PAIN CONTROL  POD # 5    Subjective:  Symptoms:  Stable.  (Patient is doing well this morning.  She has no new problems or concerns.  Pain is controlled.  She has no worsening shortness of breath.).        Vital Signs:  Temp:  [97.8 °F (36.6 °C)-98.4 °F (36.9 °C)] 98 °F (36.7 °C)  Heart Rate:  [50-93] 82  Resp:  [16-18] 18  BP: (118-131)/(60-81) 127/81    Intake & Output (last day)       03/14 0701 - 03/15 0700 03/15 0701 - 03/16 0700    P.O. 960     Total Intake(mL/kg) 960 (8.4)     Urine (mL/kg/hr) 400 (0.1)     Drains      Stool 0 (0)     Chest Tube 40 (0)     Total Output 440      Net +520            Unmeasured Urine Occurrence 3 x     Unmeasured Stool Occurrence 2 x           Objective:  General Appearance:  Comfortable and in no acute distress.    Vital signs: (most recent): Blood pressure 127/81, pulse 82, temperature 98 °F (36.7 °C), temperature source Oral, resp. rate 18, height 175.3 cm (69\"), weight 114 kg (252 lb 6.4 oz), SpO2 92 %, not currently breastfeeding.  No fever.    Lungs:  Normal effort and normal respiratory rate.  Breath sounds clear to auscultation.    Heart: Normal rate.  Regular rhythm.  No murmur.   Abdomen: Abdomen is soft and non-distended.    Extremities: Normal range of motion.  There is no dependent edema.    Neurological: Patient is alert and oriented to person, place and time.    Skin:  Warm and dry.              Chest tube:   Site: Left, Clean, Dry and Intact  Suction: waterseal  Air Leak: negative  24 Hour Total: 40    Results Review:     I reviewed the patient's new clinical results.  I reviewed the patient's new imaging results and agree with the interpretation.    Imaging Results (last 24 hours)     Procedure Component Value Units Date/Time    XR Chest 1 View [[id number]] Collected:  03/15/18 0738     Updated:  03/15/18 0738    " Narrative:       CLINICAL HISTORY: 67-year-old female 5 days postop left video-assisted  thoracoscopy with pericardial window.     PORTABLE AP SEMIERECT CHEST DATED 03/15/2018 AT 0601 HOURS     FINDINGS: When compared to the exam dated 03/14/2018 at 0513 hours,  there has been removal of one of the left chest tubes. A single left  chest tube remains at the lower lateral left chest. A 3 mm superolateral  left apical pneumothorax is present. Cardiomegaly is present. Prominence  of perihilar pulmonary vasculature and interstitial markings remains.  There is similar bibasilar patchy atelectasis or infiltrate. Lung  volumes are slightly increased. Trace blunting of left costophrenic  angle is present. Monitoring lead wires and oxygen cannula tubing are  present. Degenerative change in the spine and right acromioclavicular  joint is present. Aortic calcification is again noted.     CONCLUSION: There is an approximately 3 mm thick superolateral left  apical pneumothorax following removal of one of the previous dual left  chest tubes. A single lower lateral left chest tube remains. Mild  cardiomegaly, pulmonary vascular engorgement, and bibasilar airspace  opacities are present.       XR Chest 1 View [[id number]] Collected:  03/14/18 0715     Updated:  03/15/18 0505    Narrative:       PORTABLE CHEST X-RAY     CLINICAL HISTORY: Chest tube management; R06.02-Shortness of breath;  I31.3-Pericardial effusion (noninflammatory); C46-Yjqitzt effusion, not  elsewhere classified; C18.9-Malignant neoplasm of colon, unspecified;  R26.2-Difficulty in walking, not elsewhere classified.     COMPARISON: 03/13/2018.     FINDINGS: Portable AP view of the chest was obtained with overlying  monitor leads in place. Life support lines are unchanged. There is some  mild subcutaneous emphysema on the left side but a definite significant  pneumothorax is not appreciated. Lungs are poorly aerated with basilar  atelectasis and probable small  effusions. Borderline cardiomegaly which  may be accentuated by poor inspiration. Vascularity felt to be normal  considering poor inspiration.               Impression:          Stable appearance of the chest by portable radiography.        This report was finalized on 3/15/2018 5:02 AM by Farzad Colunga MD.             Lab Results:     Lab Results (last 24 hours)     Procedure Component Value Units Date/Time    Fungus Culture - Body Fluid, Pericardium [[id number]] Collected:  03/10/18 0835    Specimen:  Body Fluid from Pericardium Updated:  03/15/18 1031     Fungus Culture No fungus isolated at less than 1 week    AFB Culture - Body Fluid, Pericardium [[id number]]  (Normal) Collected:  03/10/18 0835    Specimen:  Body Fluid from Pericardium Updated:  03/15/18 1031     AFB Culture No AFB isolated at less than 1 week     AFB Stain No acid fast bacilli seen on direct smear    Fungus Culture - Tissue, Pericardium [[id number]] Collected:  03/10/18 0852    Specimen:  Tissue from Pericardium Updated:  03/15/18 1031     Fungus Culture No fungus isolated at less than 1 week    AFB Culture - Tissue, Pericardium [[id number]]  (Normal) Collected:  03/10/18 0852    Specimen:  Tissue from Pericardium Updated:  03/15/18 1031     AFB Culture No AFB isolated at less than 1 week     AFB Stain No acid fast bacilli seen on direct smear    POC Glucose Once [[id number]]  (Abnormal) Collected:  03/15/18 0755    Specimen:  Blood Updated:  03/15/18 0757     Glucose 142 (H) mg/dL     Narrative:       Meter: [id number] : [id number] Millydesi Ford NITHYA    Basic Metabolic Panel [[id number]]  (Abnormal) Collected:  03/15/18 0617    Specimen:  Blood Updated:  03/15/18 0753     Glucose 137 (H) mg/dL      BUN 11 mg/dL      Creatinine 0.71 mg/dL      Sodium 137 mmol/L      Potassium 3.6 mmol/L      Chloride 101 mmol/L      CO2 29.1 (H) mmol/L      Calcium 8.3 (L) mg/dL      eGFR  African Amer 100 mL/min/1.73      BUN/Creatinine Ratio 15.5     Anion Gap  6.9 mmol/L     Narrative:       GFR Normal >60  Chronic Kidney Disease <60  Kidney Failure <15    CBC & Differential [[id number]] Collected:  03/15/18 0617    Specimen:  Blood Updated:  03/15/18 0730    Narrative:       The following orders were created for panel order CBC & Differential.  Procedure                               Abnormality         Status                     ---------                               -----------         ------                     CBC Auto Differential[[id number]]        Abnormal            Final result                 Please view results for these tests on the individual orders.    CBC Auto Differential [[id number]]  (Abnormal) Collected:  03/15/18 0617    Specimen:  Blood Updated:  03/15/18 0730     WBC 11.39 (H) 10*3/mm3      RBC 3.55 (L) 10*6/mm3      Hemoglobin 10.3 (L) g/dL      Hematocrit 32.3 (L) %      MCV 91.0 fL      MCH 29.0 pg      MCHC 31.9 (L) g/dL      RDW 14.9 (H) %      RDW-SD 49.9 fl      MPV 9.1 fL      Platelets 632 (H) 10*3/mm3      Neutrophil % 53.6 %      Lymphocyte % 20.8 %      Monocyte % 15.6 (H) %      Eosinophil % 6.3 (H) %      Basophil % 0.5 %      Immature Grans % 3.2 (H) %      Neutrophils, Absolute 6.09 10*3/mm3      Lymphocytes, Absolute 2.37 10*3/mm3      Monocytes, Absolute 1.78 (H) 10*3/mm3      Eosinophils, Absolute 0.72 (H) 10*3/mm3      Basophils, Absolute 0.06 10*3/mm3      Immature Grans, Absolute 0.37 (H) 10*3/mm3     Cyclic Citrul Peptide Antibody, IgG / IgA [[id number]] Collected:  03/13/18 1242    Specimen:  Blood Updated:  03/15/18 0618     CCP Antibodies IgG/IgA 7 units      Comment:                           Negative               <20                            Weak positive      20 - 39                            Moderate positive  40 - 59                            Strong positive        >59       Narrative:       Performed at:  [address] [place] [address]  [id number]  : Andrew Foster MD, Phone:   [phone]    POC Glucose Once [[id number]]  (Abnormal) Collected:  03/14/18 2039    Specimen:  Blood Updated:  03/14/18 2041     Glucose 210 (H) mg/dL     Narrative:       Meter: [id number] : [id number] Rex Lackey RN    POC Glucose Once [[id number]]  (Abnormal) Collected:  03/14/18 1611    Specimen:  Blood Updated:  03/14/18 1631     Glucose 206 (H) mg/dL     Narrative:       Meter: [id number] : [id number] Keven BARRIGA    POC Glucose Once [[id number]]  (Abnormal) Collected:  03/14/18 1143    Specimen:  Blood Updated:  03/14/18 1146     Glucose 189 (H) mg/dL     Narrative:       Meter: [id number] : [id number] Milly BARRIGA           Assessment/Plan     Principal Problem:    Pericardial effusion  Active Problems:    Essential hypertension    Primary malignant neoplasm of colon    Type 2 diabetes mellitus with complication, with long-term current use of insulin    Liver metastasis    Shortness of breath    Pleural effusion       Assessment:    Condition: In stable condition.  Improving.   (Stable post-op course.  HD stable. ).     Plan:   Encourage ambulation.  Start/continue incentive spirometry.  X-rays as ordered.  (Chest x-ray stable.  She has no airleak noted from chest tube.  Removed final chest tube today.  Repeat CXR PA and lateral in am.  Continue current treatment plan.  Pulmonary hygiene, incentive spirometer, increase activity, and pain management.  If chest x-ray is stable in the a.m., she will be cleared for discharge tomorrow per thoracic surgery standpoint.).       Ting Hill, APRN  [facility]  03/15/18  10:42 AM

## 2018-03-15 NOTE — PLAN OF CARE
Problem: Patient Care Overview  Goal: Plan of Care Review  Outcome: Ongoing (interventions implemented as appropriate)   03/15/18 0621 03/15/18 1715 03/15/18 1719   Coping/Psychosocial   Plan of Care Reviewed With --  --  patient;family   Plan of Care Review   Progress improving --  --    OTHER   Outcome Summary --  VSS, no complaints of pain, chest tube removed. Up ad codi and ambulating with family. Small blister on chest noted, cleaned, and applied barrier cream. Overnight sleep ox study order and CXR in AM with probable D/C 3/16 --

## 2018-03-15 NOTE — PLAN OF CARE
Problem: Cardiac Output, Decreased (Adult)  Goal: Adequate Cardiac Output/Effective Tissue Perfusion  Outcome: Ongoing (interventions implemented as appropriate)      Problem: Respiratory Insufficiency (Adult)  Goal: Acid/Base Balance  Outcome: Ongoing (interventions implemented as appropriate)    Goal: Effective Ventilation  Outcome: Ongoing (interventions implemented as appropriate)      Problem: Patient Care Overview  Goal: Plan of Care Review  Outcome: Ongoing (interventions implemented as appropriate)      Problem: Fall Risk (Adult)  Goal: Identify Related Risk Factors and Signs and Symptoms  Outcome: Outcome(s) achieved Date Met: 03/15/18    Goal: Absence of Fall  Outcome: Outcome(s) achieved Date Met: 03/15/18

## 2018-03-15 NOTE — THERAPY TREATMENT NOTE
Acute Care - Physical Therapy Treatment Note  [facility]     Patient Name: Feli DelT oro  : 1950  MRN: [id number]  Today's Date: 3/15/2018  Onset of Illness/Injury or Date of Surgery: 18          Admit Date: 3/7/2018    Visit Dx:    ICD-10-CM ICD-9-CM   1. Shortness of breath R06.02 786.05   2. Pericardial effusion I31.3 423.9   3. Pleural effusion J90 511.9   4. Primary malignant neoplasm of colon C18.9 153.9   5. Difficulty walking R26.2 719.7     Patient Active Problem List   Diagnosis   • Hyperlipidemia   • Essential hypertension   • Allergic rhinitis due to pollen   • Vitamin D deficiency   • Morbid obesity due to excess calories   • Encounter for adjustment or management of vascular access device   • Primary malignant neoplasm of colon   • Iron deficiency anemia   • Breast lesion   • Peripheral neuropathy due to chemotherapy   • Nonproliferative diabetic retinopathy   • Type 2 diabetes mellitus with microalbuminuria   • Osteomyelitis of toe of left foot   • Sepsis   • Type 2 diabetes mellitus with peripheral neuropathy   • Type 2 diabetes mellitus with complication, with long-term current use of insulin   • Obesity (BMI 30-39.9)   • Liver metastasis   • Elevated liver enzymes   • Chemotherapy induced nausea and vomiting   • Shortness of breath   • Pericardial effusion   • Pleural effusion       Therapy Treatment    Therapy Treatment / Health Promotion    Treatment Time/Intention  Discipline: physical therapy assistant (Erica Paige PTA)  Document Type: therapy note (daily note) (Erica Paige PTA)  Subjective Information: no complaints (Erica Paige PTA)  Patient Effort: good (Erica Paige PTA)  Plan of Care Review  Plan of Care Reviewed With: patient (Erica Paige PTA)    Vitals/Pain/Safety  Vital Signs  Pre SpO2 (%): 93 (Erica Paige PTA)  O2 Delivery Pre Treatment: room air (Erica Paige PTA)  Intra SpO2 (%): 87 (Erica Paige PTA)  O2 Delivery Intra  Treatment: room air (Erica Paige PTA)  Post SpO2 (%): 90 (Erica Paige PTA)  O2 Delivery Post Treatment: room air (Erica Paige PTA)  Pain Scale: Numbers Pre/Post-Treatment  Pain Scale: Numbers, Pretreatment: 0/10 - no pain (Erica Paige PTA)  Safety Issues, Functional Mobility  Impairments Affecting Function (Mobility): endurance/activity tolerance (Erica Paige PTA)  Positioning and Restraints  Pre-Treatment Position: in bed (Erica Paige PTA)  Post Treatment Position: chair (Erica Paige PTA)  In Chair: reclined, call light within reach, encouraged to call for assist (Erica Paige PTA)    Mobility,ADL,Motor, Modality  Bed Mobility Assessment/Treatment  Supine-Sit [place] (Bed Mobility): conditional independence (Erica Paige PTA)  Sit-Supine [place] (Bed Mobility): not tested (Pt up in chair) (Erica Paige PTA)  Assistive Device (Bed Mobility): head of bed elevated (Erica Paige PTA)  Transfer Assessment/Treatment  Comment (Transfers): No AD used for transfers (Erica Paige PTA)  Sit-Stand Transfer  Sit-Stand [place] (Transfers): supervision (Erica Paige PTA)  Stand-Sit Transfer  Stand-Sit [place] (Transfers): supervision (Erica Paige PTA)  Gait/Stairs Assessment/Training  [place] Level (Gait): supervision (Erica Paige PTA)  Distance in Feet (Gait): 300 (Erica Paige PTA)  Pattern (Gait): swing-through (Erica Paige PTA)  Deviations/Abnormal Patterns (Gait): bang decreased, gait speed decreased (Erica Paige PTA)  Comment (Gait/Stairs): Pt initially required HHA, but balance improved w/ increased distance (Erica Paige PTA)                 ROM/MMT             Sensory, Edema, Orthotics          Cognition, Communication, Swallow  Cognitive Assessment/Intervention- PT/OT  Orientation Status (Cognition): oriented x 4 (Erica Paige PTA)  Follows Commands (Cognition): WNL (Erica Paige PTA)  Personal  Safety Interventions: fall prevention program maintained, nonskid shoes/slippers when out of bed (Erica Paige PTA)  Speaking Valve  Pre SpO2 (%): 93 (Erica Paige PTA)  Post SpO2 (%): 90 (Erica Paige PTA)  General Eating/Swallowing Observations  Pre SpO2 (%): 93 (Erica Paige PTA)  Post SpO2 (%): 90 (Erica Paige PTA)    Outcome Summary  Outcome Summary/Treatment Plan (PT)  Anticipated Discharge Disposition (PT): home or self care (Erica Paige PTA)            PT Rehab Goals     Row Name 03/15/18 1002 03/11/18 1140          Bed Mobility Goal 1 (PT)    Activity/Assistive Device (Bed Mobility Goal 1, PT) supine to sit  -RW bed mobility activities, all  -CH     [place] Level/Cues Needed (Bed Mobility Goal 1, PT) conditional independence   goal achieved  -RW conditional independence  -CH     Time Frame (Bed Mobility Goal 1, PT)  -- 1 week  -CH        Transfer Goal 1 (PT)    Activity/Assistive Device (Transfer Goal 1, PT) transfers, all  -RW transfers, all  -CH     [place] Level/Cues Needed (Transfer Goal 1, PT) supervision required   goal achieved  -RW supervision required  -CH     Time Frame (Transfer Goal 1, PT)  -- 1 week  -CH        Gait Training Goal 1 (PT)    [place] Level (Gait Training Goal 1, PT) supervision required  -RW supervision required  -CH     Distance (Gait Goal 1, PT) 300   goal achieved  -  -CH     Time Frame (Gait Training Goal 1, PT)  -- 1 week  -CH       User Key  (r) = Recorded By, (t) = Taken By, (c) = Cosigned By    Initials Name Provider Type    CH Julia Sandhu, PT Physical Therapist    BRAYAN Paige PTA Physical Therapy Assistant          Physical Therapy Education     Title: PT OT SLP Therapies (Resolved)     Topic: Physical Therapy (Resolved)     Point: Mobility training (Resolved)    Learning Progress Summary     Learner Status Readiness Method Response Comment Documented by    Patient Done Acceptance E MJ  RW 03/15/18 1040     Done  Acceptance E,TB,D VU   03/13/18 1329     Done Acceptance E,D VU,DU  PV 03/12/18 1449     Done Acceptance E,TB,D VU,NR  CH 03/11/18 1156    Family Done Acceptance E,TB,D VU,NR   03/11/18 1156          Point: Body mechanics (Resolved)    Learning Progress Summary     Learner Status Readiness Method Response Comment Documented by    Patient Done Acceptance E VU   03/15/18 1040     Done Acceptance E,TB,D VU   03/13/18 1329     Done Acceptance E,D VU,DU  PV 03/12/18 1449     Done Acceptance E,TB,D VU,NR  CH 03/11/18 1156    Family Done Acceptance E,TB,D VU,NR   03/11/18 1156          Point: Precautions (Resolved)    Learning Progress Summary     Learner Status Readiness Method Response Comment Documented by    Patient Done Acceptance E VU   03/15/18 1040     Done Acceptance E,TB,D VU   03/13/18 1329     Done Acceptance E,D VU,DU  PV 03/12/18 1449     Done Acceptance E,TB,D VU,NR   03/11/18 1156    Family Done Acceptance E,TB,D VU,NR   03/11/18 1156                      User Key     Initials Effective Dates Name Provider Type Discipline     12/01/15 -  Julia Sandhu, PT Physical Therapist PT     03/07/18 -  Erica Paige, PTA Physical Therapy Assistant PT     03/07/18 -  Howard Hawthorne, PT Student PT Student PT                    PT Recommendation and Plan  Anticipated Discharge Disposition (PT): home or self care  Therapy Frequency (PT Clinical Impression): 3 times/wk  Outcome Summary/Treatment Plan (PT)  Anticipated Discharge Disposition (PT): home or self care  Plan of Care Reviewed With: patient  Outcome Summary: Pt increased ambulation distance. Able to ambulate w/o use of AD. Plans for d/c home w/ family and has no questions for PT at this time. Pt has been up ambulating w/ family.           Outcome Measures     Row Name 03/15/18 1002 03/13/18 1320 03/12/18 1400       How much help from another person do you currently need...    Turning from your back to your side while in flat bed without  using bedrails? 4  -RW 3  -RW 3  -KH (r) PV (t) KH (c)    Moving from lying on back to sitting on the side of a flat bed without bedrails? 3  -RW 3  -RW 3  -KH (r) PV (t) KH (c)    Moving to and from a bed to a chair (including a wheelchair)? 4  -RW 4  -RW 3  -KH (r) PV (t) KH (c)    Standing up from a chair using your arms (e.g., wheelchair, bedside chair)? 4  -RW 4  -RW 4  -KH (r) PV (t) KH (c)    Climbing 3-5 steps with a railing? 3  -RW 3  -RW 2  -KH (r) PV (t) KH (c)    To walk in hospital room? 4  -RW 4  -RW 4  -KH (r) PV (t) KH (c)    AM-PAC 6 Clicks Score 22  -RW 21  -RW 19  -KH (r) PV (t)       Functional Assessment    Outcome Measure Options AM-PAC 6 Clicks Basic Mobility (PT)  -RW AM-PAC 6 Clicks Basic Mobility (PT)  -RW AM-PAC 6 Clicks Basic Mobility (PT)  -KH (r) PV (t) KH (c)      User Key  (r) = Recorded By, (t) = Taken By, (c) = Cosigned By    Initials Name Provider Type     Louise Barraza, PT Physical Therapist    RW Erica Paige PTA Physical Therapy Assistant    BESSIE Hawthorne, PT Student PT Student           Time Calculation:         PT Charges     Row Name 03/15/18 1038             Time Calculation    Start Time 0951  -RW      Stop Time 1002  -RW      Time Calculation (min) 11 min  -RW      PT Received On 03/15/18  -RW        User Key  (r) = Recorded By, (t) = Taken By, (c) = Cosigned By    Initials Name Provider Type     Erica Paige PTA Physical Therapy Assistant          Therapy Charges for Today     Code Description Service Date Service Provider Modifiers Qty    90351006651 HC PT THER PROC EA 15 MIN 3/15/2018 Erica Paige PTA GP 1          PT G-Codes  Outcome Measure Options: AM-PAC 6 Clicks Basic Mobility (PT)    Erica Paige PTA  3/15/2018

## 2018-03-15 NOTE — PLAN OF CARE
Problem: Respiratory Insufficiency (Adult)  Goal: Effective Ventilation  Outcome: Ongoing (interventions implemented as appropriate)   03/15/18 0621   Respiratory Insufficiency (Adult)   Effective Ventilation making progress toward outcome       Problem: Patient Care Overview  Goal: Plan of Care Review  Outcome: Ongoing (interventions implemented as appropriate)   03/15/18 0621   Coping/Psychosocial   Plan of Care Reviewed With patient   Plan of Care Review   Progress improving

## 2018-03-15 NOTE — PROGRESS NOTES
Continued Stay Note  [facility]     Patient Name: Feli Del Toro  MRN: [id number]  Today's Date: 3/15/2018    Admit Date: 3/7/2018          Discharge Plan     Row Name 03/15/18 1511       Plan    Plan Home    Patient/Family in Agreement with Plan yes    Plan Comments Met with pt at bedside who confirms plan to return home at discharge, no known needs.  NEO Mckeon RN              Discharge Codes    No documentation.           Jenni Mckeon

## 2018-03-16 ENCOUNTER — APPOINTMENT (OUTPATIENT)
Dept: GENERAL RADIOLOGY | Facility: HOSPITAL | Age: 68
End: 2018-03-16

## 2018-03-16 VITALS
HEART RATE: 80 BPM | SYSTOLIC BLOOD PRESSURE: 125 MMHG | DIASTOLIC BLOOD PRESSURE: 73 MMHG | RESPIRATION RATE: 18 BRPM | OXYGEN SATURATION: 91 % | TEMPERATURE: 98 F | BODY MASS INDEX: 37.38 KG/M2 | WEIGHT: 252.4 LBS | HEIGHT: 69 IN

## 2018-03-16 LAB
ANION GAP SERPL CALCULATED.3IONS-SCNC: 8.8 MMOL/L
BASOPHILS # BLD AUTO: 0.06 10*3/MM3 (ref 0–0.2)
BASOPHILS NFR BLD AUTO: 0.5 % (ref 0–1.5)
BUN BLD-MCNC: 13 MG/DL (ref 8–23)
BUN/CREAT SERPL: 15.7 (ref 7–25)
CALCIUM SPEC-SCNC: 8.4 MG/DL (ref 8.6–10.5)
CHLORIDE SERPL-SCNC: 101 MMOL/L (ref 98–107)
CO2 SERPL-SCNC: 29.2 MMOL/L (ref 22–29)
CREAT BLD-MCNC: 0.83 MG/DL (ref 0.57–1)
DEPRECATED RDW RBC AUTO: 48.4 FL (ref 37–54)
EOSINOPHIL # BLD AUTO: 0.66 10*3/MM3 (ref 0–0.7)
EOSINOPHIL NFR BLD AUTO: 5.8 % (ref 0.3–6.2)
ERYTHROCYTE [DISTWIDTH] IN BLOOD BY AUTOMATED COUNT: 14.8 % (ref 11.7–13)
GFR SERPL CREATININE-BSD FRML MDRD: 83 ML/MIN/1.73
GLUCOSE BLD-MCNC: 88 MG/DL (ref 65–99)
GLUCOSE BLDC GLUCOMTR-MCNC: 166 MG/DL (ref 70–130)
GLUCOSE BLDC GLUCOMTR-MCNC: 87 MG/DL (ref 70–130)
GLUCOSE BLDC GLUCOMTR-MCNC: 97 MG/DL (ref 70–130)
HCT VFR BLD AUTO: 32.8 % (ref 35.6–45.5)
HGB BLD-MCNC: 10.4 G/DL (ref 11.9–15.5)
IMM GRANULOCYTES # BLD: 0.43 10*3/MM3 (ref 0–0.03)
IMM GRANULOCYTES NFR BLD: 3.8 % (ref 0–0.5)
LYMPHOCYTES # BLD AUTO: 2.28 10*3/MM3 (ref 0.9–4.8)
LYMPHOCYTES NFR BLD AUTO: 20 % (ref 19.6–45.3)
MCH RBC QN AUTO: 28.7 PG (ref 26.9–32)
MCHC RBC AUTO-ENTMCNC: 31.7 G/DL (ref 32.4–36.3)
MCV RBC AUTO: 90.4 FL (ref 80.5–98.2)
MONOCYTES # BLD AUTO: 1.89 10*3/MM3 (ref 0.2–1.2)
MONOCYTES NFR BLD AUTO: 16.6 % (ref 5–12)
NEUTROPHILS # BLD AUTO: 6.07 10*3/MM3 (ref 1.9–8.1)
NEUTROPHILS NFR BLD AUTO: 53.3 % (ref 42.7–76)
PLATELET # BLD AUTO: 539 10*3/MM3 (ref 140–500)
PMV BLD AUTO: 8.7 FL (ref 6–12)
POTASSIUM BLD-SCNC: 3.4 MMOL/L (ref 3.5–5.2)
POTASSIUM BLD-SCNC: 4.2 MMOL/L (ref 3.5–5.2)
RBC # BLD AUTO: 3.63 10*6/MM3 (ref 3.9–5.2)
SODIUM BLD-SCNC: 139 MMOL/L (ref 136–145)
WBC NRBC COR # BLD: 11.39 10*3/MM3 (ref 4.5–10.7)

## 2018-03-16 PROCEDURE — 63710000001 INSULIN ASPART PER 5 UNITS: Performed by: HOSPITALIST

## 2018-03-16 PROCEDURE — 25010000002 HEPARIN (PORCINE) PER 1000 UNITS: Performed by: THORACIC SURGERY (CARDIOTHORACIC VASCULAR SURGERY)

## 2018-03-16 PROCEDURE — 85025 COMPLETE CBC W/AUTO DIFF WBC: CPT | Performed by: HOSPITALIST

## 2018-03-16 PROCEDURE — 71046 X-RAY EXAM CHEST 2 VIEWS: CPT

## 2018-03-16 PROCEDURE — 25010000002 HEPARIN FLUSH (PORCINE) 100 UNIT/ML SOLUTION: Performed by: THORACIC SURGERY (CARDIOTHORACIC VASCULAR SURGERY)

## 2018-03-16 PROCEDURE — 84132 ASSAY OF SERUM POTASSIUM: CPT | Performed by: HOSPITALIST

## 2018-03-16 PROCEDURE — 99024 POSTOP FOLLOW-UP VISIT: CPT | Performed by: NURSE PRACTITIONER

## 2018-03-16 PROCEDURE — 80048 BASIC METABOLIC PNL TOTAL CA: CPT | Performed by: HOSPITALIST

## 2018-03-16 PROCEDURE — 82962 GLUCOSE BLOOD TEST: CPT

## 2018-03-16 RX ORDER — HYDROCODONE BITARTRATE AND ACETAMINOPHEN 7.5; 325 MG/1; MG/1
1 TABLET ORAL EVERY 6 HOURS PRN
Qty: 40 TABLET | Refills: 0 | Status: SHIPPED | OUTPATIENT
Start: 2018-03-16 | End: 2018-03-20

## 2018-03-16 RX ORDER — POTASSIUM CHLORIDE 1.5 G/1.77G
40 POWDER, FOR SOLUTION ORAL ONCE
Status: COMPLETED | OUTPATIENT
Start: 2018-03-16 | End: 2018-03-16

## 2018-03-16 RX ADMIN — LINAGLIPTIN 5 MG: 5 TABLET, FILM COATED ORAL at 08:25

## 2018-03-16 RX ADMIN — METOPROLOL TARTRATE 25 MG: 25 TABLET ORAL at 08:25

## 2018-03-16 RX ADMIN — HYDROCHLOROTHIAZIDE 12.5 MG: 12.5 CAPSULE, GELATIN COATED ORAL at 08:25

## 2018-03-16 RX ADMIN — HEPARIN SODIUM 5000 UNITS: 5000 INJECTION, SOLUTION INTRAVENOUS; SUBCUTANEOUS at 08:25

## 2018-03-16 RX ADMIN — Medication 400 MG: at 08:25

## 2018-03-16 RX ADMIN — Medication 500 UNITS: at 17:29

## 2018-03-16 RX ADMIN — LOSARTAN POTASSIUM 100 MG: 100 TABLET ORAL at 08:25

## 2018-03-16 RX ADMIN — Medication 10 ML: at 08:26

## 2018-03-16 RX ADMIN — FERROUS SULFATE TAB 325 MG (65 MG ELEMENTAL FE) 325 MG: 325 (65 FE) TAB at 08:25

## 2018-03-16 RX ADMIN — POTASSIUM CHLORIDE 40 MEQ: 1.5 POWDER, FOR SOLUTION ORAL at 13:45

## 2018-03-16 RX ADMIN — Medication 500 UNITS: at 06:19

## 2018-03-16 RX ADMIN — ALLOPURINOL 100 MG: 100 TABLET ORAL at 08:25

## 2018-03-16 RX ADMIN — INSULIN ASPART 2 UNITS: 100 INJECTION, SOLUTION INTRAVENOUS; SUBCUTANEOUS at 17:01

## 2018-03-16 NOTE — CONSULTS
Group: [facility]         CONSULT NOTE    Patient Identification:  Feli Del Toro  67 y.o.  female  1950  [id number]            Requesting physician: Dr. Mullins    Reason for Consultation:  Nocturnal desaturation    CC:     History of Present Illness:  Patient is pleasant 67-year-old female admitted with shortness of breath and pericardial effusion in the setting of metastatic colon cancer.  CT chest showed loculated pleural effusion.  Patient underwent VATS per thoracic surgery and had 3 chest tube placed.  Underlying history of primary colon cancer status post splenectomy.  Patient had overnight oximetry which showed 15 minutes of desaturation.  I walked her for exercise oximetry and rest oxygen saturation was 88% on room air.  Currently she denies any chest pain or shortness of breath.   reports snoring but no clear history of witnessed apneas as such.  She usually wakes up rested in the morning but somewhat gets sleepy as the day progresses.  Patient is a lifelong nonsmoker and denies any history of chronic lung disease.      Review of Systems  As above rest is negative  Past Medical History:  Past Medical History:   Diagnosis Date   • Anemia    • Cancer 06/04/2013   • Diabetes mellitus    • History of transfusion    • Hyperlipidemia    • Hypertension    • Metastatic colon cancer to liver    • Retinopathy    • Type 2 diabetes mellitus        Past Surgical History:  Past Surgical History:   Procedure Laterality Date   • ABDOMINAL SURGERY      ablation    • AMPUTATION DIGIT Left 1/1/2017    Procedure: LEFT SECOND TOE AMPUTATION;  Surgeon: Farzad Nichols MD;  Location: [facility] OR;  Service:    • AMPUTATION OF REPLICATED TOES      right distal second toe    • CARDIAC CATHETERIZATION N/A 3/8/2018    Procedure: Pericardiocentesis;  Surgeon: Andrew Dobbins MD;  Location: [facility] INVASIVE LOCATION;  Service:    • CATARACT EXTRACTION     • COLECTOMY PARTIAL / TOTAL  01/14/2014   •  HYSTERECTOMY  1998   • PERICARDIAL WINDOW N/A 3/10/2018    Procedure: PERICARDIAL WINDOW;  Surgeon: Nomi Patel III, MD;  Location: [facility] OR;  Service: Cardiothoracic   • PORTACATH PLACEMENT  06/2013   • SALPINGO OOPHORECTOMY Bilateral    • SPLENECTOMY     • THORACOSCOPY Left 3/10/2018    Procedure: BRONCHOSCOPY, LEFT THORACOSCOPY VIDEO ASSISTED, SUBPLEURAL CATHETERS FOR PAIN;  Surgeon: Nomi Patel III, MD;  Location: [facility] OR;  Service: Cardiothoracic        Home Meds:  Prescriptions Prior to Admission   Medication Sig Dispense Refill Last Dose   • allopurinol (ZYLOPRIM) 100 MG tablet Take 100 mg by mouth Daily.      • aspirin 81 MG tablet Take 81 mg by mouth daily.   Taking   • Biotin 5000 MCG tablet Take 5,000 mcg by mouth Daily.   Taking   • calcium carbonate (TUMS) 500 MG chewable tablet Chew 2 tablets As Needed.   Taking   • ferrous sulfate 325 (65 FE) MG tablet Take 1 tablet by mouth Daily.   Taking   • fluticasone (FLONASE) 50 MCG/ACT nasal spray 2 sprays into each nostril Daily.   Taking   • Insulin Lispro (HUMALOG KWIKPEN) 100 UNIT/ML solution pen-injector Inject 15 Units under the skin 3 (Three) Times a Day Before Meals. And sliding scale as directed      • LORazepam (ATIVAN) 0.5 MG tablet Take 1 tablet by mouth 2 (Two) Times a Day As Needed (nausea). 20 tablet 0 Taking   • magnesium oxide (MAG-OX) 400 MG tablet Take 400 mg by mouth Daily.      • meclizine (ANTIVERT) 25 MG tablet Take 25 mg by mouth 3 (Three) Times a Day As Needed.   Taking   • NOVOFINE 32G X 6 MM misc USE AS DIRECTED 5 TIMES DAILY AND AS NEEDED 200 each 3 Taking   • ondansetron (ZOFRAN) 8 MG tablet Take 1 tablet by mouth 3 (Three) Times a Day As Needed for Nausea or Vomiting. 30 tablet 5 Taking   • ONETOUCH VERIO test strip TEST 3-4 TIMES A  each 5 Taking   • SAXagliptin (ONGLYZA) 5 MG tablet Take 5 mg by mouth Daily.      • telmisartan-hydrochlorothiazide (MICARDIS HCT) 40-12.5 MG per tablet Take 1 tablet by  "mouth daily 90 tablet 3 Taking   • TOUJEO SOLOSTAR 300 UNIT/ML solution pen-injector Inject 45 Units under the skin Daily. 3 pen 0 Taking   • vitamin D (ERGOCALCIFEROL) 92416 UNITS capsule capsule Take 1 capsule by mouth 1 (One) Time Per Week. 13 capsule 3 Taking   • XIGDUO XR 5-1000 MG tablet sustained-release 24 hour Take 2 tablets by mouth Daily. 180 tablet 3 Taking       Allergies:  Allergies   Allergen Reactions   • Compazine [Prochlorperazine Edisylate] Other (See Comments)     Complete arch in the back    • Prochlorperazine Maleate Other (See Comments)     \"BACK MUSCLE RETRACTION\"       Social History:   Social History     Social History   • Marital status:      Spouse name: Jesus   • Number of children: N/A   • Years of education: College     Occupational History   • Middle School Counselor   Retired     [employer]     Social History Main Topics   • Smoking status: Never Smoker   • Smokeless tobacco: Never Used   • Alcohol use No   • Drug use: No   • Sexual activity: Defer     Other Topics Concern   • Not on file     Social History Narrative   • No narrative on file       Family History:  Family History   Problem Relation Age of Onset   • Heart attack Other    • Cancer Other    • Diabetes Other    • Hypertension Other    • Hypertension Father    • Diabetes Father    • Breast cancer Sister    • Diabetes Sister    • Stroke Paternal Grandmother    • Diabetes Paternal Grandmother    • Lupus Paternal Grandfather    • Stroke Paternal Grandfather    • Diabetes Sister        Physical Exam:  /73 (BP Location: Left arm, Patient Position: Sitting)   Pulse 80   Temp 98 °F (36.7 °C) (Oral)   Resp 18   Ht 175.3 cm (69\")   Wt 114 kg (252 lb 6.4 oz)   LMP  (LMP Unknown)   SpO2 91%   BMI 37.27 kg/m²  Body mass index is 37.27 kg/m². 91% 114 kg (252 lb 6.4 oz)  Physical Exam  Awake no distress no labored breathing  ENT Mallampati between 3 and 4  Neck is supple no bruit no adenopathy  Chest-breath sounds with " wheezing and crackles bilaterally on the bases left greater than right  CVS regular rate and rhythm no murmurs or gallop  Abdomen obese nontender  Extremities no edema no sinuses no clubbing CNS no deficits      LABS:  Lab Results   Component Value Date    CALCIUM 8.4 (L) 03/16/2018    PHOS 4.0 12/31/2016     Results from last 7 days  Lab Units 03/16/18  0609 03/15/18  0617 03/14/18  0517  03/12/18  0545  03/10/18  0440   MAGNESIUM mg/dL  --   --   --   --   --   --  2.1   SODIUM mmol/L 139 137 136  --  136  < > 134*   POTASSIUM mmol/L 3.4* 3.6 3.6  --  4.4  < > 4.0   CHLORIDE mmol/L 101 101 100  --  100  < > 98   CO2 mmol/L 29.2* 29.1* 29.1*  --  24.2  < > 25.6   BUN mg/dL 13 11 13  --  15  < > 12   CREATININE mg/dL 0.83 0.71 0.73  --  0.88  < > 0.87   GLUCOSE mg/dL 88 137* 153*  --  183*  < > 193*   CALCIUM mg/dL 8.4* 8.3* 8.1*  --  7.9*  < > 8.4*   WBC 10*3/mm3 11.39* 11.39* 13.46*  < > 14.16*  < > 13.18*   HEMOGLOBIN g/dL 10.4* 10.3* 10.2*  < > 10.7*  < > 11.2*   PLATELETS 10*3/mm3 539* 632* 609*  < > 505*  < > 577*   PROCALCITONIN ng/mL  --   --  0.15  --  0.21  --   --    < > = values in this interval not displayed.  Lab Results   Component Value Date    TROPONINT <0.010 03/07/2018           Results from last 7 days  Lab Units 03/10/18  0852 03/10/18  0835   BODYFLDCX   --  No growth at 3 days   GRAM STAIN RESULT  Moderate (3+) Red blood cells  No organisms seen Many (4+) Red blood cells  Rare (1+) WBCs seen  No organisms seen       Results from last 7 days  Lab Units 03/14/18  0517 03/12/18  0545   PROCALCITONIN ng/mL 0.15 0.21           Results from last 7 days  Lab Units 03/13/18  1243   ADENOVIRUS DETECTION BY PCR  Not Detected   CORONAVIRUS 229E  Not Detected   CORONAVIRUS HKU1  Not Detected   CORONAVIRUS NL63  Not Detected   CORONAVIRUS OC43  Not Detected   HUMAN METAPNEUMOVIRUS  Not Detected   HUMAN RHINOVIRUS/ENTEROVIRUS  Not Detected   INFLUENZA B PCR  Not Detected   PARAINFLUENZA 1  Not Detected    PARAINFLUENZA VIRUS 2  Not Detected   PARAINFLUENZA VIRUS 3  Not Detected   PARAINFLUENZA VIRUS 4  Not Detected   BORDETELLA PERTUSSIS PCR  Not Detected   CHLAMYDOPHILA PNEUMONIAE PCR  Not Detected   MYCOPLAMA PNEUMO PCR  Not Detected   INFLUENZA A PCR  Not Detected   INFLUENZA A H3  Not Detected   INFLUENZA A H1  Not Detected   RSV, PCR  Not Detected       Results from last 7 days  Lab Units 03/10/18  0440   INR  1.16*       Results from last 7 days  Lab Units 03/10/18  0852 03/10/18  0835   BODYFLDCX   --  No growth at 3 days   GRAM STAIN RESULT  Moderate (3+) Red blood cells  No organisms seen Many (4+) Red blood cells  Rare (1+) WBCs seen  No organisms seen     Lab Results   Component Value Date    TSH 1.080 03/12/2018     Estimated Creatinine Clearance: 88.6 mL/min (by C-G formula based on SCr of 0.83 mg/dL).         Imaging: I personally visualized the images of scans/x-rays performed within last 3 days.      Assessment:  Pericardial effusion  Nocturnal desaturation  Suspected ERICA  Status post VATS with bilateral pleural effusion  History of colon cancer with liver metastases  Diabetes mellitus        Recommendations:  At this point we have a female with bilateral pleural effusion status post VATS.  It appears pleural effusion is likely malignant although cytologies negative at this time.  She has nocturnal desaturation likely from recent VATS pleural effusion but also cannot rule out ERICA.  I reviewed her overnight oximetry and we will go ahead and start her on some oxygen with sleep only.  Educated patient on ERICA and sleep hygiene measures.  I will set her up for an outpatient sleep study also.  We will follow-up after sleep study            Ady Gutierrez MD  3/16/2018  1:38 PM      Much of this encounter note is an electronic transcription/translation of spoken language to printed text using Dragon Software.

## 2018-03-16 NOTE — DISCHARGE SUMMARY
Name: Feli Del Toro  Age: 67 y.o.  Sex: female  :  1950  MRN: [id number]         Primary Care Physician: Paola Onofre MD      Date of Admission:  3/7/2018  Date of Discharge:  3/16/2018      CHIEF COMPLAINT  Shortness of Breath (X1 WK AFTER HAVING CT SCAN DONE)      DISCHARGE DIAGNOSIS  Active Hospital Problems (** Indicates Principal Problem)    Diagnosis Date Noted   • **Pericardial effusion [I31.3] 2018   • Shortness of breath [R06.02] 2018   • Pleural effusion [J90] 2018   • Liver metastasis [C78.7] 2017   • Type 2 diabetes mellitus with complication, with long-term current use of insulin [E11.8, Z79.4] 2016   • Primary malignant neoplasm of colon [C18.9] 2016   • Essential hypertension [I10] 2015      Resolved Hospital Problems    Diagnosis Date Noted Date Resolved   No resolved problems to display.       SECONDARY DIAGNOSES  Past Medical History:   Diagnosis Date   • Anemia    • Cancer 2013   • Diabetes mellitus    • History of transfusion    • Hyperlipidemia    • Hypertension    • Metastatic colon cancer to liver    • Retinopathy    • Type 2 diabetes mellitus        CONSULTS   Consult Orders (all)     Start     Ordered    18 1210  Inpatient Pulmonology Consult  Once     Specialty:  Pulmonary Disease  Provider:  Vivian Romeo MD    18 1210    18 1039  Inpatient Infectious Diseases Consult  Once     Specialty:  Infectious Diseases  Provider:  Regino Montemayor MD    18 1038    18 0933  Hematology & Oncology Inpatient Consult  Once     Specialty:  Hematology and Oncology  Provider:  Jesus Griffin Jr., MD    18 0933    18 0801  Inpatient Thoracic Surgery Consult  Once     Specialty:  Thoracic Surgery  Provider:  Nomi Patel III, MD    18 0801    18 2153  Inpatient Consult to Cardiology  Once     Specialty:  Cardiology  Provider:  Lauren Hodge MD    18 2154    18  1721  [facility] (on-call MD unless specified)  Once     Specialty:  Internal Medicine  Provider:  Jacob Morgan MD    03/07/18 1720    03/07/18 1616  [facility] (on-call MD unless specified)  Once     Specialty:  Cardiology  Provider:  Lauren Hodge MD    03/07/18 1615        Consulting Physician(s)     Provider Relationship Specialty    Vivian Romeo MD Consulting Physician Pulmonary Disease    Nomi Patel III, MD Consulting Physician Thoracic Surgery            PROCEDURES PERFORMED  Bronchoscopy and VATS with pericardial window    Pericardiocentesis          HOSPITAL COURSE  This is a 67 year old is admitted with SOA and pericardial effusion in setting of metastatic colon cancer. Cardiology tried pericardiocentesis but got pleural fluid. CT then showed loculated pleural effusion. CT Surgery consulted and underwent L VATS.  The Cx results were negative in the hospitalization.  The patient had 3 chest tubes during the hospitalization on the left side which have all been removed.  Patient's now 24 hours out from chest tube removal is doing well.    The patient is s/p FOLFOX and resection of primary colon tumor, s/p splenectomy, thermal ablation 2014, FOLFIRI regimen course, Radiation therapy and cyberknife 2015.  Most recently the patient has had microwave ablation of liver mass and FOLFIRI regimen 06/2017.  We did ask the [facility] group to see the patient during hospitalization.    Further hospital course by problem list:     Pericardial/Pleural Effusion (Loculated): sp L VATS, decortication, and pericardial window 03/10. Chest tube in place. PCA for pain controlled her in the hospitalization well.   -Cx and fungal smears are no growth.   -Pathology shows acute on chronic inflammation.   -WBC still elevated but asplenic so likely insignificant and the patient is afebrile.   -Repeat CXR this am is reviewed and stable.  -Completeness sake further workup included a tspot negative, ccp negative,  Id consult and they agreed this was  "likely not infectious  -d/w hem/onc and the chemo not a likely cause of the effusion     -leukocytosis, nearly resolved by the time of discharge, check a procal, temps normal, not currently on antibiotics    - Colon Cancer/Metastatic to Liver: Keep already scheduled appointment with Dr. Brown in our office 4/11/18    - DM2: A1c 7.7. Levemir and SSI. bs elevated, increase the levemir    - HTN: HCTZ/Losartan; currently stable     -Nocturnal hypoxemia for which the patient will be discharged on 2 L of oxygen.  The patient will need a follow-up with the pulmonary physicians for possible evaluation of sleep apnea but again hopefully out from having the 3 chest tubes placed the patient's nocturnal hypoxemia will improve.    - Disposition: maybe tomorrow per CTS    PHYSICAL EXAM  Temp:  [98 °F (36.7 °C)-98.6 °F (37 °C)] 98 °F (36.7 °C)  Heart Rate:  [] 80  Resp:  [18] 18  BP: (107-125)/(68-73) 125/73  Body mass index is 37.27 kg/m².  Physical Exam   Constitutional: She appears well-developed and well-nourished.   Pulmonary/Chest: Effort normal and breath sounds normal.   Skin: Skin is warm and dry.         CONDITION ON DISCHARGE  Stable.      DISCHARGE DISPOSITION   Home      ALLERGIES  Allergies   Allergen Reactions   • Compazine [Prochlorperazine Edisylate] Other (See Comments)     Complete arch in the back    • Prochlorperazine Maleate Other (See Comments)     \"BACK MUSCLE RETRACTION\"         DISCHARGE MEDICATIONS     Your medication list      START taking these medications      Instructions Last Dose Given Next Dose Due   HYDROcodone-acetaminophen 7.5-325 MG per tablet  Commonly known as:  NORCO      Take 1 tablet by mouth Every 6 (Six) Hours As Needed for Moderate Pain  for up to 4 days.          CONTINUE taking these medications      Instructions Last Dose Given Next Dose Due   allopurinol 100 MG tablet  Commonly known as:  ZYLOPRIM      Take 100 mg by mouth Daily.       aspirin 81 MG tablet      Take 81 mg " by mouth daily.       calcium carbonate 500 MG chewable tablet  Commonly known as:  TUMS      Chew 2 tablets As Needed.       ferrous sulfate 325 (65 FE) MG tablet      Take 1 tablet by mouth Daily.       fluticasone 50 MCG/ACT nasal spray  Commonly known as:  FLONASE      2 sprays into each nostril Daily.       LORazepam 0.5 MG tablet  Commonly known as:  ATIVAN      Take 1 tablet by mouth 2 (Two) Times a Day As Needed (nausea).       magnesium oxide 400 MG tablet  Commonly known as:  MAG-OX      Take 400 mg by mouth Daily.       NOVOFINE 32G X 6 MM misc  Generic drug:  Insulin Pen Needle      USE AS DIRECTED 5 TIMES DAILY AND AS NEEDED       SAXagliptin 5 MG tablet  Commonly known as:  ONGLYZA      Take 5 mg by mouth Daily.       telmisartan-hydrochlorothiazide 40-12.5 MG per tablet  Commonly known as:  MICARDIS HCT      Take 1 tablet by mouth daily       TOUJEO SOLOSTAR 300 UNIT/ML solution pen-injector  Generic drug:  Insulin Glargine      Inject 45 Units under the skin Daily.       XIGDUO XR 5-1000 MG tablet  Generic drug:  dapagliflozin-metformin HCl ER      Take 2 tablets by mouth Daily.          STOP taking these medications    Biotin 5000 MCG tablet        HUMALOG KWIKPEN 100 UNIT/ML solution pen-injector  Generic drug:  Insulin Lispro        meclizine 25 MG tablet  Commonly known as:  ANTIVERT        ondansetron 8 MG tablet  Commonly known as:  ZOFRAN        ONETOUCH VERIO test strip  Generic drug:  glucose blood        vitamin D 84666 units capsule capsule  Commonly known as:  ERGOCALCIFEROL              Where to Get Your Medications      You can get these medications from any pharmacy    Bring a paper prescription for each of these medications   HYDROcodone-acetaminophen 7.5-325 MG per tablet          Activity Instructions     Discharge Activity Restrictions       Discharge Instructions:    1. Activity:  No heavy lifting, pushing, pulling greater than 10 pounds  May drive car after 2 weeks or as directed  by Physician  Walk at least 3 times a day  Climb stairs as tolerated  Continue incentive spirometer at least 4 times per day    2. Nutrition:  Resume previous home diet as tolerated  Eat well balanced meals  Avoid constipation by eating fresh fruits, vegetables, whole grain foods and increasing fluid intake    3. Incision (wound) Care:  Remove dressing after 24 hours, then may shower and clean incision sites with antibacterial soap or hydrogen peroxide. Apply gauze dressing or band-aid every 24 hours and as needed for any drainage.  Once there is no drainage, may leave dressing off.  No lotions or creams on incision area.  Take temperature daily for the first week after discharge.    4. When to call for medical advice:  Fever greater than 101 degrees  Unusual or severe pain  Difficulty breathing  Incision changes (redness, swelling or increased drainage)  Any problems or concerns    5. Medication Instructions:  Continue home medications as directed  Take pain medications as directed to stay comfortable  No driving or drinking alcohol when taking prescribed pain meds  Laxative of choice if needed for constipation      Future Appointments  Date Time Provider Department Center   3/26/2018 12:40 PM Lauren Hodge MD [facility] CD L[facility]KR None   3/30/2018 8:40 AM EMMA Gaspar [facility] END K[facility]G None   4/2/2018 2:45 PM Nomi Patel III, MD [facility] TS KIM None   4/11/2018 10:30 AM [facility] CBC KRE PORT CHAIR  INFUS KRE LAG   4/11/2018 11:00 AM MD JULIANNE AcostaK CBC KRES  CBC Kim   7/17/2018 8:30 AM [facility] ENDO [facility] END K[facility] None   7/31/2018 9:40 AM Hollis Moran MD [facility] END K[facility]G None     Follow-up Information     Nomi Patel III, MD. Go on 4/2/2018.    Specialty:  Thoracic Surgery  Why:  CXR first at [facility] at 1:30pm, then to office for appt.    Contact information:  [address]  [place] [address]  [phone]                   TEST  RESULTS PENDING AT DISCHARGE  None   Order Current  Status    AFB Culture - Body Fluid, Pericardium Preliminary result    AFB Culture - Tissue, Pericardium Preliminary result    Fungus Culture - Body Fluid, Pericardium Preliminary result    Fungus Culture - Tissue, Pericardium Preliminary result         Follow-up with primary care doctor in 1 week and the patient needs a CBC and a BMP with the primary care doctor to check white blood cell count and potassium    Patient will leave with 2 L of oxygen at night and the patient will need follow-up with the pulmonary physicians to ensure that she still needs long-term oxygen as I would think the patient would be able to get off of oxygen when her left lung heals fully    CODE STATUS  Full Code          Dirk Mullins MD  [facility]  03/16/18  12:10 PM      Time: greater than 30 minutes.      Addendum 3/20/18: fungal cx x 4 negative  afb x 4 neg  Tissue and fluid cx x3 negative      lacey

## 2018-03-16 NOTE — PLAN OF CARE
Problem: Patient Care Overview (Adult)  Goal: Plan of Care Review  Outcome: Outcome(s) achieved Date Met: 03/16/18 03/16/18 1604   Outcome Evaluation   Outcome Summary/Follow up Plan VSS, ambulated pt, pt cleared for discharge. Home O2 leaving with pt, sleep study ordered with follow up post study

## 2018-03-16 NOTE — PROGRESS NOTES
"    Chief Complaint: Postoperative management  S/P: BRONCHOSCOPY, LEFT THORACOSCOPY VIDEO ASSISTED WITH PERICARDIAL WINDOW, SUBPLEURAL CATHETERS FOR PAIN CONTROL  POD # 6    Subjective:  Symptoms:  Stable.  No shortness of breath, cough or chest pain.  (Patient is doing well today.  She has no complaints of pain or shortness breath.  She is ambulating without difficulty.  No problems with bowel or bladder.).    Diet:  Adequate intake.  No nausea or vomiting.    Activity level: Normal.    Pain:  She reports no pain.        Vital Signs:  Temp:  [97.9 °F (36.6 °C)-98.6 °F (37 °C)] 98 °F (36.7 °C)  Heart Rate:  [] 80  Resp:  [18] 18  BP: (107-125)/(68-73) 125/73    Intake & Output (last day)       03/15 0701 - 03/16 0700 03/16 0701 - 03/17 0700    P.O. 240 240    Total Intake(mL/kg) 240 (2.1) 240 (2.1)    Urine (mL/kg/hr)      Stool      Chest Tube      Total Output        Net +240 +240          Unmeasured Urine Occurrence 3 x 1 x    Unmeasured Stool Occurrence  1 x          Objective:  General Appearance:  Comfortable and in no acute distress.    Vital signs: (most recent): Blood pressure 125/73, pulse 80, temperature 98 °F (36.7 °C), temperature source Oral, resp. rate 18, height 175.3 cm (69\"), weight 114 kg (252 lb 6.4 oz), SpO2 91 %, not currently breastfeeding.  Vital signs are normal.  No fever.    Lungs:  Normal effort and normal respiratory rate.    Heart: Normal rate.  Regular rhythm.    Extremities: Normal range of motion.  There is no dependent edema.    Neurological: Patient is alert and oriented to person, place and time.    Skin:  Warm and dry.              Results Review:     I reviewed the patient's new clinical results.  I reviewed the patient's new imaging results and agree with the interpretation.    Imaging Results (last 24 hours)     Procedure Component Value Units Date/Time    XR Chest 2 View [[id number]] Updated:  03/16/18 1011    XR Chest 1 View [[id number]] Collected:  03/15/18 0738     " Updated:  03/15/18 1212    Narrative:       CLINICAL HISTORY: 67-year-old female 5 days postop left video-assisted  thoracoscopy with pericardial window.     PORTABLE AP SEMIERECT CHEST DATED 03/15/2018 AT 0601 HOURS     FINDINGS: When compared to the exam dated 03/14/2018 at 0513 hours,  there has been removal of one of the left chest tubes. A single left  chest tube remains at the lower lateral left chest. A 3 mm superolateral  left apical pneumothorax is present. Cardiomegaly is present. Prominence  of perihilar pulmonary vasculature and interstitial markings remains.  There is similar bibasilar patchy atelectasis or infiltrate. Lung  volumes are slightly increased. Trace blunting of left costophrenic  angle is present. Monitoring lead wires and oxygen cannula tubing are  present. Degenerative change in the spine and right acromioclavicular  joint is present. Aortic calcification is again noted.     CONCLUSION: There is an approximately 3 mm thick superolateral left  apical pneumothorax following removal of one of the previous dual left  chest tubes. A single lower lateral left chest tube remains. Mild  cardiomegaly, pulmonary vascular engorgement, and bibasilar airspace  opacities are present.     This report was finalized on 3/15/2018 12:09 PM by Dr. Reji Zavala MD.             Lab Results:     Lab Results (last 24 hours)     Procedure Component Value Units Date/Time    POC Glucose Once [[id number]]  (Normal) Collected:  03/16/18 0737    Specimen:  Blood Updated:  03/16/18 0738     Glucose 87 mg/dL     Narrative:       Meter: [id number] : [id number] Milly BARRIGA    Basic Metabolic Panel [[id number]]  (Abnormal) Collected:  03/16/18 0609    Specimen:  Blood Updated:  03/16/18 0701     Glucose 88 mg/dL      BUN 13 mg/dL      Creatinine 0.83 mg/dL      Sodium 139 mmol/L      Potassium 3.4 (L) mmol/L      Chloride 101 mmol/L      CO2 29.2 (H) mmol/L      Calcium 8.4 (L) mg/dL      eGFR  African Amer 83  mL/min/1.73      BUN/Creatinine Ratio 15.7     Anion Gap 8.8 mmol/L     Narrative:       GFR Normal >60  Chronic Kidney Disease <60  Kidney Failure <15    CBC & Differential [[id number]] Collected:  03/16/18 0609    Specimen:  Blood Updated:  03/16/18 0642    Narrative:       The following orders were created for panel order CBC & Differential.  Procedure                               Abnormality         Status                     ---------                               -----------         ------                     CBC Auto Differential[[id number]]        Abnormal            Final result                 Please view results for these tests on the individual orders.    CBC Auto Differential [[id number]]  (Abnormal) Collected:  03/16/18 0609    Specimen:  Blood Updated:  03/16/18 0642     WBC 11.39 (H) 10*3/mm3      RBC 3.63 (L) 10*6/mm3      Hemoglobin 10.4 (L) g/dL      Hematocrit 32.8 (L) %      MCV 90.4 fL      MCH 28.7 pg      MCHC 31.7 (L) g/dL      RDW 14.8 (H) %      RDW-SD 48.4 fl      MPV 8.7 fL      Platelets 539 (H) 10*3/mm3      Neutrophil % 53.3 %      Lymphocyte % 20.0 %      Monocyte % 16.6 (H) %      Eosinophil % 5.8 %      Basophil % 0.5 %      Immature Grans % 3.8 (H) %      Neutrophils, Absolute 6.07 10*3/mm3      Lymphocytes, Absolute 2.28 10*3/mm3      Monocytes, Absolute 1.89 (H) 10*3/mm3      Eosinophils, Absolute 0.66 10*3/mm3      Basophils, Absolute 0.06 10*3/mm3      Immature Grans, Absolute 0.43 (H) 10*3/mm3     POC Glucose Once [[id number]]  (Abnormal) Collected:  03/15/18 2032    Specimen:  Blood Updated:  03/15/18 2033     Glucose 230 (H) mg/dL     Narrative:       Meter: [id number] : [id number] Pasquale Hurt CNA    POC Glucose Once [[id number]]  (Abnormal) Collected:  03/15/18 1555    Specimen:  Blood Updated:  03/15/18 1852     Glucose 260 (H) mg/dL     Narrative:       Meter: [id number] : [id number] Sally BARRIGA    TSPOT [[id number]] Collected:  03/13/18 1601    Specimen:   Blood Updated:  03/15/18 1215     T-SPOT Nil Control 0     T-SPOT Panel A 0     T-SPOT Panel B 0     T-SPOT Pos Control 87     TSPOT Negative    POC Glucose Once [[id number]]  (Abnormal) Collected:  03/15/18 1141    Specimen:  Blood Updated:  03/15/18 1147     Glucose 252 (H) mg/dL     Narrative:       Meter: [id number] : [id number] Milly BARRIGA           Assessment/Plan     Principal Problem:    Pericardial effusion  Active Problems:    Essential hypertension    Primary malignant neoplasm of colon    Type 2 diabetes mellitus with complication, with long-term current use of insulin    Liver metastasis    Shortness of breath    Pleural effusion       Assessment:    Condition: In stable condition.  Improving.   (Stable post-op course.  HD stable. ).     Plan:   Encourage ambulation.  Start/continue incentive spirometry.  X-rays as ordered.  (Chest x-ray stable after final chest tube was removed yesterday.  She is cleared for discharge per thoracic surgery standpoint.  Remove On-Q pump prior to discharge.  Continue current treatment plan.  Pulmonary hygiene, incentive spirometer, increase activity, and pain management.  She is scheduled follow-up with Dr. Patel approximately 2 weeks after discharge with repeat chest x-ray.  Prescription for hydrocodone provided.  Discharge instructions provided to patient.  Instructed contact us sooner for any problem's or concerns.).       Ting Hill, APRN  [facility]  03/16/18  11:09 AM

## 2018-03-16 NOTE — PLAN OF CARE
Problem: Patient Care Overview  Goal: Plan of Care Review   03/16/18 0345   Coping/Psychosocial   Plan of Care Reviewed With patient   Plan of Care Review   Progress improving   OTHER   Outcome Summary Pain controlled dressings dry and intact overnight oximetry in progress no complaints

## 2018-03-19 RX ORDER — ALLOPURINOL 100 MG/1
TABLET ORAL
Qty: 90 TABLET | Refills: 1 | Status: SHIPPED | OUTPATIENT
Start: 2018-03-19 | End: 2018-09-15 | Stop reason: SDUPTHER

## 2018-03-19 NOTE — PROGRESS NOTES
Case Management Discharge Note    Final Note: Pt discharged.  O2 to be provided by [facility].      Destination     No service coordination in this encounter.      Durable Medical Equipment     No service coordination in this encounter.      Dialysis/Infusion     No service coordination in this encounter.      Home Medical Care     No service coordination in this encounter.      Social Care     No service coordination in this encounter.        Other: Other (private auto)    Final Discharge Disposition Code: 01 - home or self-care

## 2018-03-20 ENCOUNTER — RESULTS ENCOUNTER (OUTPATIENT)
Dept: ENDOCRINOLOGY | Age: 68
End: 2018-03-20

## 2018-03-20 DIAGNOSIS — Z79.4 TYPE 2 DIABETES MELLITUS WITH COMPLICATION, WITH LONG-TERM CURRENT USE OF INSULIN (HCC): ICD-10-CM

## 2018-03-20 DIAGNOSIS — E78.2 MIXED HYPERLIPIDEMIA: ICD-10-CM

## 2018-03-20 DIAGNOSIS — E11.3299 NONPROLIFERATIVE DIABETIC RETINOPATHY (HCC): ICD-10-CM

## 2018-03-20 DIAGNOSIS — E66.01 MORBID OBESITY DUE TO EXCESS CALORIES (HCC): ICD-10-CM

## 2018-03-20 DIAGNOSIS — I10 ESSENTIAL HYPERTENSION: ICD-10-CM

## 2018-03-20 DIAGNOSIS — E11.8 TYPE 2 DIABETES MELLITUS WITH COMPLICATION, WITH LONG-TERM CURRENT USE OF INSULIN (HCC): ICD-10-CM

## 2018-03-20 DIAGNOSIS — E55.9 VITAMIN D DEFICIENCY: ICD-10-CM

## 2018-03-26 ENCOUNTER — OFFICE VISIT (OUTPATIENT)
Dept: CARDIOLOGY | Facility: CLINIC | Age: 68
End: 2018-03-26

## 2018-03-26 VITALS
SYSTOLIC BLOOD PRESSURE: 116 MMHG | HEIGHT: 69 IN | HEART RATE: 100 BPM | WEIGHT: 242 LBS | DIASTOLIC BLOOD PRESSURE: 68 MMHG | BODY MASS INDEX: 35.84 KG/M2

## 2018-03-26 DIAGNOSIS — R06.02 SHORTNESS OF BREATH: Primary | ICD-10-CM

## 2018-03-26 DIAGNOSIS — I10 ESSENTIAL HYPERTENSION: ICD-10-CM

## 2018-03-26 DIAGNOSIS — I31.39 PERICARDIAL EFFUSION: ICD-10-CM

## 2018-03-26 PROCEDURE — 99214 OFFICE O/P EST MOD 30 MIN: CPT | Performed by: INTERNAL MEDICINE

## 2018-03-26 PROCEDURE — 93000 ELECTROCARDIOGRAM COMPLETE: CPT | Performed by: INTERNAL MEDICINE

## 2018-03-26 NOTE — PROGRESS NOTES
Date of Office Visit: 2018  Encounter Provider: Lauren Hodge MD  Place of Service: [facility] CARDIOLOGY  Patient Name: Feli Del Toro  :1950    Chief complaint  Follow up of pericardial effusion.    History of Present Illness  The patient is a pleasant 67-year-old female with history of diabetes, hypertension, hyperlipidemia, with history of metastatic colon cancer who was recently admitted to [facility] with worsening shortness of breath and planned to have a pericardial effusion as well as a pleural effusion. Pericardiocentesis was unsuccessful and she underwent a VATS procedure as well as a pericardial window with significant improvement in her symptoms. Cytology did not suggest carcinomatous spread. She is now in here for follow up.    In the interim, she denies any chest pain, shortness of breath, palpitations, syncope or near syncope. She is moderately active around her home.    Past Medical History:   Diagnosis Date   • Anemia    • Cancer 2013   • Diabetes mellitus    • Dyspnea    • Essential hypertension    • History of transfusion    • Hyperlipidemia    • Hypertension    • Metastatic colon cancer to liver    • Pericardial effusion     Moderate   • Retinopathy    • SOB (shortness of breath)    • Type 2 diabetes mellitus        Past Surgical History:   Procedure Laterality Date   • ABDOMINAL SURGERY      ablation    • AMPUTATION DIGIT Left 2017    Procedure: LEFT SECOND TOE AMPUTATION;  Surgeon: Farzad Nichols MD;  Location: [facility] OR;  Service:    • AMPUTATION OF REPLICATED TOES      right distal second toe    • CARDIAC CATHETERIZATION N/A 3/8/2018    Procedure: Pericardiocentesis;  Surgeon: Andrew Dobbins MD;  Location: [facility] INVASIVE LOCATION;  Service:    • CATARACT EXTRACTION     • COLECTOMY PARTIAL / TOTAL  2014   • HYSTERECTOMY  1998   • PERICARDIAL WINDOW N/A 3/10/2018    Procedure: PERICARDIAL WINDOW;  Surgeon:  Nomi Patel III, MD;  Location: [facility] OR;  Service: Cardiothoracic   • PORTACATH PLACEMENT  06/2013   • SALPINGO OOPHORECTOMY Bilateral    • SPLENECTOMY     • THORACOSCOPY Left 3/10/2018    Procedure: BRONCHOSCOPY, LEFT THORACOSCOPY VIDEO ASSISTED, SUBPLEURAL CATHETERS FOR PAIN;  Surgeon: Nomi Patel III, MD;  Location: [facility] OR;  Service: Cardiothoracic       Current Outpatient Prescriptions:   •  allopurinol (ZYLOPRIM) 100 MG tablet, TAKE 1 TABLET DAILY, Disp: 90 tablet, Rfl: 1  •  aspirin 81 MG tablet, Take 81 mg by mouth daily., Disp: , Rfl:   •  calcium carbonate (TUMS) 500 MG chewable tablet, Chew 2 tablets As Needed., Disp: , Rfl:   •  ferrous sulfate 325 (65 FE) MG tablet, Take 1 tablet by mouth Daily., Disp: , Rfl:   •  fluticasone (FLONASE) 50 MCG/ACT nasal spray, 2 sprays into each nostril Daily., Disp: , Rfl:   •  magnesium oxide (MAG-OX) 400 MG tablet, Take 400 mg by mouth Daily., Disp: , Rfl:   •  NOVOFINE 32G X 6 MM misc, USE AS DIRECTED 5 TIMES DAILY AND AS NEEDED, Disp: 200 each, Rfl: 3  •  SAXagliptin (ONGLYZA) 5 MG tablet, Take 5 mg by mouth Daily., Disp: , Rfl:   •  telmisartan-hydrochlorothiazide (MICARDIS HCT) 40-12.5 MG per tablet, Take 1 tablet by mouth daily, Disp: 90 tablet, Rfl: 3  •  TOUJEO SOLOSTAR 300 UNIT/ML solution pen-injector, Inject 45 Units under the skin Daily., Disp: 3 pen, Rfl: 0  •  XIGDUO XR 5-1000 MG tablet sustained-release 24 hour, Take 2 tablets by mouth Daily., Disp: 180 tablet, Rfl: 3      Allergies as of 03/26/2018 - Reviewed 03/26/2018   Allergen Reaction Noted   • Compazine [prochlorperazine edisylate] Other (See Comments) 12/31/2015   • Prochlorperazine maleate Other (See Comments) 01/09/2014     Social History     Social History   • Marital status:      Spouse name: Jesus   • Number of children: N/A   • Years of education: College     Occupational History   • Middle School Counselor   Retired     [employer]     Social History Main Topics   •  "Smoking status: Never Smoker   • Smokeless tobacco: Never Used   • Alcohol use No   • Drug use: No   • Sexual activity: Defer     Other Topics Concern   • Not on file     Social History Narrative   • No narrative on file     Family History   Problem Relation Age of Onset   • Heart attack Other    • Cancer Other    • Diabetes Other    • Hypertension Other    • Hypertension Father    • Diabetes Father    • Breast cancer Sister    • Diabetes Sister    • Stroke Paternal Grandmother    • Diabetes Paternal Grandmother    • Lupus Paternal Grandfather    • Stroke Paternal Grandfather    • Diabetes Sister      Review of Systems   Constitution: Negative for fever, malaise/fatigue, weight gain and weight loss.   HENT: Negative for ear pain, hearing loss, nosebleeds and sore throat.    Eyes: Negative for double vision, pain, vision loss in left eye and vision loss in right eye.   Cardiovascular:        See history of present illness.   Respiratory: Positive for shortness of breath and snoring. Negative for cough, sleep disturbances due to breathing and wheezing.    Endocrine: Negative for cold intolerance, heat intolerance and polyuria.   Skin: Negative for itching, poor wound healing and rash.   Musculoskeletal: Negative for joint pain, joint swelling and myalgias.   Gastrointestinal: Negative for abdominal pain, diarrhea, hematochezia, nausea and vomiting.   Genitourinary: Negative for hematuria and hesitancy.   Neurological: Negative for numbness, paresthesias and seizures.   Psychiatric/Behavioral: Negative for depression. The patient is not nervous/anxious.         Objective:     Vitals:    03/26/18 1305   BP: 116/68   Pulse: 100   Weight: 110 kg (242 lb)   Height: 175.3 cm (69\")     Body mass index is 35.74 kg/m².    Physical Exam   Constitutional: She is oriented to person, place, and time. She appears well-developed and well-nourished.   Obese   HENT:   Head: Normocephalic.   Nose: Nose normal.   Mouth/Throat: " Oropharynx is clear and moist.   Eyes: Conjunctivae and EOM are normal. Pupils are equal, round, and reactive to light. Right eye exhibits no discharge. No scleral icterus.   Neck: Normal range of motion. Neck supple. No JVD present. No thyromegaly present.   Cardiovascular: Normal rate, regular rhythm, normal heart sounds and intact distal pulses.  Exam reveals no gallop and no friction rub.    No murmur heard.  Pulses:       Carotid pulses are 2+ on the right side, and 2+ on the left side.       Radial pulses are 2+ on the right side, and 2+ on the left side.        Femoral pulses are 2+ on the right side, and 2+ on the left side.       Popliteal pulses are 2+ on the right side, and 2+ on the left side.        Dorsalis pedis pulses are 2+ on the right side, and 2+ on the left side.        Posterior tibial pulses are 2+ on the right side, and 2+ on the left side.   Pulmonary/Chest: Effort normal and breath sounds normal. No respiratory distress. She has no wheezes. She has no rales.   Abdominal: Soft. Bowel sounds are normal. She exhibits no distension. There is no hepatosplenomegaly. There is no tenderness. There is no rebound.   Musculoskeletal: Normal range of motion. She exhibits no edema or tenderness.   Neurological: She is alert and oriented to person, place, and time.   Skin: Skin is warm and dry. No rash noted. No erythema.   Psychiatric: She has a normal mood and affect. Her behavior is normal. Judgment and thought content normal.   Vitals reviewed.    Lab Review:     ECG 12 Lead  Date/Time: 3/26/2018 9:44 PM  Performed by: TORITO DEUTSCH  Authorized by: TORITO DEUTSCH   Comparison: compared with previous ECG   Similar to previous ECG  Rhythm: sinus rhythm  Conduction comments: Nonspecific ST T wave changes  Other findings: PRWP  Clinical impression: abnormal ECG          Assessment:       Diagnosis Plan   1. Pericardial effusion  Adult Transthoracic Echo Complete W/ Cont if Necessary Per Protocol   2.  Essential hypertension     3. Shortness of breath       Plan:       1.  Pericardial effusion, status post pericardial window. Will check a followup echocardiogram.  2.  Colon cancer with liver metastasis.  3.  Diabetes.  4.  Hypertension. Blood pressure under control.       Feli Del Toro   Home Medication Instructions RACHNA:    Printed on:03/26/18 7964   Medication Information                      allopurinol (ZYLOPRIM) 100 MG tablet  TAKE 1 TABLET DAILY             aspirin 81 MG tablet  Take 81 mg by mouth daily.             calcium carbonate (TUMS) 500 MG chewable tablet  Chew 2 tablets As Needed.             ferrous sulfate 325 (65 FE) MG tablet  Take 1 tablet by mouth Daily.             fluticasone (FLONASE) 50 MCG/ACT nasal spray  2 sprays into each nostril Daily.             magnesium oxide (MAG-OX) 400 MG tablet  Take 400 mg by mouth Daily.             NOVOFINE 32G X 6 MM misc  USE AS DIRECTED 5 TIMES DAILY AND AS NEEDED             SAXagliptin (ONGLYZA) 5 MG tablet  Take 5 mg by mouth Daily.             telmisartan-hydrochlorothiazide (MICARDIS HCT) 40-12.5 MG per tablet  Take 1 tablet by mouth daily             TOUJEO SOLOSTAR 300 UNIT/ML solution pen-injector  Inject 45 Units under the skin Daily.             XIGDUO XR 5-1000 MG tablet sustained-release 24 hour  Take 2 tablets by mouth Daily.                 No orders of the defined types were placed in this encounter.      Dictated utilizing Dragon dictation

## 2018-03-27 DIAGNOSIS — J90 PLEURAL EFFUSION: Primary | ICD-10-CM

## 2018-03-30 ENCOUNTER — OFFICE VISIT (OUTPATIENT)
Dept: ENDOCRINOLOGY | Age: 68
End: 2018-03-30

## 2018-03-30 VITALS
SYSTOLIC BLOOD PRESSURE: 132 MMHG | HEIGHT: 69 IN | WEIGHT: 241.6 LBS | BODY MASS INDEX: 35.78 KG/M2 | DIASTOLIC BLOOD PRESSURE: 86 MMHG

## 2018-03-30 DIAGNOSIS — Z79.4 TYPE 2 DIABETES MELLITUS WITH COMPLICATION, WITH LONG-TERM CURRENT USE OF INSULIN (HCC): Primary | ICD-10-CM

## 2018-03-30 DIAGNOSIS — E67.3 HYPERVITAMINOSIS D: ICD-10-CM

## 2018-03-30 DIAGNOSIS — E55.9 VITAMIN D DEFICIENCY: ICD-10-CM

## 2018-03-30 DIAGNOSIS — E11.8 TYPE 2 DIABETES MELLITUS WITH COMPLICATION, WITH LONG-TERM CURRENT USE OF INSULIN (HCC): Primary | ICD-10-CM

## 2018-03-30 DIAGNOSIS — E78.2 MIXED HYPERLIPIDEMIA: ICD-10-CM

## 2018-03-30 DIAGNOSIS — I10 ESSENTIAL HYPERTENSION: ICD-10-CM

## 2018-03-30 PROCEDURE — 99214 OFFICE O/P EST MOD 30 MIN: CPT | Performed by: NURSE PRACTITIONER

## 2018-03-30 NOTE — PROGRESS NOTES
Feli Del Toro  presents to the office  for the follow-up appointment for Type 2 diabetes mellitus.     The diabete's condition location is throughout the system with the  clinical course has fluctuated, the severity is Mild, and the modifying/allievating factors are insulin.  Medications and  Dosages were  reviewed with Feli Del Toro and suggested that compliance most of the time.    The patient reports associated symptoms of hyperglycemia have been tingling and associated symptoms of hypoglycemia have been jitteriness and sweating, with their  hypoglycemia threshold for symptoms is 65 mg/dl .     The patient is currently on insulin.     Compliance with blood glucose monitoring: good.     Meal panning: The patient is using avoidance of concentrated sweets.    The patient is currently taking home blood tests - Blood glucose testing: 3 times daily, that are:  fasting- 1st thing in morning before eating or drinking  before each meal   basal insulIn  Toujeo U300 45 units in PM    Last instructions given were:  In his summary I saw and examined this 66-year-old female for above-mentioned problems.  I reviewed her laboratory evaluation of 2017 and provided her with a hard copy of it.  Overall she is clinically and metabolically stable and therefore we will go ahead and continue all her current prescriptions.  She will see Ms. Tara Olivares in 4 months or sooner if needed with laboratory evaluation prior to each office visit.    Home blood glucose testing daily: 3  FB to 120 mg/dl  before lunch 100 to 130 mg/dl  before dinner/supper 100 to 130 mg/dl    Last reported blood glucose readings are:   Patient is checking BG 4 times a day    Patterns reported per patient are none.         The following portions of the patient's history were reviewed and updated as appropriate: current medications, past family history, past medical history, past social history, past surgical history and problem list.      Current  Outpatient Prescriptions:   •  allopurinol (ZYLOPRIM) 100 MG tablet, TAKE 1 TABLET DAILY, Disp: 90 tablet, Rfl: 1  •  aspirin 81 MG tablet, Take 81 mg by mouth daily., Disp: , Rfl:   •  calcium carbonate (TUMS) 500 MG chewable tablet, Chew 2 tablets As Needed., Disp: , Rfl:   •  ferrous sulfate 325 (65 FE) MG tablet, Take 1 tablet by mouth Daily., Disp: , Rfl:   •  fluticasone (FLONASE) 50 MCG/ACT nasal spray, 2 sprays into each nostril Daily., Disp: , Rfl:   •  magnesium oxide (MAG-OX) 400 MG tablet, Take 400 mg by mouth Daily., Disp: , Rfl:   •  NOVOFINE 32G X 6 MM misc, USE AS DIRECTED 5 TIMES DAILY AND AS NEEDED, Disp: 200 each, Rfl: 3  •  SAXagliptin (ONGLYZA) 5 MG tablet, Take 5 mg by mouth Daily., Disp: , Rfl:   •  telmisartan-hydrochlorothiazide (MICARDIS HCT) 40-12.5 MG per tablet, Take 1 tablet by mouth daily, Disp: 90 tablet, Rfl: 3  •  TOUJEO SOLOSTAR 300 UNIT/ML solution pen-injector, Inject 45 Units under the skin Daily., Disp: 3 pen, Rfl: 0  •  XIGDUO XR 5-1000 MG tablet sustained-release 24 hour, Take 2 tablets by mouth Daily., Disp: 180 tablet, Rfl: 3    Patient Active Problem List    Diagnosis   • Shortness of breath [R06.02]   • Pericardial effusion [I31.3]   • Pleural effusion [J90]   • Chemotherapy induced nausea and vomiting [R11.2, T45.1X5A]   • Elevated liver enzymes [R74.8]   • Liver metastasis [C78.7]   • Obesity (BMI 30-39.9) [E66.9]   • Osteomyelitis of toe of left foot [M86.9]   • Sepsis [A41.9]   • Type 2 diabetes mellitus with peripheral neuropathy [E11.42]   • Type 2 diabetes mellitus with complication, with long-term current use of insulin [E11.8, Z79.4]   • Nonproliferative diabetic retinopathy [E11.3299]   • Type 2 diabetes mellitus with microalbuminuria [E11.29, R80.9]   • Primary malignant neoplasm of colon [C18.9]   • Iron deficiency anemia [D50.9]   • Breast lesion [N64.9]   • 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, T45.1X5A]   • Encounter for adjustment or management  "of vascular access device [Z45.2]   • Vitamin D deficiency [E55.9]   • Morbid obesity due to excess calories [E66.01]   • Hyperlipidemia [E78.5]   • Essential hypertension [I10]   • Allergic rhinitis due to pollen [J30.1]       Review of Systems   A comprehensive review of the 14 systems was negative except of listed below:  Respiratory: negative, night O2 dep  Endocrine: hyperglycemia     Objective:     Wt Readings from Last 3 Encounters:   03/30/18 110 kg (241 lb 9.6 oz)   03/26/18 110 kg (242 lb)   03/09/18 114 kg (252 lb 6.4 oz)     Temp Readings from Last 3 Encounters:   03/16/18 98 °F (36.7 °C) (Oral)   03/07/18 98.2 °F (36.8 °C) (Oral)   02/21/18 98.3 °F (36.8 °C) (Oral)     BP Readings from Last 3 Encounters:   03/30/18 132/86   03/26/18 116/68   03/16/18 125/73     Pulse Readings from Last 3 Encounters:   03/26/18 100   03/16/18 80   03/07/18 105        /86   Ht 175.3 cm (69.02\")   Wt 110 kg (241 lb 9.6 oz)   LMP  (LMP Unknown)   BMI 35.66 kg/m²     General appearance:  alert, cooperative, delirious, fatigued, nourished\" \"appears stated age and cooperative\" \"NAD   Neck: no carotid bruit, supple, symmetrical, trachea midline and thyroid not enlarged, symmetric, no tenderness/mass/nodules   Thyroid:  no palpable nodule, no enlargement, no tenderness   Lung:  \"clear to auscultation bilaterally\" \"no abnormal breath sounds  \" effort was normal, no labored breath, no use of accessory muscles.   Heart: regular rate and rhythm, S1, S2 normal, no murmur, click, rub or gallop      Abdomen:  normal bowel sounds- 4 quads, soft non-tender, contour - rounded and contour - obese      Extremities: extremities normal, atraumatic, no cyanosis or edema extremities normal, atraumatic, no cyanosis or edema\" WNL - gait and station, strength and stability\"       Skin:   Pulses:  warm and dry, no hyperpigmentation, normal skin coloring, or suspicious lesions   2+ and symmetric   Neuro: Alert and oriented x3. Gait normal. " Reflexes and motor strength normal and symmetric. Cranial nerves 2-12 and sensation grossly intact.      Psych: behavior - normal, judgement - normal, mood - normal, affect - normal                 Lab Review  Results for orders placed or performed during the hospital encounter of 03/07/18   Body Fluid Culture - Body Fluid, Pleural Cavity   Result Value Ref Range    BF Culture No growth at 5 days     Gram Stain Result Many (4+) WBCs seen     Gram Stain Result No organisms seen    AFB Stain - Body Fluid, Pleural Cavity   Result Value Ref Range    AFB Stain No acid fast bacilli seen on direct smear    Body Fluid Culture - Body Fluid, Pleural Cavity   Result Value Ref Range    BF Culture No growth     Gram Stain Result Many (4+) WBCs seen     Gram Stain Result No organisms seen    AFB Stain - Body Fluid, Pleural Cavity   Result Value Ref Range    AFB Stain No acid fast bacilli seen on direct smear    Fungus Culture - Body Fluid, Pericardium   Result Value Ref Range    Fungus Culture No fungus isolated at 2 weeks    Body Fluid Culture - Body Fluid, Pericardium   Result Value Ref Range    BF Culture No growth at 3 days     Gram Stain Result Many (4+) Red blood cells     Gram Stain Result Rare (1+) WBCs seen     Gram Stain Result No organisms seen    AFB Culture - Body Fluid, Pericardium   Result Value Ref Range    AFB Culture No AFB isolated at 2 weeks     AFB Stain No acid fast bacilli seen on direct smear    Fungus Smear - Body Fluid, Pericardium   Result Value Ref Range    GMS Stain No fungal elements seen    Fungus Culture - Tissue, Pericardium   Result Value Ref Range    Fungus Culture No fungus isolated at 2 weeks    Tissue / Bone Culture - Tissue, Pericardium   Result Value Ref Range    Tissue Culture No growth at 3 days     Gram Stain Result Moderate (3+) Red blood cells     Gram Stain Result No organisms seen    AFB Culture - Tissue, Pericardium   Result Value Ref Range    AFB Culture No AFB isolated at 2 weeks      AFB Stain No acid fast bacilli seen on direct smear    Fungus Smear - Tissue, Pericardium   Result Value Ref Range    GMS Stain No fungal elements seen    Respiratory Panel, PCR - Swab, Nasopharynx   Result Value Ref Range    ADENOVIRUS, PCR Not Detected Not Detected    Coronavirus 229E Not Detected Not Detected    Coronavirus HKU1 Not Detected Not Detected    Coronavirus NL63 Not Detected Not Detected    Coronavirus OC43 Not Detected Not Detected    Human Metapneumovirus Not Detected Not Detected    Human Rhinovirus/Enterovirus Not Detected Not Detected    Influenza B PCR Not Detected Not Detected    Parainfluenza Virus 1 Not Detected Not Detected    Parainfluenza Virus 2 Not Detected Not Detected    Parainfluenza Virus 3 Not Detected Not Detected    Parainfluenza Virus 4 Not Detected Not Detected    Bordetella pertussis pcr Not Detected Not Detected    Influenza A H1 2009 PCR Not Detected Not Detected    Chlamydophila pneumoniae PCR Not Detected Not Detected    Mycoplasma pneumo by PCR Not Detected Not Detected    Influenza A PCR Not Detected Not Detected    Influenza A H3 Not Detected Not Detected    Influenza A H1 Not Detected Not Detected    RSV, PCR Not Detected Not Detected   Comprehensive Metabolic Panel   Result Value Ref Range    Glucose 89 65 - 99 mg/dL    BUN 11 8 - 23 mg/dL    Creatinine 0.99 0.57 - 1.00 mg/dL    Sodium 137 136 - 145 mmol/L    Potassium 3.6 3.5 - 5.2 mmol/L    Chloride 98 98 - 107 mmol/L    CO2 28.6 22.0 - 29.0 mmol/L    Calcium 9.5 8.6 - 10.5 mg/dL    Total Protein 8.4 6.0 - 8.5 g/dL    Albumin 3.00 (L) 3.50 - 5.20 g/dL    ALT (SGPT) 52 (H) 1 - 33 U/L    AST (SGOT) 52 (H) 1 - 32 U/L    Alkaline Phosphatase 453 (H) 39 - 117 U/L    Total Bilirubin 0.9 0.1 - 1.2 mg/dL    eGFR  African Amer 68 >60 mL/min/1.73    Globulin 5.4 gm/dL    A/G Ratio 0.6 g/dL    BUN/Creatinine Ratio 11.1 7.0 - 25.0    Anion Gap 10.4 mmol/L   BNP   Result Value Ref Range    proBNP 267.8 0.0 - 900.0 pg/mL    Troponin   Result Value Ref Range    Troponin T <0.010 0.000 - 0.030 ng/mL   CBC Auto Differential   Result Value Ref Range    WBC 13.36 (H) 4.50 - 10.70 10*3/mm3    RBC 3.84 (L) 3.90 - 5.20 10*6/mm3    Hemoglobin 11.7 (L) 11.9 - 15.5 g/dL    Hematocrit 34.7 (L) 35.6 - 45.5 %    MCV 90.4 80.5 - 98.2 fL    MCH 30.5 26.9 - 32.0 pg    MCHC 33.7 32.4 - 36.3 g/dL    RDW 14.9 (H) 11.7 - 13.0 %    RDW-SD 48.8 37.0 - 54.0 fl    MPV 8.7 6.0 - 12.0 fL    Platelets 556 (H) 140 - 500 10*3/mm3    Neutrophil % 61.8 42.7 - 76.0 %    Lymphocyte % 17.4 (L) 19.6 - 45.3 %    Monocyte % 16.5 (H) 5.0 - 12.0 %    Eosinophil % 1.6 0.3 - 6.2 %    Basophil % 0.4 0.0 - 1.5 %    Immature Grans % 2.3 (H) 0.0 - 0.5 %    Neutrophils, Absolute 8.25 (H) 1.90 - 8.10 10*3/mm3    Lymphocytes, Absolute 2.33 0.90 - 4.80 10*3/mm3    Monocytes, Absolute 2.21 (H) 0.20 - 1.20 10*3/mm3    Eosinophils, Absolute 0.21 0.00 - 0.70 10*3/mm3    Basophils, Absolute 0.05 0.00 - 0.20 10*3/mm3    Immature Grans, Absolute 0.31 (H) 0.00 - 0.03 10*3/mm3   Protime-INR   Result Value Ref Range    Protime 13.5 11.7 - 14.2 Seconds    INR 1.05 0.90 - 1.10   Hemoglobin A1c   Result Value Ref Range    Hemoglobin A1C 7.70 (H) 4.80 - 5.60 %   CBC Auto Differential   Result Value Ref Range    WBC 11.26 (H) 4.50 - 10.70 10*3/mm3    RBC 3.73 (L) 3.90 - 5.20 10*6/mm3    Hemoglobin 10.9 (L) 11.9 - 15.5 g/dL    Hematocrit 34.0 (L) 35.6 - 45.5 %    MCV 91.2 80.5 - 98.2 fL    MCH 29.2 26.9 - 32.0 pg    MCHC 32.1 (L) 32.4 - 36.3 g/dL    RDW 15.3 (H) 11.7 - 13.0 %    RDW-SD 50.3 37.0 - 54.0 fl    MPV 9.5 6.0 - 12.0 fL    Platelets 375 140 - 500 10*3/mm3    Neutrophil % 64.8 42.7 - 76.0 %    Lymphocyte % 14.9 (L) 19.6 - 45.3 %    Monocyte % 17.1 (H) 5.0 - 12.0 %    Eosinophil % 1.4 0.3 - 6.2 %    Basophil % 0.4 0.0 - 1.5 %    Immature Grans % 1.4 (H) 0.0 - 0.5 %    Neutrophils, Absolute 7.29 1.90 - 8.10 10*3/mm3    Lymphocytes, Absolute 1.68 0.90 - 4.80 10*3/mm3    Monocytes, Absolute  1.93 (H) 0.20 - 1.20 10*3/mm3    Eosinophils, Absolute 0.16 0.00 - 0.70 10*3/mm3    Basophils, Absolute 0.04 0.00 - 0.20 10*3/mm3    Immature Grans, Absolute 0.16 (H) 0.00 - 0.03 10*3/mm3    nRBC 0.0 0.0 - 0.0 /100 WBC   Basic Metabolic Panel   Result Value Ref Range    Glucose 83 65 - 99 mg/dL    BUN 12 8 - 23 mg/dL    Creatinine 0.86 0.57 - 1.00 mg/dL    Sodium 136 136 - 145 mmol/L    Potassium 4.4 3.5 - 5.2 mmol/L    Chloride 100 98 - 107 mmol/L    CO2 24.5 22.0 - 29.0 mmol/L    Calcium 8.7 8.6 - 10.5 mg/dL    eGFR  African Amer 80 >60 mL/min/1.73    BUN/Creatinine Ratio 14.0 7.0 - 25.0    Anion Gap 11.5 mmol/L   Scan Slide   Result Value Ref Range    Anisocytosis Slight/1+ None Seen    Target Cells Slight/1+ None Seen    WBC Morphology Normal Normal    Platelet Morphology Normal Normal   Glucose, Body Fluid - Body Fluid, Pleural Cavity   Result Value Ref Range    Glucose, Fluid 69 mg/dL   Protein, Body Fluid - Body Fluid, Pleural Cavity   Result Value Ref Range    Protein, Total, Fluid 4.5 g/dL   Body fluid cell count - Body Fluid,   Result Value Ref Range    Color, Fluid Yellow     Appearance, Fluid Cloudy (A) Clear    WBC, Fluid 705 /mm3    RBC, Fluid 13,000 /mm3   Body fluid cell count - Body Fluid,   Result Value Ref Range    Color, Fluid Yellow     Appearance, Fluid Hazy (A) Clear    WBC, Fluid 1,383 /mm3    RBC, Fluid 2,058 /mm3   Protein, Body Fluid - Body Fluid, Pleural Cavity   Result Value Ref Range    Protein, Total, Fluid 4.5 g/dL   Glucose, Body Fluid - Body Fluid, Pleural Cavity   Result Value Ref Range    Glucose, Fluid 69 mg/dL   Body fluid differential - Body Fluid,   Result Value Ref Range    Neutrophils, Fluid 6 %    Lymphocytes, Fluid 41 %    Monocytes, Fluid 2 %    Mononuclear, Fluid 51 %    Other Cells/100 WBCs, Fluid 10 /100 WBCs   Body fluid differential - Body Fluid,   Result Value Ref Range    Neutrophils, Fluid 15 %    Lymphocytes, Fluid 56 %    Monocytes, Fluid 13 %    Mononuclear,  Fluid 16 %   Comprehensive Metabolic Panel   Result Value Ref Range    Glucose 77 65 - 99 mg/dL    BUN 12 8 - 23 mg/dL    Creatinine 0.83 0.57 - 1.00 mg/dL    Sodium 139 136 - 145 mmol/L    Potassium 3.9 3.5 - 5.2 mmol/L    Chloride 103 98 - 107 mmol/L    CO2 25.1 22.0 - 29.0 mmol/L    Calcium 8.6 8.6 - 10.5 mg/dL    Total Protein 8.0 6.0 - 8.5 g/dL    Albumin 2.90 (L) 3.50 - 5.20 g/dL    ALT (SGPT) 31 1 - 33 U/L    AST (SGOT) 32 1 - 32 U/L    Alkaline Phosphatase 365 (H) 39 - 117 U/L    Total Bilirubin 0.7 0.1 - 1.2 mg/dL    eGFR  African Amer 83 >60 mL/min/1.73    Globulin 5.1 gm/dL    A/G Ratio 0.6 g/dL    BUN/Creatinine Ratio 14.5 7.0 - 25.0    Anion Gap 10.9 mmol/L   CBC Auto Differential   Result Value Ref Range    WBC 9.56 4.50 - 10.70 10*3/mm3    RBC 3.80 (L) 3.90 - 5.20 10*6/mm3    Hemoglobin 11.1 (L) 11.9 - 15.5 g/dL    Hematocrit 34.2 (L) 35.6 - 45.5 %    MCV 90.0 80.5 - 98.2 fL    MCH 29.2 26.9 - 32.0 pg    MCHC 32.5 32.4 - 36.3 g/dL    RDW 15.0 (H) 11.7 - 13.0 %    RDW-SD 49.0 37.0 - 54.0 fl    MPV 8.9 6.0 - 12.0 fL    Platelets 552 (H) 140 - 500 10*3/mm3    Neutrophil % 54.9 42.7 - 76.0 %    Lymphocyte % 19.4 (L) 19.6 - 45.3 %    Monocyte % 20.6 (H) 5.0 - 12.0 %    Eosinophil % 2.2 0.3 - 6.2 %    Basophil % 0.4 0.0 - 1.5 %    Immature Grans % 2.5 (H) 0.0 - 0.5 %    Neutrophils, Absolute 5.25 1.90 - 8.10 10*3/mm3    Lymphocytes, Absolute 1.85 0.90 - 4.80 10*3/mm3    Monocytes, Absolute 1.97 (H) 0.20 - 1.20 10*3/mm3    Eosinophils, Absolute 0.21 0.00 - 0.70 10*3/mm3    Basophils, Absolute 0.04 0.00 - 0.20 10*3/mm3    Immature Grans, Absolute 0.24 (H) 0.00 - 0.03 10*3/mm3    nRBC 0.0 0.0 - 0.0 /100 WBC   Scan Slide   Result Value Ref Range    Anisocytosis Mod/2+ None Seen    Target Cells Slight/1+ None Seen    WBC Morphology Normal Normal    Platelet Morphology Normal Normal   Lactate Dehydrogenase, Body Fluid - Body Fluid, Pleural Cavity   Result Value Ref Range    Lactate Dehydrogenase (LD), Fluid 159  U/L   Lactate Dehydrogenase   Result Value Ref Range     (H) 135 - 214 U/L   Basic Metabolic Panel   Result Value Ref Range    Glucose 193 (H) 65 - 99 mg/dL    BUN 12 8 - 23 mg/dL    Creatinine 0.87 0.57 - 1.00 mg/dL    Sodium 134 (L) 136 - 145 mmol/L    Potassium 4.0 3.5 - 5.2 mmol/L    Chloride 98 98 - 107 mmol/L    CO2 25.6 22.0 - 29.0 mmol/L    Calcium 8.4 (L) 8.6 - 10.5 mg/dL    eGFR  African Amer 79 >60 mL/min/1.73    BUN/Creatinine Ratio 13.8 7.0 - 25.0    Anion Gap 10.4 mmol/L   Magnesium   Result Value Ref Range    Magnesium 2.1 1.6 - 2.4 mg/dL   Protime-INR   Result Value Ref Range    Protime 14.6 (H) 11.7 - 14.2 Seconds    INR 1.16 (H) 0.90 - 1.10   aPTT   Result Value Ref Range    PTT 52.9 (H) 22.7 - 35.4 seconds   CBC Auto Differential   Result Value Ref Range    WBC 13.18 (H) 4.50 - 10.70 10*3/mm3    RBC 3.75 (L) 3.90 - 5.20 10*6/mm3    Hemoglobin 11.2 (L) 11.9 - 15.5 g/dL    Hematocrit 34.2 (L) 35.6 - 45.5 %    MCV 91.2 80.5 - 98.2 fL    MCH 29.9 26.9 - 32.0 pg    MCHC 32.7 32.4 - 36.3 g/dL    RDW 15.1 (H) 11.7 - 13.0 %    RDW-SD 49.5 37.0 - 54.0 fl    MPV 9.5 6.0 - 12.0 fL    Platelets 577 (H) 140 - 500 10*3/mm3    Neutrophil % 59.6 42.7 - 76.0 %    Lymphocyte % 17.1 (L) 19.6 - 45.3 %    Monocyte % 18.8 (H) 5.0 - 12.0 %    Eosinophil % 2.0 0.3 - 6.2 %    Basophil % 0.4 0.0 - 1.5 %    Immature Grans % 2.1 (H) 0.0 - 0.5 %    Neutrophils, Absolute 7.86 1.90 - 8.10 10*3/mm3    Lymphocytes, Absolute 2.25 0.90 - 4.80 10*3/mm3    Monocytes, Absolute 2.48 (H) 0.20 - 1.20 10*3/mm3    Eosinophils, Absolute 0.26 0.00 - 0.70 10*3/mm3    Basophils, Absolute 0.05 0.00 - 0.20 10*3/mm3    Immature Grans, Absolute 0.28 (H) 0.00 - 0.03 10*3/mm3   pH, Body Fluid - Body Fluid, Pericardium   Result Value Ref Range    pH, Fluid 8.0    Lactate Dehydrogenase, Body Fluid - Body Fluid, Pericardium   Result Value Ref Range    Lactate Dehydrogenase (LD), Fluid 665 U/L   Body fluid cell count - Body Fluid,   Result Value  Ref Range    Color, Fluid Red     Appearance, Fluid Turbid (A) Clear    WBC, Fluid 5,143 /mm3    RBC, Fluid 419,000 /mm3   Protein, Body Fluid - Body Fluid, Pericardium   Result Value Ref Range    Protein, Total, Fluid 5.9 g/dL   Glucose, Body Fluid - Body Fluid, Pericardium   Result Value Ref Range    Glucose, Fluid 178 mg/dL   Body fluid differential - Body Fluid,   Result Value Ref Range    Neutrophils, Fluid 11 %    Lymphocytes, Fluid 88 %    Monocytes, Fluid 1 %   Basic Metabolic Panel   Result Value Ref Range    Glucose 170 (H) 65 - 99 mg/dL    BUN 15 8 - 23 mg/dL    Creatinine 0.89 0.57 - 1.00 mg/dL    Sodium 134 (L) 136 - 145 mmol/L    Potassium 4.3 3.5 - 5.2 mmol/L    Chloride 100 98 - 107 mmol/L    CO2 24.7 22.0 - 29.0 mmol/L    Calcium 7.8 (L) 8.6 - 10.5 mg/dL    eGFR  African Amer 77 >60 mL/min/1.73    BUN/Creatinine Ratio 16.9 7.0 - 25.0    Anion Gap 9.3 mmol/L   CBC (No Diff)   Result Value Ref Range    WBC 14.70 (H) 4.50 - 10.70 10*3/mm3    RBC 3.54 (L) 3.90 - 5.20 10*6/mm3    Hemoglobin 10.4 (L) 11.9 - 15.5 g/dL    Hematocrit 32.4 (L) 35.6 - 45.5 %    MCV 91.5 80.5 - 98.2 fL    MCH 29.4 26.9 - 32.0 pg    MCHC 32.1 (L) 32.4 - 36.3 g/dL    RDW 15.0 (H) 11.7 - 13.0 %    RDW-SD 49.9 37.0 - 54.0 fl    MPV 8.6 6.0 - 12.0 fL    Platelets 484 140 - 500 10*3/mm3   Basic Metabolic Panel   Result Value Ref Range    Glucose 183 (H) 65 - 99 mg/dL    BUN 15 8 - 23 mg/dL    Creatinine 0.88 0.57 - 1.00 mg/dL    Sodium 136 136 - 145 mmol/L    Potassium 4.4 3.5 - 5.2 mmol/L    Chloride 100 98 - 107 mmol/L    CO2 24.2 22.0 - 29.0 mmol/L    Calcium 7.9 (L) 8.6 - 10.5 mg/dL    eGFR  African Amer 78 >60 mL/min/1.73    BUN/Creatinine Ratio 17.0 7.0 - 25.0    Anion Gap 11.8 mmol/L   CBC Auto Differential   Result Value Ref Range    WBC 14.16 (H) 4.50 - 10.70 10*3/mm3    RBC 3.67 (L) 3.90 - 5.20 10*6/mm3    Hemoglobin 10.7 (L) 11.9 - 15.5 g/dL    Hematocrit 33.4 (L) 35.6 - 45.5 %    MCV 91.0 80.5 - 98.2 fL    MCH 29.2 26.9 -  32.0 pg    MCHC 32.0 (L) 32.4 - 36.3 g/dL    RDW 14.9 (H) 11.7 - 13.0 %    RDW-SD 49.6 37.0 - 54.0 fl    MPV 8.8 6.0 - 12.0 fL    Platelets 505 (H) 140 - 500 10*3/mm3    Neutrophil % 71.8 42.7 - 76.0 %    Lymphocyte % 8.4 (L) 19.6 - 45.3 %    Monocyte % 15.9 (H) 5.0 - 12.0 %    Eosinophil % 2.5 0.3 - 6.2 %    Basophil % 0.1 0.0 - 1.5 %    Immature Grans % 1.3 (H) 0.0 - 0.5 %    Neutrophils, Absolute 10.16 (H) 1.90 - 8.10 10*3/mm3    Lymphocytes, Absolute 1.19 0.90 - 4.80 10*3/mm3    Monocytes, Absolute 2.25 (H) 0.20 - 1.20 10*3/mm3    Eosinophils, Absolute 0.36 0.00 - 0.70 10*3/mm3    Basophils, Absolute 0.02 0.00 - 0.20 10*3/mm3    Immature Grans, Absolute 0.18 (H) 0.00 - 0.03 10*3/mm3    nRBC 0.0 0.0 - 0.0 /100 WBC   Procalcitonin   Result Value Ref Range    Procalcitonin 0.21 0.10 - 0.25 ng/mL   CBC Auto Differential   Result Value Ref Range    WBC 13.31 (H) 4.50 - 10.70 10*3/mm3    RBC 3.68 (L) 3.90 - 5.20 10*6/mm3    Hemoglobin 10.9 (L) 11.9 - 15.5 g/dL    Hematocrit 33.4 (L) 35.6 - 45.5 %    MCV 90.8 80.5 - 98.2 fL    MCH 29.6 26.9 - 32.0 pg    MCHC 32.6 32.4 - 36.3 g/dL    RDW 14.8 (H) 11.7 - 13.0 %    RDW-SD 48.5 37.0 - 54.0 fl    MPV 8.7 6.0 - 12.0 fL    Platelets 520 (H) 140 - 500 10*3/mm3    Neutrophil % 64.1 42.7 - 76.0 %    Lymphocyte % 15.3 (L) 19.6 - 45.3 %    Monocyte % 14.4 (H) 5.0 - 12.0 %    Eosinophil % 4.1 0.3 - 6.2 %    Basophil % 0.4 0.0 - 1.5 %    Immature Grans % 1.7 (H) 0.0 - 0.5 %    Neutrophils, Absolute 8.56 (H) 1.90 - 8.10 10*3/mm3    Lymphocytes, Absolute 2.03 0.90 - 4.80 10*3/mm3    Monocytes, Absolute 1.91 (H) 0.20 - 1.20 10*3/mm3    Eosinophils, Absolute 0.54 0.00 - 0.70 10*3/mm3    Basophils, Absolute 0.05 0.00 - 0.20 10*3/mm3    Immature Grans, Absolute 0.22 (H) 0.00 - 0.03 10*3/mm3   Rheumatoid Factor, Quant   Result Value Ref Range    Rheumatoid Factor Quantitative 13.4 0.0 - 14.0 IU/mL   Cyclic Citrul Peptide Antibody, IgG / IgA   Result Value Ref Range    CCP Antibodies  IgG/IgA 7 0 - 19 units   TSPOT   Result Value Ref Range    T-SPOT Nil Control 0     T-SPOT Panel A 0     T-SPOT Panel B 0     T-SPOT Pos Control 87     TSPOT Negative Negative   TSH   Result Value Ref Range    TSH 1.080 0.270 - 4.200 mIU/mL   T4, Free   Result Value Ref Range    Free T4 1.36 0.93 - 1.70 ng/dL   Basic Metabolic Panel   Result Value Ref Range    Glucose 153 (H) 65 - 99 mg/dL    BUN 13 8 - 23 mg/dL    Creatinine 0.73 0.57 - 1.00 mg/dL    Sodium 136 136 - 145 mmol/L    Potassium 3.6 3.5 - 5.2 mmol/L    Chloride 100 98 - 107 mmol/L    CO2 29.1 (H) 22.0 - 29.0 mmol/L    Calcium 8.1 (L) 8.6 - 10.5 mg/dL    eGFR  African Amer 96 >60 mL/min/1.73    BUN/Creatinine Ratio 17.8 7.0 - 25.0    Anion Gap 6.9 mmol/L   Procalcitonin   Result Value Ref Range    Procalcitonin 0.15 0.10 - 0.25 ng/mL   CBC Auto Differential   Result Value Ref Range    WBC 13.46 (H) 4.50 - 10.70 10*3/mm3    RBC 3.44 (L) 3.90 - 5.20 10*6/mm3    Hemoglobin 10.2 (L) 11.9 - 15.5 g/dL    Hematocrit 31.4 (L) 35.6 - 45.5 %    MCV 91.3 80.5 - 98.2 fL    MCH 29.7 26.9 - 32.0 pg    MCHC 32.5 32.4 - 36.3 g/dL    RDW 15.0 (H) 11.7 - 13.0 %    RDW-SD 49.7 37.0 - 54.0 fl    MPV 9.1 6.0 - 12.0 fL    Platelets 609 (H) 140 - 500 10*3/mm3    Neutrophil % 58.8 42.7 - 76.0 %    Lymphocyte % 16.7 (L) 19.6 - 45.3 %    Monocyte % 16.8 (H) 5.0 - 12.0 %    Eosinophil % 5.1 0.3 - 6.2 %    Basophil % 0.3 0.0 - 1.5 %    Immature Grans % 2.3 (H) 0.0 - 0.5 %    Neutrophils, Absolute 7.91 1.90 - 8.10 10*3/mm3    Lymphocytes, Absolute 2.25 0.90 - 4.80 10*3/mm3    Monocytes, Absolute 2.26 (H) 0.20 - 1.20 10*3/mm3    Eosinophils, Absolute 0.69 0.00 - 0.70 10*3/mm3    Basophils, Absolute 0.04 0.00 - 0.20 10*3/mm3    Immature Grans, Absolute 0.31 (H) 0.00 - 0.03 10*3/mm3   Basic Metabolic Panel   Result Value Ref Range    Glucose 137 (H) 65 - 99 mg/dL    BUN 11 8 - 23 mg/dL    Creatinine 0.71 0.57 - 1.00 mg/dL    Sodium 137 136 - 145 mmol/L    Potassium 3.6 3.5 - 5.2 mmol/L     Chloride 101 98 - 107 mmol/L    CO2 29.1 (H) 22.0 - 29.0 mmol/L    Calcium 8.3 (L) 8.6 - 10.5 mg/dL    eGFR  African Amer 100 >60 mL/min/1.73    BUN/Creatinine Ratio 15.5 7.0 - 25.0    Anion Gap 6.9 mmol/L   CBC Auto Differential   Result Value Ref Range    WBC 11.39 (H) 4.50 - 10.70 10*3/mm3    RBC 3.55 (L) 3.90 - 5.20 10*6/mm3    Hemoglobin 10.3 (L) 11.9 - 15.5 g/dL    Hematocrit 32.3 (L) 35.6 - 45.5 %    MCV 91.0 80.5 - 98.2 fL    MCH 29.0 26.9 - 32.0 pg    MCHC 31.9 (L) 32.4 - 36.3 g/dL    RDW 14.9 (H) 11.7 - 13.0 %    RDW-SD 49.9 37.0 - 54.0 fl    MPV 9.1 6.0 - 12.0 fL    Platelets 632 (H) 140 - 500 10*3/mm3    Neutrophil % 53.6 42.7 - 76.0 %    Lymphocyte % 20.8 19.6 - 45.3 %    Monocyte % 15.6 (H) 5.0 - 12.0 %    Eosinophil % 6.3 (H) 0.3 - 6.2 %    Basophil % 0.5 0.0 - 1.5 %    Immature Grans % 3.2 (H) 0.0 - 0.5 %    Neutrophils, Absolute 6.09 1.90 - 8.10 10*3/mm3    Lymphocytes, Absolute 2.37 0.90 - 4.80 10*3/mm3    Monocytes, Absolute 1.78 (H) 0.20 - 1.20 10*3/mm3    Eosinophils, Absolute 0.72 (H) 0.00 - 0.70 10*3/mm3    Basophils, Absolute 0.06 0.00 - 0.20 10*3/mm3    Immature Grans, Absolute 0.37 (H) 0.00 - 0.03 10*3/mm3   Basic Metabolic Panel   Result Value Ref Range    Glucose 88 65 - 99 mg/dL    BUN 13 8 - 23 mg/dL    Creatinine 0.83 0.57 - 1.00 mg/dL    Sodium 139 136 - 145 mmol/L    Potassium 3.4 (L) 3.5 - 5.2 mmol/L    Chloride 101 98 - 107 mmol/L    CO2 29.2 (H) 22.0 - 29.0 mmol/L    Calcium 8.4 (L) 8.6 - 10.5 mg/dL    eGFR  African Amer 83 >60 mL/min/1.73    BUN/Creatinine Ratio 15.7 7.0 - 25.0    Anion Gap 8.8 mmol/L   CBC Auto Differential   Result Value Ref Range    WBC 11.39 (H) 4.50 - 10.70 10*3/mm3    RBC 3.63 (L) 3.90 - 5.20 10*6/mm3    Hemoglobin 10.4 (L) 11.9 - 15.5 g/dL    Hematocrit 32.8 (L) 35.6 - 45.5 %    MCV 90.4 80.5 - 98.2 fL    MCH 28.7 26.9 - 32.0 pg    MCHC 31.7 (L) 32.4 - 36.3 g/dL    RDW 14.8 (H) 11.7 - 13.0 %    RDW-SD 48.4 37.0 - 54.0 fl    MPV 8.7 6.0 - 12.0 fL     Platelets 539 (H) 140 - 500 10*3/mm3    Neutrophil % 53.3 42.7 - 76.0 %    Lymphocyte % 20.0 19.6 - 45.3 %    Monocyte % 16.6 (H) 5.0 - 12.0 %    Eosinophil % 5.8 0.3 - 6.2 %    Basophil % 0.5 0.0 - 1.5 %    Immature Grans % 3.8 (H) 0.0 - 0.5 %    Neutrophils, Absolute 6.07 1.90 - 8.10 10*3/mm3    Lymphocytes, Absolute 2.28 0.90 - 4.80 10*3/mm3    Monocytes, Absolute 1.89 (H) 0.20 - 1.20 10*3/mm3    Eosinophils, Absolute 0.66 0.00 - 0.70 10*3/mm3    Basophils, Absolute 0.06 0.00 - 0.20 10*3/mm3    Immature Grans, Absolute 0.43 (H) 0.00 - 0.03 10*3/mm3   Potassium   Result Value Ref Range    Potassium 4.2 3.5 - 5.2 mmol/L   POC Glucose Once   Result Value Ref Range    Glucose 89 70 - 130 mg/dL   POC Glucose Once   Result Value Ref Range    Glucose 56 (L) 70 - 130 mg/dL   POC Glucose Once   Result Value Ref Range    Glucose 58 (L) 70 - 130 mg/dL   POC Glucose Once   Result Value Ref Range    Glucose 98 70 - 130 mg/dL   POC Glucose Once   Result Value Ref Range    Glucose 77 70 - 130 mg/dL   POC Glucose Once   Result Value Ref Range    Glucose 115 70 - 130 mg/dL   POC Glucose Once   Result Value Ref Range    Glucose 195 (H) 70 - 130 mg/dL   POC Glucose Once   Result Value Ref Range    Glucose 61 (L) 70 - 130 mg/dL   POC Glucose Once   Result Value Ref Range    Glucose 65 (L) 70 - 130 mg/dL   POC Glucose Once   Result Value Ref Range    Glucose 144 (H) 70 - 130 mg/dL   POC Glucose Once   Result Value Ref Range    Glucose 183 (H) 70 - 130 mg/dL   POC Glucose Once   Result Value Ref Range    Glucose 192 (H) 70 - 130 mg/dL   POC Glucose Once   Result Value Ref Range    Glucose 241 (H) 70 - 130 mg/dL   POC Glucose Once   Result Value Ref Range    Glucose 215 (H) 70 - 130 mg/dL   POC Glucose Once   Result Value Ref Range    Glucose 219 (H) 70 - 130 mg/dL   POC Glucose Once   Result Value Ref Range    Glucose 217 (H) 70 - 130 mg/dL   POC Glucose Once   Result Value Ref Range    Glucose 160 (H) 70 - 130 mg/dL   POC Glucose  Once   Result Value Ref Range    Glucose 239 (H) 70 - 130 mg/dL   POC Glucose Once   Result Value Ref Range    Glucose 216 (H) 70 - 130 mg/dL   POC Glucose Once   Result Value Ref Range    Glucose 235 (H) 70 - 130 mg/dL   POC Glucose Once   Result Value Ref Range    Glucose 164 (H) 70 - 130 mg/dL   POC Glucose Once   Result Value Ref Range    Glucose 235 (H) 70 - 130 mg/dL   POC Glucose Once   Result Value Ref Range    Glucose 212 (H) 70 - 130 mg/dL   POC Glucose Once   Result Value Ref Range    Glucose 163 (H) 70 - 130 mg/dL   POC Glucose Once   Result Value Ref Range    Glucose 95 70 - 130 mg/dL   POC Glucose Once   Result Value Ref Range    Glucose 130 70 - 130 mg/dL   POC Glucose Once   Result Value Ref Range    Glucose 140 (H) 70 - 130 mg/dL   POC Glucose Once   Result Value Ref Range    Glucose 201 (H) 70 - 130 mg/dL   POC Glucose Once   Result Value Ref Range    Glucose 165 (H) 70 - 130 mg/dL   POC Glucose Once   Result Value Ref Range    Glucose 189 (H) 70 - 130 mg/dL   POC Glucose Once   Result Value Ref Range    Glucose 206 (H) 70 - 130 mg/dL   POC Glucose Once   Result Value Ref Range    Glucose 210 (H) 70 - 130 mg/dL   POC Glucose Once   Result Value Ref Range    Glucose 142 (H) 70 - 130 mg/dL   POC Glucose Once   Result Value Ref Range    Glucose 252 (H) 70 - 130 mg/dL   POC Glucose Once   Result Value Ref Range    Glucose 260 (H) 70 - 130 mg/dL   POC Glucose Once   Result Value Ref Range    Glucose 230 (H) 70 - 130 mg/dL   POC Glucose Once   Result Value Ref Range    Glucose 87 70 - 130 mg/dL   POC Glucose Once   Result Value Ref Range    Glucose 97 70 - 130 mg/dL   POC Glucose Once   Result Value Ref Range    Glucose 166 (H) 70 - 130 mg/dL   ADULT TRANSTHORACIC ECHO COMPLETE W/ CONT IF NECESSARY PER PROTOCOL   Result Value Ref Range    BSA 2.3 m^2    IVSd 1.1 cm    LVIDd 4.4 cm    LVIDs 2.9 cm    LVPWd 1.0 cm    IVS/LVPW 1.1     FS 34.1 %    EDV(Teich) 87.7 ml    ESV(Teich) 32.2 ml    EF(Teich) 63.3 %     EDV(cubed) 85.2 ml    ESV(cubed) 24.4 ml    EF(cubed) 71.4 %    LV mass(C)d 158.2 grams    LV mass(C)dI 69.1 grams/m^2    SV(Teich) 55.5 ml    SI(Teich) 24.2 ml/m^2    SV(cubed) 60.8 ml    SI(cubed) 26.5 ml/m^2    Ao root diam 3.2 cm    Ao root area 8.0 cm^2    ACS 2.0 cm    asc Aorta Diam 2.8 cm    Ao Arch Diam (Proximal trans.) 2.3 cm    LVOT diam 2.1 cm    LVOT area 3.5 cm^2    LVOT area(traced) 3.5 cm^2    RVOT diam 2.1 cm    RVOT area 3.5 cm^2    LVLd ap4 8.8 cm    EDV(MOD-sp4) 101.0 ml    LVLs ap4 6.8 cm    ESV(MOD-sp4) 32.0 ml    EF(MOD-sp4) 68.3 %    LVLd ap2 8.3 cm    EDV(MOD-sp2) 78.0 ml    LVLs ap2 6.4 cm    ESV(MOD-sp2) 32.0 ml    EF(MOD-sp2) 59.0 %    SV(MOD-sp4) 69.0 ml    SI(MOD-sp4) 30.1 ml/m^2    SV(MOD-sp2) 46.0 ml    SI(MOD-sp2) 20.1 ml/m^2    Ao root area (BSA corrected) 1.4     Ao root area (BSA corrected) 59.0 ml/m^2    CONTRAST EF 4CH 68.3 ml/m^2    LV Diastolic corrected for BSA 44.1 ml/m^2    LV Systolic corrected for BSA 14.0 ml/m^2    MV A dur 0.1 sec    MV E max rashi 80.4 cm/sec    MV A max rashi 76.6 cm/sec    MV E/A 1.0     MV V2 max 91.8 cm/sec    MV max PG 3.4 mmHg    MV V2 mean 72.5 cm/sec    MV mean PG 2.0 mmHg    MV V2 VTI 19.5 cm    MVA(VTI) 3.3 cm^2    MV P1/2t max rashi 90.9 cm/sec    MV P1/2t 40.6 msec    MVA(P1/2t) 5.4 cm^2    MV dec slope 655.0 cm/sec^2    MV dec time 0.14 sec    Ao pk rashi 121.0 cm/sec    Ao max PG 5.9 mmHg    Ao max PG (full) 2.6 mmHg    Ao V2 mean 88.7 cm/sec    Ao mean PG 4.0 mmHg    Ao mean PG (full) 2.0 mmHg    Ao V2 VTI 22.4 cm    CLARA(I,A) 2.9 cm^2    CLARA(I,D) 2.9 cm^2    CLARA(V,A) 2.6 cm^2    CLARA(V,D) 2.6 cm^2    LV V1 max PG 3.3 mmHg    LV V1 mean PG 2.0 mmHg    LV V1 max 90.8 cm/sec    LV V1 mean 72.2 cm/sec    LV V1 VTI 18.8 cm    SV(Ao) 180.2 ml    SI(Ao) 78.7 ml/m^2    SV(LVOT) 65.1 ml    SV(RVOT) 63.7 ml    SI(LVOT) 28.4 ml/m^2    PA V2 max 114.0 cm/sec    PA max PG 5.2 mmHg    PA max PG (full) 2.3 mmHg    BH CV ECHO STEPHANIE - PVA(V,A) 2.6 cm^2    BH  CV ECHO STEPHANIE - PVA(V,D) 2.6 cm^2    PA acc time 0.06 sec    RV V1 max PG 2.9 mmHg    RV V1 mean PG 2.0 mmHg    RV V1 max 85.8 cm/sec    RV V1 mean 62.4 cm/sec    RV V1 VTI 18.4 cm    PA pr(Accel) 52.0 mmHg    Pulm Sys Bryan 38.8 cm/sec    Pulm [place] Bryan 22.6 cm/sec    Pulm S/D 1.7     Qp/Qs 0.98     Pulm A Revs Dur 0.1 sec    Pulm A Revs Bryan 25.7 cm/sec    MVA P1/2T LCG 2.4 cm^2     CV ECHO STEPHANIE - BZI_BMI 37.7 kilograms/m^2     CV ECHO STEPHANIE - BSA(HAYCOCK) 2.4 m^2     CV ECHO STEPHANIE - BZI_METRIC_WEIGHT 115.7 kg     CV ECHO STEPHANIE - BZI_METRIC_HEIGHT 175.3 cm    Target HR (85%) 130 bpm    Max. Pred. HR (100%) 153 bpm     CV VAS BP RIGHT /83 mmHg    TDI S' 12.70 cm/sec    RV Base 3.39 cm    Ascending aorta 2.80 cm    Aortic arch 2.30 cm    Dimensionless Index 0.8 (DI)    E/E' ratio 10.0     LA Volume Index 22.0 mL/m2    Lat Peak E' Bryan 7.0 cm/sec    Med Peak E' Bryan 8.00 cm/sec    TAPSE (>1.6) 1.80 cm2    Echo EF Estimated 68 %   Type & Screen   Result Value Ref Range    ABO Type O     RH type Positive     Antibody Screen Negative    Non-gynecologic Cytology   Result Value Ref Range    Case Report       Non-gynecologic Cytology                          Case: [id number]                                  Authorizing Provider:  Andrew Dobbins MD         Collected:           03/08/2018 02:00 PM          Ordering Location:     [facility]  Received:            03/08/2018 04:08 PM                                 [facility]                                                                  Pathologist:           Hitesh Sherwood MD                                                        Specimens:   1) - Pleural Cavity, Lt. pleural - 10 mL serous                                                     2) - Pleural Cavity, Lt. pleural - 60 mL bloody                                            Final Diagnosis       1. LEFT PLEURAL FLUID (THIN PREP AND CELL BLOCK):   MESOTHELIAL CELLS AND PROTEINACEOUS  DEBRIS.    2. LEFT PLEURAL FLUID (THIN PREP AND CELL BLOCK):   MESOTHELIAL CELLS WITH REACTIVE CHANGE.   PROTEINACEOUS DEBRIS, HISTIOCYTES, AND MIXED LEUKOCYTES.    COMMENT: Correlation with flow cytometric results is recommended.    MALDONADO/pkm    CPT CODES:  1. 27225, 52791  2. 51929, 58629        Comment       Spoke w/ Dr. Dobbins on 3/9/18. He confirmed this was a pleural fluid, not a pericardial fluid. -       Gross Description       #1 - 1 syringe submitted w/ 1ml of yellow fluid. 1 thin prep, and 1 cellblock. In formalin @ 14:10.    #2 - 1 syringe submitted w/ 10 ml of dark red fluid. 1 thin prep, and 1 cellblock. In formalin @ 14:10.      Microscopic Description       Performed, incorporated in diagnosis.      Embedded Images     Non-gynecologic Cytology   Result Value Ref Range    Case Report       Non-gynecologic Cytology                          Case: [id number]                                  Authorizing Provider:  Nomi Patel III, MD   Collected:           03/10/2018 08:35 AM          Ordering Location:     [facility]  Received:            03/10/2018 10:58 AM                                 MAIN OR                                                                      Pathologist:           Hitesh Sherwood MD                                                        Specimen:    Pericardium, PERICARDIAL FLUID                                                             Final Diagnosis       1. PERICARDIAL FLUID (THIN PREP AND CELL BLOCK):   PREDOMINANTLY BLOOD.   SCATTERED LYMPHOCYTES.    COMMENT: Correlation with flow cytometric results is recommended.    MALDONADO/Koffeewarem    CPT CODES:  1. 29382, 04313        Gross Description       17 ML DARK RED THIN FLUID WITH CLOT.1 THIN PREP, 1 CELL BLOCK. CELL BLOCK PLACED IN FORMALIN  @11:10.      Microscopic Description       Performed, incorporated in diagnosis.      Embedded Images     Tissue Pathology Exam   Result Value Ref Range    Case Report        "Surgical Pathology Report                         Case: [id number]                                  Authorizing Provider:  Nomi Patel III, MD   Collected:           03/10/2018 08:43 AM          Ordering Location:     [facility]  Received:            03/10/2018 10:59 AM                                 MAIN OR                                                                      Pathologist:           Timothy Loco MD                                                                           Specimen:    Pericardium                                                                                Final Diagnosis       PERICARDIUM:   PERICARDIUM WITH MARKED MIXED ACUTE AND CHRONIC INFLAMMATION, ORGANIZING    HEMORRHAGE.    COMMENT: Tumor and granuloma are not identified.    CMK/pkm  IHC/THM    CPT CODES:  1. 44978        Gross Description       Received in formalin designated \"pericardium\" are two sheet-like portions of gray white pink hemorrhagic tissue consistent with pericardium.  They are 20 and 30 mm in greatest dimension. The Each is up to 6 mm in thickness.  The smaller is sectioned and submitted A and B and the larger is submitted in C - E.  CMK/pkm        Microscopic Description       Performed, incorporated in diagnosis.      Embedded Images     Light Blue Top   Result Value Ref Range    Extra Tube hold for add-on    Green Top (Gel)   Result Value Ref Range    Extra Tube Hold for add-ons.    Lavender Top   Result Value Ref Range    Extra Tube hold for add-on    Gold Top - SST   Result Value Ref Range    Extra Tube Hold for add-ons.            Assessment:   Feli was seen today for follow-up.    Diagnoses and all orders for this visit:    Type 2 diabetes mellitus with complication, with long-term current use of insulin  -     Fructosamine  -     Hemoglobin A1c  -     Insulin, Total  -     Lipid Panel  -     " Uric Acid  -     Vitamin D 25 Hydroxy  -     TSH  -     Thyroid Antibodies  -     T4, Free  -     T3, Free  -     CBC & Differential; Future  -     Comprehensive Metabolic Panel; Future  -     C-Peptide; Future  -     Hemoglobin A1c; Future  -     Insulin, Total; Future  -     Lipid Panel; Future  -     Microalbumin / Creatinine Urine Ratio - Urine, Clean Catch; Future  -     Uric Acid; Future  -     Vitamin D 25 Hydroxy; Future  -     TSH; Future  -     Thyroid Antibodies; Future  -     T4, Free; Future  -     T3, Free; Future    Mixed hyperlipidemia  -     CBC & Differential; Future  -     Comprehensive Metabolic Panel; Future    Essential hypertension  -     CBC & Differential; Future  -     Comprehensive Metabolic Panel; Future    Vitamin D deficiency  -     CBC & Differential; Future  -     Comprehensive Metabolic Panel; Future  -     Vitamin D 25 Hydroxy; Future    Hypervitaminosis D   -     Vitamin D 25 Hydroxy  -     CBC & Differential; Future  -     Comprehensive Metabolic Panel; Future          Plan:   In summary:  Met with patient today who is metabolically stable and doing well.  Patient states that she was recently hospitalized for gwendolyn that was diagnosed as pericardial effusion.  Gabapentin has an upcoming appointment with cardiology.  Instructed patient that she will need to have the notes sent to us if determined that she has congestive heart failure I will need to know the stage due to medication that she is presently on from us.  Patient verbally stated that she understood these instructions.  As at this time review the lab work was completed this time additional lab work will be obtained and treat as indicated with results.  Review lab and follow-up orders were given patient verbally understood all instructions.    :     home blood tests -  Blood glucose testin-3 times daily, that are:  fasting- 1st thing in morning before eating or drinking  before each meal and 1 or 2 hours after  meal  bedtime  anytime you feel symptoms of hyperglycemia or hypoglycemia (high or low blood sugars)        Education:  interpretation of lab results, blood sugar goals, complications of diabetes mellitus, hypoglycemia prevention and treatment, exercise, illness management, self-monitoring of blood glucose skills, nutrition and carbohydrate counting        The total face to face time spent was  25 minutes  with additional education given: 14 minutes (greater than 50% of the total time) was spent with counseling and coordination of care on: SMBG with goals, Side effects profiles with medications, medication use and purposes.     Return if symptoms worsen or fail to improve, for Recheck. Keep f/u with Dr. Moran with labs 2 weeks prior.       Dragon transcription disclaimer     Much of this encounter note is an electronic transcription/translation of spoken language to printed text. The electronic translation of spoken language may permit erroneous, or at times, nonsensical words or phrases to be inadvertently transcribed. Although I have reviewed the note for such errors, some may still exist.

## 2018-04-02 ENCOUNTER — OFFICE VISIT (OUTPATIENT)
Dept: SURGERY | Facility: CLINIC | Age: 68
End: 2018-04-02

## 2018-04-02 ENCOUNTER — HOSPITAL ENCOUNTER (OUTPATIENT)
Dept: GENERAL RADIOLOGY | Facility: HOSPITAL | Age: 68
Discharge: HOME | End: 2018-04-02
Attending: THORACIC SURGERY (CARDIOTHORACIC VASCULAR SURGERY) | Admitting: THORACIC SURGERY (CARDIOTHORACIC VASCULAR SURGERY)

## 2018-04-02 VITALS
SYSTOLIC BLOOD PRESSURE: 135 MMHG | DIASTOLIC BLOOD PRESSURE: 78 MMHG | BODY MASS INDEX: 36.14 KG/M2 | OXYGEN SATURATION: 93 % | WEIGHT: 244 LBS | HEART RATE: 91 BPM | HEIGHT: 69 IN

## 2018-04-02 DIAGNOSIS — J90 PLEURAL EFFUSION: ICD-10-CM

## 2018-04-02 DIAGNOSIS — I31.39 PERICARDIAL EFFUSION: Primary | ICD-10-CM

## 2018-04-02 LAB
25(OH)D3+25(OH)D2 SERPL-MCNC: 41.3 NG/ML (ref 30–100)
CHOLEST SERPL-MCNC: 247 MG/DL (ref 0–200)
FRUCTOSAMINE SERPL-SCNC: 269 UMOL/L (ref 0–285)
HBA1C MFR BLD: 7.38 % (ref 4.8–5.6)
HDLC SERPL-MCNC: 83 MG/DL (ref 40–60)
INSULIN SERPL-ACNC: 28.9 UIU/ML (ref 2.6–24.9)
INTERPRETATION: NORMAL
LDLC SERPL CALC-MCNC: 143 MG/DL (ref 0–100)
Lab: NORMAL
T3FREE SERPL-MCNC: 3.2 PG/ML (ref 2–4.4)
T4 FREE SERPL-MCNC: 1.42 NG/DL (ref 0.93–1.7)
THYROGLOB AB SERPL-ACNC: 3 IU/ML (ref 0–0.9)
THYROPEROXIDASE AB SERPL-ACNC: 9 IU/ML (ref 0–34)
TRIGL SERPL-MCNC: 106 MG/DL (ref 0–150)
TSH SERPL DL<=0.005 MIU/L-ACNC: 1.5 MIU/ML (ref 0.27–4.2)
URATE SERPL-MCNC: 5.1 MG/DL (ref 2.4–5.7)
VLDLC SERPL CALC-MCNC: 21.2 MG/DL (ref 5–40)

## 2018-04-02 PROCEDURE — 71046 X-RAY EXAM CHEST 2 VIEWS: CPT

## 2018-04-02 PROCEDURE — 99024 POSTOP FOLLOW-UP VISIT: CPT | Performed by: THORACIC SURGERY (CARDIOTHORACIC VASCULAR SURGERY)

## 2018-04-02 NOTE — PROGRESS NOTES
Subjective   Patient ID: Feli Del Toro is a 67 y.o. female is here today for follow-up.    History of Present Illness  Dear Colleagues,   Feli Del Toro is here today for their first postoperative visit following a left VAT with pericardial window performed March 10, 2018.  Patient has made a good recovery.  She reports no chest wall pain.  She is having no shortness of breath.  She was discharged home on oxygen and has basically quit using the oxygen.  She has no cough or hemoptysis.  There is no pleuritic pain.  She has a follow-up appointment with Dr. Hodge of cardiology later this week.    The following portions of the patient's history were reviewed and updated as appropriate: allergies, current medications, past family history, past medical history, past social history, past surgical history and problem list.  Review of Systems   Constitution: Negative.   HENT: Negative.    Eyes: Negative.    Cardiovascular: Negative.    Respiratory: Negative.    Endocrine: Negative.    Hematologic/Lymphatic: Negative.    Skin: Negative.    Musculoskeletal: Negative.    Gastrointestinal: Negative.    Genitourinary: Negative.    Neurological: Negative.    Psychiatric/Behavioral: Negative.         Objective   Physical Exam   Constitutional: She is oriented to person, place, and time. She appears well-developed and well-nourished.   HENT:   Head: Normocephalic.   Eyes: Conjunctivae, EOM and lids are normal. Pupils are equal, round, and reactive to light.   Neck: Trachea normal and normal range of motion. Neck supple. No hepatojugular reflux and no JVD present. Carotid bruit is not present. No thyroid mass and no thyromegaly present.   Cardiovascular: Normal rate, regular rhythm, S1 normal, S2 normal, normal heart sounds and normal pulses.   No extrasystoles are present. PMI is not displaced.    Pulmonary/Chest: Effort normal and breath sounds normal.   All incisions are clean dry and well healed.  There are no subcutaneous masses  or nodules.  Chest wall is stable.   Abdominal: Soft. Normal appearance and bowel sounds are normal. She exhibits no mass. There is no hepatosplenomegaly. There is no tenderness. No hernia.   Musculoskeletal: Normal range of motion.   Neurological: She is alert and oriented to person, place, and time. She has normal strength and normal reflexes. No cranial nerve deficit or sensory deficit. She displays a negative Romberg sign.   Skin: Skin is warm, dry and intact.   Psychiatric: She has a normal mood and affect. Her speech is normal and behavior is normal. Judgment and thought content normal. Cognition and memory are normal.       Assessment/Plan   Independent Review of Radiographic Studies:    Chest x-ray performed today was independently reviewed.  Pleural  effusion is completely resolved.  Left lung is fully expanded and well aerated.  There is no pneumothorax.  Right lung is clear.  Cardiomegaly is unchanged.      Assessment: Satisfactory postoperative course.  Patient is made a complete recovery.    Plan: Patient is scheduled to follow-up with Dr. Hodge of cardiology later this week.  I will see her back here on an as-needed basis only.  She may resume all normal activities without restrictions.  Thank you for allowing me to participate in the care of Mrs. Del Toro.    There are no diagnoses linked to this encounter.

## 2018-04-05 ENCOUNTER — HOSPITAL ENCOUNTER (OUTPATIENT)
Dept: CARDIOLOGY | Facility: HOSPITAL | Age: 68
Discharge: HOME | End: 2018-04-05
Attending: INTERNAL MEDICINE | Admitting: INTERNAL MEDICINE

## 2018-04-05 VITALS
BODY MASS INDEX: 36.14 KG/M2 | OXYGEN SATURATION: 98 % | WEIGHT: 244 LBS | DIASTOLIC BLOOD PRESSURE: 62 MMHG | SYSTOLIC BLOOD PRESSURE: 122 MMHG | HEIGHT: 69 IN | HEART RATE: 87 BPM

## 2018-04-05 DIAGNOSIS — I31.39 PERICARDIAL EFFUSION: ICD-10-CM

## 2018-04-05 PROCEDURE — 93306 TTE W/DOPPLER COMPLETE: CPT | Performed by: INTERNAL MEDICINE

## 2018-04-05 PROCEDURE — 93306 TTE W/DOPPLER COMPLETE: CPT

## 2018-04-05 PROCEDURE — 25010000002 PERFLUTREN (DEFINITY) 8.476 MG IN SODIUM CHLORIDE 0.9 % 10 ML INJECTION: Performed by: INTERNAL MEDICINE

## 2018-04-05 RX ADMIN — PERFLUTREN 1.5 ML: 6.52 INJECTION, SUSPENSION INTRAVENOUS at 14:38

## 2018-04-06 LAB
BH CV ECHO MEAS - ACS: 2.2 CM
BH CV ECHO MEAS - AO MAX PG (FULL): 2.9 MMHG
BH CV ECHO MEAS - AO MAX PG: 4.8 MMHG
BH CV ECHO MEAS - AO MEAN PG (FULL): 1.4 MMHG
BH CV ECHO MEAS - AO MEAN PG: 2.6 MMHG
BH CV ECHO MEAS - AO ROOT AREA (BSA CORRECTED): 1.5
BH CV ECHO MEAS - AO ROOT AREA: 9 CM^2
BH CV ECHO MEAS - AO ROOT DIAM: 3.4 CM
BH CV ECHO MEAS - AO V2 MAX: 109.9 CM/SEC
BH CV ECHO MEAS - AO V2 MEAN: 71.5 CM/SEC
BH CV ECHO MEAS - AO V2 VTI: 20.4 CM
BH CV ECHO MEAS - ASC AORTA: 3.2 CM
BH CV ECHO MEAS - AVA(I,A): 2 CM^2
BH CV ECHO MEAS - AVA(I,D): 2 CM^2
BH CV ECHO MEAS - AVA(V,A): 1.9 CM^2
BH CV ECHO MEAS - AVA(V,D): 1.9 CM^2
BH CV ECHO MEAS - BSA(HAYCOCK): 2.4 M^2
BH CV ECHO MEAS - BSA: 2.2 M^2
BH CV ECHO MEAS - BZI_BMI: 36 KILOGRAMS/M^2
BH CV ECHO MEAS - BZI_METRIC_HEIGHT: 175.3 CM
BH CV ECHO MEAS - BZI_METRIC_WEIGHT: 110.7 KG
BH CV ECHO MEAS - CONTRAST EF (2CH): 63 ML/M^2
BH CV ECHO MEAS - CONTRAST EF 4CH: 60.8 ML/M^2
BH CV ECHO MEAS - EDV(MOD-SP2): 73 ML
BH CV ECHO MEAS - EDV(MOD-SP4): 74 ML
BH CV ECHO MEAS - EDV(TEICH): 139.5 ML
BH CV ECHO MEAS - EF(CUBED): 65 %
BH CV ECHO MEAS - EF(MOD-SP2): 63 %
BH CV ECHO MEAS - EF(MOD-SP4): 60.8 %
BH CV ECHO MEAS - EF(TEICH): 56.1 %
BH CV ECHO MEAS - ESV(MOD-SP2): 27 ML
BH CV ECHO MEAS - ESV(MOD-SP4): 29 ML
BH CV ECHO MEAS - ESV(TEICH): 61.3 ML
BH CV ECHO MEAS - FS: 29.5 %
BH CV ECHO MEAS - IVS/LVPW: 1.1
BH CV ECHO MEAS - IVSD: 1.1 CM
BH CV ECHO MEAS - LAT PEAK E' VEL: 7 CM/SEC
BH CV ECHO MEAS - LV DIASTOLIC VOL/BSA (35-75): 32.9 ML/M^2
BH CV ECHO MEAS - LV MASS(C)D: 215.1 GRAMS
BH CV ECHO MEAS - LV MASS(C)DI: 95.7 GRAMS/M^2
BH CV ECHO MEAS - LV MAX PG: 2 MMHG
BH CV ECHO MEAS - LV MEAN PG: 1.2 MMHG
BH CV ECHO MEAS - LV SYSTOLIC VOL/BSA (12-30): 12.9 ML/M^2
BH CV ECHO MEAS - LV V1 MAX: 70.2 CM/SEC
BH CV ECHO MEAS - LV V1 MEAN: 51.8 CM/SEC
BH CV ECHO MEAS - LV V1 VTI: 13.9 CM
BH CV ECHO MEAS - LVIDD: 5.4 CM
BH CV ECHO MEAS - LVIDS: 3.8 CM
BH CV ECHO MEAS - LVLD AP2: 7.5 CM
BH CV ECHO MEAS - LVLD AP4: 7.6 CM
BH CV ECHO MEAS - LVLS AP2: 6.7 CM
BH CV ECHO MEAS - LVLS AP4: 6.6 CM
BH CV ECHO MEAS - LVOT AREA (M): 3.1 CM^2
BH CV ECHO MEAS - LVOT AREA: 3 CM^2
BH CV ECHO MEAS - LVOT DIAM: 2 CM
BH CV ECHO MEAS - LVPWD: 0.98 CM
BH CV ECHO MEAS - MED PEAK E' VEL: 6 CM/SEC
BH CV ECHO MEAS - MV A DUR: 0.11 SEC
BH CV ECHO MEAS - MV A MAX VEL: 60 CM/SEC
BH CV ECHO MEAS - MV DEC SLOPE: 183.1 CM/SEC^2
BH CV ECHO MEAS - MV DEC TIME: 0.23 SEC
BH CV ECHO MEAS - MV E MAX VEL: 39.4 CM/SEC
BH CV ECHO MEAS - MV E/A: 0.66
BH CV ECHO MEAS - MV P1/2T MAX VEL: 40.8 CM/SEC
BH CV ECHO MEAS - MV P1/2T: 65.2 MSEC
BH CV ECHO MEAS - MVA P1/2T LCG: 5.4 CM^2
BH CV ECHO MEAS - MVA(P1/2T): 3.4 CM^2
BH CV ECHO MEAS - PA ACC TIME: 0.14 SEC
BH CV ECHO MEAS - PA MAX PG (FULL): 2.1 MMHG
BH CV ECHO MEAS - PA MAX PG: 3.3 MMHG
BH CV ECHO MEAS - PA PR(ACCEL): 15.6 MMHG
BH CV ECHO MEAS - PA V2 MAX: 90.9 CM/SEC
BH CV ECHO MEAS - PVA(V,A): 1.6 CM^2
BH CV ECHO MEAS - PVA(V,D): 1.6 CM^2
BH CV ECHO MEAS - QP/QS: 0.77
BH CV ECHO MEAS - RV MAX PG: 1.2 MMHG
BH CV ECHO MEAS - RV MEAN PG: 0.69 MMHG
BH CV ECHO MEAS - RV V1 MAX: 55.8 CM/SEC
BH CV ECHO MEAS - RV V1 MEAN: 37.6 CM/SEC
BH CV ECHO MEAS - RV V1 VTI: 12.3 CM
BH CV ECHO MEAS - RVOT AREA: 2.6 CM^2
BH CV ECHO MEAS - RVOT DIAM: 1.8 CM
BH CV ECHO MEAS - SI(AO): 81.5 ML/M^2
BH CV ECHO MEAS - SI(CUBED): 44.8 ML/M^2
BH CV ECHO MEAS - SI(LVOT): 18.5 ML/M^2
BH CV ECHO MEAS - SI(MOD-SP2): 20.5 ML/M^2
BH CV ECHO MEAS - SI(MOD-SP4): 20 ML/M^2
BH CV ECHO MEAS - SI(TEICH): 34.8 ML/M^2
BH CV ECHO MEAS - SV(AO): 183.1 ML
BH CV ECHO MEAS - SV(CUBED): 100.7 ML
BH CV ECHO MEAS - SV(LVOT): 41.5 ML
BH CV ECHO MEAS - SV(MOD-SP2): 46 ML
BH CV ECHO MEAS - SV(MOD-SP4): 45 ML
BH CV ECHO MEAS - SV(RVOT): 32 ML
BH CV ECHO MEAS - SV(TEICH): 78.2 ML
BH CV ECHO MEAS - TAPSE (>1.6): 2.3 CM2
BH CV ECHO MEAS - TR MAX VEL: 150.7 CM/SEC
BH CV XLRA - RV BASE: 3 CM
E/E' RATIO: 6
LEFT ATRIUM VOLUME INDEX: 23 ML/M2
MAXIMAL PREDICTED HEART RATE: 153 BPM
STRESS TARGET HR: 130 BPM

## 2018-04-07 LAB
FUNGUS WND CULT: NORMAL
FUNGUS WND CULT: NORMAL

## 2018-04-09 RX ORDER — SAXAGLIPTIN 5 MG/1
TABLET, FILM COATED ORAL
Qty: 90 TABLET | Refills: 0 | Status: SHIPPED | OUTPATIENT
Start: 2018-04-09 | End: 2018-07-08 | Stop reason: SDUPTHER

## 2018-04-11 ENCOUNTER — OFFICE VISIT (OUTPATIENT)
Dept: ONCOLOGY | Facility: CLINIC | Age: 68
End: 2018-04-11

## 2018-04-11 ENCOUNTER — INFUSION (OUTPATIENT)
Dept: ONCOLOGY | Facility: HOSPITAL | Age: 68
End: 2018-04-11

## 2018-04-11 VITALS
WEIGHT: 242.6 LBS | RESPIRATION RATE: 18 BRPM | DIASTOLIC BLOOD PRESSURE: 74 MMHG | TEMPERATURE: 98 F | HEIGHT: 69 IN | OXYGEN SATURATION: 98 % | SYSTOLIC BLOOD PRESSURE: 142 MMHG | HEART RATE: 85 BPM | BODY MASS INDEX: 35.93 KG/M2

## 2018-04-11 DIAGNOSIS — Z45.2 ENCOUNTER FOR ADJUSTMENT OR MANAGEMENT OF VASCULAR ACCESS DEVICE: ICD-10-CM

## 2018-04-11 DIAGNOSIS — I31.39 PERICARDIAL EFFUSION: ICD-10-CM

## 2018-04-11 DIAGNOSIS — D50.9 IRON DEFICIENCY ANEMIA, UNSPECIFIED IRON DEFICIENCY ANEMIA TYPE: ICD-10-CM

## 2018-04-11 DIAGNOSIS — C18.9 PRIMARY MALIGNANT NEOPLASM OF COLON (HCC): Primary | ICD-10-CM

## 2018-04-11 DIAGNOSIS — R74.8 ELEVATED LIVER ENZYMES: ICD-10-CM

## 2018-04-11 DIAGNOSIS — C78.7 LIVER METASTASIS: ICD-10-CM

## 2018-04-11 LAB
ALBUMIN SERPL-MCNC: 3.4 G/DL (ref 3.5–5.2)
ALBUMIN/GLOB SERPL: 0.7 G/DL (ref 1.1–2.4)
ALP SERPL-CCNC: 339 U/L (ref 38–116)
ALT SERPL W P-5'-P-CCNC: 32 U/L (ref 0–33)
ANION GAP SERPL CALCULATED.3IONS-SCNC: 11.5 MMOL/L
AST SERPL-CCNC: 49 U/L (ref 0–32)
BASOPHILS # BLD AUTO: 0.06 10*3/MM3 (ref 0–0.1)
BASOPHILS NFR BLD AUTO: 0.7 % (ref 0–1.1)
BILIRUB SERPL-MCNC: 0.4 MG/DL (ref 0.1–1.2)
BUN BLD-MCNC: 18 MG/DL (ref 6–20)
BUN/CREAT SERPL: 21.2 (ref 7.3–30)
CALCIUM SPEC-SCNC: 9.3 MG/DL (ref 8.5–10.2)
CEA SERPL-MCNC: 5.09 NG/ML
CHLORIDE SERPL-SCNC: 98 MMOL/L (ref 98–107)
CO2 SERPL-SCNC: 28.5 MMOL/L (ref 22–29)
CREAT BLD-MCNC: 0.85 MG/DL (ref 0.6–1.1)
DEPRECATED RDW RBC AUTO: 47.6 FL (ref 37–49)
EOSINOPHIL # BLD AUTO: 0.37 10*3/MM3 (ref 0–0.36)
EOSINOPHIL NFR BLD AUTO: 4.5 % (ref 1–5)
ERYTHROCYTE [DISTWIDTH] IN BLOOD BY AUTOMATED COUNT: 15.1 % (ref 11.7–14.5)
GFR SERPL CREATININE-BSD FRML MDRD: 81 ML/MIN/1.73
GLOBULIN UR ELPH-MCNC: 5.1 GM/DL (ref 1.8–3.5)
GLUCOSE BLD-MCNC: 127 MG/DL (ref 74–124)
HCT VFR BLD AUTO: 38.9 % (ref 34–45)
HGB BLD-MCNC: 12.7 G/DL (ref 11.5–14.9)
IMM GRANULOCYTES # BLD: 0.06 10*3/MM3 (ref 0–0.03)
IMM GRANULOCYTES NFR BLD: 0.7 % (ref 0–0.5)
LYMPHOCYTES # BLD AUTO: 3.05 10*3/MM3 (ref 1–3.5)
LYMPHOCYTES NFR BLD AUTO: 37.2 % (ref 20–49)
MCH RBC QN AUTO: 28.3 PG (ref 27–33)
MCHC RBC AUTO-ENTMCNC: 32.6 G/DL (ref 32–35)
MCV RBC AUTO: 86.6 FL (ref 83–97)
MONOCYTES # BLD AUTO: 0.77 10*3/MM3 (ref 0.25–0.8)
MONOCYTES NFR BLD AUTO: 9.4 % (ref 4–12)
NEUTROPHILS # BLD AUTO: 3.88 10*3/MM3 (ref 1.5–7)
NEUTROPHILS NFR BLD AUTO: 47.5 % (ref 39–75)
NRBC BLD MANUAL-RTO: 0 /100 WBC (ref 0–0)
PLATELET # BLD AUTO: 405 10*3/MM3 (ref 150–375)
PMV BLD AUTO: 9 FL (ref 8.9–12.1)
POTASSIUM BLD-SCNC: 4.1 MMOL/L (ref 3.5–4.7)
PROT SERPL-MCNC: 8.5 G/DL (ref 6.3–8)
RBC # BLD AUTO: 4.49 10*6/MM3 (ref 3.9–5)
SODIUM BLD-SCNC: 138 MMOL/L (ref 134–145)
WBC NRBC COR # BLD: 8.19 10*3/MM3 (ref 4–10)

## 2018-04-11 PROCEDURE — 25010000002 HEPARIN FLUSH (PORCINE) 100 UNIT/ML SOLUTION: Performed by: INTERNAL MEDICINE

## 2018-04-11 PROCEDURE — 80053 COMPREHEN METABOLIC PANEL: CPT

## 2018-04-11 PROCEDURE — 99214 OFFICE O/P EST MOD 30 MIN: CPT | Performed by: INTERNAL MEDICINE

## 2018-04-11 PROCEDURE — 82378 CARCINOEMBRYONIC ANTIGEN: CPT | Performed by: INTERNAL MEDICINE

## 2018-04-11 PROCEDURE — 96523 IRRIG DRUG DELIVERY DEVICE: CPT | Performed by: INTERNAL MEDICINE

## 2018-04-11 PROCEDURE — 85025 COMPLETE CBC W/AUTO DIFF WBC: CPT

## 2018-04-11 RX ORDER — SODIUM CHLORIDE 0.9 % (FLUSH) 0.9 %
10 SYRINGE (ML) INJECTION AS NEEDED
Status: DISCONTINUED | OUTPATIENT
Start: 2018-04-11 | End: 2018-04-11 | Stop reason: HOSPADM

## 2018-04-11 RX ORDER — SODIUM CHLORIDE 0.9 % (FLUSH) 0.9 %
10 SYRINGE (ML) INJECTION AS NEEDED
Status: CANCELLED | OUTPATIENT
Start: 2018-04-11

## 2018-04-11 RX ADMIN — SODIUM CHLORIDE, PRESERVATIVE FREE 500 UNITS: 5 INJECTION INTRAVENOUS at 10:35

## 2018-04-11 RX ADMIN — Medication 10 ML: at 10:35

## 2018-04-11 NOTE — PROGRESS NOTES
Subjective     CHIEF COMPLAINT:    1. Metastatic colon cancer (KRAS mutation positive) with liver metastasis, biopsy confirmed. No resection of primary sigmoid colon cancer.   2. Recurrent Iron deficiency anemia   3. Palliative chemotherapy with FOLFOX-6 was given from June 2013 to November 2013.   4. Left partial colectomy, splenectomy, resection of left diaphragm with repair and partial hepatectomy with microwave ablation of metastasis on 1/14/2014.   5. FOLFIRI x12 cycles between 3/07/2014 and 8/07/2014.   6. CT scan on 05/28/2015 revealed a new liver lesion. Stereotactic radiation therapy with CyberKnife was delivered in 3 fractions, completed on 07/14/2015.        7.  New liver lesion was identified on CT on 5/5/17. CT guided microwave ablation of liver mass on 6/16/17.       8.  Patient re-treated with Folfiri on 6/29/17-11/29/17.     HISTORY OF PRESENT ILLNESS:     Feli Del Toro is a 67 y.o. patient with the above medical history.  She was hospitalized at [facility] with recurrent effusion between 3/7/18 and 3/16/18. She had pericardial window done by Dr. Patel  on 3/10/18.  She tolerated the procedure well.  She is feeling good.  No shortness of breath.  No chest pain.    Patient denies having an upper respiratory tract infection before she developed the pericardial effusion.    Past Medical History:   Diagnosis Date   • Acid reflux    • Anemia    • Cancer 06/04/2013   • Diabetes mellitus    • Dyspnea    • Essential hypertension    • History of constipation    • History of transfusion    • Hyperlipidemia    • Hypertension    • Metastatic colon cancer to liver    • Neuropathy    • Pericardial effusion     Moderate   • Retinopathy    • SOB (shortness of breath)    • Type 2 diabetes mellitus        Past Surgical History:   Procedure Laterality Date   • ABDOMINAL SURGERY      ablation    • AMPUTATION DIGIT Left 1/1/2017    Procedure: LEFT SECOND TOE AMPUTATION;  Surgeon: Farzad Nichols MD;   Location: [facility] OR;  Service:    • AMPUTATION OF REPLICATED TOES      right distal second toe    • CARDIAC CATHETERIZATION N/A 3/8/2018    Procedure: Pericardiocentesis;  Surgeon: Andrew Dobbins MD;  Location: [facility] INVASIVE LOCATION;  Service:    • CATARACT EXTRACTION     • COLECTOMY PARTIAL / TOTAL  01/14/2014   • COLONOSCOPY  2013   • FRACTURE SURGERY Right 2017    right lower leg with screw in ankle   • HYSTERECTOMY  1998   • PERICARDIAL WINDOW N/A 3/10/2018    Procedure: PERICARDIAL WINDOW;  Surgeon: Nomi Patel III, MD;  Location: [facility] OR;  Service: Cardiothoracic   • PORTACATH PLACEMENT  06/2013   • SALPINGO OOPHORECTOMY Bilateral    • SPLENECTOMY     • THORACOSCOPY Left 3/10/2018    Procedure: BRONCHOSCOPY, LEFT THORACOSCOPY VIDEO ASSISTED, SUBPLEURAL CATHETERS FOR PAIN;  Surgeon: Nomi Patel III, MD;  Location: [facility] OR;  Service: Cardiothoracic       Cancer-related family history includes Breast cancer in her sister; Cancer in her other.    SCHEDULED MEDS:       Current Outpatient Prescriptions:   •  allopurinol (ZYLOPRIM) 100 MG tablet, TAKE 1 TABLET DAILY, Disp: 90 tablet, Rfl: 1  •  aspirin 81 MG tablet, Take 81 mg by mouth daily., Disp: , Rfl:   •  calcium carbonate (TUMS) 500 MG chewable tablet, Chew 2 tablets As Needed., Disp: , Rfl:   •  ferrous sulfate 325 (65 FE) MG tablet, Take 1 tablet by mouth Daily., Disp: , Rfl:   •  fluticasone (FLONASE) 50 MCG/ACT nasal spray, 2 sprays into each nostril Daily., Disp: , Rfl:   •  magnesium oxide (MAG-OX) 400 MG tablet, Take 400 mg by mouth Daily., Disp: , Rfl:   •  NOVOFINE 32G X 6 MM misc, USE AS DIRECTED 5 TIMES DAILY AND AS NEEDED, Disp: 200 each, Rfl: 3  •  ONGLYZA 5 MG tablet, TAKE 1 TABLET DAILY, Disp: 90 tablet, Rfl: 0  •  telmisartan-hydrochlorothiazide (MICARDIS HCT) 40-12.5 MG per tablet, Take 1 tablet by mouth daily, Disp: 90 tablet, Rfl: 3  •  TOUJEO SOLOSTAR 300 UNIT/ML solution pen-injector, Inject 45 Units  "under the skin Daily., Disp: 3 pen, Rfl: 0  •  XIGDUO XR 5-1000 MG tablet sustained-release 24 hour, Take 2 tablets by mouth Daily., Disp: 180 tablet, Rfl: 3  No current facility-administered medications for this visit.     Facility-Administered Medications Ordered in Other Visits:   •  heparin flush (porcine) 100 UNIT/ML injection 500 Units, 500 Units, Intravenous, PRN, Marivel Brown MD, 500 Units at 04/11/18 1035  •  sodium chloride 0.9 % flush 10 mL, 10 mL, Intravenous, PRN, Marivel Brown MD, 10 mL at 04/11/18 1035    REVIEW OF SYSTEMS:  GENERAL:    No fever.  No Fatigue.  SKIN:  No skin rashes.  HEME/LYMPH: anemia.  No easy bruisability.  ENT:    No sore throat.  RESPIRATORY:    Shortness breath improved.  CVS:  No chest pain, orthopnea or PND.   GI: no abdominal pain nausea or vomiting..  :    No recent change in the color of urine  MUSCULOSKELETAL: chronic back pain.  NEUROLOGICAL:  No dizziness or lightheadedness.  PSYCHIATRIC:    No anxiety or depression.    Objective   VITAL SIGNS:     Vitals:    04/11/18 1034   BP: 142/74   Pulse: 85   Resp: 18   Temp: 98 °F (36.7 °C)   TempSrc: Oral   SpO2: 98%   Weight: 110 kg (242 lb 9.6 oz)   Height: 175.3 cm (69.02\")     Body mass index is 35.81 kg/m².     Wt Readings from Last 3 Encounters:   04/11/18 110 kg (242 lb 9.6 oz)   04/05/18 111 kg (244 lb)   04/02/18 111 kg (244 lb)       PHYSICAL EXAMINATION:  GENERAL:  Patient appears in good general condition, not in acute distress.  SKIN:  No skin rashes or lesions.  HEAD:  Normocephalic.  EYES:  No jaundice or pallor..   NECK:  JVD.  LYMPHATICS:  No cervical respiratory rate lymphadenopathy.  CHEST: clear to auscultation bilaterally.  No added sounds.  CARDIAC:  Normal S1-S2.  No murmurs.  ABDOMEN:  Soft and nontender.  No masses.  EXTREMITIES:    No edema.  NEUROLOGICAL:    No focal deficits.      RESULT REVIEW:     Results for orders placed or performed in visit on 04/11/18   Comprehensive Metabolic " Panel   Result Value Ref Range    Glucose 127 (H) 74 - 124 mg/dL    BUN 18 6 - 20 mg/dL    Creatinine 0.85 0.60 - 1.10 mg/dL    Sodium 138 134 - 145 mmol/L    Potassium 4.1 3.5 - 4.7 mmol/L    Chloride 98 98 - 107 mmol/L    CO2 28.5 22.0 - 29.0 mmol/L    Calcium 9.3 8.5 - 10.2 mg/dL    Total Protein 8.5 (H) 6.3 - 8.0 g/dL    Albumin 3.40 (L) 3.50 - 5.20 g/dL    ALT (SGPT) 32 0 - 33 U/L    AST (SGOT) 49 (H) 0 - 32 U/L    Alkaline Phosphatase 339 (H) 38 - 116 U/L    Total Bilirubin 0.4 0.1 - 1.2 mg/dL    eGFR  African Amer 81 >60 mL/min/1.73    Globulin 5.1 (H) 1.8 - 3.5 gm/dL    A/G Ratio 0.7 (L) 1.1 - 2.4 g/dL    BUN/Creatinine Ratio 21.2 7.3 - 30.0    Anion Gap 11.5 mmol/L   CBC Auto Differential   Result Value Ref Range    WBC 8.19 4.00 - 10.00 10*3/mm3    RBC 4.49 3.90 - 5.00 10*6/mm3    Hemoglobin 12.7 11.5 - 14.9 g/dL    Hematocrit 38.9 34.0 - 45.0 %    MCV 86.6 83.0 - 97.0 fL    MCH 28.3 27.0 - 33.0 pg    MCHC 32.6 32.0 - 35.0 g/dL    RDW 15.1 (H) 11.7 - 14.5 %    RDW-SD 47.6 37.0 - 49.0 fl    MPV 9.0 8.9 - 12.1 fL    Platelets 405 (H) 150 - 375 10*3/mm3    Neutrophil % 47.5 39.0 - 75.0 %    Lymphocyte % 37.2 20.0 - 49.0 %    Monocyte % 9.4 4.0 - 12.0 %    Eosinophil % 4.5 1.0 - 5.0 %    Basophil % 0.7 0.0 - 1.1 %    Immature Grans % 0.7 (H) 0.0 - 0.5 %    Neutrophils, Absolute 3.88 1.50 - 7.00 10*3/mm3    Lymphocytes, Absolute 3.05 1.00 - 3.50 10*3/mm3    Monocytes, Absolute 0.77 0.25 - 0.80 10*3/mm3    Eosinophils, Absolute 0.37 (H) 0.00 - 0.36 10*3/mm3    Basophils, Absolute 0.06 0.00 - 0.10 10*3/mm3    Immature Grans, Absolute 0.06 (H) 0.00 - 0.03 10*3/mm3    nRBC 0.0 0.0 - 0.0 /100 WBC        Lab Results   Component Value Date    CEA 5.31 02/14/2018    CEA 5.41 01/10/2018    CEA 5.18 11/29/2017     Pathology examination of the pericardium from 3/10/18 wasnegative for malignancy.  There was marked mixed acute and chronic inflammation and organizing hemorrhage.  Tumor and granulomas were not  identified.    Cytology examination of the pleural fluid revealed blood with scattered lymphocytes.  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  No malignant cells were identified.     Assessment/Plan    1.  Metastatic colon cancer. She had metastases to the liver.   · She received 12 cycles of FOLFOX-6 and then had resection of the primary tumor along with metastasectomy and splenectomy with thermal ablation of lesions in the liver in January 2014.   · This was followed by 12 cycles of the FOLFIRI regimen completed on 08/07/2014.   · CT scans on 05/28/2015 revealed a new lesion in the liver.  Dr. Ruano referred the patient to Radiation Therapy and she received CyberKnife radiation, completed on 07/14/2015.    · The CT scan from 5/5/17 revealed a new 2.2 cm lesion in the anterior hepatic segment.  CT guided microwave ablation of liver mass performed on 6/16/17 with CT evidence on 7/20/17 of a complete response  · FOLFIRI ×12 cycles given between 6/29/17 and 11/29/17.     The last imaging study on 3:15 showed no evidence of new areas of metastases.  Post treatment changes were seen in the liver with no suspicious findings.  She will have follow-up CT scan with Dr. Ruano next week.  I asked her to request a copy to be forwarded to our office for review.    2.  Elevated liver enzymes, improved.  Elevation was possibly attributed to hepatic congestion due to the pericardial effusion.    3.  Pericardial effusion.  This was concerning for a malignant pericardial effusion.  Patient had pericardial window.  Pathology examination of the pericardium and cytology examination of the fluid were negative for malignancy.  I reassured the patient about the results and explained to her that the findings are most likely attributed to inflammation, the etiology of which is yet to be determined.    4.  Iron deficiency anemia.  She is on oral iron once daily.  Anemia is improved.    5.  The patient has a port which would require  maintenance every 6 weeks.    PLAN:    1.  Continue oral iron once a day.    2.  Return in 6 weeks for port flush.  CBC CMP CEA levels will be obtained.    3.  Consider follow-up in 11-12 weeks with port flushes CBC CMP and CEA.    4.  Since the patient is doing well, she was given the okay to drive.      Marivel Brown MD  04/11/18

## 2018-04-12 ENCOUNTER — TELEPHONE (OUTPATIENT)
Dept: CARDIOLOGY | Facility: CLINIC | Age: 68
End: 2018-04-12

## 2018-04-21 LAB
MYCOBACTERIUM SPEC CULT: NORMAL
MYCOBACTERIUM SPEC CULT: NORMAL
NIGHT BLUE STAIN TISS: NORMAL
NIGHT BLUE STAIN TISS: NORMAL

## 2018-04-24 ENCOUNTER — APPOINTMENT (OUTPATIENT)
Dept: SLEEP MEDICINE | Facility: HOSPITAL | Age: 68
End: 2018-04-24
Attending: INTERNAL MEDICINE

## 2018-05-23 ENCOUNTER — INFUSION (OUTPATIENT)
Dept: ONCOLOGY | Facility: HOSPITAL | Age: 68
End: 2018-05-23

## 2018-05-23 DIAGNOSIS — C78.7 LIVER METASTASIS: ICD-10-CM

## 2018-05-23 DIAGNOSIS — Z45.2 ENCOUNTER FOR ADJUSTMENT OR MANAGEMENT OF VASCULAR ACCESS DEVICE: ICD-10-CM

## 2018-05-23 DIAGNOSIS — C18.9 PRIMARY MALIGNANT NEOPLASM OF COLON (HCC): Primary | ICD-10-CM

## 2018-05-23 DIAGNOSIS — R74.8 ELEVATED LIVER ENZYMES: ICD-10-CM

## 2018-05-23 LAB
ALBUMIN SERPL-MCNC: 3.5 G/DL (ref 3.5–5.2)
ALBUMIN/GLOB SERPL: 0.7 G/DL (ref 1.1–2.4)
ALP SERPL-CCNC: 343 U/L (ref 38–116)
ALT SERPL W P-5'-P-CCNC: 65 U/L (ref 0–33)
ANION GAP SERPL CALCULATED.3IONS-SCNC: 12.2 MMOL/L
AST SERPL-CCNC: 83 U/L (ref 0–32)
BASOPHILS # BLD AUTO: 0.07 10*3/MM3 (ref 0–0.1)
BASOPHILS NFR BLD AUTO: 0.9 % (ref 0–1.1)
BILIRUB SERPL-MCNC: 0.4 MG/DL (ref 0.1–1.2)
BUN BLD-MCNC: 16 MG/DL (ref 6–20)
BUN/CREAT SERPL: 19.3 (ref 7.3–30)
CALCIUM SPEC-SCNC: 9.7 MG/DL (ref 8.5–10.2)
CEA SERPL-MCNC: 4.79 NG/ML
CHLORIDE SERPL-SCNC: 100 MMOL/L (ref 98–107)
CO2 SERPL-SCNC: 28.8 MMOL/L (ref 22–29)
CREAT BLD-MCNC: 0.83 MG/DL (ref 0.6–1.1)
DEPRECATED RDW RBC AUTO: 52.1 FL (ref 37–49)
EOSINOPHIL # BLD AUTO: 0.5 10*3/MM3 (ref 0–0.36)
EOSINOPHIL NFR BLD AUTO: 6.5 % (ref 1–5)
ERYTHROCYTE [DISTWIDTH] IN BLOOD BY AUTOMATED COUNT: 17.1 % (ref 11.7–14.5)
GFR SERPL CREATININE-BSD FRML MDRD: 83 ML/MIN/1.73
GLOBULIN UR ELPH-MCNC: 4.7 GM/DL (ref 1.8–3.5)
GLUCOSE BLD-MCNC: 111 MG/DL (ref 74–124)
HCT VFR BLD AUTO: 40.6 % (ref 34–45)
HGB BLD-MCNC: 13.1 G/DL (ref 11.5–14.9)
IMM GRANULOCYTES # BLD: 0.05 10*3/MM3 (ref 0–0.03)
IMM GRANULOCYTES NFR BLD: 0.7 % (ref 0–0.5)
LYMPHOCYTES # BLD AUTO: 2.49 10*3/MM3 (ref 1–3.5)
LYMPHOCYTES NFR BLD AUTO: 32.5 % (ref 20–49)
MCH RBC QN AUTO: 27.5 PG (ref 27–33)
MCHC RBC AUTO-ENTMCNC: 32.3 G/DL (ref 32–35)
MCV RBC AUTO: 85.1 FL (ref 83–97)
MONOCYTES # BLD AUTO: 0.86 10*3/MM3 (ref 0.25–0.8)
MONOCYTES NFR BLD AUTO: 11.2 % (ref 4–12)
NEUTROPHILS # BLD AUTO: 3.69 10*3/MM3 (ref 1.5–7)
NEUTROPHILS NFR BLD AUTO: 48.2 % (ref 39–75)
NRBC BLD MANUAL-RTO: 0 /100 WBC (ref 0–0)
PLATELET # BLD AUTO: 392 10*3/MM3 (ref 150–375)
PMV BLD AUTO: 9 FL (ref 8.9–12.1)
POTASSIUM BLD-SCNC: 4 MMOL/L (ref 3.5–4.7)
PROT SERPL-MCNC: 8.2 G/DL (ref 6.3–8)
RBC # BLD AUTO: 4.77 10*6/MM3 (ref 3.9–5)
SODIUM BLD-SCNC: 141 MMOL/L (ref 134–145)
WBC NRBC COR # BLD: 7.66 10*3/MM3 (ref 4–10)

## 2018-05-23 PROCEDURE — 85025 COMPLETE CBC W/AUTO DIFF WBC: CPT

## 2018-05-23 PROCEDURE — 25010000002 HEPARIN FLUSH (PORCINE) 100 UNIT/ML SOLUTION: Performed by: INTERNAL MEDICINE

## 2018-05-23 PROCEDURE — 96523 IRRIG DRUG DELIVERY DEVICE: CPT | Performed by: INTERNAL MEDICINE

## 2018-05-23 PROCEDURE — 80053 COMPREHEN METABOLIC PANEL: CPT

## 2018-05-23 PROCEDURE — 82378 CARCINOEMBRYONIC ANTIGEN: CPT | Performed by: INTERNAL MEDICINE

## 2018-05-23 RX ORDER — SODIUM CHLORIDE 0.9 % (FLUSH) 0.9 %
10 SYRINGE (ML) INJECTION AS NEEDED
Status: DISCONTINUED | OUTPATIENT
Start: 2018-05-23 | End: 2018-05-23 | Stop reason: HOSPADM

## 2018-05-23 RX ORDER — SODIUM CHLORIDE 0.9 % (FLUSH) 0.9 %
10 SYRINGE (ML) INJECTION AS NEEDED
Status: CANCELLED | OUTPATIENT
Start: 2018-05-23

## 2018-05-23 RX ADMIN — SODIUM CHLORIDE, PRESERVATIVE FREE 500 UNITS: 5 INJECTION INTRAVENOUS at 07:59

## 2018-05-23 RX ADMIN — Medication 10 ML: at 07:59

## 2018-06-28 ENCOUNTER — OFFICE VISIT (OUTPATIENT)
Dept: ONCOLOGY | Facility: CLINIC | Age: 68
End: 2018-06-28

## 2018-06-28 ENCOUNTER — INFUSION (OUTPATIENT)
Dept: ONCOLOGY | Facility: HOSPITAL | Age: 68
End: 2018-06-28

## 2018-06-28 VITALS
HEART RATE: 82 BPM | OXYGEN SATURATION: 98 % | BODY MASS INDEX: 35.93 KG/M2 | RESPIRATION RATE: 16 BRPM | HEIGHT: 69 IN | TEMPERATURE: 97.9 F | DIASTOLIC BLOOD PRESSURE: 72 MMHG | WEIGHT: 242.6 LBS | SYSTOLIC BLOOD PRESSURE: 138 MMHG

## 2018-06-28 DIAGNOSIS — C78.7 LIVER METASTASIS: ICD-10-CM

## 2018-06-28 DIAGNOSIS — E11.8 TYPE 2 DIABETES MELLITUS WITH COMPLICATION, WITH LONG-TERM CURRENT USE OF INSULIN (HCC): Primary | ICD-10-CM

## 2018-06-28 DIAGNOSIS — C18.9 PRIMARY MALIGNANT NEOPLASM OF COLON (HCC): ICD-10-CM

## 2018-06-28 DIAGNOSIS — C18.9 PRIMARY MALIGNANT NEOPLASM OF COLON (HCC): Primary | ICD-10-CM

## 2018-06-28 DIAGNOSIS — D50.9 IRON DEFICIENCY ANEMIA, UNSPECIFIED IRON DEFICIENCY ANEMIA TYPE: ICD-10-CM

## 2018-06-28 DIAGNOSIS — I31.39 PERICARDIAL EFFUSION: ICD-10-CM

## 2018-06-28 DIAGNOSIS — R74.8 ELEVATED LIVER ENZYMES: ICD-10-CM

## 2018-06-28 DIAGNOSIS — Z45.2 ENCOUNTER FOR ADJUSTMENT OR MANAGEMENT OF VASCULAR ACCESS DEVICE: ICD-10-CM

## 2018-06-28 DIAGNOSIS — Z79.4 TYPE 2 DIABETES MELLITUS WITH COMPLICATION, WITH LONG-TERM CURRENT USE OF INSULIN (HCC): Primary | ICD-10-CM

## 2018-06-28 LAB
ALBUMIN SERPL-MCNC: 3.5 G/DL (ref 3.5–5.2)
ALBUMIN/GLOB SERPL: 0.8 G/DL (ref 1.1–2.4)
ALP SERPL-CCNC: 353 U/L (ref 38–116)
ALT SERPL W P-5'-P-CCNC: 64 U/L (ref 0–33)
ANION GAP SERPL CALCULATED.3IONS-SCNC: 10.4 MMOL/L
AST SERPL-CCNC: 56 U/L (ref 0–32)
BASOPHILS # BLD AUTO: 0.05 10*3/MM3 (ref 0–0.1)
BASOPHILS NFR BLD AUTO: 0.6 % (ref 0–1.1)
BILIRUB SERPL-MCNC: 0.4 MG/DL (ref 0.1–1.2)
BUN BLD-MCNC: 15 MG/DL (ref 6–20)
BUN/CREAT SERPL: 18.1 (ref 7.3–30)
CALCIUM SPEC-SCNC: 9.3 MG/DL (ref 8.5–10.2)
CEA SERPL-MCNC: 4.52 NG/ML
CHLORIDE SERPL-SCNC: 100 MMOL/L (ref 98–107)
CO2 SERPL-SCNC: 27.6 MMOL/L (ref 22–29)
CREAT BLD-MCNC: 0.83 MG/DL (ref 0.6–1.1)
DEPRECATED RDW RBC AUTO: 55.8 FL (ref 37–49)
EOSINOPHIL # BLD AUTO: 0.42 10*3/MM3 (ref 0–0.36)
EOSINOPHIL NFR BLD AUTO: 5.1 % (ref 1–5)
ERYTHROCYTE [DISTWIDTH] IN BLOOD BY AUTOMATED COUNT: 18.2 % (ref 11.7–14.5)
GFR SERPL CREATININE-BSD FRML MDRD: 83 ML/MIN/1.73
GLOBULIN UR ELPH-MCNC: 4.4 GM/DL (ref 1.8–3.5)
GLUCOSE BLD-MCNC: 114 MG/DL (ref 74–124)
HCT VFR BLD AUTO: 39.5 % (ref 34–45)
HGB BLD-MCNC: 13 G/DL (ref 11.5–14.9)
IMM GRANULOCYTES # BLD: 0.06 10*3/MM3 (ref 0–0.03)
IMM GRANULOCYTES NFR BLD: 0.7 % (ref 0–0.5)
LYMPHOCYTES # BLD AUTO: 2.24 10*3/MM3 (ref 1–3.5)
LYMPHOCYTES NFR BLD AUTO: 27.2 % (ref 20–49)
MCH RBC QN AUTO: 27.9 PG (ref 27–33)
MCHC RBC AUTO-ENTMCNC: 32.9 G/DL (ref 32–35)
MCV RBC AUTO: 84.8 FL (ref 83–97)
MONOCYTES # BLD AUTO: 0.91 10*3/MM3 (ref 0.25–0.8)
MONOCYTES NFR BLD AUTO: 11 % (ref 4–12)
NEUTROPHILS # BLD AUTO: 4.56 10*3/MM3 (ref 1.5–7)
NEUTROPHILS NFR BLD AUTO: 55.4 % (ref 39–75)
NRBC BLD MANUAL-RTO: 0 /100 WBC (ref 0–0)
PLATELET # BLD AUTO: 391 10*3/MM3 (ref 150–375)
PMV BLD AUTO: 8.8 FL (ref 8.9–12.1)
POTASSIUM BLD-SCNC: 3.8 MMOL/L (ref 3.5–4.7)
PROT SERPL-MCNC: 7.9 G/DL (ref 6.3–8)
RBC # BLD AUTO: 4.66 10*6/MM3 (ref 3.9–5)
SODIUM BLD-SCNC: 138 MMOL/L (ref 134–145)
WBC NRBC COR # BLD: 8.24 10*3/MM3 (ref 4–10)

## 2018-06-28 PROCEDURE — 96523 IRRIG DRUG DELIVERY DEVICE: CPT | Performed by: INTERNAL MEDICINE

## 2018-06-28 PROCEDURE — 85025 COMPLETE CBC W/AUTO DIFF WBC: CPT

## 2018-06-28 PROCEDURE — 82378 CARCINOEMBRYONIC ANTIGEN: CPT | Performed by: INTERNAL MEDICINE

## 2018-06-28 PROCEDURE — 25010000002 HEPARIN FLUSH (PORCINE) 100 UNIT/ML SOLUTION: Performed by: INTERNAL MEDICINE

## 2018-06-28 PROCEDURE — 80053 COMPREHEN METABOLIC PANEL: CPT

## 2018-06-28 PROCEDURE — 99214 OFFICE O/P EST MOD 30 MIN: CPT | Performed by: INTERNAL MEDICINE

## 2018-06-28 RX ORDER — SODIUM CHLORIDE 0.9 % (FLUSH) 0.9 %
10 SYRINGE (ML) INJECTION AS NEEDED
Status: DISCONTINUED | OUTPATIENT
Start: 2018-06-28 | End: 2018-06-28 | Stop reason: HOSPADM

## 2018-06-28 RX ORDER — SODIUM CHLORIDE 0.9 % (FLUSH) 0.9 %
10 SYRINGE (ML) INJECTION AS NEEDED
Status: CANCELLED | OUTPATIENT
Start: 2018-06-28

## 2018-06-28 RX ADMIN — SODIUM CHLORIDE, PRESERVATIVE FREE 500 UNITS: 5 INJECTION INTRAVENOUS at 08:50

## 2018-06-28 RX ADMIN — Medication 10 ML: at 08:49

## 2018-06-28 NOTE — PROGRESS NOTES
Subjective     CHIEF COMPLAINT:    1. Metastatic colon cancer (KRAS mutation positive) with liver metastasis, biopsy confirmed. No resection of primary sigmoid colon cancer.   2. Recurrent Iron deficiency anemia   3. Palliative chemotherapy with FOLFOX-6 was given from June 2013 to November 2013.   4. Left partial colectomy, splenectomy, resection of left diaphragm with repair and partial hepatectomy with microwave ablation of metastasis on 1/14/2014.   5. FOLFIRI x12 cycles between 3/07/2014 and 8/07/2014.   6. CT scan on 05/28/2015 revealed a new liver lesion. Stereotactic radiation therapy with CyberKnife was delivered in 3 fractions, completed on 07/14/2015.        7.  New liver lesion was identified on CT on 5/5/17. CT guided microwave ablation of liver mass on 6/16/17.       8.  Patient re-treated with Folfiri on 6/29/17-11/29/17.     HISTORY OF PRESENT ILLNESS:     Feli Del Toro is a 67 y.o. patient with the above medical history.  She returns today for follow-up accompanied by her .  She is not having shortness breath.  No chest pain.  Appetite is good.  Weight stable.  No new symptoms since last visit.    Past Medical History:   Diagnosis Date   • Acid reflux    • Anemia    • Cancer 06/04/2013   • Diabetes mellitus    • Dyspnea    • Essential hypertension    • History of constipation    • History of transfusion    • Hyperlipidemia    • Hypertension    • Metastatic colon cancer to liver    • Neuropathy    • Pericardial effusion     Moderate   • Retinopathy    • SOB (shortness of breath)    • Type 2 diabetes mellitus        Past Surgical History:   Procedure Laterality Date   • ABDOMINAL SURGERY      ablation    • AMPUTATION DIGIT Left 1/1/2017    Procedure: LEFT SECOND TOE AMPUTATION;  Surgeon: Farzad Nichols MD;  Location: [facility];  Service:    • AMPUTATION OF REPLICATED TOES      right distal second toe    • CARDIAC CATHETERIZATION N/A 3/8/2018    Procedure: Pericardiocentesis;  Surgeon:  Andrew Dobbins MD;  Location: [facility] INVASIVE LOCATION;  Service:    • CATARACT EXTRACTION     • COLECTOMY PARTIAL / TOTAL  01/14/2014   • COLONOSCOPY  2013   • FRACTURE SURGERY Right 2017    right lower leg with screw in ankle   • HYSTERECTOMY  1998   • PERICARDIAL WINDOW N/A 3/10/2018    Procedure: PERICARDIAL WINDOW;  Surgeon: Nomi Patel III, MD;  Location: [facility] OR;  Service: Cardiothoracic   • PORTACATH PLACEMENT  06/2013   • SALPINGO OOPHORECTOMY Bilateral    • SPLENECTOMY     • THORACOSCOPY Left 3/10/2018    Procedure: BRONCHOSCOPY, LEFT THORACOSCOPY VIDEO ASSISTED, SUBPLEURAL CATHETERS FOR PAIN;  Surgeon: Nomi Patel III, MD;  Location: [facility] OR;  Service: Cardiothoracic       Cancer-related family history includes Breast cancer in her sister; Cancer in her other.    SCHEDULED MEDS:       Current Outpatient Prescriptions:   •  allopurinol (ZYLOPRIM) 100 MG tablet, TAKE 1 TABLET DAILY, Disp: 90 tablet, Rfl: 1  •  aspirin 81 MG tablet, Take 81 mg by mouth daily., Disp: , Rfl:   •  calcium carbonate (TUMS) 500 MG chewable tablet, Chew 2 tablets As Needed., Disp: , Rfl:   •  ferrous sulfate 325 (65 FE) MG tablet, Take 1 tablet by mouth Daily., Disp: , Rfl:   •  fluticasone (FLONASE) 50 MCG/ACT nasal spray, 2 sprays into each nostril Daily., Disp: , Rfl:   •  magnesium oxide (MAG-OX) 400 MG tablet, Take 400 mg by mouth Daily., Disp: , Rfl:   •  NOVOFINE 32G X 6 MM misc, USE AS DIRECTED 5 TIMES DAILY AND AS NEEDED, Disp: 200 each, Rfl: 3  •  ONGLYZA 5 MG tablet, TAKE 1 TABLET DAILY, Disp: 90 tablet, Rfl: 0  •  telmisartan-hydrochlorothiazide (MICARDIS HCT) 40-12.5 MG per tablet, Take 1 tablet by mouth daily, Disp: 90 tablet, Rfl: 3  •  TOUJEO SOLOSTAR 300 UNIT/ML solution pen-injector, Inject 45 Units under the skin Daily., Disp: 3 pen, Rfl: 0  •  XIGDUO XR 5-1000 MG tablet sustained-release 24 hour, Take 2 tablets by mouth Daily., Disp: 180 tablet, Rfl: 3  No current  "facility-administered medications for this visit.     Facility-Administered Medications Ordered in Other Visits:   •  heparin flush (porcine) 100 UNIT/ML injection 500 Units, 500 Units, Intravenous, PRN, Marivel Brown MD, 500 Units at 06/28/18 0850  •  sodium chloride 0.9 % flush 10 mL, 10 mL, Intravenous, PRN, Marivel Brown MD, 10 mL at 06/28/18 0849    REVIEW OF SYSTEMS:  GENERAL:  No Fever or chills. No weight change.  No Fatigue.   SKIN:  No skin rashes or lesions.  HEME/LYMPH: No Anemia. No Easy bruisability. No Enlarged lymph nodes.  RESPIRATORY:  No Shortness of breath.   CVS:  No Chest pain. No Lower extremity swelling.   GI:  No Abdominal pain. No Nausea. No Vomiting. No Constipation. No Diarrhea. No Melena. No Hematochezia.  MUSCULOSKELETAL:  No Back pain.   NEUROLOGICAL:  Occasional finger Numbness.  PSYCHIATRIC:  No Anxiety. No Depression.       Objective   VITAL SIGNS:     Vitals:    06/28/18 0847   BP: 138/72   Pulse: 82   Resp: 16   Temp: 97.9 °F (36.6 °C)   TempSrc: Oral   SpO2: 98%   Weight: 110 kg (242 lb 9.6 oz)   Height: 175.3 cm (69.02\")   PainSc: 0-No pain     Body mass index is 35.81 kg/m².     Wt Readings from Last 3 Encounters:   06/28/18 110 kg (242 lb 9.6 oz)   04/11/18 110 kg (242 lb 9.6 oz)   04/05/18 111 kg (244 lb)       PHYSICAL EXAMINATION:  GENERAL:  The patient appears in good general condition, not in acute distress.  SKIN:  No skin rashes or lesions. No Ecchymosis or Petechiae.  HEAD:  Normocephalic.  EYES:  No Pallor. No Jaundice.   NECK:  Supple with no Thyromegaly or Masses.  LYMPHATICS:  No cervical or supraclavicular Lymphadenopathy.  CHEST:  Lungs clear to auscultation bilaterally. No added sounds.  CARDIAC:  Normal S1 & S2. No Murmurs.  ABDOMEN:  Soft. No tenderness. No Hepatomegaly. No Splenomegaly. No masses.  EXTREMITIES:  No Edema. No Calf tenderness.   NEUROLOGICAL:  No Focal neurological deficits.     RESULT REVIEW:       Results from last 7 days  Lab Units " 06/28/18  0836   WBC 10*3/mm3 8.24   NEUTROS ABS 10*3/mm3 4.56   HEMOGLOBIN g/dL 13.0   HEMATOCRIT % 39.5   PLATELETS 10*3/mm3 391*       Results from last 7 days  Lab Units 06/28/18  0835   SODIUM mmol/L 138   POTASSIUM mmol/L 3.8   CHLORIDE mmol/L 100   CO2 mmol/L 27.6   BUN mg/dL 15   CREATININE mg/dL 0.83   CALCIUM mg/dL 9.3   ALBUMIN g/dL 3.50   BILIRUBIN mg/dL 0.4   ALK PHOS U/L 353*   ALT (SGPT) U/L 64*   AST (SGOT) U/L 56*   GLUCOSE mg/dL 114         Lab Results   Component Value Date    CEA 4.52 06/28/2018    CEA 4.79 05/23/2018    CEA 5.09 04/11/2018          Assessment/Plan    1.  Metastatic colon cancer. She had metastases to the liver.   · She received 12 cycles of FOLFOX-6 and then had resection of the primary tumor along with metastasectomy and splenectomy with thermal ablation of lesions in the liver in January 2014.   · This was followed by 12 cycles of the FOLFIRI regimen completed on 08/07/2014.   · CT scans on 05/28/2015 revealed a new lesion in the liver.  Dr. Ruano referred the patient to Radiation Therapy and she received CyberKnife radiation, completed on 07/14/2015.    · The CT scan from 5/5/17 revealed a new 2.2 cm lesion in the anterior hepatic segment.  CT guided microwave ablation of liver mass performed on 6/16/17 with CT evidence on 7/20/17 of a complete response  · FOLFIRI ×12 cycles given between 6/29/17 and 11/29/17.     Patient is doing well.  There is no evidence of progression of the disease or development of new lesions. CEA is stable.    2.  Elevated liver enzymes likely due to inflammation related to the recent liver treatment procedure.  They are improving.      3.  Pericardial effusion status post pericardial window.  Cytology and biopsy were negative.  Symptoms are improving.    4.  Iron deficiency anemia, resolved.    5.  Patient has a port which requires maintenance every 6 weeks.      PLAN:    1.  Discontinue oral iron.    2.  Return in 6 weeks for port flush with CBC  CMP CA.    3.  We'll see in follow-up in 3 months with repeat CT scan of the chest, CBC CMP and CEA 1 week prior.  We will defer the follow-up CT of the abdomen pelvis to Dr. Ruano.          Marivel Brown MD  06/28/18

## 2018-06-29 LAB
25(OH)D3 SERPL-MCNC: 20.4 NG/ML (ref 30–100)
C PEPTIDE SERPL-MCNC: 2.7 NG/ML (ref 1.1–4.4)
CHOLEST SERPL-MCNC: 276 MG/DL (ref 100–199)
CREAT 24H UR-MCNC: 97.4 MG/DL
HBA1C MFR BLD: 6.7 % (ref 4.8–5.6)
HDLC SERPL-MCNC: 107 MG/DL
INSULIN SERPL-ACNC: 25.7 UIU/ML (ref 2.6–24.9)
LDLC SERPL CALC-MCNC: 153 MG/DL (ref 0–99)
MICROALBUMIN UR-MCNC: 56.7 UG/ML
MICROALBUMIN/CREAT UR: 58.2 MG/G CREAT (ref 0–30)
T3FREE SERPL-MCNC: 3.1 PG/ML (ref 2–4.4)
T4 FREE SERPL-MCNC: 1.25 NG/DL (ref 0.82–1.77)
THYROGLOB AB SERPL-ACNC: 1.1 IU/ML (ref 0–0.9)
THYROPEROXIDASE AB SERPL-ACNC: 11 IU/ML (ref 0–34)
TRIGL SERPL-MCNC: 82 MG/DL (ref 0–149)
TSH SERPL-ACNC: 1.61 UIU/ML (ref 0.45–4.5)
URATE SERPL-MCNC: 4.7 MG/DL (ref 2.5–7.1)
VLDLC SERPL-MCNC: 16 MG/DL (ref 5–40)

## 2018-06-30 ENCOUNTER — RESULTS ENCOUNTER (OUTPATIENT)
Dept: ENDOCRINOLOGY | Age: 68
End: 2018-06-30

## 2018-06-30 DIAGNOSIS — E67.3 HYPERVITAMINOSIS D: ICD-10-CM

## 2018-06-30 DIAGNOSIS — Z79.4 TYPE 2 DIABETES MELLITUS WITH COMPLICATION, WITH LONG-TERM CURRENT USE OF INSULIN (HCC): ICD-10-CM

## 2018-06-30 DIAGNOSIS — I10 ESSENTIAL HYPERTENSION: ICD-10-CM

## 2018-06-30 DIAGNOSIS — E11.8 TYPE 2 DIABETES MELLITUS WITH COMPLICATION, WITH LONG-TERM CURRENT USE OF INSULIN (HCC): ICD-10-CM

## 2018-06-30 DIAGNOSIS — E55.9 VITAMIN D DEFICIENCY: ICD-10-CM

## 2018-06-30 DIAGNOSIS — E78.2 MIXED HYPERLIPIDEMIA: ICD-10-CM

## 2018-07-09 RX ORDER — SAXAGLIPTIN 5 MG/1
TABLET, FILM COATED ORAL
Qty: 90 TABLET | Refills: 0 | Status: SHIPPED | OUTPATIENT
Start: 2018-07-09 | End: 2018-08-15

## 2018-07-10 RX ORDER — INSULIN GLARGINE 300 U/ML
45 INJECTION, SOLUTION SUBCUTANEOUS DAILY
Qty: 9 ML | Refills: 3 | Status: SHIPPED | OUTPATIENT
Start: 2018-07-10 | End: 2018-08-15 | Stop reason: SDUPTHER

## 2018-07-11 DIAGNOSIS — R80.9 TYPE 2 DIABETES MELLITUS WITH MICROALBUMINURIA, UNSPECIFIED LONG TERM INSULIN USE STATUS: Primary | ICD-10-CM

## 2018-07-11 DIAGNOSIS — E55.9 VITAMIN D DEFICIENCY: ICD-10-CM

## 2018-07-11 DIAGNOSIS — E11.29 TYPE 2 DIABETES MELLITUS WITH MICROALBUMINURIA, UNSPECIFIED LONG TERM INSULIN USE STATUS: Primary | ICD-10-CM

## 2018-07-11 DIAGNOSIS — E78.2 MIXED HYPERLIPIDEMIA: ICD-10-CM

## 2018-08-09 ENCOUNTER — INFUSION (OUTPATIENT)
Dept: ONCOLOGY | Facility: HOSPITAL | Age: 68
End: 2018-08-09

## 2018-08-09 DIAGNOSIS — C18.9 PRIMARY MALIGNANT NEOPLASM OF COLON (HCC): Primary | ICD-10-CM

## 2018-08-09 DIAGNOSIS — C78.7 LIVER METASTASIS: ICD-10-CM

## 2018-08-09 DIAGNOSIS — R74.8 ELEVATED LIVER ENZYMES: ICD-10-CM

## 2018-08-09 DIAGNOSIS — Z45.2 ENCOUNTER FOR ADJUSTMENT OR MANAGEMENT OF VASCULAR ACCESS DEVICE: ICD-10-CM

## 2018-08-09 DIAGNOSIS — D50.9 IRON DEFICIENCY ANEMIA, UNSPECIFIED IRON DEFICIENCY ANEMIA TYPE: ICD-10-CM

## 2018-08-09 LAB
ALBUMIN SERPL-MCNC: 3.4 G/DL (ref 3.5–5.2)
ALBUMIN/GLOB SERPL: 0.8 G/DL (ref 1.1–2.4)
ALP SERPL-CCNC: 339 U/L (ref 38–116)
ALT SERPL W P-5'-P-CCNC: 32 U/L (ref 0–33)
ANION GAP SERPL CALCULATED.3IONS-SCNC: 12.6 MMOL/L
AST SERPL-CCNC: 39 U/L (ref 0–32)
BASOPHILS # BLD AUTO: 0.05 10*3/MM3 (ref 0–0.1)
BASOPHILS NFR BLD AUTO: 0.7 % (ref 0–1.1)
BILIRUB SERPL-MCNC: 0.3 MG/DL (ref 0.1–1.2)
BUN BLD-MCNC: 17 MG/DL (ref 6–20)
BUN/CREAT SERPL: 19.3 (ref 7.3–30)
CALCIUM SPEC-SCNC: 9.2 MG/DL (ref 8.5–10.2)
CEA SERPL-MCNC: 4.22 NG/ML
CHLORIDE SERPL-SCNC: 100 MMOL/L (ref 98–107)
CO2 SERPL-SCNC: 28.4 MMOL/L (ref 22–29)
CREAT BLD-MCNC: 0.88 MG/DL (ref 0.6–1.1)
DEPRECATED RDW RBC AUTO: 51.1 FL (ref 37–49)
EOSINOPHIL # BLD AUTO: 0.28 10*3/MM3 (ref 0–0.36)
EOSINOPHIL NFR BLD AUTO: 3.8 % (ref 1–5)
ERYTHROCYTE [DISTWIDTH] IN BLOOD BY AUTOMATED COUNT: 16.2 % (ref 11.7–14.5)
GFR SERPL CREATININE-BSD FRML MDRD: 78 ML/MIN/1.73
GLOBULIN UR ELPH-MCNC: 4.3 GM/DL (ref 1.8–3.5)
GLUCOSE BLD-MCNC: 197 MG/DL (ref 74–124)
HCT VFR BLD AUTO: 39.8 % (ref 34–45)
HGB BLD-MCNC: 13 G/DL (ref 11.5–14.9)
IMM GRANULOCYTES # BLD: 0.05 10*3/MM3 (ref 0–0.03)
IMM GRANULOCYTES NFR BLD: 0.7 % (ref 0–0.5)
LYMPHOCYTES # BLD AUTO: 2.4 10*3/MM3 (ref 1–3.5)
LYMPHOCYTES NFR BLD AUTO: 32.2 % (ref 20–49)
MCH RBC QN AUTO: 28.1 PG (ref 27–33)
MCHC RBC AUTO-ENTMCNC: 32.7 G/DL (ref 32–35)
MCV RBC AUTO: 86 FL (ref 83–97)
MONOCYTES # BLD AUTO: 0.83 10*3/MM3 (ref 0.25–0.8)
MONOCYTES NFR BLD AUTO: 11.1 % (ref 4–12)
NEUTROPHILS # BLD AUTO: 3.85 10*3/MM3 (ref 1.5–7)
NEUTROPHILS NFR BLD AUTO: 51.5 % (ref 39–75)
NRBC BLD MANUAL-RTO: 0 /100 WBC (ref 0–0)
PLATELET # BLD AUTO: 406 10*3/MM3 (ref 150–375)
PMV BLD AUTO: 9 FL (ref 8.9–12.1)
POTASSIUM BLD-SCNC: 4 MMOL/L (ref 3.5–4.7)
PROT SERPL-MCNC: 7.7 G/DL (ref 6.3–8)
RBC # BLD AUTO: 4.63 10*6/MM3 (ref 3.9–5)
SODIUM BLD-SCNC: 141 MMOL/L (ref 134–145)
WBC NRBC COR # BLD: 7.46 10*3/MM3 (ref 4–10)

## 2018-08-09 PROCEDURE — 96523 IRRIG DRUG DELIVERY DEVICE: CPT | Performed by: INTERNAL MEDICINE

## 2018-08-09 PROCEDURE — 80053 COMPREHEN METABOLIC PANEL: CPT

## 2018-08-09 PROCEDURE — 82378 CARCINOEMBRYONIC ANTIGEN: CPT | Performed by: INTERNAL MEDICINE

## 2018-08-09 PROCEDURE — 25010000002 HEPARIN FLUSH (PORCINE) 100 UNIT/ML SOLUTION: Performed by: INTERNAL MEDICINE

## 2018-08-09 PROCEDURE — 85025 COMPLETE CBC W/AUTO DIFF WBC: CPT

## 2018-08-09 RX ORDER — SODIUM CHLORIDE 0.9 % (FLUSH) 0.9 %
10 SYRINGE (ML) INJECTION AS NEEDED
Status: CANCELLED | OUTPATIENT
Start: 2018-08-09

## 2018-08-09 RX ORDER — SODIUM CHLORIDE 0.9 % (FLUSH) 0.9 %
10 SYRINGE (ML) INJECTION AS NEEDED
Status: DISCONTINUED | OUTPATIENT
Start: 2018-08-09 | End: 2018-08-09 | Stop reason: HOSPADM

## 2018-08-09 RX ADMIN — Medication 10 ML: at 12:38

## 2018-08-09 RX ADMIN — SODIUM CHLORIDE, PRESERVATIVE FREE 500 UNITS: 5 INJECTION INTRAVENOUS at 12:39

## 2018-08-11 ENCOUNTER — RESULTS ENCOUNTER (OUTPATIENT)
Dept: ENDOCRINOLOGY | Age: 68
End: 2018-08-11

## 2018-08-11 DIAGNOSIS — E55.9 VITAMIN D DEFICIENCY: ICD-10-CM

## 2018-08-11 DIAGNOSIS — R80.9 TYPE 2 DIABETES MELLITUS WITH MICROALBUMINURIA, UNSPECIFIED LONG TERM INSULIN USE STATUS: ICD-10-CM

## 2018-08-11 DIAGNOSIS — E78.2 MIXED HYPERLIPIDEMIA: ICD-10-CM

## 2018-08-11 DIAGNOSIS — E11.29 TYPE 2 DIABETES MELLITUS WITH MICROALBUMINURIA, UNSPECIFIED LONG TERM INSULIN USE STATUS: ICD-10-CM

## 2018-08-15 ENCOUNTER — OFFICE VISIT (OUTPATIENT)
Dept: ENDOCRINOLOGY | Age: 68
End: 2018-08-15

## 2018-08-15 VITALS
RESPIRATION RATE: 16 BRPM | WEIGHT: 248 LBS | DIASTOLIC BLOOD PRESSURE: 82 MMHG | SYSTOLIC BLOOD PRESSURE: 118 MMHG | BODY MASS INDEX: 35.5 KG/M2 | HEIGHT: 70 IN

## 2018-08-15 DIAGNOSIS — E11.29 TYPE 2 DIABETES MELLITUS WITH MICROALBUMINURIA, UNSPECIFIED LONG TERM INSULIN USE STATUS: Primary | ICD-10-CM

## 2018-08-15 DIAGNOSIS — Z79.4 CONTROLLED TYPE 2 DIABETES MELLITUS WITHOUT COMPLICATION, WITH LONG-TERM CURRENT USE OF INSULIN (HCC): ICD-10-CM

## 2018-08-15 DIAGNOSIS — E11.8 TYPE 2 DIABETES MELLITUS WITH COMPLICATION, WITH LONG-TERM CURRENT USE OF INSULIN (HCC): ICD-10-CM

## 2018-08-15 DIAGNOSIS — E66.01 MORBID OBESITY DUE TO EXCESS CALORIES (HCC): ICD-10-CM

## 2018-08-15 DIAGNOSIS — R80.9 TYPE 2 DIABETES MELLITUS WITH MICROALBUMINURIA, UNSPECIFIED LONG TERM INSULIN USE STATUS: Primary | ICD-10-CM

## 2018-08-15 DIAGNOSIS — Z79.4 TYPE 2 DIABETES MELLITUS WITH COMPLICATION, WITH LONG-TERM CURRENT USE OF INSULIN (HCC): ICD-10-CM

## 2018-08-15 DIAGNOSIS — E11.9 CONTROLLED TYPE 2 DIABETES MELLITUS WITHOUT COMPLICATION, WITH LONG-TERM CURRENT USE OF INSULIN (HCC): ICD-10-CM

## 2018-08-15 DIAGNOSIS — I10 ESSENTIAL HYPERTENSION: ICD-10-CM

## 2018-08-15 DIAGNOSIS — E78.2 MIXED HYPERLIPIDEMIA: ICD-10-CM

## 2018-08-15 DIAGNOSIS — E55.9 VITAMIN D DEFICIENCY: ICD-10-CM

## 2018-08-15 PROCEDURE — 99214 OFFICE O/P EST MOD 30 MIN: CPT | Performed by: INTERNAL MEDICINE

## 2018-08-15 RX ORDER — TELMISARTAN AND HYDROCHLORTHIAZIDE 40; 12.5 MG/1; MG/1
1 TABLET ORAL DAILY
Qty: 90 TABLET | Refills: 3 | Status: SHIPPED | OUTPATIENT
Start: 2018-08-15 | End: 2019-04-18 | Stop reason: SDUPTHER

## 2018-08-15 RX ORDER — DAPAGLIFLOZIN AND METFORMIN HYDROCHLORIDE 5; 1000 MG/1; MG/1
2 TABLET, FILM COATED, EXTENDED RELEASE ORAL DAILY
Qty: 180 TABLET | Refills: 3 | Status: SHIPPED | OUTPATIENT
Start: 2018-08-15 | End: 2018-12-17 | Stop reason: SDUPTHER

## 2018-08-15 RX ORDER — INSULIN GLARGINE 300 U/ML
45 INJECTION, SOLUTION SUBCUTANEOUS DAILY
Qty: 12 PEN | Refills: 3 | Status: SHIPPED | OUTPATIENT
Start: 2018-08-15 | End: 2018-12-17 | Stop reason: SDUPTHER

## 2018-08-15 RX ORDER — EXENATIDE 2 MG/.85ML
2 INJECTION, SUSPENSION, EXTENDED RELEASE SUBCUTANEOUS WEEKLY
Qty: 12 PEN | Refills: 3 | Status: SHIPPED | OUTPATIENT
Start: 2018-08-15 | End: 2018-12-17 | Stop reason: SDUPTHER

## 2018-08-15 NOTE — PROGRESS NOTES
"Subjective   Feli Del Toro is a 67 y.o. female seen for follow up for DM2, hyperlipidemia, HTN. No lab review. Patient is checking BG 3 times a day. No BG readings. Patient denies any problems or concerns.   History of Present Illness this is a 67-year-old female known patient with type II diabetes hypertension and dyslipidemia as well as morbid obesity.  Over the course of last 6 months she has had no significant health problems for which to go to the emergency room or hospital.    /82   Resp 16   Ht 176.5 cm (69.5\")   Wt 112 kg (248 lb)   LMP  (LMP Unknown)   BMI 36.10 kg/m²      Allergies   Allergen Reactions   • Compazine [Prochlorperazine Edisylate] Other (See Comments)     Complete arch in the back    • Prochlorperazine Maleate Other (See Comments)     \"BACK MUSCLE RETRACTION\"       Current Outpatient Prescriptions:   •  allopurinol (ZYLOPRIM) 100 MG tablet, TAKE 1 TABLET DAILY, Disp: 90 tablet, Rfl: 1  •  aspirin 81 MG tablet, Take 81 mg by mouth daily., Disp: , Rfl:   •  calcium carbonate (TUMS) 500 MG chewable tablet, Chew 2 tablets As Needed., Disp: , Rfl:   •  fluticasone (FLONASE) 50 MCG/ACT nasal spray, 2 sprays into each nostril Daily., Disp: , Rfl:   •  magnesium oxide (MAG-OX) 400 MG tablet, Take 400 mg by mouth Daily., Disp: , Rfl:   •  NOVOFINE 32G X 6 MM misc, USE AS DIRECTED 5 TIMES DAILY AND AS NEEDED, Disp: 200 each, Rfl: 3  •  ONGLYZA 5 MG tablet, TAKE 1 TABLET DAILY, Disp: 90 tablet, Rfl: 0  •  telmisartan-hydrochlorothiazide (MICARDIS HCT) 40-12.5 MG per tablet, Take 1 tablet by mouth daily, Disp: 90 tablet, Rfl: 3  •  TOUJEO SOLOSTAR 300 UNIT/ML solution pen-injector, Inject 45 Units under the skin Daily., Disp: 3 pen, Rfl: 0  •  TOUJEO SOLOSTAR 300 UNIT/ML solution pen-injector, INJECT 45 UNITS UNDER THE SKIN DAILY, Disp: 9 mL, Rfl: 3  •  XIGDUO XR 5-1000 MG tablet sustained-release 24 hour, Take 2 tablets by mouth Daily., Disp: 180 tablet, Rfl: 3      The following portions of " the patient's history were reviewed and updated as appropriate: allergies, current medications, past family history, past medical history, past social history, past surgical history and problem list.    Review of Systems   Constitutional: Negative.    HENT: Negative.    Eyes: Negative.    Respiratory: Negative.    Cardiovascular: Negative.    Gastrointestinal: Negative.    Endocrine: Negative.    Genitourinary: Negative.    Musculoskeletal: Negative.    Skin: Negative.    Allergic/Immunologic: Negative.    Neurological: Negative.    Hematological: Negative.    Psychiatric/Behavioral: Negative.        Objective   Physical Exam   Constitutional: She is oriented to person, place, and time. She appears well-developed and well-nourished. No distress.   HENT:   Head: Normocephalic and atraumatic.   Right Ear: External ear normal.   Left Ear: External ear normal.   Nose: Nose normal.   Mouth/Throat: Oropharynx is clear and moist. No oropharyngeal exudate.   Eyes: Pupils are equal, round, and reactive to light. Conjunctivae and EOM are normal. Right eye exhibits no discharge. Left eye exhibits no discharge. No scleral icterus.   Neck: Normal range of motion. Neck supple. No JVD present. No tracheal deviation present. No thyromegaly present.   Cardiovascular: Normal rate, regular rhythm, normal heart sounds and intact distal pulses.  Exam reveals no gallop and no friction rub.    No murmur heard.  Pulmonary/Chest: Effort normal and breath sounds normal. No stridor. No respiratory distress. She has no wheezes. She has no rales. She exhibits no tenderness.   Abdominal: Soft. Bowel sounds are normal. She exhibits no distension and no mass. There is no tenderness. There is no rebound and no guarding. No hernia.   Musculoskeletal: Normal range of motion. She exhibits no edema, tenderness or deformity.   Lymphadenopathy:     She has no cervical adenopathy.   Neurological: She is alert and oriented to person, place, and time. She  has normal reflexes. She displays normal reflexes. No cranial nerve deficit or sensory deficit. She exhibits normal muscle tone. Coordination normal.   Skin: Skin is warm and dry. No rash noted. She is not diaphoretic. No erythema. No pallor.   Psychiatric: She has a normal mood and affect. Her behavior is normal. Judgment and thought content normal.   Nursing note and vitals reviewed.        Assessment/Plan   Diagnoses and all orders for this visit:    Type 2 diabetes mellitus with microalbuminuria, unspecified long term insulin use status (CMS/[facility])  -     T4 & TSH ([facility]); Future  -     Uric Acid; Future  -     Vitamin D 25 Hydroxy; Future  -     Comprehensive Metabolic Panel; Future  -     C-Peptide; Future  -     Hemoglobin A1c; Future  -     Lipid Panel; Future    Type 2 diabetes mellitus with complication, with long-term current use of insulin (CMS/[facility])  -     T4 & TSH ([facility]); Future  -     Uric Acid; Future  -     Vitamin D 25 Hydroxy; Future  -     Comprehensive Metabolic Panel; Future  -     C-Peptide; Future  -     Hemoglobin A1c; Future  -     Lipid Panel; Future    Mixed hyperlipidemia  -     T4 & TSH ([facility]); Future  -     Uric Acid; Future  -     Vitamin D 25 Hydroxy; Future  -     Comprehensive Metabolic Panel; Future  -     C-Peptide; Future  -     Hemoglobin A1c; Future  -     Lipid Panel; Future    Essential hypertension  -     T4 & TSH ([facility]); Future  -     Uric Acid; Future  -     Vitamin D 25 Hydroxy; Future  -     Comprehensive Metabolic Panel; Future  -     C-Peptide; Future  -     Hemoglobin A1c; Future  -     Lipid Panel; Future  -     telmisartan-hydrochlorothiazide (MICARDIS HCT) 40-12.5 MG per tablet; Take 1 tablet by mouth Daily.    Vitamin D deficiency  -     T4 & TSH ([facility]); Future  -     Uric Acid; Future  -     Vitamin D 25 Hydroxy; Future  -     Comprehensive Metabolic Panel; Future  -     C-Peptide; Future  -     Hemoglobin A1c; Future  -     Lipid Panel;  Future    Morbid obesity due to excess calories (CMS/[facility])  -     T4 & TSH ([facility]); Future  -     Uric Acid; Future  -     Vitamin D 25 Hydroxy; Future  -     Comprehensive Metabolic Panel; Future  -     C-Peptide; Future  -     Hemoglobin A1c; Future  -     Lipid Panel; Future    Controlled type 2 diabetes mellitus without complication, with long-term current use of insulin (CMS/[facility])  -     Insulin Pen Needle (NOVOFINE) 32G X 6 MM misc; Take 1 shot of insulin per day    Other orders  -     BYDUREON BCISE 2 MG/0.85ML auto-injector injection; Inject 0.85 mL under the skin into the appropriate area as directed 1 (One) Time Per Week.  -     TOUJEO SOLOSTAR 300 UNIT/ML solution pen-injector; Inject 45 Units under the skin into the appropriate area as directed Daily.  -     XIGDUO XR 5-1000 MG tablet; Take 2 tablets by mouth Daily.             in summary I saw and examined this 67-year-old female for above-mentioned problems.  I reviewed her laboratory evaluation of 06/28/2018 and the provided her with a hard copy of it.  We will go ahead and order additional laboratory evaluation and once the results come back we will go ahead and call for any possible modification or new medications.  In the meantime she will continue all her current prescriptions and will see Ms. Tara Olivares in 4 months or sooner if needed with laboratory evaluation prior to each office visit.

## 2018-09-12 DIAGNOSIS — C18.9 PRIMARY MALIGNANT NEOPLASM OF COLON (HCC): Primary | ICD-10-CM

## 2018-09-12 RX ORDER — SODIUM CHLORIDE 0.9 % (FLUSH) 0.9 %
10 SYRINGE (ML) INJECTION AS NEEDED
Status: CANCELLED | OUTPATIENT
Start: 2018-09-13

## 2018-09-13 ENCOUNTER — HOSPITAL ENCOUNTER (OUTPATIENT)
Dept: PET IMAGING | Facility: HOSPITAL | Age: 68
Discharge: HOME | End: 2018-09-13
Attending: INTERNAL MEDICINE | Admitting: INTERNAL MEDICINE

## 2018-09-13 ENCOUNTER — INFUSION (OUTPATIENT)
Dept: ONCOLOGY | Facility: HOSPITAL | Age: 68
End: 2018-09-13

## 2018-09-13 DIAGNOSIS — Z45.2 ENCOUNTER FOR ADJUSTMENT OR MANAGEMENT OF VASCULAR ACCESS DEVICE: Primary | ICD-10-CM

## 2018-09-13 DIAGNOSIS — C78.7 LIVER METASTASIS: ICD-10-CM

## 2018-09-13 DIAGNOSIS — D50.9 IRON DEFICIENCY ANEMIA, UNSPECIFIED IRON DEFICIENCY ANEMIA TYPE: ICD-10-CM

## 2018-09-13 DIAGNOSIS — C18.9 PRIMARY MALIGNANT NEOPLASM OF COLON (HCC): ICD-10-CM

## 2018-09-13 LAB
ALBUMIN SERPL-MCNC: 3.5 G/DL (ref 3.5–5.2)
ALBUMIN/GLOB SERPL: 0.8 G/DL (ref 1.1–2.4)
ALP SERPL-CCNC: 374 U/L (ref 38–116)
ALT SERPL W P-5'-P-CCNC: 65 U/L (ref 0–33)
ANION GAP SERPL CALCULATED.3IONS-SCNC: 9.9 MMOL/L
AST SERPL-CCNC: 71 U/L (ref 0–32)
BASOPHILS # BLD AUTO: 0.04 10*3/MM3 (ref 0–0.1)
BASOPHILS NFR BLD AUTO: 0.6 % (ref 0–1.1)
BILIRUB SERPL-MCNC: 0.4 MG/DL (ref 0.1–1.2)
BUN BLD-MCNC: 18 MG/DL (ref 6–20)
BUN/CREAT SERPL: 21.2 (ref 7.3–30)
CALCIUM SPEC-SCNC: 9.4 MG/DL (ref 8.5–10.2)
CEA SERPL-MCNC: 4.58 NG/ML
CHLORIDE SERPL-SCNC: 101 MMOL/L (ref 98–107)
CO2 SERPL-SCNC: 28.1 MMOL/L (ref 22–29)
CREAT BLD-MCNC: 0.85 MG/DL (ref 0.6–1.1)
CREAT BLDA-MCNC: 0.8 MG/DL (ref 0.6–1.3)
DEPRECATED RDW RBC AUTO: 49.3 FL (ref 37–49)
EOSINOPHIL # BLD AUTO: 0.39 10*3/MM3 (ref 0–0.36)
EOSINOPHIL NFR BLD AUTO: 5.5 % (ref 1–5)
ERYTHROCYTE [DISTWIDTH] IN BLOOD BY AUTOMATED COUNT: 15.6 % (ref 11.7–14.5)
GFR SERPL CREATININE-BSD FRML MDRD: 81 ML/MIN/1.73
GLOBULIN UR ELPH-MCNC: 4.3 GM/DL (ref 1.8–3.5)
GLUCOSE BLD-MCNC: 112 MG/DL (ref 74–124)
HCT VFR BLD AUTO: 40.9 % (ref 34–45)
HGB BLD-MCNC: 13.3 G/DL (ref 11.5–14.9)
IMM GRANULOCYTES # BLD: 0.04 10*3/MM3 (ref 0–0.03)
IMM GRANULOCYTES NFR BLD: 0.6 % (ref 0–0.5)
LYMPHOCYTES # BLD AUTO: 2.38 10*3/MM3 (ref 1–3.5)
LYMPHOCYTES NFR BLD AUTO: 33.7 % (ref 20–49)
MCH RBC QN AUTO: 28.2 PG (ref 27–33)
MCHC RBC AUTO-ENTMCNC: 32.5 G/DL (ref 32–35)
MCV RBC AUTO: 86.7 FL (ref 83–97)
MONOCYTES # BLD AUTO: 0.82 10*3/MM3 (ref 0.25–0.8)
MONOCYTES NFR BLD AUTO: 11.6 % (ref 4–12)
NEUTROPHILS # BLD AUTO: 3.39 10*3/MM3 (ref 1.5–7)
NEUTROPHILS NFR BLD AUTO: 48 % (ref 39–75)
NRBC BLD MANUAL-RTO: 0 /100 WBC (ref 0–0)
PLATELET # BLD AUTO: 418 10*3/MM3 (ref 150–375)
PMV BLD AUTO: 9 FL (ref 8.9–12.1)
POTASSIUM BLD-SCNC: 4.1 MMOL/L (ref 3.5–4.7)
PROT SERPL-MCNC: 7.8 G/DL (ref 6.3–8)
RBC # BLD AUTO: 4.72 10*6/MM3 (ref 3.9–5)
SODIUM BLD-SCNC: 139 MMOL/L (ref 134–145)
WBC NRBC COR # BLD: 7.06 10*3/MM3 (ref 4–10)

## 2018-09-13 PROCEDURE — 96523 IRRIG DRUG DELIVERY DEVICE: CPT | Performed by: INTERNAL MEDICINE

## 2018-09-13 PROCEDURE — 71260 CT THORAX DX C+: CPT

## 2018-09-13 PROCEDURE — 82378 CARCINOEMBRYONIC ANTIGEN: CPT | Performed by: INTERNAL MEDICINE

## 2018-09-13 PROCEDURE — 85025 COMPLETE CBC W/AUTO DIFF WBC: CPT

## 2018-09-13 PROCEDURE — 25010000002 IOPAMIDOL 61 % SOLUTION: Performed by: INTERNAL MEDICINE

## 2018-09-13 PROCEDURE — 80053 COMPREHEN METABOLIC PANEL: CPT

## 2018-09-13 PROCEDURE — 36415 COLL VENOUS BLD VENIPUNCTURE: CPT

## 2018-09-13 PROCEDURE — 82565 ASSAY OF CREATININE: CPT

## 2018-09-13 RX ADMIN — IOPAMIDOL 75 ML: 612 INJECTION, SOLUTION INTRAVENOUS at 08:25

## 2018-09-17 RX ORDER — ALLOPURINOL 100 MG/1
TABLET ORAL
Qty: 90 TABLET | Refills: 1 | Status: SHIPPED | OUTPATIENT
Start: 2018-09-17 | End: 2018-12-17 | Stop reason: SDUPTHER

## 2018-09-20 ENCOUNTER — OFFICE VISIT (OUTPATIENT)
Dept: ONCOLOGY | Facility: CLINIC | Age: 68
End: 2018-09-20

## 2018-09-20 ENCOUNTER — APPOINTMENT (OUTPATIENT)
Dept: LAB | Facility: HOSPITAL | Age: 68
End: 2018-09-20

## 2018-09-20 VITALS
RESPIRATION RATE: 18 BRPM | DIASTOLIC BLOOD PRESSURE: 70 MMHG | TEMPERATURE: 98.5 F | HEART RATE: 83 BPM | BODY MASS INDEX: 36.32 KG/M2 | HEIGHT: 69 IN | WEIGHT: 245.2 LBS | OXYGEN SATURATION: 97 % | SYSTOLIC BLOOD PRESSURE: 118 MMHG

## 2018-09-20 DIAGNOSIS — J90 PLEURAL EFFUSION: ICD-10-CM

## 2018-09-20 DIAGNOSIS — R74.8 ELEVATED LIVER ENZYMES: ICD-10-CM

## 2018-09-20 DIAGNOSIS — C78.7 LIVER METASTASIS: ICD-10-CM

## 2018-09-20 DIAGNOSIS — I31.39 PERICARDIAL EFFUSION: ICD-10-CM

## 2018-09-20 DIAGNOSIS — C18.9 PRIMARY MALIGNANT NEOPLASM OF COLON (HCC): Primary | ICD-10-CM

## 2018-09-20 PROCEDURE — G0463 HOSPITAL OUTPT CLINIC VISIT: HCPCS | Performed by: INTERNAL MEDICINE

## 2018-09-20 PROCEDURE — 99215 OFFICE O/P EST HI 40 MIN: CPT | Performed by: INTERNAL MEDICINE

## 2018-09-20 NOTE — PROGRESS NOTES
Subjective     CHIEF COMPLAINT:      Chief Complaint   Patient presents with   • Follow-up     question about magnesium oxide         HISTORY OF PRESENT ILLNESS:     Feli Del Toro is a 67 y.o. female patient who returns today for follow up on her colon cancer.  She is feeling good.  She is not experiencing abdominal pain, nausea or vomiting.  No shortness breath or chest pain.    Patient developed skin lesion in one of her toes and this was attributed to weight distribution.  Her foot is now in a boot.      Past Medical History:   Diagnosis Date   • Acid reflux    • Anemia    • Cancer (CMS/HCC) 06/04/2013   • Diabetes mellitus (CMS/HCC)    • Dyspnea    • Essential hypertension    • History of constipation    • History of transfusion    • Hyperlipidemia    • Hypertension    • Metastatic colon cancer to liver (CMS/HCC)    • Neuropathy    • Pericardial effusion     Moderate   • Retinopathy    • SOB (shortness of breath)    • Type 2 diabetes mellitus (CMS/HCC)        Past Surgical History:   Procedure Laterality Date   • ABDOMINAL SURGERY      ablation    • AMPUTATION DIGIT Left 1/1/2017    Procedure: LEFT SECOND TOE AMPUTATION;  Surgeon: Farzad Nichols MD;  Location: [facility] OR;  Service:    • AMPUTATION OF REPLICATED TOES      right distal second toe    • CARDIAC CATHETERIZATION N/A 3/8/2018    Procedure: Pericardiocentesis;  Surgeon: Andrew Dobbins MD;  Location: [facility] INVASIVE LOCATION;  Service:    • CATARACT EXTRACTION     • COLECTOMY PARTIAL / TOTAL  01/14/2014   • COLONOSCOPY  2013   • FRACTURE SURGERY Right 2017    right lower leg with screw in ankle   • HYSTERECTOMY  1998   • PERICARDIAL WINDOW N/A 3/10/2018    Procedure: PERICARDIAL WINDOW;  Surgeon: Nomi Patel III, MD;  Location: [facility] OR;  Service: Cardiothoracic   • PORTACATH PLACEMENT  06/2013   • SALPINGO OOPHORECTOMY Bilateral    • SPLENECTOMY     • THORACOSCOPY Left 3/10/2018    Procedure: BRONCHOSCOPY, LEFT THORACOSCOPY VIDEO  "ASSISTED, SUBPLEURAL CATHETERS FOR PAIN;  Surgeon: Nomi Patel III, MD;  Location: [facility]  Service: Cardiothoracic       Cancer-related family history includes Breast cancer in her sister; Cancer in her other.  Social History   Substance Use Topics   • Smoking status: Never Smoker   • Smokeless tobacco: Never Used   • Alcohol use No       MEDICATIONS:    Current Outpatient Prescriptions:   •  allopurinol (ZYLOPRIM) 100 MG tablet, TAKE 1 TABLET DAILY, Disp: 90 tablet, Rfl: 1  •  aspirin 81 MG tablet, Take 81 mg by mouth daily., Disp: , Rfl:   •  BYDUREON BCISE 2 MG/0.85ML auto-injector injection, Inject 0.85 mL under the skin into the appropriate area as directed 1 (One) Time Per Week., Disp: 12 pen, Rfl: 3  •  calcium carbonate (TUMS) 500 MG chewable tablet, Chew 2 tablets As Needed., Disp: , Rfl:   •  fluticasone (FLONASE) 50 MCG/ACT nasal spray, 2 sprays into each nostril Daily., Disp: , Rfl:   •  Insulin Pen Needle (NOVOFINE) 32G X 6 MM misc, Take 1 shot of insulin per day, Disp: 90 each, Rfl: 3  •  telmisartan-hydrochlorothiazide (MICARDIS HCT) 40-12.5 MG per tablet, Take 1 tablet by mouth Daily., Disp: 90 tablet, Rfl: 3  •  TOUJEO SOLOSTAR 300 UNIT/ML solution pen-injector, Inject 45 Units under the skin into the appropriate area as directed Daily., Disp: 12 pen, Rfl: 3  •  XIGDUO XR 5-1000 MG tablet, Take 2 tablets by mouth Daily., Disp: 180 tablet, Rfl: 3  •  magnesium oxide (MAG-OX) 400 MG tablet, Take 400 mg by mouth Daily., Disp: , Rfl:     ALLERGIES:  Allergies   Allergen Reactions   • Compazine [Prochlorperazine Edisylate] Other (See Comments)     Complete arch in the back    • Prochlorperazine Maleate Other (See Comments)     \"BACK MUSCLE RETRACTION\"       REVIEW OF SYSTEMS:  Review of Systems   Constitutional: Negative for chills, fever and unexpected weight change.   HENT: Positive for sneezing. Negative for mouth sores, nosebleeds, sore throat and voice change.    Eyes: Negative for " "visual disturbance.   Respiratory: Negative for cough and shortness of breath.    Cardiovascular: Negative for chest pain and leg swelling.   Gastrointestinal: Negative for abdominal pain, blood in stool, constipation, diarrhea, nausea and vomiting.   Genitourinary: Negative for dysuria, frequency and hematuria.   Musculoskeletal: Negative for arthralgias, back pain and joint swelling.   Skin: Negative for rash.   Neurological: Negative for dizziness, numbness and headaches.   Hematological: Negative for adenopathy. Does not bruise/bleed easily.   Psychiatric/Behavioral: Negative for dysphoric mood. The patient is not nervous/anxious.          Objective   VITAL SIGNS:     Vitals:    09/20/18 1019   BP: 118/70   Pulse: 83   Resp: 18   Temp: 98.5 °F (36.9 °C)   TempSrc: Oral   SpO2: 97%   Weight: 111 kg (245 lb 3.2 oz)   Height: 175.3 cm (69.02\")   PainSc: 0-No pain     Body mass index is 36.19 kg/m².     Wt Readings from Last 3 Encounters:   09/20/18 111 kg (245 lb 3.2 oz)   08/15/18 112 kg (248 lb)   06/28/18 110 kg (242 lb 9.6 oz)       PHYSICAL EXAMINATION:  GENERAL:  The patient appears in good general condition, not in acute distress.  SKIN: Warm and dry. No skin rashes, ecchymosis or petechiae.  HEAD:  Normocephalic.  EYES:  No Jaundice. No Pallor. Pupils equal. EOMI.  NECK:  Supple with Good ROM. No Thyromegaly. No Masses.  LYMPHATICS:  No cervical or supraclavicular lymphadenopathy.  CHEST: Normal respiratory effort. Lungs clear to auscultation.   ABDOMEN:  Soft. No tenderness. No Hepatomegaly. No masses.  EXTREMITIES:  No arthritic changes. No Edema.  Left foot in a boot.  NEUROLOGICAL:  No Focal neurological deficits.      DIAGNOSTIC DATA:       Component      Latest Ref Rng & Units 9/13/2018   WBC      4.00 - 10.00 10*3/mm3 7.06   RBC      3.90 - 5.00 10*6/mm3 4.72   Hemoglobin      11.5 - 14.9 g/dL 13.3   Hematocrit      34.0 - 45.0 % 40.9   MCV      83.0 - 97.0 fL 86.7   MCH      27.0 - 33.0 pg 28.2 "   MCHC      32.0 - 35.0 g/dL 32.5   RDW      11.7 - 14.5 % 15.6 (H)   RDW-SD      37.0 - 49.0 fl 49.3 (H)   MPV      8.9 - 12.1 fL 9.0   Platelets      150 - 375 10*3/mm3 418 (H)   Neutrophil %      39.0 - 75.0 % 48.0   Lymphocyte %      20.0 - 49.0 % 33.7   Monocyte %      4.0 - 12.0 % 11.6   Eosinophil %      1.0 - 5.0 % 5.5 (H)   Basophil %      0.0 - 1.1 % 0.6   Immature Grans %      0.0 - 0.5 % 0.6 (H)   Neutrophils, Absolute      1.50 - 7.00 10*3/mm3 3.39   Lymphocytes, Absolute      1.00 - 3.50 10*3/mm3 2.38   Monocytes, Absolute      0.25 - 0.80 10*3/mm3 0.82 (H)   Eosinophils, Absolute      0.00 - 0.36 10*3/mm3 0.39 (H)   Basophils, Absolute      0.00 - 0.10 10*3/mm3 0.04   Immature Grans, Absolute      0.00 - 0.03 10*3/mm3 0.04 (H)   nRBC      0.0 - 0.0 /100 WBC 0.0   Glucose      74 - 124 mg/dL 112   BUN      6 - 20 mg/dL 18   Creatinine      0.60 - 1.10 mg/dL 0.85   Sodium      134 - 145 mmol/L 139   Potassium      3.5 - 4.7 mmol/L 4.1   Chloride      98 - 107 mmol/L 101   CO2      22.0 - 29.0 mmol/L 28.1   Calcium      8.5 - 10.2 mg/dL 9.4   Total Protein      6.3 - 8.0 g/dL 7.8   Albumin      3.50 - 5.20 g/dL 3.50   ALT (SGPT)      0 - 33 U/L 65 (H)   AST (SGOT)      0 - 32 U/L 71 (H)   Alkaline Phosphatase      38 - 116 U/L 374 (H)   Total Bilirubin      0.1 - 1.2 mg/dL 0.4   eGFR  African Amer      >60 mL/min/1.73 81   Globulin      1.8 - 3.5 gm/dL 4.3 (H)   A/G Ratio      1.1 - 2.4 g/dL 0.8 (L)   BUN/Creatinine Ratio      7.3 - 30.0 21.2   Anion Gap      mmol/L 9.9   CEA      ng/mL 4.58       CT CHEST WITH CONTRAST 9/13/18:     CLINICAL: Follow-up colon cancer, pericardial and pleural effusion.     COMPARISON: CT scan of the chest 03/08/2018.     TECHNIQUE: Radiation dose reduction techniques were utilized, including  automated exposure control and exposure modulation based on body size.     FINDINGS: The left pleural effusion has resolved. The pericardial  effusion has resolved. The compressive  atelectasis/consolidation  involving the left lower lobe has near completely resolved with a fine  curvilinear opacity at this location likely discoid atelectasis  remaining. No pulmonary nodule or acute airspace disease.     The esophagus is satisfactory in course and caliber. The heart size is  normal. No mediastinal or hilar mass/adenopathy. Caliber of the aorta is  within normal limits.     Limited images through the upper abdomen demonstrate a circumscribed  cystic area within the liver measuring 4.6 cm, there appear to be  fiduciary markers at this location. Prior partial hepatic resection. No  lytic or sclerotic bone lesion.     CONCLUSION: Interval resolution of the pericardial effusion and left  pleural effusion, near complete resolution of the  consolidation/atelectasis left lower lobe with a small curvilinear area  of discoid atelectasis remaining.      Assessment/Plan   1.  Metastatic colon cancer. She had metastases to the liver.   · She received 12 cycles of FOLFOX-6 and then had resection of the primary tumor along with metastasectomy and splenectomy with thermal ablation of lesions in the liver in January 2014.   · This was followed by 12 cycles of the FOLFIRI regimen completed on 08/07/2014.   · CT scans on 05/28/2015 revealed a new lesion in the liver.  Dr. Ruano referred the patient to Radiation Therapy and she received CyberKnife radiation, completed on 07/14/2015.    · The CT scan from 5/5/17 revealed a new 2.2 cm lesion in the anterior hepatic segment.  CT guided microwave ablation of liver mass performed on 6/16/17 with CT evidence on 7/20/17 of a complete response  · FOLFIRI ×12 cycles given between 6/29/17 and 11/29/17.     The follow-up CT scan of the chest showed no evidence of metastatic disease.  There is some consolidation/atelectasis in the left lung base explained by the pleural effusion that has resolved.  Patient is asymptomatic.    2.  Elevated liver enzymes likely due to  inflammation related to the recent liver treatment procedure.  Liver enzymes have increased.  She is going to have follow-up CT scan of the abdomen and pelvis next month.    3.  Pericardial effusion status post pericardial window.  Cytology and biopsy were negative.  The follow-up CT scan showed resolution of the pericardial and pleural effusions.    4.  History of hypomagnesemia, resolved.  She does not need to take the magnesium oxide.    5.  Patient has a port which would need maintenance every 6 weeks.      PLAN:    1.  The patient is going to have CT scan of the abdomen pelvis on 10/11/2018 and will see Dr. Ruano on 10/18/2018.  We will await for the results of the scans.    2.  I'll see the patient in follow-up in 5 weeks.  She will be due for her port flush.  We will repeat CBC CMP and CEA at her return visit.        Marivel Brown MD  09/20/18

## 2018-10-08 RX ORDER — SAXAGLIPTIN 5 MG/1
TABLET, FILM COATED ORAL
Qty: 90 TABLET | Refills: 0 | Status: SHIPPED | OUTPATIENT
Start: 2018-10-08 | End: 2018-12-17 | Stop reason: SDUPTHER

## 2018-10-30 ENCOUNTER — INFUSION (OUTPATIENT)
Dept: ONCOLOGY | Facility: HOSPITAL | Age: 68
End: 2018-10-30

## 2018-10-30 ENCOUNTER — OFFICE VISIT (OUTPATIENT)
Dept: ONCOLOGY | Facility: CLINIC | Age: 68
End: 2018-10-30

## 2018-10-30 VITALS
WEIGHT: 250.6 LBS | DIASTOLIC BLOOD PRESSURE: 78 MMHG | HEART RATE: 75 BPM | HEIGHT: 69 IN | SYSTOLIC BLOOD PRESSURE: 132 MMHG | RESPIRATION RATE: 18 BRPM | BODY MASS INDEX: 37.12 KG/M2 | TEMPERATURE: 98.9 F | OXYGEN SATURATION: 97 %

## 2018-10-30 DIAGNOSIS — R74.8 ELEVATED LIVER ENZYMES: ICD-10-CM

## 2018-10-30 DIAGNOSIS — C18.9 PRIMARY MALIGNANT NEOPLASM OF COLON (HCC): Primary | ICD-10-CM

## 2018-10-30 DIAGNOSIS — C78.7 LIVER METASTASIS: ICD-10-CM

## 2018-10-30 DIAGNOSIS — Z45.2 ENCOUNTER FOR ADJUSTMENT OR MANAGEMENT OF VASCULAR ACCESS DEVICE: Primary | ICD-10-CM

## 2018-10-30 DIAGNOSIS — C18.9 PRIMARY MALIGNANT NEOPLASM OF COLON (HCC): ICD-10-CM

## 2018-10-30 DIAGNOSIS — Z45.2 ENCOUNTER FOR ADJUSTMENT OR MANAGEMENT OF VASCULAR ACCESS DEVICE: ICD-10-CM

## 2018-10-30 PROBLEM — T45.1X5A CHEMOTHERAPY INDUCED NAUSEA AND VOMITING: Status: RESOLVED | Noted: 2017-08-24 | Resolved: 2018-10-30

## 2018-10-30 PROBLEM — R11.2 CHEMOTHERAPY INDUCED NAUSEA AND VOMITING: Status: RESOLVED | Noted: 2017-08-24 | Resolved: 2018-10-30

## 2018-10-30 LAB
ALBUMIN SERPL-MCNC: 3.7 G/DL (ref 3.5–5.2)
ALBUMIN/GLOB SERPL: 0.9 G/DL (ref 1.1–2.4)
ALP SERPL-CCNC: 276 U/L (ref 38–116)
ALT SERPL W P-5'-P-CCNC: 36 U/L (ref 0–33)
ANION GAP SERPL CALCULATED.3IONS-SCNC: 12.6 MMOL/L
AST SERPL-CCNC: 41 U/L (ref 0–32)
BASOPHILS # BLD AUTO: 0.07 10*3/MM3 (ref 0–0.1)
BASOPHILS NFR BLD AUTO: 0.9 % (ref 0–1.1)
BILIRUB SERPL-MCNC: 0.5 MG/DL (ref 0.1–1.2)
BUN BLD-MCNC: 17 MG/DL (ref 6–20)
BUN/CREAT SERPL: 18.3 (ref 7.3–30)
CALCIUM SPEC-SCNC: 9.5 MG/DL (ref 8.5–10.2)
CEA SERPL-MCNC: 4.52 NG/ML
CHLORIDE SERPL-SCNC: 98 MMOL/L (ref 98–107)
CO2 SERPL-SCNC: 27.4 MMOL/L (ref 22–29)
CREAT BLD-MCNC: 0.93 MG/DL (ref 0.6–1.1)
DEPRECATED RDW RBC AUTO: 50.1 FL (ref 37–49)
EOSINOPHIL # BLD AUTO: 0.43 10*3/MM3 (ref 0–0.36)
EOSINOPHIL NFR BLD AUTO: 5.7 % (ref 1–5)
ERYTHROCYTE [DISTWIDTH] IN BLOOD BY AUTOMATED COUNT: 15.8 % (ref 11.7–14.5)
GFR SERPL CREATININE-BSD FRML MDRD: 73 ML/MIN/1.73
GLOBULIN UR ELPH-MCNC: 4.1 GM/DL (ref 1.8–3.5)
GLUCOSE BLD-MCNC: 93 MG/DL (ref 74–124)
HCT VFR BLD AUTO: 40.7 % (ref 34–45)
HGB BLD-MCNC: 13.2 G/DL (ref 11.5–14.9)
IMM GRANULOCYTES # BLD: 0.05 10*3/MM3 (ref 0–0.03)
IMM GRANULOCYTES NFR BLD: 0.7 % (ref 0–0.5)
LYMPHOCYTES # BLD AUTO: 3.09 10*3/MM3 (ref 1–3.5)
LYMPHOCYTES NFR BLD AUTO: 40.6 % (ref 20–49)
MCH RBC QN AUTO: 28.3 PG (ref 27–33)
MCHC RBC AUTO-ENTMCNC: 32.4 G/DL (ref 32–35)
MCV RBC AUTO: 87.3 FL (ref 83–97)
MONOCYTES # BLD AUTO: 0.84 10*3/MM3 (ref 0.25–0.8)
MONOCYTES NFR BLD AUTO: 11 % (ref 4–12)
NEUTROPHILS # BLD AUTO: 3.13 10*3/MM3 (ref 1.5–7)
NEUTROPHILS NFR BLD AUTO: 41.1 % (ref 39–75)
NRBC BLD MANUAL-RTO: 0 /100 WBC (ref 0–0)
PLATELET # BLD AUTO: 361 10*3/MM3 (ref 150–375)
PMV BLD AUTO: 8.9 FL (ref 8.9–12.1)
POTASSIUM BLD-SCNC: 4.1 MMOL/L (ref 3.5–4.7)
PROT SERPL-MCNC: 7.8 G/DL (ref 6.3–8)
RBC # BLD AUTO: 4.66 10*6/MM3 (ref 3.9–5)
SODIUM BLD-SCNC: 138 MMOL/L (ref 134–145)
WBC NRBC COR # BLD: 7.61 10*3/MM3 (ref 4–10)

## 2018-10-30 PROCEDURE — 82378 CARCINOEMBRYONIC ANTIGEN: CPT | Performed by: INTERNAL MEDICINE

## 2018-10-30 PROCEDURE — 96523 IRRIG DRUG DELIVERY DEVICE: CPT | Performed by: INTERNAL MEDICINE

## 2018-10-30 PROCEDURE — 85025 COMPLETE CBC W/AUTO DIFF WBC: CPT

## 2018-10-30 PROCEDURE — 80053 COMPREHEN METABOLIC PANEL: CPT

## 2018-10-30 PROCEDURE — 99214 OFFICE O/P EST MOD 30 MIN: CPT | Performed by: INTERNAL MEDICINE

## 2018-10-30 RX ORDER — SODIUM CHLORIDE 0.9 % (FLUSH) 0.9 %
10 SYRINGE (ML) INJECTION AS NEEDED
Status: DISCONTINUED | OUTPATIENT
Start: 2018-10-30 | End: 2018-10-30 | Stop reason: HOSPADM

## 2018-10-30 RX ORDER — SODIUM CHLORIDE 0.9 % (FLUSH) 0.9 %
10 SYRINGE (ML) INJECTION AS NEEDED
Status: CANCELLED | OUTPATIENT
Start: 2018-10-30

## 2018-10-30 RX ADMIN — SODIUM CHLORIDE, PRESERVATIVE FREE 500 UNITS: 5 INJECTION INTRAVENOUS at 09:21

## 2018-10-30 RX ADMIN — Medication 10 ML: at 09:21

## 2018-10-30 NOTE — PROGRESS NOTES
Subjective     CHIEF COMPLAINT:      Chief Complaint   Patient presents with   • Follow-up     no concerns         HISTORY OF PRESENT ILLNESS:     Feli Del Toro is a 67 y.o. female patient who returns today for follow up on her colon cancer.  She returns today for follow-up and reports no abdominal symptoms.  Specifically, she denies abdominal pain or nausea.  She is feeling good.  Energy level is good.  No abnormality in bowel movements.      Past Medical History:   Diagnosis Date   • Acid reflux    • Anemia    • Cancer (CMS/HCC) 06/04/2013   • Diabetes mellitus (CMS/HCC)    • Dyspnea    • Essential hypertension    • History of constipation    • History of transfusion    • Hyperlipidemia    • Hypertension    • Metastatic colon cancer to liver (CMS/HCC)    • Neuropathy    • Pericardial effusion     Moderate   • Retinopathy    • SOB (shortness of breath)    • Type 2 diabetes mellitus (CMS/HCC)        Past Surgical History:   Procedure Laterality Date   • ABDOMINAL SURGERY      ablation    • AMPUTATION DIGIT Left 1/1/2017    Procedure: LEFT SECOND TOE AMPUTATION;  Surgeon: Farzad Nichols MD;  Location: [facility];  Service:    • AMPUTATION OF REPLICATED TOES      right distal second toe    • CARDIAC CATHETERIZATION N/A 3/8/2018    Procedure: Pericardiocentesis;  Surgeon: Andrew Dobbins MD;  Location: [facility] INVASIVE LOCATION;  Service:    • CATARACT EXTRACTION     • COLECTOMY PARTIAL / TOTAL  01/14/2014   • COLONOSCOPY  2013   • FRACTURE SURGERY Right 2017    right lower leg with screw in ankle   • HYSTERECTOMY  1998   • PERICARDIAL WINDOW N/A 3/10/2018    Procedure: PERICARDIAL WINDOW;  Surgeon: Nomi Patel III, MD;  Location: [facility] OR;  Service: Cardiothoracic   • PORTACATH PLACEMENT  06/2013   • SALPINGO OOPHORECTOMY Bilateral    • SPLENECTOMY     • THORACOSCOPY Left 3/10/2018    Procedure: BRONCHOSCOPY, LEFT THORACOSCOPY VIDEO ASSISTED, SUBPLEURAL CATHETERS FOR PAIN;  Surgeon: Nomi ESPARZA  "Jorge CHOUDHURY MD;  Location: [facility];  Service: Cardiothoracic       Cancer-related family history includes Breast cancer in her sister; Cancer in her other.  Social History   Substance Use Topics   • Smoking status: Never Smoker   • Smokeless tobacco: Never Used   • Alcohol use No       MEDICATIONS:    Current Outpatient Prescriptions:   •  allopurinol (ZYLOPRIM) 100 MG tablet, TAKE 1 TABLET DAILY, Disp: 90 tablet, Rfl: 1  •  aspirin 81 MG tablet, Take 81 mg by mouth daily., Disp: , Rfl:   •  BYDUREON BCISE 2 MG/0.85ML auto-injector injection, Inject 0.85 mL under the skin into the appropriate area as directed 1 (One) Time Per Week., Disp: 12 pen, Rfl: 3  •  calcium carbonate (TUMS) 500 MG chewable tablet, Chew 2 tablets As Needed., Disp: , Rfl:   •  fluticasone (FLONASE) 50 MCG/ACT nasal spray, 2 sprays into each nostril Daily., Disp: , Rfl:   •  Insulin Pen Needle (NOVOFINE) 32G X 6 MM misc, Take 1 shot of insulin per day, Disp: 90 each, Rfl: 3  •  ONGLYZA 5 MG tablet, TAKE 1 TABLET DAILY, Disp: 90 tablet, Rfl: 0  •  telmisartan-hydrochlorothiazide (MICARDIS HCT) 40-12.5 MG per tablet, Take 1 tablet by mouth Daily., Disp: 90 tablet, Rfl: 3  •  TOUJEO SOLOSTAR 300 UNIT/ML solution pen-injector, Inject 45 Units under the skin into the appropriate area as directed Daily., Disp: 12 pen, Rfl: 3  •  XIGDUO XR 5-1000 MG tablet, Take 2 tablets by mouth Daily., Disp: 180 tablet, Rfl: 3  No current facility-administered medications for this visit.     ALLERGIES:  Allergies   Allergen Reactions   • Compazine [Prochlorperazine Edisylate] Other (See Comments)     Complete arch in the back    • Prochlorperazine Maleate Other (See Comments)     \"BACK MUSCLE RETRACTION\"       REVIEW OF SYSTEMS:  Review of Systems   Constitutional: Negative for chills, fever and unexpected weight change.   HENT: Negative for mouth sores, nosebleeds, sore throat and voice change.    Eyes: Negative for visual disturbance.   Respiratory: " "Negative for cough and shortness of breath.    Cardiovascular: Negative for chest pain and leg swelling.   Gastrointestinal: Negative for abdominal pain, blood in stool, constipation, diarrhea, nausea and vomiting.   Genitourinary: Negative for dysuria, frequency and hematuria.   Musculoskeletal: Negative for arthralgias, back pain and joint swelling.   Skin: Negative for rash.   Neurological: Negative for dizziness, numbness and headaches.   Hematological: Negative for adenopathy. Does not bruise/bleed easily.   Psychiatric/Behavioral: Negative for dysphoric mood. The patient is not nervous/anxious.          Objective   VITAL SIGNS:     Vitals:    10/30/18 0929   BP: 132/78   Pulse: 75   Resp: 18   Temp: 98.9 °F (37.2 °C)   TempSrc: Oral   SpO2: 97%   Weight: 114 kg (250 lb 9.6 oz)   Height: 175.3 cm (69.02\")   PainSc: 0-No pain     Body mass index is 36.99 kg/m².     Wt Readings from Last 3 Encounters:   10/30/18 114 kg (250 lb 9.6 oz)   09/20/18 111 kg (245 lb 3.2 oz)   08/15/18 112 kg (248 lb)       PHYSICAL EXAMINATION:  GENERAL:  The patient appears in good general condition, not in acute distress.  SKIN: Warm and dry. No skin rashes, ecchymosis or petechiae.  HEAD:  Normocephalic.  EYES:  No Jaundice. No Pallor. Pupils equal. EOMI.  NECK:  Supple with Good ROM. No Thyromegaly. No Masses.  LYMPHATICS:  No cervical or supraclavicular lymphadenopathy.  CHEST: Normal respiratory effort. Lungs clear to auscultation.   CARDIAC:  Normal S1 & S2. No murmurs. No edema.  ABDOMEN:  Soft. No tenderness. No Hepatomegaly. No Splenomegaly. No masses.  EXTREMITIES:  No clubbing. No joint swelling in the hands. No Calf tenderness.  NEUROLOGICAL:  No Focal neurological deficits.         DIAGNOSTIC DATA:       Results from last 7 days  Lab Units 10/30/18  0914   WBC 10*3/mm3 7.61   NEUTROS ABS 10*3/mm3 3.13   HEMOGLOBIN g/dL 13.2   HEMATOCRIT % 40.7   PLATELETS 10*3/mm3 361       Results from last 7 days  Lab Units " 10/30/18  0914   SODIUM mmol/L 138   POTASSIUM mmol/L 4.1   CHLORIDE mmol/L 98   CO2 mmol/L 27.4   BUN mg/dL 17   CREATININE mg/dL 0.93   CALCIUM mg/dL 9.5   ALBUMIN g/dL 3.70   BILIRUBIN mg/dL 0.5   ALK PHOS U/L 276*   ALT (SGPT) U/L 36*   AST (SGOT) U/L 41*   GLUCOSE mg/dL 93         RADIOLOGY REPORT    FACILITY:  [facility]  UNIT/AGE/GENDER: N.PAVCT  OP      AGE:67 Y          SEX:F  PATIENT NAME/:  DAMARI WILSON L    1950  UNIT NUMBER:  [id number]  ACCOUNT NUMBER:  [id number]  ACCESSION NUMBER:  [id number]    EXAMINATION:  1. CT of the abdomen with and without intravenous contrast.  2. CT of the pelvis with intravenous contrast.    DATE: 10/11/2018    HISTORY: COLON CANCER. Restaging study. Metastatic disease to the liver with previous ablation therapy.    TECHNIQUE: CT abdomen was performed before and after administration of intravenous contrast including arterial and venous phase imaging. Venous phase imaging was continued into the pelvis. Radiation dose reduction techniques were used for the scan per   the ALARA (As Low As Reasonably Achievable) protocol.    COMPARISON: 2018    FINDINGS:  Stable postsurgical changes of a partial left hepatectomy. Ablation zone in segment 8 is slightly decreased in size now measuring up to 4.3 cm. There is no enhancement to suggest recurrence. No new suspicious liver lesions are seen. Hypoattenuating   structures radiating from ablation defect into hepatic segment 5 may reflect thrombosed portal vein branches are focally dilated biliary ducts.    Layering stones within the gallbladder which is largely decompressed. No dilatation of the common bile duct. Spleen has been removed with small splenule at the left upper quadrant. Pancreas and adrenals are without significant abnormality. Stable small   right renal cysts.    Stable postsurgical changes of partial left hemicolectomy. Bowel is normal in caliber without evidence for obstruction, inflammation  or abnormal bowel wall thickening. Appendix is normal. No ascites. No lymphadenopathy. Aorta is normal in caliber.    Uterus has been removed. No adnexal mass. Urinary bladder is unremarkable. No pelvic or inguinal lymphadenopathy. Tiny left inguinal hernia containing fat.    Degenerative changes of the spine. No suspicious focal osseous lesions. Included lung bases demonstrate linear atelectasis versus scarring at the left.    IMPRESSION:  1. Interval decrease in size of the ablation defect in segment 8, without signs of recurrence. No evidence of new metastatic disease in the liver or elsewhere in the abdomen or pelvis.  2. Stable tubular hypoattenuating foci radiating from the ablation defect into segment 5 may reflect thrombosed peripheral portal vein branches or mildly dilated intrahepatic biliary ducts.  3. Stable bandlike area of atelectasis versus scarring at the left lung base.    Dictated by: Ruthann Rosales M.D.    Images and Report reviewed and interpreted by: Ruthann Rosales M.D.    <PS><Electronically signed by: Ruthann Rosales M.D.>  10/11/2018 0820    Assessment/Plan   1.  Metastatic colon cancer. She had metastases to the liver.   · She received 12 cycles of FOLFOX-6 and then had resection of the primary tumor along with metastasectomy and splenectomy with thermal ablation of lesions in the liver in January 2014.   · This was followed by 12 cycles of the FOLFIRI regimen completed on 08/07/2014.   · CT scans on 05/28/2015 revealed a new lesion in the liver.  Dr. Ruano referred the patient to Radiation Therapy and she received CyberKnife radiation, completed on 07/14/2015.    · The CT scan from 5/5/17 revealed a new 2.2 cm lesion in the anterior hepatic segment.  CT guided microwave ablation of liver mass performed on 6/16/17 with CT evidence on 7/20/17 of a complete response  · FOLFIRI ×12 cycles given between 6/29/17 and 11/29/17.   · CT scan from 10/11/2018 showed no evidence of  recurrence.  There were positive changes related to her local therapy to the liver.    2.  Elevated liver enzymes likely due to inflammation related to the recent liver treatment procedure.  Liver enzymes have improved.  The CT scan indicated possible thrombosis of one of the branches of the portal vein.  The patient is completely asymptomatic.  I explained the findings to the patient.  Since the presence of the thrombosis is not definitive and since she is completely asymptomatic, I did not recommend anticoagulation.  She agreed with the recommendation.    3.  Pericardial effusion status post pericardial window.  Cytology and biopsy were negative.  There is no evidence of reaccumulation of the pericardial effusion.  She is not having shortness breath.    4.   Patient has a port which would need maintenance every 6 weeks.      PLAN:    1.  We will continue to monitor.  She will notify us if she develops any abdominal pain.     2.  I recommended repeating CT scans in 6 months.    3.  We schedule the patient to return in 6 weeks for port flush.  We will see the patient in 3 months with CBC CMP CEA.       Marivel Brown MD  10/30/18

## 2018-12-03 ENCOUNTER — LAB (OUTPATIENT)
Dept: ENDOCRINOLOGY | Age: 68
End: 2018-12-03

## 2018-12-03 DIAGNOSIS — E78.2 MIXED HYPERLIPIDEMIA: Primary | ICD-10-CM

## 2018-12-03 DIAGNOSIS — Z79.4 TYPE 2 DIABETES MELLITUS WITH COMPLICATION, WITH LONG-TERM CURRENT USE OF INSULIN (HCC): ICD-10-CM

## 2018-12-03 DIAGNOSIS — E78.2 MIXED HYPERLIPIDEMIA: ICD-10-CM

## 2018-12-03 DIAGNOSIS — E11.8 TYPE 2 DIABETES MELLITUS WITH COMPLICATION, WITH LONG-TERM CURRENT USE OF INSULIN (HCC): ICD-10-CM

## 2018-12-03 DIAGNOSIS — E55.9 VITAMIN D DEFICIENCY: ICD-10-CM

## 2018-12-04 LAB
25(OH)D3+25(OH)D2 SERPL-MCNC: 30 NG/ML (ref 30–100)
ALBUMIN SERPL-MCNC: 3.9 G/DL (ref 3.5–5.2)
ALBUMIN/GLOB SERPL: 1 G/DL
ALP SERPL-CCNC: 301 U/L (ref 39–117)
ALT SERPL-CCNC: 40 U/L (ref 1–33)
AST SERPL-CCNC: 44 U/L (ref 1–32)
BILIRUB SERPL-MCNC: 0.4 MG/DL (ref 0.1–1.2)
BUN SERPL-MCNC: 18 MG/DL (ref 8–23)
BUN/CREAT SERPL: 18.9 (ref 7–25)
C PEPTIDE SERPL-MCNC: 1.7 NG/ML (ref 1.1–4.4)
CALCIUM SERPL-MCNC: 9.9 MG/DL (ref 8.6–10.5)
CHLORIDE SERPL-SCNC: 99 MMOL/L (ref 98–107)
CHOLEST SERPL-MCNC: 286 MG/DL (ref 0–200)
CO2 SERPL-SCNC: 26.4 MMOL/L (ref 22–29)
CREAT SERPL-MCNC: 0.95 MG/DL (ref 0.57–1)
GLOBULIN SER CALC-MCNC: 4 GM/DL
GLUCOSE SERPL-MCNC: 89 MG/DL (ref 65–99)
HBA1C MFR BLD: 6.98 % (ref 4.8–5.6)
HDLC SERPL-MCNC: 104 MG/DL (ref 40–60)
INTERPRETATION: NORMAL
LDLC SERPL CALC-MCNC: 170 MG/DL (ref 0–100)
Lab: NORMAL
MICROALBUMIN UR-MCNC: 49.3 UG/ML
POTASSIUM SERPL-SCNC: 4.5 MMOL/L (ref 3.5–5.2)
PROT SERPL-MCNC: 7.9 G/DL (ref 6–8.5)
SODIUM SERPL-SCNC: 138 MMOL/L (ref 136–145)
TRIGL SERPL-MCNC: 61 MG/DL (ref 0–150)
VLDLC SERPL CALC-MCNC: 12.2 MG/DL (ref 5–40)

## 2018-12-05 ENCOUNTER — RESULTS ENCOUNTER (OUTPATIENT)
Dept: ENDOCRINOLOGY | Age: 68
End: 2018-12-05

## 2018-12-05 DIAGNOSIS — E78.2 MIXED HYPERLIPIDEMIA: ICD-10-CM

## 2018-12-05 DIAGNOSIS — E55.9 VITAMIN D DEFICIENCY: ICD-10-CM

## 2018-12-05 DIAGNOSIS — I10 ESSENTIAL HYPERTENSION: ICD-10-CM

## 2018-12-05 DIAGNOSIS — R80.9 TYPE 2 DIABETES MELLITUS WITH MICROALBUMINURIA (HCC): ICD-10-CM

## 2018-12-05 DIAGNOSIS — E11.8 TYPE 2 DIABETES MELLITUS WITH COMPLICATION, WITH LONG-TERM CURRENT USE OF INSULIN (HCC): ICD-10-CM

## 2018-12-05 DIAGNOSIS — Z79.4 TYPE 2 DIABETES MELLITUS WITH COMPLICATION, WITH LONG-TERM CURRENT USE OF INSULIN (HCC): ICD-10-CM

## 2018-12-05 DIAGNOSIS — E11.29 TYPE 2 DIABETES MELLITUS WITH MICROALBUMINURIA (HCC): ICD-10-CM

## 2018-12-05 DIAGNOSIS — E66.01 MORBID OBESITY DUE TO EXCESS CALORIES (HCC): ICD-10-CM

## 2018-12-13 ENCOUNTER — INFUSION (OUTPATIENT)
Dept: ONCOLOGY | Facility: HOSPITAL | Age: 68
End: 2018-12-13

## 2018-12-13 DIAGNOSIS — Z45.2 ENCOUNTER FOR ADJUSTMENT OR MANAGEMENT OF VASCULAR ACCESS DEVICE: Primary | ICD-10-CM

## 2018-12-13 PROCEDURE — 96523 IRRIG DRUG DELIVERY DEVICE: CPT | Performed by: INTERNAL MEDICINE

## 2018-12-13 RX ORDER — SODIUM CHLORIDE 0.9 % (FLUSH) 0.9 %
10 SYRINGE (ML) INJECTION AS NEEDED
Status: CANCELLED | OUTPATIENT
Start: 2018-12-13

## 2018-12-13 RX ORDER — SODIUM CHLORIDE 0.9 % (FLUSH) 0.9 %
10 SYRINGE (ML) INJECTION AS NEEDED
Status: DISCONTINUED | OUTPATIENT
Start: 2018-12-13 | End: 2018-12-13 | Stop reason: HOSPADM

## 2018-12-13 RX ADMIN — Medication 10 ML: at 13:37

## 2018-12-13 RX ADMIN — SODIUM CHLORIDE, PRESERVATIVE FREE 500 UNITS: 5 INJECTION INTRAVENOUS at 13:37

## 2018-12-17 ENCOUNTER — OFFICE VISIT (OUTPATIENT)
Dept: ENDOCRINOLOGY | Age: 68
End: 2018-12-17

## 2018-12-17 VITALS
DIASTOLIC BLOOD PRESSURE: 84 MMHG | WEIGHT: 250 LBS | SYSTOLIC BLOOD PRESSURE: 136 MMHG | BODY MASS INDEX: 37.03 KG/M2 | HEIGHT: 69 IN

## 2018-12-17 DIAGNOSIS — E55.9 VITAMIN D DEFICIENCY: ICD-10-CM

## 2018-12-17 DIAGNOSIS — I10 ESSENTIAL HYPERTENSION: ICD-10-CM

## 2018-12-17 DIAGNOSIS — Z79.4 TYPE 2 DIABETES MELLITUS WITH COMPLICATION, WITH LONG-TERM CURRENT USE OF INSULIN (HCC): Primary | ICD-10-CM

## 2018-12-17 DIAGNOSIS — E11.8 TYPE 2 DIABETES MELLITUS WITH COMPLICATION, WITH LONG-TERM CURRENT USE OF INSULIN (HCC): Primary | ICD-10-CM

## 2018-12-17 DIAGNOSIS — E79.0 HYPERURICEMIA: ICD-10-CM

## 2018-12-17 DIAGNOSIS — E78.2 MIXED HYPERLIPIDEMIA: ICD-10-CM

## 2018-12-17 PROCEDURE — 99214 OFFICE O/P EST MOD 30 MIN: CPT | Performed by: NURSE PRACTITIONER

## 2018-12-17 RX ORDER — ALLOPURINOL 100 MG/1
100 TABLET ORAL DAILY
Qty: 90 TABLET | Refills: 1 | Status: SHIPPED | OUTPATIENT
Start: 2018-12-17 | End: 2019-04-18 | Stop reason: SDUPTHER

## 2018-12-17 RX ORDER — DAPAGLIFLOZIN AND METFORMIN HYDROCHLORIDE 5; 1000 MG/1; MG/1
2 TABLET, FILM COATED, EXTENDED RELEASE ORAL DAILY
Qty: 180 TABLET | Refills: 3 | Status: SHIPPED | OUTPATIENT
Start: 2018-12-17 | End: 2019-04-18 | Stop reason: SDUPTHER

## 2018-12-17 RX ORDER — EXENATIDE 2 MG/.85ML
2 INJECTION, SUSPENSION, EXTENDED RELEASE SUBCUTANEOUS WEEKLY
Qty: 12 PEN | Refills: 3 | Status: SHIPPED | OUTPATIENT
Start: 2018-12-17 | End: 2019-04-18 | Stop reason: SDUPTHER

## 2018-12-17 RX ORDER — INSULIN GLARGINE 300 U/ML
45 INJECTION, SOLUTION SUBCUTANEOUS DAILY
Qty: 12 PEN | Refills: 3 | Status: SHIPPED | OUTPATIENT
Start: 2018-12-17 | End: 2019-04-18 | Stop reason: SDUPTHER

## 2018-12-17 NOTE — PROGRESS NOTES
Feli Del Toro  presents to the office  for the follow-up appointment for Type 2 diabetes mellitus.     The diabete's condition location is throughout the system with the  clinical course has fluctuated, the severity is Mild, and the modifying/allievating factors are insulin.  Medications and  Dosages were  reviewed with Feli Del Toro and suggested that compliance most of the time.    The patient reports associated symptoms of hyperglycemia have been tingling and associated symptoms of hypoglycemia have been jitteriness and sweating, with their  hypoglycemia threshold for symptoms is 65 mg/dl .     The patient is currently on insulin.      Compliance with blood glucose monitoring: good.     Meal panning: The patient is using avoidance of concentrated sweets.    The patient is currently taking home blood tests - Blood glucose testing: 3-4 times daily, that are:  fasting- 1st thing in morning before eating or drinking  before each meal  bedtime   basal insulIn  Toujeo U300 45 units in PM    Last instructions given were:  in summary I saw and examined this 67-year-old female for above-mentioned problems.  I reviewed her laboratory evaluation of 2018 and the provided her with a hard copy of it.  We will go ahead and order additional laboratory evaluation and once the results come back we will go ahead and call for any possible modification or new medications.  In the meantime she will continue all her current prescriptions and will see Ms. Tara Marshall in 4 months or sooner if needed with laboratory evaluation prior to each office visit.    Home blood glucose testing daily: 3-4  FB to 105 mg/dl  before lunch 85 to 95 mg/dl  before dinner/supper 110 to 120 mg/dl  bedtime 95 to 110 mg/dl    Last reported blood glucose readings are: None.    Patterns reported per patient are none.         The following portions of the patient's history were reviewed and updated as appropriate: current medications, past  family history, past medical history, past social history, past surgical history and problem list.      Current Outpatient Medications:   •  allopurinol (ZYLOPRIM) 100 MG tablet, Take 1 tablet by mouth Daily., Disp: 90 tablet, Rfl: 1  •  aspirin 81 MG tablet, Take 81 mg by mouth daily., Disp: , Rfl:   •  BYDUREON BCISE 2 MG/0.85ML auto-injector injection, Inject 0.85 mL under the skin into the appropriate area as directed 1 (One) Time Per Week., Disp: 12 pen, Rfl: 3  •  calcium carbonate (TUMS) 500 MG chewable tablet, Chew 2 tablets As Needed., Disp: , Rfl:   •  fluticasone (FLONASE) 50 MCG/ACT nasal spray, 2 sprays into each nostril Daily., Disp: , Rfl:   •  Insulin Pen Needle (NOVOFINE) 32G X 6 MM misc, Take 1 shot of insulin per day, Disp: 90 each, Rfl: 3  •  SAXagliptin (ONGLYZA) 5 MG tablet, Take 1 tablet by mouth Daily., Disp: 90 tablet, Rfl: 1  •  telmisartan-hydrochlorothiazide (MICARDIS HCT) 40-12.5 MG per tablet, Take 1 tablet by mouth Daily., Disp: 90 tablet, Rfl: 3  •  TOUJEO SOLOSTAR 300 UNIT/ML solution pen-injector, Inject 45 Units under the skin into the appropriate area as directed Daily., Disp: 12 pen, Rfl: 3  •  XIGDUO XR 5-1000 MG tablet, Take 2 tablets by mouth Daily., Disp: 180 tablet, Rfl: 3    Patient Active Problem List    Diagnosis   • Shortness of breath [R06.02]   • Elevated liver enzymes [R74.8]   • Liver metastasis (CMS/HCC) [C78.7]   • Obesity (BMI 30-39.9) [E66.9]   • Osteomyelitis of toe of left foot (CMS/HCC) [M86.9]   • Sepsis (CMS/HCC) [A41.9]   • Type 2 diabetes mellitus with peripheral neuropathy (CMS/HCC) [E11.42]   • Type 2 diabetes mellitus with complication, with long-term current use of insulin (CMS/HCC) [E11.8, Z79.4]   • Nonproliferative diabetic retinopathy (CMS/HCC) [E11.3299]   • Type 2 diabetes mellitus with microalbuminuria (CMS/HCC) [E11.29, R80.9]   • Primary malignant neoplasm of colon (CMS/HCC) [C18.9]   • Breast lesion [N64.9]   • Peripheral neuropathy due to  "chemotherapy (CMS/[facility], [facility]) [G62.0, T45.1X5A]   • Encounter for adjustment or management of vascular access device [Z45.2]   • Vitamin D deficiency [E55.9]   • Morbid obesity due to excess calories (CMS/HCC) [E66.01]   • Hyperlipidemia [E78.5]   • Essential hypertension [I10]   • Allergic rhinitis due to pollen [J30.1]       Review of Systems   A comprehensive review of systems was negative.- 14 systems reviewed.      Objective:     Wt Readings from Last 3 Encounters:   12/17/18 113 kg (250 lb)   10/30/18 114 kg (250 lb 9.6 oz)   09/20/18 111 kg (245 lb 3.2 oz)     Temp Readings from Last 3 Encounters:   10/30/18 98.9 °F (37.2 °C) (Oral)   09/20/18 98.5 °F (36.9 °C) (Oral)   06/28/18 97.9 °F (36.6 °C) (Oral)     BP Readings from Last 3 Encounters:   12/17/18 136/84   10/30/18 132/78   09/20/18 118/70     Pulse Readings from Last 3 Encounters:   10/30/18 75   09/20/18 83   06/28/18 82        /84   Ht 175.3 cm (69.02\")   Wt 113 kg (250 lb)   LMP  (LMP Unknown)   BMI 36.90 kg/m²        Physical Exam   Constitutional: She is oriented to person, place, and time. She appears well-developed and well-nourished. No distress.   HENT:   Head: Normocephalic and atraumatic.   Eyes: EOM are normal. Pupils are equal, round, and reactive to light.   Neck: Normal range of motion. Neck supple. No thyromegaly present.   Cardiovascular: Normal rate, regular rhythm, normal heart sounds and intact distal pulses.   No murmur heard.  Pulmonary/Chest: Effort normal and breath sounds normal.   Abdominal: Soft. Bowel sounds are normal.   Musculoskeletal: Normal range of motion.   Neurological: She is alert and oriented to person, place, and time.   Skin: Skin is warm and dry. Capillary refill takes 2 to 3 seconds. She is not diaphoretic.   Psychiatric: She has a normal mood and affect. Her behavior is normal. Judgment and thought content normal.   Nursing note and vitals reviewed.          Lab Review  Results for orders placed or " performed in visit on 12/03/18   Lipid Panel   Result Value Ref Range    Total Cholesterol 286 (H) 0 - 200 mg/dL    Triglycerides 61 0 - 150 mg/dL    HDL Cholesterol 104 (H) 40 - 60 mg/dL    VLDL Cholesterol 12.2 5 - 40 mg/dL    LDL Cholesterol  170 (H) 0 - 100 mg/dL   Vitamin D 25 Hydroxy   Result Value Ref Range    25 Hydroxy, Vitamin D 30.0 30.0 - 100.0 ng/ml   MicroAlbumin, Urine, Random - Urine, Clean Catch   Result Value Ref Range    Microalbumin, Urine 49.3 Not Estab. ug/mL   Hemoglobin A1c   Result Value Ref Range    Hemoglobin A1C 6.98 (H) 4.80 - 5.60 %   C-Peptide   Result Value Ref Range    C-Peptide 1.7 1.1 - 4.4 ng/mL   Comprehensive Metabolic Panel   Result Value Ref Range    Glucose 89 65 - 99 mg/dL    BUN 18 8 - 23 mg/dL    Creatinine 0.95 0.57 - 1.00 mg/dL    eGFR Non African Am 59 (L) >60 mL/min/1.73    eGFR African Am 71 >60 mL/min/1.73    BUN/Creatinine Ratio 18.9 7.0 - 25.0    Sodium 138 136 - 145 mmol/L    Potassium 4.5 3.5 - 5.2 mmol/L    Chloride 99 98 - 107 mmol/L    Total CO2 26.4 22.0 - 29.0 mmol/L    Calcium 9.9 8.6 - 10.5 mg/dL    Total Protein 7.9 6.0 - 8.5 g/dL    Albumin 3.90 3.50 - 5.20 g/dL    Globulin 4.0 gm/dL    A/G Ratio 1.0 g/dL    Total Bilirubin 0.4 0.1 - 1.2 mg/dL    Alkaline Phosphatase 301 (H) 39 - 117 U/L    AST (SGOT) 44 (H) 1 - 32 U/L    ALT (SGPT) 40 (H) 1 - 33 U/L   Cardiovascular Risk Assessment   Result Value Ref Range    Interpretation Note    Diabetes Patient Education   Result Value Ref Range    PDF Image Not applicable            Assessment:   Feli was seen today for follow-up.    Diagnoses and all orders for this visit:    Type 2 diabetes mellitus with complication, with long-term current use of insulin (CMS/[facility])  -     BYDUREON BCISE 2 MG/0.85ML auto-injector injection; Inject 0.85 mL under the skin into the appropriate area as directed 1 (One) Time Per Week.  -     SAXagliptin (ONGLYZA) 5 MG tablet; Take 1 tablet by mouth Daily.  -     TOUJEO SOLOSTAR 300  UNIT/ML solution pen-injector; Inject 45 Units under the skin into the appropriate area as directed Daily.  -     XIGDUO XR 5-1000 MG tablet; Take 2 tablets by mouth Daily.  -     Comprehensive Metabolic Panel; Future  -     C-Peptide; Future  -     Hemoglobin A1c; Future  -     Insulin, Total; Future  -     Lipid Panel; Future  -     Microalbumin / Creatinine Urine Ratio - Urine, Clean Catch; Future  -     Uric Acid; Future  -     Vitamin D 25 Hydroxy; Future  -     TSH; Future  -     T4; Future  -     T3; Future    Essential hypertension  -     Comprehensive Metabolic Panel; Future    Mixed hyperlipidemia  -     Comprehensive Metabolic Panel; Future  -     Lipid Panel; Future    Vitamin D deficiency  -     Comprehensive Metabolic Panel; Future  -     Vitamin D 25 Hydroxy; Future    Hyperuricemia  -     allopurinol (ZYLOPRIM) 100 MG tablet; Take 1 tablet by mouth Daily.  -     Comprehensive Metabolic Panel; Future          Plan:   In summary/ Medication changes: I met with this patient is metabolically stable and doing well at this time.  Laboratory testing was reviewed dated 12.3.2018, discussed with questions and answers completed.  Discussed and formulate a treatment plan with patient, patient verbally stated understood all instructions.    1.  Type 2 diabetes mellitus with complication, with long-term current use of insulin (CMS/[facility])- Chronic,stable-controlled. No medication changes.  -   2. Essential hypertension- Chronic,stable-controlled. No medication changes  -      3. Mixed hyperlipidemia- Chronic,stable-controlled. No medication changes  -    4. Vitamin D deficiency- Chronic,stable-controlled. No medication changes  -      5. Hyperuricemia- Chronic,stable-controlled. No medication changes  -        Additional instructions    home blood tests -  Blood glucose testin times daily, that are:  fasting- 1st thing in morning before eating or drinking  before each meal and 1 or 2 hours after  meal  bedtime  anytime you feel symptoms of hyperglycemia or hypoglycemia (high or low blood sugars)        Education:  interpretation of lab results, blood sugar goals, complications of diabetes mellitus, hypoglycemia prevention and treatment, exercise, illness management, self-monitoring of blood glucose skills, nutrition and carbohydrate counting        The total face to face time spent was  25 minutes  with additional education given: 14 minutes (greater than 50% of the total time) was spent with counseling and coordination of care on: SMBG with goals, Side effects profiles with medications, medication use and purposes,     Return in about 4 months (around 4/17/2019). 4 months with Tara-2 weeks prior for labs 8 months with Dr. Moran-2 weeks prior for labs        Dragon transcription disclaimer     Much of this encounter note is an electronic transcription/translation of spoken language to printed text. The electronic translation of spoken language may permit erroneous, or at times, nonsensical words or phrases to be inadvertently transcribed. Although I have reviewed the note for such errors, some may still exist.

## 2019-01-24 ENCOUNTER — APPOINTMENT (OUTPATIENT)
Dept: ONCOLOGY | Facility: CLINIC | Age: 69
End: 2019-01-24

## 2019-01-24 ENCOUNTER — APPOINTMENT (OUTPATIENT)
Dept: ONCOLOGY | Facility: HOSPITAL | Age: 69
End: 2019-01-24

## 2019-02-04 ENCOUNTER — INFUSION (OUTPATIENT)
Dept: ONCOLOGY | Facility: HOSPITAL | Age: 69
End: 2019-02-04

## 2019-02-04 ENCOUNTER — OFFICE VISIT (OUTPATIENT)
Dept: ONCOLOGY | Facility: CLINIC | Age: 69
End: 2019-02-04

## 2019-02-04 VITALS
HEART RATE: 81 BPM | HEIGHT: 69 IN | SYSTOLIC BLOOD PRESSURE: 147 MMHG | BODY MASS INDEX: 36.63 KG/M2 | RESPIRATION RATE: 16 BRPM | TEMPERATURE: 98.4 F | WEIGHT: 247.3 LBS | DIASTOLIC BLOOD PRESSURE: 88 MMHG | OXYGEN SATURATION: 93 %

## 2019-02-04 DIAGNOSIS — C78.7 LIVER METASTASIS: ICD-10-CM

## 2019-02-04 DIAGNOSIS — Z45.2 ENCOUNTER FOR ADJUSTMENT OR MANAGEMENT OF VASCULAR ACCESS DEVICE: ICD-10-CM

## 2019-02-04 DIAGNOSIS — Z45.2 ENCOUNTER FOR ADJUSTMENT OR MANAGEMENT OF VASCULAR ACCESS DEVICE: Primary | ICD-10-CM

## 2019-02-04 DIAGNOSIS — C18.9 PRIMARY MALIGNANT NEOPLASM OF COLON (HCC): Primary | ICD-10-CM

## 2019-02-04 DIAGNOSIS — I31.39 PERICARDIAL EFFUSION: ICD-10-CM

## 2019-02-04 DIAGNOSIS — C18.9 PRIMARY MALIGNANT NEOPLASM OF COLON (HCC): ICD-10-CM

## 2019-02-04 LAB
ALBUMIN SERPL-MCNC: 3.5 G/DL (ref 3.5–5.2)
ALBUMIN/GLOB SERPL: 0.9 G/DL (ref 1.1–2.4)
ALP SERPL-CCNC: 195 U/L (ref 38–116)
ALT SERPL W P-5'-P-CCNC: 19 U/L (ref 0–33)
ANION GAP SERPL CALCULATED.3IONS-SCNC: 10.6 MMOL/L
AST SERPL-CCNC: 27 U/L (ref 0–32)
BASOPHILS # BLD AUTO: 0.04 10*3/MM3 (ref 0–0.1)
BASOPHILS NFR BLD AUTO: 0.5 % (ref 0–1.1)
BILIRUB SERPL-MCNC: 0.3 MG/DL (ref 0.1–1.2)
BUN BLD-MCNC: 17 MG/DL (ref 6–20)
BUN/CREAT SERPL: 20 (ref 7.3–30)
CALCIUM SPEC-SCNC: 9.1 MG/DL (ref 8.5–10.2)
CEA SERPL-MCNC: 4.53 NG/ML
CHLORIDE SERPL-SCNC: 102 MMOL/L (ref 98–107)
CO2 SERPL-SCNC: 26.4 MMOL/L (ref 22–29)
CREAT BLD-MCNC: 0.85 MG/DL (ref 0.6–1.1)
DEPRECATED RDW RBC AUTO: 48 FL (ref 37–49)
EOSINOPHIL # BLD AUTO: 0.34 10*3/MM3 (ref 0–0.36)
EOSINOPHIL NFR BLD AUTO: 4.3 % (ref 1–5)
ERYTHROCYTE [DISTWIDTH] IN BLOOD BY AUTOMATED COUNT: 15 % (ref 11.7–14.5)
GFR SERPL CREATININE-BSD FRML MDRD: 81 ML/MIN/1.73
GLOBULIN UR ELPH-MCNC: 4.1 GM/DL (ref 1.8–3.5)
GLUCOSE BLD-MCNC: 83 MG/DL (ref 74–124)
HCT VFR BLD AUTO: 40.6 % (ref 34–45)
HGB BLD-MCNC: 12.9 G/DL (ref 11.5–14.9)
IMM GRANULOCYTES # BLD AUTO: 0.1 10*3/MM3 (ref 0–0.03)
IMM GRANULOCYTES NFR BLD AUTO: 1.3 % (ref 0–0.5)
LYMPHOCYTES # BLD AUTO: 2.63 10*3/MM3 (ref 1–3.5)
LYMPHOCYTES NFR BLD AUTO: 33.3 % (ref 20–49)
MCH RBC QN AUTO: 27.8 PG (ref 27–33)
MCHC RBC AUTO-ENTMCNC: 31.8 G/DL (ref 32–35)
MCV RBC AUTO: 87.5 FL (ref 83–97)
MONOCYTES # BLD AUTO: 0.88 10*3/MM3 (ref 0.25–0.8)
MONOCYTES NFR BLD AUTO: 11.1 % (ref 4–12)
NEUTROPHILS # BLD AUTO: 3.91 10*3/MM3 (ref 1.5–7)
NEUTROPHILS NFR BLD AUTO: 49.5 % (ref 39–75)
NRBC BLD AUTO-RTO: 0 /100 WBC (ref 0–0)
PLATELET # BLD AUTO: 428 10*3/MM3 (ref 150–375)
PMV BLD AUTO: 8.4 FL (ref 8.9–12.1)
POTASSIUM BLD-SCNC: 4 MMOL/L (ref 3.5–4.7)
PROT SERPL-MCNC: 7.6 G/DL (ref 6.3–8)
RBC # BLD AUTO: 4.64 10*6/MM3 (ref 3.9–5)
SODIUM BLD-SCNC: 139 MMOL/L (ref 134–145)
WBC NRBC COR # BLD: 7.9 10*3/MM3 (ref 4–10)

## 2019-02-04 PROCEDURE — 96523 IRRIG DRUG DELIVERY DEVICE: CPT | Performed by: INTERNAL MEDICINE

## 2019-02-04 PROCEDURE — 36591 DRAW BLOOD OFF VENOUS DEVICE: CPT | Performed by: INTERNAL MEDICINE

## 2019-02-04 PROCEDURE — 85025 COMPLETE CBC W/AUTO DIFF WBC: CPT

## 2019-02-04 PROCEDURE — 99214 OFFICE O/P EST MOD 30 MIN: CPT | Performed by: INTERNAL MEDICINE

## 2019-02-04 PROCEDURE — 80053 COMPREHEN METABOLIC PANEL: CPT

## 2019-02-04 PROCEDURE — 82378 CARCINOEMBRYONIC ANTIGEN: CPT | Performed by: INTERNAL MEDICINE

## 2019-02-04 RX ORDER — SODIUM CHLORIDE 0.9 % (FLUSH) 0.9 %
10 SYRINGE (ML) INJECTION AS NEEDED
Status: CANCELLED | OUTPATIENT
Start: 2019-02-04

## 2019-02-04 RX ORDER — SODIUM CHLORIDE 0.9 % (FLUSH) 0.9 %
10 SYRINGE (ML) INJECTION AS NEEDED
Status: DISCONTINUED | OUTPATIENT
Start: 2019-02-04 | End: 2019-02-04 | Stop reason: HOSPADM

## 2019-02-04 RX ADMIN — Medication 500 UNITS: at 09:38

## 2019-02-04 RX ADMIN — SODIUM CHLORIDE, PRESERVATIVE FREE 10 ML: 5 INJECTION INTRAVENOUS at 09:38

## 2019-02-04 NOTE — PROGRESS NOTES
Subjective     CHIEF COMPLAINT:      Chief Complaint   Patient presents with   • Follow-up     no concerns       HISTORY OF PRESENT ILLNESS:     Feli Del Toro is a 68 y.o. female patient who returns today for follow up on her colon cancer. She is feeling good and denies abdominal pain or problems with constipation.  No shortness breath.  No chest pain.  Her appetite is good.        REVIEW OF SYSTEMS:  Review of Systems   Constitutional: Negative for chills, fever and unexpected weight change.   HENT: Negative for mouth sores, nosebleeds, sore throat and voice change.    Eyes: Negative for visual disturbance.   Respiratory: Negative for cough and shortness of breath.    Cardiovascular: Negative for chest pain and leg swelling.   Gastrointestinal: Negative for abdominal pain, blood in stool, constipation, diarrhea, nausea and vomiting.   Genitourinary: Negative for dysuria, frequency and hematuria.   Musculoskeletal: Negative for arthralgias, back pain and joint swelling.   Skin: Negative for rash.   Neurological: Positive for numbness. Negative for dizziness and headaches.   Hematological: Negative for adenopathy. Does not bruise/bleed easily.   Psychiatric/Behavioral: Negative for dysphoric mood. The patient is not nervous/anxious.      I verified the ROS obtained by the MA.        Past Medical History:   Diagnosis Date   • Acid reflux    • Anemia    • Cancer (CMS/HCC) 06/04/2013   • Diabetes mellitus (CMS/HCC)    • Dyspnea    • Essential hypertension    • History of constipation    • History of transfusion    • Hyperlipidemia    • Hypertension    • Metastatic colon cancer to liver (CMS/HCC)    • Neuropathy    • Pericardial effusion     Moderate   • Retinopathy    • SOB (shortness of breath)    • Type 2 diabetes mellitus (CMS/HCC)        Past Surgical History:   Procedure Laterality Date   • ABDOMINAL SURGERY      ablation    • AMPUTATION DIGIT Left 1/1/2017    Procedure: LEFT SECOND TOE AMPUTATION;  Surgeon:  Farzad Nichols MD;  Location: [facility] OR;  Service:    • AMPUTATION OF REPLICATED TOES      right distal second toe    • CARDIAC CATHETERIZATION N/A 3/8/2018    Procedure: Pericardiocentesis;  Surgeon: Andrew Dobbins MD;  Location: [facility] INVASIVE LOCATION;  Service:    • CATARACT EXTRACTION     • COLECTOMY PARTIAL / TOTAL  01/14/2014   • COLONOSCOPY  2013   • FRACTURE SURGERY Right 2017    right lower leg with screw in ankle   • HYSTERECTOMY  1998   • PERICARDIAL WINDOW N/A 3/10/2018    Procedure: PERICARDIAL WINDOW;  Surgeon: Nomi Patel III, MD;  Location: [facility] OR;  Service: Cardiothoracic   • PORTACATH PLACEMENT  06/2013   • SALPINGO OOPHORECTOMY Bilateral    • SPLENECTOMY     • THORACOSCOPY Left 3/10/2018    Procedure: BRONCHOSCOPY, LEFT THORACOSCOPY VIDEO ASSISTED, SUBPLEURAL CATHETERS FOR PAIN;  Surgeon: Nomi Patel III, MD;  Location: [facility] OR;  Service: Cardiothoracic       Cancer-related family history includes Breast cancer in her sister; Cancer in her other.  Social History     Tobacco Use   • Smoking status: Never Smoker   • Smokeless tobacco: Never Used   Substance Use Topics   • Alcohol use: No       MEDICATIONS:    Current Outpatient Medications:   •  allopurinol (ZYLOPRIM) 100 MG tablet, Take 1 tablet by mouth Daily., Disp: 90 tablet, Rfl: 1  •  aspirin 81 MG tablet, Take 81 mg by mouth daily., Disp: , Rfl:   •  BYDUREON BCISE 2 MG/0.85ML auto-injector injection, Inject 0.85 mL under the skin into the appropriate area as directed 1 (One) Time Per Week., Disp: 12 pen, Rfl: 3  •  calcium carbonate (TUMS) 500 MG chewable tablet, Chew 2 tablets As Needed., Disp: , Rfl:   •  fluticasone (FLONASE) 50 MCG/ACT nasal spray, 2 sprays into each nostril Daily., Disp: , Rfl:   •  Insulin Pen Needle (NOVOFINE) 32G X 6 MM misc, Take 1 shot of insulin per day, Disp: 90 each, Rfl: 3  •  SAXagliptin (ONGLYZA) 5 MG tablet, Take 1 tablet by mouth Daily., Disp: 90 tablet, Rfl: 1  •   "telmisartan-hydrochlorothiazide (MICARDIS HCT) 40-12.5 MG per tablet, Take 1 tablet by mouth Daily., Disp: 90 tablet, Rfl: 3  •  TOUJEO SOLOSTAR 300 UNIT/ML solution pen-injector, Inject 45 Units under the skin into the appropriate area as directed Daily., Disp: 12 pen, Rfl: 3  •  XIGDUO XR 5-1000 MG tablet, Take 2 tablets by mouth Daily., Disp: 180 tablet, Rfl: 3  No current facility-administered medications for this visit.     ALLERGIES:  Allergies   Allergen Reactions   • Compazine [Prochlorperazine Edisylate] Other (See Comments)     Complete arch in the back    • Prochlorperazine Maleate Other (See Comments)     \"BACK MUSCLE RETRACTION\"         Objective   VITAL SIGNS:     Vitals:    02/04/19 0944   BP: 147/88   Pulse: 81   Resp: 16   Temp: 98.4 °F (36.9 °C)   TempSrc: Oral   SpO2: 93%   Weight: 112 kg (247 lb 4.8 oz)   Height: 175.3 cm (69.02\")   PainSc: 0-No pain     Body mass index is 36.5 kg/m².     Wt Readings from Last 3 Encounters:   02/04/19 112 kg (247 lb 4.8 oz)   12/17/18 113 kg (250 lb)   10/30/18 114 kg (250 lb 9.6 oz)       PHYSICAL EXAMINATION:  GENERAL:  The patient appears in good general condition, not in acute distress.  SKIN: Warm and dry. No skin rashes, ecchymosis or petechiae.  HEAD:  Normocephalic.  EYES:  No Jaundice. No Pallor.   NECK:  Supple with Good ROM. No Thyromegaly. No Masses.  LYMPHATICS:  No cervical or supraclavicular lymphadenopathy.  CHEST: Normal respiratory effort. Lungs clear to auscultation.   CARDIAC:  Normal S1 & S2. No murmurs.   ABDOMEN:  Soft. No tenderness. No Hepatomegaly. No masses.  NEUROLOGICAL:  No Focal neurological deficits.         DIAGNOSTIC DATA:     Results from last 7 days   Lab Units 02/04/19  0922   WBC 10*3/mm3 7.90   NEUTROS ABS 10*3/mm3 3.91   HEMOGLOBIN g/dL 12.9   HEMATOCRIT % 40.6   PLATELETS 10*3/mm3 428*                 Assessment/Plan   1.  Metastatic colon cancer. She had metastases to the liver.   · She received 12 cycles of FOLFOX-6 and " then had resection of the primary tumor along with metastasectomy and splenectomy with thermal ablation of lesions in the liver in January 2014.   · This was followed by 12 cycles of the FOLFIRI regimen completed on 08/07/2014.   · CT scans on 05/28/2015 revealed a new lesion in the liver.  Dr. Ruano referred the patient to Radiation Therapy and she received CyberKnife radiation, completed on 07/14/2015.    · The CT scan from 5/5/17 revealed a new 2.2 cm lesion in the anterior hepatic segment.  CT guided microwave ablation of liver mass performed on 6/16/17 with CT evidence on 7/20/17 of a complete response  · FOLFIRI ×12 cycles given between 6/29/17 and 11/29/17.   · CT scan from 10/11/2018 showed no evidence of recurrence.  There were positive changes related to her local therapy to the liver.    Patient continues to do well.  No clinical evidence of disease progression.    2.  Elevated liver enzymes likely due to inflammation related to the recent liver treatment procedure.  Liver enzymes continue to improve.  Her next imaging studies with Dr. Ruano will be in May 2019.    3.  Pericardial effusion status post pericardial window.  Cytology and biopsy were negative.  She is asymptomatic.  Physical exam is normal today.    4.   Patient has a port which would need maintenance every 6 weeks.      PLAN:    1.  Obtain CEA today.    2.  We'll schedule the patient for a follow-up CT scan of the chest in mid March 2019 (6 months from the last study).  She will have follow-up CT of the abdomen and pelvis when she sees Dr. Ruano in May 2019.    I'll see in follow-up in late March 2019 to review the result of the CT scan.        Marivel Brown MD  02/04/19

## 2019-03-11 ENCOUNTER — INFUSION (OUTPATIENT)
Dept: ONCOLOGY | Facility: HOSPITAL | Age: 69
End: 2019-03-11

## 2019-03-11 ENCOUNTER — HOSPITAL ENCOUNTER (OUTPATIENT)
Dept: PET IMAGING | Facility: HOSPITAL | Age: 69
Discharge: HOME | End: 2019-03-11
Attending: INTERNAL MEDICINE | Admitting: INTERNAL MEDICINE

## 2019-03-11 DIAGNOSIS — Z45.2 ENCOUNTER FOR ADJUSTMENT OR MANAGEMENT OF VASCULAR ACCESS DEVICE: Primary | ICD-10-CM

## 2019-03-11 DIAGNOSIS — I31.39 PERICARDIAL EFFUSION: ICD-10-CM

## 2019-03-11 DIAGNOSIS — C18.9 PRIMARY MALIGNANT NEOPLASM OF COLON (HCC): ICD-10-CM

## 2019-03-11 DIAGNOSIS — C78.7 LIVER METASTASIS: ICD-10-CM

## 2019-03-11 LAB
ALBUMIN SERPL-MCNC: 3.7 G/DL (ref 3.5–5.2)
ALBUMIN/GLOB SERPL: 0.9 G/DL (ref 1.1–2.4)
ALP SERPL-CCNC: 213 U/L (ref 38–116)
ALT SERPL W P-5'-P-CCNC: 49 U/L (ref 0–33)
ANION GAP SERPL CALCULATED.3IONS-SCNC: 11.2 MMOL/L
AST SERPL-CCNC: 58 U/L (ref 0–32)
BASOPHILS # BLD AUTO: 0.08 10*3/MM3 (ref 0–0.2)
BASOPHILS NFR BLD AUTO: 1 % (ref 0–1.5)
BILIRUB SERPL-MCNC: 0.3 MG/DL (ref 0.1–1.2)
BUN BLD-MCNC: 18 MG/DL (ref 6–20)
BUN/CREAT SERPL: 19.8 (ref 7.3–30)
CALCIUM SPEC-SCNC: 9.4 MG/DL (ref 8.5–10.2)
CEA SERPL-MCNC: 4.39 NG/ML
CHLORIDE SERPL-SCNC: 102 MMOL/L (ref 98–107)
CO2 SERPL-SCNC: 28.8 MMOL/L (ref 22–29)
CREAT BLD-MCNC: 0.91 MG/DL (ref 0.6–1.1)
DEPRECATED RDW RBC AUTO: 50 FL (ref 37–54)
EOSINOPHIL # BLD AUTO: 0.45 10*3/MM3 (ref 0–0.4)
EOSINOPHIL NFR BLD AUTO: 5.6 % (ref 0.3–6.2)
ERYTHROCYTE [DISTWIDTH] IN BLOOD BY AUTOMATED COUNT: 15.9 % (ref 12.3–15.4)
GFR SERPL CREATININE-BSD FRML MDRD: 75 ML/MIN/1.73
GLOBULIN UR ELPH-MCNC: 4.2 GM/DL (ref 1.8–3.5)
GLUCOSE BLD-MCNC: 107 MG/DL (ref 74–124)
HCT VFR BLD AUTO: 40.1 % (ref 34–46.6)
HGB BLD-MCNC: 12.9 G/DL (ref 12–15.9)
IMM GRANULOCYTES # BLD AUTO: 0.07 10*3/MM3 (ref 0–0.05)
IMM GRANULOCYTES NFR BLD AUTO: 0.9 % (ref 0–0.5)
LYMPHOCYTES # BLD AUTO: 2.89 10*3/MM3 (ref 0.7–3.1)
LYMPHOCYTES NFR BLD AUTO: 35.9 % (ref 19.6–45.3)
MCH RBC QN AUTO: 28 PG (ref 26.6–33)
MCHC RBC AUTO-ENTMCNC: 32.2 G/DL (ref 31.5–35.7)
MCV RBC AUTO: 87.2 FL (ref 79–97)
MONOCYTES # BLD AUTO: 0.96 10*3/MM3 (ref 0.1–0.9)
MONOCYTES NFR BLD AUTO: 11.9 % (ref 5–12)
NEUTROPHILS # BLD AUTO: 3.59 10*3/MM3 (ref 1.4–7)
NEUTROPHILS NFR BLD AUTO: 44.7 % (ref 42.7–76)
NRBC BLD AUTO-RTO: 0 /100 WBC (ref 0–0)
PLATELET # BLD AUTO: 407 10*3/MM3 (ref 140–450)
PMV BLD AUTO: 8.8 FL (ref 6–12)
POTASSIUM BLD-SCNC: 4.2 MMOL/L (ref 3.5–4.7)
PROT SERPL-MCNC: 7.9 G/DL (ref 6.3–8)
RBC # BLD AUTO: 4.6 10*6/MM3 (ref 3.77–5.28)
SODIUM BLD-SCNC: 142 MMOL/L (ref 134–145)
WBC NRBC COR # BLD: 8.04 10*3/MM3 (ref 3.4–10.8)

## 2019-03-11 PROCEDURE — 85025 COMPLETE CBC W/AUTO DIFF WBC: CPT

## 2019-03-11 PROCEDURE — 80053 COMPREHEN METABOLIC PANEL: CPT

## 2019-03-11 PROCEDURE — 82378 CARCINOEMBRYONIC ANTIGEN: CPT | Performed by: INTERNAL MEDICINE

## 2019-03-11 PROCEDURE — 96523 IRRIG DRUG DELIVERY DEVICE: CPT | Performed by: INTERNAL MEDICINE

## 2019-03-11 PROCEDURE — 71260 CT THORAX DX C+: CPT

## 2019-03-11 PROCEDURE — 25010000002 IOPAMIDOL 61 % SOLUTION: Performed by: INTERNAL MEDICINE

## 2019-03-11 RX ORDER — SODIUM CHLORIDE 0.9 % (FLUSH) 0.9 %
10 SYRINGE (ML) INJECTION AS NEEDED
Status: DISCONTINUED | OUTPATIENT
Start: 2019-03-11 | End: 2019-03-11 | Stop reason: HOSPADM

## 2019-03-11 RX ORDER — SODIUM CHLORIDE 0.9 % (FLUSH) 0.9 %
10 SYRINGE (ML) INJECTION AS NEEDED
Status: CANCELLED | OUTPATIENT
Start: 2019-03-11

## 2019-03-11 RX ADMIN — IOPAMIDOL 75 ML: 612 INJECTION, SOLUTION INTRAVENOUS at 14:05

## 2019-03-18 ENCOUNTER — APPOINTMENT (OUTPATIENT)
Dept: LAB | Facility: HOSPITAL | Age: 69
End: 2019-03-18

## 2019-03-18 ENCOUNTER — OFFICE VISIT (OUTPATIENT)
Dept: ONCOLOGY | Facility: CLINIC | Age: 69
End: 2019-03-18

## 2019-03-18 VITALS
WEIGHT: 249.3 LBS | RESPIRATION RATE: 16 BRPM | SYSTOLIC BLOOD PRESSURE: 127 MMHG | HEART RATE: 76 BPM | OXYGEN SATURATION: 95 % | HEIGHT: 69 IN | TEMPERATURE: 98.1 F | DIASTOLIC BLOOD PRESSURE: 70 MMHG | BODY MASS INDEX: 36.92 KG/M2

## 2019-03-18 DIAGNOSIS — R74.8 ELEVATED LIVER ENZYMES: ICD-10-CM

## 2019-03-18 DIAGNOSIS — C18.9 PRIMARY MALIGNANT NEOPLASM OF COLON (HCC): Primary | ICD-10-CM

## 2019-03-18 DIAGNOSIS — Z90.81 POST-SPLENECTOMY: ICD-10-CM

## 2019-03-18 DIAGNOSIS — C78.7 LIVER METASTASIS: ICD-10-CM

## 2019-03-18 PROCEDURE — 99214 OFFICE O/P EST MOD 30 MIN: CPT | Performed by: INTERNAL MEDICINE

## 2019-03-18 PROCEDURE — G0463 HOSPITAL OUTPT CLINIC VISIT: HCPCS | Performed by: INTERNAL MEDICINE

## 2019-03-18 NOTE — PROGRESS NOTES
Subjective     CHIEF COMPLAINT:      Chief Complaint   Patient presents with   • Follow-up     discuss ct scan       HISTORY OF PRESENT ILLNESS:     Feli Del Toro is a 68 y.o. female patient who returns today for follow up on her metastatic colon cancer to liver.  She returns today for follow-up accompanied by her .  She underwent a CT scan of the chest and she is here to review the results.    Patient reports recent surgery for the left foot.  It is currently in a boot and she would need to keep it in a boot for 3 more weeks.    Patient denies abdominal pain.  No nausea vomiting.      REVIEW OF SYSTEMS:  Review of Systems   Constitutional: Negative for chills, fever and unexpected weight change.   HENT: Negative for mouth sores, nosebleeds, sore throat and voice change.    Eyes: Negative for visual disturbance.   Respiratory: Negative for cough and shortness of breath.    Cardiovascular: Negative for chest pain and leg swelling.   Gastrointestinal: Negative for abdominal pain, blood in stool, constipation, diarrhea, nausea and vomiting.   Genitourinary: Negative for dysuria, frequency and hematuria.   Musculoskeletal: Negative for arthralgias, back pain and joint swelling.   Skin: Negative for rash.   Neurological: Negative for dizziness, numbness and headaches.   Hematological: Negative for adenopathy. Bruises/bleeds easily.   Psychiatric/Behavioral: Negative for dysphoric mood. The patient is not nervous/anxious.      I verified the ROS obtained by the MA.      Past Medical History:   Diagnosis Date   • Acid reflux    • Anemia    • Cancer (CMS/HCC) 06/04/2013   • Diabetes mellitus (CMS/HCC)    • Dyspnea    • Essential hypertension    • History of constipation    • History of transfusion    • Hyperlipidemia    • Hypertension    • Metastatic colon cancer to liver (CMS/HCC)    • Neuropathy    • Pericardial effusion     Moderate   • Retinopathy    • SOB (shortness of breath)    • Type 2 diabetes mellitus  (CMS/HCC)        Past Surgical History:   Procedure Laterality Date   • ABDOMINAL SURGERY      ablation    • AMPUTATION DIGIT Left 1/1/2017    Procedure: LEFT SECOND TOE AMPUTATION;  Surgeon: Farzad Nichols MD;  Location: [facility] OR;  Service:    • AMPUTATION OF REPLICATED TOES      right distal second toe    • CARDIAC CATHETERIZATION N/A 3/8/2018    Procedure: Pericardiocentesis;  Surgeon: Adnrew Dobbins MD;  Location: [facility] INVASIVE LOCATION;  Service:    • CATARACT EXTRACTION     • COLECTOMY PARTIAL / TOTAL  01/14/2014   • COLONOSCOPY  2013   • FOOT SURGERY Left 03/15/2019   • FRACTURE SURGERY Right 2017    right lower leg with screw in ankle   • HYSTERECTOMY  1998   • PERICARDIAL WINDOW N/A 3/10/2018    Procedure: PERICARDIAL WINDOW;  Surgeon: Nomi Patel III, MD;  Location: [facility] OR;  Service: Cardiothoracic   • PORTACATH PLACEMENT  06/2013   • SALPINGO OOPHORECTOMY Bilateral    • SPLENECTOMY     • THORACOSCOPY Left 3/10/2018    Procedure: BRONCHOSCOPY, LEFT THORACOSCOPY VIDEO ASSISTED, SUBPLEURAL CATHETERS FOR PAIN;  Surgeon: Nomi Patel III, MD;  Location: [facility] OR;  Service: Cardiothoracic       Cancer-related family history includes Breast cancer in her sister; Cancer in her other.  Social History     Tobacco Use   • Smoking status: Never Smoker   • Smokeless tobacco: Never Used   Substance Use Topics   • Alcohol use: No       MEDICATIONS:    Current Outpatient Medications:   •  allopurinol (ZYLOPRIM) 100 MG tablet, Take 1 tablet by mouth Daily., Disp: 90 tablet, Rfl: 1  •  aspirin 81 MG tablet, Take 81 mg by mouth daily., Disp: , Rfl:   •  BYDUREON BCISE 2 MG/0.85ML auto-injector injection, Inject 0.85 mL under the skin into the appropriate area as directed 1 (One) Time Per Week., Disp: 12 pen, Rfl: 3  •  calcium carbonate (TUMS) 500 MG chewable tablet, Chew 2 tablets As Needed., Disp: , Rfl:   •  fluticasone (FLONASE) 50 MCG/ACT nasal spray, 2 sprays into each nostril  "Daily., Disp: , Rfl:   •  Insulin Pen Needle (NOVOFINE) 32G X 6 MM misc, Take 1 shot of insulin per day, Disp: 90 each, Rfl: 3  •  SAXagliptin (ONGLYZA) 5 MG tablet, Take 1 tablet by mouth Daily., Disp: 90 tablet, Rfl: 1  •  telmisartan-hydrochlorothiazide (MICARDIS HCT) 40-12.5 MG per tablet, Take 1 tablet by mouth Daily., Disp: 90 tablet, Rfl: 3  •  TOUJEO SOLOSTAR 300 UNIT/ML solution pen-injector, Inject 45 Units under the skin into the appropriate area as directed Daily., Disp: 12 pen, Rfl: 3  •  XIGDUO XR 5-1000 MG tablet, Take 2 tablets by mouth Daily., Disp: 180 tablet, Rfl: 3    ALLERGIES:  Allergies   Allergen Reactions   • Compazine [Prochlorperazine Edisylate] Other (See Comments)     Complete arch in the back    • Prochlorperazine Maleate Other (See Comments)     \"BACK MUSCLE RETRACTION\"         Objective   VITAL SIGNS:     Vitals:    03/18/19 1059   BP: 127/70   Pulse: 76   Resp: 16   Temp: 98.1 °F (36.7 °C)   TempSrc: Oral   SpO2: 95%   Weight: 113 kg (249 lb 4.8 oz)   Height: 175.3 cm (69.02\")     Body mass index is 36.8 kg/m².     Wt Readings from Last 3 Encounters:   03/18/19 113 kg (249 lb 4.8 oz)   02/04/19 112 kg (247 lb 4.8 oz)   12/17/18 113 kg (250 lb)       PHYSICAL EXAMINATION:  GENERAL:  The patient appears in good general condition, not in acute distress.  SKIN: Warm and dry. No skin rashes, ecchymosis or petechiae.  HEAD:  Normocephalic.  EYES:  No Jaundice. No Pallor.   NECK:  Supple with Good ROM. No Thyromegaly. No Masses.  LYMPHATICS:  No cervical or supraclavicular lymphadenopathy.  CHEST: Normal respiratory effort. Lungs clear to auscultation.   CARDIAC:  Normal S1 & S2. No murmurs. No edema.  ABDOMEN:  Soft. No tenderness. No Hepatomegaly. No Splenomegaly. No masses.  EXTREMITIES: Left foot in a boot.  NEUROLOGICAL:  No Focal neurological deficits.         DIAGNOSTIC DATA:     Results from last 7 days   Lab Units 03/11/19  1312   WBC 10*3/mm3 8.04   NEUTROS ABS 10*3/mm3 3.59 "   HEMOGLOBIN g/dL 12.9   HEMATOCRIT % 40.1   PLATELETS 10*3/mm3 407     Results from last 7 days   Lab Units 03/11/19  1312   SODIUM mmol/L 142   POTASSIUM mmol/L 4.2   CHLORIDE mmol/L 102   CO2 mmol/L 28.8   BUN mg/dL 18   CREATININE mg/dL 0.91   CALCIUM mg/dL 9.4   ALBUMIN g/dL 3.70   BILIRUBIN mg/dL 0.3   ALK PHOS U/L 213*   ALT (SGPT) U/L 49*   AST (SGOT) U/L 58*   GLUCOSE mg/dL 107       Lab Results   Component Value Date    CEA 4.39 03/11/2019    CEA 4.53 02/04/2019    CEA 4.52 10/30/2018    CEA 4.58 09/13/2018    CEA 4.22 08/09/2018     CT CHEST W CONTRAST-     CLINICAL HISTORY: 60-year-old female with history of metastatic colon  carcinoma. History of pleural effusions. Restaging.     TECHNIQUE: Spiral CT images were obtained through the chest with IV  contrast and were reconstructed in 3 mm thick slices.     Radiation dose reduction techniques were utilized, including automated  exposure control and exposure modulation based on body size.     COMPARISON: CT scan of the chest dated 9/13/2018.     FINDINGS: There is no mediastinal or hilar or axillary lymphadenopathy.  Lung window images demonstrate minimal curvilinear scarring at the left  lung base that is unchanged, and are otherwise unremarkable. There are  no parenchymal infiltrates or discrete lung masses. No pleural effusions  are present. Images through the upper abdomen demonstrate an  approximately 4.1 cm diameter well-circumscribed low density mass in the  dome of the liver containing a couple fiducial markers that is  unchanged. The left lobe of the liver has been resected.     IMPRESSION:  Stable minimal linear scarring at the left lung base. There  is no evidence of metastatic disease within the chest. A metastatic  lesion in the dome of the liver is unchanged since a preceding  9/13/2018.     This report was finalized on 3/13/2019 2:55 PM by Dr. Cecil Vo M.D.         Assessment/Plan   1.  Metastatic colon cancer. She had metastases to  the liver.   · She received 12 cycles of FOLFOX-6 and then had resection of the primary tumor along with metastasectomy and splenectomy with thermal ablation of lesions in the liver in January 2014.   · This was followed by 12 cycles of the FOLFIRI regimen completed on 08/07/2014.   · CT scans on 05/28/2015 revealed a new lesion in the liver.  Dr. Ruano referred the patient to Radiation Therapy and she received CyberKnife radiation, completed on 07/14/2015.    · The CT scan from 5/5/17 revealed a new 2.2 cm lesion in the anterior hepatic segment.  CT guided microwave ablation of liver mass performed on 6/16/17 with CT evidence on 7/20/17 of a complete response  · FOLFIRI ×12 cycles given between 6/29/17 and 11/29/17.   · CT scan from 3/11/2019 showed no evidence of lung metastasis.  No evidence of reaccumulation of the left pleural/pericardial effusion.    I reassured the patient about the results.  We would consider repeating CT scan in about 6 months.    2.  Elevated liver enzymes although the liver enzymes became normal in February 2019, they increased on the labs from 3/11/2019.  Patient is asymptomatic.  No liver enlargement.  The liver lesion appears stable on the CT scan of the chest.    3.  Pericardial effusion status post pericardial window.  Cytology and biopsy were negative.  No evidence of reaccumulation of the effusion.    4.   Patient has a port which would need maintenance every 6 weeks.    5.  Patient status post splenectomy 5 years ago.  We discussed the need to receive the pneumonia vaccine booster.  She does not remember when she received the last pneumonia vaccine but she will check with her primary care physician.      PLAN:    1.  We will continue to monitor.  We will repeat CMP and CEA in 6 weeks.    2.  Await upcoming CT scan of the liver in May 2019 that is requested by Dr. Ruano.    3.  I will see the patient in follow-up in 12 weeks with CBC CMP CEA.        Marivel Brown,  MD  03/18/19

## 2019-04-04 ENCOUNTER — LAB (OUTPATIENT)
Dept: ENDOCRINOLOGY | Age: 69
End: 2019-04-04

## 2019-04-04 DIAGNOSIS — E11.8 TYPE 2 DIABETES MELLITUS WITH COMPLICATION, WITH LONG-TERM CURRENT USE OF INSULIN (HCC): ICD-10-CM

## 2019-04-04 DIAGNOSIS — E55.9 VITAMIN D DEFICIENCY: ICD-10-CM

## 2019-04-04 DIAGNOSIS — E79.0 HYPERURICEMIA: ICD-10-CM

## 2019-04-04 DIAGNOSIS — I10 ESSENTIAL HYPERTENSION: ICD-10-CM

## 2019-04-04 DIAGNOSIS — Z79.4 TYPE 2 DIABETES MELLITUS WITH COMPLICATION, WITH LONG-TERM CURRENT USE OF INSULIN (HCC): ICD-10-CM

## 2019-04-04 DIAGNOSIS — E78.2 MIXED HYPERLIPIDEMIA: ICD-10-CM

## 2019-04-05 LAB
25(OH)D3+25(OH)D2 SERPL-MCNC: 26.4 NG/ML (ref 30–100)
ALBUMIN SERPL-MCNC: 4.1 G/DL (ref 3.5–5.2)
ALBUMIN/CREAT UR: 22.7 MG/G CREAT (ref 0–30)
ALBUMIN/GLOB SERPL: 1.2 G/DL
ALP SERPL-CCNC: 251 U/L (ref 39–117)
ALT SERPL-CCNC: 67 U/L (ref 1–33)
AST SERPL-CCNC: 58 U/L (ref 1–32)
BILIRUB SERPL-MCNC: 0.4 MG/DL (ref 0.2–1.2)
BUN SERPL-MCNC: 16 MG/DL (ref 8–23)
BUN/CREAT SERPL: 17.8 (ref 7–25)
C PEPTIDE SERPL-MCNC: 2.3 NG/ML (ref 1.1–4.4)
CALCIUM SERPL-MCNC: 9.5 MG/DL (ref 8.6–10.5)
CHLORIDE SERPL-SCNC: 99 MMOL/L (ref 98–107)
CHOLEST SERPL-MCNC: 233 MG/DL (ref 0–200)
CO2 SERPL-SCNC: 26.6 MMOL/L (ref 22–29)
CREAT SERPL-MCNC: 0.9 MG/DL (ref 0.57–1)
CREAT UR-MCNC: 148.7 MG/DL
GLOBULIN SER CALC-MCNC: 3.4 GM/DL
GLUCOSE SERPL-MCNC: 93 MG/DL (ref 65–99)
HBA1C MFR BLD: 6.46 % (ref 4.8–5.6)
HDLC SERPL-MCNC: 98 MG/DL (ref 40–60)
INSULIN SERPL-ACNC: 13.1 UIU/ML (ref 2.6–24.9)
INTERPRETATION: NORMAL
LDLC SERPL CALC-MCNC: 119 MG/DL (ref 0–100)
Lab: NORMAL
MICROALBUMIN UR-MCNC: 33.7 UG/ML
POTASSIUM SERPL-SCNC: 4.5 MMOL/L (ref 3.5–5.2)
PROT SERPL-MCNC: 7.5 G/DL (ref 6–8.5)
SODIUM SERPL-SCNC: 141 MMOL/L (ref 136–145)
T4 SERPL-MCNC: 9.31 MCG/DL (ref 4.5–11.7)
TRIGL SERPL-MCNC: 78 MG/DL (ref 0–150)
TSH SERPL DL<=0.005 MIU/L-ACNC: 1.5 MIU/ML (ref 0.27–4.2)
URATE SERPL-MCNC: 4.8 MG/DL (ref 2.4–5.7)
VLDLC SERPL CALC-MCNC: 15.6 MG/DL (ref 5–40)

## 2019-04-17 ENCOUNTER — RESULTS ENCOUNTER (OUTPATIENT)
Dept: ENDOCRINOLOGY | Age: 69
End: 2019-04-17

## 2019-04-17 DIAGNOSIS — Z79.4 TYPE 2 DIABETES MELLITUS WITH COMPLICATION, WITH LONG-TERM CURRENT USE OF INSULIN (HCC): ICD-10-CM

## 2019-04-17 DIAGNOSIS — E11.8 TYPE 2 DIABETES MELLITUS WITH COMPLICATION, WITH LONG-TERM CURRENT USE OF INSULIN (HCC): ICD-10-CM

## 2019-04-18 ENCOUNTER — OFFICE VISIT (OUTPATIENT)
Dept: ENDOCRINOLOGY | Age: 69
End: 2019-04-18

## 2019-04-18 VITALS
DIASTOLIC BLOOD PRESSURE: 68 MMHG | BODY MASS INDEX: 36.43 KG/M2 | WEIGHT: 246 LBS | SYSTOLIC BLOOD PRESSURE: 118 MMHG | HEIGHT: 69 IN

## 2019-04-18 DIAGNOSIS — E11.8 TYPE 2 DIABETES MELLITUS WITH COMPLICATION, WITH LONG-TERM CURRENT USE OF INSULIN (HCC): ICD-10-CM

## 2019-04-18 DIAGNOSIS — E78.2 MIXED HYPERLIPIDEMIA: ICD-10-CM

## 2019-04-18 DIAGNOSIS — E55.9 VITAMIN D DEFICIENCY: Primary | ICD-10-CM

## 2019-04-18 DIAGNOSIS — I10 ESSENTIAL HYPERTENSION: ICD-10-CM

## 2019-04-18 DIAGNOSIS — E11.9 CONTROLLED TYPE 2 DIABETES MELLITUS WITHOUT COMPLICATION, WITH LONG-TERM CURRENT USE OF INSULIN (HCC): ICD-10-CM

## 2019-04-18 DIAGNOSIS — E79.0 HYPERURICEMIA: ICD-10-CM

## 2019-04-18 DIAGNOSIS — Z79.4 TYPE 2 DIABETES MELLITUS WITH COMPLICATION, WITH LONG-TERM CURRENT USE OF INSULIN (HCC): ICD-10-CM

## 2019-04-18 DIAGNOSIS — Z79.4 CONTROLLED TYPE 2 DIABETES MELLITUS WITHOUT COMPLICATION, WITH LONG-TERM CURRENT USE OF INSULIN (HCC): ICD-10-CM

## 2019-04-18 PROCEDURE — 99214 OFFICE O/P EST MOD 30 MIN: CPT | Performed by: NURSE PRACTITIONER

## 2019-04-18 RX ORDER — ATORVASTATIN CALCIUM 20 MG/1
20 TABLET, FILM COATED ORAL DAILY
Qty: 30 TABLET | Refills: 4 | Status: SHIPPED | OUTPATIENT
Start: 2019-04-18 | End: 2019-04-19 | Stop reason: SDUPTHER

## 2019-04-18 RX ORDER — CHOLECALCIFEROL (VITAMIN D3) 1250 MCG
50000 CAPSULE ORAL 2 TIMES WEEKLY
Qty: 24 CAPSULE | Refills: 1 | Status: SHIPPED | OUTPATIENT
Start: 2019-04-18 | End: 2019-04-19 | Stop reason: SDUPTHER

## 2019-04-18 RX ORDER — DAPAGLIFLOZIN AND METFORMIN HYDROCHLORIDE 5; 1000 MG/1; MG/1
2 TABLET, FILM COATED, EXTENDED RELEASE ORAL DAILY
Qty: 180 TABLET | Refills: 3 | Status: SHIPPED | OUTPATIENT
Start: 2019-04-18 | End: 2019-04-19 | Stop reason: SDUPTHER

## 2019-04-18 RX ORDER — INSULIN GLARGINE 300 U/ML
45 INJECTION, SOLUTION SUBCUTANEOUS DAILY
Qty: 12 PEN | Refills: 3 | Status: SHIPPED | OUTPATIENT
Start: 2019-04-18 | End: 2019-04-19 | Stop reason: SDUPTHER

## 2019-04-18 RX ORDER — TELMISARTAN AND HYDROCHLORTHIAZIDE 40; 12.5 MG/1; MG/1
1 TABLET ORAL DAILY
Qty: 90 TABLET | Refills: 3 | Status: SHIPPED | OUTPATIENT
Start: 2019-04-18 | End: 2019-04-19 | Stop reason: SDUPTHER

## 2019-04-18 RX ORDER — BROMOCRIPTINE MESYLATE 0.8 MG/1
0.8 TABLET ORAL DAILY
Qty: 180 TABLET | Refills: 4 | Status: SHIPPED | OUTPATIENT
Start: 2019-04-18 | End: 2019-04-19 | Stop reason: SDUPTHER

## 2019-04-18 RX ORDER — ALLOPURINOL 100 MG/1
100 TABLET ORAL DAILY
Qty: 90 TABLET | Refills: 1 | Status: SHIPPED | OUTPATIENT
Start: 2019-04-18 | End: 2019-04-19 | Stop reason: SDUPTHER

## 2019-04-18 RX ORDER — EXENATIDE 2 MG/.85ML
2 INJECTION, SUSPENSION, EXTENDED RELEASE SUBCUTANEOUS WEEKLY
Qty: 12 PEN | Refills: 3 | Status: SHIPPED | OUTPATIENT
Start: 2019-04-18 | End: 2019-04-19 | Stop reason: SDUPTHER

## 2019-04-18 NOTE — PATIENT INSTRUCTIONS
Stop the Onglyza    Start Cycloset     Week #1 Take 1 in the  morning   Week #2 2 in the morning   Week #3-3 in the morning    Week #4-4 in the morning    Week #5-5 in the morning    Week #6-6 in the morning      Start vitamin D 50,000 units 1 by mouth 2 times weekly    Start Lipitor 20 mg once in the evening    Additional instructions:   home blood tests -  Blood glucose testin-3 times daily, that are: fasting- 1st thing in morning before eating or drinking, before each meal and 1 or 2 hours after meal  Bedtime. anytime you feel symptoms of hyperglycemia or hypoglycemia (high or low blood sugars)

## 2019-04-18 NOTE — PROGRESS NOTES
Feli Del Toro  presents to the office  for the follow-up appointment for Type 2 diabetes mellitus.     The diabete's condition location is throughout the system with the  clinical course has fluctuated, the severity is Mild, and the modifying/allievating factors are insulin.  Medications and  Dosages were  reviewed with Feli Del Toro and suggested that compliance most of the time.    The patient reports associated symptoms of hyperglycemia have been tingling and associated symptoms of hypoglycemia have been jitteriness and sweating, with their  hypoglycemia threshold for symptoms is 65 mg/dl .     The patient is currently on insulin.    Compliance with blood glucose monitoring: good.     Meal panning: The patient is using avoidance of concentrated sweets.    The patient is currently taking home blood tests - Blood glucose testing: 3 times daily, that are:  fasting- 1st thing in morning before eating or drinking  before each meal   basal insulIn  Toujeo U300 45 units in PM    Last instructions given were:   1.  Type 2 diabetes mellitus with complication, with long-term current use of insulin (CMS/[facility])- Chronic,stable-controlled. No medication changes.  -   2. Essential hypertension- Chronic,stable-controlled. No medication changes  -      3. Mixed hyperlipidemia- Chronic,stable-controlled. No medication changes  -    4. Vitamin D deficiency- Chronic,stable-controlled. No medication changes  -      5. Hyperuricemia- Chronic,stable-controlled. No medication changes    Home blood glucose testing daily: 3-4  FB to 100 mg/dl  before lunch 98 to 104 mg/dl  before dinner/supper 100 to 115 mg/dl  bedtime 98 to 100 mg/dl    Last reported blood glucose readings are:  Home blood glucose testing daily: 3-4  FB to 105 mg/dl  before lunch 85 to 95 mg/dl  before dinner/supper 110 to 120 mg/dl  bedtime 95 to 110 mg/dl    Patterns reported per patient are none.         The following portions of the patient's history were  reviewed and updated as appropriate: current medications, past family history, past medical history, past social history, past surgical history and problem list.      Current Outpatient Medications:   •  allopurinol (ZYLOPRIM) 100 MG tablet, Take 1 tablet by mouth Daily., Disp: 90 tablet, Rfl: 1  •  aspirin 81 MG tablet, Take 81 mg by mouth daily., Disp: , Rfl:   •  BYDUREON BCISE 2 MG/0.85ML auto-injector injection, Inject 0.85 mL under the skin into the appropriate area as directed 1 (One) Time Per Week., Disp: 12 pen, Rfl: 3  •  calcium carbonate (TUMS) 500 MG chewable tablet, Chew 2 tablets As Needed., Disp: , Rfl:   •  fluticasone (FLONASE) 50 MCG/ACT nasal spray, 2 sprays into each nostril Daily., Disp: , Rfl:   •  Insulin Pen Needle (NOVOFINE) 32G X 6 MM misc, Take 1 shot of insulin per day, Disp: 90 each, Rfl: 3  •  telmisartan-hydrochlorothiazide (MICARDIS HCT) 40-12.5 MG per tablet, Take 1 tablet by mouth Daily., Disp: 90 tablet, Rfl: 3  •  TOUJEO SOLOSTAR 300 UNIT/ML solution pen-injector, Inject 45 Units under the skin into the appropriate area as directed Daily., Disp: 12 pen, Rfl: 3  •  XIGDUO XR 5-1000 MG tablet, Take 2 tablets by mouth Daily., Disp: 180 tablet, Rfl: 3  •  atorvastatin (LIPITOR) 20 MG tablet, Take 1 tablet by mouth Daily., Disp: 30 tablet, Rfl: 4  •  Cholecalciferol (VITAMIN D3) 74277 units capsule, Take 1 capsule by mouth 2 (Two) Times a Week., Disp: 24 capsule, Rfl: 1  •  CYCLOSET 0.8 MG tablet, Take 0.8 mg by mouth Daily., Disp: 180 tablet, Rfl: 4    Patient Active Problem List    Diagnosis   • 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) [E11.8, Z79.4]   • Nonproliferative diabetic retinopathy (CMS/[facility]) [E11.3299]   • Type 2 diabetes mellitus with microalbuminuria (CMS/[facility]) [E11.29, R80.9]   • Primary malignant neoplasm of colon (CMS/[facility]) [C18.9]   • Breast lesion [N64.9]   • Peripheral neuropathy due to chemotherapy (CMS/[facility]) [G62.0, T45.1X5A]   • Encounter for adjustment or management of vascular access device [Z45.2]   • Vitamin D deficiency [E55.9]   • Morbid obesity due to excess calories (CMS/[facility]) [E66.01]   • Hyperlipidemia [E78.5]   • Essential hypertension [I10]   • Allergic rhinitis due to pollen [J30.1]       Review of Systems   A comprehensive review of the 14 systems was negative except of listed below:  Endocrine: hyperglycemia     Objective:     Wt Readings from Last 3 Encounters:   04/18/19 112 kg (246 lb)   03/18/19 113 kg (249 lb 4.8 oz)   02/04/19 112 kg (247 lb 4.8 oz)     Temp Readings from Last 3 Encounters:   03/18/19 98.1 °F (36.7 °C) (Oral)   02/04/19 98.4 °F (36.9 °C) (Oral)   10/30/18 98.9 °F (37.2 °C) (Oral)     BP Readings from Last 3 Encounters:   04/18/19 118/68   03/18/19 127/70   02/04/19 147/88     Pulse Readings from Last 3 Encounters:   03/18/19 76   02/04/19 81   10/30/18 75        /68   Ht 175.3 cm (69.02\")   Wt 112 kg (246 lb)   LMP  (LMP Unknown)   BMI 36.31 kg/m²        Physical Exam   Constitutional: She is oriented to person, place, and time. She appears well-developed and well-nourished. No distress.   HENT:   Head: Normocephalic and atraumatic.   Eyes: EOM are normal. Pupils are equal, round, and reactive to light.   Neck: Normal range of motion. Neck supple. No thyromegaly present.   Cardiovascular: Normal rate, regular rhythm, normal heart sounds and intact distal pulses.   No murmur heard.  Pulmonary/Chest: Effort normal and breath sounds normal.   Abdominal: Soft. Bowel sounds are normal.   Musculoskeletal: Normal range of motion.   Neurological: She is alert " and oriented to person, place, and time.   Skin: Skin is warm and dry. Capillary refill takes 2 to 3 seconds. She is not diaphoretic.   Psychiatric: She has a normal mood and affect. Her behavior is normal. Judgment and thought content normal.           Lab Review  Results for orders placed or performed in visit on 04/04/19   T4   Result Value Ref Range    T4, Total 9.31 4.50 - 11.70 mcg/dL   TSH   Result Value Ref Range    TSH 1.500 0.270 - 4.200 mIU/mL   Vitamin D 25 Hydroxy   Result Value Ref Range    25 Hydroxy, Vitamin D 26.4 (L) 30.0 - 100.0 ng/ml   Uric Acid   Result Value Ref Range    Uric Acid 4.8 2.4 - 5.7 mg/dL   Microalbumin / Creatinine Urine Ratio - Urine, Clean Catch   Result Value Ref Range    Creatinine, Urine 148.7 Not Estab. mg/dL    Microalbumin, Urine 33.7 Not Estab. ug/mL    Microalbumin/Creatinine Ratio 22.7 0.0 - 30.0 mg/g creat   Lipid Panel   Result Value Ref Range    Total Cholesterol 233 (H) 0 - 200 mg/dL    Triglycerides 78 0 - 150 mg/dL    HDL Cholesterol 98 (H) 40 - 60 mg/dL    VLDL Cholesterol 15.6 5 - 40 mg/dL    LDL Cholesterol  119 (H) 0 - 100 mg/dL   Insulin, Total   Result Value Ref Range    Insulin 13.1 2.6 - 24.9 uIU/mL   Hemoglobin A1c   Result Value Ref Range    Hemoglobin A1C 6.46 (H) 4.80 - 5.60 %   C-Peptide   Result Value Ref Range    C-Peptide 2.3 1.1 - 4.4 ng/mL   Comprehensive Metabolic Panel   Result Value Ref Range    Glucose 93 65 - 99 mg/dL    BUN 16 8 - 23 mg/dL    Creatinine 0.90 0.57 - 1.00 mg/dL    eGFR Non African Am 62 >60 mL/min/1.73    eGFR African Am 75 >60 mL/min/1.73    BUN/Creatinine Ratio 17.8 7.0 - 25.0    Sodium 141 136 - 145 mmol/L    Potassium 4.5 3.5 - 5.2 mmol/L    Chloride 99 98 - 107 mmol/L    Total CO2 26.6 22.0 - 29.0 mmol/L    Calcium 9.5 8.6 - 10.5 mg/dL    Total Protein 7.5 6.0 - 8.5 g/dL    Albumin 4.10 3.50 - 5.20 g/dL    Globulin 3.4 gm/dL    A/G Ratio 1.2 g/dL    Total Bilirubin 0.4 0.2 - 1.2 mg/dL    Alkaline Phosphatase 251 (H) 39 -  117 U/L    AST (SGOT) 58 (H) 1 - 32 U/L    ALT (SGPT) 67 (H) 1 - 33 U/L   Cardiovascular Risk Assessment   Result Value Ref Range    Interpretation Note    Diabetes Patient Education   Result Value Ref Range    PDF Image Not applicable            Assessment:   Feli was seen today for follow-up.    Diagnoses and all orders for this visit:    Vitamin D deficiency  -     Cholecalciferol (VITAMIN D3) 75968 units capsule; Take 1 capsule by mouth 2 (Two) Times a Week.    Controlled type 2 diabetes mellitus without complication, with long-term current use of insulin (CMS/HCC)  -     CYCLOSET 0.8 MG tablet; Take 0.8 mg by mouth Daily.  -     Insulin Pen Needle (NOVOFINE) 32G X 6 MM misc; Take 1 shot of insulin per day    Essential hypertension  -     telmisartan-hydrochlorothiazide (MICARDIS HCT) 40-12.5 MG per tablet; Take 1 tablet by mouth Daily.    Hyperuricemia  -     allopurinol (ZYLOPRIM) 100 MG tablet; Take 1 tablet by mouth Daily.    Type 2 diabetes mellitus with complication, with long-term current use of insulin (CMS/HCC)  -     XIGDUO XR 5-1000 MG tablet; Take 2 tablets by mouth Daily.  -     TOUJEO SOLOSTAR 300 UNIT/ML solution pen-injector; Inject 45 Units under the skin into the appropriate area as directed Daily.  -     BYDUREON BCISE 2 MG/0.85ML auto-injector injection; Inject 0.85 mL under the skin into the appropriate area as directed 1 (One) Time Per Week.    Mixed hyperlipidemia  -     atorvastatin (LIPITOR) 20 MG tablet; Take 1 tablet by mouth Daily.          Plan:   In summary/ Medication changes: I met with this patient is metabolically stable and doing well at this time.  Laboratory testing was reviewed dated 4.4.2019, discussed with questions and answers completed.  Discussed and formulate a treatment plan with patient, patient verbally stated understood all instructions.      Vitamin D deficiency- new problem to this provider. Medication changes:      Start vitamin D 50,000 units 1 by mouth 2  times weekly    Controlled type 2 diabetes mellitus without complication, with long-term current use of insulin- chronic, stable, medication changes at this time can vary to increased liver enzymes. Refills prescribed. Future labs ordered for upcoming appointment and assessment.   Stop the Onglyza    Start Cycloset     Week #1 Take 1 in the  morning   Week #2 2 in the morning   Week #3-3 in the morning    Week #4-4 in the morning    Week #5-5 in the morning    Week #6-6 in the morning      Continue all other medications as previously prescribed..    Additional instructions:   home blood tests -  Blood glucose testin-3 times daily, that are: fasting- 1st thing in morning before eating or drinking, before each meal and 1 or 2 hours after meal  Bedtime. anytime you feel symptoms of hyperglycemia or hypoglycemia (high or low blood sugars)          Essential hypertension-condition is chronic.  Controlled at this time.  Reviewed labs discussed with patient.  There will be no medication  changes at this time     Hyperuricemia- chronic, stable no medication changes at this time. Refills prescribed. Future labs ordered for upcoming appointment and assessment.         Mixed hyperlipidemia- new problem to this provider. Medication changes:  Start Lipitor 20 mg once in the evening           Education:  interpretation of lab results, blood sugar goals, complications of diabetes mellitus, hypoglycemia prevention and treatment, exercise, illness management, self-monitoring of blood glucose skills, nutrition and carbohydrate counting        The total face to face time spent was  25 minutes  with additional education given: 14 minutes (greater than 50% of the total time) was spent with counseling and coordination of care on: SMBG with goals, Side effects profiles with medications, medication use and purposes,    Return in about 3 months (around 2019), or if symptoms worsen or fail to improve, for Recheck. keep appt with   Dean - 2 weeks prior for labs      Dragon transcription disclaimer     Much of this encounter note is an electronic transcription/translation of spoken language to printed text. The electronic translation of spoken language may permit erroneous, or at times, nonsensical words or phrases to be inadvertently transcribed. Although I have reviewed the note for such errors, some may still exist.

## 2019-04-19 DIAGNOSIS — Z79.4 TYPE 2 DIABETES MELLITUS WITH COMPLICATION, WITH LONG-TERM CURRENT USE OF INSULIN (HCC): ICD-10-CM

## 2019-04-19 DIAGNOSIS — E78.2 MIXED HYPERLIPIDEMIA: ICD-10-CM

## 2019-04-19 DIAGNOSIS — E11.9 CONTROLLED TYPE 2 DIABETES MELLITUS WITHOUT COMPLICATION, WITH LONG-TERM CURRENT USE OF INSULIN (HCC): ICD-10-CM

## 2019-04-19 DIAGNOSIS — Z79.4 CONTROLLED TYPE 2 DIABETES MELLITUS WITHOUT COMPLICATION, WITH LONG-TERM CURRENT USE OF INSULIN (HCC): ICD-10-CM

## 2019-04-19 DIAGNOSIS — I10 ESSENTIAL HYPERTENSION: ICD-10-CM

## 2019-04-19 DIAGNOSIS — E79.0 HYPERURICEMIA: ICD-10-CM

## 2019-04-19 DIAGNOSIS — E55.9 VITAMIN D DEFICIENCY: ICD-10-CM

## 2019-04-19 DIAGNOSIS — E11.8 TYPE 2 DIABETES MELLITUS WITH COMPLICATION, WITH LONG-TERM CURRENT USE OF INSULIN (HCC): ICD-10-CM

## 2019-04-19 RX ORDER — EXENATIDE 2 MG/.85ML
2 INJECTION, SUSPENSION, EXTENDED RELEASE SUBCUTANEOUS WEEKLY
Qty: 12 PEN | Refills: 3 | Status: SHIPPED | OUTPATIENT
Start: 2019-04-19 | End: 2019-06-19 | Stop reason: SDUPTHER

## 2019-04-19 RX ORDER — BROMOCRIPTINE MESYLATE 0.8 MG/1
0.8 TABLET ORAL DAILY
Qty: 180 TABLET | Refills: 4 | Status: SHIPPED | OUTPATIENT
Start: 2019-04-19 | End: 2019-08-22 | Stop reason: SDUPTHER

## 2019-04-19 RX ORDER — INSULIN GLARGINE 300 U/ML
45 INJECTION, SOLUTION SUBCUTANEOUS DAILY
Qty: 12 PEN | Refills: 3 | Status: SHIPPED | OUTPATIENT
Start: 2019-04-19 | End: 2019-08-22 | Stop reason: SDUPTHER

## 2019-04-19 RX ORDER — TELMISARTAN AND HYDROCHLORTHIAZIDE 40; 12.5 MG/1; MG/1
1 TABLET ORAL DAILY
Qty: 90 TABLET | Refills: 3 | Status: SHIPPED | OUTPATIENT
Start: 2019-04-19 | End: 2019-08-22 | Stop reason: SDUPTHER

## 2019-04-19 RX ORDER — CHOLECALCIFEROL (VITAMIN D3) 1250 MCG
50000 CAPSULE ORAL 2 TIMES WEEKLY
Qty: 24 CAPSULE | Refills: 1 | Status: SHIPPED | OUTPATIENT
Start: 2019-04-22 | End: 2019-08-22 | Stop reason: SDUPTHER

## 2019-04-19 RX ORDER — ATORVASTATIN CALCIUM 20 MG/1
20 TABLET, FILM COATED ORAL DAILY
Qty: 30 TABLET | Refills: 4 | Status: SHIPPED | OUTPATIENT
Start: 2019-04-19 | End: 2019-08-22 | Stop reason: SDUPTHER

## 2019-04-19 RX ORDER — ALLOPURINOL 100 MG/1
100 TABLET ORAL DAILY
Qty: 90 TABLET | Refills: 1 | Status: SHIPPED | OUTPATIENT
Start: 2019-04-19 | End: 2019-08-22 | Stop reason: SDUPTHER

## 2019-04-19 RX ORDER — DAPAGLIFLOZIN AND METFORMIN HYDROCHLORIDE 5; 1000 MG/1; MG/1
2 TABLET, FILM COATED, EXTENDED RELEASE ORAL DAILY
Qty: 180 TABLET | Refills: 3 | Status: SHIPPED | OUTPATIENT
Start: 2019-04-19 | End: 2019-08-22 | Stop reason: SDUPTHER

## 2019-05-02 ENCOUNTER — INFUSION (OUTPATIENT)
Dept: ONCOLOGY | Facility: HOSPITAL | Age: 69
End: 2019-05-02

## 2019-05-02 DIAGNOSIS — Z45.2 ENCOUNTER FOR ADJUSTMENT OR MANAGEMENT OF VASCULAR ACCESS DEVICE: Primary | ICD-10-CM

## 2019-05-02 DIAGNOSIS — C78.7 LIVER METASTASIS: ICD-10-CM

## 2019-05-02 DIAGNOSIS — C18.9 PRIMARY MALIGNANT NEOPLASM OF COLON (HCC): ICD-10-CM

## 2019-05-02 LAB
ALBUMIN SERPL-MCNC: 3.7 G/DL (ref 3.5–5.2)
ALBUMIN/GLOB SERPL: 0.8 G/DL (ref 1.1–2.4)
ALP SERPL-CCNC: 238 U/L (ref 38–116)
ALT SERPL W P-5'-P-CCNC: 62 U/L (ref 0–33)
ANION GAP SERPL CALCULATED.3IONS-SCNC: 10.2 MMOL/L
AST SERPL-CCNC: 53 U/L (ref 0–32)
BASOPHILS # BLD AUTO: 0.06 10*3/MM3 (ref 0–0.2)
BASOPHILS NFR BLD AUTO: 0.8 % (ref 0–1.5)
BILIRUB SERPL-MCNC: 0.3 MG/DL (ref 0.2–1.2)
BUN BLD-MCNC: 19 MG/DL (ref 6–20)
BUN/CREAT SERPL: 19.6 (ref 7.3–30)
CALCIUM SPEC-SCNC: 9.6 MG/DL (ref 8.5–10.2)
CEA SERPL-MCNC: 4.69 NG/ML
CHLORIDE SERPL-SCNC: 101 MMOL/L (ref 98–107)
CO2 SERPL-SCNC: 28.8 MMOL/L (ref 22–29)
CREAT BLD-MCNC: 0.97 MG/DL (ref 0.6–1.1)
DEPRECATED RDW RBC AUTO: 49.7 FL (ref 37–54)
EOSINOPHIL # BLD AUTO: 0.51 10*3/MM3 (ref 0–0.4)
EOSINOPHIL NFR BLD AUTO: 6.5 % (ref 0.3–6.2)
ERYTHROCYTE [DISTWIDTH] IN BLOOD BY AUTOMATED COUNT: 15.8 % (ref 12.3–15.4)
GFR SERPL CREATININE-BSD FRML MDRD: 69 ML/MIN/1.73
GLOBULIN UR ELPH-MCNC: 4.4 GM/DL (ref 1.8–3.5)
GLUCOSE BLD-MCNC: 88 MG/DL (ref 74–124)
HCT VFR BLD AUTO: 40.4 % (ref 34–46.6)
HGB BLD-MCNC: 13.2 G/DL (ref 12–15.9)
IMM GRANULOCYTES # BLD AUTO: 0.04 10*3/MM3 (ref 0–0.05)
IMM GRANULOCYTES NFR BLD AUTO: 0.5 % (ref 0–0.5)
LYMPHOCYTES # BLD AUTO: 2.75 10*3/MM3 (ref 0.7–3.1)
LYMPHOCYTES NFR BLD AUTO: 34.8 % (ref 19.6–45.3)
MCH RBC QN AUTO: 28.1 PG (ref 26.6–33)
MCHC RBC AUTO-ENTMCNC: 32.7 G/DL (ref 31.5–35.7)
MCV RBC AUTO: 86.1 FL (ref 79–97)
MONOCYTES # BLD AUTO: 0.94 10*3/MM3 (ref 0.1–0.9)
MONOCYTES NFR BLD AUTO: 11.9 % (ref 5–12)
NEUTROPHILS # BLD AUTO: 3.6 10*3/MM3 (ref 1.7–7)
NEUTROPHILS NFR BLD AUTO: 45.5 % (ref 42.7–76)
NRBC BLD AUTO-RTO: 0 /100 WBC (ref 0–0.2)
PLATELET # BLD AUTO: 387 10*3/MM3 (ref 140–450)
PMV BLD AUTO: 9.1 FL (ref 6–12)
POTASSIUM BLD-SCNC: 4 MMOL/L (ref 3.5–4.7)
PROT SERPL-MCNC: 8.1 G/DL (ref 6.3–8)
RBC # BLD AUTO: 4.69 10*6/MM3 (ref 3.77–5.28)
SODIUM BLD-SCNC: 140 MMOL/L (ref 134–145)
WBC NRBC COR # BLD: 7.9 10*3/MM3 (ref 3.4–10.8)

## 2019-05-02 PROCEDURE — 80053 COMPREHEN METABOLIC PANEL: CPT

## 2019-05-02 PROCEDURE — 36591 DRAW BLOOD OFF VENOUS DEVICE: CPT

## 2019-05-02 PROCEDURE — 82378 CARCINOEMBRYONIC ANTIGEN: CPT | Performed by: INTERNAL MEDICINE

## 2019-05-02 PROCEDURE — 85025 COMPLETE CBC W/AUTO DIFF WBC: CPT

## 2019-05-02 RX ORDER — SODIUM CHLORIDE 0.9 % (FLUSH) 0.9 %
10 SYRINGE (ML) INJECTION AS NEEDED
Status: DISCONTINUED | OUTPATIENT
Start: 2019-05-02 | End: 2019-05-02 | Stop reason: HOSPADM

## 2019-05-02 RX ORDER — SODIUM CHLORIDE 0.9 % (FLUSH) 0.9 %
10 SYRINGE (ML) INJECTION AS NEEDED
Status: CANCELLED | OUTPATIENT
Start: 2019-05-02

## 2019-05-02 RX ADMIN — Medication 500 UNITS: at 14:05

## 2019-05-02 RX ADMIN — SODIUM CHLORIDE, PRESERVATIVE FREE 10 ML: 5 INJECTION INTRAVENOUS at 14:05

## 2019-06-13 ENCOUNTER — INFUSION (OUTPATIENT)
Dept: ONCOLOGY | Facility: HOSPITAL | Age: 69
End: 2019-06-13

## 2019-06-13 ENCOUNTER — OFFICE VISIT (OUTPATIENT)
Dept: ONCOLOGY | Facility: CLINIC | Age: 69
End: 2019-06-13

## 2019-06-13 VITALS
HEART RATE: 96 BPM | BODY MASS INDEX: 31.04 KG/M2 | DIASTOLIC BLOOD PRESSURE: 80 MMHG | TEMPERATURE: 98.4 F | SYSTOLIC BLOOD PRESSURE: 147 MMHG | OXYGEN SATURATION: 95 % | WEIGHT: 210.3 LBS | RESPIRATION RATE: 16 BRPM

## 2019-06-13 DIAGNOSIS — Z45.2 ENCOUNTER FOR ADJUSTMENT OR MANAGEMENT OF VASCULAR ACCESS DEVICE: ICD-10-CM

## 2019-06-13 DIAGNOSIS — C18.9 PRIMARY MALIGNANT NEOPLASM OF COLON (HCC): Primary | ICD-10-CM

## 2019-06-13 DIAGNOSIS — R74.8 ELEVATED LIVER ENZYMES: ICD-10-CM

## 2019-06-13 DIAGNOSIS — C78.7 LIVER METASTASIS: ICD-10-CM

## 2019-06-13 PROBLEM — R06.02 SHORTNESS OF BREATH: Status: RESOLVED | Noted: 2018-03-07 | Resolved: 2019-06-13

## 2019-06-13 LAB
ALBUMIN SERPL-MCNC: 3.8 G/DL (ref 3.5–5.2)
ALBUMIN/GLOB SERPL: 0.9 G/DL (ref 1.1–2.4)
ALP SERPL-CCNC: 216 U/L (ref 38–116)
ALT SERPL W P-5'-P-CCNC: 40 U/L (ref 0–33)
ANION GAP SERPL CALCULATED.3IONS-SCNC: 14.3 MMOL/L
AST SERPL-CCNC: 34 U/L (ref 0–32)
BASOPHILS # BLD AUTO: 0.06 10*3/MM3 (ref 0–0.2)
BASOPHILS NFR BLD AUTO: 0.8 % (ref 0–1.5)
BILIRUB SERPL-MCNC: 0.3 MG/DL (ref 0.2–1.2)
BUN BLD-MCNC: 20 MG/DL (ref 6–20)
BUN/CREAT SERPL: 22 (ref 7.3–30)
CALCIUM SPEC-SCNC: 9.6 MG/DL (ref 8.5–10.2)
CEA SERPL-MCNC: 4.04 NG/ML
CHLORIDE SERPL-SCNC: 99 MMOL/L (ref 98–107)
CO2 SERPL-SCNC: 25.7 MMOL/L (ref 22–29)
CREAT BLD-MCNC: 0.91 MG/DL (ref 0.6–1.1)
DEPRECATED RDW RBC AUTO: 50 FL (ref 37–54)
EOSINOPHIL # BLD AUTO: 0.4 10*3/MM3 (ref 0–0.4)
EOSINOPHIL NFR BLD AUTO: 5.1 % (ref 0.3–6.2)
ERYTHROCYTE [DISTWIDTH] IN BLOOD BY AUTOMATED COUNT: 16 % (ref 12.3–15.4)
GFR SERPL CREATININE-BSD FRML MDRD: 75 ML/MIN/1.73
GLOBULIN UR ELPH-MCNC: 4.2 GM/DL (ref 1.8–3.5)
GLUCOSE BLD-MCNC: 146 MG/DL (ref 74–124)
HCT VFR BLD AUTO: 40.7 % (ref 34–46.6)
HGB BLD-MCNC: 13 G/DL (ref 12–15.9)
IMM GRANULOCYTES # BLD AUTO: 0.06 10*3/MM3 (ref 0–0.05)
IMM GRANULOCYTES NFR BLD AUTO: 0.8 % (ref 0–0.5)
LYMPHOCYTES # BLD AUTO: 2.46 10*3/MM3 (ref 0.7–3.1)
LYMPHOCYTES NFR BLD AUTO: 31.4 % (ref 19.6–45.3)
MCH RBC QN AUTO: 27.7 PG (ref 26.6–33)
MCHC RBC AUTO-ENTMCNC: 31.9 G/DL (ref 31.5–35.7)
MCV RBC AUTO: 86.8 FL (ref 79–97)
MONOCYTES # BLD AUTO: 0.85 10*3/MM3 (ref 0.1–0.9)
MONOCYTES NFR BLD AUTO: 10.8 % (ref 5–12)
NEUTROPHILS # BLD AUTO: 4.01 10*3/MM3 (ref 1.7–7)
NEUTROPHILS NFR BLD AUTO: 51.1 % (ref 42.7–76)
NRBC BLD AUTO-RTO: 0 /100 WBC (ref 0–0.2)
PLATELET # BLD AUTO: 380 10*3/MM3 (ref 140–450)
PMV BLD AUTO: 8.7 FL (ref 6–12)
POTASSIUM BLD-SCNC: 3.9 MMOL/L (ref 3.5–4.7)
PROT SERPL-MCNC: 8 G/DL (ref 6.3–8)
RBC # BLD AUTO: 4.69 10*6/MM3 (ref 3.77–5.28)
SODIUM BLD-SCNC: 139 MMOL/L (ref 134–145)
WBC NRBC COR # BLD: 7.84 10*3/MM3 (ref 3.4–10.8)

## 2019-06-13 PROCEDURE — 80053 COMPREHEN METABOLIC PANEL: CPT

## 2019-06-13 PROCEDURE — 99214 OFFICE O/P EST MOD 30 MIN: CPT | Performed by: INTERNAL MEDICINE

## 2019-06-13 PROCEDURE — 85025 COMPLETE CBC W/AUTO DIFF WBC: CPT

## 2019-06-13 PROCEDURE — 36591 DRAW BLOOD OFF VENOUS DEVICE: CPT

## 2019-06-13 PROCEDURE — 82378 CARCINOEMBRYONIC ANTIGEN: CPT | Performed by: INTERNAL MEDICINE

## 2019-06-13 PROCEDURE — 96523 IRRIG DRUG DELIVERY DEVICE: CPT

## 2019-06-13 RX ORDER — SODIUM CHLORIDE 0.9 % (FLUSH) 0.9 %
10 SYRINGE (ML) INJECTION AS NEEDED
Status: CANCELLED | OUTPATIENT
Start: 2019-06-13

## 2019-06-13 RX ORDER — SODIUM CHLORIDE 0.9 % (FLUSH) 0.9 %
10 SYRINGE (ML) INJECTION AS NEEDED
Status: DISCONTINUED | OUTPATIENT
Start: 2019-06-13 | End: 2019-06-13 | Stop reason: HOSPADM

## 2019-06-13 RX ADMIN — SODIUM CHLORIDE, PRESERVATIVE FREE 10 ML: 5 INJECTION INTRAVENOUS at 11:22

## 2019-06-13 RX ADMIN — Medication 500 UNITS: at 11:22

## 2019-06-13 NOTE — PROGRESS NOTES
Subjective     CHIEF COMPLAINT:      Chief Complaint   Patient presents with   • Follow-up     no concerns       HISTORY OF PRESENT ILLNESS:     Feli Del Toro is a 68 y.o. female patient who returns today for follow up on her colon cancer.  Had follow-up with Dr. Ruano and had CT scan of the abdomen done.      Patient has intentionally lost weight by watching her diet and after some medicine changes by her primary care physician.  She is feeling better.  She is not having abdominal pain.    Patient has a port in the right upper chest.  She is not having pain related to it.    REVIEW OF SYSTEMS:  Review of Systems   Constitutional: Negative for chills, fever and unexpected weight change.   HENT: Negative for mouth sores, nosebleeds, sore throat and voice change.    Eyes: Negative for visual disturbance.   Respiratory: Negative for cough and shortness of breath.    Cardiovascular: Negative for chest pain and leg swelling.   Gastrointestinal: Negative for abdominal pain, blood in stool, constipation, diarrhea, nausea and vomiting.   Genitourinary: Negative for dysuria, frequency and hematuria.   Musculoskeletal: Negative for arthralgias, back pain and joint swelling.   Skin: Negative for rash.   Neurological: Negative for dizziness, numbness and headaches.   Hematological: Negative for adenopathy. Does not bruise/bleed easily.   Psychiatric/Behavioral: Negative for dysphoric mood. The patient is not nervous/anxious.      I verified the ROS obtained by the MA.      Past Medical History:   Diagnosis Date   • Acid reflux    • Anemia    • Cancer (CMS/HCC) 06/04/2013   • Diabetes mellitus (CMS/HCC)    • Dyspnea    • Essential hypertension    • History of constipation    • History of transfusion    • Hyperlipidemia    • Hypertension    • Metastatic colon cancer to liver (CMS/HCC)    • Neuropathy    • Pericardial effusion     Moderate   • Retinopathy    • SOB (shortness of breath)    • Type 2 diabetes mellitus (CMS/HCC)         Past Surgical History:   Procedure Laterality Date   • ABDOMINAL SURGERY      ablation    • AMPUTATION DIGIT Left 1/1/2017    Procedure: LEFT SECOND TOE AMPUTATION;  Surgeon: Farzad Nichols MD;  Location: [facility] OR;  Service:    • AMPUTATION OF REPLICATED TOES      right distal second toe    • CARDIAC CATHETERIZATION N/A 3/8/2018    Procedure: Pericardiocentesis;  Surgeon: Andrew Dobbins MD;  Location: [facility] INVASIVE LOCATION;  Service:    • CATARACT EXTRACTION     • COLECTOMY PARTIAL / TOTAL  01/14/2014   • COLONOSCOPY  2013   • FOOT SURGERY Left 03/15/2019   • FRACTURE SURGERY Right 2017    right lower leg with screw in ankle   • HYSTERECTOMY  1998   • PERICARDIAL WINDOW N/A 3/10/2018    Procedure: PERICARDIAL WINDOW;  Surgeon: Nomi Patle III, MD;  Location: [facility] OR;  Service: Cardiothoracic   • PORTACATH PLACEMENT  06/2013   • SALPINGO OOPHORECTOMY Bilateral    • SPLENECTOMY     • THORACOSCOPY Left 3/10/2018    Procedure: BRONCHOSCOPY, LEFT THORACOSCOPY VIDEO ASSISTED, SUBPLEURAL CATHETERS FOR PAIN;  Surgeon: Nomi Patel III, MD;  Location: [facility] OR;  Service: Cardiothoracic       Cancer-related family history includes Breast cancer in her sister; Cancer in her other.  Social History     Tobacco Use   • Smoking status: Never Smoker   • Smokeless tobacco: Never Used   Substance Use Topics   • Alcohol use: No       MEDICATIONS:    Current Outpatient Medications:   •  allopurinol (ZYLOPRIM) 100 MG tablet, Take 1 tablet by mouth Daily., Disp: 90 tablet, Rfl: 1  •  aspirin 81 MG tablet, Take 81 mg by mouth daily., Disp: , Rfl:   •  atorvastatin (LIPITOR) 20 MG tablet, Take 1 tablet by mouth Daily., Disp: 30 tablet, Rfl: 4  •  BYDUREON BCISE 2 MG/0.85ML auto-injector injection, Inject 0.85 mL under the skin into the appropriate area as directed 1 (One) Time Per Week., Disp: 12 pen, Rfl: 3  •  calcium carbonate (TUMS) 500 MG chewable tablet, Chew 2 tablets As Needed., Disp: ,  "Rfl:   •  Cholecalciferol (VITAMIN D3) 55997 units capsule, Take 1 capsule by mouth 2 (Two) Times a Week., Disp: 24 capsule, Rfl: 1  •  CYCLOSET 0.8 MG tablet, Take 0.8 mg by mouth Daily., Disp: 180 tablet, Rfl: 4  •  fluticasone (FLONASE) 50 MCG/ACT nasal spray, 2 sprays into each nostril Daily., Disp: , Rfl:   •  Insulin Pen Needle (NOVOFINE) 32G X 6 MM misc, Take 1 shot of insulin per day, Disp: 90 each, Rfl: 3  •  telmisartan-hydrochlorothiazide (MICARDIS HCT) 40-12.5 MG per tablet, Take 1 tablet by mouth Daily., Disp: 90 tablet, Rfl: 3  •  TOUJEO SOLOSTAR 300 UNIT/ML solution pen-injector, Inject 45 Units under the skin into the appropriate area as directed Daily., Disp: 12 pen, Rfl: 3  •  XIGDUO XR 5-1000 MG tablet, Take 2 tablets by mouth Daily., Disp: 180 tablet, Rfl: 3  No current facility-administered medications for this visit.     ALLERGIES:  Allergies   Allergen Reactions   • Compazine [Prochlorperazine Edisylate] Other (See Comments)     Complete arch in the back    • Prochlorperazine Maleate Other (See Comments)     \"BACK MUSCLE RETRACTION\"         Objective   VITAL SIGNS:     Vitals:    06/13/19 1130   BP: 147/80   Pulse: 96   Resp: 16   Temp: 98.4 °F (36.9 °C)   TempSrc: Oral   SpO2: 95%   Weight: 95.4 kg (210 lb 4.8 oz)  Comment: excerise and diet/doing weight watchers   PainSc: 0-No pain     Body mass index is 31.04 kg/m².     Wt Readings from Last 3 Encounters:   06/13/19 95.4 kg (210 lb 4.8 oz)   04/18/19 112 kg (246 lb)   03/18/19 113 kg (249 lb 4.8 oz)       PHYSICAL EXAMINATION:  GENERAL:  The patient appears in good general condition, not in acute distress.  SKIN: Warm and dry. No skin rashes, ecchymosis or petechiae.  HEAD:  Normocephalic.  EYES:  No Jaundice. No Pallor.   NECK:  Supple with Good ROM. No Thyromegaly. No Masses.  LYMPHATICS:  No cervical or supraclavicular lymphadenopathy.  CHEST: Normal respiratory effort. Lungs clear to auscultation.   ABDOMEN:  Soft. No tenderness. No " Hepatomegaly. No Splenomegaly. No masses.  NEUROLOGICAL:  No Focal neurological deficits.         DIAGNOSTIC DATA:     Results from last 7 days   Lab Units 19  1116   WBC 10*3/mm3 7.84   NEUTROS ABS 10*3/mm3 4.01   HEMOGLOBIN g/dL 13.0   HEMATOCRIT % 40.7   PLATELETS 10*3/mm3 380     Results from last 7 days   Lab Units 19  1116   SODIUM mmol/L 139   POTASSIUM mmol/L 3.9   CHLORIDE mmol/L 99   CO2 mmol/L 25.7   BUN mg/dL 20   CREATININE mg/dL 0.91   CALCIUM mg/dL 9.6   ALBUMIN g/dL 3.80   BILIRUBIN mg/dL 0.3   ALK PHOS U/L 216*   ALT (SGPT) U/L 40*   AST (SGOT) U/L 34*   GLUCOSE mg/dL 146*     Lab Results   Component Value Date    FERRITIN 192.60 2018    FERRITIN 289.60 2017    FERRITIN 42.3 2014    IRON 82 2018    IRON 44 2017    IRON 34 (L) 2014    TIBC 309 2018    TIBC 316 2017     Lab Results   Component Value Date    FOLATE >20.00 2017    FOLATE 14.1 2014     Lab Results   Component Value Date    IOASCJPY84 1,298 (H) 2017    TSECQPHS33 572 2014         Lab Results   Component Value Date    CEA 4.04 2019    CEA 4.69 2019    CEA 4.39 2019    CEA 4.53 2019    CEA 4.52 10/30/2018        [facility]ACILITY:  [facility]  UNIT/AGE/GENDER: N.PAVCT  OP      AGE:68 Y          SEX:F  PATIENT NAME/:  DAMARI WILSON BERRY    1950  UNIT NUMBER:  [id number]  ACCOUNT NUMBER:  [id number]  ACCESSION NUMBER:  [id number]      Abdominal CT without and with intravenous contrast and pelvic CT with intravenous contrast on 2019    INDICATION: COLON CA W/LIVER METS, restaging    TECHNIQUE: Three-phase liver protocol was used.    Radiation dose reduction techniques were used for the scan per the ALARA (As Low As Reasonably Achievable) protocol.    COMPARISON: 2018    FINDINGS: Ablation defect in segment 8 currently measures 4.5 cm on image 118 series 3, about 3 mm smaller than on the prior. No nodular  enhancement at its margin to suggest local tumor recurrence. Stable mild peripheral ductal dilatation leading away   from the ablated area.    No other hepatic mass. Prior lateral segment resection. No central biliary obstruction. No portal vein or hepatic vein thrombus.    Continued cirrhotic morphology with prominent caudate lobe. Cholelithiasis.    Prior splenectomy with stable splenule in the left subphrenic area. Normal pancreas, adrenals, and left kidney. Small right renal cysts.    No ascites or peritoneal mass. No bowel dilatation or inflammation. No lung base nodule or skeletal destruction.    IMPRESSION:  1. Slight further decrease in size of ablation defect in the liver  2. No sign of active tumor elsewhere  3. Cirrhosis with postoperative changes  4. Cholelithiasis    Dictated by: Geoffrey Paula M.D.    Assessment/Plan   1.  Metastatic colon cancer. She had metastases to the liver.   · She received 12 cycles of FOLFOX-6 and then had resection of the primary tumor along with metastasectomy and splenectomy with thermal ablation of lesions in the liver in January 2014.   · This was followed by 12 cycles of the FOLFIRI regimen completed on 08/07/2014.   · CT scans on 05/28/2015 revealed a new lesion in the liver.  Dr. Ruano referred the patient to Radiation Therapy and she received CyberKnife radiation, completed on 07/14/2015.    · The CT scan from 5/5/17 revealed a new 2.2 cm lesion in the anterior hepatic segment.  CT guided microwave ablation of liver mass performed on 6/16/17 with CT evidence on 7/20/17 of a complete response  · FOLFIRI ×12 cycles given between 6/29/17 and 11/29/17.   · CT scan from 3/11/2019 showed no evidence of lung metastasis.  No evidence of reaccumulation of the left pleural/pericardial effusion.  · CT scan of the abdomen on 5/9/2019 showed further decrease in the size of the liver metastasis.  No new lesions were seen.    Patient is doing well with no evidence of disease  progression.  CEA remains stable around 4.    2.  Elevated liver enzymes although the liver enzymes became normal in February 2019, they increased on the labs from 3/11/2019.  Liver enzymes improved on today's labs.     3.  Pericardial effusion status post pericardial window.  Cytology and biopsy were negative.  No signs of reaccumulation on the CT scan from March 2019.  Exam from today is normal.    4.   Patient has a port which would need maintenance every 6 weeks.    5.  Patient status post splenectomy 5 years ago.  She is seeing her primary care physician in 1 month to receive the booster vaccines and she will be due to receive Prevnar-13.      PLAN:    1.  We will continue to monitor.    2.  She will return in 6 and 12 weeks for a port flush.  3.  We will repeat CT scan of the chest 6 months from the last study.  CBC CMP CEA will be repeated.  I will see her in follow-up 1 week after the studies.  She will be due for the follow-up CT scan of the abdomen per Dr. Ruano in November 2019.      Marivel Brown MD  06/13/19

## 2019-06-15 DIAGNOSIS — Z79.4 TYPE 2 DIABETES MELLITUS WITH COMPLICATION, WITH LONG-TERM CURRENT USE OF INSULIN (HCC): ICD-10-CM

## 2019-06-15 DIAGNOSIS — E11.8 TYPE 2 DIABETES MELLITUS WITH COMPLICATION, WITH LONG-TERM CURRENT USE OF INSULIN (HCC): ICD-10-CM

## 2019-06-17 RX ORDER — SAXAGLIPTIN 5 MG/1
TABLET, FILM COATED ORAL
Qty: 90 TABLET | Refills: 1 | OUTPATIENT
Start: 2019-06-17

## 2019-06-19 ENCOUNTER — TELEPHONE (OUTPATIENT)
Dept: ENDOCRINOLOGY | Age: 69
End: 2019-06-19

## 2019-06-19 DIAGNOSIS — Z79.4 TYPE 2 DIABETES MELLITUS WITH COMPLICATION, WITH LONG-TERM CURRENT USE OF INSULIN (HCC): ICD-10-CM

## 2019-06-19 DIAGNOSIS — E11.8 TYPE 2 DIABETES MELLITUS WITH COMPLICATION, WITH LONG-TERM CURRENT USE OF INSULIN (HCC): ICD-10-CM

## 2019-06-19 RX ORDER — EXENATIDE 2 MG/.85ML
2 INJECTION, SUSPENSION, EXTENDED RELEASE SUBCUTANEOUS WEEKLY
Qty: 12 PEN | Refills: 1 | Status: SHIPPED | OUTPATIENT
Start: 2019-06-19 | End: 2019-08-22 | Stop reason: SDUPTHER

## 2019-06-19 NOTE — TELEPHONE ENCOUNTER
Pt called requesting a refill on her BYDUREON BCISE 2 MG/0.85ML auto-injector injection sent to [facility]. Pt can be contacted at [phone].

## 2019-07-25 ENCOUNTER — INFUSION (OUTPATIENT)
Dept: ONCOLOGY | Facility: HOSPITAL | Age: 69
End: 2019-07-25

## 2019-07-25 DIAGNOSIS — Z45.2 ENCOUNTER FOR ADJUSTMENT OR MANAGEMENT OF VASCULAR ACCESS DEVICE: Primary | ICD-10-CM

## 2019-07-25 PROCEDURE — 96523 IRRIG DRUG DELIVERY DEVICE: CPT | Performed by: INTERNAL MEDICINE

## 2019-07-25 RX ORDER — SODIUM CHLORIDE 0.9 % (FLUSH) 0.9 %
10 SYRINGE (ML) INJECTION AS NEEDED
Status: CANCELLED | OUTPATIENT
Start: 2019-07-25

## 2019-07-25 RX ORDER — SODIUM CHLORIDE 0.9 % (FLUSH) 0.9 %
10 SYRINGE (ML) INJECTION AS NEEDED
Status: DISCONTINUED | OUTPATIENT
Start: 2019-07-25 | End: 2019-07-25 | Stop reason: HOSPADM

## 2019-07-25 RX ADMIN — Medication 10 ML: at 08:21

## 2019-07-25 RX ADMIN — Medication 500 UNITS: at 08:21

## 2019-08-09 LAB
25(OH)D3+25(OH)D2 SERPL-MCNC: 33.6 NG/ML (ref 30–100)
ALBUMIN SERPL-MCNC: 3.6 G/DL (ref 3.5–5.2)
ALBUMIN/GLOB SERPL: 0.9 G/DL
ALP SERPL-CCNC: 254 U/L (ref 39–117)
ALT SERPL-CCNC: 84 U/L (ref 1–33)
AST SERPL-CCNC: 97 U/L (ref 1–32)
BILIRUB SERPL-MCNC: 0.3 MG/DL (ref 0.2–1.2)
BUN SERPL-MCNC: 16 MG/DL (ref 8–23)
BUN/CREAT SERPL: 19.3 (ref 7–25)
C PEPTIDE SERPL-MCNC: 1.9 NG/ML (ref 1.1–4.4)
CALCIUM SERPL-MCNC: 9.1 MG/DL (ref 8.6–10.5)
CHLORIDE SERPL-SCNC: 100 MMOL/L (ref 98–107)
CHOLEST SERPL-MCNC: 236 MG/DL (ref 0–200)
CO2 SERPL-SCNC: 26.9 MMOL/L (ref 22–29)
CREAT SERPL-MCNC: 0.83 MG/DL (ref 0.57–1)
GLOBULIN SER CALC-MCNC: 3.8 GM/DL
GLUCOSE SERPL-MCNC: 99 MG/DL (ref 65–99)
HBA1C MFR BLD: 6.6 % (ref 4.8–5.6)
HDLC SERPL-MCNC: 100 MG/DL (ref 40–60)
INTERPRETATION: NORMAL
LDLC SERPL CALC-MCNC: 122 MG/DL (ref 0–100)
Lab: NORMAL
POTASSIUM SERPL-SCNC: 4.6 MMOL/L (ref 3.5–5.2)
PROT SERPL-MCNC: 7.4 G/DL (ref 6–8.5)
SODIUM SERPL-SCNC: 141 MMOL/L (ref 136–145)
T4 SERPL-MCNC: 8.76 MCG/DL (ref 4.5–11.7)
TRIGL SERPL-MCNC: 71 MG/DL (ref 0–150)
TSH SERPL DL<=0.005 MIU/L-ACNC: 1.57 MIU/ML (ref 0.27–4.2)
URATE SERPL-MCNC: 4.2 MG/DL (ref 2.4–5.7)
VLDLC SERPL CALC-MCNC: 14.2 MG/DL

## 2019-08-22 ENCOUNTER — OFFICE VISIT (OUTPATIENT)
Dept: ENDOCRINOLOGY | Age: 69
End: 2019-08-22

## 2019-08-22 VITALS
RESPIRATION RATE: 16 BRPM | BODY MASS INDEX: 36.73 KG/M2 | DIASTOLIC BLOOD PRESSURE: 86 MMHG | HEIGHT: 69 IN | WEIGHT: 248 LBS | SYSTOLIC BLOOD PRESSURE: 132 MMHG

## 2019-08-22 DIAGNOSIS — E55.9 VITAMIN D DEFICIENCY: ICD-10-CM

## 2019-08-22 DIAGNOSIS — R80.9 TYPE 2 DIABETES MELLITUS WITH MICROALBUMINURIA, UNSPECIFIED WHETHER LONG TERM INSULIN USE (HCC): Primary | ICD-10-CM

## 2019-08-22 DIAGNOSIS — E11.9 CONTROLLED TYPE 2 DIABETES MELLITUS WITHOUT COMPLICATION, WITH LONG-TERM CURRENT USE OF INSULIN (HCC): ICD-10-CM

## 2019-08-22 DIAGNOSIS — Z79.4 CONTROLLED TYPE 2 DIABETES MELLITUS WITHOUT COMPLICATION, WITH LONG-TERM CURRENT USE OF INSULIN (HCC): ICD-10-CM

## 2019-08-22 DIAGNOSIS — Z79.4 TYPE 2 DIABETES MELLITUS WITH COMPLICATION, WITH LONG-TERM CURRENT USE OF INSULIN (HCC): ICD-10-CM

## 2019-08-22 DIAGNOSIS — E11.29 TYPE 2 DIABETES MELLITUS WITH MICROALBUMINURIA, UNSPECIFIED WHETHER LONG TERM INSULIN USE (HCC): Primary | ICD-10-CM

## 2019-08-22 DIAGNOSIS — E78.2 MIXED HYPERLIPIDEMIA: ICD-10-CM

## 2019-08-22 DIAGNOSIS — E79.0 HYPERURICEMIA: ICD-10-CM

## 2019-08-22 DIAGNOSIS — E66.9 OBESITY (BMI 30-39.9): ICD-10-CM

## 2019-08-22 DIAGNOSIS — E11.8 TYPE 2 DIABETES MELLITUS WITH COMPLICATION, WITH LONG-TERM CURRENT USE OF INSULIN (HCC): ICD-10-CM

## 2019-08-22 DIAGNOSIS — I10 ESSENTIAL HYPERTENSION: ICD-10-CM

## 2019-08-22 PROCEDURE — 99214 OFFICE O/P EST MOD 30 MIN: CPT | Performed by: INTERNAL MEDICINE

## 2019-08-22 RX ORDER — ALLOPURINOL 100 MG/1
100 TABLET ORAL DAILY
Qty: 90 TABLET | Refills: 3 | Status: SHIPPED | OUTPATIENT
Start: 2019-08-22 | End: 2020-01-24 | Stop reason: SDUPTHER

## 2019-08-22 RX ORDER — INSULIN GLARGINE 300 U/ML
45 INJECTION, SOLUTION SUBCUTANEOUS DAILY
Qty: 12 PEN | Refills: 3 | Status: SHIPPED | OUTPATIENT
Start: 2019-08-22 | End: 2020-01-24 | Stop reason: SDUPTHER

## 2019-08-22 RX ORDER — TELMISARTAN AND HYDROCHLORTHIAZIDE 40; 12.5 MG/1; MG/1
1 TABLET ORAL DAILY
Qty: 90 TABLET | Refills: 3 | Status: SHIPPED | OUTPATIENT
Start: 2019-08-22 | End: 2020-01-24 | Stop reason: SDUPTHER

## 2019-08-22 RX ORDER — BROMOCRIPTINE MESYLATE 0.8 MG/1
4.8 TABLET ORAL DAILY
Qty: 540 TABLET | Refills: 3 | Status: SHIPPED | OUTPATIENT
Start: 2019-08-22 | End: 2020-01-24

## 2019-08-22 RX ORDER — EXENATIDE 2 MG/.85ML
2 INJECTION, SUSPENSION, EXTENDED RELEASE SUBCUTANEOUS WEEKLY
Qty: 12 PEN | Refills: 3 | Status: SHIPPED | OUTPATIENT
Start: 2019-08-22 | End: 2020-01-24 | Stop reason: SDUPTHER

## 2019-08-22 RX ORDER — ATORVASTATIN CALCIUM 20 MG/1
20 TABLET, FILM COATED ORAL DAILY
Qty: 90 TABLET | Refills: 3 | Status: SHIPPED | OUTPATIENT
Start: 2019-08-22 | End: 2020-01-24 | Stop reason: SDUPTHER

## 2019-08-22 RX ORDER — CHOLECALCIFEROL (VITAMIN D3) 1250 MCG
50000 CAPSULE ORAL 2 TIMES WEEKLY
Qty: 26 CAPSULE | Refills: 3 | Status: SHIPPED | OUTPATIENT
Start: 2019-08-22 | End: 2019-10-01 | Stop reason: SDUPTHER

## 2019-08-22 RX ORDER — DAPAGLIFLOZIN AND METFORMIN HYDROCHLORIDE 5; 1000 MG/1; MG/1
2 TABLET, FILM COATED, EXTENDED RELEASE ORAL DAILY
Qty: 180 TABLET | Refills: 3 | Status: SHIPPED | OUTPATIENT
Start: 2019-08-22 | End: 2020-01-24 | Stop reason: SDUPTHER

## 2019-08-22 NOTE — PROGRESS NOTES
"Subjective   Feli Del Toro is a 68 y.o. female seen for follow up for DM2, hyperlipidemia, HTN, lab review. Patient is checking BG 3 times a day. No BG readings. She denies hypoglycemic episodes. She denies any problems or concerns.    History of Present Illness this is a 68-year-old female known patient with type 2 diabetes hypertension and dyslipidemia as well as vitamin D deficiency and morbid obesity.  Over the course of last 6 months she has had no significant health problems for which to go to the ER or hospital.    /86   Resp 16   Ht 175.3 cm (69\")   Wt 112 kg (248 lb)   LMP  (LMP Unknown)   BMI 36.62 kg/m²      Allergies   Allergen Reactions   • Compazine [Prochlorperazine Edisylate] Other (See Comments)     Complete arch in the back    • Prochlorperazine Maleate Other (See Comments)     \"BACK MUSCLE RETRACTION\"       Current Outpatient Medications:   •  allopurinol (ZYLOPRIM) 100 MG tablet, Take 1 tablet by mouth Daily., Disp: 90 tablet, Rfl: 1  •  aspirin 81 MG tablet, Take 81 mg by mouth daily., Disp: , Rfl:   •  atorvastatin (LIPITOR) 20 MG tablet, Take 1 tablet by mouth Daily., Disp: 30 tablet, Rfl: 4  •  BYDUREON BCISE 2 MG/0.85ML auto-injector injection, Inject 0.85 mL under the skin into the appropriate area as directed 1 (One) Time Per Week., Disp: 12 pen, Rfl: 1  •  calcium carbonate (TUMS) 500 MG chewable tablet, Chew 2 tablets As Needed., Disp: , Rfl:   •  Cholecalciferol (VITAMIN D3) 05375 units capsule, Take 1 capsule by mouth 2 (Two) Times a Week., Disp: 24 capsule, Rfl: 1  •  CYCLOSET 0.8 MG tablet, Take 0.8 mg by mouth Daily., Disp: 180 tablet, Rfl: 4  •  fluticasone (FLONASE) 50 MCG/ACT nasal spray, 2 sprays into each nostril Daily., Disp: , Rfl:   •  Insulin Pen Needle (NOVOFINE) 32G X 6 MM misc, Take 1 shot of insulin per day, Disp: 90 each, Rfl: 3  •  telmisartan-hydrochlorothiazide (MICARDIS HCT) 40-12.5 MG per tablet, Take 1 tablet by mouth Daily., Disp: 90 tablet, Rfl: " 3  •  TOUJEO SOLOSTAR 300 UNIT/ML solution pen-injector, Inject 45 Units under the skin into the appropriate area as directed Daily., Disp: 12 pen, Rfl: 3  •  XIGDUO XR 5-1000 MG tablet, Take 2 tablets by mouth Daily., Disp: 180 tablet, Rfl: 3      The following portions of the patient's history were reviewed and updated as appropriate: allergies, current medications, past family history, past medical history, past social history, past surgical history and problem list.    Review of Systems   Constitutional: Negative.    HENT: Negative.    Eyes: Negative.    Respiratory: Negative.    Cardiovascular: Negative.    Gastrointestinal: Negative.    Endocrine: Negative.    Genitourinary: Negative.    Musculoskeletal: Negative.    Skin: Negative.    Allergic/Immunologic: Negative.    Neurological: Negative.    Hematological: Negative.    Psychiatric/Behavioral: Negative.        Objective   Physical Exam   Constitutional: She is oriented to person, place, and time. She appears well-developed and well-nourished. No distress.   HENT:   Head: Normocephalic and atraumatic.   Right Ear: External ear normal.   Left Ear: External ear normal.   Nose: Nose normal.   Mouth/Throat: Oropharynx is clear and moist. No oropharyngeal exudate.   Eyes: Conjunctivae and EOM are normal. Pupils are equal, round, and reactive to light. Right eye exhibits no discharge. Left eye exhibits no discharge. No scleral icterus.   Neck: Normal range of motion. Neck supple. No JVD present. No tracheal deviation present. No thyromegaly present.   Cardiovascular: Normal rate, regular rhythm, normal heart sounds and intact distal pulses. Exam reveals no gallop and no friction rub.   No murmur heard.  Pulmonary/Chest: Effort normal and breath sounds normal. No stridor. No respiratory distress. She has no wheezes. She has no rales. She exhibits no tenderness.   Abdominal: Soft. Bowel sounds are normal. She exhibits no distension and no mass. There is no  tenderness. There is no rebound and no guarding. No hernia.   Musculoskeletal: Normal range of motion. She exhibits no edema, tenderness or deformity.   Lymphadenopathy:     She has no cervical adenopathy.   Neurological: She is alert and oriented to person, place, and time. She has normal reflexes. She displays normal reflexes. No cranial nerve deficit or sensory deficit. She exhibits normal muscle tone. Coordination normal.   Skin: Skin is warm and dry. No rash noted. She is not diaphoretic. No erythema. No pallor.   Psychiatric: She has a normal mood and affect. Her behavior is normal. Judgment and thought content normal.   Nursing note and vitals reviewed.       Lab Results   Component Value Date    GLUCOSE 146 (H) 06/13/2019    BUN 16 08/08/2019    CREATININE 0.83 08/08/2019    EGFRIFNONA 68 08/08/2019    EGFRIFAFRI 83 08/08/2019    BCR 19.3 08/08/2019    K 4.6 08/08/2019    CO2 26.9 08/08/2019    CALCIUM 9.1 08/08/2019    PROTENTOTREF 7.4 08/08/2019    ALBUMIN 3.60 08/08/2019    LABIL2 0.9 08/08/2019    AST 97 (H) 08/08/2019    ALT 84 (H) 08/08/2019     Lab Results   Component Value Date    HGBA1C 6.60 (H) 08/08/2019     Lab Results   Component Value Date    TSH 1.570 08/08/2019     Lab Results   Component Value Date    CHLPL 236 (H) 08/08/2019    CHLPL 233 (H) 04/04/2019    CHLPL 225 (H) 01/04/2019     Lab Results   Component Value Date    TRIG 71 08/08/2019    TRIG 78 04/04/2019    TRIG 63 01/04/2019     Lab Results   Component Value Date     (H) 08/08/2019    HDL 98 (H) 04/04/2019     01/04/2019     Lab Results   Component Value Date     (H) 08/08/2019     (H) 04/04/2019     (H) 01/04/2019           Assessment/Plan   Diagnoses and all orders for this visit:    Type 2 diabetes mellitus with microalbuminuria, unspecified whether long term insulin use (CMS/[facility])  -     T4 & TSH ([facility]); Future  -     Uric Acid; Future  -     Vitamin D 25 Hydroxy; Future  -     Comprehensive  Metabolic Panel; Future  -     C-Peptide; Future  -     Hemoglobin A1c; Future  -     NMR LipoProfile; Future    Type 2 diabetes mellitus with complication, with long-term current use of insulin (CMS/HCC)  -     XIGDUO XR 5-1000 MG tablet; Take 2 tablets by mouth Daily.  -     TOUJEO SOLOSTAR 300 UNIT/ML solution pen-injector; Inject 45 Units under the skin into the appropriate area as directed Daily.  -     BYDUREON BCISE 2 MG/0.85ML auto-injector injection; Inject 0.85 mL under the skin into the appropriate area as directed 1 (One) Time Per Week.  -     T4 & TSH ([facility]); Future  -     Uric Acid; Future  -     Vitamin D 25 Hydroxy; Future  -     Comprehensive Metabolic Panel; Future  -     C-Peptide; Future  -     Hemoglobin A1c; Future  -     NMR LipoProfile; Future    Obesity (BMI 30-39.9)  -     T4 & TSH ([facility]); Future  -     Uric Acid; Future  -     Vitamin D 25 Hydroxy; Future  -     Comprehensive Metabolic Panel; Future  -     C-Peptide; Future  -     Hemoglobin A1c; Future  -     NMR LipoProfile; Future    Mixed hyperlipidemia  -     atorvastatin (LIPITOR) 20 MG tablet; Take 1 tablet by mouth Daily.  -     T4 & TSH ([facility]); Future  -     Uric Acid; Future  -     Vitamin D 25 Hydroxy; Future  -     Comprehensive Metabolic Panel; Future  -     C-Peptide; Future  -     Hemoglobin A1c; Future  -     NMR LipoProfile; Future    Essential hypertension  -     telmisartan-hydrochlorothiazide (MICARDIS HCT) 40-12.5 MG per tablet; Take 1 tablet by mouth Daily.  -     T4 & TSH ([facility]); Future  -     Uric Acid; Future  -     Vitamin D 25 Hydroxy; Future  -     Comprehensive Metabolic Panel; Future  -     C-Peptide; Future  -     Hemoglobin A1c; Future  -     NMR LipoProfile; Future    Vitamin D deficiency  -     Cholecalciferol (VITAMIN D3) 67547 units capsule; Take 1 capsule by mouth 2 (Two) Times a Week.  -     T4 & TSH ([facility]); Future  -     Uric Acid; Future  -     Vitamin D 25 Hydroxy; Future  -      Comprehensive Metabolic Panel; Future  -     C-Peptide; Future  -     Hemoglobin A1c; Future  -     NMR LipoProfile; Future    Controlled type 2 diabetes mellitus without complication, with long-term current use of insulin (CMS/[facility])  -     CYCLOSET 0.8 MG tablet; Take 4.8 mg by mouth Daily.  -     T4 & TSH ([facility]); Future  -     Uric Acid; Future  -     Vitamin D 25 Hydroxy; Future  -     Comprehensive Metabolic Panel; Future  -     C-Peptide; Future  -     Hemoglobin A1c; Future  -     NMR LipoProfile; Future    Hyperuricemia  -     allopurinol (ZYLOPRIM) 100 MG tablet; Take 1 tablet by mouth Daily.  -     T4 & TSH ([facility]); Future  -     Uric Acid; Future  -     Vitamin D 25 Hydroxy; Future  -     Comprehensive Metabolic Panel; Future  -     C-Peptide; Future  -     Hemoglobin A1c; Future  -     NMR LipoProfile; Future      In summary I saw and examined this 68-year-old female for above-mentioned problems.  I reviewed her laboratory evaluation of 8/8/2019 and provided her with a hard copy of it.  Overall she is clinically and metabolically stable and therefore we will go ahead and continue all her current prescriptions.  She will see Ms. Tara Olivares in 4 months or sooner if needed with laboratory evaluation prior to each office visit.

## 2019-09-05 ENCOUNTER — INFUSION (OUTPATIENT)
Dept: ONCOLOGY | Facility: HOSPITAL | Age: 69
End: 2019-09-05

## 2019-09-05 DIAGNOSIS — Z45.2 ENCOUNTER FOR ADJUSTMENT OR MANAGEMENT OF VASCULAR ACCESS DEVICE: Primary | ICD-10-CM

## 2019-09-05 PROCEDURE — 96523 IRRIG DRUG DELIVERY DEVICE: CPT | Performed by: INTERNAL MEDICINE

## 2019-09-05 RX ORDER — SODIUM CHLORIDE 0.9 % (FLUSH) 0.9 %
10 SYRINGE (ML) INJECTION AS NEEDED
Status: DISCONTINUED | OUTPATIENT
Start: 2019-09-05 | End: 2019-09-05 | Stop reason: HOSPADM

## 2019-09-05 RX ORDER — SODIUM CHLORIDE 0.9 % (FLUSH) 0.9 %
10 SYRINGE (ML) INJECTION AS NEEDED
Status: CANCELLED | OUTPATIENT
Start: 2019-09-05

## 2019-09-05 RX ADMIN — SODIUM CHLORIDE, PRESERVATIVE FREE 10 ML: 5 INJECTION INTRAVENOUS at 08:14

## 2019-09-05 RX ADMIN — Medication 500 UNITS: at 08:14

## 2019-10-01 ENCOUNTER — TELEPHONE (OUTPATIENT)
Dept: ENDOCRINOLOGY | Age: 69
End: 2019-10-01

## 2019-10-01 DIAGNOSIS — E55.9 VITAMIN D DEFICIENCY: ICD-10-CM

## 2019-10-01 RX ORDER — CHOLECALCIFEROL (VITAMIN D3) 1250 MCG
50000 CAPSULE ORAL 2 TIMES WEEKLY
Qty: 26 CAPSULE | Refills: 3 | Status: SHIPPED | OUTPATIENT
Start: 2019-10-03 | End: 2020-01-24 | Stop reason: SDUPTHER

## 2019-10-01 NOTE — TELEPHONE ENCOUNTER
Pt is wanting medication vitamin D3   She said she never got it, [id number]           [facility] [phone].

## 2019-10-17 ENCOUNTER — INFUSION (OUTPATIENT)
Dept: ONCOLOGY | Facility: HOSPITAL | Age: 69
End: 2019-10-17

## 2019-10-17 ENCOUNTER — APPOINTMENT (OUTPATIENT)
Dept: ONCOLOGY | Facility: HOSPITAL | Age: 69
End: 2019-10-17

## 2019-10-17 ENCOUNTER — HOSPITAL ENCOUNTER (OUTPATIENT)
Dept: PET IMAGING | Facility: HOSPITAL | Age: 69
Discharge: HOME | End: 2019-10-17
Admitting: INTERNAL MEDICINE

## 2019-10-17 DIAGNOSIS — C78.7 LIVER METASTASIS: ICD-10-CM

## 2019-10-17 DIAGNOSIS — C18.9 PRIMARY MALIGNANT NEOPLASM OF COLON (HCC): ICD-10-CM

## 2019-10-17 DIAGNOSIS — R74.8 ELEVATED LIVER ENZYMES: ICD-10-CM

## 2019-10-17 LAB
ALBUMIN SERPL-MCNC: 3.8 G/DL (ref 3.5–5.2)
ALBUMIN/GLOB SERPL: 1 G/DL (ref 1.1–2.4)
ALP SERPL-CCNC: 217 U/L (ref 38–116)
ALT SERPL W P-5'-P-CCNC: 34 U/L (ref 0–33)
ANION GAP SERPL CALCULATED.3IONS-SCNC: 10.4 MMOL/L (ref 5–15)
AST SERPL-CCNC: 43 U/L (ref 0–32)
BASOPHILS # BLD AUTO: 0.05 10*3/MM3 (ref 0–0.2)
BASOPHILS NFR BLD AUTO: 0.7 % (ref 0–1.5)
BILIRUB SERPL-MCNC: 0.3 MG/DL (ref 0.2–1.2)
BUN BLD-MCNC: 16 MG/DL (ref 6–20)
BUN/CREAT SERPL: 18.6 (ref 7.3–30)
CALCIUM SPEC-SCNC: 9.2 MG/DL (ref 8.5–10.2)
CEA SERPL-MCNC: 4.26 NG/ML
CHLORIDE SERPL-SCNC: 101 MMOL/L (ref 98–107)
CO2 SERPL-SCNC: 27.6 MMOL/L (ref 22–29)
CREAT BLD-MCNC: 0.86 MG/DL (ref 0.6–1.1)
CREAT BLDA-MCNC: 0.8 MG/DL (ref 0.6–1.3)
DEPRECATED RDW RBC AUTO: 49.9 FL (ref 37–54)
EOSINOPHIL # BLD AUTO: 0.42 10*3/MM3 (ref 0–0.4)
EOSINOPHIL NFR BLD AUTO: 5.7 % (ref 0.3–6.2)
ERYTHROCYTE [DISTWIDTH] IN BLOOD BY AUTOMATED COUNT: 15.8 % (ref 12.3–15.4)
GFR SERPL CREATININE-BSD FRML MDRD: 80 ML/MIN/1.73
GLOBULIN UR ELPH-MCNC: 4 GM/DL (ref 1.8–3.5)
GLUCOSE BLD-MCNC: 103 MG/DL (ref 74–124)
HCT VFR BLD AUTO: 39.7 % (ref 34–46.6)
HGB BLD-MCNC: 12.9 G/DL (ref 12–15.9)
IMM GRANULOCYTES # BLD AUTO: 0.03 10*3/MM3 (ref 0–0.05)
IMM GRANULOCYTES NFR BLD AUTO: 0.4 % (ref 0–0.5)
LYMPHOCYTES # BLD AUTO: 2.13 10*3/MM3 (ref 0.7–3.1)
LYMPHOCYTES NFR BLD AUTO: 28.7 % (ref 19.6–45.3)
MCH RBC QN AUTO: 28.1 PG (ref 26.6–33)
MCHC RBC AUTO-ENTMCNC: 32.5 G/DL (ref 31.5–35.7)
MCV RBC AUTO: 86.5 FL (ref 79–97)
MONOCYTES # BLD AUTO: 0.88 10*3/MM3 (ref 0.1–0.9)
MONOCYTES NFR BLD AUTO: 11.9 % (ref 5–12)
NEUTROPHILS # BLD AUTO: 3.9 10*3/MM3 (ref 1.7–7)
NEUTROPHILS NFR BLD AUTO: 52.6 % (ref 42.7–76)
NRBC BLD AUTO-RTO: 0 /100 WBC (ref 0–0.2)
PLATELET # BLD AUTO: 367 10*3/MM3 (ref 140–450)
PMV BLD AUTO: 8.7 FL (ref 6–12)
POTASSIUM BLD-SCNC: 4.1 MMOL/L (ref 3.5–4.7)
PROT SERPL-MCNC: 7.8 G/DL (ref 6.3–8)
RBC # BLD AUTO: 4.59 10*6/MM3 (ref 3.77–5.28)
SODIUM BLD-SCNC: 139 MMOL/L (ref 134–145)
WBC NRBC COR # BLD: 7.41 10*3/MM3 (ref 3.4–10.8)

## 2019-10-17 PROCEDURE — 71260 CT THORAX DX C+: CPT

## 2019-10-17 PROCEDURE — 82565 ASSAY OF CREATININE: CPT

## 2019-10-17 PROCEDURE — 96523 IRRIG DRUG DELIVERY DEVICE: CPT | Performed by: INTERNAL MEDICINE

## 2019-10-17 PROCEDURE — 82378 CARCINOEMBRYONIC ANTIGEN: CPT | Performed by: INTERNAL MEDICINE

## 2019-10-17 PROCEDURE — 80053 COMPREHEN METABOLIC PANEL: CPT | Performed by: INTERNAL MEDICINE

## 2019-10-17 PROCEDURE — 85025 COMPLETE CBC W/AUTO DIFF WBC: CPT | Performed by: INTERNAL MEDICINE

## 2019-10-17 PROCEDURE — 25010000002 IOPAMIDOL 61 % SOLUTION: Performed by: INTERNAL MEDICINE

## 2019-10-17 RX ADMIN — IOPAMIDOL 75 ML: 612 INJECTION, SOLUTION INTRAVENOUS at 09:20

## 2019-10-24 ENCOUNTER — APPOINTMENT (OUTPATIENT)
Dept: LAB | Facility: HOSPITAL | Age: 69
End: 2019-10-24

## 2019-10-24 ENCOUNTER — OFFICE VISIT (OUTPATIENT)
Dept: ONCOLOGY | Facility: CLINIC | Age: 69
End: 2019-10-24

## 2019-10-24 VITALS
RESPIRATION RATE: 16 BRPM | OXYGEN SATURATION: 96 % | BODY MASS INDEX: 36.73 KG/M2 | SYSTOLIC BLOOD PRESSURE: 133 MMHG | HEART RATE: 78 BPM | DIASTOLIC BLOOD PRESSURE: 72 MMHG | TEMPERATURE: 98.2 F | HEIGHT: 69 IN | WEIGHT: 248 LBS

## 2019-10-24 DIAGNOSIS — Z45.2 ENCOUNTER FOR ADJUSTMENT OR MANAGEMENT OF VASCULAR ACCESS DEVICE: ICD-10-CM

## 2019-10-24 DIAGNOSIS — C78.7 LIVER METASTASIS: ICD-10-CM

## 2019-10-24 DIAGNOSIS — I31.39 PERICARDIAL EFFUSION: ICD-10-CM

## 2019-10-24 DIAGNOSIS — R74.8 ELEVATED LIVER ENZYMES: ICD-10-CM

## 2019-10-24 DIAGNOSIS — C18.9 PRIMARY MALIGNANT NEOPLASM OF COLON (HCC): Primary | ICD-10-CM

## 2019-10-24 PROCEDURE — 99214 OFFICE O/P EST MOD 30 MIN: CPT | Performed by: INTERNAL MEDICINE

## 2019-10-24 PROCEDURE — G0463 HOSPITAL OUTPT CLINIC VISIT: HCPCS | Performed by: INTERNAL MEDICINE

## 2019-10-24 NOTE — PROGRESS NOTES
Subjective     CHIEF COMPLAINT:      Chief Complaint   Patient presents with   • Follow-up     discuss ct scan       HISTORY OF PRESENT ILLNESS:     Feli Del Toro is a 68 y.o. female patient who returns today for follow up on her colon cancer with liver metastasis.  She is accompanied by her .  She reports feeling good.  No abdominal pain.  No nausea vomiting.  No shortness of breath or chest pain.      REVIEW OF SYSTEMS:  Review of Systems   Constitutional: Negative for chills, fever and unexpected weight change.   HENT: Negative for mouth sores, nosebleeds, sore throat and voice change.    Eyes: Negative for visual disturbance.   Respiratory: Negative for cough and shortness of breath.    Cardiovascular: Negative for chest pain and leg swelling.   Gastrointestinal: Negative for abdominal pain, blood in stool, constipation, diarrhea, nausea and vomiting.   Genitourinary: Negative for dysuria, frequency and hematuria.   Musculoskeletal: Negative for arthralgias, back pain and joint swelling.   Skin: Negative for rash.   Neurological: Negative for dizziness, numbness and headaches.   Hematological: Negative for adenopathy. Does not bruise/bleed easily.   Psychiatric/Behavioral: Negative for dysphoric mood. The patient is not nervous/anxious.      I verified the ROS obtained by the MA.      Past Medical History:   Diagnosis Date   • Acid reflux    • Anemia    • Cancer (CMS/HCC) 06/04/2013   • Diabetes mellitus (CMS/HCC)    • Dyspnea    • Essential hypertension    • History of constipation    • History of transfusion    • Hyperlipidemia    • Hypertension    • Metastatic colon cancer to liver (CMS/HCC)    • Neuropathy    • Pericardial effusion     Moderate   • Retinopathy    • SOB (shortness of breath)    • Type 2 diabetes mellitus (CMS/HCC)        Past Surgical History:   Procedure Laterality Date   • ABDOMINAL SURGERY      ablation    • AMPUTATION DIGIT Left 1/1/2017    Procedure: LEFT SECOND TOE AMPUTATION;   Surgeon: Farzad Nichols MD;  Location: [facility] OR;  Service:    • AMPUTATION OF REPLICATED TOES      right distal second toe    • CARDIAC CATHETERIZATION N/A 3/8/2018    Procedure: Pericardiocentesis;  Surgeon: Andrew Dobbins MD;  Location: [facility] CATH INVASIVE LOCATION;  Service:    • CATARACT EXTRACTION     • COLECTOMY PARTIAL / TOTAL  01/14/2014   • COLONOSCOPY  2013   • FOOT SURGERY Left 03/15/2019   • FRACTURE SURGERY Right 2017    right lower leg with screw in ankle   • HYSTERECTOMY  1998   • PERICARDIAL WINDOW N/A 3/10/2018    Procedure: PERICARDIAL WINDOW;  Surgeon: Nomi Patel III, MD;  Location: [facility] OR;  Service: Cardiothoracic   • PORTACATH PLACEMENT  06/2013   • SALPINGO OOPHORECTOMY Bilateral    • SPLENECTOMY     • THORACOSCOPY Left 3/10/2018    Procedure: BRONCHOSCOPY, LEFT THORACOSCOPY VIDEO ASSISTED, SUBPLEURAL CATHETERS FOR PAIN;  Surgeon: Nomi Patel III, MD;  Location: [facility] OR;  Service: Cardiothoracic       Cancer-related family history includes Breast cancer in her sister; Cancer in an other family member.  Social History     Tobacco Use   • Smoking status: Never Smoker   • Smokeless tobacco: Never Used   Substance Use Topics   • Alcohol use: No       MEDICATIONS:    Current Outpatient Medications:   •  allopurinol (ZYLOPRIM) 100 MG tablet, Take 1 tablet by mouth Daily., Disp: 90 tablet, Rfl: 3  •  aspirin 81 MG tablet, Take 81 mg by mouth daily., Disp: , Rfl:   •  atorvastatin (LIPITOR) 20 MG tablet, Take 1 tablet by mouth Daily., Disp: 90 tablet, Rfl: 3  •  BYDUREON BCISE 2 MG/0.85ML auto-injector injection, Inject 0.85 mL under the skin into the appropriate area as directed 1 (One) Time Per Week., Disp: 12 pen, Rfl: 3  •  calcium carbonate (TUMS) 500 MG chewable tablet, Chew 2 tablets As Needed., Disp: , Rfl:   •  CYCLOSET 0.8 MG tablet, Take 4.8 mg by mouth Daily., Disp: 540 tablet, Rfl: 3  •  fluticasone (FLONASE) 50 MCG/ACT nasal spray, 2 sprays into the  "nostril(s) as directed by provider As Needed., Disp: , Rfl:   •  Insulin Pen Needle (NOVOFINE) 32G X 6 MM misc, Take 1 shot of insulin per day, Disp: 90 each, Rfl: 3  •  telmisartan-hydrochlorothiazide (MICARDIS HCT) 40-12.5 MG per tablet, Take 1 tablet by mouth Daily., Disp: 90 tablet, Rfl: 3  •  TOUJEO SOLOSTAR 300 UNIT/ML solution pen-injector, Inject 45 Units under the skin into the appropriate area as directed Daily., Disp: 12 pen, Rfl: 3  •  XIGDUO XR 5-1000 MG tablet, Take 2 tablets by mouth Daily., Disp: 180 tablet, Rfl: 3  •  Cholecalciferol (VITAMIN D3) 83026 units capsule, Take 1 capsule by mouth 2 (Two) Times a Week., Disp: 26 capsule, Rfl: 3    ALLERGIES:  Allergies   Allergen Reactions   • Compazine [Prochlorperazine Edisylate] Other (See Comments)     Complete arch in the back    • Prochlorperazine Maleate Other (See Comments)     \"BACK MUSCLE RETRACTION\"         Objective   VITAL SIGNS:     Vitals:    10/24/19 0938   BP: 133/72   Pulse: 78   Resp: 16   Temp: 98.2 °F (36.8 °C)   TempSrc: Oral   SpO2: 96%   Weight: 112 kg (248 lb)   Height: 175.3 cm (69.02\")   PainSc: 0-No pain     Body mass index is 36.61 kg/m².     Wt Readings from Last 3 Encounters:   10/24/19 112 kg (248 lb)   08/22/19 112 kg (248 lb)   06/13/19 95.4 kg (210 lb 4.8 oz)       PHYSICAL EXAMINATION:  GENERAL:  The patient appears in good general condition, not in acute distress.  SKIN: Warm and dry. No skin rashes. No ecchymosis.  HEAD:  Normocephalic.  EYES:  No Jaundice. No Pallor. Pupils equal. EOMI.  NECK:  Supple with Good ROM. No Thyromegaly. No Masses.  LYMPHATICS:  No cervical or supraclavicular lymphadenopathy.  CHEST: Normal respiratory effort. Lungs clear to auscultation.   CARDIAC:  Normal S1 & S2. No murmur. No edema.  ABDOMEN: Obese.  Soft. No tenderness. No Hepatomegaly. No Splenomegaly. No masses.        DIAGNOSTIC DATA:     Component      Latest Ref Rng & Units 10/17/2019   WBC      3.40 - 10.80 10*3/mm3 7.41   RBC    "   3.77 - 5.28 10*6/mm3 4.59   Hemoglobin      12.0 - 15.9 g/dL 12.9   Hematocrit      34.0 - 46.6 % 39.7   MCV      79.0 - 97.0 fL 86.5   MCH      26.6 - 33.0 pg 28.1   MCHC      31.5 - 35.7 g/dL 32.5   RDW      12.3 - 15.4 % 15.8 (H)   RDW-SD      37.0 - 54.0 fl 49.9   MPV      6.0 - 12.0 fL 8.7   Platelets      140 - 450 10*3/mm3 367   Neutrophil Rel %      42.7 - 76.0 % 52.6   Lymphocyte Rel %      19.6 - 45.3 % 28.7   Monocyte Rel %      5.0 - 12.0 % 11.9   Eosinophil Rel %      0.3 - 6.2 % 5.7   Basophil Rel %      0.0 - 1.5 % 0.7   Immature Granulocyte Rel %      0.0 - 0.5 % 0.4   Neutrophils Absolute      1.70 - 7.00 10*3/mm3 3.90   Lymphocytes Absolute      0.70 - 3.10 10*3/mm3 2.13   Monocytes Absolute      0.10 - 0.90 10*3/mm3 0.88   Eosinophils Absolute      0.00 - 0.40 10*3/mm3 0.42 (H)   Basophils Absolute      0.00 - 0.20 10*3/mm3 0.05   Immature Grans, Absolute      0.00 - 0.05 10*3/mm3 0.03   nRBC      0.0 - 0.2 /100 WBC 0.0   Glucose      74 - 124 mg/dL 103   BUN      6 - 20 mg/dL 16   Creatinine      0.60 - 1.10 mg/dL 0.86   Sodium      134 - 145 mmol/L 139   Potassium      3.5 - 4.7 mmol/L 4.1   Chloride      98 - 107 mmol/L 101   CO2      22.0 - 29.0 mmol/L 27.6   Calcium      8.5 - 10.2 mg/dL 9.2   Total Protein      6.3 - 8.0 g/dL 7.8   Albumin      3.50 - 5.20 g/dL 3.80   ALT (SGPT)      0 - 33 U/L 34 (H)   AST (SGOT)      0 - 32 U/L 43 (H)   Alkaline Phosphatase      38 - 116 U/L 217 (H)   Total Bilirubin      0.2 - 1.2 mg/dL 0.3   eGFR African Am      >60 mL/min/1.73 80   Globulin      1.8 - 3.5 gm/dL 4.0 (H)   A/G Ratio      1.1 - 2.4 g/dL 1.0 (L)   BUN/Creatinine Ratio      7.3 - 30.0 18.6   Anion Gap      5.0 - 15.0 mmol/L 10.4   CEA      ng/mL 4.26       CT CHEST WITH CONTRAST 10/17/19:     Radiation dose reduction techniques were utilized, including automated  exposure control and exposure modulation based on body size.     CLINICAL INFORMATION:  Follow up colon carcinoma, evaluate  for  intrathoracic metastasis, there is a known hepatic metastasis.     COMPARISON: 03/11/2019     FINDINGS: Stable scarring at the left lung base. The lungs are otherwise  clear. No pleural abnormality nor effusion.     Cardiac size within normal limits, no pericardial abnormality. The  esophagus is satisfactory in course and caliber. No mediastinal or hilar  mass/lymphadenopathy. The breast parenchyma is symmetric and stable,  there are typical axillary and subpectoral lymph nodes demonstrated  without interval change. Limited images through the upper abdomen  demonstrate a stable hepatic nodule. No lytic or sclerotic bone lesion  has developed.     CONCLUSION: No indication of intrathoracic metastatic disease. Stable CT  chest.        This report was finalized on 10/17/2019 3:55 PM by Dr. David Araujo M.D    Assessment/Plan   1.  Metastatic colon cancer. She had metastases to the liver.   · She received 12 cycles of FOLFOX-6 and then had resection of the primary tumor along with metastasectomy and splenectomy with thermal ablation of lesions in the liver in January 2014.   · This was followed by 12 cycles of the FOLFIRI regimen completed on 08/07/2014.   · CT scans on 05/28/2015 revealed a new lesion in the liver.  Dr. Ruano referred the patient to Radiation Therapy and she received CyberKnife radiation, completed on 07/14/2015.    · The CT scan from 5/5/17 revealed a new 2.2 cm lesion in the anterior hepatic segment.  CT guided microwave ablation of liver mass performed on 6/16/17 with CT evidence on 7/20/17 of a complete response  · FOLFIRI ×12 cycles given between 6/29/17 and 11/29/17.   · CT scan from 3/11/2019 showed no evidence of lung metastasis.  No evidence of reaccumulation of the left pleural/pericardial effusion.  · CT scan of the abdomen on 5/9/2019 showed further decrease in the size of the liver metastasis.  No new lesions were seen.  · CT scan of the chest on 10/17/2019 showed no lung  metastasis.  No recurrence of the pericardial or pleural effusions.   · CEA remains stable in the 4 range.  · Patient has follow-up with Dr. Ruano in 1 week and will have a follow-up CT scan of the abdomen pelvis.    2.  Elevated liver enzymes.  Overall, they have improved although they did not normalize.  Bilirubin remains normal.    3.  Pericardial effusion status post pericardial window.  Cytology and biopsy were negative for malignancy.  CT scan from 10/17/2019 is not showing evidence of recurrence of the effusions.    4.   Patient has a port which would need maintenance every 6 weeks.    PLAN:    1.  We will await the results of the upcoming CT scan of the abdomen pelvis.  If no signs of progression, we will continue to monitor.  2.  Patient will return every 6 weeks for port flush.    3.  Follow-up in 4 months with CBC CMP CEA.  I recommended repeating CT scans at 6-month intervals.       Marivel Brown MD  10/24/19

## 2019-12-05 ENCOUNTER — INFUSION (OUTPATIENT)
Dept: ONCOLOGY | Facility: HOSPITAL | Age: 69
End: 2019-12-05

## 2019-12-05 DIAGNOSIS — Z45.2 ENCOUNTER FOR ADJUSTMENT OR MANAGEMENT OF VASCULAR ACCESS DEVICE: Primary | ICD-10-CM

## 2019-12-05 PROCEDURE — 96523 IRRIG DRUG DELIVERY DEVICE: CPT | Performed by: INTERNAL MEDICINE

## 2019-12-05 RX ORDER — SODIUM CHLORIDE 0.9 % (FLUSH) 0.9 %
10 SYRINGE (ML) INJECTION AS NEEDED
Status: DISCONTINUED | OUTPATIENT
Start: 2019-12-05 | End: 2019-12-05 | Stop reason: HOSPADM

## 2019-12-05 RX ORDER — SODIUM CHLORIDE 0.9 % (FLUSH) 0.9 %
10 SYRINGE (ML) INJECTION AS NEEDED
Status: CANCELLED | OUTPATIENT
Start: 2019-12-05

## 2019-12-05 RX ADMIN — Medication 500 UNITS: at 13:31

## 2019-12-05 RX ADMIN — Medication 10 ML: at 13:31

## 2019-12-09 ENCOUNTER — RESULTS ENCOUNTER (OUTPATIENT)
Dept: ENDOCRINOLOGY | Age: 69
End: 2019-12-09

## 2019-12-09 DIAGNOSIS — Z79.4 CONTROLLED TYPE 2 DIABETES MELLITUS WITHOUT COMPLICATION, WITH LONG-TERM CURRENT USE OF INSULIN (HCC): ICD-10-CM

## 2019-12-09 DIAGNOSIS — E11.9 CONTROLLED TYPE 2 DIABETES MELLITUS WITHOUT COMPLICATION, WITH LONG-TERM CURRENT USE OF INSULIN (HCC): ICD-10-CM

## 2019-12-09 DIAGNOSIS — E78.2 MIXED HYPERLIPIDEMIA: ICD-10-CM

## 2019-12-09 DIAGNOSIS — R80.9 TYPE 2 DIABETES MELLITUS WITH MICROALBUMINURIA, UNSPECIFIED WHETHER LONG TERM INSULIN USE (HCC): ICD-10-CM

## 2019-12-09 DIAGNOSIS — Z79.4 TYPE 2 DIABETES MELLITUS WITH COMPLICATION, WITH LONG-TERM CURRENT USE OF INSULIN (HCC): ICD-10-CM

## 2019-12-09 DIAGNOSIS — E66.9 OBESITY (BMI 30-39.9): ICD-10-CM

## 2019-12-09 DIAGNOSIS — I10 ESSENTIAL HYPERTENSION: ICD-10-CM

## 2019-12-09 DIAGNOSIS — E11.8 TYPE 2 DIABETES MELLITUS WITH COMPLICATION, WITH LONG-TERM CURRENT USE OF INSULIN (HCC): ICD-10-CM

## 2019-12-09 DIAGNOSIS — E79.0 HYPERURICEMIA: ICD-10-CM

## 2019-12-09 DIAGNOSIS — E11.29 TYPE 2 DIABETES MELLITUS WITH MICROALBUMINURIA, UNSPECIFIED WHETHER LONG TERM INSULIN USE (HCC): ICD-10-CM

## 2019-12-09 DIAGNOSIS — E55.9 VITAMIN D DEFICIENCY: ICD-10-CM

## 2020-01-09 LAB
25(OH)D3+25(OH)D2 SERPL-MCNC: 26.4 NG/ML (ref 30–100)
ALBUMIN SERPL-MCNC: 3.8 G/DL (ref 3.5–5.2)
ALBUMIN/GLOB SERPL: 1 G/DL
ALP SERPL-CCNC: 183 U/L (ref 39–117)
ALT SERPL-CCNC: 28 U/L (ref 1–33)
AST SERPL-CCNC: 37 U/L (ref 1–32)
BILIRUB SERPL-MCNC: 0.2 MG/DL (ref 0.2–1.2)
BUN SERPL-MCNC: 15 MG/DL (ref 8–23)
BUN/CREAT SERPL: 15.5 (ref 7–25)
C PEPTIDE SERPL-MCNC: 3.5 NG/ML (ref 1.1–4.4)
CALCIUM SERPL-MCNC: 9 MG/DL (ref 8.6–10.5)
CHLORIDE SERPL-SCNC: 101 MMOL/L (ref 98–107)
CHOLEST SERPL-MCNC: 219 MG/DL (ref 100–199)
CO2 SERPL-SCNC: 27.5 MMOL/L (ref 22–29)
CREAT SERPL-MCNC: 0.97 MG/DL (ref 0.57–1)
GLOBULIN SER CALC-MCNC: 3.8 GM/DL
GLUCOSE SERPL-MCNC: 94 MG/DL (ref 65–99)
HBA1C MFR BLD: 6.6 % (ref 4.8–5.6)
HDL SERPL-SCNC: 30.8 UMOL/L
HDLC SERPL-MCNC: 90 MG/DL
LDL SERPL QN: 22 NM
LDL SERPL-SCNC: 1107 NMOL/L
LDL SMALL SERPL-SCNC: <90 NMOL/L
LDLC SERPL CALC-MCNC: 116 MG/DL (ref 0–99)
POTASSIUM SERPL-SCNC: 4.8 MMOL/L (ref 3.5–5.2)
PROT SERPL-MCNC: 7.6 G/DL (ref 6–8.5)
SODIUM SERPL-SCNC: 140 MMOL/L (ref 136–145)
T4 SERPL-MCNC: 8.94 MCG/DL (ref 4.5–11.7)
TRIGL SERPL-MCNC: 63 MG/DL (ref 0–149)
TSH SERPL DL<=0.005 MIU/L-ACNC: 1.24 UIU/ML (ref 0.27–4.2)
URATE SERPL-MCNC: 4.2 MG/DL (ref 2.4–5.7)

## 2020-01-16 ENCOUNTER — INFUSION (OUTPATIENT)
Dept: ONCOLOGY | Facility: HOSPITAL | Age: 70
End: 2020-01-16

## 2020-01-16 DIAGNOSIS — Z45.2 ENCOUNTER FOR ADJUSTMENT OR MANAGEMENT OF VASCULAR ACCESS DEVICE: Primary | ICD-10-CM

## 2020-01-16 PROCEDURE — 96523 IRRIG DRUG DELIVERY DEVICE: CPT | Performed by: INTERNAL MEDICINE

## 2020-01-16 RX ORDER — SODIUM CHLORIDE 0.9 % (FLUSH) 0.9 %
10 SYRINGE (ML) INJECTION AS NEEDED
Status: DISCONTINUED | OUTPATIENT
Start: 2020-01-16 | End: 2020-01-16 | Stop reason: HOSPADM

## 2020-01-16 RX ORDER — SODIUM CHLORIDE 0.9 % (FLUSH) 0.9 %
10 SYRINGE (ML) INJECTION AS NEEDED
Status: CANCELLED | OUTPATIENT
Start: 2020-01-16

## 2020-01-16 RX ORDER — HEPARIN SODIUM (PORCINE) LOCK FLUSH IV SOLN 100 UNIT/ML 100 UNIT/ML
500 SOLUTION INTRAVENOUS AS NEEDED
Status: CANCELLED | OUTPATIENT
Start: 2020-01-16

## 2020-01-16 RX ADMIN — Medication 10 ML: at 13:33

## 2020-01-16 RX ADMIN — SODIUM CHLORIDE, PRESERVATIVE FREE 500 UNITS: 5 INJECTION INTRAVENOUS at 13:33

## 2020-01-24 ENCOUNTER — OFFICE VISIT (OUTPATIENT)
Dept: ENDOCRINOLOGY | Age: 70
End: 2020-01-24

## 2020-01-24 VITALS
SYSTOLIC BLOOD PRESSURE: 148 MMHG | RESPIRATION RATE: 16 BRPM | DIASTOLIC BLOOD PRESSURE: 76 MMHG | HEIGHT: 69 IN | BODY MASS INDEX: 36.58 KG/M2 | WEIGHT: 247 LBS

## 2020-01-24 DIAGNOSIS — E11.29 TYPE 2 DIABETES MELLITUS WITH MICROALBUMINURIA, UNSPECIFIED WHETHER LONG TERM INSULIN USE (HCC): Primary | ICD-10-CM

## 2020-01-24 DIAGNOSIS — E11.8 TYPE 2 DIABETES MELLITUS WITH COMPLICATION, WITH LONG-TERM CURRENT USE OF INSULIN (HCC): ICD-10-CM

## 2020-01-24 DIAGNOSIS — E66.01 MORBID OBESITY DUE TO EXCESS CALORIES (HCC): ICD-10-CM

## 2020-01-24 DIAGNOSIS — Z79.4 TYPE 2 DIABETES MELLITUS WITH COMPLICATION, WITH LONG-TERM CURRENT USE OF INSULIN (HCC): ICD-10-CM

## 2020-01-24 DIAGNOSIS — E78.2 MIXED HYPERLIPIDEMIA: ICD-10-CM

## 2020-01-24 DIAGNOSIS — E11.9 CONTROLLED TYPE 2 DIABETES MELLITUS WITHOUT COMPLICATION, WITH LONG-TERM CURRENT USE OF INSULIN (HCC): ICD-10-CM

## 2020-01-24 DIAGNOSIS — E55.9 VITAMIN D DEFICIENCY: ICD-10-CM

## 2020-01-24 DIAGNOSIS — E79.0 HYPERURICEMIA: ICD-10-CM

## 2020-01-24 DIAGNOSIS — Z79.4 CONTROLLED TYPE 2 DIABETES MELLITUS WITHOUT COMPLICATION, WITH LONG-TERM CURRENT USE OF INSULIN (HCC): ICD-10-CM

## 2020-01-24 DIAGNOSIS — I10 ESSENTIAL HYPERTENSION: ICD-10-CM

## 2020-01-24 DIAGNOSIS — R80.9 TYPE 2 DIABETES MELLITUS WITH MICROALBUMINURIA, UNSPECIFIED WHETHER LONG TERM INSULIN USE (HCC): Primary | ICD-10-CM

## 2020-01-24 PROCEDURE — 99214 OFFICE O/P EST MOD 30 MIN: CPT | Performed by: INTERNAL MEDICINE

## 2020-01-24 RX ORDER — DAPAGLIFLOZIN AND METFORMIN HYDROCHLORIDE 5; 1000 MG/1; MG/1
2 TABLET, FILM COATED, EXTENDED RELEASE ORAL DAILY
Qty: 180 TABLET | Refills: 3 | Status: SHIPPED | OUTPATIENT
Start: 2020-01-24 | End: 2020-08-28

## 2020-01-24 RX ORDER — INSULIN GLARGINE 300 U/ML
45 INJECTION, SOLUTION SUBCUTANEOUS DAILY
Qty: 12 PEN | Refills: 3 | Status: SHIPPED | OUTPATIENT
Start: 2020-01-24 | End: 2021-03-26

## 2020-01-24 RX ORDER — EXENATIDE 2 MG/.85ML
2 INJECTION, SUSPENSION, EXTENDED RELEASE SUBCUTANEOUS WEEKLY
Qty: 12 PEN | Refills: 3 | Status: SHIPPED | OUTPATIENT
Start: 2020-01-24 | End: 2020-07-06 | Stop reason: SDUPTHER

## 2020-01-24 RX ORDER — ATORVASTATIN CALCIUM 20 MG/1
20 TABLET, FILM COATED ORAL DAILY
Qty: 90 TABLET | Refills: 3 | Status: SHIPPED | OUTPATIENT
Start: 2020-01-24 | End: 2021-01-18

## 2020-01-24 RX ORDER — TELMISARTAN AND HYDROCHLORTHIAZIDE 40; 12.5 MG/1; MG/1
1 TABLET ORAL DAILY
Qty: 90 TABLET | Refills: 3 | Status: SHIPPED | OUTPATIENT
Start: 2020-01-24 | End: 2022-06-01 | Stop reason: SDUPTHER

## 2020-01-24 RX ORDER — ALLOPURINOL 100 MG/1
100 TABLET ORAL DAILY
Qty: 90 TABLET | Refills: 3 | Status: SHIPPED | OUTPATIENT
Start: 2020-01-24 | End: 2022-06-01 | Stop reason: SDUPTHER

## 2020-01-24 RX ORDER — CHOLECALCIFEROL (VITAMIN D3) 1250 MCG
50000 CAPSULE ORAL 3 TIMES WEEKLY
Qty: 39 CAPSULE | Refills: 3 | Status: SHIPPED | OUTPATIENT
Start: 2020-01-24 | End: 2020-09-10

## 2020-01-24 NOTE — PROGRESS NOTES
"Subjective   Sontyrell Del Toro is a 69 y.o. female seen for follow up for Dm2, hyperlipidemia, HTN, lab review. Patient states that she did not take Cycloset due to cost. She denies any problems or concerns. She is checking BG 3 times a day. No BG readings but states this AM was 76.     History of Present Illness this is a 69-year-old female known patient with type 2 diabetes morbid obesity as well as hypertension and dyslipidemia with vitamin D deficiency and peripheral neuropathy.  Over the course of 6 months she has had no significant health problem for which to go to the ER or hospital.    /76   Resp 16   Ht 175.3 cm (69\")   Wt 112 kg (247 lb)   LMP  (LMP Unknown)   BMI 36.48 kg/m²      Allergies   Allergen Reactions   • Compazine [Prochlorperazine Edisylate] Other (See Comments)     Complete arch in the back    • Prochlorperazine Maleate Other (See Comments)     \"BACK MUSCLE RETRACTION\"       Current Outpatient Medications:   •  allopurinol (ZYLOPRIM) 100 MG tablet, Take 1 tablet by mouth Daily., Disp: 90 tablet, Rfl: 3  •  aspirin 81 MG tablet, Take 81 mg by mouth daily., Disp: , Rfl:   •  atorvastatin (LIPITOR) 20 MG tablet, Take 1 tablet by mouth Daily., Disp: 90 tablet, Rfl: 3  •  BYDUREON BCISE 2 MG/0.85ML auto-injector injection, Inject 0.85 mL under the skin into the appropriate area as directed 1 (One) Time Per Week., Disp: 12 pen, Rfl: 3  •  calcium carbonate (TUMS) 500 MG chewable tablet, Chew 2 tablets As Needed., Disp: , Rfl:   •  Cholecalciferol (VITAMIN D3) 71336 units capsule, Take 1 capsule by mouth 2 (Two) Times a Week., Disp: 26 capsule, Rfl: 3  •  fluticasone (FLONASE) 50 MCG/ACT nasal spray, 2 sprays into the nostril(s) as directed by provider As Needed., Disp: , Rfl:   •  Insulin Pen Needle (NOVOFINE) 32G X 6 MM misc, Take 1 shot of insulin per day, Disp: 90 each, Rfl: 3  •  telmisartan-hydrochlorothiazide (MICARDIS HCT) 40-12.5 MG per tablet, Take 1 tablet by mouth Daily., Disp: 90 " tablet, Rfl: 3  •  TOUJEO SOLOSTAR 300 UNIT/ML solution pen-injector, Inject 45 Units under the skin into the appropriate area as directed Daily., Disp: 12 pen, Rfl: 3  •  XIGDUO XR 5-1000 MG tablet, Take 2 tablets by mouth Daily., Disp: 180 tablet, Rfl: 3    The following portions of the patient's history were reviewed and updated as appropriate: allergies, current medications, past family history, past medical history, past social history, past surgical history and problem list.    Review of Systems   Constitutional: Negative.    HENT: Negative.    Eyes: Negative.    Respiratory: Negative.    Cardiovascular: Negative.    Gastrointestinal: Negative.    Endocrine: Negative.    Genitourinary: Negative.    Musculoskeletal: Negative.    Skin: Negative.    Allergic/Immunologic: Negative.    Neurological: Negative.    Hematological: Negative.    Psychiatric/Behavioral: Negative.    Above review of system was reviewed, corroborated and accepted.    Objective   Physical Exam   Constitutional: She is oriented to person, place, and time. She appears well-developed and well-nourished. No distress.   HENT:   Head: Normocephalic and atraumatic.   Right Ear: External ear normal.   Left Ear: External ear normal.   Nose: Nose normal.   Mouth/Throat: Oropharynx is clear and moist. No oropharyngeal exudate.   Eyes: Pupils are equal, round, and reactive to light. Conjunctivae and EOM are normal. Right eye exhibits no discharge. Left eye exhibits no discharge. No scleral icterus.   Neck: Normal range of motion. Neck supple. No JVD present. No tracheal deviation present. No thyromegaly present.   Cardiovascular: Normal rate, regular rhythm, normal heart sounds and intact distal pulses. Exam reveals no gallop and no friction rub.   No murmur heard.  Pulmonary/Chest: Effort normal and breath sounds normal. No stridor. No respiratory distress. She has no wheezes. She has no rales. She exhibits no tenderness.   Abdominal: Soft. Bowel  sounds are normal. She exhibits no distension and no mass. There is no tenderness. There is no rebound and no guarding. No hernia.   Musculoskeletal: Normal range of motion. She exhibits no edema, tenderness or deformity.   Lymphadenopathy:     She has no cervical adenopathy.   Neurological: She is alert and oriented to person, place, and time. She has normal reflexes. She displays normal reflexes. No cranial nerve deficit or sensory deficit. She exhibits normal muscle tone. Coordination normal.   Skin: Skin is warm and dry. No rash noted. She is not diaphoretic. No erythema. No pallor.   Psychiatric: She has a normal mood and affect. Her behavior is normal. Judgment and thought content normal.   Nursing note and vitals reviewed.  No significant change since 8/22/2019 office visit.    Results for orders placed or performed in visit on 12/09/19   T4 & TSH ([facility])   Result Value Ref Range    TSH 1.240 0.270 - 4.200 uIU/mL    T4, Total 8.94 4.50 - 11.70 mcg/dL   Uric Acid   Result Value Ref Range    Uric Acid 4.2 2.4 - 5.7 mg/dL   Vitamin D 25 Hydroxy   Result Value Ref Range    25 Hydroxy, Vitamin D 26.4 (L) 30.0 - 100.0 ng/ml   Comprehensive Metabolic Panel   Result Value Ref Range    Glucose 94 65 - 99 mg/dL    BUN 15 8 - 23 mg/dL    Creatinine 0.97 0.57 - 1.00 mg/dL    eGFR Non African Am 57 (L) >60 mL/min/1.73    eGFR African Am 69 >60 mL/min/1.73    BUN/Creatinine Ratio 15.5 7.0 - 25.0    Sodium 140 136 - 145 mmol/L    Potassium 4.8 3.5 - 5.2 mmol/L    Chloride 101 98 - 107 mmol/L    Total CO2 27.5 22.0 - 29.0 mmol/L    Calcium 9.0 8.6 - 10.5 mg/dL    Total Protein 7.6 6.0 - 8.5 g/dL    Albumin 3.80 3.50 - 5.20 g/dL    Globulin 3.8 gm/dL    A/G Ratio 1.0 g/dL    Total Bilirubin 0.2 0.2 - 1.2 mg/dL    Alkaline Phosphatase 183 (H) 39 - 117 U/L    AST (SGOT) 37 (H) 1 - 32 U/L    ALT (SGPT) 28 1 - 33 U/L   C-Peptide   Result Value Ref Range    C-Peptide 3.5 1.1 - 4.4 ng/mL   Hemoglobin A1c   Result Value Ref Range     Hemoglobin A1C 6.60 (H) 4.80 - 5.60 %   NMR LipoProfile   Result Value Ref Range    LDL-P 1,107 (H) <1,000 nmol/L    LDL-C 116 (H) 0 - 99 mg/dL    HDL-C 90 >39 mg/dL    Triglycerides 63 0 - 149 mg/dL    Total Cholesterol 219 (H) 100 - 199 mg/dL    HDL-P (Total) 30.8 >=30.5 umol/L    Small LDL-P <90 <=527 nmol/L    LDL Size 22.0 >20.5 nm         Assessment/Plan   Feli was seen today for diabetes.    Diagnoses and all orders for this visit:    Type 2 diabetes mellitus with microalbuminuria, unspecified whether long term insulin use (CMS/[facility])  -     T4 & TSH ([facility]); Future  -     Uric Acid; Future  -     Vitamin D 25 Hydroxy; Future  -     Comprehensive Metabolic Panel; Future  -     C-Peptide; Future  -     Hemoglobin A1c; Future  -     MicroAlbumin, Urine, Random - Urine, Clean Catch; Future  -     Lipid Panel; Future    Type 2 diabetes mellitus with complication, with long-term current use of insulin (CMS/[facility])  -     XIGDUO XR 5-1000 MG tablet; Take 2 tablets by mouth Daily.  -     TOUJEO SOLOSTAR 300 UNIT/ML solution pen-injector injection; Inject 45 Units under the skin into the appropriate area as directed Daily.  -     BYDUREON BCISE 2 MG/0.85ML auto-injector injection; Inject 0.85 mL under the skin into the appropriate area as directed 1 (One) Time Per Week.  -     T4 & TSH ([facility]); Future  -     Uric Acid; Future  -     Vitamin D 25 Hydroxy; Future  -     Comprehensive Metabolic Panel; Future  -     C-Peptide; Future  -     Hemoglobin A1c; Future  -     MicroAlbumin, Urine, Random - Urine, Clean Catch; Future  -     Lipid Panel; Future    Mixed hyperlipidemia  -     atorvastatin (LIPITOR) 20 MG tablet; Take 1 tablet by mouth Daily.  -     T4 & TSH ([facility]); Future  -     Uric Acid; Future  -     Vitamin D 25 Hydroxy; Future  -     Comprehensive Metabolic Panel; Future  -     C-Peptide; Future  -     Hemoglobin A1c; Future  -     MicroAlbumin, Urine, Random - Urine, Clean Catch; Future  -     Lipid  Panel; Future    Essential hypertension  -     telmisartan-hydrochlorothiazide (MICARDIS HCT) 40-12.5 MG per tablet; Take 1 tablet by mouth Daily.  -     T4 & TSH ([facility]); Future  -     Uric Acid; Future  -     Vitamin D 25 Hydroxy; Future  -     Comprehensive Metabolic Panel; Future  -     C-Peptide; Future  -     Hemoglobin A1c; Future  -     MicroAlbumin, Urine, Random - Urine, Clean Catch; Future  -     Lipid Panel; Future    Vitamin D deficiency  -     Cholecalciferol (VITAMIN D3) 1.25 MG (69706 UT) capsule; Take 1 capsule by mouth 3 (Three) Times a Week.  -     T4 & TSH ([facility]); Future  -     Uric Acid; Future  -     Vitamin D 25 Hydroxy; Future  -     Comprehensive Metabolic Panel; Future  -     C-Peptide; Future  -     Hemoglobin A1c; Future  -     MicroAlbumin, Urine, Random - Urine, Clean Catch; Future  -     Lipid Panel; Future    Morbid obesity due to excess calories (CMS/HCC)  -     T4 & TSH ([facility]); Future  -     Uric Acid; Future  -     Vitamin D 25 Hydroxy; Future  -     Comprehensive Metabolic Panel; Future  -     C-Peptide; Future  -     Hemoglobin A1c; Future  -     MicroAlbumin, Urine, Random - Urine, Clean Catch; Future  -     Lipid Panel; Future    Controlled type 2 diabetes mellitus without complication, with long-term current use of insulin (CMS/[facility])  -     Insulin Pen Needle (NOVOFINE) 32G X 6 MM misc; Take 1 shot of insulin per day  -     T4 & TSH ([facility]); Future  -     Uric Acid; Future  -     Vitamin D 25 Hydroxy; Future  -     Comprehensive Metabolic Panel; Future  -     C-Peptide; Future  -     Hemoglobin A1c; Future  -     MicroAlbumin, Urine, Random - Urine, Clean Catch; Future  -     Lipid Panel; Future    Hyperuricemia  -     allopurinol (ZYLOPRIM) 100 MG tablet; Take 1 tablet by mouth Daily.  -     T4 & TSH ([facility]); Future  -     Uric Acid; Future  -     Vitamin D 25 Hydroxy; Future  -     Comprehensive Metabolic Panel; Future  -     C-Peptide; Future  -     Hemoglobin  A1c; Future  -     MicroAlbumin, Urine, Random - Urine, Clean Catch; Future  -     Lipid Panel; Future      In summary I saw and examined this 69-year-old female for above-mentioned problems.  I reviewed her laboratory evaluations of 1/8/2020 provided her with a hard copy of it.  Overall she is clinically and metabolically stable and therefore we will go ahead and continue all her current prescriptions.  I will see her in 6 months or sooner if needed with laboratory evaluation prior to each office visit.

## 2020-02-27 ENCOUNTER — OFFICE VISIT (OUTPATIENT)
Dept: ONCOLOGY | Facility: CLINIC | Age: 70
End: 2020-02-27

## 2020-02-27 ENCOUNTER — INFUSION (OUTPATIENT)
Dept: ONCOLOGY | Facility: HOSPITAL | Age: 70
End: 2020-02-27

## 2020-02-27 VITALS
HEART RATE: 85 BPM | TEMPERATURE: 98.1 F | BODY MASS INDEX: 36.57 KG/M2 | HEIGHT: 69 IN | DIASTOLIC BLOOD PRESSURE: 85 MMHG | RESPIRATION RATE: 16 BRPM | OXYGEN SATURATION: 95 % | WEIGHT: 246.9 LBS | SYSTOLIC BLOOD PRESSURE: 140 MMHG

## 2020-02-27 DIAGNOSIS — C78.7 LIVER METASTASIS: ICD-10-CM

## 2020-02-27 DIAGNOSIS — C18.9 PRIMARY MALIGNANT NEOPLASM OF COLON (HCC): Primary | ICD-10-CM

## 2020-02-27 DIAGNOSIS — Z45.2 ENCOUNTER FOR ADJUSTMENT OR MANAGEMENT OF VASCULAR ACCESS DEVICE: ICD-10-CM

## 2020-02-27 DIAGNOSIS — R74.8 ELEVATED LIVER ENZYMES: ICD-10-CM

## 2020-02-27 LAB
ALBUMIN SERPL-MCNC: 3.7 G/DL (ref 3.5–5.2)
ALBUMIN/GLOB SERPL: 0.9 G/DL (ref 1.1–2.4)
ALP SERPL-CCNC: 163 U/L (ref 38–116)
ALT SERPL W P-5'-P-CCNC: 21 U/L (ref 0–33)
ANION GAP SERPL CALCULATED.3IONS-SCNC: 14.2 MMOL/L (ref 5–15)
AST SERPL-CCNC: 26 U/L (ref 0–32)
BASOPHILS # BLD AUTO: 0.05 10*3/MM3 (ref 0–0.2)
BASOPHILS NFR BLD AUTO: 0.6 % (ref 0–1.5)
BILIRUB SERPL-MCNC: 0.3 MG/DL (ref 0.2–1.2)
BUN BLD-MCNC: 15 MG/DL (ref 6–20)
BUN/CREAT SERPL: 17.2 (ref 7.3–30)
CALCIUM SPEC-SCNC: 9.4 MG/DL (ref 8.5–10.2)
CEA SERPL-MCNC: 4.18 NG/ML
CHLORIDE SERPL-SCNC: 99 MMOL/L (ref 98–107)
CO2 SERPL-SCNC: 26.8 MMOL/L (ref 22–29)
CREAT BLD-MCNC: 0.87 MG/DL (ref 0.6–1.1)
DEPRECATED RDW RBC AUTO: 50.8 FL (ref 37–54)
EOSINOPHIL # BLD AUTO: 0.41 10*3/MM3 (ref 0–0.4)
EOSINOPHIL NFR BLD AUTO: 5 % (ref 0.3–6.2)
ERYTHROCYTE [DISTWIDTH] IN BLOOD BY AUTOMATED COUNT: 15.9 % (ref 12.3–15.4)
GFR SERPL CREATININE-BSD FRML MDRD: 78 ML/MIN/1.73
GLOBULIN UR ELPH-MCNC: 4.2 GM/DL (ref 1.8–3.5)
GLUCOSE BLD-MCNC: 145 MG/DL (ref 74–124)
HCT VFR BLD AUTO: 41.7 % (ref 34–46.6)
HGB BLD-MCNC: 13.3 G/DL (ref 12–15.9)
IMM GRANULOCYTES # BLD AUTO: 0.04 10*3/MM3 (ref 0–0.05)
IMM GRANULOCYTES NFR BLD AUTO: 0.5 % (ref 0–0.5)
LYMPHOCYTES # BLD AUTO: 2.4 10*3/MM3 (ref 0.7–3.1)
LYMPHOCYTES NFR BLD AUTO: 29.3 % (ref 19.6–45.3)
MCH RBC QN AUTO: 27.8 PG (ref 26.6–33)
MCHC RBC AUTO-ENTMCNC: 31.9 G/DL (ref 31.5–35.7)
MCV RBC AUTO: 87.1 FL (ref 79–97)
MONOCYTES # BLD AUTO: 0.66 10*3/MM3 (ref 0.1–0.9)
MONOCYTES NFR BLD AUTO: 8.1 % (ref 5–12)
NEUTROPHILS # BLD AUTO: 4.63 10*3/MM3 (ref 1.7–7)
NEUTROPHILS NFR BLD AUTO: 56.5 % (ref 42.7–76)
NRBC BLD AUTO-RTO: 0 /100 WBC (ref 0–0.2)
PLATELET # BLD AUTO: 381 10*3/MM3 (ref 140–450)
PMV BLD AUTO: 8.8 FL (ref 6–12)
POTASSIUM BLD-SCNC: 3.9 MMOL/L (ref 3.5–4.7)
PROT SERPL-MCNC: 7.9 G/DL (ref 6.3–8)
RBC # BLD AUTO: 4.79 10*6/MM3 (ref 3.77–5.28)
SODIUM BLD-SCNC: 140 MMOL/L (ref 134–145)
WBC NRBC COR # BLD: 8.19 10*3/MM3 (ref 3.4–10.8)

## 2020-02-27 PROCEDURE — 25010000003 HEPARIN LOCK FLUCH PER 10 UNITS: Performed by: INTERNAL MEDICINE

## 2020-02-27 PROCEDURE — 36591 DRAW BLOOD OFF VENOUS DEVICE: CPT

## 2020-02-27 PROCEDURE — 82378 CARCINOEMBRYONIC ANTIGEN: CPT | Performed by: INTERNAL MEDICINE

## 2020-02-27 PROCEDURE — 99213 OFFICE O/P EST LOW 20 MIN: CPT | Performed by: INTERNAL MEDICINE

## 2020-02-27 PROCEDURE — 85025 COMPLETE CBC W/AUTO DIFF WBC: CPT

## 2020-02-27 PROCEDURE — 80053 COMPREHEN METABOLIC PANEL: CPT

## 2020-02-27 RX ORDER — HEPARIN SODIUM (PORCINE) LOCK FLUSH IV SOLN 100 UNIT/ML 100 UNIT/ML
500 SOLUTION INTRAVENOUS AS NEEDED
Status: CANCELLED | OUTPATIENT
Start: 2020-02-27

## 2020-02-27 RX ORDER — HEPARIN SODIUM (PORCINE) LOCK FLUSH IV SOLN 100 UNIT/ML 100 UNIT/ML
500 SOLUTION INTRAVENOUS AS NEEDED
Status: DISCONTINUED | OUTPATIENT
Start: 2020-02-27 | End: 2020-02-27 | Stop reason: HOSPADM

## 2020-02-27 RX ORDER — SODIUM CHLORIDE 0.9 % (FLUSH) 0.9 %
10 SYRINGE (ML) INJECTION AS NEEDED
Status: CANCELLED | OUTPATIENT
Start: 2020-02-27

## 2020-02-27 RX ORDER — SODIUM CHLORIDE 0.9 % (FLUSH) 0.9 %
10 SYRINGE (ML) INJECTION AS NEEDED
Status: DISCONTINUED | OUTPATIENT
Start: 2020-02-27 | End: 2020-02-27 | Stop reason: HOSPADM

## 2020-02-27 RX ADMIN — Medication 500 UNITS: at 09:44

## 2020-02-27 RX ADMIN — SODIUM CHLORIDE, PRESERVATIVE FREE 10 ML: 5 INJECTION INTRAVENOUS at 09:44

## 2020-02-27 NOTE — PROGRESS NOTES
Subjective     CHIEF COMPLAINT:      Chief Complaint   Patient presents with   • Follow-up     no concerns       HISTORY OF PRESENT ILLNESS:     Feli Del Toro is a 69 y.o. female patient who returns today for follow up on her colon cancer with liver metastasis.  She returns today for follow-up reporting feeling well.  She is not experiencing abdominal pain.  No diarrhea.  No blood in the stool.  No chest pain or shortness of breath.    Patient is going to have CT scan of the abdomen with Dr. Ruano in early May 2020.        REVIEW OF SYSTEMS:  Review of Systems   Constitutional: Negative for chills, fever and unexpected weight change.   HENT: Negative for mouth sores, nosebleeds, sore throat and voice change.    Eyes: Negative for visual disturbance.   Respiratory: Negative for cough and shortness of breath.    Cardiovascular: Negative for chest pain and leg swelling.   Gastrointestinal: Negative for abdominal pain, blood in stool, constipation, diarrhea, nausea and vomiting.   Genitourinary: Negative for dysuria, frequency and hematuria.   Musculoskeletal: Negative for arthralgias, back pain and joint swelling.   Skin: Negative for rash.   Neurological: Negative for dizziness, numbness and headaches.   Hematological: Negative for adenopathy. Does not bruise/bleed easily.   Psychiatric/Behavioral: Negative for dysphoric mood. The patient is not nervous/anxious.      I verified the ROS obtained by the MA.      Past Medical History:   Diagnosis Date   • Acid reflux    • Anemia    • Cancer (CMS/HCC) 06/04/2013   • Diabetes mellitus (CMS/HCC)    • Dyspnea    • Essential hypertension    • History of constipation    • History of transfusion    • Hyperlipidemia    • Hypertension    • Metastatic colon cancer to liver (CMS/HCC)    • Neuropathy    • Pericardial effusion     Moderate   • Retinopathy    • SOB (shortness of breath)    • Type 2 diabetes mellitus (CMS/HCC)        Past Surgical History:   Procedure Laterality  Date   • ABDOMINAL SURGERY      ablation    • AMPUTATION DIGIT Left 1/1/2017    Procedure: LEFT SECOND TOE AMPUTATION;  Surgeon: Farzad Nichols MD;  Location: [facility] OR;  Service:    • AMPUTATION OF REPLICATED TOES      right distal second toe    • CARDIAC CATHETERIZATION N/A 3/8/2018    Procedure: Pericardiocentesis;  Surgeon: Andrew Dobbins MD;  Location: [facility] CATH INVASIVE LOCATION;  Service:    • CATARACT EXTRACTION     • COLECTOMY PARTIAL / TOTAL  01/14/2014   • COLONOSCOPY  2013   • FOOT SURGERY Left 03/15/2019   • FRACTURE SURGERY Right 2017    right lower leg with screw in ankle   • HYSTERECTOMY  1998   • PERICARDIAL WINDOW N/A 3/10/2018    Procedure: PERICARDIAL WINDOW;  Surgeon: Nomi Patel III, MD;  Location: [facility] OR;  Service: Cardiothoracic   • PORTACATH PLACEMENT  06/2013   • SALPINGO OOPHORECTOMY Bilateral    • SPLENECTOMY     • THORACOSCOPY Left 3/10/2018    Procedure: BRONCHOSCOPY, LEFT THORACOSCOPY VIDEO ASSISTED, SUBPLEURAL CATHETERS FOR PAIN;  Surgeon: Nomi Patel III, MD;  Location: [facility] OR;  Service: Cardiothoracic       Cancer-related family history includes Breast cancer in her sister; Cancer in an other family member.  Social History     Tobacco Use   • Smoking status: Never Smoker   • Smokeless tobacco: Never Used   Substance Use Topics   • Alcohol use: No       MEDICATIONS:    Current Outpatient Medications:   •  allopurinol (ZYLOPRIM) 100 MG tablet, Take 1 tablet by mouth Daily., Disp: 90 tablet, Rfl: 3  •  aspirin 81 MG tablet, Take 81 mg by mouth daily., Disp: , Rfl:   •  atorvastatin (LIPITOR) 20 MG tablet, Take 1 tablet by mouth Daily., Disp: 90 tablet, Rfl: 3  •  BYDUREON BCISE 2 MG/0.85ML auto-injector injection, Inject 0.85 mL under the skin into the appropriate area as directed 1 (One) Time Per Week., Disp: 12 pen, Rfl: 3  •  calcium carbonate (TUMS) 500 MG chewable tablet, Chew 2 tablets As Needed., Disp: , Rfl:   •  Insulin Pen Needle (NOVOFINE)  "32G X 6 MM misc, Take 1 shot of insulin per day, Disp: 90 each, Rfl: 3  •  telmisartan-hydrochlorothiazide (MICARDIS HCT) 40-12.5 MG per tablet, Take 1 tablet by mouth Daily., Disp: 90 tablet, Rfl: 3  •  TOUJEO SOLOSTAR 300 UNIT/ML solution pen-injector injection, Inject 45 Units under the skin into the appropriate area as directed Daily., Disp: 12 pen, Rfl: 3  •  XIGDUO XR 5-1000 MG tablet, Take 2 tablets by mouth Daily., Disp: 180 tablet, Rfl: 3  •  Cholecalciferol (VITAMIN D3) 1.25 MG (50177 UT) capsule, Take 1 capsule by mouth 3 (Three) Times a Week., Disp: 39 capsule, Rfl: 3  No current facility-administered medications for this visit.     ALLERGIES:  Allergies   Allergen Reactions   • Compazine [Prochlorperazine Edisylate] Unknown - Low Severity     Complete arch in the back    • Prochlorperazine Maleate Unknown - Low Severity     \"BACK MUSCLE RETRACTION\"         Objective   VITAL SIGNS:     Vitals:    02/27/20 1014   BP: 140/85   Pulse: 85   Resp: 16   Temp: 98.1 °F (36.7 °C)   TempSrc: Oral   SpO2: 95%   Weight: 112 kg (246 lb 14.4 oz)   Height: 175.3 cm (69.02\")   PainSc: 0-No pain     Body mass index is 36.44 kg/m².     Wt Readings from Last 3 Encounters:   02/27/20 112 kg (246 lb 14.4 oz)   01/24/20 112 kg (247 lb)   10/24/19 112 kg (248 lb)       PHYSICAL EXAMINATION:  GENERAL:  The patient appears in good general condition, not in acute distress.  SKIN: Warm and dry. No skin rashes. No ecchymosis.  HEAD:  Normocephalic.  EYES:  No Jaundice. No Pallor.   NECK:  Supple with Good ROM. No Thyromegaly. No Masses.  LYMPHATICS:  No cervical or supraclavicular lymphadenopathy.  CHEST: Normal respiratory effort. Lungs clear to auscultation.  Port in the left upper chest appears benign.  CARDIAC:  Normal S1 & S2. No murmur. No edema.  ABDOMEN:  Soft. No tenderness. No Hepatomegaly. No Splenomegaly. No masses.       DIAGNOSTIC DATA:     Results from last 7 days   Lab Units 02/27/20  0936   WBC 10*3/mm3 8.19 "   NEUTROS ABS 10*3/mm3 4.63   HEMOGLOBIN g/dL 13.3   HEMATOCRIT % 41.7   PLATELETS 10*3/mm3 381     Results from last 7 days   Lab Units 02/27/20  0936   SODIUM mmol/L 140   POTASSIUM mmol/L 3.9   CHLORIDE mmol/L 99   CO2 mmol/L 26.8   BUN mg/dL 15   CREATININE mg/dL 0.87   CALCIUM mg/dL 9.4   ALBUMIN g/dL 3.70   BILIRUBIN mg/dL 0.3   ALK PHOS U/L 163*   ALT (SGPT) U/L 21   AST (SGOT) U/L 26   GLUCOSE mg/dL 145*         Lab Results   Component Value Date    CEA 4.26 10/17/2019    CEA 4.04 06/13/2019    CEA 4.69 05/02/2019    CEA 4.39 03/11/2019    CEA 4.53 02/04/2019       Assessment/Plan   1.  Metastatic colon cancer. She had metastases to the liver.   · She received 12 cycles of FOLFOX-6 and then had resection of the primary tumor along with metastasectomy and splenectomy with thermal ablation of lesions in the liver in January 2014.   · This was followed by 12 cycles of the FOLFIRI regimen completed on 08/07/2014.   · CT scans on 05/28/2015 revealed a new lesion in the liver.  Dr. Ruano referred the patient to Radiation Therapy and she received CyberKnife radiation, completed on 07/14/2015.   · The CT scan from 5/5/17 revealed a new 2.2 cm lesion in the anterior hepatic segment.  CT guided microwave ablation of liver mass performed on 6/16/17 with CT evidence on 7/20/17 of a complete response.  · FOLFIRI ×12 cycles given between 6/29/17 and 11/29/17.   · CT scan from 3/11/2019 showed no evidence of lung metastasis.  No evidence of reaccumulation of the left pleural/pericardial effusion.  · CT scan of the abdomen on 5/9/2019 showed further decrease in the size of the liver metastasis.  No new lesions were seen.  · CT scan of the chest on 10/17/2019 showed no lung metastasis.  No recurrence of the pericardial or pleural effusions.   · Patient is doing good.  No clinical signs of recurrence or new areas of metastasis.  · I recommended obtaining CT scan of the chest in May 2020.    2.  Elevated liver enzymes.   They have gradually improved over the past several months and ALT and AST are normal today.  Alkaline phosphatase continues to improve.  Wound normal    3.  Pericardial effusion status post pericardial window.  Cytology and biopsy were negative for malignancy.  CT scan from 10/17/2019 did not show evidence of recurrence.    4.   Patient has a port which would need maintenance every 6 weeks.    PLAN:    1.  We will continue to monitor.  We will schedule patient for a follow-up CT scan of the chest in 12 weeks.  She will have a CT scan of the abdomen pelvis with Dr. Ruano early May 2020.  2.  We will schedule the patient for port flush in 6 weeks and in 12 weeks.    I will see the patient in follow-up in May 2020 and we will review the results of the upcoming CT scans.      Marivel Brown MD  02/27/20

## 2020-04-09 ENCOUNTER — INFUSION (OUTPATIENT)
Dept: ONCOLOGY | Facility: HOSPITAL | Age: 70
End: 2020-04-09

## 2020-04-09 DIAGNOSIS — Z45.2 ENCOUNTER FOR ADJUSTMENT OR MANAGEMENT OF VASCULAR ACCESS DEVICE: Primary | ICD-10-CM

## 2020-04-09 PROCEDURE — 25010000003 HEPARIN LOCK FLUCH PER 10 UNITS: Performed by: INTERNAL MEDICINE

## 2020-04-09 PROCEDURE — 96523 IRRIG DRUG DELIVERY DEVICE: CPT

## 2020-04-09 RX ORDER — SODIUM CHLORIDE 0.9 % (FLUSH) 0.9 %
10 SYRINGE (ML) INJECTION AS NEEDED
Status: DISCONTINUED | OUTPATIENT
Start: 2020-04-09 | End: 2020-04-09 | Stop reason: HOSPADM

## 2020-04-09 RX ORDER — HEPARIN SODIUM (PORCINE) LOCK FLUSH IV SOLN 100 UNIT/ML 100 UNIT/ML
500 SOLUTION INTRAVENOUS AS NEEDED
Status: CANCELLED | OUTPATIENT
Start: 2020-04-09

## 2020-04-09 RX ORDER — HEPARIN SODIUM (PORCINE) LOCK FLUSH IV SOLN 100 UNIT/ML 100 UNIT/ML
500 SOLUTION INTRAVENOUS AS NEEDED
Status: DISCONTINUED | OUTPATIENT
Start: 2020-04-09 | End: 2020-04-09 | Stop reason: HOSPADM

## 2020-04-09 RX ORDER — SODIUM CHLORIDE 0.9 % (FLUSH) 0.9 %
10 SYRINGE (ML) INJECTION AS NEEDED
Status: CANCELLED | OUTPATIENT
Start: 2020-04-09

## 2020-04-09 RX ADMIN — Medication 500 UNITS: at 13:23

## 2020-04-09 RX ADMIN — SODIUM CHLORIDE, PRESERVATIVE FREE 10 ML: 5 INJECTION INTRAVENOUS at 13:23

## 2020-05-21 ENCOUNTER — HOSPITAL ENCOUNTER (OUTPATIENT)
Dept: PET IMAGING | Facility: HOSPITAL | Age: 70
Discharge: HOME | End: 2020-05-21
Admitting: INTERNAL MEDICINE

## 2020-05-21 ENCOUNTER — INFUSION (OUTPATIENT)
Dept: ONCOLOGY | Facility: HOSPITAL | Age: 70
End: 2020-05-21

## 2020-05-21 DIAGNOSIS — C18.9 PRIMARY MALIGNANT NEOPLASM OF COLON (HCC): ICD-10-CM

## 2020-05-21 DIAGNOSIS — C78.7 LIVER METASTASIS: ICD-10-CM

## 2020-05-21 DIAGNOSIS — R74.8 ELEVATED LIVER ENZYMES: ICD-10-CM

## 2020-05-21 LAB
ALBUMIN SERPL-MCNC: 3.7 G/DL (ref 3.5–5.2)
ALBUMIN/GLOB SERPL: 0.8 G/DL (ref 1.1–2.4)
ALP SERPL-CCNC: 278 U/L (ref 38–116)
ALT SERPL W P-5'-P-CCNC: 55 U/L (ref 0–33)
ANION GAP SERPL CALCULATED.3IONS-SCNC: 12.2 MMOL/L (ref 5–15)
AST SERPL-CCNC: 66 U/L (ref 0–32)
BASOPHILS # BLD AUTO: 0.07 10*3/MM3 (ref 0–0.2)
BASOPHILS NFR BLD AUTO: 0.8 % (ref 0–1.5)
BILIRUB SERPL-MCNC: 0.4 MG/DL (ref 0.2–1.2)
BUN BLD-MCNC: 16 MG/DL (ref 6–20)
BUN/CREAT SERPL: 20.5 (ref 7.3–30)
CALCIUM SPEC-SCNC: 9.6 MG/DL (ref 8.5–10.2)
CEA SERPL-MCNC: 5.14 NG/ML
CHLORIDE SERPL-SCNC: 98 MMOL/L (ref 98–107)
CO2 SERPL-SCNC: 26.8 MMOL/L (ref 22–29)
CREAT BLD-MCNC: 0.78 MG/DL (ref 0.6–1.1)
CREAT BLDA-MCNC: 0.8 MG/DL (ref 0.6–1.3)
DEPRECATED RDW RBC AUTO: 51.4 FL (ref 37–54)
EOSINOPHIL # BLD AUTO: 0.54 10*3/MM3 (ref 0–0.4)
EOSINOPHIL NFR BLD AUTO: 6.1 % (ref 0.3–6.2)
ERYTHROCYTE [DISTWIDTH] IN BLOOD BY AUTOMATED COUNT: 16.5 % (ref 12.3–15.4)
GFR SERPL CREATININE-BSD FRML MDRD: 89 ML/MIN/1.73
GLOBULIN UR ELPH-MCNC: 4.4 GM/DL (ref 1.8–3.5)
GLUCOSE BLD-MCNC: 144 MG/DL (ref 74–124)
HCT VFR BLD AUTO: 41.4 % (ref 34–46.6)
HGB BLD-MCNC: 13.3 G/DL (ref 12–15.9)
IMM GRANULOCYTES # BLD AUTO: 0.07 10*3/MM3 (ref 0–0.05)
IMM GRANULOCYTES NFR BLD AUTO: 0.8 % (ref 0–0.5)
LYMPHOCYTES # BLD AUTO: 2.3 10*3/MM3 (ref 0.7–3.1)
LYMPHOCYTES NFR BLD AUTO: 26.1 % (ref 19.6–45.3)
MCH RBC QN AUTO: 27.8 PG (ref 26.6–33)
MCHC RBC AUTO-ENTMCNC: 32.1 G/DL (ref 31.5–35.7)
MCV RBC AUTO: 86.6 FL (ref 79–97)
MONOCYTES # BLD AUTO: 1.1 10*3/MM3 (ref 0.1–0.9)
MONOCYTES NFR BLD AUTO: 12.5 % (ref 5–12)
NEUTROPHILS # BLD AUTO: 4.73 10*3/MM3 (ref 1.7–7)
NEUTROPHILS NFR BLD AUTO: 53.7 % (ref 42.7–76)
NRBC BLD AUTO-RTO: 0 /100 WBC (ref 0–0.2)
PLATELET # BLD AUTO: 389 10*3/MM3 (ref 140–450)
PMV BLD AUTO: 9 FL (ref 6–12)
POTASSIUM BLD-SCNC: 4.1 MMOL/L (ref 3.5–4.7)
PROT SERPL-MCNC: 8.1 G/DL (ref 6.3–8)
RBC # BLD AUTO: 4.78 10*6/MM3 (ref 3.77–5.28)
SODIUM BLD-SCNC: 137 MMOL/L (ref 134–145)
WBC NRBC COR # BLD: 8.81 10*3/MM3 (ref 3.4–10.8)

## 2020-05-21 PROCEDURE — 71260 CT THORAX DX C+: CPT

## 2020-05-21 PROCEDURE — 85025 COMPLETE CBC W/AUTO DIFF WBC: CPT

## 2020-05-21 PROCEDURE — 82378 CARCINOEMBRYONIC ANTIGEN: CPT | Performed by: INTERNAL MEDICINE

## 2020-05-21 PROCEDURE — 82565 ASSAY OF CREATININE: CPT

## 2020-05-21 PROCEDURE — 25010000002 IOPAMIDOL 61 % SOLUTION: Performed by: INTERNAL MEDICINE

## 2020-05-21 PROCEDURE — 80053 COMPREHEN METABOLIC PANEL: CPT

## 2020-05-21 PROCEDURE — 36591 DRAW BLOOD OFF VENOUS DEVICE: CPT

## 2020-05-21 RX ADMIN — IOPAMIDOL 75 ML: 612 INJECTION, SOLUTION INTRAVENOUS at 09:35

## 2020-05-28 ENCOUNTER — OFFICE VISIT (OUTPATIENT)
Dept: ONCOLOGY | Facility: CLINIC | Age: 70
End: 2020-05-28

## 2020-05-28 ENCOUNTER — APPOINTMENT (OUTPATIENT)
Dept: LAB | Facility: HOSPITAL | Age: 70
End: 2020-05-28

## 2020-05-28 VITALS
HEART RATE: 100 BPM | WEIGHT: 254.3 LBS | TEMPERATURE: 98.6 F | OXYGEN SATURATION: 94 % | BODY MASS INDEX: 37.67 KG/M2 | DIASTOLIC BLOOD PRESSURE: 74 MMHG | RESPIRATION RATE: 18 BRPM | HEIGHT: 69 IN | SYSTOLIC BLOOD PRESSURE: 123 MMHG

## 2020-05-28 DIAGNOSIS — C18.9 PRIMARY MALIGNANT NEOPLASM OF COLON (HCC): Primary | ICD-10-CM

## 2020-05-28 DIAGNOSIS — Z45.2 ENCOUNTER FOR ADJUSTMENT OR MANAGEMENT OF VASCULAR ACCESS DEVICE: ICD-10-CM

## 2020-05-28 DIAGNOSIS — C78.7 LIVER METASTASIS: ICD-10-CM

## 2020-05-28 DIAGNOSIS — I31.39 PERICARDIAL EFFUSION: ICD-10-CM

## 2020-05-28 DIAGNOSIS — R74.8 ELEVATED LIVER ENZYMES: ICD-10-CM

## 2020-05-28 PROCEDURE — 99214 OFFICE O/P EST MOD 30 MIN: CPT | Performed by: INTERNAL MEDICINE

## 2020-05-28 NOTE — PROGRESS NOTES
Subjective     CHIEF COMPLAINT:      Chief Complaint   Patient presents with   • Follow-up     no concerns       HISTORY OF PRESENT ILLNESS:     Feli Del Toro is a 69 y.o. female patient who returns today for follow up on her colon cancer with previously treated liver metastasis.  She returns today for follow-up reporting no new symptoms.  She is not having abdominal pain.  No recent episodes of diarrhea.  No blood in the stool.  Her appetite is good.  Her weight is stable.    Patient is scheduled to see Dr. Ruano on 6/4/2020 and will have CT scan of the abdomen done prior.    REVIEW OF SYSTEMS:  Review of Systems   Constitutional: Negative for fatigue, fever and unexpected weight change.   HENT: Negative for nosebleeds and voice change.    Eyes: Negative for visual disturbance.   Respiratory: Negative for cough and shortness of breath.    Cardiovascular: Negative for chest pain and leg swelling.   Gastrointestinal: Negative for abdominal pain, blood in stool, constipation, diarrhea, nausea and vomiting.   Genitourinary: Negative for frequency and hematuria.   Musculoskeletal: Negative for back pain and joint swelling.   Skin: Negative for rash.   Neurological: Negative for dizziness and headaches.   Hematological: Negative for adenopathy. Does not bruise/bleed easily.   Psychiatric/Behavioral: Negative for dysphoric mood. The patient is not nervous/anxious.      Past Medical History:   Diagnosis Date   • Acid reflux    • Anemia    • Cancer (CMS/HCC) 06/04/2013   • Diabetes mellitus (CMS/HCC)    • Dyspnea    • Essential hypertension    • History of constipation    • History of transfusion    • Hyperlipidemia    • Hypertension    • Metastatic colon cancer to liver (CMS/HCC)    • Neuropathy    • Pericardial effusion     Moderate   • Retinopathy    • SOB (shortness of breath)    • Type 2 diabetes mellitus (CMS/HCC)        Past Surgical History:   Procedure Laterality Date   • ABDOMINAL SURGERY      ablation    •  AMPUTATION DIGIT Left 1/1/2017    Procedure: LEFT SECOND TOE AMPUTATION;  Surgeon: Farzad Nichols MD;  Location: [facility] MAIN OR;  Service:    • AMPUTATION OF REPLICATED TOES      right distal second toe    • CARDIAC CATHETERIZATION N/A 3/8/2018    Procedure: Pericardiocentesis;  Surgeon: Andrew Dobbins MD;  Location: [facility] CATH INVASIVE LOCATION;  Service:    • CATARACT EXTRACTION     • COLECTOMY PARTIAL / TOTAL  01/14/2014   • COLONOSCOPY  2013   • FOOT SURGERY Left 03/15/2019   • FRACTURE SURGERY Right 2017    right lower leg with screw in ankle   • HYSTERECTOMY  1998   • PERICARDIAL WINDOW N/A 3/10/2018    Procedure: PERICARDIAL WINDOW;  Surgeon: Nomi Patel III, MD;  Location: [facility] OR;  Service: Cardiothoracic   • PORTACATH PLACEMENT  06/2013   • SALPINGO OOPHORECTOMY Bilateral    • SPLENECTOMY     • THORACOSCOPY Left 3/10/2018    Procedure: BRONCHOSCOPY, LEFT THORACOSCOPY VIDEO ASSISTED, SUBPLEURAL CATHETERS FOR PAIN;  Surgeon: Nomi Patel III, MD;  Location: [facility] OR;  Service: Cardiothoracic       Cancer-related family history includes Breast cancer in her sister; Cancer in an other family member.  Social History     Tobacco Use   • Smoking status: Never Smoker   • Smokeless tobacco: Never Used   Substance Use Topics   • Alcohol use: No       MEDICATIONS:    Current Outpatient Medications:   •  allopurinol (ZYLOPRIM) 100 MG tablet, Take 1 tablet by mouth Daily., Disp: 90 tablet, Rfl: 3  •  aspirin 81 MG tablet, Take 81 mg by mouth daily., Disp: , Rfl:   •  atorvastatin (LIPITOR) 20 MG tablet, Take 1 tablet by mouth Daily., Disp: 90 tablet, Rfl: 3  •  BYDUREON BCISE 2 MG/0.85ML auto-injector injection, Inject 0.85 mL under the skin into the appropriate area as directed 1 (One) Time Per Week., Disp: 12 pen, Rfl: 3  •  calcium carbonate (TUMS) 500 MG chewable tablet, Chew 2 tablets As Needed., Disp: , Rfl:   •  Insulin Pen Needle (NOVOFINE) 32G X 6 MM misc, Take 1 shot of insulin per  "day, Disp: 90 each, Rfl: 3  •  telmisartan-hydrochlorothiazide (MICARDIS HCT) 40-12.5 MG per tablet, Take 1 tablet by mouth Daily., Disp: 90 tablet, Rfl: 3  •  TOUJEO SOLOSTAR 300 UNIT/ML solution pen-injector injection, Inject 45 Units under the skin into the appropriate area as directed Daily., Disp: 12 pen, Rfl: 3  •  XIGDUO XR 5-1000 MG tablet, Take 2 tablets by mouth Daily., Disp: 180 tablet, Rfl: 3  •  Cholecalciferol (VITAMIN D3) 1.25 MG (26199 UT) capsule, Take 1 capsule by mouth 3 (Three) Times a Week., Disp: 39 capsule, Rfl: 3    ALLERGIES:  Allergies   Allergen Reactions   • Compazine [Prochlorperazine Edisylate] Unknown - Low Severity     Complete arch in the back    • Prochlorperazine Maleate Unknown - Low Severity     \"BACK MUSCLE RETRACTION\"         Objective   VITAL SIGNS:     Vitals:    05/28/20 1055   BP: 123/74   Pulse: 100   Resp: 18   Temp: 98.6 °F (37 °C)   TempSrc: Oral   SpO2: 94%   Weight: 115 kg (254 lb 4.8 oz)   Height: 175.3 cm (69.02\")   PainSc: 0-No pain     Body mass index is 37.54 kg/m².     Wt Readings from Last 3 Encounters:   05/28/20 115 kg (254 lb 4.8 oz)   02/27/20 112 kg (246 lb 14.4 oz)   01/24/20 112 kg (247 lb)       PHYSICAL EXAMINATION:  GENERAL:  The patient appears in good general condition, not in acute distress.  SKIN: No skin rashes. No ecchymosis.  HEAD:  Normocephalic.  EYES:  No Jaundice. No Pallor. Pupils equal. EOMI.  NECK:  Supple with Good ROM. No Thyromegaly. No Masses.  LYMPHATICS:  No cervical or supraclavicular lymphadenopathy.  CHEST: Normal respiratory effort. Lungs clear to auscultation.   CARDIAC:  Normal S1 & S2. No murmur. No edema.  ABDOMEN:  Soft. No tenderness. No Hepatomegaly. No Splenomegaly. No masses.  EXTREMITIES:  No clubbing. No deforming arthritis in the hands.   NEUROLOGICAL:  No Focal neurological deficits.       DIAGNOSTIC DATA:     Component      Latest Ref Rng & Units 5/21/2020   WBC      3.40 - 10.80 10*3/mm3 8.81   RBC      3.77 - " 5.28 10*6/mm3 4.78   Hemoglobin      12.0 - 15.9 g/dL 13.3   Hematocrit      34.0 - 46.6 % 41.4   MCV      79.0 - 97.0 fL 86.6   MCH      26.6 - 33.0 pg 27.8   MCHC      31.5 - 35.7 g/dL 32.1   RDW      12.3 - 15.4 % 16.5 (H)   RDW-SD      37.0 - 54.0 fl 51.4   MPV      6.0 - 12.0 fL 9.0   Platelets      140 - 450 10*3/mm3 389   Neutrophil Rel %      42.7 - 76.0 % 53.7   Lymphocyte Rel %      19.6 - 45.3 % 26.1   Monocyte Rel %      5.0 - 12.0 % 12.5 (H)   Eosinophil Rel %      0.3 - 6.2 % 6.1   Basophil Rel %      0.0 - 1.5 % 0.8   Immature Granulocyte Rel %      0.0 - 0.5 % 0.8 (H)   Neutrophils Absolute      1.70 - 7.00 10*3/mm3 4.73   Lymphocytes Absolute      0.70 - 3.10 10*3/mm3 2.30   Monocytes Absolute      0.10 - 0.90 10*3/mm3 1.10 (H)   Eosinophils Absolute      0.00 - 0.40 10*3/mm3 0.54 (H)   Basophils Absolute      0.00 - 0.20 10*3/mm3 0.07   Immature Grans, Absolute      0.00 - 0.05 10*3/mm3 0.07 (H)   nRBC      0.0 - 0.2 /100 WBC 0.0   Glucose      74 - 124 mg/dL 144 (H)   BUN      6 - 20 mg/dL 16   Creatinine      0.60 - 1.10 mg/dL 0.78   Sodium      134 - 145 mmol/L 137   Potassium      3.5 - 4.7 mmol/L 4.1   Chloride      98 - 107 mmol/L 98   CO2      22.0 - 29.0 mmol/L 26.8   Calcium      8.5 - 10.2 mg/dL 9.6   Total Protein      6.3 - 8.0 g/dL 8.1 (H)   Albumin      3.50 - 5.20 g/dL 3.70   ALT (SGPT)      0 - 33 U/L 55 (H)   AST (SGOT)      0 - 32 U/L 66 (H)   Alkaline Phosphatase      38 - 116 U/L 278 (H)   Total Bilirubin      0.2 - 1.2 mg/dL 0.4   eGFR African Am      >60 mL/min/1.73 89   Globulin      1.8 - 3.5 gm/dL 4.4 (H)   A/G Ratio      1.1 - 2.4 g/dL 0.8 (L)   BUN/Creatinine Ratio      7.3 - 30.0 20.5   Anion Gap      5.0 - 15.0 mmol/L 12.2   CEA      ng/mL 5.14         Lab Results   Component Value Date    CEA 5.14 05/21/2020    CEA 4.18 02/27/2020    CEA 4.26 10/17/2019    CEA 4.04 06/13/2019    CEA 4.69 05/02/2019     CT OF THE CHEST WITH CONTRAST 05/21/2020     HISTORY: Colon cancer.  Follow-up.     TECHNIQUE: Axial images were obtained from the lung apices to the upper  abdomen after intravenous contrast.     COMPARISON: Previous CT of the chest dated 10/17/2019 is compared.     FINDINGS: No pathologically enlarged hilar or mediastinal lymph nodes  are seen. Mediport catheter seen in good position. There is some minimal  probable parenchymal scarring in the left lower lobe.     No new lung nodules or significant pulmonary infiltrates are seen. There  is an approximately 4.2 cm low-density lesion in the dome of the liver  stable since the 10/17/2019 study. No new liver lesions are seen.     No bony metastases are seen.     IMPRESSION:  1. Stable appearance of the chest since the previous study of  10/17/2019.  2. No new evidence of metastatic disease in the chest.     Radiation dose reduction techniques were utilized, including automated  exposure control and exposure modulation based on body size.     This report was finalized on 5/21/2020 4:44 PM by Dr. Conrad Westfall M.D.    I personally reviewed the CT scan with the patient during today's visit and I concur with the findings.  The posttreatment changes in the liver are stable compared to 10/17/2019.  No new liver lesions.    Assessment/Plan   1.  Metastatic colon cancer. She had metastases to the liver.   · She received 12 cycles of FOLFOX-6 and then had resection of the primary tumor along with metastasectomy and splenectomy with thermal ablation of lesions in the liver in January 2014.   · This was followed by 12 cycles of the FOLFIRI regimen completed on 08/07/2014.   · CT scans on 05/28/2015 revealed a new lesion in the liver.  Dr. Ruano referred the patient to Radiation Therapy and she received CyberKnife radiation, completed on 07/14/2015.   · The CT scan from 5/5/17 revealed a new 2.2 cm lesion in the anterior hepatic segment.  CT guided microwave ablation of liver mass performed on 6/16/17 with CT evidence on 7/20/17 of a  complete response.  · FOLFIRI ×12 cycles given between 6/29/17 and 11/29/17.   · CT scan from 3/11/2019 showed no evidence of lung metastasis.  No evidence of reaccumulation of the left pleural/pericardial effusion.  · CT scan of the abdomen on 5/9/2019 showed further decrease in the size of the liver metastasis.  No new lesions were seen.  · CT scan of the chest on 10/17/2019 showed no lung metastasis.  No recurrence of the pericardial or pleural effusions.   · CT scan on 5/21/2020 showed no evidence of lung metastasis.  · CEA slightly increased to 5.14.  Her usual CEA is in the 4 range.  · I recommended a follow-up colonoscopy.    2.  Elevated liver enzymes.  Although AST and ALT normalized on the lab from 2/27/2020, the increased back to an ALT of 55 and AST of 66 with alkaline phosphatase of 278.    3.  Pericardial effusion status post pericardial window.  Cytology and biopsy were negative for malignancy.  Current CT scan does not show evidence of recurrence of the pericardial effusion.    4.   Patient has a port which would need maintenance every 6 weeks.     5.  Patient is postsplenectomy.  She did not receive the Prevnar vaccine in the past.    PLAN:    1.  We will refer the patient back to Dr. Fish for a colonoscopy.  2.  Await CT scan at Dr. Ruano office scheduled for 6/4/2020.  3.  We will schedule patient for port flush in 6 and 12 weeks.  CBC CMP CEA will be obtained.  4.  I will see her in follow-up in 12 weeks.  We will schedule her to receive the Prevnar vaccine on that day.        Marivel Brown MD  05/28/20

## 2020-07-06 ENCOUNTER — TELEPHONE (OUTPATIENT)
Dept: ENDOCRINOLOGY | Age: 70
End: 2020-07-06

## 2020-07-06 DIAGNOSIS — Z79.4 TYPE 2 DIABETES MELLITUS WITH COMPLICATION, WITH LONG-TERM CURRENT USE OF INSULIN (HCC): ICD-10-CM

## 2020-07-06 DIAGNOSIS — E11.8 TYPE 2 DIABETES MELLITUS WITH COMPLICATION, WITH LONG-TERM CURRENT USE OF INSULIN (HCC): ICD-10-CM

## 2020-07-06 RX ORDER — EXENATIDE 2 MG/.85ML
2 INJECTION, SUSPENSION, EXTENDED RELEASE SUBCUTANEOUS WEEKLY
Qty: 12 PEN | Refills: 3 | Status: SHIPPED | OUTPATIENT
Start: 2020-07-06 | End: 2021-07-16

## 2020-07-09 ENCOUNTER — INFUSION (OUTPATIENT)
Dept: ONCOLOGY | Facility: HOSPITAL | Age: 70
End: 2020-07-09

## 2020-07-09 DIAGNOSIS — C78.7 LIVER METASTASIS: ICD-10-CM

## 2020-07-09 DIAGNOSIS — R74.8 ELEVATED LIVER ENZYMES: ICD-10-CM

## 2020-07-09 DIAGNOSIS — Z45.2 ENCOUNTER FOR ADJUSTMENT OR MANAGEMENT OF VASCULAR ACCESS DEVICE: ICD-10-CM

## 2020-07-09 DIAGNOSIS — C18.9 PRIMARY MALIGNANT NEOPLASM OF COLON (HCC): Primary | ICD-10-CM

## 2020-07-09 LAB
ALBUMIN SERPL-MCNC: 3.7 G/DL (ref 3.5–5.2)
ALBUMIN/GLOB SERPL: 0.9 G/DL (ref 1.1–2.4)
ALP SERPL-CCNC: 150 U/L (ref 38–116)
ALT SERPL W P-5'-P-CCNC: 18 U/L (ref 0–33)
ANION GAP SERPL CALCULATED.3IONS-SCNC: 12.2 MMOL/L (ref 5–15)
AST SERPL-CCNC: 28 U/L (ref 0–32)
BASOPHILS # BLD AUTO: 0.05 10*3/MM3 (ref 0–0.2)
BASOPHILS NFR BLD AUTO: 0.6 % (ref 0–1.5)
BILIRUB SERPL-MCNC: 0.3 MG/DL (ref 0.2–1.2)
BUN SERPL-MCNC: 17 MG/DL (ref 6–20)
BUN/CREAT SERPL: 18.1 (ref 7.3–30)
CALCIUM SPEC-SCNC: 9.6 MG/DL (ref 8.5–10.2)
CEA SERPL-MCNC: 3.92 NG/ML
CHLORIDE SERPL-SCNC: 100 MMOL/L (ref 98–107)
CO2 SERPL-SCNC: 26.8 MMOL/L (ref 22–29)
CREAT SERPL-MCNC: 0.94 MG/DL (ref 0.6–1.1)
DEPRECATED RDW RBC AUTO: 48.8 FL (ref 37–54)
EOSINOPHIL # BLD AUTO: 0.38 10*3/MM3 (ref 0–0.4)
EOSINOPHIL NFR BLD AUTO: 4.9 % (ref 0.3–6.2)
ERYTHROCYTE [DISTWIDTH] IN BLOOD BY AUTOMATED COUNT: 15.4 % (ref 12.3–15.4)
GFR SERPL CREATININE-BSD FRML MDRD: 72 ML/MIN/1.73
GLOBULIN UR ELPH-MCNC: 4 GM/DL (ref 1.8–3.5)
GLUCOSE SERPL-MCNC: 81 MG/DL (ref 74–124)
HCT VFR BLD AUTO: 39.6 % (ref 34–46.6)
HGB BLD-MCNC: 12.7 G/DL (ref 12–15.9)
IMM GRANULOCYTES # BLD AUTO: 0.03 10*3/MM3 (ref 0–0.05)
IMM GRANULOCYTES NFR BLD AUTO: 0.4 % (ref 0–0.5)
LYMPHOCYTES # BLD AUTO: 2.87 10*3/MM3 (ref 0.7–3.1)
LYMPHOCYTES NFR BLD AUTO: 36.7 % (ref 19.6–45.3)
MCH RBC QN AUTO: 28 PG (ref 26.6–33)
MCHC RBC AUTO-ENTMCNC: 32.1 G/DL (ref 31.5–35.7)
MCV RBC AUTO: 87.2 FL (ref 79–97)
MONOCYTES # BLD AUTO: 0.97 10*3/MM3 (ref 0.1–0.9)
MONOCYTES NFR BLD AUTO: 12.4 % (ref 5–12)
NEUTROPHILS NFR BLD AUTO: 3.53 10*3/MM3 (ref 1.7–7)
NEUTROPHILS NFR BLD AUTO: 45 % (ref 42.7–76)
NRBC BLD AUTO-RTO: 0 /100 WBC (ref 0–0.2)
PLATELET # BLD AUTO: 389 10*3/MM3 (ref 140–450)
PMV BLD AUTO: 9 FL (ref 6–12)
POTASSIUM SERPL-SCNC: 4 MMOL/L (ref 3.5–4.7)
PROT SERPL-MCNC: 7.7 G/DL (ref 6.3–8)
RBC # BLD AUTO: 4.54 10*6/MM3 (ref 3.77–5.28)
SODIUM SERPL-SCNC: 139 MMOL/L (ref 134–145)
WBC # BLD AUTO: 7.83 10*3/MM3 (ref 3.4–10.8)

## 2020-07-09 PROCEDURE — 36591 DRAW BLOOD OFF VENOUS DEVICE: CPT

## 2020-07-09 PROCEDURE — 25010000003 HEPARIN LOCK FLUSH PER 10 UNITS: Performed by: INTERNAL MEDICINE

## 2020-07-09 PROCEDURE — 85025 COMPLETE CBC W/AUTO DIFF WBC: CPT

## 2020-07-09 PROCEDURE — 80053 COMPREHEN METABOLIC PANEL: CPT

## 2020-07-09 PROCEDURE — 82378 CARCINOEMBRYONIC ANTIGEN: CPT | Performed by: INTERNAL MEDICINE

## 2020-07-09 RX ORDER — SODIUM CHLORIDE 0.9 % (FLUSH) 0.9 %
10 SYRINGE (ML) INJECTION AS NEEDED
Status: CANCELLED | OUTPATIENT
Start: 2020-07-09

## 2020-07-09 RX ORDER — SODIUM CHLORIDE 0.9 % (FLUSH) 0.9 %
10 SYRINGE (ML) INJECTION AS NEEDED
Status: DISCONTINUED | OUTPATIENT
Start: 2020-07-09 | End: 2020-07-09 | Stop reason: HOSPADM

## 2020-07-09 RX ORDER — HEPARIN SODIUM (PORCINE) LOCK FLUSH IV SOLN 100 UNIT/ML 100 UNIT/ML
500 SOLUTION INTRAVENOUS AS NEEDED
Status: CANCELLED | OUTPATIENT
Start: 2020-07-09

## 2020-07-09 RX ORDER — HEPARIN SODIUM (PORCINE) LOCK FLUSH IV SOLN 100 UNIT/ML 100 UNIT/ML
500 SOLUTION INTRAVENOUS AS NEEDED
Status: DISCONTINUED | OUTPATIENT
Start: 2020-07-09 | End: 2020-07-09 | Stop reason: HOSPADM

## 2020-07-09 RX ADMIN — SODIUM CHLORIDE, PRESERVATIVE FREE 500 UNITS: 5 INJECTION INTRAVENOUS at 13:17

## 2020-07-09 RX ADMIN — Medication 10 ML: at 13:17

## 2020-07-10 ENCOUNTER — RESULTS ENCOUNTER (OUTPATIENT)
Dept: ENDOCRINOLOGY | Age: 70
End: 2020-07-10

## 2020-07-10 DIAGNOSIS — E11.29 TYPE 2 DIABETES MELLITUS WITH MICROALBUMINURIA, UNSPECIFIED WHETHER LONG TERM INSULIN USE (HCC): ICD-10-CM

## 2020-07-10 DIAGNOSIS — E79.0 HYPERURICEMIA: ICD-10-CM

## 2020-07-10 DIAGNOSIS — E66.01 MORBID OBESITY DUE TO EXCESS CALORIES (HCC): ICD-10-CM

## 2020-07-10 DIAGNOSIS — Z79.4 TYPE 2 DIABETES MELLITUS WITH COMPLICATION, WITH LONG-TERM CURRENT USE OF INSULIN (HCC): ICD-10-CM

## 2020-07-10 DIAGNOSIS — E78.2 MIXED HYPERLIPIDEMIA: ICD-10-CM

## 2020-07-10 DIAGNOSIS — E11.9 CONTROLLED TYPE 2 DIABETES MELLITUS WITHOUT COMPLICATION, WITH LONG-TERM CURRENT USE OF INSULIN (HCC): ICD-10-CM

## 2020-07-10 DIAGNOSIS — E11.8 TYPE 2 DIABETES MELLITUS WITH COMPLICATION, WITH LONG-TERM CURRENT USE OF INSULIN (HCC): ICD-10-CM

## 2020-07-10 DIAGNOSIS — I10 ESSENTIAL HYPERTENSION: ICD-10-CM

## 2020-07-10 DIAGNOSIS — Z79.4 CONTROLLED TYPE 2 DIABETES MELLITUS WITHOUT COMPLICATION, WITH LONG-TERM CURRENT USE OF INSULIN (HCC): ICD-10-CM

## 2020-07-10 DIAGNOSIS — E55.9 VITAMIN D DEFICIENCY: ICD-10-CM

## 2020-07-10 DIAGNOSIS — R80.9 TYPE 2 DIABETES MELLITUS WITH MICROALBUMINURIA, UNSPECIFIED WHETHER LONG TERM INSULIN USE (HCC): ICD-10-CM

## 2020-08-11 ENCOUNTER — PREP FOR SURGERY (OUTPATIENT)
Dept: OTHER | Facility: HOSPITAL | Age: 70
End: 2020-08-11

## 2020-08-11 DIAGNOSIS — Z85.038 HISTORY OF COLON CANCER: Primary | ICD-10-CM

## 2020-08-20 ENCOUNTER — APPOINTMENT (OUTPATIENT)
Dept: ONCOLOGY | Facility: HOSPITAL | Age: 70
End: 2020-08-20

## 2020-08-28 DIAGNOSIS — E11.8 TYPE 2 DIABETES MELLITUS WITH COMPLICATION, WITH LONG-TERM CURRENT USE OF INSULIN (HCC): ICD-10-CM

## 2020-08-28 DIAGNOSIS — Z79.4 TYPE 2 DIABETES MELLITUS WITH COMPLICATION, WITH LONG-TERM CURRENT USE OF INSULIN (HCC): ICD-10-CM

## 2020-08-28 RX ORDER — DAPAGLIFLOZIN AND METFORMIN HYDROCHLORIDE 5; 1000 MG/1; MG/1
TABLET, FILM COATED, EXTENDED RELEASE ORAL
Qty: 180 TABLET | Refills: 3 | Status: SHIPPED | OUTPATIENT
Start: 2020-08-28 | End: 2021-07-16 | Stop reason: SDUPTHER

## 2020-09-08 ENCOUNTER — TELEPHONE (OUTPATIENT)
Dept: ONCOLOGY | Facility: CLINIC | Age: 70
End: 2020-09-08

## 2020-09-08 NOTE — TELEPHONE ENCOUNTER
Caller: Feli Del Toro    Relationship to patient: SELF    Best call back number: [phone]    Chief complaint: Pt asked to move appt to earlier date    Type of visit: infusion and F/U appts    Requested date: 9.10.20 at a time later than 9am but earlier than 4p.    If rescheduling, when is the original appointment: 9.10.20 at 4p

## 2020-09-10 ENCOUNTER — INFUSION (OUTPATIENT)
Dept: ONCOLOGY | Facility: HOSPITAL | Age: 70
End: 2020-09-10

## 2020-09-10 ENCOUNTER — OFFICE VISIT (OUTPATIENT)
Dept: ONCOLOGY | Facility: CLINIC | Age: 70
End: 2020-09-10

## 2020-09-10 VITALS
HEIGHT: 69 IN | DIASTOLIC BLOOD PRESSURE: 86 MMHG | TEMPERATURE: 97.3 F | OXYGEN SATURATION: 96 % | SYSTOLIC BLOOD PRESSURE: 152 MMHG | BODY MASS INDEX: 37.1 KG/M2 | RESPIRATION RATE: 14 BRPM | HEART RATE: 84 BPM | WEIGHT: 250.5 LBS

## 2020-09-10 DIAGNOSIS — C78.7 LIVER METASTASIS: ICD-10-CM

## 2020-09-10 DIAGNOSIS — R74.8 ELEVATED LIVER ENZYMES: ICD-10-CM

## 2020-09-10 DIAGNOSIS — I31.39 PERICARDIAL EFFUSION: ICD-10-CM

## 2020-09-10 DIAGNOSIS — Z90.81 POST-SPLENECTOMY: ICD-10-CM

## 2020-09-10 DIAGNOSIS — J90 PLEURAL EFFUSION: ICD-10-CM

## 2020-09-10 DIAGNOSIS — N64.9 BREAST LESION: ICD-10-CM

## 2020-09-10 DIAGNOSIS — Z45.2 ENCOUNTER FOR ADJUSTMENT OR MANAGEMENT OF VASCULAR ACCESS DEVICE: ICD-10-CM

## 2020-09-10 DIAGNOSIS — C18.9 PRIMARY MALIGNANT NEOPLASM OF COLON (HCC): Primary | ICD-10-CM

## 2020-09-10 LAB
BASOPHILS # BLD AUTO: 0.06 10*3/MM3 (ref 0–0.2)
BASOPHILS NFR BLD AUTO: 0.6 % (ref 0–1.5)
DEPRECATED RDW RBC AUTO: 49.8 FL (ref 37–54)
EOSINOPHIL # BLD AUTO: 0.57 10*3/MM3 (ref 0–0.4)
EOSINOPHIL NFR BLD AUTO: 5.9 % (ref 0.3–6.2)
ERYTHROCYTE [DISTWIDTH] IN BLOOD BY AUTOMATED COUNT: 15.9 % (ref 12.3–15.4)
HCT VFR BLD AUTO: 38.7 % (ref 34–46.6)
HGB BLD-MCNC: 12.3 G/DL (ref 12–15.9)
IMM GRANULOCYTES # BLD AUTO: 0.05 10*3/MM3 (ref 0–0.05)
IMM GRANULOCYTES NFR BLD AUTO: 0.5 % (ref 0–0.5)
LYMPHOCYTES # BLD AUTO: 2.94 10*3/MM3 (ref 0.7–3.1)
LYMPHOCYTES NFR BLD AUTO: 30.6 % (ref 19.6–45.3)
MCH RBC QN AUTO: 27.3 PG (ref 26.6–33)
MCHC RBC AUTO-ENTMCNC: 31.8 G/DL (ref 31.5–35.7)
MCV RBC AUTO: 86 FL (ref 79–97)
MONOCYTES # BLD AUTO: 1.13 10*3/MM3 (ref 0.1–0.9)
MONOCYTES NFR BLD AUTO: 11.8 % (ref 5–12)
NEUTROPHILS NFR BLD AUTO: 4.85 10*3/MM3 (ref 1.7–7)
NEUTROPHILS NFR BLD AUTO: 50.6 % (ref 42.7–76)
NRBC BLD AUTO-RTO: 0 /100 WBC (ref 0–0.2)
PLATELET # BLD AUTO: 393 10*3/MM3 (ref 140–450)
PMV BLD AUTO: 8.8 FL (ref 6–12)
RBC # BLD AUTO: 4.5 10*6/MM3 (ref 3.77–5.28)
WBC # BLD AUTO: 9.6 10*3/MM3 (ref 3.4–10.8)

## 2020-09-10 PROCEDURE — 96372 THER/PROPH/DIAG INJ SC/IM: CPT

## 2020-09-10 PROCEDURE — G0009 ADMIN PNEUMOCOCCAL VACCINE: HCPCS | Performed by: INTERNAL MEDICINE

## 2020-09-10 PROCEDURE — 25010000002 PNEUMOCOCCAL CONJ. 13-VALENT SUSPENSION: Performed by: INTERNAL MEDICINE

## 2020-09-10 PROCEDURE — 85025 COMPLETE CBC W/AUTO DIFF WBC: CPT | Performed by: INTERNAL MEDICINE

## 2020-09-10 PROCEDURE — 90670 PCV13 VACCINE IM: CPT | Performed by: INTERNAL MEDICINE

## 2020-09-10 PROCEDURE — 99214 OFFICE O/P EST MOD 30 MIN: CPT | Performed by: INTERNAL MEDICINE

## 2020-09-10 RX ADMIN — PNEUMOCOCCAL 13-VALENT CONJUGATE VACCINE 0.5 ML: 2.2; 2.2; 2.2; 2.2; 2.2; 4.4; 2.2; 2.2; 2.2; 2.2; 2.2; 2.2; 2.2 INJECTION, SUSPENSION INTRAMUSCULAR at 17:07

## 2020-09-10 NOTE — PROGRESS NOTES
Subjective     CHIEF COMPLAINT:      Chief Complaint   Patient presents with   • Follow-up       HISTORY OF PRESENT ILLNESS:     Feli Del Toro is a 69 y.o. female patient who returns today for follow up on her colon cancer.  She returns today for follow-up reporting that she is feeling good.  She is not having pain at the site of the port.  No abdominal pain.  No blood in the stool.  Bowel movements are regular.    Patient states that she received paperwork from the gastroenterology office.  However, she did not hear from them since that time and a colonoscopy has not been scheduled yet.    REVIEW OF SYSTEMS:  Review of Systems   Constitutional: Negative for fatigue, fever and unexpected weight change.   HENT: Negative for nosebleeds and voice change.    Eyes: Negative for visual disturbance.   Respiratory: Negative for cough and shortness of breath.    Cardiovascular: Negative for chest pain and leg swelling.   Gastrointestinal: Negative for abdominal pain, blood in stool, constipation, diarrhea, nausea and vomiting.   Genitourinary: Negative for frequency and hematuria.   Musculoskeletal: Negative for back pain and joint swelling.   Skin: Negative for rash.   Neurological: Negative for dizziness and headaches.   Hematological: Negative for adenopathy. Does not bruise/bleed easily.   Psychiatric/Behavioral: Negative for dysphoric mood. The patient is not nervous/anxious.          Past Medical History:   Diagnosis Date   • Acid reflux    • Anemia    • Cancer (CMS/HCC) 06/04/2013   • Diabetes mellitus (CMS/HCC)    • Dyspnea    • Essential hypertension    • History of constipation    • History of transfusion    • Hyperlipidemia    • Hypertension    • Metastatic colon cancer to liver (CMS/HCC)    • Neuropathy    • Pericardial effusion     Moderate   • Retinopathy    • SOB (shortness of breath)    • Type 2 diabetes mellitus (CMS/HCC)        Past Surgical History:   Procedure Laterality Date   • ABDOMINAL SURGERY       ablation    • AMPUTATION DIGIT Left 1/1/2017    Procedure: LEFT SECOND TOE AMPUTATION;  Surgeon: Farzad Nichols MD;  Location: [facility] OR;  Service:    • AMPUTATION OF REPLICATED TOES      right distal second toe    • CARDIAC CATHETERIZATION N/A 3/8/2018    Procedure: Pericardiocentesis;  Surgeon: Andrew Dobbins MD;  Location: [facility] CATH INVASIVE LOCATION;  Service:    • CATARACT EXTRACTION     • COLECTOMY PARTIAL / TOTAL  01/14/2014   • COLONOSCOPY  2013   • FOOT SURGERY Left 03/15/2019   • FRACTURE SURGERY Right 2017    right lower leg with screw in ankle   • HYSTERECTOMY  1998   • PERICARDIAL WINDOW N/A 3/10/2018    Procedure: PERICARDIAL WINDOW;  Surgeon: Nomi Patel III, MD;  Location: [facility] OR;  Service: Cardiothoracic   • PORTACATH PLACEMENT  06/2013   • SALPINGO OOPHORECTOMY Bilateral    • SPLENECTOMY     • THORACOSCOPY Left 3/10/2018    Procedure: BRONCHOSCOPY, LEFT THORACOSCOPY VIDEO ASSISTED, SUBPLEURAL CATHETERS FOR PAIN;  Surgeon: Nomi Patel III, MD;  Location: [facility] OR;  Service: Cardiothoracic       Cancer-related family history includes Breast cancer in her sister; Cancer in an other family member.  Social History     Tobacco Use   • Smoking status: Never Smoker   • Smokeless tobacco: Never Used   Substance Use Topics   • Alcohol use: No       MEDICATIONS:    Current Outpatient Medications:   •  allopurinol (ZYLOPRIM) 100 MG tablet, Take 1 tablet by mouth Daily., Disp: 90 tablet, Rfl: 3  •  aspirin 81 MG tablet, Take 81 mg by mouth daily., Disp: , Rfl:   •  atorvastatin (LIPITOR) 20 MG tablet, Take 1 tablet by mouth Daily., Disp: 90 tablet, Rfl: 3  •  BYDUREON BCISE 2 MG/0.85ML auto-injector injection, Inject 0.85 mL under the skin into the appropriate area as directed 1 (One) Time Per Week., Disp: 12 pen, Rfl: 3  •  calcium carbonate (TUMS) 500 MG chewable tablet, Chew 2 tablets As Needed., Disp: , Rfl:   •  Insulin Pen Needle (NOVOFINE) 32G X 6 MM misc, Take 1 shot of  "insulin per day, Disp: 90 each, Rfl: 3  •  telmisartan-hydrochlorothiazide (MICARDIS HCT) 40-12.5 MG per tablet, Take 1 tablet by mouth Daily., Disp: 90 tablet, Rfl: 3  •  TOUJEO SOLOSTAR 300 UNIT/ML solution pen-injector injection, Inject 45 Units under the skin into the appropriate area as directed Daily., Disp: 12 pen, Rfl: 3  •  XIGDUO XR 5-1000 MG tablet, TAKE 2 TABLETS DAILY, Disp: 180 tablet, Rfl: 3    ALLERGIES:  Allergies   Allergen Reactions   • Compazine [Prochlorperazine Edisylate] Unknown - Low Severity     Complete arch in the back    • Prochlorperazine Maleate Unknown - Low Severity     \"BACK MUSCLE RETRACTION\"         Objective   VITAL SIGNS:     Vitals:    09/10/20 1635   BP: 152/86   Pulse: 84   Resp: 14   Temp: 97.3 °F (36.3 °C)   TempSrc: Tympanic   SpO2: 96%   Weight: 114 kg (250 lb 8 oz)   Height: 175.3 cm (69.02\")   PainSc: 0-No pain     Body mass index is 36.98 kg/m².     Wt Readings from Last 3 Encounters:   09/10/20 114 kg (250 lb 8 oz)   05/28/20 115 kg (254 lb 4.8 oz)   02/27/20 112 kg (246 lb 14.4 oz)       PHYSICAL EXAMINATION:  GENERAL:  The patient appears in good general condition, not in acute distress.  SKIN: No skin rashes. No ecchymosis.  HEAD:  Normocephalic.  EYES:  No Jaundice. No Pallor. Pupils equal. EOMI.  NECK:  Supple with Good ROM. No Thyromegaly. No Masses.  LYMPHATICS:  No cervical or supraclavicular lymphadenopathy.  CHEST: Normal respiratory effort. Lungs clear to auscultation.   CARDIAC:  Normal S1 & S2. No murmur. No edema.  ABDOMEN: Obese.  Soft. No tenderness. No Hepatomegaly. No masses.  NEUROLOGICAL:  No Focal neurological deficits.       DIAGNOSTIC DATA:     Results from last 7 days   Lab Units 09/10/20  1553   WBC 10*3/mm3 9.60   NEUTROS ABS 10*3/mm3 4.85   HEMOGLOBIN g/dL 12.3   HEMATOCRIT % 38.7   PLATELETS 10*3/mm3 393         Assessment/Plan   1.  Metastatic colon cancer. She had metastases to the liver.   · She received 12 cycles of FOLFOX-6 and then had " resection of the primary tumor along with metastasectomy and splenectomy with thermal ablation of lesions in the liver in January 2014.   · This was followed by 12 cycles of the FOLFIRI regimen completed on 08/07/2014.   · CT scans on 05/28/2015 revealed a new lesion in the liver.  Dr. Ruano referred the patient to Radiation Therapy and she received CyberKnife radiation, completed on 07/14/2015.   · The CT scan from 5/5/17 revealed a new 2.2 cm lesion in the anterior hepatic segment.  CT guided microwave ablation of liver mass performed on 6/16/17 with CT evidence on 7/20/17 of a complete response.  · FOLFIRI ×12 cycles given between 6/29/17 and 11/29/17.   · CT scan from 3/11/2019 showed no evidence of lung metastasis.  No evidence of reaccumulation of the left pleural/pericardial effusion.  · CT scan of the abdomen on 5/9/2019 showed further decrease in the size of the liver metastasis.  No new lesions were seen.  · CT scan of the chest on 10/17/2019 showed no lung metastasis.  No recurrence of the pericardial or pleural effusions.   · CT scan on 5/21/2020 showed no evidence of lung metastasis.  · CEA slightly increased to 5.14 on 5/21/2020.  It improved to 3.92 on 7/9/2020  · Patient did not have a follow-up colonoscopy.  We referred her to gastroenterology to have the colonoscopy done.    2.  Elevated liver enzymes.  Although AST and ALT normalized on the lab from 2/27/2020, they increased back to an ALT of 55 and AST of 66 with alkaline phosphatase of 278 on 5/21/2020.  Repeat labs on 7/9/2020 showed improvement.  ALT was down to 18, AST down to 28 and alkaline phosphatase was down to 150.    3.  Pericardial effusion status post pericardial window.  Cytology and biopsy were negative for malignancy.  CT scan from 5/21/2020 showed no recurrence.  Exam today showed no evidence of pleural effusion.    4.  Patient has a port.  It will need to be maintained every 6 weeks.     5.  Patient is postsplenectomy.  She  did not receive the Prevnar vaccine in the past.  I recommended proceeding with Prevnar 13 vaccine today.  In 1 year, she will be due for the Pneumovax.     PLAN:    1.  We will give Prevnar 13 vaccine today.  2.  We will contact Dr. Bennett's office regarding the patient's colonoscopy.  3.  Return in 6 weeks for port flush.  4.  Obtain a follow-up CT scan of the chest with IV contrast in 12 weeks.  Patient's port will be flushed and a CBC CMP CEA will be obtained.  I will see her in follow-up 1 week after.  5.  Patient is going to be due for a follow-up CT of the abdomen and pelvis with Dr. Ruano in December 2020.        Marivel Brown MD  09/10/20

## 2020-09-11 PROBLEM — Z85.038 HISTORY OF COLON CANCER: Status: ACTIVE | Noted: 2020-09-11

## 2020-09-14 ENCOUNTER — TELEPHONE (OUTPATIENT)
Dept: ONCOLOGY | Facility: CLINIC | Age: 70
End: 2020-09-14

## 2020-09-14 NOTE — TELEPHONE ENCOUNTER
Synopsis from 9/10/20 showing pt received prevnar injection faxed to Dr. Mcpherson [phone] per pt's request

## 2020-09-14 NOTE — TELEPHONE ENCOUNTER
Pt called she had the Prevnar Injection when she was at her appt on 9/10/20 she would like a copy [facility]  Phone [phone]

## 2020-10-05 ENCOUNTER — TRANSCRIBE ORDERS (OUTPATIENT)
Dept: SLEEP MEDICINE | Facility: HOSPITAL | Age: 70
End: 2020-10-05

## 2020-10-05 DIAGNOSIS — Z01.818 OTHER SPECIFIED PRE-OPERATIVE EXAMINATION: Primary | ICD-10-CM

## 2020-10-07 ENCOUNTER — LAB (OUTPATIENT)
Dept: LAB | Facility: HOSPITAL | Age: 70
End: 2020-10-07

## 2020-10-07 DIAGNOSIS — Z01.818 OTHER SPECIFIED PRE-OPERATIVE EXAMINATION: ICD-10-CM

## 2020-10-07 PROCEDURE — U0004 COV-19 TEST NON-CDC HGH THRU: HCPCS

## 2020-10-07 PROCEDURE — C9803 HOPD COVID-19 SPEC COLLECT: HCPCS

## 2020-10-08 LAB — SARS-COV-2 RNA RESP QL NAA+PROBE: NOT DETECTED

## 2020-10-09 ENCOUNTER — HOSPITAL ENCOUNTER (OUTPATIENT)
Facility: HOSPITAL | Age: 70
Setting detail: HOSPITAL OUTPATIENT SURGERY
Discharge: HOME | End: 2020-10-09
Attending: INTERNAL MEDICINE | Admitting: INTERNAL MEDICINE

## 2020-10-09 ENCOUNTER — ANESTHESIA (OUTPATIENT)
Dept: GASTROENTEROLOGY | Facility: HOSPITAL | Age: 70
End: 2020-10-09

## 2020-10-09 ENCOUNTER — ANESTHESIA EVENT (OUTPATIENT)
Dept: GASTROENTEROLOGY | Facility: HOSPITAL | Age: 70
End: 2020-10-09

## 2020-10-09 VITALS
RESPIRATION RATE: 13 BRPM | HEIGHT: 69 IN | OXYGEN SATURATION: 96 % | HEART RATE: 86 BPM | BODY MASS INDEX: 36.43 KG/M2 | SYSTOLIC BLOOD PRESSURE: 137 MMHG | DIASTOLIC BLOOD PRESSURE: 85 MMHG | WEIGHT: 246 LBS

## 2020-10-09 DIAGNOSIS — Z85.038 HISTORY OF COLON CANCER: ICD-10-CM

## 2020-10-09 LAB — GLUCOSE BLDC GLUCOMTR-MCNC: 84 MG/DL (ref 70–130)

## 2020-10-09 PROCEDURE — 82962 GLUCOSE BLOOD TEST: CPT

## 2020-10-09 PROCEDURE — 45385 COLONOSCOPY W/LESION REMOVAL: CPT | Performed by: INTERNAL MEDICINE

## 2020-10-09 PROCEDURE — 25010000003 HEPARIN LOCK FLUSH PER 10 UNITS: Performed by: INTERNAL MEDICINE

## 2020-10-09 PROCEDURE — 45380 COLONOSCOPY AND BIOPSY: CPT | Performed by: INTERNAL MEDICINE

## 2020-10-09 PROCEDURE — 25010000002 PROPOFOL 10 MG/ML EMULSION: Performed by: NURSE ANESTHETIST, CERTIFIED REGISTERED

## 2020-10-09 PROCEDURE — 88305 TISSUE EXAM BY PATHOLOGIST: CPT | Performed by: INTERNAL MEDICINE

## 2020-10-09 RX ORDER — SODIUM CHLORIDE 0.9 % (FLUSH) 0.9 %
10 SYRINGE (ML) INJECTION AS NEEDED
Status: DISCONTINUED | OUTPATIENT
Start: 2020-10-09 | End: 2020-10-09 | Stop reason: HOSPADM

## 2020-10-09 RX ORDER — HEPARIN SODIUM (PORCINE) LOCK FLUSH IV SOLN 100 UNIT/ML 100 UNIT/ML
5 SOLUTION INTRAVENOUS AS NEEDED
Status: DISCONTINUED | OUTPATIENT
Start: 2020-10-09 | End: 2020-10-09 | Stop reason: HOSPADM

## 2020-10-09 RX ORDER — PROPOFOL 10 MG/ML
VIAL (ML) INTRAVENOUS AS NEEDED
Status: DISCONTINUED | OUTPATIENT
Start: 2020-10-09 | End: 2020-10-09 | Stop reason: SURG

## 2020-10-09 RX ORDER — LIDOCAINE HYDROCHLORIDE 20 MG/ML
INJECTION, SOLUTION INFILTRATION; PERINEURAL AS NEEDED
Status: DISCONTINUED | OUTPATIENT
Start: 2020-10-09 | End: 2020-10-09 | Stop reason: SURG

## 2020-10-09 RX ORDER — PROPOFOL 10 MG/ML
VIAL (ML) INTRAVENOUS CONTINUOUS PRN
Status: DISCONTINUED | OUTPATIENT
Start: 2020-10-09 | End: 2020-10-09 | Stop reason: SURG

## 2020-10-09 RX ORDER — SODIUM CHLORIDE, SODIUM LACTATE, POTASSIUM CHLORIDE, CALCIUM CHLORIDE 600; 310; 30; 20 MG/100ML; MG/100ML; MG/100ML; MG/100ML
1000 INJECTION, SOLUTION INTRAVENOUS CONTINUOUS
Status: DISCONTINUED | OUTPATIENT
Start: 2020-10-09 | End: 2020-10-09 | Stop reason: HOSPADM

## 2020-10-09 RX ADMIN — LIDOCAINE HYDROCHLORIDE 60 MG: 20 INJECTION, SOLUTION INFILTRATION; PERINEURAL at 14:06

## 2020-10-09 RX ADMIN — PROPOFOL 200 MCG/KG/MIN: 10 INJECTION, EMULSION INTRAVENOUS at 14:06

## 2020-10-09 RX ADMIN — PROPOFOL 50 MG: 10 INJECTION, EMULSION INTRAVENOUS at 14:06

## 2020-10-09 RX ADMIN — HEPARIN 500 UNITS: 100 SYRINGE at 14:48

## 2020-10-09 RX ADMIN — SODIUM CHLORIDE, POTASSIUM CHLORIDE, SODIUM LACTATE AND CALCIUM CHLORIDE 1000 ML: 600; 310; 30; 20 INJECTION, SOLUTION INTRAVENOUS at 13:49

## 2020-10-09 RX ADMIN — Medication 10 ML: at 14:48

## 2020-10-09 NOTE — ANESTHESIA POSTPROCEDURE EVALUATION
"Patient: Feli Del Toro    Procedure Summary     Date: 10/09/20 Room / [facility]:  [facility]    Anesthesia Start: 1403 Anesthesia Stop: 1435    Procedure: COLONOSCOPY INTO CECUM AND TI WITH COLD SNARE POLYPECTOMIES, COLD BX POLYPECTOMY (N/A ) Diagnosis:       History of colon cancer      (History of colon cancer [Z85.038])    Surgeon: Dylon Bennett MD Provider: Katie Barba MD    Anesthesia Type: MAC ASA Status: 3          Anesthesia Type: MAC    Vitals  Vitals Value Taken Time   /71 10/09/20 1438   Temp     Pulse 81 10/09/20 1438   Resp     SpO2 96 % 10/09/20 1438           Post Anesthesia Care and Evaluation    Patient location during evaluation: PHASE II  Patient participation: complete - patient participated  Level of consciousness: awake and alert  Pain management: adequate  Airway patency: patent  Anesthetic complications: No anesthetic complications    Cardiovascular status: acceptable  Respiratory status: acceptable  Hydration status: acceptable    Comments: /71 (BP Location: Left arm, Patient Position: Lying)   Pulse 81   Resp 13   Ht 175.3 cm (69\")   Wt 112 kg (246 lb)   LMP  (LMP Unknown)   SpO2 96%   BMI 36.33 kg/m²         "

## 2020-10-09 NOTE — H&P
[facility]  Pre Procedure History & Physical    Chief Complaint:   Time for my colonoscopy    Subjective     HPI:   69 y.o. female retired [employer] who was diagnosed with colon cancer in 2014. She has colon cancer with liver mets. She has no GI c/o. Her last colonoscopy was in 2014 when she was diagnosed with colon cancer.     Past Medical History:   Past Medical History:   Diagnosis Date   • Acid reflux    • Anemia    • Cancer (CMS/HCC) 06/04/2013   • Diabetes mellitus (CMS/HCC)    • Dyspnea    • Essential hypertension    • History of constipation    • History of transfusion    • Hyperlipidemia    • Hypertension    • Metastatic colon cancer to liver (CMS/HCC)    • Neuropathy    • Pericardial effusion     Moderate   • Retinopathy    • SOB (shortness of breath)    • Type 2 diabetes mellitus (CMS/HCC)        Family History:  Family History   Problem Relation Age of Onset   • Heart attack Other    • Cancer Other    • Diabetes Other    • Hypertension Other    • Hypertension Father    • Diabetes Father    • Stroke Father    • Breast cancer Sister    • Diabetes Sister    • Diabetes Maternal Grandmother    • Stroke Paternal Grandmother    • Diabetes Paternal Grandmother    • Lupus Paternal Grandfather    • Stroke Paternal Grandfather    • Diabetes Sister    • Hypertension Mother    • Malig Hyperthermia Neg Hx        Social History:   reports that she has never smoked. She has never used smokeless tobacco. She reports that she does not drink alcohol or use drugs.    Medications:   Medications Prior to Admission   Medication Sig Dispense Refill Last Dose   • allopurinol (ZYLOPRIM) 100 MG tablet Take 1 tablet by mouth Daily. 90 tablet 3    • aspirin 81 MG tablet Take 81 mg by mouth daily.   10/7/2020   • atorvastatin (LIPITOR) 20 MG tablet Take 1 tablet by mouth Daily. 90 tablet 3    • BYDUREON BCISE 2 MG/0.85ML auto-injector injection Inject 0.85 mL under the skin into the appropriate area as  "directed 1 (One) Time Per Week. 12 pen 3    • calcium carbonate (TUMS) 500 MG chewable tablet Chew 2 tablets As Needed.      • Insulin Pen Needle (NOVOFINE) 32G X 6 MM misc Take 1 shot of insulin per day 90 each 3    • telmisartan-hydrochlorothiazide (MICARDIS HCT) 40-12.5 MG per tablet Take 1 tablet by mouth Daily. 90 tablet 3 10/9/2020 at Unknown time   • TOUJEO SOLOSTAR 300 UNIT/ML solution pen-injector injection Inject 45 Units under the skin into the appropriate area as directed Daily. 12 pen 3 10/8/2020 at Unknown time   • XIGDUO XR 5-1000 MG tablet TAKE 2 TABLETS DAILY 180 tablet 3 10/8/2020 at Unknown time       Allergies:  Compazine [prochlorperazine edisylate] and Prochlorperazine maleate    ROS:    Pertinent items are noted in HPI     Objective     Blood pressure 127/71, pulse 83, resp. rate 13, height 175.3 cm (69\"), weight 112 kg (246 lb), SpO2 97 %, not currently breastfeeding.    Physical Exam   Constitutional: Pt is oriented to person, place, and time and well-developed, well-nourished, and in no distress.   HENT:   Mouth/Throat: Oropharynx is clear and moist.   Neck: Normal range of motion. Neck supple.   Cardiovascular: Normal rate, regular rhythm and normal heart sounds.    Pulmonary/Chest: Effort normal and breath sounds normal. No respiratory distress. No  wheezes.   Abdominal: Soft. Bowel sounds are normal.   Skin: Skin is warm and dry.   Psychiatric: Mood, memory, affect and judgment normal.     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 She has colon cancer with liver mets. She has no GI c/o. Her last colonoscopy was in 2014 when she was diagnosed with colon cancer.     Anticipated Surgical Procedure:  Colonoscopy    The risks, benefits, and alternatives of this procedure have been discussed with the patient or the responsible party- the patient understands and agrees to proceed.    Dylon Bennett M.D.  "

## 2020-10-09 NOTE — ANESTHESIA PREPROCEDURE EVALUATION
Anesthesia Evaluation     Patient summary reviewed and Nursing notes reviewed   no history of anesthetic complications:  NPO Solid Status: > 8 hours             Airway   Mallampati: II  TM distance: >3 FB  Neck ROM: full  no difficulty expected  Dental - normal exam     Pulmonary - normal exam   (+) pleural effusion (remote),   (-) shortness of breath, recent URI, sleep apnea  Cardiovascular - normal exam  Exercise tolerance: poor (<4 METS)    ECG reviewed  Rhythm: regular  Rate: normal    (+) hypertension, hyperlipidemia,   (-) past MI, CAD, angina, orthopnea, BARRERA      Neuro/Psych  (+) numbness,     (-) seizures, neuromuscular disease, TIA, CVA, dizziness/light headedness, syncope  GI/Hepatic/Renal/Endo    (+) obesity, morbid obesity,  liver disease (h/o Metastatic to liver) history of elevated LFT, diabetes mellitus type 2 using insulin,   (-) no renal disease    Musculoskeletal (-) negative ROS    Abdominal  - normal exam   Substance History - negative use     OB/GYN          Other      history of cancer remission                    Anesthesia Plan    ASA 3     MAC       Anesthetic plan, all risks, benefits, and alternatives have been provided, discussed and informed consent has been obtained with: patient.

## 2020-10-13 LAB
LAB AP CASE REPORT: NORMAL
PATH REPORT.FINAL DX SPEC: NORMAL
PATH REPORT.GROSS SPEC: NORMAL

## 2020-10-22 ENCOUNTER — INFUSION (OUTPATIENT)
Dept: ONCOLOGY | Facility: HOSPITAL | Age: 70
End: 2020-10-22

## 2020-10-22 DIAGNOSIS — Z45.2 ENCOUNTER FOR ADJUSTMENT OR MANAGEMENT OF VASCULAR ACCESS DEVICE: Primary | ICD-10-CM

## 2020-10-22 PROCEDURE — 25010000003 HEPARIN LOCK FLUSH PER 10 UNITS: Performed by: INTERNAL MEDICINE

## 2020-10-22 PROCEDURE — 96523 IRRIG DRUG DELIVERY DEVICE: CPT

## 2020-10-22 RX ORDER — HEPARIN SODIUM (PORCINE) LOCK FLUSH IV SOLN 100 UNIT/ML 100 UNIT/ML
500 SOLUTION INTRAVENOUS AS NEEDED
Status: CANCELLED | OUTPATIENT
Start: 2020-10-22

## 2020-10-22 RX ORDER — SODIUM CHLORIDE 0.9 % (FLUSH) 0.9 %
10 SYRINGE (ML) INJECTION AS NEEDED
Status: CANCELLED | OUTPATIENT
Start: 2020-10-22

## 2020-10-22 RX ORDER — SODIUM CHLORIDE 0.9 % (FLUSH) 0.9 %
10 SYRINGE (ML) INJECTION AS NEEDED
Status: DISCONTINUED | OUTPATIENT
Start: 2020-10-22 | End: 2020-10-22 | Stop reason: HOSPADM

## 2020-10-22 RX ORDER — HEPARIN SODIUM (PORCINE) LOCK FLUSH IV SOLN 100 UNIT/ML 100 UNIT/ML
500 SOLUTION INTRAVENOUS AS NEEDED
Status: DISCONTINUED | OUTPATIENT
Start: 2020-10-22 | End: 2020-10-22 | Stop reason: HOSPADM

## 2020-10-22 RX ADMIN — SODIUM CHLORIDE, PRESERVATIVE FREE 10 ML: 5 INJECTION INTRAVENOUS at 13:23

## 2020-10-22 RX ADMIN — Medication 500 UNITS: at 13:23

## 2020-11-10 ENCOUNTER — TELEPHONE (OUTPATIENT)
Dept: GASTROENTEROLOGY | Facility: CLINIC | Age: 70
End: 2020-11-10

## 2020-11-10 DIAGNOSIS — Z85.038 HISTORY OF COLON CANCER: Primary | ICD-10-CM

## 2020-11-10 DIAGNOSIS — Z83.71 FAMILY HISTORY OF POLYPS IN THE COLON: ICD-10-CM

## 2020-11-10 NOTE — TELEPHONE ENCOUNTER
----- Message from Dylon Bennett MD sent at 11/1/2020  3:20 PM EST -----  11/01/20  MT/SA - Tell her that the colon polyps that we removed were not cancerous but were precancerous. I would recommend that we do a repeat colonoscopy in 3 mos because she had a poor prep during this last colonoscopy. If we are going to repeat a colonoscopy in 3 mos then I would use a more aggressive colonoscopy prep such as a Split Dose Go Lytely prep. FAX to her PCP.   arthur. [facility]

## 2020-11-10 NOTE — TELEPHONE ENCOUNTER
Called pt and advised of Dr Bennett's note. ADvised someone from scheduling will call her to arrange repeat c/s..  Pt verb understanding.     Case request placed for c/s and message sent to Dr Bennett to [facility].

## 2020-11-18 ENCOUNTER — PREP FOR SURGERY (OUTPATIENT)
Dept: OTHER | Facility: HOSPITAL | Age: 70
End: 2020-11-18

## 2020-11-18 ENCOUNTER — TELEPHONE (OUTPATIENT)
Dept: GASTROENTEROLOGY | Facility: CLINIC | Age: 70
End: 2020-11-18

## 2020-11-18 DIAGNOSIS — C18.9 COLON CANCER (HCC): Primary | ICD-10-CM

## 2020-11-18 DIAGNOSIS — K63.5 COLON POLYPS: ICD-10-CM

## 2020-11-18 NOTE — TELEPHONE ENCOUNTER
----- Message from Tin Barros Rep sent at 11/18/2020 10:57 AM EST -----  Regarding: 3 mnth follow up colonoscopy  Contact: [phone]  Pt states she needs to schedule a 3 mth follow up colonoscopy per Dr Bennett

## 2020-11-19 PROBLEM — C18.9 COLON CANCER: Status: ACTIVE | Noted: 2020-11-19

## 2020-11-19 PROBLEM — K63.5 COLON POLYPS: Status: ACTIVE | Noted: 2020-11-19

## 2020-12-01 ENCOUNTER — HOSPITAL ENCOUNTER (OUTPATIENT)
Dept: PET IMAGING | Facility: HOSPITAL | Age: 70
Discharge: HOME | End: 2020-12-01
Admitting: INTERNAL MEDICINE

## 2020-12-01 ENCOUNTER — INFUSION (OUTPATIENT)
Dept: ONCOLOGY | Facility: HOSPITAL | Age: 70
End: 2020-12-01

## 2020-12-01 DIAGNOSIS — C78.7 LIVER METASTASIS: ICD-10-CM

## 2020-12-01 DIAGNOSIS — R74.8 ELEVATED LIVER ENZYMES: ICD-10-CM

## 2020-12-01 DIAGNOSIS — I31.39 PERICARDIAL EFFUSION: ICD-10-CM

## 2020-12-01 DIAGNOSIS — C18.9 PRIMARY MALIGNANT NEOPLASM OF COLON (HCC): ICD-10-CM

## 2020-12-01 DIAGNOSIS — J90 PLEURAL EFFUSION: ICD-10-CM

## 2020-12-01 LAB
ALBUMIN SERPL-MCNC: 3.8 G/DL (ref 3.5–5.2)
ALBUMIN/GLOB SERPL: 1 G/DL (ref 1.1–2.4)
ALP SERPL-CCNC: 129 U/L (ref 38–116)
ALT SERPL W P-5'-P-CCNC: 20 U/L (ref 0–33)
ANION GAP SERPL CALCULATED.3IONS-SCNC: 7.2 MMOL/L (ref 5–15)
AST SERPL-CCNC: 30 U/L (ref 0–32)
BASOPHILS # BLD AUTO: 0.06 10*3/MM3 (ref 0–0.2)
BASOPHILS NFR BLD AUTO: 0.8 % (ref 0–1.5)
BILIRUB SERPL-MCNC: 0.3 MG/DL (ref 0.2–1.2)
BUN SERPL-MCNC: 17 MG/DL (ref 6–20)
BUN/CREAT SERPL: 18.1 (ref 7.3–30)
CALCIUM SPEC-SCNC: 9.5 MG/DL (ref 8.5–10.2)
CEA SERPL-MCNC: 4.28 NG/ML
CHLORIDE SERPL-SCNC: 104 MMOL/L (ref 98–107)
CO2 SERPL-SCNC: 27.8 MMOL/L (ref 22–29)
CREAT BLDA-MCNC: 1.1 MG/DL (ref 0.6–1.3)
CREAT SERPL-MCNC: 0.94 MG/DL (ref 0.6–1.1)
DEPRECATED RDW RBC AUTO: 50.4 FL (ref 37–54)
EOSINOPHIL # BLD AUTO: 0.41 10*3/MM3 (ref 0–0.4)
EOSINOPHIL NFR BLD AUTO: 5.6 % (ref 0.3–6.2)
ERYTHROCYTE [DISTWIDTH] IN BLOOD BY AUTOMATED COUNT: 16.3 % (ref 12.3–15.4)
GFR SERPL CREATININE-BSD FRML MDRD: 72 ML/MIN/1.73
GLOBULIN UR ELPH-MCNC: 3.7 GM/DL (ref 1.8–3.5)
GLUCOSE SERPL-MCNC: 157 MG/DL (ref 74–124)
HCT VFR BLD AUTO: 37.4 % (ref 34–46.6)
HGB BLD-MCNC: 12.1 G/DL (ref 12–15.9)
IMM GRANULOCYTES # BLD AUTO: 0.04 10*3/MM3 (ref 0–0.05)
IMM GRANULOCYTES NFR BLD AUTO: 0.5 % (ref 0–0.5)
LYMPHOCYTES # BLD AUTO: 2.54 10*3/MM3 (ref 0.7–3.1)
LYMPHOCYTES NFR BLD AUTO: 34.6 % (ref 19.6–45.3)
MCH RBC QN AUTO: 27.5 PG (ref 26.6–33)
MCHC RBC AUTO-ENTMCNC: 32.4 G/DL (ref 31.5–35.7)
MCV RBC AUTO: 85 FL (ref 79–97)
MONOCYTES # BLD AUTO: 0.9 10*3/MM3 (ref 0.1–0.9)
MONOCYTES NFR BLD AUTO: 12.3 % (ref 5–12)
NEUTROPHILS NFR BLD AUTO: 3.39 10*3/MM3 (ref 1.7–7)
NEUTROPHILS NFR BLD AUTO: 46.2 % (ref 42.7–76)
NRBC BLD AUTO-RTO: 0 /100 WBC (ref 0–0.2)
PLATELET # BLD AUTO: 385 10*3/MM3 (ref 140–450)
PMV BLD AUTO: 8.7 FL (ref 6–12)
POTASSIUM SERPL-SCNC: 4.1 MMOL/L (ref 3.5–4.7)
PROT SERPL-MCNC: 7.5 G/DL (ref 6.3–8)
RBC # BLD AUTO: 4.4 10*6/MM3 (ref 3.77–5.28)
SODIUM SERPL-SCNC: 139 MMOL/L (ref 134–145)
WBC # BLD AUTO: 7.34 10*3/MM3 (ref 3.4–10.8)

## 2020-12-01 PROCEDURE — 80053 COMPREHEN METABOLIC PANEL: CPT

## 2020-12-01 PROCEDURE — 36591 DRAW BLOOD OFF VENOUS DEVICE: CPT

## 2020-12-01 PROCEDURE — 25010000002 IOPAMIDOL 61 % SOLUTION: Performed by: INTERNAL MEDICINE

## 2020-12-01 PROCEDURE — 82565 ASSAY OF CREATININE: CPT

## 2020-12-01 PROCEDURE — 82378 CARCINOEMBRYONIC ANTIGEN: CPT | Performed by: INTERNAL MEDICINE

## 2020-12-01 PROCEDURE — 85025 COMPLETE CBC W/AUTO DIFF WBC: CPT

## 2020-12-01 PROCEDURE — 71260 CT THORAX DX C+: CPT

## 2020-12-01 RX ADMIN — IOPAMIDOL 75 ML: 612 INJECTION, SOLUTION INTRAVENOUS at 11:43

## 2020-12-07 ENCOUNTER — APPOINTMENT (OUTPATIENT)
Dept: LAB | Facility: HOSPITAL | Age: 70
End: 2020-12-07

## 2020-12-07 ENCOUNTER — OFFICE VISIT (OUTPATIENT)
Dept: ONCOLOGY | Facility: CLINIC | Age: 70
End: 2020-12-07

## 2020-12-07 VITALS
SYSTOLIC BLOOD PRESSURE: 137 MMHG | BODY MASS INDEX: 36.81 KG/M2 | DIASTOLIC BLOOD PRESSURE: 64 MMHG | OXYGEN SATURATION: 96 % | HEIGHT: 69 IN | WEIGHT: 248.5 LBS | RESPIRATION RATE: 16 BRPM | TEMPERATURE: 98.2 F | HEART RATE: 90 BPM

## 2020-12-07 DIAGNOSIS — C18.9 PRIMARY MALIGNANT NEOPLASM OF COLON (HCC): Primary | ICD-10-CM

## 2020-12-07 DIAGNOSIS — C78.7 LIVER METASTASIS: ICD-10-CM

## 2020-12-07 DIAGNOSIS — Z45.2 ENCOUNTER FOR ADJUSTMENT OR MANAGEMENT OF VASCULAR ACCESS DEVICE: ICD-10-CM

## 2020-12-07 PROCEDURE — 99214 OFFICE O/P EST MOD 30 MIN: CPT | Performed by: INTERNAL MEDICINE

## 2020-12-07 RX ORDER — HEPARIN SODIUM (PORCINE) LOCK FLUSH IV SOLN 100 UNIT/ML 100 UNIT/ML
500 SOLUTION INTRAVENOUS AS NEEDED
Status: CANCELLED | OUTPATIENT
Start: 2020-12-07

## 2020-12-07 RX ORDER — SODIUM CHLORIDE 0.9 % (FLUSH) 0.9 %
10 SYRINGE (ML) INJECTION AS NEEDED
Status: CANCELLED | OUTPATIENT
Start: 2020-12-07

## 2020-12-07 NOTE — PROGRESS NOTES
Subjective     CHIEF COMPLAINT:      Chief Complaint   Patient presents with   • Follow-up     discuss CT Scan       HISTORY OF PRESENT ILLNESS:     Feli Del Toro is a 69 y.o. female patient who returns today for follow up on her colon cancer.  She returns today for follow-up and reports feeling good.  She had a colonoscopy in October 2020 but it was not a good prep.  She is going to have a repeat colonoscopy in January 2021.  2 polyps were excised.    Patient is not having abdominal pain.  No chest pain or shortness of breath.    REVIEW OF SYSTEMS:  Review of Systems   Constitutional: Negative for fatigue, fever and unexpected weight change.   HENT: Negative for nosebleeds and voice change.    Eyes: Negative for visual disturbance.   Respiratory: Negative for cough and shortness of breath.    Cardiovascular: Negative for chest pain and leg swelling.   Gastrointestinal: Negative for abdominal pain, blood in stool, constipation, diarrhea, nausea and vomiting.   Genitourinary: Negative for frequency and hematuria.   Musculoskeletal: Negative for back pain and joint swelling.   Skin: Negative for rash.   Neurological: Negative for dizziness and headaches.   Hematological: Negative for adenopathy. Does not bruise/bleed easily.   Psychiatric/Behavioral: Negative for dysphoric mood. The patient is not nervous/anxious.      I reviewed and verified the CC and ROS obtained by the MA.     Past Medical History:   Diagnosis Date   • Acid reflux    • Anemia    • Cancer (CMS/HCC) 06/04/2013   • Diabetes mellitus (CMS/HCC)    • Dyspnea    • Essential hypertension    • History of constipation    • History of transfusion    • Hyperlipidemia    • Hypertension    • Metastatic colon cancer to liver (CMS/HCC)    • Neuropathy    • Pericardial effusion     Moderate   • Retinopathy    • SOB (shortness of breath)    • Type 2 diabetes mellitus (CMS/HCC)        Past Surgical History:   Procedure Laterality Date   • ABDOMINAL SURGERY       ablation    • AMPUTATION DIGIT Left 1/1/2017    Procedure: LEFT SECOND TOE AMPUTATION;  Surgeon: Farzad Nichols MD;  Location: [facility] OR;  Service:    • AMPUTATION OF REPLICATED TOES      right distal second toe    • CARDIAC CATHETERIZATION N/A 3/8/2018    Procedure: Pericardiocentesis;  Surgeon: Andrew Dobbins MD;  Location: [facility] CATH INVASIVE LOCATION;  Service:    • CATARACT EXTRACTION     • COLECTOMY PARTIAL / TOTAL  01/14/2014   • COLONOSCOPY  2013   • COLONOSCOPY N/A 10/9/2020    Procedure: COLONOSCOPY INTO CECUM AND TI WITH COLD SNARE POLYPECTOMIES, COLD BX POLYPECTOMY;  Surgeon: Dylon Bennett MD;  Location: [facility] ENDOSCOPY;  Service: Gastroenterology;  Laterality: N/A;  PRE:  HX OF COLON CANCER  POST:  POLYPS, FAIR-POOR PREP, INTERNAL HEMORRHOIDS   • FOOT SURGERY Left 03/15/2019   • FRACTURE SURGERY Right 2017    right lower leg with screw in ankle   • HYSTERECTOMY  1998   • PERICARDIAL WINDOW N/A 3/10/2018    Procedure: PERICARDIAL WINDOW;  Surgeon: Nomi Patel III, MD;  Location: [facility] OR;  Service: Cardiothoracic   • PORTACATH PLACEMENT  06/2013   • SALPINGO OOPHORECTOMY Bilateral    • SPLENECTOMY     • THORACOSCOPY Left 3/10/2018    Procedure: BRONCHOSCOPY, LEFT THORACOSCOPY VIDEO ASSISTED, SUBPLEURAL CATHETERS FOR PAIN;  Surgeon: Nomi Patel III, MD;  Location: [facility] OR;  Service: Cardiothoracic       Cancer-related family history includes Breast cancer in her sister; Cancer in an other family member.  Social History     Tobacco Use   • Smoking status: Never Smoker   • Smokeless tobacco: Never Used   Substance Use Topics   • Alcohol use: No       MEDICATIONS:    Current Outpatient Medications:   •  allopurinol (ZYLOPRIM) 100 MG tablet, Take 1 tablet by mouth Daily., Disp: 90 tablet, Rfl: 3  •  aspirin 81 MG tablet, Take 81 mg by mouth daily., Disp: , Rfl:   •  atorvastatin (LIPITOR) 20 MG tablet, Take 1 tablet by mouth Daily., Disp: 90 tablet, Rfl: 3  •  BYDUREON BCISE 2  "MG/0.85ML auto-injector injection, Inject 0.85 mL under the skin into the appropriate area as directed 1 (One) Time Per Week., Disp: 12 pen, Rfl: 3  •  calcium carbonate (TUMS) 500 MG chewable tablet, Chew 2 tablets As Needed., Disp: , Rfl:   •  Insulin Pen Needle (NOVOFINE) 32G X 6 MM misc, Take 1 shot of insulin per day, Disp: 90 each, Rfl: 3  •  telmisartan-hydrochlorothiazide (MICARDIS HCT) 40-12.5 MG per tablet, Take 1 tablet by mouth Daily., Disp: 90 tablet, Rfl: 3  •  TOUJEO SOLOSTAR 300 UNIT/ML solution pen-injector injection, Inject 45 Units under the skin into the appropriate area as directed Daily., Disp: 12 pen, Rfl: 3  •  XIGDUO XR 5-1000 MG tablet, TAKE 2 TABLETS DAILY, Disp: 180 tablet, Rfl: 3    ALLERGIES:  Allergies   Allergen Reactions   • Compazine [Prochlorperazine Edisylate] Unknown - Low Severity     Complete arch in the back    • Prochlorperazine Maleate Unknown - Low Severity     \"BACK MUSCLE RETRACTION\"         Objective   VITAL SIGNS:     Vitals:    12/07/20 1444   BP: 137/64   Pulse: 90   Resp: 16   Temp: 98.2 °F (36.8 °C)   TempSrc: Temporal   SpO2: 96%   Weight: 113 kg (248 lb 8 oz)   Height: 175.3 cm (69.02\")   PainSc: 0-No pain     Body mass index is 36.68 kg/m².     Wt Readings from Last 3 Encounters:   12/07/20 113 kg (248 lb 8 oz)   10/09/20 112 kg (246 lb)   09/10/20 114 kg (250 lb 8 oz)       PHYSICAL EXAMINATION:  GENERAL:  The patient appears in good general condition, not in acute distress.  SKIN: No skin rashes. No ecchymosis.  HEAD:  Normocephalic.  EYES:  No Jaundice. No Pallor. Pupils equal. EOMI.  NECK:  Supple with Good ROM. No Thyromegaly. No Masses.  LYMPHATICS:  No cervical or supraclavicular lymphadenopathy.  CHEST: Normal respiratory effort. Lungs clear to auscultation.   CARDIAC:  Normal S1 & S2. No murmur. No edema.  ABDOMEN:  Soft. No tenderness. No Hepatomegaly. No Splenomegaly. No masses.  EXTREMITIES:  No clubbing. No deforming arthritis in the hands. "   NEUROLOGICAL:  No Focal neurological deficits.       DIAGNOSTIC DATA:     Results from last 7 days   Lab Units 12/01/20  1103   WBC 10*3/mm3 7.34   NEUTROS ABS 10*3/mm3 3.39   HEMOGLOBIN g/dL 12.1   HEMATOCRIT % 37.4   PLATELETS 10*3/mm3 385     Results from last 7 days   Lab Units 12/01/20  1139 12/01/20  1103   SODIUM mmol/L  --  139   POTASSIUM mmol/L  --  4.1   CHLORIDE mmol/L  --  104   CO2 mmol/L  --  27.8   BUN mg/dL  --  17   CREATININE mg/dL 1.10 0.94   CALCIUM mg/dL  --  9.5   ALBUMIN g/dL  --  3.80   BILIRUBIN mg/dL  --  0.3   ALK PHOS U/L  --  129*   ALT (SGPT) U/L  --  20   AST (SGOT) U/L  --  30   GLUCOSE mg/dL  --  157*         Lab Results   Component Value Date    CEA 4.28 12/01/2020    CEA 3.92 07/09/2020    CEA 5.14 05/21/2020    CEA 4.18 02/27/2020    CEA 4.26 10/17/2019     Pathology exam from 10/9/2020:  1.  Colon Polyp at 90 cm Biopsy:                 A. Tubular adenoma .               B. No high grade glandular dyplasia nor malignancy identified.      2.  Colon Polyp at 40 cm, Biopsy:                 A. Tubular adenoma .               B. No high grade glandular dyplasia nor malignancy identified.     CT chest on 12/1/2020:  Stable exam compared to 5/21/2020.  No evidence of metastatic disease.  No pleural effusion.  Limited images of the upper abdomen did not reveal a change in the liver lesions.    Assessment/Plan   *Metastatic colon cancer. She had metastases to the liver.   · She received 12 cycles of FOLFOX-6 and then had resection of the primary tumor along with metastasectomy and splenectomy with thermal ablation of lesions in the liver in January 2014.   · This was followed by 12 cycles of the FOLFIRI regimen completed on 08/07/2014.   · CT scans on 05/28/2015 revealed a new lesion in the liver.  Dr. Ruano referred the patient to Radiation Therapy and she received CyberKnife radiation, completed on 07/14/2015.   · The CT scan from 5/5/17 revealed a new 2.2 cm lesion in the anterior  hepatic segment.  CT guided microwave ablation of liver mass performed on 6/16/17 with CT evidence on 7/20/17 of a complete response.  · FOLFIRI ×12 cycles given between 6/29/17 and 11/29/17.   · CT scan from 3/11/2019 showed no evidence of lung metastasis.  No evidence of reaccumulation of the left pleural/pericardial effusion.  · CT scan of the abdomen on 5/9/2019 showed further decrease in the size of the liver metastasis.  No new lesions were seen.  · CT scan of the chest on 10/17/2019 showed no lung metastasis.  No recurrence of the pericardial or pleural effusions.   · CT scan on 5/21/2020 showed no evidence of lung metastasis.  · CEA slightly increased to 5.14 on 5/21/2020.  It improved to 3.92 on 7/9/2020  · Colonoscopy on 10/9/2020 revealed tubular adenomas with no high-grade dysplasia or malignancy.  · CT chest on 12/1/2020 showed no evidence of recurrence.  · CEA remains in the normal range.    *Pericardial effusion status post pericardial window.    · Cytology and biopsy were negative for malignancy.    · CT scan from 5/21/2020 showed no recurrence.    · CT on 12/1/2020 showed no recurrence of the effusions.    *Patient has a port.    · It will need to be maintained every 6 weeks.     *Patient is postsplenectomy.    · She did not receive the Prevnar vaccine in the past.    · She was given Prevnar 13 vaccine 9/10/2020.    · She will be due for Pneumovax in September 2021.     PLAN:    1.  Return in 6 weeks for a port flush.  2.  Await results of CT of the abdomen pelvis at Dr. Ruano office.  3.  Follow-up in 3 months with CBC CMP CEA.  4.  Await results of the repeat colonoscopy in January 2021.  5.  Plan to repeat CT chest in 6 months.        Marivel Brown MD  12/07/20

## 2020-12-17 ENCOUNTER — OFFICE VISIT (OUTPATIENT)
Dept: ENDOCRINOLOGY | Age: 70
End: 2020-12-17

## 2020-12-17 VITALS
WEIGHT: 248.6 LBS | HEART RATE: 88 BPM | HEIGHT: 70 IN | SYSTOLIC BLOOD PRESSURE: 120 MMHG | BODY MASS INDEX: 35.59 KG/M2 | DIASTOLIC BLOOD PRESSURE: 70 MMHG | OXYGEN SATURATION: 98 %

## 2020-12-17 DIAGNOSIS — Z79.4 TYPE 2 DIABETES MELLITUS WITH COMPLICATION, WITH LONG-TERM CURRENT USE OF INSULIN (HCC): ICD-10-CM

## 2020-12-17 DIAGNOSIS — E78.2 HYPERLIPEMIA, MIXED: ICD-10-CM

## 2020-12-17 DIAGNOSIS — E66.9 OBESITY (BMI 35.0-39.9 WITHOUT COMORBIDITY): ICD-10-CM

## 2020-12-17 DIAGNOSIS — Z79.4 LONG-TERM INSULIN USE (HCC): Primary | ICD-10-CM

## 2020-12-17 DIAGNOSIS — E11.8 TYPE 2 DIABETES MELLITUS WITH COMPLICATION, WITH LONG-TERM CURRENT USE OF INSULIN (HCC): ICD-10-CM

## 2020-12-17 DIAGNOSIS — IMO0002 DM (DIABETES MELLITUS), TYPE 2, UNCONTROLLED W/NEUROLOGIC COMPLICATION: ICD-10-CM

## 2020-12-17 PROCEDURE — 99214 OFFICE O/P EST MOD 30 MIN: CPT | Performed by: INTERNAL MEDICINE

## 2020-12-17 NOTE — PROGRESS NOTES
70 y.o.    Patient Care Team:  Paola Onofre MD as PCP - General (Family Medicine)  Marivel Brown MD as Consulting Physician (Hematology and Oncology)    Chief Complaint:    Follow up/ type 2 dm former kristine  Nafisa     HPI  70-year-old -American female is here as a follow-up for the evaluation of type 2 diabetes mellitus.  Patient used to see Dr. Moran in the past, transferred her care to me on 12/17/2020.    Type 2 dm - Diagnosed in 1986.  Today in clinic pt reports being on Toujeo 45 units at bedtime, xigduo XR - 5 - 1000 once a day, bydureon once a week. Pt ran out of the medication for about 1 - 2 months  FBG - 100 - 130  Pre meals - less than 150 mg/dl.   Checks BG -  3- 4 times a day  Dm retinopathy - no,Last eye exam - Jan 2020  Dm nephropathy - no  Dm neuropathy - no,Dm neuropathy meds - no  CAD -no  CVA -no  Episodes of hypoglycemia - lowest Bg was 70 mg/dl  Pt is physically active. weight has been stable.   Pt tries to follow DM diet for most part.   On Ace inb.    Hyperlipidemia - Lipitor 20 mg po daily.      Reviewed primary care physician's/consulting physician documentation and lab results       Interval History      The following portions of the patient's history were reviewed and updated as appropriate: allergies, current medications, past family history, past medical history, past social history, past surgical history and problem list.    Past Medical History:   Diagnosis Date   • Acid reflux    • Anemia    • Cancer (CMS/HCC) 06/04/2013   • Diabetes mellitus (CMS/HCC)    • Dyspnea    • Essential hypertension    • History of constipation    • History of transfusion    • Hyperlipidemia    • Hypertension    • Metastatic colon cancer to liver (CMS/HCC)    • Neuropathy    • Pericardial effusion     Moderate   • Retinopathy    • SOB (shortness of breath)    • Type 2 diabetes mellitus (CMS/HCC)      Family History   Problem Relation Age of Onset   • Heart attack Other    •  "Cancer Other    • Diabetes Other    • Hypertension Other    • Hypertension Father    • Diabetes Father    • Stroke Father    • Breast cancer Sister    • Diabetes Sister    • Diabetes Maternal Grandmother    • Stroke Paternal Grandmother    • Diabetes Paternal Grandmother    • Lupus Paternal Grandfather    • Stroke Paternal Grandfather    • Diabetes Sister    • Hypertension Mother    • Malig Hyperthermia Neg Hx      Social History     Socioeconomic History   • Marital status:      Spouse name: Jesus   • Number of children: Not on file   • Years of education: College   • Highest education level: Not on file   Occupational History   • Occupation: Middle School Counselor       Employer: RETIRED     Comment: [employer]   Tobacco Use   • Smoking status: Never Smoker   • Smokeless tobacco: Never Used   Substance and Sexual Activity   • Alcohol use: No   • Drug use: No   • Sexual activity: Defer     Allergies   Allergen Reactions   • Compazine [Prochlorperazine Edisylate] Unknown - Low Severity     Complete arch in the back    • Prochlorperazine Maleate Unknown - Low Severity     \"BACK MUSCLE RETRACTION\"       Current Outpatient Medications:   •  allopurinol (ZYLOPRIM) 100 MG tablet, Take 1 tablet by mouth Daily., Disp: 90 tablet, Rfl: 3  •  aspirin 81 MG tablet, Take 81 mg by mouth daily., Disp: , Rfl:   •  atorvastatin (LIPITOR) 20 MG tablet, Take 1 tablet by mouth Daily., Disp: 90 tablet, Rfl: 3  •  BYDUREON BCISE 2 MG/0.85ML auto-injector injection, Inject 0.85 mL under the skin into the appropriate area as directed 1 (One) Time Per Week., Disp: 12 pen, Rfl: 3  •  calcium carbonate (TUMS) 500 MG chewable tablet, Chew 2 tablets As Needed., Disp: , Rfl:   •  Insulin Pen Needle (NOVOFINE) 32G X 6 MM misc, Take 1 shot of insulin per day, Disp: 90 each, Rfl: 3  •  telmisartan-hydrochlorothiazide (MICARDIS HCT) 40-12.5 MG per tablet, Take 1 tablet by mouth Daily., Disp: 90 tablet, Rfl: 3  •  TOUJEO SOLOSTAR 300 UNIT/ML " "solution pen-injector injection, Inject 45 Units under the skin into the appropriate area as directed Daily., Disp: 12 pen, Rfl: 3  •  XIGDUO XR 5-1000 MG tablet, TAKE 2 TABLETS DAILY, Disp: 180 tablet, Rfl: 3        Review of Systems   Constitutional: Negative for appetite change, fatigue and fever.   Eyes: Negative for visual disturbance.   Respiratory: Negative for shortness of breath.    Cardiovascular: Negative for palpitations and leg swelling.   Gastrointestinal: Negative for abdominal pain and vomiting.   Endocrine: Negative for polydipsia and polyuria.   Musculoskeletal: Negative for joint swelling and neck pain.   Skin: Negative for rash.   Neurological: Negative for weakness and numbness.   Psychiatric/Behavioral: Negative for behavioral problems.     I have reviewed and confirmed the accuracy of the ROS as documented by the MA/LPN/RN Rusty Rehman MD          Objective       Vitals:    12/17/20 1250   BP: 120/70   Pulse: 88   SpO2: 98%   Weight: 113 kg (248 lb 9.6 oz)   Height: 176.5 cm (69.5\")     Body mass index is 36.19 kg/m².      Physical Exam  Vitals signs reviewed.   Constitutional:       Appearance: She is not diaphoretic.      Comments: Obese     HENT:      Head: Normocephalic and atraumatic.   Eyes:      General: No scleral icterus.     Conjunctiva/sclera: Conjunctivae normal.   Neck:      Musculoskeletal: Normal range of motion and neck supple.      Thyroid: No thyromegaly.      Comments: Acanthosis nigricans  Cardiovascular:      Rate and Rhythm: Normal rate.      Heart sounds: Normal heart sounds.   Pulmonary:      Effort: Pulmonary effort is normal.      Breath sounds: Normal breath sounds. No stridor. No wheezing.   Abdominal:      General: Bowel sounds are normal. There is no distension.      Palpations: Abdomen is soft.      Tenderness: There is no abdominal tenderness.      Comments: Central obesity   Musculoskeletal:         General: No tenderness.   Skin:     General: Skin is warm " "and dry.   Neurological:      Mental Status: She is alert and oriented to person, place, and time.         Results Review:     I reviewed the patient's new clinical results and mentioned them above in HPI and in plan as well.    Medical records reviewed  Summary:Done      Hospital Outpatient Visit on 12/01/2020   Component Date Value Ref Range Status   • Creatinine 12/01/2020 1.10  0.60 - 1.30 mg/dL Final    Serial Number: [id number]:  [id number]     Lab Results   Component Value Date    HGBA1C 6.60 (H) 01/08/2020    HGBA1C 6.60 (H) 08/08/2019    HGBA1C 6.46 (H) 04/04/2019     Lab Results   Component Value Date    MICROALBUR 33.7 04/04/2019    CREATININE 1.10 12/01/2020     Imaging Results (Most Recent)     None                Assessment and Plan:    Diagnoses and all orders for this visit:    1. Long-term insulin use (CMS/[facility]) (Primary)  -     Basic Metabolic Panel  -     Hemoglobin A1c  -     TSH  -     T4, Free    2. DM (diabetes mellitus), type 2, uncontrolled w/neurologic complication (CMS/[facility])  -     Basic Metabolic Panel  -     Hemoglobin A1c  -     TSH  -     T4, Free    3. Hyperlipemia, mixed  -     Basic Metabolic Panel  -     Hemoglobin A1c  -     TSH  -     T4, Free    4. Obesity (BMI 35.0-39.9 without comorbidity)  -     Basic Metabolic Panel  -     Hemoglobin A1c  -     TSH  -     T4, Free    5. Type 2 diabetes mellitus with complication, with long-term current use of insulin (CMS/[facility])  -     Basic Metabolic Panel  -     Hemoglobin A1c  -     TSH  -     T4, Free      Type 2 diabetes mellitus-uncontrolled  Check HbA1c  Continue zinc duo and Toujeo for now  Would consider Bydureon based on her work-up    Hyperlipidemia  Continue the statin    Obesity  Insert obesity    All the above problems are new for me    Reviewed Lab results with the patient.               Rusty Rehman MD  12/17/20    EMR Dragon / transcription disclaimer:     \"Dictated utilizing Dragon dictation\".      "

## 2020-12-18 LAB
BUN SERPL-MCNC: 21 MG/DL (ref 8–23)
BUN/CREAT SERPL: 21.9 (ref 7–25)
CALCIUM SERPL-MCNC: 9.5 MG/DL (ref 8.6–10.5)
CHLORIDE SERPL-SCNC: 100 MMOL/L (ref 98–107)
CO2 SERPL-SCNC: 28.5 MMOL/L (ref 22–29)
CREAT SERPL-MCNC: 0.96 MG/DL (ref 0.57–1)
GLUCOSE SERPL-MCNC: 102 MG/DL (ref 65–99)
HBA1C MFR BLD: 6.77 % (ref 4.8–5.6)
POTASSIUM SERPL-SCNC: 4.3 MMOL/L (ref 3.5–5.2)
SODIUM SERPL-SCNC: 137 MMOL/L (ref 136–145)
T4 FREE SERPL-MCNC: 1.17 NG/DL (ref 0.93–1.7)
TSH SERPL DL<=0.005 MIU/L-ACNC: 1.56 UIU/ML (ref 0.27–4.2)

## 2021-01-08 ENCOUNTER — TRANSCRIBE ORDERS (OUTPATIENT)
Dept: LAB | Facility: HOSPITAL | Age: 71
End: 2021-01-08

## 2021-01-08 DIAGNOSIS — Z01.818 OTHER SPECIFIED PRE-OPERATIVE EXAMINATION: Primary | ICD-10-CM

## 2021-01-12 ENCOUNTER — LAB (OUTPATIENT)
Dept: LAB | Facility: HOSPITAL | Age: 71
End: 2021-01-12

## 2021-01-12 DIAGNOSIS — Z01.818 OTHER SPECIFIED PRE-OPERATIVE EXAMINATION: ICD-10-CM

## 2021-01-12 PROCEDURE — C9803 HOPD COVID-19 SPEC COLLECT: HCPCS

## 2021-01-12 PROCEDURE — U0004 COV-19 TEST NON-CDC HGH THRU: HCPCS

## 2021-01-13 LAB — SARS-COV-2 RNA RESP QL NAA+PROBE: NOT DETECTED

## 2021-01-14 ENCOUNTER — ANESTHESIA (OUTPATIENT)
Dept: GASTROENTEROLOGY | Facility: HOSPITAL | Age: 71
End: 2021-01-14

## 2021-01-14 ENCOUNTER — HOSPITAL ENCOUNTER (OUTPATIENT)
Facility: HOSPITAL | Age: 71
Setting detail: HOSPITAL OUTPATIENT SURGERY
Discharge: HOME | End: 2021-01-14
Attending: INTERNAL MEDICINE | Admitting: INTERNAL MEDICINE

## 2021-01-14 ENCOUNTER — ANESTHESIA EVENT (OUTPATIENT)
Dept: GASTROENTEROLOGY | Facility: HOSPITAL | Age: 71
End: 2021-01-14

## 2021-01-14 VITALS
HEART RATE: 91 BPM | OXYGEN SATURATION: 96 % | RESPIRATION RATE: 16 BRPM | SYSTOLIC BLOOD PRESSURE: 149 MMHG | HEIGHT: 69 IN | BODY MASS INDEX: 36.59 KG/M2 | DIASTOLIC BLOOD PRESSURE: 90 MMHG | WEIGHT: 247.06 LBS

## 2021-01-14 DIAGNOSIS — C18.9 COLON CANCER (HCC): ICD-10-CM

## 2021-01-14 DIAGNOSIS — K63.5 COLON POLYPS: ICD-10-CM

## 2021-01-14 LAB
GLUCOSE BLDC GLUCOMTR-MCNC: 63 MG/DL (ref 70–130)
GLUCOSE BLDC GLUCOMTR-MCNC: 93 MG/DL (ref 70–130)

## 2021-01-14 PROCEDURE — 25010000002 PROPOFOL 10 MG/ML EMULSION: Performed by: NURSE ANESTHETIST, CERTIFIED REGISTERED

## 2021-01-14 PROCEDURE — 45380 COLONOSCOPY AND BIOPSY: CPT | Performed by: INTERNAL MEDICINE

## 2021-01-14 PROCEDURE — 25010000002 PHENYLEPHRINE PER 1 ML: Performed by: NURSE ANESTHETIST, CERTIFIED REGISTERED

## 2021-01-14 PROCEDURE — 82962 GLUCOSE BLOOD TEST: CPT

## 2021-01-14 PROCEDURE — 88305 TISSUE EXAM BY PATHOLOGIST: CPT | Performed by: INTERNAL MEDICINE

## 2021-01-14 RX ORDER — PROPOFOL 10 MG/ML
VIAL (ML) INTRAVENOUS CONTINUOUS PRN
Status: DISCONTINUED | OUTPATIENT
Start: 2021-01-14 | End: 2021-01-14 | Stop reason: SURG

## 2021-01-14 RX ORDER — DEXTROSE, SODIUM CHLORIDE, SODIUM LACTATE, POTASSIUM CHLORIDE, AND CALCIUM CHLORIDE 5; .6; .31; .03; .02 G/100ML; G/100ML; G/100ML; G/100ML; G/100ML
30 INJECTION, SOLUTION INTRAVENOUS CONTINUOUS
Status: DISCONTINUED | OUTPATIENT
Start: 2021-01-14 | End: 2021-01-14 | Stop reason: HOSPADM

## 2021-01-14 RX ORDER — SODIUM CHLORIDE, SODIUM LACTATE, POTASSIUM CHLORIDE, CALCIUM CHLORIDE 600; 310; 30; 20 MG/100ML; MG/100ML; MG/100ML; MG/100ML
30 INJECTION, SOLUTION INTRAVENOUS CONTINUOUS PRN
Status: DISCONTINUED | OUTPATIENT
Start: 2021-01-14 | End: 2021-01-14 | Stop reason: HOSPADM

## 2021-01-14 RX ORDER — LIDOCAINE HYDROCHLORIDE 20 MG/ML
INJECTION, SOLUTION INFILTRATION; PERINEURAL AS NEEDED
Status: DISCONTINUED | OUTPATIENT
Start: 2021-01-14 | End: 2021-01-14 | Stop reason: SURG

## 2021-01-14 RX ORDER — PROPOFOL 10 MG/ML
VIAL (ML) INTRAVENOUS AS NEEDED
Status: DISCONTINUED | OUTPATIENT
Start: 2021-01-14 | End: 2021-01-14 | Stop reason: SURG

## 2021-01-14 RX ADMIN — PROPOFOL 50 MG: 10 INJECTION, EMULSION INTRAVENOUS at 13:57

## 2021-01-14 RX ADMIN — LIDOCAINE HYDROCHLORIDE 40 MG: 20 INJECTION, SOLUTION INFILTRATION; PERINEURAL at 13:57

## 2021-01-14 RX ADMIN — PHENYLEPHRINE HYDROCHLORIDE 100 MCG: 10 INJECTION INTRAVENOUS at 14:16

## 2021-01-14 RX ADMIN — PROPOFOL 200 MCG/KG/MIN: 10 INJECTION, EMULSION INTRAVENOUS at 13:57

## 2021-01-14 RX ADMIN — SODIUM CHLORIDE, SODIUM LACTATE, POTASSIUM CHLORIDE, CALCIUM CHLORIDE AND DEXTROSE MONOHYDRATE 30 ML/HR: 5; 600; 310; 30; 20 INJECTION, SOLUTION INTRAVENOUS at 13:51

## 2021-01-14 RX ADMIN — SODIUM CHLORIDE, POTASSIUM CHLORIDE, SODIUM LACTATE AND CALCIUM CHLORIDE 30 ML/HR: 600; 310; 30; 20 INJECTION, SOLUTION INTRAVENOUS at 13:34

## 2021-01-14 NOTE — ANESTHESIA PREPROCEDURE EVALUATION
Anesthesia Evaluation     Patient summary reviewed and Nursing notes reviewed   NPO Solid Status: > 8 hours  NPO Liquid Status: > 4 hours           Airway   Mallampati: II  Neck ROM: full  No difficulty expected  Dental - normal exam     Pulmonary     breath sounds clear to auscultation  (+) shortness of breath,   Cardiovascular     Rhythm: regular    (+) hypertension, pericardial effusion, hyperlipidemia,       Neuro/Psych  (+) numbness,     GI/Hepatic/Renal/Endo    (+) obesity, morbid obesity, GERD,  liver disease, diabetes mellitus,     Musculoskeletal     Abdominal   (+) obese,    Substance History      OB/GYN          Other      history of cancer active                    Anesthesia Plan    ASA 3     MAC     intravenous induction     Anesthetic plan, all risks, benefits, and alternatives have been provided, discussed and informed consent has been obtained with: patient.

## 2021-01-14 NOTE — ANESTHESIA POSTPROCEDURE EVALUATION
"Patient: Feli Del Toro    Procedure Summary     Date: 01/14/21 Room / Location:  [facility]    Anesthesia Start: 1351 Anesthesia Stop: 1422    Procedure: COLONOSCOPY TO CECUM & T.I. WITH COLD POLYPECTOMY (N/A ) Diagnosis:       Colon cancer (CMS/HCC)      Colon polyps      (Colon cancer (CMS/HCC) [C18.9])      (Colon polyps [K63.5])    Surgeon: Dylon Bennett MD Provider: Timothy Harry MD    Anesthesia Type: MAC ASA Status: 3          Anesthesia Type: MAC    Vitals  Vitals Value Taken Time   /90 01/14/21 1444   Temp     Pulse 91 01/14/21 1444   Resp 16 01/14/21 1444   SpO2 96 % 01/14/21 1444           Post Anesthesia Care and Evaluation    Patient location during evaluation: bedside  Patient participation: complete - patient participated  Level of consciousness: sleepy but conscious  Pain score: 0  Pain management: adequate  Airway patency: patent  Anesthetic complications: No anesthetic complications    Cardiovascular status: acceptable  Respiratory status: acceptable  Hydration status: acceptable    Comments: /90   Pulse 91   Resp 16   Ht 175.3 cm (69\")   Wt 112 kg (247 lb 1 oz)   LMP  (LMP Unknown)   SpO2 96%   BMI 36.48 kg/m²         "

## 2021-01-14 NOTE — H&P
[facility]  Pre Procedure History & Physical    Chief Complaint:   Time for my colonoscopy    Subjective     HPI:   70 y.o. female who has a h/o colon cancer requiring surgical resection in 2014. She has mets to the liver. I last did a colonoscopy in 10/20. She had a poor to fair prep. I found 2 small tubular adenomas. I wanted to redo a colonoscopy in 3 mos after using a more aggressive colonoscopy prep to make sure that I didn't miss anything.     Past Medical History:   Past Medical History:   Diagnosis Date   • Acid reflux    • Anemia    • Cancer (CMS/HCC) 06/04/2013   • Diabetes mellitus (CMS/HCC)    • Dyspnea    • Essential hypertension    • History of constipation    • History of transfusion    • Hyperlipidemia    • Hypertension    • Metastatic colon cancer to liver (CMS/HCC)    • Neuropathy    • Pericardial effusion     Moderate   • Retinopathy    • SOB (shortness of breath)    • Type 2 diabetes mellitus (CMS/HCC)        Family History:  Family History   Problem Relation Age of Onset   • Heart attack Other    • Cancer Other    • Diabetes Other    • Hypertension Other    • Hypertension Father    • Diabetes Father    • Stroke Father    • Breast cancer Sister    • Diabetes Sister    • Diabetes Maternal Grandmother    • Stroke Paternal Grandmother    • Diabetes Paternal Grandmother    • Lupus Paternal Grandfather    • Stroke Paternal Grandfather    • Diabetes Sister    • Hypertension Mother    • Malig Hyperthermia Neg Hx        Social History:   reports that she has never smoked. She has never used smokeless tobacco. She reports that she does not drink alcohol or use drugs.    Medications:   Medications Prior to Admission   Medication Sig Dispense Refill Last Dose   • allopurinol (ZYLOPRIM) 100 MG tablet Take 1 tablet by mouth Daily. 90 tablet 3 1/13/2021 at Unknown time   • aspirin 81 MG tablet Take 81 mg by mouth daily.   1/13/2021 at Unknown time   • atorvastatin (LIPITOR) 20 MG  "tablet Take 1 tablet by mouth Daily. 90 tablet 3 1/13/2021 at Unknown time   • Insulin Pen Needle (NOVOFINE) 32G X 6 MM misc Take 1 shot of insulin per day 90 each 3 1/13/2021 at Unknown time   • telmisartan-hydrochlorothiazide (MICARDIS HCT) 40-12.5 MG per tablet Take 1 tablet by mouth Daily. 90 tablet 3 1/13/2021 at Unknown time   • TOUJEO SOLOSTAR 300 UNIT/ML solution pen-injector injection Inject 45 Units under the skin into the appropriate area as directed Daily. 12 pen 3 1/13/2021 at Unknown time   • XIGDUO XR 5-1000 MG tablet TAKE 2 TABLETS DAILY 180 tablet 3 1/13/2021 at Unknown time   • BYDUREON BCISE 2 MG/0.85ML auto-injector injection Inject 0.85 mL under the skin into the appropriate area as directed 1 (One) Time Per Week. 12 pen 3 1/10/2021   • calcium carbonate (TUMS) 500 MG chewable tablet Chew 2 tablets As Needed.          Allergies:  Compazine [prochlorperazine edisylate] and Prochlorperazine maleate    ROS:    Pertinent items are noted in HPI     Objective     Blood pressure 135/75, pulse 89, resp. rate 12, height 175.3 cm (69\"), weight 112 kg (247 lb 1 oz), SpO2 94 %, not currently breastfeeding.    Physical Exam   Constitutional: Pt is oriented to person, place, and time and well-developed, well-nourished, and in no distress.   HENT:   Mouth/Throat: Oropharynx is clear and moist.   Neck: Normal range of motion. Neck supple.   Cardiovascular: Normal rate, regular rhythm and normal heart sounds.    Pulmonary/Chest: Effort normal and breath sounds normal. No respiratory distress. No  wheezes.   Abdominal: Soft. Bowel sounds are normal.   Skin: Skin is warm and dry.   Psychiatric: Mood, memory, affect and judgment normal.     Assessment/Plan     Diagnosis:  70 y.o. female who has a h/o colon cancer requiring surgical resection in 2014. She has mets to the liver. I last did a colonoscopy in 10/20. She had a poor to fair prep. I found 2 small tubular adenomas. I wanted to redo a colonoscopy in 3 mos " after using a more aggressive colonoscopy prep to make sure that I didn't miss anything.     Anticipated Surgical Procedure:  Colonoscopy    The risks, benefits, and alternatives of this procedure have been discussed with the patient or the responsible party- the patient understands and agrees to proceed.    Dylon Bennett M.D.

## 2021-01-14 NOTE — DISCHARGE INSTRUCTIONS
For the next 24 hours patient needs to be with a responsible adult.    For 24 hours DO NOT drive, operate machinery, appliances, drink alcohol, make important decisions or sign legal documents.    Start with a light or bland diet if you are feeling sick to your stomach otherwise advance to regular diet as tolerated.    Follow recommendations on procedure report if provided by your doctor.    Call Dr Bennett for problems [phone]    Problems may include but not limited to: large amounts of bleeding, trouble breathing, repeated vomiting, severe unrelieved pain, fever or chills.

## 2021-01-15 ENCOUNTER — TELEPHONE (OUTPATIENT)
Dept: GASTROENTEROLOGY | Facility: CLINIC | Age: 71
End: 2021-01-15

## 2021-01-15 LAB
LAB AP CASE REPORT: NORMAL
PATH REPORT.FINAL DX SPEC: NORMAL
PATH REPORT.GROSS SPEC: NORMAL

## 2021-01-15 NOTE — TELEPHONE ENCOUNTER
----- Message from Dylon Bennett MD sent at 11/1/2020  3:20 PM EST -----  11/01/20  MT/SA - Tell her that the colon polyps that we removed were not cancerous but were precancerous. I would recommend that we do a repeat colonoscopy in 3 mos because she had a poor prep during this last colonoscopy. If we are going to repeat a colonoscopy in 3 mos then I would use a more aggressive colonoscopy prep such as a Split Dose Go Lytely prep. FAX to her PCP.   arthur. [facility]

## 2021-01-18 DIAGNOSIS — I10 ESSENTIAL HYPERTENSION: ICD-10-CM

## 2021-01-18 DIAGNOSIS — E78.2 MIXED HYPERLIPIDEMIA: ICD-10-CM

## 2021-01-18 DIAGNOSIS — E79.0 HYPERURICEMIA: ICD-10-CM

## 2021-01-18 RX ORDER — ALLOPURINOL 100 MG/1
TABLET ORAL
Qty: 90 TABLET | Refills: 3 | OUTPATIENT
Start: 2021-01-18

## 2021-01-18 RX ORDER — TELMISARTAN AND HYDROCHLORTHIAZIDE 40; 12.5 MG/1; MG/1
TABLET ORAL
Qty: 90 TABLET | Refills: 3 | OUTPATIENT
Start: 2021-01-18

## 2021-01-18 RX ORDER — ATORVASTATIN CALCIUM 20 MG/1
TABLET, FILM COATED ORAL
Qty: 90 TABLET | Refills: 3 | Status: SHIPPED | OUTPATIENT
Start: 2021-01-18 | End: 2021-05-26 | Stop reason: SDUPTHER

## 2021-01-21 ENCOUNTER — INFUSION (OUTPATIENT)
Dept: ONCOLOGY | Facility: HOSPITAL | Age: 71
End: 2021-01-21

## 2021-01-21 DIAGNOSIS — Z45.2 ENCOUNTER FOR ADJUSTMENT OR MANAGEMENT OF VASCULAR ACCESS DEVICE: Primary | ICD-10-CM

## 2021-01-21 PROCEDURE — 25010000003 HEPARIN LOCK FLUSH PER 10 UNITS: Performed by: INTERNAL MEDICINE

## 2021-01-21 PROCEDURE — 96523 IRRIG DRUG DELIVERY DEVICE: CPT

## 2021-01-21 RX ORDER — SODIUM CHLORIDE 0.9 % (FLUSH) 0.9 %
10 SYRINGE (ML) INJECTION AS NEEDED
Status: CANCELLED | OUTPATIENT
Start: 2021-01-21

## 2021-01-21 RX ORDER — SODIUM CHLORIDE 0.9 % (FLUSH) 0.9 %
10 SYRINGE (ML) INJECTION AS NEEDED
Status: DISCONTINUED | OUTPATIENT
Start: 2021-01-21 | End: 2021-01-21 | Stop reason: HOSPADM

## 2021-01-21 RX ORDER — HEPARIN SODIUM (PORCINE) LOCK FLUSH IV SOLN 100 UNIT/ML 100 UNIT/ML
500 SOLUTION INTRAVENOUS AS NEEDED
Status: CANCELLED | OUTPATIENT
Start: 2021-01-21

## 2021-01-21 RX ORDER — HEPARIN SODIUM (PORCINE) LOCK FLUSH IV SOLN 100 UNIT/ML 100 UNIT/ML
500 SOLUTION INTRAVENOUS AS NEEDED
Status: DISCONTINUED | OUTPATIENT
Start: 2021-01-21 | End: 2021-01-21 | Stop reason: HOSPADM

## 2021-01-21 RX ADMIN — SODIUM CHLORIDE, PRESERVATIVE FREE 10 ML: 5 INJECTION INTRAVENOUS at 14:32

## 2021-01-21 RX ADMIN — Medication 500 UNITS: at 14:32

## 2021-02-22 ENCOUNTER — TELEPHONE (OUTPATIENT)
Dept: GASTROENTEROLOGY | Facility: CLINIC | Age: 71
End: 2021-02-22

## 2021-02-22 NOTE — TELEPHONE ENCOUNTER
----- Message from Dylon Bennett MD sent at 2/7/2021  3:10 PM EST -----  02/07/21  Tell her that the colon polyps that were removed were not cancerous but were precancerous.  Because the prep was not great this last time I would recommend that we do a repeat colonoscopy in 1 year.  At that time I would use a 2-day colonoscopy prep.  I would have her come see me about 2 months before the colonoscopy should be done and we could talk about how to do a 2-day colonoscopy prep.  Please fax a copy of this report to her PCP.  Thx. kjh

## 2021-02-22 NOTE — TELEPHONE ENCOUNTER
Called pt and left  for pt to call back.     C/s placed in recall and  for 01/14/2022.    Results sent to Dr Onofre thru [facility].

## 2021-02-22 NOTE — TELEPHONE ENCOUNTER
Call to pt.  Advise per Dr Bennett note.  Verb understanding.     Appt scheduled with DR Bennett for 11/4 @ 10 am.

## 2021-03-03 ENCOUNTER — TELEPHONE (OUTPATIENT)
Dept: ONCOLOGY | Facility: CLINIC | Age: 71
End: 2021-03-03

## 2021-03-03 NOTE — TELEPHONE ENCOUNTER
"PT: SELF    PT CALLING TO CANCEL HER APPT FOR 3/4 AS SHE TESTED POSITIVE FOR COVID YESTERDAY AND WILL NEED TO R/S ONCE SHE IS CLEAR.  WENT TO [facility] THEY GAVE HER AN INFUSION \"BAM\" LANEIVIMAB 700MG  MI NS  SHE WANTED YOU TO KNOW THIS.    PT #: [phone]       "

## 2021-03-04 ENCOUNTER — APPOINTMENT (OUTPATIENT)
Dept: ONCOLOGY | Facility: HOSPITAL | Age: 71
End: 2021-03-04

## 2021-03-04 DIAGNOSIS — Z23 IMMUNIZATION DUE: ICD-10-CM

## 2021-03-26 DIAGNOSIS — Z79.4 TYPE 2 DIABETES MELLITUS WITH COMPLICATION, WITH LONG-TERM CURRENT USE OF INSULIN (HCC): ICD-10-CM

## 2021-03-26 DIAGNOSIS — E11.8 TYPE 2 DIABETES MELLITUS WITH COMPLICATION, WITH LONG-TERM CURRENT USE OF INSULIN (HCC): ICD-10-CM

## 2021-04-05 RX ORDER — INSULIN GLARGINE 300 U/ML
45 INJECTION, SOLUTION SUBCUTANEOUS DAILY
Qty: 18 ML | Refills: 1 | Status: SHIPPED | OUTPATIENT
Start: 2021-04-05 | End: 2021-07-16

## 2021-05-26 ENCOUNTER — TELEPHONE (OUTPATIENT)
Dept: ONCOLOGY | Facility: CLINIC | Age: 71
End: 2021-05-26

## 2021-05-26 DIAGNOSIS — E79.0 HYPERURICEMIA: ICD-10-CM

## 2021-05-26 DIAGNOSIS — I10 ESSENTIAL HYPERTENSION: ICD-10-CM

## 2021-05-26 DIAGNOSIS — E78.2 MIXED HYPERLIPIDEMIA: ICD-10-CM

## 2021-05-26 NOTE — TELEPHONE ENCOUNTER
Caller: Feli Del Toro    Relationship to patient: Self    Best call back number: [phone]    Chief complaint: PT IS CALLING TO RESCHEDULE HER APPT    Type of visit: PORT FLUSH AND FOLLOW UP    Requested date: NEXT AVAILABLE    If rescheduling, when is the original appointment: 03/04/21

## 2021-05-26 NOTE — TELEPHONE ENCOUNTER
Patient is needing refills on     atorvastatin (LIPITOR) 20 MG tablet  allopurinol (ZYLOPRIM) 100 MG tablet  telmisartan-hydrochlorothiazide (MICARDIS HCT) 40-12.5 MG per tablet    Sent to     [facility] - [place], [place] - [address] [place] [address] - [phone]  - [phone]   Phone:  [phone]   Fax:  [phone]

## 2021-05-27 RX ORDER — TELMISARTAN AND HYDROCHLORTHIAZIDE 40; 12.5 MG/1; MG/1
1 TABLET ORAL DAILY
Qty: 30 TABLET | Refills: 0 | OUTPATIENT
Start: 2021-05-27

## 2021-05-27 RX ORDER — ATORVASTATIN CALCIUM 20 MG/1
20 TABLET, FILM COATED ORAL DAILY
Qty: 90 TABLET | Refills: 3 | Status: SHIPPED | OUTPATIENT
Start: 2021-05-27 | End: 2021-07-16 | Stop reason: SDUPTHER

## 2021-05-27 RX ORDER — ALLOPURINOL 100 MG/1
100 TABLET ORAL DAILY
Qty: 30 TABLET | Refills: 0 | OUTPATIENT
Start: 2021-05-27

## 2021-06-01 ENCOUNTER — OFFICE VISIT (OUTPATIENT)
Dept: ONCOLOGY | Facility: CLINIC | Age: 71
End: 2021-06-01

## 2021-06-01 ENCOUNTER — INFUSION (OUTPATIENT)
Dept: ONCOLOGY | Facility: HOSPITAL | Age: 71
End: 2021-06-01

## 2021-06-01 ENCOUNTER — TELEPHONE (OUTPATIENT)
Dept: ONCOLOGY | Facility: CLINIC | Age: 71
End: 2021-06-01

## 2021-06-01 VITALS
SYSTOLIC BLOOD PRESSURE: 129 MMHG | HEART RATE: 88 BPM | TEMPERATURE: 97.7 F | HEIGHT: 69 IN | OXYGEN SATURATION: 98 % | RESPIRATION RATE: 16 BRPM | WEIGHT: 244.7 LBS | DIASTOLIC BLOOD PRESSURE: 75 MMHG | BODY MASS INDEX: 36.24 KG/M2

## 2021-06-01 DIAGNOSIS — C18.9 PRIMARY MALIGNANT NEOPLASM OF COLON (HCC): Primary | ICD-10-CM

## 2021-06-01 DIAGNOSIS — Z45.2 ENCOUNTER FOR ADJUSTMENT OR MANAGEMENT OF VASCULAR ACCESS DEVICE: ICD-10-CM

## 2021-06-01 DIAGNOSIS — C18.9 PRIMARY MALIGNANT NEOPLASM OF COLON (HCC): ICD-10-CM

## 2021-06-01 DIAGNOSIS — C78.7 LIVER METASTASIS: ICD-10-CM

## 2021-06-01 LAB
ALBUMIN SERPL-MCNC: 3.8 G/DL (ref 3.5–5.2)
ALBUMIN/GLOB SERPL: 1 G/DL (ref 1.1–2.4)
ALP SERPL-CCNC: 148 U/L (ref 38–116)
ALT SERPL W P-5'-P-CCNC: 28 U/L (ref 0–33)
ANION GAP SERPL CALCULATED.3IONS-SCNC: 10.4 MMOL/L (ref 5–15)
AST SERPL-CCNC: 27 U/L (ref 0–32)
BASOPHILS # BLD AUTO: 0.05 10*3/MM3 (ref 0–0.2)
BASOPHILS NFR BLD AUTO: 0.7 % (ref 0–1.5)
BILIRUB SERPL-MCNC: 0.3 MG/DL (ref 0.2–1.2)
BUN SERPL-MCNC: 16 MG/DL (ref 6–20)
BUN/CREAT SERPL: 22.2 (ref 7.3–30)
CALCIUM SPEC-SCNC: 9.1 MG/DL (ref 8.5–10.2)
CEA SERPL-MCNC: 4.57 NG/ML
CHLORIDE SERPL-SCNC: 104 MMOL/L (ref 98–107)
CO2 SERPL-SCNC: 24.6 MMOL/L (ref 22–29)
CREAT SERPL-MCNC: 0.72 MG/DL (ref 0.6–1.1)
DEPRECATED RDW RBC AUTO: 52.5 FL (ref 37–54)
EOSINOPHIL # BLD AUTO: 0.39 10*3/MM3 (ref 0–0.4)
EOSINOPHIL NFR BLD AUTO: 5.1 % (ref 0.3–6.2)
ERYTHROCYTE [DISTWIDTH] IN BLOOD BY AUTOMATED COUNT: 17.3 % (ref 12.3–15.4)
GFR SERPL CREATININE-BSD FRML MDRD: 97 ML/MIN/1.73
GLOBULIN UR ELPH-MCNC: 4 GM/DL (ref 1.8–3.5)
GLUCOSE SERPL-MCNC: 125 MG/DL (ref 74–124)
HCT VFR BLD AUTO: 38.7 % (ref 34–46.6)
HGB BLD-MCNC: 12.9 G/DL (ref 12–15.9)
IMM GRANULOCYTES # BLD AUTO: 0.06 10*3/MM3 (ref 0–0.05)
IMM GRANULOCYTES NFR BLD AUTO: 0.8 % (ref 0–0.5)
LYMPHOCYTES # BLD AUTO: 2.12 10*3/MM3 (ref 0.7–3.1)
LYMPHOCYTES NFR BLD AUTO: 27.7 % (ref 19.6–45.3)
MCH RBC QN AUTO: 28.1 PG (ref 26.6–33)
MCHC RBC AUTO-ENTMCNC: 33.3 G/DL (ref 31.5–35.7)
MCV RBC AUTO: 84.3 FL (ref 79–97)
MONOCYTES # BLD AUTO: 0.77 10*3/MM3 (ref 0.1–0.9)
MONOCYTES NFR BLD AUTO: 10.1 % (ref 5–12)
NEUTROPHILS NFR BLD AUTO: 4.27 10*3/MM3 (ref 1.7–7)
NEUTROPHILS NFR BLD AUTO: 55.6 % (ref 42.7–76)
NRBC BLD AUTO-RTO: 0 /100 WBC (ref 0–0.2)
PLATELET # BLD AUTO: 380 10*3/MM3 (ref 140–450)
PMV BLD AUTO: 9 FL (ref 6–12)
POTASSIUM SERPL-SCNC: 3.8 MMOL/L (ref 3.5–4.7)
PROT SERPL-MCNC: 7.8 G/DL (ref 6.3–8)
RBC # BLD AUTO: 4.59 10*6/MM3 (ref 3.77–5.28)
SODIUM SERPL-SCNC: 139 MMOL/L (ref 134–145)
WBC # BLD AUTO: 7.66 10*3/MM3 (ref 3.4–10.8)

## 2021-06-01 PROCEDURE — 99213 OFFICE O/P EST LOW 20 MIN: CPT | Performed by: INTERNAL MEDICINE

## 2021-06-01 PROCEDURE — 36591 DRAW BLOOD OFF VENOUS DEVICE: CPT

## 2021-06-01 PROCEDURE — 80053 COMPREHEN METABOLIC PANEL: CPT

## 2021-06-01 PROCEDURE — 82378 CARCINOEMBRYONIC ANTIGEN: CPT | Performed by: INTERNAL MEDICINE

## 2021-06-01 PROCEDURE — 85025 COMPLETE CBC W/AUTO DIFF WBC: CPT

## 2021-06-01 NOTE — TELEPHONE ENCOUNTER
----- Message from Marivel Brown MD sent at 6/1/2021 12:43 PM EDT -----  Please add Pneumovax vaccine to her visit in 4.5 months.    Thank you

## 2021-06-01 NOTE — PROGRESS NOTES
Subjective     CHIEF COMPLAINT:      Chief Complaint   Patient presents with   • Follow-up     no concerns       HISTORY OF PRESENT ILLNESS:     Feli Del Toro is a 70 y.o. female patient who returns today for follow up on her colon cancer.  She returns today for follow-up and reports that she developed COVID-19 infection despite receiving the first dose of the COVID-19 vaccine.  She was given the monoclonal antibody.  She did not develop lung symptoms.  Her  was hospitalized for 3 days but did not develop a severe infection either.    Patient denies abdominal pain.  She is not having problem with the port.      ROS:  Pertinent ROS is in the HPI.     Past medical, surgical, social and family history were reviewed.     MEDICATIONS:    Current Outpatient Medications:   •  allopurinol (ZYLOPRIM) 100 MG tablet, Take 1 tablet by mouth Daily., Disp: 90 tablet, Rfl: 3  •  aspirin 81 MG tablet, Take 81 mg by mouth daily., Disp: , Rfl:   •  atorvastatin (LIPITOR) 20 MG tablet, Take 1 tablet by mouth Daily., Disp: 90 tablet, Rfl: 3  •  calcium carbonate (TUMS) 500 MG chewable tablet, Chew 2 tablets As Needed., Disp: , Rfl:   •  Insulin Pen Needle (NOVOFINE) 32G X 6 MM misc, Take 1 shot of insulin per day, Disp: 90 each, Rfl: 3  •  telmisartan-hydrochlorothiazide (MICARDIS HCT) 40-12.5 MG per tablet, Take 1 tablet by mouth Daily., Disp: 90 tablet, Rfl: 3  •  Toujeo SoloStar 300 UNIT/ML solution pen-injector injection, INJECT 45 UNITS UNDER THE SKIN INTO THE APPROPRIATE AREA AS DIRECTED DAILY, Disp: 18 mL, Rfl: 1  •  XIGDUO XR 5-1000 MG tablet, TAKE 2 TABLETS DAILY, Disp: 180 tablet, Rfl: 3  •  BYDUREON BCISE 2 MG/0.85ML auto-injector injection, Inject 0.85 mL under the skin into the appropriate area as directed 1 (One) Time Per Week., Disp: 12 pen, Rfl: 3    Objective   VITAL SIGNS:     Vitals:    06/01/21 1113   BP: 129/75   Pulse: 88   Resp: 16   Temp: 97.7 °F (36.5 °C)   TempSrc: Temporal   SpO2: 98%   Weight: 111 kg  "(244 lb 11.2 oz)   Height: 175.3 cm (69.02\")   PainSc: 0-No pain     Body mass index is 36.12 kg/m².     Wt Readings from Last 5 Encounters:   06/01/21 111 kg (244 lb 11.2 oz)   01/14/21 112 kg (247 lb 1 oz)   12/17/20 113 kg (248 lb 9.6 oz)   12/07/20 113 kg (248 lb 8 oz)   10/09/20 112 kg (246 lb)       PHYSICAL EXAMINATION:   GENERAL: The patient appears in good general condition, not in acute distress.   SKIN: No ecchymosis.  EYES: No jaundice.  LYMPHATICS: No cervical lymphadenopathy.  CHEST: Normal respiratory effort.  Lungs clear bilaterally.  No added sounds.  CVS: Normal S1-S2.  No murmurs.    ABDOMEN: Soft. No tenderness. No Hepatomegaly. No Splenomegaly. No masses.  EXTREMITIES: No edema.  No calf tenderness.    DIAGNOSTIC DATA:     Results from last 7 days   Lab Units 06/01/21  1048   WBC 10*3/mm3 7.66   NEUTROS ABS 10*3/mm3 4.27   HEMOGLOBIN g/dL 12.9   HEMATOCRIT % 38.7   PLATELETS 10*3/mm3 380     Results from last 7 days   Lab Units 06/01/21  1047   SODIUM mmol/L 139   POTASSIUM mmol/L 3.8   CHLORIDE mmol/L 104   CO2 mmol/L 24.6   BUN mg/dL 16   CREATININE mg/dL 0.72   CALCIUM mg/dL 9.1   ALBUMIN g/dL 3.80   BILIRUBIN mg/dL 0.3   ALK PHOS U/L 148*   ALT (SGPT) U/L 28   AST (SGOT) U/L 27   GLUCOSE mg/dL 125*     Lab Results   Component Value Date    CEA 4.28 12/01/2020    CEA 3.92 07/09/2020    CEA 5.14 05/21/2020    CEA 4.18 02/27/2020    CEA 4.26 10/17/2019        Assessment/Plan   *Metastatic colon cancer. She had metastases to the liver.   · She received 12 cycles of FOLFOX-6 and then had resection of the primary tumor along with metastasectomy and splenectomy with thermal ablation of lesions in the liver in January 2014.   · This was followed by 12 cycles of the FOLFIRI regimen completed on 08/07/2014.   · CT scans on 05/28/2015 revealed a new lesion in the liver.  Dr. Ruano referred the patient to Radiation Therapy and she received CyberKnife radiation, completed on 07/14/2015.   · The CT " scan from 5/5/17 revealed a new 2.2 cm lesion in the anterior hepatic segment.  CT guided microwave ablation of liver mass performed on 6/16/17 with CT evidence on 7/20/17 of a complete response.  · FOLFIRI ×12 cycles given between 6/29/17 and 11/29/17.   · CT scan from 3/11/2019 showed no evidence of lung metastasis.  No evidence of reaccumulation of the left pleural/pericardial effusion.  · CT scan of the abdomen on 5/9/2019 showed further decrease in the size of the liver metastasis.  No new lesions were seen.  · CT scan of the chest on 10/17/2019 showed no lung metastasis.  No recurrence of the pericardial or pleural effusions.   · CT scan on 5/21/2020 showed no evidence of lung metastasis.  · CEA slightly increased to 5.14 on 5/21/2020.  It improved to 3.92 on 7/9/2020  · Colonoscopy on 10/9/2020 revealed tubular adenomas with no high-grade dysplasia or malignancy.  · CT chest on 12/1/2020 showed no evidence of recurrence.  · Patient is doing well.  No symptoms or signs of recurrence.    *Patient has a port.    · Port is being maintained every 6 weeks.    *Patient is postsplenectomy.    · She did not receive the Prevnar vaccine in the past.    · She was given Prevnar 13 vaccine 9/10/2020.    · She will be due for Pneumovax in September 2021.   · Patient developed COVID-19 infection and had to receive the monoclonal antibody.  She has completely recovered.  No lung symptoms or abnormalities on exam today.    PLAN:    1.  Await upcoming CT scan patient will have with Dr. Ruano in the coming week.  2.  We will flush the port in 6 weeks and 3 months.  3.  Follow-up in 4-5 months with CBC CMP CEA.  Depending on the findings on the upcoming CT scan, we will decide on the frequency of the upcoming CT scans.  We will schedule her to receive Pneumovax on that day.        Marivel Brown MD  06/01/21

## 2021-06-02 ENCOUNTER — TELEPHONE (OUTPATIENT)
Dept: ENDOCRINOLOGY | Age: 71
End: 2021-06-02

## 2021-06-02 NOTE — TELEPHONE ENCOUNTER
PATIENT LEFT VOICE MAIL STATING THAT SHE NEEDS 3 PRESCRIPTION REFILLED     TELEMISARTAN - HCTZ  ALLOPURINOL  AND LIPITOR   INFORMED PATIENT THAT THE LIPITOR  WAS SENT TO THE PHARMACY  THE OTHER MEDICATION WILL HAVE TO COME FROM pcp  PATIENT VOICE UNDERSTANDING

## 2021-07-02 DIAGNOSIS — Z79.4 TYPE 2 DIABETES MELLITUS WITH COMPLICATION, WITH LONG-TERM CURRENT USE OF INSULIN (HCC): ICD-10-CM

## 2021-07-02 DIAGNOSIS — E11.8 TYPE 2 DIABETES MELLITUS WITH COMPLICATION, WITH LONG-TERM CURRENT USE OF INSULIN (HCC): ICD-10-CM

## 2021-07-02 DIAGNOSIS — E55.9 VITAMIN D DEFICIENCY: ICD-10-CM

## 2021-07-02 DIAGNOSIS — E78.2 MIXED HYPERLIPIDEMIA: Primary | ICD-10-CM

## 2021-07-13 ENCOUNTER — INFUSION (OUTPATIENT)
Dept: ONCOLOGY | Facility: HOSPITAL | Age: 71
End: 2021-07-13

## 2021-07-13 DIAGNOSIS — Z45.2 ENCOUNTER FOR ADJUSTMENT OR MANAGEMENT OF VASCULAR ACCESS DEVICE: Primary | ICD-10-CM

## 2021-07-13 PROCEDURE — 25010000002 HEPARIN LOCK FLUSH PER 10 UNITS: Performed by: INTERNAL MEDICINE

## 2021-07-13 PROCEDURE — 96523 IRRIG DRUG DELIVERY DEVICE: CPT

## 2021-07-13 RX ORDER — SODIUM CHLORIDE 0.9 % (FLUSH) 0.9 %
10 SYRINGE (ML) INJECTION AS NEEDED
Status: DISCONTINUED | OUTPATIENT
Start: 2021-07-13 | End: 2021-07-13 | Stop reason: HOSPADM

## 2021-07-13 RX ORDER — SODIUM CHLORIDE 0.9 % (FLUSH) 0.9 %
10 SYRINGE (ML) INJECTION AS NEEDED
Status: CANCELLED | OUTPATIENT
Start: 2021-07-13

## 2021-07-13 RX ORDER — HEPARIN SODIUM (PORCINE) LOCK FLUSH IV SOLN 100 UNIT/ML 100 UNIT/ML
500 SOLUTION INTRAVENOUS AS NEEDED
Status: CANCELLED | OUTPATIENT
Start: 2021-07-13

## 2021-07-13 RX ORDER — HEPARIN SODIUM (PORCINE) LOCK FLUSH IV SOLN 100 UNIT/ML 100 UNIT/ML
500 SOLUTION INTRAVENOUS AS NEEDED
Status: DISCONTINUED | OUTPATIENT
Start: 2021-07-13 | End: 2021-07-13 | Stop reason: HOSPADM

## 2021-07-13 RX ADMIN — Medication 10 ML: at 13:53

## 2021-07-13 RX ADMIN — Medication 500 UNITS: at 13:53

## 2021-07-16 ENCOUNTER — MEDICATION THERAPY MANAGEMENT (OUTPATIENT)
Dept: ENDOCRINOLOGY | Age: 71
End: 2021-07-16

## 2021-07-16 ENCOUNTER — OFFICE VISIT (OUTPATIENT)
Dept: ENDOCRINOLOGY | Age: 71
End: 2021-07-16

## 2021-07-16 VITALS
DIASTOLIC BLOOD PRESSURE: 70 MMHG | WEIGHT: 242.6 LBS | BODY MASS INDEX: 35.93 KG/M2 | HEIGHT: 69 IN | SYSTOLIC BLOOD PRESSURE: 142 MMHG

## 2021-07-16 DIAGNOSIS — E11.8 TYPE 2 DIABETES MELLITUS WITH COMPLICATION, WITH LONG-TERM CURRENT USE OF INSULIN (HCC): ICD-10-CM

## 2021-07-16 DIAGNOSIS — E55.9 VITAMIN D DEFICIENCY: ICD-10-CM

## 2021-07-16 DIAGNOSIS — Z79.4 TYPE 2 DIABETES MELLITUS WITH COMPLICATION, WITH LONG-TERM CURRENT USE OF INSULIN (HCC): ICD-10-CM

## 2021-07-16 DIAGNOSIS — Z79.4 TYPE 2 DIABETES MELLITUS WITH PROLIFERATIVE RETINOPATHY, WITH LONG-TERM CURRENT USE OF INSULIN, MACULAR EDEMA PRESENCE UNSPECIFIED, UNSPECIFIED LATERALITY, UNSPECIFIED PROLIFERATIVE RETINOPATHY* (HCC): Primary | ICD-10-CM

## 2021-07-16 DIAGNOSIS — E11.3599 TYPE 2 DIABETES MELLITUS WITH PROLIFERATIVE RETINOPATHY, WITH LONG-TERM CURRENT USE OF INSULIN, MACULAR EDEMA PRESENCE UNSPECIFIED, UNSPECIFIED LATERALITY, UNSPECIFIED PROLIFERATIVE RETINOPATHY* (HCC): Primary | ICD-10-CM

## 2021-07-16 DIAGNOSIS — E78.2 MIXED HYPERLIPIDEMIA: ICD-10-CM

## 2021-07-16 PROCEDURE — 99214 OFFICE O/P EST MOD 30 MIN: CPT | Performed by: NURSE PRACTITIONER

## 2021-07-16 RX ORDER — BLOOD-GLUCOSE METER
1 KIT MISCELLANEOUS AS NEEDED
Qty: 1 EACH | Refills: 1 | Status: SHIPPED | OUTPATIENT
Start: 2021-07-16 | End: 2022-08-24

## 2021-07-16 RX ORDER — EPINEPHRINE 0.3 MG/.3ML
0.3 INJECTION SUBCUTANEOUS
COMMUNITY
Start: 2021-03-02 | End: 2022-04-28

## 2021-07-16 RX ORDER — DAPAGLIFLOZIN AND METFORMIN HYDROCHLORIDE 5; 1000 MG/1; MG/1
2 TABLET, FILM COATED, EXTENDED RELEASE ORAL DAILY
Qty: 180 TABLET | Refills: 0 | Status: SHIPPED | OUTPATIENT
Start: 2021-07-16 | End: 2021-10-18

## 2021-07-16 RX ORDER — FLUTICASONE PROPIONATE 50 MCG
1 SPRAY, SUSPENSION (ML) NASAL DAILY PRN
COMMUNITY

## 2021-07-16 RX ORDER — ATORVASTATIN CALCIUM 20 MG/1
20 TABLET, FILM COATED ORAL DAILY
Qty: 90 TABLET | Refills: 3 | Status: SHIPPED | OUTPATIENT
Start: 2021-07-16 | End: 2022-08-03

## 2021-07-16 NOTE — TELEPHONE ENCOUNTER
Ms. Del Toro needs refills on the following:  Atorvastatin 20 mg  Xigduo XR 5-1000 mg  Accu-Check GuideMe test strips    She was seen in the office today, 7/16, with Emma.    Thank you,  Laine Grady, PharmD  7/16/2021  11:39 EDT

## 2021-07-16 NOTE — PROGRESS NOTES
[facility] Pharmacy Visit [facility]    Feli Del Toro is a 70 y.o. female seen by Endocrinology and assessed by the Medication Management Clinic Pharmacist at [facility].  This was an initial MTM visit for Feli Del Toro.    Feli Del Toro was introduced to the [facility] and her allergies, PMH, and medications were reviewed.       Past Medical History:   Diagnosis Date   • Acid reflux    • Anemia    • Cancer (CMS/HCC) 06/04/2013   • Diabetes mellitus (CMS/HCC)    • Dyspnea    • Essential hypertension    • History of constipation    • History of transfusion    • Hyperlipidemia    • Hypertension    • Metastatic colon cancer to liver (CMS/HCC)    • Neuropathy    • Pericardial effusion     Moderate   • Retinopathy    • SOB (shortness of breath)    • Type 2 diabetes mellitus (CMS/HCC)      Social History     Socioeconomic History   • Marital status:      Spouse name: Jesus   • Number of children: Not on file   • Years of education: College   • Highest education level: Not on file   Tobacco Use   • Smoking status: Never Smoker   • Smokeless tobacco: Never Used   Substance and Sexual Activity   • Alcohol use: No   • Drug use: No   • Sexual activity: Defer       Medication Allergies:  Compazine [prochlorperazine edisylate], Erythromycin, and Prochlorperazine maleate        Current Outpatient Medications:   •  allopurinol (ZYLOPRIM) 100 MG tablet, Take 1 tablet by mouth Daily., Disp: 90 tablet, Rfl: 3  •  aspirin 81 MG tablet, Take 81 mg by mouth daily., Disp: , Rfl:   •  atorvastatin (LIPITOR) 20 MG tablet, Take 1 tablet by mouth Daily., Disp: 90 tablet, Rfl: 3  •  calcium carbonate (TUMS) 500 MG chewable tablet, Chew 2 tablets As Needed., Disp: , Rfl:   •  EPINEPHrine (EPIPEN) 0.3 MG/0.3ML solution auto-injector injection, Inject 0.3 mg into the appropriate muscle as directed by prescriber., Disp: , Rfl:   •  glucose blood test strip, accucheck guide me  e11.9. QD, Disp: 100 each, Rfl: 5  •  glucose monitor monitoring kit, 1 each As Needed (e11.9)., Disp: 1 each, Rfl: 1  •  insulin detemir (LEVEMIR) 100 UNIT/ML injection, Inject 45 Units under the skin into the appropriate area as directed Every Night., Disp: , Rfl:   •  Insulin Pen Needle (NOVOFINE) 32G X 6 MM misc, Take 1 shot of insulin per day, Disp: 90 each, Rfl: 3  •  telmisartan-hydrochlorothiazide (MICARDIS HCT) 40-12.5 MG per tablet, Take 1 tablet by mouth Daily., Disp: 90 tablet, Rfl: 3  •  XIGDUO XR 5-1000 MG tablet, TAKE 2 TABLETS DAILY, Disp: 180 tablet, Rfl: 3  •  fluticasone (FLONASE) 50 MCG/ACT nasal spray, 1 spray into the nostril(s) as directed by provider Daily., Disp: , Rfl:       Labs:  There were no vitals filed for this visit.  There were no vitals filed for this visit.  Lab Results   Component Value Date    HGBA1C 6.00 (H) 07/07/2021     Lab Results   Component Value Date    GLUCOSE 125 (H) 06/01/2021    CALCIUM 10.0 07/07/2021     07/07/2021    K 4.4 07/07/2021    CO2 29.2 (H) 07/07/2021     07/07/2021    BUN 16 07/07/2021    CREATININE 0.74 07/07/2021    EGFRIFAFRI 94 07/07/2021    EGFRIFNONA 78 07/07/2021    BCR 21.6 07/07/2021    ANIONGAP 10.4 06/01/2021     Lab Results   Component Value Date    CHLPL 164 07/07/2021    TRIG 57 07/07/2021    HDL 80 (H) 07/07/2021    LDL 72 07/07/2021         Drug-Drug Interactions:   No significant DDIs were noted.        Medication Assessment:   1. Aspirin - Currently Taking   2. Statin - Currently Taking  3. ACEi/ARB - Currently Taking      Medication Education on Specialty Medication:  Xigduo XR  • Discussed the medication's MOA and that it may cause more frequent urination particularly upon starting the medication  • Take one tablet in the morning with or without food    • Encouraged to drink plenty of water as to not get dehydrated especially in warmer weather or with activity  • Also discussed there may be an increased incidence of UTIs  or yeast infections and to monitor for signs/symptoms  • Encouraged to take with food as it may cause N/V/D if taken on empty stomach        Assessment / Plan:  1. The patient was provided medication education via verbal information. Patient expressed understanding and had no further questions at this time.  2. New glucometer provided to patient (Accu-Check GuideMe)  3. Discussed the [facility] pharmacy services that are available to her, including monitoring of refills, prior authorizations, and copay coupon cards, as applicable, as well as curb-side pick-up or mail-order as needed.  o However, she gets a discount on [facility] mail-order  4. Contact information for the pharmacy team was provided to the patient.        Laine Grady, PharmD  7/16/2021  11:41 EDT

## 2021-07-16 NOTE — PROGRESS NOTES
"Chief Complaint  Diabetes (type 2 diabetes)    Subjective          Sontyrell Del Toro presents to [facility] ENDOCRINOLOGY  History of Present Illness     Type 2 dm    Diagnosed in 1986.  Today in clinic pt reports being on levemir 45 units at bedtime, xigduo XR - 5 - 1000 once a day, off bydureon ( unsure who stopped it)  FBG - 76-92  Checks BG - once daily  Dm retinopathy - yes, has had laser in the past, Last eye exam - utd 2021, MAY   Dm nephropathy - no  Dm neuropathy - no, sees podiatry q9 weeks, Dm neuropathy meds - no  CAD -no  CVA -no  Episodes of hypoglycemia - not recently  Pt is physically active. weight has been stable.   Pt tries to follow DM diet for most part.   On ARB  Lab Results   Component Value Date    HGBA1C 6.00 (H) 07/07/2021          Hyperlipidemia    Lipitor 20 mg po daily.  Lab Results   Component Value Date    CHLPL 164 07/07/2021    TRIG 57 07/07/2021    HDL 80 (H) 07/07/2021    LDL 72 07/07/2021       Objective   Vital Signs:   /70   Ht 175.3 cm (69\")   Wt 110 kg (242 lb 9.6 oz)   BMI 35.83 kg/m²     Physical Exam  Vitals reviewed.   Constitutional:       General: She is not in acute distress.  HENT:      Head: Normocephalic and atraumatic.   Cardiovascular:      Rate and Rhythm: Normal rate and regular rhythm.   Pulmonary:      Effort: Pulmonary effort is normal. No respiratory distress.   Musculoskeletal:         General: No signs of injury. Normal range of motion.      Cervical back: Normal range of motion and neck supple.   Skin:     General: Skin is warm and dry.   Neurological:      Mental Status: She is alert and oriented to person, place, and time. Mental status is at baseline.   Psychiatric:         Mood and Affect: Mood normal.         Behavior: Behavior normal.         Thought Content: Thought content normal.         Judgment: Judgment normal.          Result Review :   The following data was reviewed by: EMMA Hickman on 07/16/2021:  Common labs "    Common Labsle 4/9/21 4/9/21 4/9/21 6/1/21 6/1/21 7/7/21 7/7/21 7/7/21 7/7/21    0908 0908 0908 1047 1048 1109 1109 1109 1109   Glucose    125 (A)        Glucose 61 (A)        166 (A)   BUN 14   16     16   Creatinine 0.8   0.72     0.74   eGFR Non  Am         78   eGFR  Am    97     94   Sodium 140   139     138   Potassium 4.5   3.8     4.4   Chloride 101   104     101   Calcium 9.6   9.1     10.0   Total Protein         7.5   Albumin 3.8   3.80     4.20   Total Bilirubin 0.4   0.3     0.3   Alkaline Phosphatase 135 (A)   148 (A)     175 (A)   AST (SGOT) 37   27     40 (A)   ALT (SGPT) 16   28     42 (A)   WBC   8.19  7.66       Hemoglobin   13.3  12.9       Hematocrit   44.3  38.7       Platelets   386  380       Total Cholesterol       164     Triglycerides       57     HDL Cholesterol       80 (A)     LDL Cholesterol        72     Hemoglobin A1C  6.5 (A)      6.00 (A)    Microalbumin, Urine      8.1      (A) Abnormal value       Comments are available for some flowsheets but are not being displayed.                     Assessment and Plan    Diagnoses and all orders for this visit:    1. Type 2 diabetes mellitus with proliferative retinopathy, with long-term current use of insulin, macular edema presence unspecified, unspecified laterality, unspecified proliferative retinop* (CMS/[facility]) (Primary)  -     glucose monitor monitoring kit; 1 each As Needed (e11.9).  Dispense: 1 each; Refill: 1  -     glucose blood test strip; accucheck guide me e11.9. QD  Dispense: 100 each; Refill: 5  -     Hemoglobin A1c; Future  -     Comprehensive Metabolic Panel; Future  -     Lipid Panel; Future  -     Microalbumin / Creatinine Urine Ratio - Urine, Clean Catch; Future    2. Vitamin D deficiency  -     Vitamin D 25 Hydroxy; Future    3. Mixed hyperlipidemia        Follow Up   No follow-ups on file.   Has f/u with dr figueredo in December   Continue toujeo and xigduo  otc vitamin d- start soon  Continue healthy diet  and exercise  On statin and ARB    Patient was given instructions and counseling regarding her condition or for health maintenance advice. Please see specific information pulled into the AVS if appropriate.

## 2021-08-02 ENCOUNTER — TELEPHONE (OUTPATIENT)
Dept: ENDOCRINOLOGY | Age: 71
End: 2021-08-02

## 2021-08-02 NOTE — TELEPHONE ENCOUNTER
Patient called.    She said her doctor sent us paperwork to fill out so she can get diabetic supplies but they have not received a call back.    She is asking if we can fill out the paperwork as soon as possible.    The patient said on her medication list it said Mino instead of Toujeo. She is requesting Toujeo instead of the Mino Aly    Patient's phone number: [phone]

## 2021-08-24 ENCOUNTER — INFUSION (OUTPATIENT)
Dept: ONCOLOGY | Facility: HOSPITAL | Age: 71
End: 2021-08-24

## 2021-08-24 DIAGNOSIS — Z45.2 ENCOUNTER FOR ADJUSTMENT OR MANAGEMENT OF VASCULAR ACCESS DEVICE: Primary | ICD-10-CM

## 2021-08-24 PROCEDURE — 25010000002 HEPARIN LOCK FLUSH PER 10 UNITS: Performed by: INTERNAL MEDICINE

## 2021-08-24 PROCEDURE — 96523 IRRIG DRUG DELIVERY DEVICE: CPT

## 2021-08-24 RX ORDER — HEPARIN SODIUM (PORCINE) LOCK FLUSH IV SOLN 100 UNIT/ML 100 UNIT/ML
500 SOLUTION INTRAVENOUS AS NEEDED
Status: CANCELLED | OUTPATIENT
Start: 2021-08-24

## 2021-08-24 RX ORDER — SODIUM CHLORIDE 0.9 % (FLUSH) 0.9 %
10 SYRINGE (ML) INJECTION AS NEEDED
Status: CANCELLED | OUTPATIENT
Start: 2021-08-24

## 2021-08-24 RX ORDER — HEPARIN SODIUM (PORCINE) LOCK FLUSH IV SOLN 100 UNIT/ML 100 UNIT/ML
500 SOLUTION INTRAVENOUS AS NEEDED
Status: DISCONTINUED | OUTPATIENT
Start: 2021-08-24 | End: 2021-08-24 | Stop reason: HOSPADM

## 2021-08-24 RX ORDER — SODIUM CHLORIDE 0.9 % (FLUSH) 0.9 %
10 SYRINGE (ML) INJECTION AS NEEDED
Status: DISCONTINUED | OUTPATIENT
Start: 2021-08-24 | End: 2021-08-24 | Stop reason: HOSPADM

## 2021-08-24 RX ADMIN — Medication 500 UNITS: at 13:11

## 2021-08-24 RX ADMIN — Medication 10 ML: at 13:11

## 2021-10-05 ENCOUNTER — INFUSION (OUTPATIENT)
Dept: ONCOLOGY | Facility: HOSPITAL | Age: 71
End: 2021-10-05

## 2021-10-05 ENCOUNTER — OFFICE VISIT (OUTPATIENT)
Dept: ONCOLOGY | Facility: CLINIC | Age: 71
End: 2021-10-05

## 2021-10-05 VITALS
WEIGHT: 245.1 LBS | SYSTOLIC BLOOD PRESSURE: 145 MMHG | BODY MASS INDEX: 36.19 KG/M2 | RESPIRATION RATE: 16 BRPM | DIASTOLIC BLOOD PRESSURE: 79 MMHG | HEART RATE: 81 BPM | TEMPERATURE: 97.1 F | OXYGEN SATURATION: 94 %

## 2021-10-05 DIAGNOSIS — Z45.2 ENCOUNTER FOR ADJUSTMENT OR MANAGEMENT OF VASCULAR ACCESS DEVICE: ICD-10-CM

## 2021-10-05 DIAGNOSIS — C18.9 PRIMARY MALIGNANT NEOPLASM OF COLON (HCC): Primary | ICD-10-CM

## 2021-10-05 DIAGNOSIS — Z90.81 POST-SPLENECTOMY: ICD-10-CM

## 2021-10-05 DIAGNOSIS — C78.7 LIVER METASTASIS: ICD-10-CM

## 2021-10-05 LAB
ALBUMIN SERPL-MCNC: 3.9 G/DL (ref 3.5–5.2)
ALBUMIN/GLOB SERPL: 1 G/DL (ref 1.1–2.4)
ALP SERPL-CCNC: 137 U/L (ref 38–116)
ALT SERPL W P-5'-P-CCNC: 12 U/L (ref 0–33)
ANION GAP SERPL CALCULATED.3IONS-SCNC: 9.6 MMOL/L (ref 5–15)
AST SERPL-CCNC: 24 U/L (ref 0–32)
BASOPHILS # BLD AUTO: 0.05 10*3/MM3 (ref 0–0.2)
BASOPHILS NFR BLD AUTO: 0.7 % (ref 0–1.5)
BILIRUB SERPL-MCNC: 0.3 MG/DL (ref 0.2–1.2)
BUN SERPL-MCNC: 20 MG/DL (ref 6–20)
BUN/CREAT SERPL: 24.1 (ref 7.3–30)
CALCIUM SPEC-SCNC: 9.3 MG/DL (ref 8.5–10.2)
CEA SERPL-MCNC: 4.06 NG/ML
CHLORIDE SERPL-SCNC: 103 MMOL/L (ref 98–107)
CO2 SERPL-SCNC: 27.4 MMOL/L (ref 22–29)
CREAT SERPL-MCNC: 0.83 MG/DL (ref 0.6–1.1)
DEPRECATED RDW RBC AUTO: 50.4 FL (ref 37–54)
EOSINOPHIL # BLD AUTO: 0.35 10*3/MM3 (ref 0–0.4)
EOSINOPHIL NFR BLD AUTO: 4.9 % (ref 0.3–6.2)
ERYTHROCYTE [DISTWIDTH] IN BLOOD BY AUTOMATED COUNT: 16.6 % (ref 12.3–15.4)
GFR SERPL CREATININE-BSD FRML MDRD: 82 ML/MIN/1.73
GLOBULIN UR ELPH-MCNC: 3.8 GM/DL (ref 1.8–3.5)
GLUCOSE SERPL-MCNC: 100 MG/DL (ref 74–124)
HCT VFR BLD AUTO: 38.2 % (ref 34–46.6)
HGB BLD-MCNC: 12.1 G/DL (ref 12–15.9)
IMM GRANULOCYTES # BLD AUTO: 0.03 10*3/MM3 (ref 0–0.05)
IMM GRANULOCYTES NFR BLD AUTO: 0.4 % (ref 0–0.5)
LYMPHOCYTES # BLD AUTO: 2.25 10*3/MM3 (ref 0.7–3.1)
LYMPHOCYTES NFR BLD AUTO: 31.8 % (ref 19.6–45.3)
MCH RBC QN AUTO: 26.6 PG (ref 26.6–33)
MCHC RBC AUTO-ENTMCNC: 31.7 G/DL (ref 31.5–35.7)
MCV RBC AUTO: 84 FL (ref 79–97)
MONOCYTES # BLD AUTO: 0.72 10*3/MM3 (ref 0.1–0.9)
MONOCYTES NFR BLD AUTO: 10.2 % (ref 5–12)
NEUTROPHILS NFR BLD AUTO: 3.68 10*3/MM3 (ref 1.7–7)
NEUTROPHILS NFR BLD AUTO: 52 % (ref 42.7–76)
NRBC BLD AUTO-RTO: 0 /100 WBC (ref 0–0.2)
PLATELET # BLD AUTO: 387 10*3/MM3 (ref 140–450)
PMV BLD AUTO: 8.9 FL (ref 6–12)
POTASSIUM SERPL-SCNC: 4 MMOL/L (ref 3.5–4.7)
PROT SERPL-MCNC: 7.7 G/DL (ref 6.3–8)
RBC # BLD AUTO: 4.55 10*6/MM3 (ref 3.77–5.28)
SODIUM SERPL-SCNC: 140 MMOL/L (ref 134–145)
WBC # BLD AUTO: 7.08 10*3/MM3 (ref 3.4–10.8)

## 2021-10-05 PROCEDURE — 85025 COMPLETE CBC W/AUTO DIFF WBC: CPT

## 2021-10-05 PROCEDURE — 25010000002 HEPARIN LOCK FLUSH PER 10 UNITS: Performed by: INTERNAL MEDICINE

## 2021-10-05 PROCEDURE — 99214 OFFICE O/P EST MOD 30 MIN: CPT | Performed by: INTERNAL MEDICINE

## 2021-10-05 PROCEDURE — 25010000002 PNEUMOCOCCAL VAC POLYVALENT PER 0.5 ML: Performed by: INTERNAL MEDICINE

## 2021-10-05 PROCEDURE — 90732 PPSV23 VACC 2 YRS+ SUBQ/IM: CPT | Performed by: INTERNAL MEDICINE

## 2021-10-05 PROCEDURE — 82378 CARCINOEMBRYONIC ANTIGEN: CPT | Performed by: INTERNAL MEDICINE

## 2021-10-05 PROCEDURE — 80053 COMPREHEN METABOLIC PANEL: CPT

## 2021-10-05 PROCEDURE — G0009 ADMIN PNEUMOCOCCAL VACCINE: HCPCS | Performed by: INTERNAL MEDICINE

## 2021-10-05 RX ORDER — INSULIN GLARGINE 300 U/ML
INJECTION, SOLUTION SUBCUTANEOUS
COMMUNITY
Start: 2021-08-03 | End: 2022-04-28 | Stop reason: SDUPTHER

## 2021-10-05 RX ORDER — HEPARIN SODIUM (PORCINE) LOCK FLUSH IV SOLN 100 UNIT/ML 100 UNIT/ML
500 SOLUTION INTRAVENOUS AS NEEDED
Status: CANCELLED | OUTPATIENT
Start: 2021-10-05

## 2021-10-05 RX ORDER — SODIUM CHLORIDE 0.9 % (FLUSH) 0.9 %
10 SYRINGE (ML) INJECTION AS NEEDED
Status: CANCELLED | OUTPATIENT
Start: 2021-10-05

## 2021-10-05 RX ORDER — SODIUM CHLORIDE 0.9 % (FLUSH) 0.9 %
10 SYRINGE (ML) INJECTION AS NEEDED
Status: DISCONTINUED | OUTPATIENT
Start: 2021-10-05 | End: 2021-10-05 | Stop reason: HOSPADM

## 2021-10-05 RX ORDER — HEPARIN SODIUM (PORCINE) LOCK FLUSH IV SOLN 100 UNIT/ML 100 UNIT/ML
500 SOLUTION INTRAVENOUS AS NEEDED
Status: DISCONTINUED | OUTPATIENT
Start: 2021-10-05 | End: 2021-10-05 | Stop reason: HOSPADM

## 2021-10-05 RX ADMIN — Medication 10 ML: at 07:40

## 2021-10-05 RX ADMIN — PNEUMOCOCCAL VACCINE POLYVALENT 0.5 ML
25; 25; 25; 25; 25; 25; 25; 25; 25; 25; 25; 25; 25; 25; 25; 25; 25; 25; 25; 25; 25; 25; 25 INJECTION, SOLUTION INTRAMUSCULAR; SUBCUTANEOUS at 08:32

## 2021-10-05 RX ADMIN — Medication 500 UNITS: at 07:40

## 2021-10-05 NOTE — PROGRESS NOTES
Subjective     CHIEF COMPLAINT:      Chief Complaint   Patient presents with   • Follow-up     no concerns       HISTORY OF PRESENT ILLNESS:     Feli Del Toro is a 70 y.o. female patient who returns today for follow up on her metastatic colon cancer to the liver.  She returns today for follow-up reporting problems with her right foot.  She is going to have surgery but she is waiting for this to be scheduled and there is delay due to the coronavirus pandemic.    Patient denies abdominal pain.  No blood in the stool.  No severe constipation.    Patient received the flu vaccine last week.  She tolerated the injection well.      ROS:  Pertinent ROS is in the HPI.     Past medical, surgical, social and family history were reviewed.     MEDICATIONS:    Current Outpatient Medications:   •  allopurinol (ZYLOPRIM) 100 MG tablet, Take 1 tablet by mouth Daily., Disp: 90 tablet, Rfl: 3  •  aspirin 81 MG tablet, Take 81 mg by mouth daily., Disp: , Rfl:   •  atorvastatin (LIPITOR) 20 MG tablet, Take 1 tablet by mouth Daily., Disp: 90 tablet, Rfl: 3  •  calcium carbonate (TUMS) 500 MG chewable tablet, Chew 2 tablets As Needed., Disp: , Rfl:   •  EPINEPHrine (EPIPEN) 0.3 MG/0.3ML solution auto-injector injection, Inject 0.3 mg into the appropriate muscle as directed by prescriber., Disp: , Rfl:   •  fluticasone (FLONASE) 50 MCG/ACT nasal spray, 1 spray into the nostril(s) as directed by provider Daily., Disp: , Rfl:   •  glucose blood test strip, accucheck guide me e11.9. QD, Disp: 100 each, Rfl: 5  •  glucose blood test strip, Use as instructed once daily, Disp: 100 each, Rfl: 12  •  glucose monitor monitoring kit, 1 each As Needed (e11.9)., Disp: 1 each, Rfl: 1  •  Insulin Pen Needle (NOVOFINE) 32G X 6 MM misc, Take 1 shot of insulin per day, Disp: 90 each, Rfl: 3  •  telmisartan-hydrochlorothiazide (MICARDIS HCT) 40-12.5 MG per tablet, Take 1 tablet by mouth Daily., Disp: 90 tablet, Rfl: 3  •  Toujeo SoloStar 300 UNIT/ML  solution pen-injector injection, , Disp: , Rfl:   •  Xigduo XR 5-1000 MG tablet, Take 2 tablets by mouth Daily., Disp: 180 tablet, Rfl: 0  No current facility-administered medications for this visit.    Objective   VITAL SIGNS:     Vitals:    10/05/21 0754   BP: 145/79   Pulse: 81   Resp: 16   Temp: 97.1 °F (36.2 °C)   TempSrc: Temporal   SpO2: 94%   Weight: 111 kg (245 lb 1.6 oz)   PainSc: 0-No pain     Body mass index is 36.19 kg/m².     Wt Readings from Last 5 Encounters:   10/05/21 111 kg (245 lb 1.6 oz)   07/16/21 110 kg (242 lb 9.6 oz)   06/01/21 111 kg (244 lb 11.2 oz)   01/14/21 112 kg (247 lb 1 oz)   12/17/20 113 kg (248 lb 9.6 oz)       PHYSICAL EXAMINATION:   GENERAL: The patient appears in good general condition, not in acute distress.   SKIN: No ecchymosis.  EYES: No jaundice.  LYMPHATICS: No cervical lymphadenopathy.  CHEST: Normal respiratory effort.  Lungs clear bilaterally.  No added sounds.  CVS: Normal S1-S2.  No murmurs.  ABDOMEN: Soft. No tenderness. No Hepatomegaly. No Splenomegaly. No masses.  EXTREMITIES: Right foot is in a boot.     DIAGNOSTIC DATA:     Results from last 7 days   Lab Units 10/05/21  0739   WBC 10*3/mm3 7.08   NEUTROS ABS 10*3/mm3 3.68   HEMOGLOBIN g/dL 12.1   HEMATOCRIT % 38.2   PLATELETS 10*3/mm3 387     Results from last 7 days   Lab Units 10/05/21  0739   SODIUM mmol/L 140   POTASSIUM mmol/L 4.0   CHLORIDE mmol/L 103   CO2 mmol/L 27.4   BUN mg/dL 20   CREATININE mg/dL 0.83   CALCIUM mg/dL 9.3   ALBUMIN g/dL 3.90   BILIRUBIN mg/dL 0.3   ALK PHOS U/L 137*   ALT (SGPT) U/L 12   AST (SGOT) U/L 24   GLUCOSE mg/dL 100     Lab Results   Component Value Date    CEA 4.06 10/05/2021    CEA 4.57 06/01/2021    CEA 4.28 12/01/2020    CEA 3.92 07/09/2020    CEA 5.14 05/21/2020      CT abdomen and pelvis three-phase on 6/7/2021:  1. Stable partial hepatectomy and ablation defect in hepatic segment 8. No evidence for recurrent or new hepatic metastases.   2. Cholelithiasis.     Chest  x-ray on 9/21/2021:  No acute cardiopulmonary findings.    Assessment/Plan   *Metastatic colon cancer. She had metastases to the liver.   · She received 12 cycles of FOLFOX-6 and then had resection of the primary tumor along with metastasectomy and splenectomy with thermal ablation of lesions in the liver in January 2014.   · This was followed by 12 cycles of the FOLFIRI regimen completed on 08/07/2014.   · CT scans on 05/28/2015 revealed a new lesion in the liver.  Dr. Ruano referred the patient to Radiation Therapy and she received CyberKnife radiation, completed on 07/14/2015.   · The CT scan from 5/5/17 revealed a new 2.2 cm lesion in the anterior hepatic segment.  CT guided microwave ablation of liver mass performed on 6/16/17 with CT evidence on 7/20/17 of a complete response.  · FOLFIRI ×12 cycles given between 6/29/17 and 11/29/17.   · CT scan from 3/11/2019 showed no evidence of lung metastasis.  No evidence of reaccumulation of the left pleural/pericardial effusion.  · CT scan of the abdomen on 5/9/2019 showed further decrease in the size of the liver metastasis.  No new lesions were seen.  · CT scan of the chest on 10/17/2019 showed no lung metastasis.  No recurrence of the pericardial or pleural effusions.   · CT scan on 5/21/2020 showed no evidence of lung metastasis.  · CEA slightly increased to 5.14 on 5/21/2020.  It improved to 3.92 on 7/9/2020  · Colonoscopy on 10/9/2020 revealed tubular adenomas with no high-grade dysplasia or malignancy.  · CT chest on 12/1/2020 showed no evidence of recurrence.  · CT abdomen pelvis on 6/7/2021 showed no evidence of recurrence.  · CEA is 4.06 today.  · Patient is doing well.  No evidence of recurrence.  · I recommended repeating scans in December 20 21-6 months from the last studies.    *Patient has a port.    · Port is being maintained every 6 weeks.    *Patient is postsplenectomy.    · She did not receive the Prevnar vaccine in the past.    · She was given  Prevnar 13 vaccine 9/10/2020.    · Patient developed COVID-19 infection and had to receive the monoclonal antibody.   · I recommended receiving the Pneumovax vaccine today.    PLAN:    1.  We will give Pneumovax today.   2.  Return in 6 weeks for a port flush.   3.  Obtain CT scan of the chest abdomen pelvis in 12 weeks with comparison to the CT scans from [place].  CBC CMP CEA will be obtained.   4.  We will see her in follow-up in 13 weeks to review the studies.       Marivel Brown MD  10/05/21

## 2021-10-18 DIAGNOSIS — Z79.4 TYPE 2 DIABETES MELLITUS WITH COMPLICATION, WITH LONG-TERM CURRENT USE OF INSULIN (HCC): ICD-10-CM

## 2021-10-18 DIAGNOSIS — E11.8 TYPE 2 DIABETES MELLITUS WITH COMPLICATION, WITH LONG-TERM CURRENT USE OF INSULIN (HCC): ICD-10-CM

## 2021-10-18 RX ORDER — DAPAGLIFLOZIN AND METFORMIN HYDROCHLORIDE 5; 1000 MG/1; MG/1
TABLET, FILM COATED, EXTENDED RELEASE ORAL
Qty: 180 TABLET | Refills: 0 | Status: SHIPPED | OUTPATIENT
Start: 2021-10-18 | End: 2022-01-18

## 2021-11-18 ENCOUNTER — INFUSION (OUTPATIENT)
Dept: ONCOLOGY | Facility: HOSPITAL | Age: 71
End: 2021-11-18

## 2021-11-18 DIAGNOSIS — Z45.2 ENCOUNTER FOR ADJUSTMENT OR MANAGEMENT OF VASCULAR ACCESS DEVICE: Primary | ICD-10-CM

## 2021-11-18 PROCEDURE — 25010000002 HEPARIN LOCK FLUSH PER 10 UNITS: Performed by: INTERNAL MEDICINE

## 2021-11-18 PROCEDURE — 96523 IRRIG DRUG DELIVERY DEVICE: CPT

## 2021-11-18 RX ORDER — HEPARIN SODIUM (PORCINE) LOCK FLUSH IV SOLN 100 UNIT/ML 100 UNIT/ML
500 SOLUTION INTRAVENOUS AS NEEDED
Status: CANCELLED | OUTPATIENT
Start: 2021-11-18

## 2021-11-18 RX ORDER — SODIUM CHLORIDE 0.9 % (FLUSH) 0.9 %
10 SYRINGE (ML) INJECTION AS NEEDED
Status: DISCONTINUED | OUTPATIENT
Start: 2021-11-18 | End: 2021-11-18 | Stop reason: HOSPADM

## 2021-11-18 RX ORDER — SODIUM CHLORIDE 0.9 % (FLUSH) 0.9 %
10 SYRINGE (ML) INJECTION AS NEEDED
Status: CANCELLED | OUTPATIENT
Start: 2021-11-18

## 2021-11-18 RX ORDER — HEPARIN SODIUM (PORCINE) LOCK FLUSH IV SOLN 100 UNIT/ML 100 UNIT/ML
500 SOLUTION INTRAVENOUS AS NEEDED
Status: DISCONTINUED | OUTPATIENT
Start: 2021-11-18 | End: 2021-11-18 | Stop reason: HOSPADM

## 2021-11-18 RX ADMIN — Medication 500 UNITS: at 14:33

## 2021-11-18 RX ADMIN — Medication 10 ML: at 14:32

## 2021-12-01 ENCOUNTER — TELEPHONE (OUTPATIENT)
Dept: ENDOCRINOLOGY | Age: 71
End: 2021-12-01

## 2021-12-17 ENCOUNTER — OFFICE VISIT (OUTPATIENT)
Dept: ENDOCRINOLOGY | Age: 71
End: 2021-12-17

## 2021-12-17 VITALS
OXYGEN SATURATION: 97 % | HEART RATE: 79 BPM | DIASTOLIC BLOOD PRESSURE: 72 MMHG | WEIGHT: 242.4 LBS | BODY MASS INDEX: 35.9 KG/M2 | HEIGHT: 69 IN | SYSTOLIC BLOOD PRESSURE: 136 MMHG

## 2021-12-17 DIAGNOSIS — E11.3599 TYPE 2 DIABETES MELLITUS WITH PROLIFERATIVE RETINOPATHY, WITH LONG-TERM CURRENT USE OF INSULIN, MACULAR EDEMA PRESENCE UNSPECIFIED, UNSPECIFIED LATERALITY, UNSPECIFIED PROLIFERATIVE RETINOPATHY* (HCC): Primary | ICD-10-CM

## 2021-12-17 DIAGNOSIS — Z79.4 TYPE 2 DIABETES MELLITUS WITH PROLIFERATIVE RETINOPATHY, WITH LONG-TERM CURRENT USE OF INSULIN, MACULAR EDEMA PRESENCE UNSPECIFIED, UNSPECIFIED LATERALITY, UNSPECIFIED PROLIFERATIVE RETINOPATHY* (HCC): Primary | ICD-10-CM

## 2021-12-17 DIAGNOSIS — E78.2 MIXED HYPERLIPIDEMIA: ICD-10-CM

## 2021-12-17 DIAGNOSIS — E55.9 VITAMIN D DEFICIENCY: ICD-10-CM

## 2021-12-17 PROCEDURE — 99214 OFFICE O/P EST MOD 30 MIN: CPT | Performed by: INTERNAL MEDICINE

## 2021-12-17 NOTE — PROGRESS NOTES
"Chief Complaint  Chief Complaint   Patient presents with   • Diabetes   FOLLOW UP/ TYPE 2 DM    Subjective          History of Present Illness    Feli Del Toro 71 y.o. presents with Type 2 dm as a new patient. Consulted by      Type 2 dm - Diagnosed about 10 + years ago.   Today in clinic pt reports being on toujeo 45 units at bed time, xigduo XR - 1 tab twice daily.   Avg bg - 120 - 140 mg/dl.    Checks BG - 3 times a day  Sensor - x  Dm retinopathy -  ? S/p laser therapy,Last eye exam - tere in Jan   Dm nephropathy - x  Dm neuropathy -x ,Dm neuropathy meds - n/a  CAD -x  CVA -x  Episodes of hypoglycemia - x  Pt is physically active. weight has been stable.   Pt tries to follow DM diet for most part.   On Ace inb.      Hyperlipidemia - Lipitor 20 mg po daily.      Reviewed primary care physician's/consulting physician documentation and lab results         I have reviewed the patient's allergies, medicines, past medical hx, family hx and social hx in detail.    Objective   Vital Signs:   /72   Pulse 79   Ht 175.3 cm (69\")   Wt 110 kg (242 lb 6.4 oz)   LMP  (LMP Unknown)   SpO2 97%   BMI 35.80 kg/m²   Physical Exam   General appearance - no distress  Eyes- anicteric sclera  Ear nose and throat-external ears and nose normal.    Respiratory-normal chest on inspection.  No respiratory distress noted.  Skin-no rashes.  Neuro-alert and oriented x3            Result Review :   The following data was reviewed by: Rusty Rehman MD on 12/17/2021:  Results Encounter on 11/16/2021   Component Date Value Ref Range Status   • Hemoglobin A1C 12/03/2021 6.6* 4.8 - 5.6 % Final    Comment:          Prediabetes: 5.7 - 6.4           Diabetes: >6.4           Glycemic control for adults with diabetes: <7.0     • Glucose 12/03/2021 85  65 - 99 mg/dL Final   • BUN 12/03/2021 18  8 - 27 mg/dL Final   • Creatinine 12/03/2021 0.85  0.57 - 1.00 mg/dL Final   • eGFR Non  Am 12/03/2021 70  >59 mL/min/1.73 Final   • eGFR "  Am 12/03/2021 80  >59 mL/min/1.73 Final    Comment: **In accordance with recommendations from the NKF-ASN Task force,**    [facility] is in [facility] updating its eGFR calculation to the    2021 CKD-EPI creatinine equation that estimates kidney function    without a race variable.     • BUN/Creatinine Ratio 12/03/2021 21  12 - 28 Final   • Sodium 12/03/2021 141  134 - 144 mmol/L Final   • Potassium 12/03/2021 4.4  3.5 - 5.2 mmol/L Final   • Chloride 12/03/2021 102  96 - 106 mmol/L Final   • Total CO2 12/03/2021 25  20 - 29 mmol/L Final   • Calcium 12/03/2021 9.5  8.7 - 10.3 mg/dL Final   • Total Protein 12/03/2021 7.5  6.0 - 8.5 g/dL Final   • Albumin 12/03/2021 4.0  3.8 - 4.8 g/dL Final   • Globulin 12/03/2021 3.5  1.5 - 4.5 g/dL Final   • A/G Ratio 12/03/2021 1.1* 1.2 - 2.2 Final   • Total Bilirubin 12/03/2021 0.3  0.0 - 1.2 mg/dL Final   • Alkaline Phosphatase 12/03/2021 166* 44 - 121 IU/L Final                  **Please note reference interval change**   • AST (SGOT) 12/03/2021 36  0 - 40 IU/L Final   • ALT (SGPT) 12/03/2021 30  0 - 32 IU/L Final   • Total Cholesterol 12/03/2021 160  100 - 199 mg/dL Final   • Triglycerides 12/03/2021 55  0 - 149 mg/dL Final   • HDL Cholesterol 12/03/2021 76  >39 mg/dL Final   • VLDL Cholesterol [place] 12/03/2021 11  5 - 40 mg/dL Final   • LDL Chol Calc (NIH) 12/03/2021 73  0 - 99 mg/dL Final   • Creatinine, Urine 12/03/2021 70.8  Not Estab. mg/dL Final   • Microalbumin, Urine 12/03/2021 38.5  Not Estab. ug/mL Final   • Microalbumin/Creatinine Ratio 12/03/2021 54* 0 - 29 mg/g creat Final    Comment:                        Normal:                0 -  29                         Moderately increased: 30 - 300                         Severely increased:       >300     • Interpretation 12/03/2021 Note   Final    Supplemental report is available.     Data reviewed: PCP document       Results Review:    I reviewed the patient's new clinical results.     Assessment and Plan   "  Problem List Items Addressed This Visit        Other    Hyperlipidemia    Relevant Orders    Basic Metabolic Panel    Hemoglobin A1c    Lipid Panel    Microalbumin / Creatinine Urine Ratio - Urine, Clean Catch    TSH    T4, Free    Vitamin D 25 Hydroxy    Vitamin D deficiency    Relevant Orders    Basic Metabolic Panel    Hemoglobin A1c    Lipid Panel    Microalbumin / Creatinine Urine Ratio - Urine, Clean Catch    TSH    T4, Free    Vitamin D 25 Hydroxy      Other Visit Diagnoses     Type 2 diabetes mellitus with proliferative retinopathy, with long-term current use of insulin, macular edema presence unspecified, unspecified laterality, unspecified proliferative retinop* (C* (HCC)    -  Primary    Relevant Orders    Basic Metabolic Panel    Hemoglobin A1c    Lipid Panel    Microalbumin / Creatinine Urine Ratio - Urine, Clean Catch    TSH    T4, Free    Vitamin D 25 Hydroxy        Type 2 diabetes mellitus-uncontrolled with hyperglycemia  Continue the above insulin regimen  Continue xigduo    hyperlipidemia  Continue Lipitor.    Check vitamin D deficiency.    Interpreted the blood work-up/imaging results performed by the primary care/consulting physician -    Refills sent to pharmacy    Follow Up     Patient was given instructions and counseling regarding her condition or for health maintenance advice. Please see specific information pulled into the AVS if appropriate.       Thank you for asking me to see your patient, Feli Del Toro in consultation.         Rusty Rehman MD  12/17/21      EMR Dragon / transcription disclaimer:     \"Dictated utilizing Dragon dictation\".         "

## 2021-12-30 ENCOUNTER — APPOINTMENT (OUTPATIENT)
Dept: OTHER | Facility: HOSPITAL | Age: 71
End: 2021-12-30

## 2021-12-30 ENCOUNTER — INFUSION (OUTPATIENT)
Dept: ONCOLOGY | Facility: HOSPITAL | Age: 71
End: 2021-12-30

## 2021-12-30 ENCOUNTER — HOSPITAL ENCOUNTER (OUTPATIENT)
Dept: PET IMAGING | Facility: HOSPITAL | Age: 71
Discharge: HOME | End: 2021-12-30

## 2021-12-30 DIAGNOSIS — C78.7 LIVER METASTASIS: ICD-10-CM

## 2021-12-30 DIAGNOSIS — C18.9 PRIMARY MALIGNANT NEOPLASM OF COLON (HCC): ICD-10-CM

## 2021-12-30 DIAGNOSIS — Z09 FOLLOW UP: ICD-10-CM

## 2021-12-30 LAB
ALBUMIN SERPL-MCNC: 3.9 G/DL (ref 3.5–5.2)
ALBUMIN/GLOB SERPL: 1 G/DL (ref 1.1–2.4)
ALP SERPL-CCNC: 126 U/L (ref 38–116)
ALT SERPL W P-5'-P-CCNC: 21 U/L (ref 0–33)
ANION GAP SERPL CALCULATED.3IONS-SCNC: 12.5 MMOL/L (ref 5–15)
AST SERPL-CCNC: 26 U/L (ref 0–32)
BASOPHILS # BLD AUTO: 0.05 10*3/MM3 (ref 0–0.2)
BASOPHILS NFR BLD AUTO: 0.7 % (ref 0–1.5)
BILIRUB SERPL-MCNC: 0.4 MG/DL (ref 0.2–1.2)
BUN SERPL-MCNC: 22 MG/DL (ref 6–20)
BUN/CREAT SERPL: 25.9 (ref 7.3–30)
CALCIUM SPEC-SCNC: 9.5 MG/DL (ref 8.5–10.2)
CEA SERPL-MCNC: 3.8 NG/ML
CHLORIDE SERPL-SCNC: 101 MMOL/L (ref 98–107)
CO2 SERPL-SCNC: 24.5 MMOL/L (ref 22–29)
CREAT BLDA-MCNC: 0.9 MG/DL (ref 0.6–1.3)
CREAT SERPL-MCNC: 0.85 MG/DL (ref 0.6–1.1)
DEPRECATED RDW RBC AUTO: 50.4 FL (ref 37–54)
EOSINOPHIL # BLD AUTO: 0.29 10*3/MM3 (ref 0–0.4)
EOSINOPHIL NFR BLD AUTO: 3.8 % (ref 0.3–6.2)
ERYTHROCYTE [DISTWIDTH] IN BLOOD BY AUTOMATED COUNT: 16.4 % (ref 12.3–15.4)
GFR SERPL CREATININE-BSD FRML MDRD: 80 ML/MIN/1.73
GLOBULIN UR ELPH-MCNC: 4 GM/DL (ref 1.8–3.5)
GLUCOSE SERPL-MCNC: 91 MG/DL (ref 74–124)
HCT VFR BLD AUTO: 39.6 % (ref 34–46.6)
HGB BLD-MCNC: 12.7 G/DL (ref 12–15.9)
IMM GRANULOCYTES # BLD AUTO: 0.05 10*3/MM3 (ref 0–0.05)
IMM GRANULOCYTES NFR BLD AUTO: 0.7 % (ref 0–0.5)
LYMPHOCYTES # BLD AUTO: 2.34 10*3/MM3 (ref 0.7–3.1)
LYMPHOCYTES NFR BLD AUTO: 30.8 % (ref 19.6–45.3)
MCH RBC QN AUTO: 27 PG (ref 26.6–33)
MCHC RBC AUTO-ENTMCNC: 32.1 G/DL (ref 31.5–35.7)
MCV RBC AUTO: 84.1 FL (ref 79–97)
MONOCYTES # BLD AUTO: 0.84 10*3/MM3 (ref 0.1–0.9)
MONOCYTES NFR BLD AUTO: 11.1 % (ref 5–12)
NEUTROPHILS NFR BLD AUTO: 4.03 10*3/MM3 (ref 1.7–7)
NEUTROPHILS NFR BLD AUTO: 52.9 % (ref 42.7–76)
NRBC BLD AUTO-RTO: 0 /100 WBC (ref 0–0.2)
PLATELET # BLD AUTO: 380 10*3/MM3 (ref 140–450)
PMV BLD AUTO: 8.7 FL (ref 6–12)
POTASSIUM SERPL-SCNC: 4.1 MMOL/L (ref 3.5–4.7)
PROT SERPL-MCNC: 7.9 G/DL (ref 6.3–8)
RBC # BLD AUTO: 4.71 10*6/MM3 (ref 3.77–5.28)
SODIUM SERPL-SCNC: 138 MMOL/L (ref 134–145)
WBC NRBC COR # BLD: 7.6 10*3/MM3 (ref 3.4–10.8)

## 2021-12-30 PROCEDURE — 80053 COMPREHEN METABOLIC PANEL: CPT

## 2021-12-30 PROCEDURE — 82565 ASSAY OF CREATININE: CPT

## 2021-12-30 PROCEDURE — 71260 CT THORAX DX C+: CPT

## 2021-12-30 PROCEDURE — 85025 COMPLETE CBC W/AUTO DIFF WBC: CPT

## 2021-12-30 PROCEDURE — 0 DIATRIZOATE MEGLUMINE & SODIUM PER 1 ML: Performed by: INTERNAL MEDICINE

## 2021-12-30 PROCEDURE — 74177 CT ABD & PELVIS W/CONTRAST: CPT

## 2021-12-30 PROCEDURE — 82378 CARCINOEMBRYONIC ANTIGEN: CPT | Performed by: INTERNAL MEDICINE

## 2021-12-30 PROCEDURE — 25010000002 IOPAMIDOL 61 % SOLUTION: Performed by: INTERNAL MEDICINE

## 2021-12-30 RX ADMIN — IOPAMIDOL 85 ML: 612 INJECTION, SOLUTION INTRAVENOUS at 08:35

## 2021-12-30 RX ADMIN — DIATRIZOATE MEGLUMINE AND DIATRIZOATE SODIUM 30 ML: 660; 100 LIQUID ORAL; RECTAL at 07:43

## 2022-01-04 ENCOUNTER — APPOINTMENT (OUTPATIENT)
Dept: LAB | Facility: HOSPITAL | Age: 72
End: 2022-01-04

## 2022-01-04 ENCOUNTER — OFFICE VISIT (OUTPATIENT)
Dept: ONCOLOGY | Facility: CLINIC | Age: 72
End: 2022-01-04

## 2022-01-04 VITALS
SYSTOLIC BLOOD PRESSURE: 122 MMHG | OXYGEN SATURATION: 98 % | HEIGHT: 69 IN | DIASTOLIC BLOOD PRESSURE: 70 MMHG | WEIGHT: 244.7 LBS | BODY MASS INDEX: 36.24 KG/M2 | HEART RATE: 86 BPM | RESPIRATION RATE: 16 BRPM | TEMPERATURE: 96.9 F

## 2022-01-04 DIAGNOSIS — C78.7 LIVER METASTASIS: ICD-10-CM

## 2022-01-04 DIAGNOSIS — Z90.81 POST-SPLENECTOMY: ICD-10-CM

## 2022-01-04 DIAGNOSIS — C18.9 PRIMARY MALIGNANT NEOPLASM OF COLON: Primary | ICD-10-CM

## 2022-01-04 DIAGNOSIS — Z45.2 ENCOUNTER FOR ADJUSTMENT OR MANAGEMENT OF VASCULAR ACCESS DEVICE: ICD-10-CM

## 2022-01-04 PROCEDURE — 99214 OFFICE O/P EST MOD 30 MIN: CPT | Performed by: INTERNAL MEDICINE

## 2022-01-04 NOTE — PROGRESS NOTES
Subjective     CHIEF COMPLAINT:      Chief Complaint   Patient presents with   • Follow-up     discuss CT Scan       HISTORY OF PRESENT ILLNESS:     Feli Del Toro is a 71 y.o. female patient who returns today for follow up on her metastatic colon cancer.  She returns today for follow-up reporting no new symptoms.  No abdominal pain.  No changes in the bowel movements.    Patient has port in the left upper chest.  She is not having pain in the area.  No neck pain or swelling.    Patient received the Pneumovax vaccine October 2021.  She did not have any symptoms after receiving the vaccine.  She received the COVID-19 booster on 11/20/2021.  She tolerated that well.      ROS:  Pertinent ROS is in the HPI.     Past medical, surgical, social and family history were reviewed.     MEDICATIONS:    Current Outpatient Medications:   •  allopurinol (ZYLOPRIM) 100 MG tablet, Take 1 tablet by mouth Daily., Disp: 90 tablet, Rfl: 3  •  aspirin 81 MG tablet, Take 81 mg by mouth daily., Disp: , Rfl:   •  atorvastatin (LIPITOR) 20 MG tablet, Take 1 tablet by mouth Daily., Disp: 90 tablet, Rfl: 3  •  calcium carbonate (TUMS) 500 MG chewable tablet, Chew 2 tablets As Needed., Disp: , Rfl:   •  EPINEPHrine (EPIPEN) 0.3 MG/0.3ML solution auto-injector injection, Inject 0.3 mg into the appropriate muscle as directed by prescriber., Disp: , Rfl:   •  fluticasone (FLONASE) 50 MCG/ACT nasal spray, 1 spray into the nostril(s) as directed by provider Daily., Disp: , Rfl:   •  glucose blood test strip, accucheck guide me e11.9. QD, Disp: 100 each, Rfl: 5  •  glucose blood test strip, Use as instructed once daily, Disp: 100 each, Rfl: 12  •  glucose monitor monitoring kit, 1 each As Needed (e11.9)., Disp: 1 each, Rfl: 1  •  Insulin Pen Needle (NOVOFINE) 32G X 6 MM misc, Take 1 shot of insulin per day, Disp: 90 each, Rfl: 3  •  telmisartan-hydrochlorothiazide (MICARDIS HCT) 40-12.5 MG per tablet, Take 1 tablet by mouth Daily., Disp: 90 tablet,  "Rfl: 3  •  Toujeo SoloStar 300 UNIT/ML solution pen-injector injection, , Disp: , Rfl:   •  Xigduo XR 5-1000 MG tablet, TAKE 2 TABLETS DAILY, Disp: 180 tablet, Rfl: 0    Objective   VITAL SIGNS:     Vitals:    01/04/22 1043   BP: 122/70   Pulse: 86   Resp: 16   Temp: 96.9 °F (36.1 °C)   TempSrc: Temporal   SpO2: 98%   Weight: 111 kg (244 lb 11.2 oz)   Height: 175.3 cm (69.02\")   PainSc: 0-No pain     Body mass index is 36.12 kg/m².     Wt Readings from Last 5 Encounters:   01/04/22 111 kg (244 lb 11.2 oz)   12/17/21 110 kg (242 lb 6.4 oz)   10/05/21 111 kg (245 lb 1.6 oz)   07/16/21 110 kg (242 lb 9.6 oz)   06/01/21 111 kg (244 lb 11.2 oz)       PHYSICAL EXAMINATION:   GENERAL: The patient appears in good general condition, not in acute distress.   SKIN: No ecchymosis.  EYES: No jaundice.  NECK: No neck swelling or redness.  LYMPHATICS: No cervical lymphadenopathy.  CHEST: Normal respiratory effort.  Lungs clear bilaterally.  No added sounds.  Port in the left upper chest with no tenderness.  CVS: Normal S1-S2.  No murmurs.  ABDOMEN: Soft. No tenderness. No Hepatomegaly. No masses.    DIAGNOSTIC DATA:     Results from last 7 days   Lab Units 12/30/21  0805   WBC 10*3/mm3 7.60   NEUTROS ABS 10*3/mm3 4.03   HEMOGLOBIN g/dL 12.7   HEMATOCRIT % 39.6   PLATELETS 10*3/mm3 380     Results from last 7 days   Lab Units 12/30/21  0836 12/30/21  0805   SODIUM mmol/L  --  138   POTASSIUM mmol/L  --  4.1   CHLORIDE mmol/L  --  101   CO2 mmol/L  --  24.5   BUN mg/dL  --  22*   CREATININE mg/dL 0.90 0.85   CALCIUM mg/dL  --  9.5   ALBUMIN g/dL  --  3.90   BILIRUBIN mg/dL  --  0.4   ALK PHOS U/L  --  126*   ALT (SGPT) U/L  --  21   AST (SGOT) U/L  --  26   GLUCOSE mg/dL  --  91     Lab Results   Component Value Date    CEA 3.80 12/30/2021    CEA 4.06 10/05/2021    CEA 4.57 06/01/2021    CEA 4.28 12/01/2020    CEA 3.92 07/09/2020      CT chest abdomen pelvis on 12/30/2021:  Stable examination. The ablation cavity is stable and " there  is no evidence for a new liver metastasis. There is no lymphadenopathy  or evidence for new metastatic disease.    Assessment/Plan   *Metastatic colon cancer. She had metastases to the liver.   · She received 12 cycles of FOLFOX-6 and then had resection of the primary tumor along with metastasectomy and splenectomy with thermal ablation of lesions in the liver in January 2014.   · This was followed by 12 cycles of the FOLFIRI regimen completed on 08/07/2014.   · CT scans on 05/28/2015 revealed a new lesion in the liver.  Dr. Ruano referred the patient to Radiation Therapy and she received CyberKnife radiation, completed on 07/14/2015.   · The CT scan from 5/5/17 revealed a new 2.2 cm lesion in the anterior hepatic segment.  CT guided microwave ablation of liver mass performed on 6/16/17 with CT evidence on 7/20/17 of a complete response.  · FOLFIRI ×12 cycles given between 6/29/17 and 11/29/17.   · CT scan from 3/11/2019 showed no evidence of lung metastasis.  No evidence of reaccumulation of the left pleural/pericardial effusion.  · CT scan of the abdomen on 5/9/2019 showed further decrease in the size of the liver metastasis.  No new lesions were seen.  · CT scan of the chest on 10/17/2019 showed no lung metastasis.  No recurrence of the pericardial or pleural effusions.   · CT scan on 5/21/2020 showed no evidence of lung metastasis.  · Colonoscopy on 10/9/2020 revealed tubular adenomas with no high-grade dysplasia or malignancy.  · CT chest on 12/1/2020 showed no evidence of recurrence.  · CT abdomen pelvis on 6/7/2021 showed no evidence of recurrence.  · CT scan on 12/30/2021 showed no evidence of recurrence or metastasis.  · CEA was 3.80 on 12/30/2021.  · Patient is doing well with no evidence of recurrence or new metastasis.    *Patient has a port.    · Port is being maintained every 6 weeks.  · She is not having problems related to the port.    *Postsplenectomy state.    · She did not receive the  Prevnar vaccine in the past.    · She was given Prevnar 13 vaccine 9/10/2020.    · Patient developed COVID-19 infection and had to receive the monoclonal antibody.   · Patient was given Pneumovax vaccine on 10/5/2021.  · Patient received COVID-19 booster on 11/20/2021.  · There is increase in the gammaglobulin level likely related to response to the vaccine.      PLAN:    1.  We will schedule patient to return every 6 weeks for port flush.   2.  Repeat CBC CMP CEA in 3 months.   3.  Follow-up in 6 months.  1 week before her return visit, we'll obtain CT scan of the chest abdomen pelvis.  We'll obtain CBC CMP CEA.  If she does not have evidence of recurrence, we will consider extending the time between the scans to 1 year.       Marivel Brown MD  01/04/22

## 2022-01-17 DIAGNOSIS — E11.8 TYPE 2 DIABETES MELLITUS WITH COMPLICATION, WITH LONG-TERM CURRENT USE OF INSULIN: ICD-10-CM

## 2022-01-17 DIAGNOSIS — Z79.4 TYPE 2 DIABETES MELLITUS WITH COMPLICATION, WITH LONG-TERM CURRENT USE OF INSULIN: ICD-10-CM

## 2022-01-18 RX ORDER — DAPAGLIFLOZIN AND METFORMIN HYDROCHLORIDE 5; 1000 MG/1; MG/1
TABLET, FILM COATED, EXTENDED RELEASE ORAL
Qty: 180 TABLET | Refills: 3 | Status: SHIPPED | OUTPATIENT
Start: 2022-01-18 | End: 2023-01-11

## 2022-02-15 ENCOUNTER — APPOINTMENT (OUTPATIENT)
Dept: ONCOLOGY | Facility: HOSPITAL | Age: 72
End: 2022-02-15

## 2022-03-07 ENCOUNTER — PRE-PROCEDURE SCREENING (OUTPATIENT)
Dept: GASTROENTEROLOGY | Facility: CLINIC | Age: 72
End: 2022-03-07

## 2022-03-29 ENCOUNTER — INFUSION (OUTPATIENT)
Dept: ONCOLOGY | Facility: HOSPITAL | Age: 72
End: 2022-03-29

## 2022-03-29 DIAGNOSIS — Z45.2 ENCOUNTER FOR ADJUSTMENT OR MANAGEMENT OF VASCULAR ACCESS DEVICE: Primary | ICD-10-CM

## 2022-03-29 DIAGNOSIS — Z90.81 POST-SPLENECTOMY: ICD-10-CM

## 2022-03-29 DIAGNOSIS — C78.7 LIVER METASTASIS: ICD-10-CM

## 2022-03-29 DIAGNOSIS — C18.9 PRIMARY MALIGNANT NEOPLASM OF COLON: ICD-10-CM

## 2022-03-29 LAB
ALBUMIN SERPL-MCNC: 3.6 G/DL (ref 3.5–5.2)
ALBUMIN/GLOB SERPL: 0.8 G/DL (ref 1.1–2.4)
ALP SERPL-CCNC: 170 U/L (ref 38–116)
ALT SERPL W P-5'-P-CCNC: 26 U/L (ref 0–33)
ANION GAP SERPL CALCULATED.3IONS-SCNC: 8.1 MMOL/L (ref 5–15)
AST SERPL-CCNC: 24 U/L (ref 0–32)
BASOPHILS # BLD AUTO: 0.05 10*3/MM3 (ref 0–0.2)
BASOPHILS NFR BLD AUTO: 0.7 % (ref 0–1.5)
BILIRUB SERPL-MCNC: 0.4 MG/DL (ref 0.2–1.2)
BUN SERPL-MCNC: 17 MG/DL (ref 6–20)
BUN/CREAT SERPL: 20 (ref 7.3–30)
CALCIUM SPEC-SCNC: 9.2 MG/DL (ref 8.5–10.2)
CEA SERPL-MCNC: 4.16 NG/ML
CHLORIDE SERPL-SCNC: 103 MMOL/L (ref 98–107)
CO2 SERPL-SCNC: 27.9 MMOL/L (ref 22–29)
CREAT SERPL-MCNC: 0.85 MG/DL (ref 0.6–1.1)
DEPRECATED RDW RBC AUTO: 49.9 FL (ref 37–54)
EGFRCR SERPLBLD CKD-EPI 2021: 73.4 ML/MIN/1.73
EOSINOPHIL # BLD AUTO: 0.28 10*3/MM3 (ref 0–0.4)
EOSINOPHIL NFR BLD AUTO: 4.2 % (ref 0.3–6.2)
ERYTHROCYTE [DISTWIDTH] IN BLOOD BY AUTOMATED COUNT: 16 % (ref 12.3–15.4)
GLOBULIN UR ELPH-MCNC: 4.3 GM/DL (ref 1.8–3.5)
GLUCOSE SERPL-MCNC: 90 MG/DL (ref 74–124)
HCT VFR BLD AUTO: 38.7 % (ref 34–46.6)
HGB BLD-MCNC: 12.5 G/DL (ref 12–15.9)
IMM GRANULOCYTES # BLD AUTO: 0.04 10*3/MM3 (ref 0–0.05)
IMM GRANULOCYTES NFR BLD AUTO: 0.6 % (ref 0–0.5)
LYMPHOCYTES # BLD AUTO: 2.05 10*3/MM3 (ref 0.7–3.1)
LYMPHOCYTES NFR BLD AUTO: 30.6 % (ref 19.6–45.3)
MCH RBC QN AUTO: 27.8 PG (ref 26.6–33)
MCHC RBC AUTO-ENTMCNC: 32.3 G/DL (ref 31.5–35.7)
MCV RBC AUTO: 86 FL (ref 79–97)
MONOCYTES # BLD AUTO: 0.85 10*3/MM3 (ref 0.1–0.9)
MONOCYTES NFR BLD AUTO: 12.7 % (ref 5–12)
NEUTROPHILS NFR BLD AUTO: 3.43 10*3/MM3 (ref 1.7–7)
NEUTROPHILS NFR BLD AUTO: 51.2 % (ref 42.7–76)
NRBC BLD AUTO-RTO: 0 /100 WBC (ref 0–0.2)
PLATELET # BLD AUTO: 373 10*3/MM3 (ref 140–450)
PMV BLD AUTO: 8.7 FL (ref 6–12)
POTASSIUM SERPL-SCNC: 4.1 MMOL/L (ref 3.5–4.7)
PROT SERPL-MCNC: 7.9 G/DL (ref 6.3–8)
RBC # BLD AUTO: 4.5 10*6/MM3 (ref 3.77–5.28)
SODIUM SERPL-SCNC: 139 MMOL/L (ref 134–145)
WBC NRBC COR # BLD: 6.7 10*3/MM3 (ref 3.4–10.8)

## 2022-03-29 PROCEDURE — 36591 DRAW BLOOD OFF VENOUS DEVICE: CPT

## 2022-03-29 PROCEDURE — 80053 COMPREHEN METABOLIC PANEL: CPT

## 2022-03-29 PROCEDURE — 25010000002 HEPARIN LOCK FLUSH PER 10 UNITS: Performed by: INTERNAL MEDICINE

## 2022-03-29 PROCEDURE — 85025 COMPLETE CBC W/AUTO DIFF WBC: CPT

## 2022-03-29 PROCEDURE — 82378 CARCINOEMBRYONIC ANTIGEN: CPT | Performed by: INTERNAL MEDICINE

## 2022-03-29 RX ORDER — HEPARIN SODIUM (PORCINE) LOCK FLUSH IV SOLN 100 UNIT/ML 100 UNIT/ML
500 SOLUTION INTRAVENOUS AS NEEDED
Status: DISCONTINUED | OUTPATIENT
Start: 2022-03-29 | End: 2022-03-29 | Stop reason: HOSPADM

## 2022-03-29 RX ORDER — HEPARIN SODIUM (PORCINE) LOCK FLUSH IV SOLN 100 UNIT/ML 100 UNIT/ML
500 SOLUTION INTRAVENOUS AS NEEDED
Status: CANCELLED | OUTPATIENT
Start: 2022-03-29

## 2022-03-29 RX ORDER — SODIUM CHLORIDE 0.9 % (FLUSH) 0.9 %
10 SYRINGE (ML) INJECTION AS NEEDED
Status: DISCONTINUED | OUTPATIENT
Start: 2022-03-29 | End: 2022-03-29 | Stop reason: HOSPADM

## 2022-03-29 RX ORDER — SODIUM CHLORIDE 0.9 % (FLUSH) 0.9 %
10 SYRINGE (ML) INJECTION AS NEEDED
Status: CANCELLED | OUTPATIENT
Start: 2022-03-29

## 2022-03-29 RX ADMIN — Medication 10 ML: at 10:06

## 2022-03-29 RX ADMIN — Medication 500 UNITS: at 10:06

## 2022-04-22 ENCOUNTER — DOCUMENTATION (OUTPATIENT)
Dept: ENDOCRINOLOGY | Age: 72
End: 2022-04-22

## 2022-04-22 NOTE — PROGRESS NOTES
Benefits Investigation Summary    Prescription: Renewal     Dispensing pharmacy: [facility]    Copay amount: TOUJEO $186, XIGDUO 6/22    Pharmacy Payor/Plan: JACQUELYN  BIN: [id number]  PCN: [id number]  GROUP: [id number]    Prior Auth and Med Assistance notes:

## 2022-04-28 ENCOUNTER — OFFICE VISIT (OUTPATIENT)
Dept: ENDOCRINOLOGY | Age: 72
End: 2022-04-28

## 2022-04-28 VITALS
HEART RATE: 89 BPM | DIASTOLIC BLOOD PRESSURE: 60 MMHG | OXYGEN SATURATION: 98 % | WEIGHT: 250.4 LBS | HEIGHT: 69 IN | BODY MASS INDEX: 37.09 KG/M2 | SYSTOLIC BLOOD PRESSURE: 130 MMHG

## 2022-04-28 DIAGNOSIS — Z79.4 TYPE 2 DIABETES MELLITUS WITH PROLIFERATIVE RETINOPATHY, WITH LONG-TERM CURRENT USE OF INSULIN, MACULAR EDEMA PRESENCE UNSPECIFIED, UNSPECIFIED LATERALITY, UNSPECIFIED PROLIFERATIVE RETINOPATHY*: Primary | ICD-10-CM

## 2022-04-28 DIAGNOSIS — E78.2 MIXED HYPERLIPIDEMIA: ICD-10-CM

## 2022-04-28 DIAGNOSIS — E11.3599 TYPE 2 DIABETES MELLITUS WITH PROLIFERATIVE RETINOPATHY, WITH LONG-TERM CURRENT USE OF INSULIN, MACULAR EDEMA PRESENCE UNSPECIFIED, UNSPECIFIED LATERALITY, UNSPECIFIED PROLIFERATIVE RETINOPATHY*: Primary | ICD-10-CM

## 2022-04-28 PROCEDURE — 99214 OFFICE O/P EST MOD 30 MIN: CPT | Performed by: NURSE PRACTITIONER

## 2022-04-28 RX ORDER — INSULIN GLARGINE 300 U/ML
45 INJECTION, SOLUTION SUBCUTANEOUS NIGHTLY
Qty: 45 ML | Refills: 0 | Status: SHIPPED | OUTPATIENT
Start: 2022-04-28 | End: 2023-01-23

## 2022-04-28 RX ORDER — LANCETS
EACH MISCELLANEOUS
Qty: 200 EACH | Refills: 4 | Status: SHIPPED | OUTPATIENT
Start: 2022-04-28 | End: 2022-08-24

## 2022-04-28 NOTE — PROGRESS NOTES
"Chief Complaint  Diabetes    Subjective          Sontyrell Del Toro presents to [facility] ENDOCRINOLOGY  History of Present Illness     I have reviewed PMH, allergies and medications UTD at this visit     Had foot surgery one month again     Saw pcp lat week- working on weight loss- started new diet, walking more and doing moderate exercises      Lab Results   Component Value Date    HGBA1C 6.5 (H) 04/21/2022     Type 2 dm    Diagnosed about 10 + years ago.   Today in clinic pt reports being on toujeo 45 units at bed time, xigduo XR - 1 tab twice daily.   Avg bg - <120  Checks BG - 3 times a day  Last eye exam - March 2022  Dm nephropathy - x  Dm neuropathy -x   CAD -x  CVA -x  Episodes of hypoglycemia - x  On ARB and statin        Objective   Vital Signs:   /60   Pulse 89   Ht 175.3 cm (69\")   Wt 114 kg (250 lb 6.4 oz)   SpO2 98%   BMI 36.98 kg/m²     Physical Exam  Vitals reviewed.   Constitutional:       General: She is not in acute distress.  HENT:      Head: Normocephalic and atraumatic.   Cardiovascular:      Rate and Rhythm: Normal rate and regular rhythm.   Pulmonary:      Effort: Pulmonary effort is normal. No respiratory distress.   Musculoskeletal:         General: No signs of injury. Normal range of motion.      Cervical back: Normal range of motion and neck supple.   Skin:     General: Skin is warm and dry.   Neurological:      Mental Status: She is alert and oriented to person, place, and time. Mental status is at baseline.   Psychiatric:         Mood and Affect: Mood normal.         Behavior: Behavior normal.         Thought Content: Thought content normal.         Judgment: Judgment normal.        Result Review :   The following data was reviewed by: EMMA Hickman on 04/28/2022:  Common labs    Common Labsle 3/29/22 3/29/22 4/15/22 4/15/22 4/15/22 4/15/22 4/21/22 4/21/22 4/21/22 4/21/22    1008 1008 0808 0808 0808 0808 0859 0859 0859 0859   Glucose  90     73    "   Glucose    68 (A)         BUN  17  19   17      Creatinine  0.85 84.3 0.76   0.86      Sodium  139  141   140      Potassium  4.1  4.2   4.3      Chloride  103  103   100      Calcium  9.2  9.4   9.6      Albumin  3.60  4.1         Total Bilirubin  0.4  0.4         Alkaline Phosphatase  170 (A)  181 (A)         AST (SGOT)  24  27         ALT (SGPT)  26  21         WBC 6.70     7.54       Hemoglobin 12.5     13.8       Hematocrit 38.7     45.0       Platelets 373     442 (A)       Total Cholesterol         146    Triglycerides         59    HDL Cholesterol         78    LDL Cholesterol          56    Hemoglobin A1C     6.5 (A)   6.5 (A)     Microalbumin, Urine   8.2       36.9   (A) Abnormal value       Comments are available for some flowsheets but are not being displayed.                     Assessment and Plan    Diagnoses and all orders for this visit:    1. Type 2 diabetes mellitus with proliferative retinopathy, with long-term current use of insulin, macular edema presence unspecified, unspecified laterality, unspecified proliferative retinop* (C* (HCC) (Primary)  -     Accu-Chek FastClix Lancets misc; Check up to 3 times a day on insulin tx, poor control, hypoglycemia. Dx: E 11.9  Dispense: 200 each; Refill: 4  -     glucose blood test strip; accucheck guide me e11.9. up to 3x/day  Dispense: 100 each; Refill: 5  -     Toujeo SoloStar 300 UNIT/ML solution pen-injector injection; Inject 45 Units under the skin into the appropriate area as directed Every Night.  Dispense: 45 mL; Refill: 0  -     Hemoglobin A1c; Future  -     Comprehensive Metabolic Panel; Future  -     Lipid Panel; Future  -     Microalbumin / Creatinine Urine Ratio - Urine, Clean Catch; Future    2. Mixed hyperlipidemia               Follow Up   No follow-ups on file.     Continue exercise routine along with improved eating habits  Continue diabetic regimen as above, A1c at goal  Continue statin, LDL at goal    Patient was given instructions  and counseling regarding her condition or for health maintenance advice. Please see specific information pulled into the AVS if appropriate.     EMMA Hickman

## 2022-04-28 NOTE — PROGRESS NOTES
Had foot surgery one month again    Saw pcp lat week- working on weight loss- started new diet, walking more and doing moderate exercises     Change toujeo to 40u daily     Last eye exam march 2022

## 2022-05-10 ENCOUNTER — INFUSION (OUTPATIENT)
Dept: ONCOLOGY | Facility: HOSPITAL | Age: 72
End: 2022-05-10

## 2022-05-10 DIAGNOSIS — Z45.2 ENCOUNTER FOR ADJUSTMENT OR MANAGEMENT OF VASCULAR ACCESS DEVICE: Primary | ICD-10-CM

## 2022-05-10 PROCEDURE — 96523 IRRIG DRUG DELIVERY DEVICE: CPT

## 2022-05-10 PROCEDURE — 25010000002 HEPARIN LOCK FLUSH PER 10 UNITS: Performed by: INTERNAL MEDICINE

## 2022-05-10 RX ORDER — HEPARIN SODIUM (PORCINE) LOCK FLUSH IV SOLN 100 UNIT/ML 100 UNIT/ML
500 SOLUTION INTRAVENOUS AS NEEDED
Status: CANCELLED | OUTPATIENT
Start: 2022-05-10

## 2022-05-10 RX ORDER — SODIUM CHLORIDE 0.9 % (FLUSH) 0.9 %
10 SYRINGE (ML) INJECTION AS NEEDED
Status: DISCONTINUED | OUTPATIENT
Start: 2022-05-10 | End: 2022-05-10 | Stop reason: HOSPADM

## 2022-05-10 RX ORDER — SODIUM CHLORIDE 0.9 % (FLUSH) 0.9 %
10 SYRINGE (ML) INJECTION AS NEEDED
Status: CANCELLED | OUTPATIENT
Start: 2022-05-10

## 2022-05-10 RX ORDER — HEPARIN SODIUM (PORCINE) LOCK FLUSH IV SOLN 100 UNIT/ML 100 UNIT/ML
500 SOLUTION INTRAVENOUS AS NEEDED
Status: DISCONTINUED | OUTPATIENT
Start: 2022-05-10 | End: 2022-05-10 | Stop reason: HOSPADM

## 2022-05-10 RX ADMIN — Medication 500 UNITS: at 10:13

## 2022-05-31 ENCOUNTER — TELEPHONE (OUTPATIENT)
Dept: ENDOCRINOLOGY | Age: 72
End: 2022-05-31

## 2022-06-01 DIAGNOSIS — E79.0 HYPERURICEMIA: ICD-10-CM

## 2022-06-01 DIAGNOSIS — I10 ESSENTIAL HYPERTENSION: ICD-10-CM

## 2022-06-01 RX ORDER — ALLOPURINOL 100 MG/1
100 TABLET ORAL DAILY
Qty: 90 TABLET | Refills: 0 | Status: SHIPPED | OUTPATIENT
Start: 2022-06-01 | End: 2022-11-03 | Stop reason: SDUPTHER

## 2022-06-01 RX ORDER — TELMISARTAN AND HYDROCHLORTHIAZIDE 40; 12.5 MG/1; MG/1
1 TABLET ORAL DAILY
Qty: 90 TABLET | Refills: 0 | Status: SHIPPED | OUTPATIENT
Start: 2022-06-01 | End: 2022-09-13 | Stop reason: SDUPTHER

## 2022-06-21 ENCOUNTER — INFUSION (OUTPATIENT)
Dept: ONCOLOGY | Facility: HOSPITAL | Age: 72
End: 2022-06-21

## 2022-06-21 DIAGNOSIS — Z45.2 ENCOUNTER FOR ADJUSTMENT OR MANAGEMENT OF VASCULAR ACCESS DEVICE: Primary | ICD-10-CM

## 2022-06-21 PROCEDURE — 96523 IRRIG DRUG DELIVERY DEVICE: CPT

## 2022-06-21 PROCEDURE — 25010000002 HEPARIN LOCK FLUSH PER 10 UNITS: Performed by: INTERNAL MEDICINE

## 2022-06-21 RX ORDER — HEPARIN SODIUM (PORCINE) LOCK FLUSH IV SOLN 100 UNIT/ML 100 UNIT/ML
500 SOLUTION INTRAVENOUS AS NEEDED
OUTPATIENT
Start: 2022-06-21

## 2022-06-21 RX ORDER — SODIUM CHLORIDE 0.9 % (FLUSH) 0.9 %
10 SYRINGE (ML) INJECTION AS NEEDED
OUTPATIENT
Start: 2022-06-21

## 2022-06-21 RX ORDER — HEPARIN SODIUM (PORCINE) LOCK FLUSH IV SOLN 100 UNIT/ML 100 UNIT/ML
500 SOLUTION INTRAVENOUS AS NEEDED
Status: DISCONTINUED | OUTPATIENT
Start: 2022-06-21 | End: 2022-06-21 | Stop reason: HOSPADM

## 2022-06-21 RX ORDER — SODIUM CHLORIDE 0.9 % (FLUSH) 0.9 %
10 SYRINGE (ML) INJECTION AS NEEDED
Status: DISCONTINUED | OUTPATIENT
Start: 2022-06-21 | End: 2022-06-21 | Stop reason: HOSPADM

## 2022-06-21 RX ADMIN — Medication 10 ML: at 09:45

## 2022-06-21 RX ADMIN — Medication 500 UNITS: at 09:45

## 2022-06-30 ENCOUNTER — TELEPHONE (OUTPATIENT)
Dept: ENDOCRINOLOGY | Age: 72
End: 2022-06-30

## 2022-06-30 DIAGNOSIS — Z79.4 CONTROLLED TYPE 2 DIABETES MELLITUS WITHOUT COMPLICATION, WITH LONG-TERM CURRENT USE OF INSULIN: ICD-10-CM

## 2022-06-30 DIAGNOSIS — E11.9 CONTROLLED TYPE 2 DIABETES MELLITUS WITHOUT COMPLICATION, WITH LONG-TERM CURRENT USE OF INSULIN: ICD-10-CM

## 2022-07-21 ENCOUNTER — APPOINTMENT (OUTPATIENT)
Dept: ONCOLOGY | Facility: HOSPITAL | Age: 72
End: 2022-07-21

## 2022-07-21 ENCOUNTER — INFUSION (OUTPATIENT)
Dept: ONCOLOGY | Facility: HOSPITAL | Age: 72
End: 2022-07-21

## 2022-07-21 ENCOUNTER — HOSPITAL ENCOUNTER (OUTPATIENT)
Dept: PET IMAGING | Facility: HOSPITAL | Age: 72
Discharge: HOME | End: 2022-07-21
Admitting: INTERNAL MEDICINE

## 2022-07-21 DIAGNOSIS — Z90.81 POST-SPLENECTOMY: ICD-10-CM

## 2022-07-21 DIAGNOSIS — C18.9 PRIMARY MALIGNANT NEOPLASM OF COLON: ICD-10-CM

## 2022-07-21 DIAGNOSIS — C78.7 LIVER METASTASIS: ICD-10-CM

## 2022-07-21 LAB
ALBUMIN SERPL-MCNC: 3.9 G/DL (ref 3.5–5.2)
ALBUMIN/GLOB SERPL: 0.9 G/DL (ref 1.1–2.4)
ALP SERPL-CCNC: 156 U/L (ref 38–116)
ALT SERPL W P-5'-P-CCNC: 19 U/L (ref 0–33)
ANION GAP SERPL CALCULATED.3IONS-SCNC: 13.7 MMOL/L (ref 5–15)
AST SERPL-CCNC: 20 U/L (ref 0–32)
BASOPHILS # BLD AUTO: 0.05 10*3/MM3 (ref 0–0.2)
BASOPHILS NFR BLD AUTO: 0.6 % (ref 0–1.5)
BILIRUB SERPL-MCNC: 0.4 MG/DL (ref 0.2–1.2)
BUN SERPL-MCNC: 18 MG/DL (ref 6–20)
BUN/CREAT SERPL: 21.7 (ref 7.3–30)
CALCIUM SPEC-SCNC: 9.4 MG/DL (ref 8.5–10.2)
CEA SERPL-MCNC: 4.04 NG/ML
CHLORIDE SERPL-SCNC: 102 MMOL/L (ref 98–107)
CO2 SERPL-SCNC: 25.3 MMOL/L (ref 22–29)
CREAT BLDA-MCNC: 0.7 MG/DL (ref 0.6–1.3)
CREAT SERPL-MCNC: 0.83 MG/DL (ref 0.6–1.1)
DEPRECATED RDW RBC AUTO: 52.5 FL (ref 37–54)
EGFRCR SERPLBLD CKD-EPI 2021: 75.5 ML/MIN/1.73
EOSINOPHIL # BLD AUTO: 0.29 10*3/MM3 (ref 0–0.4)
EOSINOPHIL NFR BLD AUTO: 3.4 % (ref 0.3–6.2)
ERYTHROCYTE [DISTWIDTH] IN BLOOD BY AUTOMATED COUNT: 16.7 % (ref 12.3–15.4)
GLOBULIN UR ELPH-MCNC: 4.2 GM/DL (ref 1.8–3.5)
GLUCOSE SERPL-MCNC: 80 MG/DL (ref 74–124)
HCT VFR BLD AUTO: 40 % (ref 34–46.6)
HGB BLD-MCNC: 12.9 G/DL (ref 12–15.9)
IMM GRANULOCYTES # BLD AUTO: 0.03 10*3/MM3 (ref 0–0.05)
IMM GRANULOCYTES NFR BLD AUTO: 0.4 % (ref 0–0.5)
LYMPHOCYTES # BLD AUTO: 2.25 10*3/MM3 (ref 0.7–3.1)
LYMPHOCYTES NFR BLD AUTO: 26.5 % (ref 19.6–45.3)
MCH RBC QN AUTO: 27.7 PG (ref 26.6–33)
MCHC RBC AUTO-ENTMCNC: 32.3 G/DL (ref 31.5–35.7)
MCV RBC AUTO: 86 FL (ref 79–97)
MONOCYTES # BLD AUTO: 1.04 10*3/MM3 (ref 0.1–0.9)
MONOCYTES NFR BLD AUTO: 12.3 % (ref 5–12)
NEUTROPHILS NFR BLD AUTO: 4.82 10*3/MM3 (ref 1.7–7)
NEUTROPHILS NFR BLD AUTO: 56.8 % (ref 42.7–76)
NRBC BLD AUTO-RTO: 0 /100 WBC (ref 0–0.2)
PLATELET # BLD AUTO: 394 10*3/MM3 (ref 140–450)
PMV BLD AUTO: 9 FL (ref 6–12)
POTASSIUM SERPL-SCNC: 3.9 MMOL/L (ref 3.5–4.7)
PROT SERPL-MCNC: 8.1 G/DL (ref 6.3–8)
RBC # BLD AUTO: 4.65 10*6/MM3 (ref 3.77–5.28)
SODIUM SERPL-SCNC: 141 MMOL/L (ref 134–145)
WBC NRBC COR # BLD: 8.48 10*3/MM3 (ref 3.4–10.8)

## 2022-07-21 PROCEDURE — 85025 COMPLETE CBC W/AUTO DIFF WBC: CPT

## 2022-07-21 PROCEDURE — 25010000002 IOPAMIDOL 61 % SOLUTION: Performed by: INTERNAL MEDICINE

## 2022-07-21 PROCEDURE — 71260 CT THORAX DX C+: CPT

## 2022-07-21 PROCEDURE — 82378 CARCINOEMBRYONIC ANTIGEN: CPT | Performed by: INTERNAL MEDICINE

## 2022-07-21 PROCEDURE — 80053 COMPREHEN METABOLIC PANEL: CPT

## 2022-07-21 PROCEDURE — 82565 ASSAY OF CREATININE: CPT

## 2022-07-21 RX ADMIN — IOPAMIDOL 75 ML: 612 INJECTION, SOLUTION INTRAVENOUS at 08:28

## 2022-07-28 ENCOUNTER — APPOINTMENT (OUTPATIENT)
Dept: LAB | Facility: HOSPITAL | Age: 72
End: 2022-07-28

## 2022-07-28 ENCOUNTER — OFFICE VISIT (OUTPATIENT)
Dept: ONCOLOGY | Facility: CLINIC | Age: 72
End: 2022-07-28

## 2022-07-28 VITALS
HEART RATE: 71 BPM | HEIGHT: 69 IN | OXYGEN SATURATION: 97 % | SYSTOLIC BLOOD PRESSURE: 129 MMHG | BODY MASS INDEX: 37.22 KG/M2 | DIASTOLIC BLOOD PRESSURE: 70 MMHG | TEMPERATURE: 97.8 F | WEIGHT: 251.3 LBS | RESPIRATION RATE: 18 BRPM

## 2022-07-28 DIAGNOSIS — C78.7 LIVER METASTASIS: ICD-10-CM

## 2022-07-28 DIAGNOSIS — Z45.2 ENCOUNTER FOR INFUSAPORT CENTRAL VENOUS CATHETER REMOVAL: ICD-10-CM

## 2022-07-28 DIAGNOSIS — C18.9 PRIMARY MALIGNANT NEOPLASM OF COLON: Primary | ICD-10-CM

## 2022-07-28 DIAGNOSIS — Z90.81 POST-SPLENECTOMY: ICD-10-CM

## 2022-07-28 PROCEDURE — 99214 OFFICE O/P EST MOD 30 MIN: CPT | Performed by: INTERNAL MEDICINE

## 2022-07-28 NOTE — PROGRESS NOTES
Subjective     CHIEF COMPLAINT:      Chief Complaint   Patient presents with   • Follow-up     Discuss CT Scan       HISTORY OF PRESENT ILLNESS:     Feli Del Toro is a 71 y.o. female patient who returns today for follow up on her colon cancer.  She returns today for follow-up reporting no new symptoms.  She is not having any abdominal pain.  No blood in the stool.  She is not having chest pain or shortness of breath.  She is not having pain at the site of the port.  No arm swelling.    ROS:  Pertinent ROS is in the HPI.     Past medical, surgical, social and family history were reviewed.     MEDICATIONS:    Current Outpatient Medications:   •  Accu-Chek FastClix Lancets misc, Check up to 3 times a day on insulin tx, poor control, hypoglycemia. Dx: E 11.9, Disp: 200 each, Rfl: 4  •  allopurinol (ZYLOPRIM) 100 MG tablet, Take 1 tablet by mouth Daily., Disp: 90 tablet, Rfl: 0  •  aspirin 81 MG tablet, Take 81 mg by mouth daily., Disp: , Rfl:   •  atorvastatin (LIPITOR) 20 MG tablet, Take 1 tablet by mouth Daily., Disp: 90 tablet, Rfl: 3  •  calcium carbonate (TUMS) 500 MG chewable tablet, Chew 2 tablets As Needed., Disp: , Rfl:   •  fluticasone (FLONASE) 50 MCG/ACT nasal spray, 1 spray into the nostril(s) as directed by provider Daily., Disp: , Rfl:   •  glucose blood test strip, Use as instructed once daily, Disp: 100 each, Rfl: 12  •  glucose blood test strip, accucheck guide me e11.9. up to 3x/day, Disp: 100 each, Rfl: 5  •  glucose monitor monitoring kit, 1 each As Needed (e11.9)., Disp: 1 each, Rfl: 1  •  Insulin Pen Needle (NovoFine) 32G X 6 MM misc, Take 1 shot of insulin per day, Disp: 100 each, Rfl: 3  •  telmisartan-hydrochlorothiazide (MICARDIS HCT) 40-12.5 MG per tablet, Take 1 tablet by mouth Daily., Disp: 90 tablet, Rfl: 0  •  Toujeo SoloStar 300 UNIT/ML solution pen-injector injection, Inject 45 Units under the skin into the appropriate area as directed Every Night., Disp: 45 mL, Rfl: 0  •  Xigduo XR  "5-1000 MG tablet, TAKE 2 TABLETS DAILY, Disp: 180 tablet, Rfl: 3  Objective     VITAL SIGNS:     Vitals:    07/28/22 1016   BP: 129/70   Pulse: 71   Resp: 18   Temp: 97.8 °F (36.6 °C)   TempSrc: Temporal   SpO2: 97%   Weight: 114 kg (251 lb 4.8 oz)   Height: 175.3 cm (69.02\")   PainSc: 0-No pain     Body mass index is 37.09 kg/m².     Wt Readings from Last 5 Encounters:   07/28/22 114 kg (251 lb 4.8 oz)   04/28/22 114 kg (250 lb 6.4 oz)   01/04/22 111 kg (244 lb 11.2 oz)   12/17/21 110 kg (242 lb 6.4 oz)   10/05/21 111 kg (245 lb 1.6 oz)     PHYSICAL EXAMINATION:   GENERAL: The patient appears in good general condition, not in acute distress.   SKIN: No ecchymosis.  EYES: No jaundice. No pallor.  NECK: No left neck swelling or tenderness.  LYMPHATICS: No cervical lymphadenopathy.  CHEST: Normal respiratory effort.  Lungs clear bilaterally.  No added sounds.  CVS: Normal S1-S2.  No murmurs.  ABDOMEN: Soft. No tenderness. No Hepatomegaly. No masses.    DIAGNOSTIC DATA:     Component      Latest Ref Rng & Units 7/21/2022   WBC      3.40 - 10.80 10*3/mm3 8.48   RBC      3.77 - 5.28 10*6/mm3 4.65   Hemoglobin      12.0 - 15.9 g/dL 12.9   Hematocrit      34.0 - 46.6 % 40.0   MCV      79.0 - 97.0 fL 86.0   MCH      26.6 - 33.0 pg 27.7   MCHC      31.5 - 35.7 g/dL 32.3   RDW      12.3 - 15.4 % 16.7 (H)   RDW-SD      37.0 - 54.0 fl 52.5   MPV      6.0 - 12.0 fL 9.0   Platelets      140 - 450 10*3/mm3 394   Neutrophil Rel %      42.7 - 76.0 % 56.8   Lymphocyte Rel %      19.6 - 45.3 % 26.5   Monocyte Rel %      5.0 - 12.0 % 12.3 (H)   Eosinophil Rel %      0.3 - 6.2 % 3.4   Basophil Rel %      0.0 - 1.5 % 0.6   Immature Granulocyte Rel %      0.0 - 0.5 % 0.4   Neutrophils Absolute      1.70 - 7.00 10*3/mm3 4.82   Lymphocytes Absolute      0.70 - 3.10 10*3/mm3 2.25   Monocytes Absolute      0.10 - 0.90 10*3/mm3 1.04 (H)   Eosinophils Absolute      0.00 - 0.40 10*3/mm3 0.29   Basophils Absolute      0.00 - 0.20 10*3/mm3 0.05 "   Immature Grans, Absolute      0.00 - 0.05 10*3/mm3 0.03     Component      Latest Ref Rng & Units 7/21/2022   Glucose      74 - 124 mg/dL 80   BUN      6 - 20 mg/dL 18   Creatinine      0.60 - 1.10 mg/dL 0.83   Sodium      134 - 145 mmol/L 141   Potassium      3.5 - 4.7 mmol/L 3.9   Chloride      98 - 107 mmol/L 102   CO2      22.0 - 29.0 mmol/L 25.3   Calcium      8.5 - 10.2 mg/dL 9.4   Total Protein      6.3 - 8.0 g/dL 8.1 (H)   Albumin      3.50 - 5.20 g/dL 3.90   ALT (SGPT)      0 - 33 U/L 19   AST (SGOT)      0 - 32 U/L 20   Alkaline Phosphatase      38 - 116 U/L 156 (H)   Total Bilirubin      0.2 - 1.2 mg/dL 0.4   Globulin      1.8 - 3.5 gm/dL 4.2 (H)     Lab Results   Component Value Date    CEA 4.04 07/21/2022    CEA 4.16 03/29/2022    CEA 3.80 12/30/2021    CEA 4.06 10/05/2021    CEA 4.57 06/01/2021      CT three-phase liver abdomen pelvis on 6/9/2022:  1. Postoperative change at the splenic flexure without evidence of recurrent disease.   2. Stable postoperative and posttreatment change in the liver without evidence of active hepatic metastatic disease.   3. Moderate colonic stool burden without obstruction.   4. Cholelithiasis.     CT chest on 7/21/2022:  No acute intrathoracic abnormality or suspicious pulmonary  lesion has developed. Stable CT chest similar to 12/30/2021.    Assessment & Plan    *Metastatic colon cancer. She had metastases to the liver.   · She received 12 cycles of FOLFOX-6 and then had resection of the primary tumor along with metastasectomy and splenectomy with thermal ablation of lesions in the liver in January 2014.   · This was followed by 12 cycles of the FOLFIRI regimen completed on 08/07/2014.   · CT scans on 05/28/2015 revealed a new lesion in the liver.  Dr. Ruano referred the patient to Radiation Therapy and she received CyberKnife radiation, completed on 07/14/2015.   · The CT scan from 5/5/17 revealed a new 2.2 cm lesion in the anterior hepatic segment.  CT guided  microwave ablation of liver mass performed on 6/16/17 with CT evidence on 7/20/17 of a complete response.  · FOLFIRI ×12 cycles given between 6/29/17 and 11/29/17.   · CT scan from 3/11/2019 showed no evidence of lung metastasis.  No evidence of reaccumulation of the left pleural/pericardial effusion.  · CT scan of the abdomen on 5/9/2019 showed further decrease in the size of the liver metastasis.  No new lesions were seen.  · CT scan of the chest on 10/17/2019 showed no lung metastasis.  No recurrence of the pericardial or pleural effusions.   · CT scan on 5/21/2020 showed no evidence of lung metastasis.  · Colonoscopy on 10/9/2020 revealed tubular adenomas with no high-grade dysplasia or malignancy.  · CT chest on 12/1/2020 showed no evidence of recurrence.  · CT abdomen pelvis on 6/7/2021 showed no evidence of recurrence.  · CT scan on 12/30/2021 showed no evidence of recurrence or metastasis.  · CT scan on 7/21/2022 showed no evidence of recurrence or metastasis.  · CEA was 4.04 on 7/21/2022.  · She is doing well with no evidence of recurrence or metastasis at this point.  · I reassured the patient about the results.  I recommended repeating CT scans in 1 year.    *Patient has a port.    · Port is being maintained every 6 weeks.  · The port was placed by Dr. Silvestre in 2013.  · Since she had multiple CT scans with no evidence of recurrence or new metastasis and since she had not needed treatment for several years, I recommended removal of the port.  · I explained that the risk of keeping the port (risk for infection and thrombosis) outweigh the benefit.    *Postsplenectomy state.    · She did not receive the Prevnar vaccine in the past.    · She was given Prevnar 13 vaccine 9/10/2020.    · Patient developed COVID-19 infection and had to receive the monoclonal antibody.   · Patient was given Pneumovax vaccine on 10/5/2021.  · She is up-to-date with the COVID-19 vaccines and she is going to receive the second  shingles vaccine and influenza vaccine in the near future.    PLAN:    1.  I will refer the patient back to Dr. Silvestre for removal of the port.   2.  I will see the patient in follow-up in 6 months with CBC CMP and CEA.   3.  Plan to repeat CT scans in 1 year.       Marivel Brown MD  07/28/22

## 2022-08-01 ENCOUNTER — PREP FOR SURGERY (OUTPATIENT)
Dept: OTHER | Facility: HOSPITAL | Age: 72
End: 2022-08-01

## 2022-08-01 DIAGNOSIS — C18.9 PRIMARY MALIGNANT NEOPLASM OF COLON: ICD-10-CM

## 2022-08-01 DIAGNOSIS — E78.2 MIXED HYPERLIPIDEMIA: Primary | ICD-10-CM

## 2022-08-01 DIAGNOSIS — E78.2 MIXED HYPERLIPIDEMIA: ICD-10-CM

## 2022-08-03 RX ORDER — ATORVASTATIN CALCIUM 20 MG/1
TABLET, FILM COATED ORAL
Qty: 90 TABLET | Refills: 3 | Status: SHIPPED | OUTPATIENT
Start: 2022-08-03

## 2022-08-08 ENCOUNTER — TELEPHONE (OUTPATIENT)
Dept: SURGERY | Facility: CLINIC | Age: 72
End: 2022-08-08

## 2022-08-17 ENCOUNTER — APPOINTMENT (OUTPATIENT)
Dept: PREADMISSION TESTING | Facility: HOSPITAL | Age: 72
End: 2022-08-17

## 2022-08-24 ENCOUNTER — PRE-ADMISSION TESTING (OUTPATIENT)
Dept: PREADMISSION TESTING | Facility: HOSPITAL | Age: 72
End: 2022-08-24

## 2022-08-24 VITALS
BODY MASS INDEX: 37.18 KG/M2 | HEART RATE: 77 BPM | OXYGEN SATURATION: 97 % | DIASTOLIC BLOOD PRESSURE: 78 MMHG | TEMPERATURE: 98 F | HEIGHT: 69 IN | SYSTOLIC BLOOD PRESSURE: 125 MMHG | WEIGHT: 251 LBS | RESPIRATION RATE: 18 BRPM

## 2022-08-24 DIAGNOSIS — C18.9 PRIMARY MALIGNANT NEOPLASM OF COLON: ICD-10-CM

## 2022-08-24 LAB
ANION GAP SERPL CALCULATED.3IONS-SCNC: 10.2 MMOL/L (ref 5–15)
BUN SERPL-MCNC: 15 MG/DL (ref 8–23)
BUN/CREAT SERPL: 17.4 (ref 7–25)
CALCIUM SPEC-SCNC: 9.5 MG/DL (ref 8.6–10.5)
CHLORIDE SERPL-SCNC: 103 MMOL/L (ref 98–107)
CO2 SERPL-SCNC: 26.8 MMOL/L (ref 22–29)
CREAT SERPL-MCNC: 0.86 MG/DL (ref 0.57–1)
DEPRECATED RDW RBC AUTO: 45.1 FL (ref 37–54)
EGFRCR SERPLBLD CKD-EPI 2021: 72.3 ML/MIN/1.73
ERYTHROCYTE [DISTWIDTH] IN BLOOD BY AUTOMATED COUNT: 14.1 % (ref 12.3–15.4)
GLUCOSE SERPL-MCNC: 78 MG/DL (ref 65–99)
HCT VFR BLD AUTO: 41.4 % (ref 34–46.6)
HGB BLD-MCNC: 13.3 G/DL (ref 12–15.9)
MCH RBC QN AUTO: 27.9 PG (ref 26.6–33)
MCHC RBC AUTO-ENTMCNC: 32.1 G/DL (ref 31.5–35.7)
MCV RBC AUTO: 86.8 FL (ref 79–97)
PLATELET # BLD AUTO: 389 10*3/MM3 (ref 140–450)
PMV BLD AUTO: 9.5 FL (ref 6–12)
POTASSIUM SERPL-SCNC: 3.9 MMOL/L (ref 3.5–5.2)
QT INTERVAL: 383 MS
RBC # BLD AUTO: 4.77 10*6/MM3 (ref 3.77–5.28)
SARS-COV-2 ORF1AB RESP QL NAA+PROBE: NOT DETECTED
SODIUM SERPL-SCNC: 140 MMOL/L (ref 136–145)
WBC NRBC COR # BLD: 7.69 10*3/MM3 (ref 3.4–10.8)

## 2022-08-24 PROCEDURE — 93005 ELECTROCARDIOGRAM TRACING: CPT

## 2022-08-24 PROCEDURE — U0004 COV-19 TEST NON-CDC HGH THRU: HCPCS

## 2022-08-24 PROCEDURE — 85027 COMPLETE CBC AUTOMATED: CPT

## 2022-08-24 PROCEDURE — 80048 BASIC METABOLIC PNL TOTAL CA: CPT

## 2022-08-24 PROCEDURE — 93010 ELECTROCARDIOGRAM REPORT: CPT | Performed by: INTERNAL MEDICINE

## 2022-08-24 PROCEDURE — 36415 COLL VENOUS BLD VENIPUNCTURE: CPT

## 2022-08-24 PROCEDURE — C9803 HOPD COVID-19 SPEC COLLECT: HCPCS

## 2022-08-26 ENCOUNTER — ANESTHESIA EVENT (OUTPATIENT)
Dept: PERIOP | Facility: HOSPITAL | Age: 72
End: 2022-08-26

## 2022-08-26 ENCOUNTER — ANESTHESIA (OUTPATIENT)
Dept: PERIOP | Facility: HOSPITAL | Age: 72
End: 2022-08-26

## 2022-08-26 ENCOUNTER — HOSPITAL ENCOUNTER (OUTPATIENT)
Facility: HOSPITAL | Age: 72
Setting detail: HOSPITAL OUTPATIENT SURGERY
Discharge: HOME | End: 2022-08-26
Attending: SURGERY | Admitting: SURGERY

## 2022-08-26 VITALS
TEMPERATURE: 98.4 F | OXYGEN SATURATION: 97 % | RESPIRATION RATE: 16 BRPM | SYSTOLIC BLOOD PRESSURE: 147 MMHG | HEART RATE: 88 BPM | DIASTOLIC BLOOD PRESSURE: 74 MMHG

## 2022-08-26 DIAGNOSIS — Z85.038 HISTORY OF COLON CANCER: Primary | ICD-10-CM

## 2022-08-26 LAB
GLUCOSE BLDC GLUCOMTR-MCNC: 110 MG/DL (ref 70–130)
GLUCOSE BLDC GLUCOMTR-MCNC: 62 MG/DL (ref 70–130)
GLUCOSE BLDC GLUCOMTR-MCNC: 66 MG/DL (ref 70–130)
GLUCOSE BLDC GLUCOMTR-MCNC: 76 MG/DL (ref 70–130)

## 2022-08-26 PROCEDURE — S0260 H&P FOR SURGERY: HCPCS | Performed by: SURGERY

## 2022-08-26 PROCEDURE — 82962 GLUCOSE BLOOD TEST: CPT

## 2022-08-26 PROCEDURE — 36590 REMOVAL TUNNELED CV CATH: CPT | Performed by: SURGERY

## 2022-08-26 PROCEDURE — 25010000002 PROPOFOL 10 MG/ML EMULSION: Performed by: NURSE ANESTHETIST, CERTIFIED REGISTERED

## 2022-08-26 RX ORDER — FLUMAZENIL 0.1 MG/ML
0.2 INJECTION INTRAVENOUS AS NEEDED
Status: DISCONTINUED | OUTPATIENT
Start: 2022-08-26 | End: 2022-08-26 | Stop reason: HOSPADM

## 2022-08-26 RX ORDER — MAGNESIUM HYDROXIDE 1200 MG/15ML
LIQUID ORAL AS NEEDED
Status: DISCONTINUED | OUTPATIENT
Start: 2022-08-26 | End: 2022-08-26 | Stop reason: HOSPADM

## 2022-08-26 RX ORDER — MIDAZOLAM HYDROCHLORIDE 1 MG/ML
0.5 INJECTION INTRAMUSCULAR; INTRAVENOUS
Status: DISCONTINUED | OUTPATIENT
Start: 2022-08-26 | End: 2022-08-26 | Stop reason: HOSPADM

## 2022-08-26 RX ORDER — FENTANYL CITRATE 50 UG/ML
50 INJECTION, SOLUTION INTRAMUSCULAR; INTRAVENOUS
Status: DISCONTINUED | OUTPATIENT
Start: 2022-08-26 | End: 2022-08-26 | Stop reason: HOSPADM

## 2022-08-26 RX ORDER — LABETALOL HYDROCHLORIDE 5 MG/ML
5 INJECTION, SOLUTION INTRAVENOUS
Status: DISCONTINUED | OUTPATIENT
Start: 2022-08-26 | End: 2022-08-26 | Stop reason: HOSPADM

## 2022-08-26 RX ORDER — HYDROCODONE BITARTRATE AND ACETAMINOPHEN 5; 325 MG/1; MG/1
TABLET ORAL
Qty: 12 TABLET | Refills: 0 | Status: SHIPPED | OUTPATIENT
Start: 2022-08-26

## 2022-08-26 RX ORDER — NALOXONE HCL 0.4 MG/ML
0.2 VIAL (ML) INJECTION AS NEEDED
Status: DISCONTINUED | OUTPATIENT
Start: 2022-08-26 | End: 2022-08-26 | Stop reason: HOSPADM

## 2022-08-26 RX ORDER — ONDANSETRON 2 MG/ML
4 INJECTION INTRAMUSCULAR; INTRAVENOUS ONCE AS NEEDED
Status: DISCONTINUED | OUTPATIENT
Start: 2022-08-26 | End: 2022-08-26 | Stop reason: HOSPADM

## 2022-08-26 RX ORDER — DIPHENHYDRAMINE HYDROCHLORIDE 50 MG/ML
12.5 INJECTION INTRAMUSCULAR; INTRAVENOUS
Status: DISCONTINUED | OUTPATIENT
Start: 2022-08-26 | End: 2022-08-26 | Stop reason: HOSPADM

## 2022-08-26 RX ORDER — EPHEDRINE SULFATE 50 MG/ML
5 INJECTION, SOLUTION INTRAVENOUS ONCE AS NEEDED
Status: DISCONTINUED | OUTPATIENT
Start: 2022-08-26 | End: 2022-08-26 | Stop reason: HOSPADM

## 2022-08-26 RX ORDER — SODIUM CHLORIDE, SODIUM LACTATE, POTASSIUM CHLORIDE, CALCIUM CHLORIDE 600; 310; 30; 20 MG/100ML; MG/100ML; MG/100ML; MG/100ML
9 INJECTION, SOLUTION INTRAVENOUS CONTINUOUS
Status: DISCONTINUED | OUTPATIENT
Start: 2022-08-26 | End: 2022-08-26 | Stop reason: HOSPADM

## 2022-08-26 RX ORDER — LIDOCAINE HYDROCHLORIDE 20 MG/ML
INJECTION, SOLUTION INFILTRATION; PERINEURAL AS NEEDED
Status: DISCONTINUED | OUTPATIENT
Start: 2022-08-26 | End: 2022-08-26 | Stop reason: SURG

## 2022-08-26 RX ORDER — BUPIVACAINE HYDROCHLORIDE AND EPINEPHRINE 5; 5 MG/ML; UG/ML
INJECTION, SOLUTION EPIDURAL; INTRACAUDAL; PERINEURAL AS NEEDED
Status: DISCONTINUED | OUTPATIENT
Start: 2022-08-26 | End: 2022-08-26 | Stop reason: HOSPADM

## 2022-08-26 RX ORDER — SODIUM CHLORIDE 0.9 % (FLUSH) 0.9 %
3-10 SYRINGE (ML) INJECTION AS NEEDED
Status: DISCONTINUED | OUTPATIENT
Start: 2022-08-26 | End: 2022-08-26 | Stop reason: HOSPADM

## 2022-08-26 RX ORDER — FAMOTIDINE 10 MG/ML
20 INJECTION, SOLUTION INTRAVENOUS ONCE
Status: COMPLETED | OUTPATIENT
Start: 2022-08-26 | End: 2022-08-26

## 2022-08-26 RX ORDER — LIDOCAINE HYDROCHLORIDE 10 MG/ML
0.5 INJECTION, SOLUTION EPIDURAL; INFILTRATION; INTRACAUDAL; PERINEURAL ONCE AS NEEDED
Status: DISCONTINUED | OUTPATIENT
Start: 2022-08-26 | End: 2022-08-26 | Stop reason: HOSPADM

## 2022-08-26 RX ORDER — DIPHENHYDRAMINE HCL 25 MG
25 CAPSULE ORAL
Status: DISCONTINUED | OUTPATIENT
Start: 2022-08-26 | End: 2022-08-26 | Stop reason: HOSPADM

## 2022-08-26 RX ORDER — PROPOFOL 10 MG/ML
VIAL (ML) INTRAVENOUS CONTINUOUS PRN
Status: DISCONTINUED | OUTPATIENT
Start: 2022-08-26 | End: 2022-08-26 | Stop reason: SURG

## 2022-08-26 RX ORDER — SODIUM CHLORIDE 0.9 % (FLUSH) 0.9 %
3 SYRINGE (ML) INJECTION EVERY 12 HOURS SCHEDULED
Status: DISCONTINUED | OUTPATIENT
Start: 2022-08-26 | End: 2022-08-26 | Stop reason: HOSPADM

## 2022-08-26 RX ORDER — ONDANSETRON 4 MG/1
4 TABLET, FILM COATED ORAL EVERY 6 HOURS PRN
Qty: 10 TABLET | Refills: 1 | Status: SHIPPED | OUTPATIENT
Start: 2022-08-26

## 2022-08-26 RX ORDER — HYDROCODONE BITARTRATE AND ACETAMINOPHEN 7.5; 325 MG/1; MG/1
1 TABLET ORAL ONCE AS NEEDED
Status: DISCONTINUED | OUTPATIENT
Start: 2022-08-26 | End: 2022-08-26 | Stop reason: HOSPADM

## 2022-08-26 RX ORDER — HYDRALAZINE HYDROCHLORIDE 20 MG/ML
5 INJECTION INTRAMUSCULAR; INTRAVENOUS
Status: DISCONTINUED | OUTPATIENT
Start: 2022-08-26 | End: 2022-08-26 | Stop reason: HOSPADM

## 2022-08-26 RX ADMIN — SODIUM CHLORIDE, POTASSIUM CHLORIDE, SODIUM LACTATE AND CALCIUM CHLORIDE 9 ML/HR: 600; 310; 30; 20 INJECTION, SOLUTION INTRAVENOUS at 10:41

## 2022-08-26 RX ADMIN — PROPOFOL 80 MCG/KG/MIN: 10 INJECTION, EMULSION INTRAVENOUS at 12:47

## 2022-08-26 RX ADMIN — LIDOCAINE HYDROCHLORIDE 100 MG: 20 INJECTION, SOLUTION INFILTRATION; PERINEURAL at 12:47

## 2022-08-26 RX ADMIN — Medication 3 ML: at 12:03

## 2022-08-26 RX ADMIN — FAMOTIDINE 20 MG: 10 INJECTION INTRAVENOUS at 10:46

## 2022-08-26 NOTE — H&P
CC: Completed use of port    HPI: 71-year-old lady with metastatic colon cancer who has remained free of evidence of recurrent disease for some time now and no longer uses her port.  Presents for port removal.    PMH, PSH, MEDS AND ALLERGIES reviewed and reconciled in  EPIC    PHYSICAL EXAM:  • Constitutional:  awake, alert, no acute distress  • VS: afebrile, VSS  • Respiratory:  normal inspiratory effort  • Cardiovascular: regular rate  • Gastrointestinal: Soft    ROS:  relevant systems negative other than any presenting complaints    ASSESSMENT/PLAN:    71-year-old lady presents for port removal.    Ken Silvestre M.D.

## 2022-08-26 NOTE — OP NOTE
PREOPERATIVE DIAGNOSIS:  Completed use of port    POSTOPERATIVE DIAGNOSIS (FINDINGS):  Same    PROCEDURE:  Left subclavian 8 Kisw[place]ili PowerPort removal    SURGEON:  Ken Silvestre MD    ASSISTANT:  Tatyana Rothman    ANESTHESIA:  MAC    EBL:  Minimal    SPECIMEN(S):  none    DESCRIPTION:  In supine position under sedation prepped and draped usual sterile manner.  Half percent Marcaine with epinephrine infiltrated locally.  Small transverse incision made and port identified and excised with electrocautery intact.  Hemostasis achieved with electrocautery and skin closed with 3-0 Vicryl deep dermal and 5-0 Vicryl subcuticular followed by Exofin.  Tolerated well.    Ken Silvestre M.D.

## 2022-08-26 NOTE — ANESTHESIA POSTPROCEDURE EVALUATION
Patient: Feli Del Toro    Procedure Summary     Date: 08/26/22 Room / Location:  [facility] /  [facility]    Anesthesia Start: 1243 Anesthesia Stop: 1320    Procedure: REMOVAL VENOUS ACCESS DEVICE (N/A Chest) Diagnosis:       Primary malignant neoplasm of colon (HCC)      (Primary malignant neoplasm of colon (HCC) [C18.9])    Surgeons: Ken Silvestre MD Provider: Rashad Huerta DO    Anesthesia Type: MAC ASA Status: 3          Anesthesia Type: MAC    Vitals  Vitals Value Taken Time   /68 08/26/22 1330   Temp 36.9 °C (98.4 °F) 08/26/22 1345   Pulse 85 08/26/22 1330   Resp 16 08/26/22 1330   SpO2 95 % 08/26/22 1330           Post Anesthesia Care and Evaluation    Patient location during evaluation: bedside  Patient participation: complete - patient participated  Level of consciousness: awake and alert  Pain management: adequate    Airway patency: patent  Anesthetic complications: No anesthetic complications  PONV Status: controlled  Cardiovascular status: acceptable and hemodynamically stable  Respiratory status: acceptable, spontaneous ventilation and nonlabored ventilation  Hydration status: acceptable    Comments: /68 (BP Location: Left arm, Patient Position: Lying)   Pulse 85   Temp 36.9 °C (98.4 °F) (Oral)   Resp 16   LMP  (LMP Unknown)   SpO2 95%

## 2022-08-26 NOTE — DISCHARGE INSTRUCTIONS
Dr. Ken Silvestre  [address]  [place] [id number] [phone]    Discharge Instructions for Port Removal    Go home, rest and take it easy today; however, you should get up and move about several times today to reduce the risk of developing a clot in your legs.      You may experience some dizziness or memory loss from the anesthesia.  This may last for the next 24 hours.  Someone should plan on staying with you for the first 24 hours for your safety.    Do not make any important legal decisions or sign any legal papers for the next 24 hours.      Eat and drink lightly today.  Start off with liquids, jello, soup, crackers or other bland foods at first. You may advance your diet tomorrow as tolerated as long as you do not experience any nausea or vomiting.     If skin glue (Dermabond) was used, your incisions are protected and covered.  The invisible glue will dissolve on its own as your incision heals. If you have dressings, you may remove your outer dressings in 3 days.  The white tapes called steri-strips should stay in place.  They will fall off on their own in 1-2 weeks.  Do not worry if they come off sooner.     You may notice some bleeding/drainage on your outer dressings. A little bloody drainage is normal. If the bleeding/drainage is such that the bandage cannot absorb it, remove the dressing, apply clean gauze and apply firm pressure for a full 15 minutes.  If the bleeding continues, please call me.    You may shower tomorrow allowing water to run over the incisions; however do not scrub the incisions.  No tub baths until your incisions are completely healed.    You have received a prescription for a narcotic pain medicine, as you may have some pain/discomfort following surgery.   You will not be totally pain free, but your pain medicine should make the pain tolerable.  Please take your pain medicine as prescribed and always take your pills with food to prevent nausea. If you are  having severe pain that cannot be controlled by the pain medicine, please contact me.  If the pain is such that narcotic pain medicine is not required, you may take Tylenol or Ibuprofen as directed unless indicated otherwise.      You have also received a prescription for an anti-nausea medicine.  Please take this as prescribed for any nausea or vomiting.  Nausea could be a result of the anesthesia or a result of the narcotic pain medicine.  If you experience severe nausea and vomiting that cannot be controlled by the nausea medicine, please call me.      No driving for 24 hours and for as long as you are taking your prescription pain medicine.      Please call  the office at [phone] to schedule a follow-up in about 1 week.      Remember to contact me for any of the following:    Fever> 101 degrees  Severe pain that cannot be controlled by taking your pain pills  Severe nausea or vomiting that cannot be controlled by taking your nausea medicine  Significant bleeding from your incision  Drainage that has a bad smell or is yellow or green in appearance  Any other questions or concerns no

## 2022-08-26 NOTE — ANESTHESIA PREPROCEDURE EVALUATION
Anesthesia Evaluation     Patient summary reviewed   history of anesthetic complications: prolonged sedation  NPO Solid Status: > 8 hours  NPO Liquid Status: > 2 hours           Airway   Mallampati: II  TM distance: >3 FB  Neck ROM: full  No difficulty expected  Dental - normal exam     Pulmonary     breath sounds clear to auscultation  (-) shortness of breath, recent URI, not a smoker  Cardiovascular   Exercise tolerance: good (4-7 METS)    ECG reviewed  Rhythm: regular  Rate: normal    (+) hypertension, pericardial effusion (resolved following window 2018, TTE confirmed), hyperlipidemia,   (-) past MI, dysrhythmias, angina      Neuro/Psych  (+) numbness,    (-) seizures, CVA  GI/Hepatic/Renal/Endo    (+) morbid obesity, GERD,  liver disease, diabetes mellitus type 2,     Musculoskeletal     (-) neck stiffness  Abdominal   (+) obese,    Substance History      OB/GYN          Other      history of cancer (colon w/ liver met, s/p resection 2014) active                    Anesthesia Plan    ASA 3     MAC     (MAC anesthesia discussed with patient and/or patient representative. Risks (including but not limited to intra-op awareness), benefits, and alternatives were discussed. Understanding was voiced with an agreement to proceed with a MAC technique and General as a backup option. )    Anesthetic plan, risks, benefits, and alternatives have been provided, discussed and informed consent has been obtained with: patient.    Plan discussed with CRNA.

## 2022-09-08 ENCOUNTER — LAB (OUTPATIENT)
Dept: ENDOCRINOLOGY | Age: 72
End: 2022-09-08

## 2022-09-08 DIAGNOSIS — E11.3599 TYPE 2 DIABETES MELLITUS WITH PROLIFERATIVE RETINOPATHY, WITH LONG-TERM CURRENT USE OF INSULIN, MACULAR EDEMA PRESENCE UNSPECIFIED, UNSPECIFIED LATERALITY, UNSPECIFIED PROLIFERATIVE RETINOPATHY*: ICD-10-CM

## 2022-09-08 DIAGNOSIS — Z79.4 TYPE 2 DIABETES MELLITUS WITH PROLIFERATIVE RETINOPATHY, WITH LONG-TERM CURRENT USE OF INSULIN, MACULAR EDEMA PRESENCE UNSPECIFIED, UNSPECIFIED LATERALITY, UNSPECIFIED PROLIFERATIVE RETINOPATHY*: ICD-10-CM

## 2022-09-09 LAB
ALBUMIN SERPL-MCNC: 4 G/DL (ref 3.5–5.2)
ALBUMIN/CREAT UR: 73 MG/G CREAT (ref 0–29)
ALBUMIN/GLOB SERPL: 1.2 G/DL
ALP SERPL-CCNC: 168 U/L (ref 39–117)
ALT SERPL-CCNC: 23 U/L (ref 1–33)
AST SERPL-CCNC: 28 U/L (ref 1–32)
BILIRUB SERPL-MCNC: 0.3 MG/DL (ref 0–1.2)
BUN SERPL-MCNC: 17 MG/DL (ref 8–23)
BUN/CREAT SERPL: 17.3 (ref 7–25)
CALCIUM SERPL-MCNC: 9.4 MG/DL (ref 8.6–10.5)
CHLORIDE SERPL-SCNC: 103 MMOL/L (ref 98–107)
CHOLEST SERPL-MCNC: 153 MG/DL (ref 0–200)
CO2 SERPL-SCNC: 29.4 MMOL/L (ref 22–29)
CREAT SERPL-MCNC: 0.98 MG/DL (ref 0.57–1)
CREAT UR-MCNC: 73.9 MG/DL
EGFRCR-CYS SERPLBLD CKD-EPI 2021: 61.8 ML/MIN/1.73
GLOBULIN SER CALC-MCNC: 3.3 GM/DL
GLUCOSE SERPL-MCNC: 79 MG/DL (ref 65–99)
HBA1C MFR BLD: 6.5 % (ref 4.8–5.6)
HDLC SERPL-MCNC: 79 MG/DL (ref 40–60)
IMP & REVIEW OF LAB RESULTS: NORMAL
LDLC SERPL CALC-MCNC: 63 MG/DL (ref 0–100)
MICROALBUMIN UR-MCNC: 54.1 UG/ML
POTASSIUM SERPL-SCNC: 4.5 MMOL/L (ref 3.5–5.2)
PROT SERPL-MCNC: 7.3 G/DL (ref 6–8.5)
SODIUM SERPL-SCNC: 142 MMOL/L (ref 136–145)
TRIGL SERPL-MCNC: 53 MG/DL (ref 0–150)
VLDLC SERPL CALC-MCNC: 11 MG/DL (ref 5–40)

## 2022-09-13 DIAGNOSIS — I10 ESSENTIAL HYPERTENSION: ICD-10-CM

## 2022-09-13 RX ORDER — TELMISARTAN AND HYDROCHLORTHIAZIDE 40; 12.5 MG/1; MG/1
1 TABLET ORAL DAILY
Qty: 90 TABLET | Refills: 0 | Status: SHIPPED | OUTPATIENT
Start: 2022-09-13 | End: 2022-11-28

## 2022-09-22 ENCOUNTER — OFFICE VISIT (OUTPATIENT)
Dept: ENDOCRINOLOGY | Age: 72
End: 2022-09-22

## 2022-09-22 VITALS
OXYGEN SATURATION: 96 % | BODY MASS INDEX: 37.41 KG/M2 | DIASTOLIC BLOOD PRESSURE: 70 MMHG | SYSTOLIC BLOOD PRESSURE: 132 MMHG | HEART RATE: 85 BPM | WEIGHT: 252.6 LBS | TEMPERATURE: 97.7 F | HEIGHT: 69 IN

## 2022-09-22 DIAGNOSIS — E66.9 OBESITY (BMI 35.0-39.9 WITHOUT COMORBIDITY): ICD-10-CM

## 2022-09-22 DIAGNOSIS — Z79.4 LONG-TERM INSULIN USE: ICD-10-CM

## 2022-09-22 DIAGNOSIS — E78.5 HYPERLIPIDEMIA ASSOCIATED WITH TYPE 2 DIABETES MELLITUS: ICD-10-CM

## 2022-09-22 DIAGNOSIS — E11.69 HYPERLIPIDEMIA ASSOCIATED WITH TYPE 2 DIABETES MELLITUS: ICD-10-CM

## 2022-09-22 DIAGNOSIS — E55.9 VITAMIN D DEFICIENCY: ICD-10-CM

## 2022-09-22 DIAGNOSIS — Z79.4 TYPE 2 DIABETES MELLITUS WITH PROLIFERATIVE RETINOPATHY, WITH LONG-TERM CURRENT USE OF INSULIN, MACULAR EDEMA PRESENCE UNSPECIFIED, UNSPECIFIED LATERALITY, UNSPECIFIED PROLIFERATIVE RETINOPATHY*: Primary | ICD-10-CM

## 2022-09-22 DIAGNOSIS — E11.3599 TYPE 2 DIABETES MELLITUS WITH PROLIFERATIVE RETINOPATHY, WITH LONG-TERM CURRENT USE OF INSULIN, MACULAR EDEMA PRESENCE UNSPECIFIED, UNSPECIFIED LATERALITY, UNSPECIFIED PROLIFERATIVE RETINOPATHY*: Primary | ICD-10-CM

## 2022-09-22 PROCEDURE — 99214 OFFICE O/P EST MOD 30 MIN: CPT | Performed by: INTERNAL MEDICINE

## 2022-09-22 RX ORDER — LANCETS
EACH MISCELLANEOUS
COMMUNITY
Start: 2022-09-13

## 2022-09-22 RX ORDER — CHLORHEXIDINE GLUCONATE 0.12 MG/ML
RINSE ORAL
COMMUNITY
Start: 2022-09-13

## 2022-09-22 NOTE — PROGRESS NOTES
"Chief Complaint  Chief Complaint   Patient presents with   • Diabetes   • Med Refill     Telmisartan and Allopurinol       Subjective          History of Present Illness    Feli Del Toro 71 y.o. presents with Type 2 dm as a F/u patient.       Type 2 dm - Diagnosed about 10 + years ago.   Today in clinic pt reports being on toujeo 45 units at bed time, xigduo XR - 1 tab twice daily.   Avg bg - around 110 - 120  Checks BG - 3 times a day  Sensor - x  Dm retinopathy -x ,Last eye exam -   Dm nephropathy - x  Dm neuropathy - x,Dm neuropathy meds -   CAD -x  CVA -x  Episodes of hypoglycemia - x  Pt is physically active. weight has been stable.   Pt tries to follow DM diet for most part.   On ARB     Hyperlipidemia - on Lipitor 20 mg oral daily.      Reviewed primary care physician's/consulting physician documentation and lab results         I have reviewed the patient's allergies, medicines, past medical hx, family hx and social hx in detail.    Objective   Vital Signs:   /70 (BP Location: Right arm, Patient Position: Sitting, Cuff Size: Large Adult)   Pulse 85   Temp 97.7 °F (36.5 °C) (Temporal)   Ht 175.3 cm (69.02\")   Wt 115 kg (252 lb 9.6 oz)   LMP  (LMP Unknown)   SpO2 96%   BMI 37.29 kg/m²   Physical Exam   General appearance - no distress  Eyes- anicteric sclera  Ear nose and throat-external ears and nose normal.    Respiratory-normal chest on inspection.  No respiratory distress noted.  Skin-no rashes.  Neuro-alert and oriented x3            Result Review :   The following data was reviewed by: Rusty Rehman MD on 09/22/2022:  Lab on 09/08/2022   Component Date Value Ref Range Status   • Creatinine, Urine 09/08/2022 73.9  Not Estab. mg/dL Final   • Microalbumin, Urine 09/08/2022 54.1  Not Estab. ug/mL Final   • Microalbumin/Creatinine Ratio 09/08/2022 73 (A) 0 - 29 mg/g creat Final    Comment:                        Normal:                0 -  29                         Moderately increased: 30 - " 300                         Severely increased:       >300     • Total Cholesterol 09/08/2022 153  0 - 200 mg/dL Final    Comment: Cholesterol Reference Ranges  (U.S. Department of Health and Human Services ATP III  Classifications)  Desirable          <200 mg/dL  Borderline High    200-239 mg/dL  High Risk          >240 mg/dL  Triglyceride Reference Ranges  (U.S. Department of Health and Human Services ATP III  Classifications)  Normal           <150 mg/dL  Borderline High  150-199 mg/dL  High             200-499 mg/dL  Very High        >500 mg/dL  HDL Reference Ranges  (U.S. Department of Health and Human Services ATP III  Classifications)  Low     <40 mg/dl (major risk factor for CHD)  High    >60 mg/dl ('negative' risk factor for CHD)  LDL Reference Ranges  (U.S. Department of Health and Human Services ATP III  Classifications)  Optimal          <100 mg/dL  Near Optimal     100-129 mg/dL  Borderline High  130-159 mg/dL  High             160-189 mg/dL  Very High        >189 mg/dL     • Triglycerides 09/08/2022 53  0 - 150 mg/dL Final   • HDL Cholesterol 09/08/2022 79 (A) 40 - 60 mg/dL Final   • VLDL Cholesterol [place] 09/08/2022 11  5 - 40 mg/dL Final   • LDL Chol Calc (NIH) 09/08/2022 63  0 - 100 mg/dL Final   • Glucose 09/08/2022 79  65 - 99 mg/dL Final   • BUN 09/08/2022 17  8 - 23 mg/dL Final   • Creatinine 09/08/2022 0.98  0.57 - 1.00 mg/dL Final   • EGFR Result 09/08/2022 61.8  >60.0 mL/min/1.73 Final    Comment: National Kidney Foundation and American Society of  Nephrology (ASN) Task Force recommended calculation based  on the Chronic Kidney Disease Epidemiology Collaboration  (CKD-EPI) equation refit without adjustment for race.  The GFR formula is only valid for adults with stable renal  function between ages 18 and 70.     • BUN/Creatinine Ratio 09/08/2022 17.3  7.0 - 25.0 Final   • Sodium 09/08/2022 142  136 - 145 mmol/L Final   • Potassium 09/08/2022 4.5  3.5 - 5.2 mmol/L Final   • Chloride 09/08/2022  103  98 - 107 mmol/L Final   • Total CO2 09/08/2022 29.4 (A) 22.0 - 29.0 mmol/L Final   • Calcium 09/08/2022 9.4  8.6 - 10.5 mg/dL Final   • Total Protein 09/08/2022 7.3  6.0 - 8.5 g/dL Final   • Albumin 09/08/2022 4.00  3.50 - 5.20 g/dL Final   • Globulin 09/08/2022 3.3  gm/dL Final   • A/G Ratio 09/08/2022 1.2  g/dL Final   • Total Bilirubin 09/08/2022 0.3  0.0 - 1.2 mg/dL Final   • Alkaline Phosphatase 09/08/2022 168 (A) 39 - 117 U/L Final   • AST (SGOT) 09/08/2022 28  1 - 32 U/L Final   • ALT (SGPT) 09/08/2022 23  1 - 33 U/L Final   • Hemoglobin A1C 09/08/2022 6.50 (A) 4.80 - 5.60 % Final    Comment: Hemoglobin A1C Ranges:  Increased Risk for Diabetes  5.7% to 6.4%  Diabetes                     >= 6.5%  Diabetic Goal                < 7.0%     • Interpretation 09/08/2022 Note   Final    Supplemental report is available.     Data reviewed: PCP and endocrine notes       Results Review:    I reviewed the patient's new clinical results.     Assessment and Plan    Problem List Items Addressed This Visit        Other    Vitamin D deficiency    Relevant Orders    Vitamin D 25 Hydroxy      Other Visit Diagnoses     Type 2 diabetes mellitus with proliferative retinopathy, with long-term current use of insulin, macular edema presence unspecified, unspecified laterality, unspecified proliferative retinop* (C* (HCC)    -  Primary    Relevant Orders    Basic Metabolic Panel    Hemoglobin A1c    Lipid Panel    TSH    T4, Free    Vitamin B12 & Folate    Vitamin D 25 Hydroxy    Hyperlipidemia associated with type 2 diabetes mellitus (HCC)        Relevant Orders    Basic Metabolic Panel    Hemoglobin A1c    Lipid Panel    TSH    T4, Free    Vitamin B12 & Folate    Vitamin D 25 Hydroxy    Obesity (BMI 35.0-39.9 without comorbidity)        Relevant Orders    Basic Metabolic Panel    Hemoglobin A1c    Lipid Panel    TSH    T4, Free    Vitamin B12 & Folate    Vitamin D 25 Hydroxy    Long-term insulin use (HCC)        Relevant Orders  "   Basic Metabolic Panel    Hemoglobin A1c    Lipid Panel    TSH    T4, Free    Vitamin B12 & Folate    Vitamin D 25 Hydroxy        Type 2 diabetes mellitus-uncontrolled with hyperglycemia  Continue Toujeo 45 units at bedtime  Continue Xigduo.    Patient was worried about the weight gain, explained to her about options of the GLP-1 analogs.  She would like to work on her diet and exercise before she considers these medications.    Hyperlipidemia  Continue Lipitor.      Interpreted the blood work-up/imaging results performed by the primary care/consulting physician -    Refills sent to pharmacy    Follow Up     Patient was given instructions and counseling regarding her condition or for health maintenance advice. Please see specific information pulled into the AVS if appropriate.       Thank you for asking me to see your patient, Feli Del Toro in consultation.         Rusty Rehman MD  09/22/22      EMR Dragon / transcription disclaimer:     \"Dictated utilizing Dragon dictation\".         "

## 2022-11-03 ENCOUNTER — TELEPHONE (OUTPATIENT)
Dept: ENDOCRINOLOGY | Age: 72
End: 2022-11-03

## 2022-11-03 DIAGNOSIS — E79.0 HYPERURICEMIA: ICD-10-CM

## 2022-11-03 RX ORDER — ALLOPURINOL 100 MG/1
100 TABLET ORAL DAILY
Qty: 90 TABLET | Refills: 0 | Status: SHIPPED | OUTPATIENT
Start: 2022-11-03

## 2022-11-03 NOTE — TELEPHONE ENCOUNTER
PT IS REQUESTING A PRESCRIPTION ON allopurinol (ZYLOPRIM) 100 MG tablet [310]    SENT TO   [facility] - [place], [place] - [address] [place] [address] - [phone]  - [phone] FX   [address] [place] [id number]   Phone:  [phone]  Fax:  [phone]

## 2022-11-28 DIAGNOSIS — I10 ESSENTIAL HYPERTENSION: ICD-10-CM

## 2022-11-28 RX ORDER — TELMISARTAN AND HYDROCHLORTHIAZIDE 40; 12.5 MG/1; MG/1
TABLET ORAL
Qty: 90 TABLET | Refills: 3 | Status: SHIPPED | OUTPATIENT
Start: 2022-11-28

## 2022-12-16 ENCOUNTER — HOSPITAL ENCOUNTER (EMERGENCY)
Facility: HOSPITAL | Age: 72
Discharge: HOME | End: 2022-12-16
Attending: EMERGENCY MEDICINE | Admitting: EMERGENCY MEDICINE

## 2022-12-16 VITALS
HEART RATE: 96 BPM | HEIGHT: 69 IN | DIASTOLIC BLOOD PRESSURE: 95 MMHG | RESPIRATION RATE: 16 BRPM | SYSTOLIC BLOOD PRESSURE: 166 MMHG | WEIGHT: 240 LBS | TEMPERATURE: 98.4 F | BODY MASS INDEX: 35.55 KG/M2 | OXYGEN SATURATION: 98 %

## 2022-12-16 DIAGNOSIS — M54.10 RADICULAR PAIN OF LEFT LOWER EXTREMITY: Primary | ICD-10-CM

## 2022-12-16 PROCEDURE — 99283 EMERGENCY DEPT VISIT LOW MDM: CPT

## 2022-12-16 RX ORDER — METHYLPREDNISOLONE 4 MG/1
TABLET ORAL
Qty: 1 EACH | Refills: 0 | Status: SHIPPED | OUTPATIENT
Start: 2022-12-16

## 2022-12-16 RX ORDER — CYCLOBENZAPRINE HCL 10 MG
5 TABLET ORAL 2 TIMES DAILY PRN
Qty: 10 TABLET | Refills: 0 | Status: SHIPPED | OUTPATIENT
Start: 2022-12-16

## 2022-12-16 NOTE — ED PROVIDER NOTES
MD ATTESTATION NOTE    The TAO and I have discussed this patient's history, physical exam, and treatment plan.  I have reviewed the documentation and personally had a face to face interaction with the patient. I affirm the documentation and agree with the treatment and plan.  The attached note describes my personal findings.      I provided a substantive portion of the care of the patient.  I personally performed the physical exam in its entirety, and below are my findings.  For this patient encounter, the patient wore surgical mask, I wore full protective PPE including N95 and eye protection    Brief HPI: 72-year-old female complains of left lower back pain that radiates to her lateral left leg that began yesterday.  Patient states she has not had this before.  The patient denies any injury.  The patient denies fevers, chills, bowel or bladder incontinence, saddle paresthesia, weakness or numbness.    General : 72-year-old patient is awake alert and oriented  HEENT: NCAT  Back: Point tenderness over left SI joint  Ext: No acute abnormalities with full range of motion without pain  Skin: No rash  Neuro: Cranial nerves II through XII grossly intact as tested.  No acute lateralizing deficits.  Neurovascular intact in bilateral feet.  Negative straight leg raise test bilaterally  Psych: Normal mood and affect      Plan: We will treat the patient with steroids and muscle relaxers.  I advised her to watch her blood sugar closely on the steroids and to follow-up with her PCP.  The patient understands and agrees with the plan.       Talha Pena MD  12/16/22 1207

## 2022-12-16 NOTE — ED NOTES
Patient ambulatory to wheelchair on discharge. Discharge instructions provided. Patient verbalized understanding of discharge instructions and medication administration. Patient is Aox4 with no acute distress noted. VSS   to take patient home

## 2022-12-16 NOTE — ED PROVIDER NOTES
EMERGENCY DEPARTMENT ENCOUNTER    Room Number:  06/06  Date seen:  12/17/2022  Time seen: 08:41 EST  PCP: Paola Onofre MD  Historian: patient      HPI:  Chief Complaint: left hip,thigh pain    A complete HPI/ROS/PMH/PSH/SH/FH are unobtainable due to: none    Context: Feli Del Toro is a 72 y.o. female who presents to the ED for evaluation of left hip and thigh pain that started yesterday and is pretty constant, varies from mild to severe and is made worse by certain positions of the left leg in certain positions or weightbearing.  She states it started in the left lateral thigh and now radiates around to the left posterior buttock.  She denies any falls or injuries as well as any history of sciatica or radiculopathy.  Denies any saddle paresthesias lower extremity weakness or bowel or bladder dysfunction.  She denies any back pain.  She has had no leg swelling and no rash, no other complaints at this time.        PAST MEDICAL HISTORY  Active Ambulatory Problems     Diagnosis Date Noted   • Hyperlipidemia 12/31/2015   • Essential hypertension 12/31/2015   • Allergic rhinitis due to pollen 12/31/2015   • Vitamin D deficiency 03/31/2016   • Morbid obesity due to excess calories (HCC) 03/31/2016   • Encounter for adjustment or management of vascular access device 04/13/2016   • Primary malignant neoplasm of colon (HCC) 05/26/2016   • Breast lesion 05/26/2016   • Nonproliferative diabetic retinopathy (HCC) 12/01/2016   • Type 2 diabetes mellitus with microalbuminuria (HCC) 12/01/2016   • Osteomyelitis of toe of left foot (HCC) 12/30/2016   • Type 2 diabetes mellitus with peripheral neuropathy (HCC) 12/30/2016   • Type 2 diabetes mellitus with complication, with long-term current use of insulin (HCC) 12/30/2016   • Obesity (BMI 30-39.9) 12/31/2016   • Liver metastasis (HCC) 06/21/2017   • Elevated liver enzymes 07/26/2017   • 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   • History of colon cancer 09/11/2020   • Colon polyps  11/19/2020     Resolved Ambulatory Problems     Diagnosis Date Noted   • Iron deficiency anemia 05/26/2016   • Peripheral neuropathy due to chemotherapy (HCC) 05/26/2016   • Sepsis (HCC) 12/30/2016   • Chemotherapy induced nausea and vomiting 08/24/2017   • Shortness of breath 03/07/2018   • Pericardial effusion 03/07/2018   • Pleural effusion 03/07/2018     Past Medical History:   Diagnosis Date   • Acid reflux    • Anemia    • Anesthesia complication    • Cancer (HCC) 06/04/2013   • COVID 2021   • Diabetes mellitus (HCC)    • Elevated cholesterol    • History of constipation    • History of transfusion    • Hypertension    • Metastatic colon cancer to liver (HCC)    • Neuropathy    • Retinopathy    • Type 2 diabetes mellitus (HCC)          PAST SURGICAL HISTORY  Past Surgical History:   Procedure Laterality Date   • ABDOMINAL SURGERY      ablation    • AMPUTATION DIGIT Left 01/01/2017    Procedure: LEFT SECOND TOE AMPUTATION;  Surgeon: Farzad Nichols MD;  Location: [facility] OR;  Service:    • AMPUTATION OF REPLICATED TOES      right distal second toe    • CARDIAC CATHETERIZATION N/A 03/08/2018    Procedure: Pericardiocentesis;  Surgeon: Andrew Dobbins MD;  Location: [facility] INVASIVE LOCATION;  Service:    • CATARACT EXTRACTION     • COLECTOMY PARTIAL / TOTAL  01/14/2014   • COLONOSCOPY  2013   • COLONOSCOPY N/A 10/09/2020    Procedure: COLONOSCOPY INTO CECUM AND TI WITH COLD SNARE POLYPECTOMIES, COLD BX POLYPECTOMY;  Surgeon: Dylon Bennett MD;  Location: [facility] END[facility]COPY;  Service: Gastroenterology;  Laterality: N/A;  PRE:  HX OF COLON CANCER  POST:  POLYPS, FAIR-POOR PREP, INTERNAL HEMORRHOIDS   • COLONOSCOPY N/A 01/14/2021    Procedure: COLONOSCOPY TO CECUM & T.I. WITH COLD POLYPECTOMY;  Surgeon: Dylon Bennett MD;  Location: [facility] ENDOSCOPY;  Service: Gastroenterology;  Laterality: N/A;  PRE- H/O COLON CANCER  POST- COLON POLYPS, HEMORRHOIDS, FAIR PREP   • FOOT SURGERY Left 03/15/2019   • FOOT SURGERY  Right 03/2022    big toe   • FRACTURE SURGERY Right 2017    right lower leg with screw in ankle   • HYSTERECTOMY  1998   • PERICARDIAL WINDOW N/A 03/10/2018    Procedure: PERICARDIAL WINDOW;  Surgeon: Nomi Patel III, MD;  Location: [facility] OR;  Service: Cardiothoracic   • PORTACATH PLACEMENT  06/2013   • SALPINGO OOPHORECTOMY Bilateral    • SPLENECTOMY     • THORACOSCOPY Left 03/10/2018    Procedure: BRONCHOSCOPY, LEFT THORACOSCOPY VIDEO ASSISTED, SUBPLEURAL CATHETERS FOR PAIN;  Surgeon: Nomi Patel III, MD;  Location: [facility] OR;  Service: Cardiothoracic   • VENOUS ACCESS DEVICE (PORT) REMOVAL N/A 8/26/2022    Procedure: REMOVAL VENOUS ACCESS DEVICE;  Surgeon: Ken Silvestre MD;  Location: [facility];  Service: General;  Laterality: N/A;         FAMILY HISTORY  Family History   Problem Relation Age of Onset   • Heart attack Other    • Cancer Other    • Diabetes Other    • Hypertension Other    • Hypertension Father    • Diabetes Father    • Stroke Father    • Breast cancer Sister    • Diabetes Sister    • Diabetes Maternal Grandmother    • Stroke Paternal Grandmother    • Diabetes Paternal Grandmother    • Lupus Paternal Grandfather    • Stroke Paternal Grandfather    • Diabetes Sister    • Hypertension Mother    • Malig Hyperthermia Neg Hx          SOCIAL HISTORY  Social History     Socioeconomic History   • Marital status:      Spouse name: Jesus   • Years of education: College   Tobacco Use   • Smoking status: Never   • Smokeless tobacco: Never   Vaping Use   • Vaping Use: Never used   Substance and Sexual Activity   • Alcohol use: No   • Drug use: No   • Sexual activity: Defer         ALLERGIES  Compazine [prochlorperazine edisylate], Erythromycin, and Prochlorperazine        REVIEW OF SYSTEMS  Review of Systems     All systems reviewed and negative except for those discussed in HPI.       PHYSICAL EXAM  ED Triage Vitals [12/16/22 0516]   Temp Heart Rate Resp BP SpO2   98.4 °F  (36.9 °C) 76 18 158/85 98 %      Temp src Heart Rate Source Patient Position BP Location FiO2 (%)   Tympanic Monitor Sitting Right arm --         GENERAL: not distressed  HENT: atraumatic  EYES: no scleral icterus  CV: regular rhythm, regular rate  RESPIRATORY: normal effort CTA B  ABDOMEN: Nondistended  MUSCULOSKELETAL: no deformity.  On inspection of the lower extremity there is no rash or lesions, no edema or erythema.  No tenderness to the left thigh, she does have some tenderness over the left posterior buttock in the area of her pain. Sensation is intact to light touch throughout the bilateral lower extremities. Muscle strength is 5/5 and symmetrical with plantarflexion and EHL. Patellar reflexes are 2+ and equal bilaterally. DP and PT pulses are 2+ bilaterally.   Straight leg raise is negative bilaterally  NEURO: alert, moves all extremities, follows commands  SKIN: warm, dry    Vital signs and nursing notes reviewed.    PROCEDURES  Procedures        MEDICATIONS GIVEN IN ER  Medications - No data to display          PROGRESS AND CONSULTS    DDX includes but not limited to sciatica, radiculopathy, meralgia paresthetica         Patient symptoms started yesterday spontaneously and are positional with no skin findings edema or extension past the knee.  Most consistent with radiculopathy, not localized to the thigh so less likely to be meralgia paresthetica.  She is neurologically intact and ambulatory and I do believe this is  radicular.  She is an insulin-dependent diabetic but states her blood sugars are well controlled, last A1c was reportedly 6.2 and blood sugars reportedly ranged from the 80s to 105.  We discussed the risk benefit of steroids, she would like to try Medrol Dosepak I think this is okay with close monitoring of her blood sugar and discontinuation if she starts seeing numbers above 400.  I will also prescribe her a low-dose muscle relaxer and counseled her that the symptoms may improve with  these interventions and may not and the importance of PCP follow-up.  She has no neurologic deficits and is stable for discharge with plans for symptomatic treatment.      Patient was placed in face mask in first look. Patient was wearing facemask each time I entered the room and throughout our encounter. I wore protective equipment throughout this patient encounter including a face mask, eye shield and gloves. Hand hygiene was performed before donning protective equipment and after removal when leaving the room.        DIAGNOSIS  Final diagnoses:   Radicular pain of left lower extremity         Follow Up:  Paola Onofre MD  [address]  [place] [address]  [phone]    In 1 week  As needed      RX:     Medication List      New Prescriptions    cyclobenzaprine 10 MG tablet  Commonly known as: FLEXERIL  Take 0.5 tablets by mouth 2 (Two) Times a Day As Needed (pain).     methylPREDNISolone 4 MG dose pack  Commonly known as: MEDROL  Take as directed on package instructions.        Changed    atorvastatin 20 MG tablet  Commonly known as: LIPITOR  TAKE 1 TABLET DAILY  What changed: how much to take     Xigduo XR 5-1000 MG tablet  Generic drug: dapagliflozin-metformin HCl ER  TAKE 2 TABLETS DAILY  What changed: how much to take           Where to Get Your Medications      These medications were sent to [facility] [id number] - [facility] [address] [place] AT [facility] - [phone]  - [phone]   [address] [place], [facility] [address]    Phone: [phone]   · cyclobenzaprine 10 MG tablet  · methylPREDNISolone 4 MG dose pack           Latest Documented Vital Signs:  BP- 166/95 HR- 96 Temp- 98.4 °F (36.9 °C) (Tympanic) O2 sat- 98%       Lucie Coughlin PA  12/17/22 8233

## 2023-01-11 DIAGNOSIS — E11.8 TYPE 2 DIABETES MELLITUS WITH COMPLICATION, WITH LONG-TERM CURRENT USE OF INSULIN: ICD-10-CM

## 2023-01-11 DIAGNOSIS — Z79.4 TYPE 2 DIABETES MELLITUS WITH COMPLICATION, WITH LONG-TERM CURRENT USE OF INSULIN: ICD-10-CM

## 2023-01-13 RX ORDER — DAPAGLIFLOZIN AND METFORMIN HYDROCHLORIDE 5; 1000 MG/1; MG/1
TABLET, FILM COATED, EXTENDED RELEASE ORAL
Qty: 180 TABLET | Refills: 1 | Status: SHIPPED | OUTPATIENT
Start: 2023-01-13

## 2023-01-23 DIAGNOSIS — Z79.4 TYPE 2 DIABETES MELLITUS WITH PROLIFERATIVE RETINOPATHY, WITH LONG-TERM CURRENT USE OF INSULIN, MACULAR EDEMA PRESENCE UNSPECIFIED, UNSPECIFIED LATERALITY, UNSPECIFIED PROLIFERATIVE RETINOPATHY*: ICD-10-CM

## 2023-01-23 DIAGNOSIS — E11.3599 TYPE 2 DIABETES MELLITUS WITH PROLIFERATIVE RETINOPATHY, WITH LONG-TERM CURRENT USE OF INSULIN, MACULAR EDEMA PRESENCE UNSPECIFIED, UNSPECIFIED LATERALITY, UNSPECIFIED PROLIFERATIVE RETINOPATHY*: ICD-10-CM

## 2023-01-23 RX ORDER — INSULIN GLARGINE 300 U/ML
45 INJECTION, SOLUTION SUBCUTANEOUS NIGHTLY
Qty: 13.5 ML | Refills: 0 | Status: SHIPPED | OUTPATIENT
Start: 2023-01-23 | End: 2023-04-23

## 2023-01-24 ENCOUNTER — LAB (OUTPATIENT)
Dept: LAB | Facility: HOSPITAL | Age: 73
End: 2023-01-24
Payer: MEDICARE

## 2023-01-24 ENCOUNTER — OFFICE VISIT (OUTPATIENT)
Dept: ONCOLOGY | Facility: CLINIC | Age: 73
End: 2023-01-24
Payer: MEDICARE

## 2023-01-24 VITALS
HEART RATE: 88 BPM | WEIGHT: 250.1 LBS | DIASTOLIC BLOOD PRESSURE: 77 MMHG | HEIGHT: 69 IN | RESPIRATION RATE: 16 BRPM | OXYGEN SATURATION: 97 % | TEMPERATURE: 97.1 F | BODY MASS INDEX: 37.04 KG/M2 | SYSTOLIC BLOOD PRESSURE: 133 MMHG

## 2023-01-24 DIAGNOSIS — C18.9 PRIMARY MALIGNANT NEOPLASM OF COLON: ICD-10-CM

## 2023-01-24 DIAGNOSIS — Z90.81 POST-SPLENECTOMY: ICD-10-CM

## 2023-01-24 DIAGNOSIS — C18.9 PRIMARY MALIGNANT NEOPLASM OF COLON: Primary | ICD-10-CM

## 2023-01-24 DIAGNOSIS — C78.7 LIVER METASTASIS: ICD-10-CM

## 2023-01-24 LAB
ALBUMIN SERPL-MCNC: 3.9 G/DL (ref 3.5–5.2)
ALBUMIN/GLOB SERPL: 1 G/DL (ref 1.1–2.4)
ALP SERPL-CCNC: 152 U/L (ref 38–116)
ALT SERPL W P-5'-P-CCNC: 28 U/L (ref 0–33)
ANION GAP SERPL CALCULATED.3IONS-SCNC: 10.7 MMOL/L (ref 5–15)
AST SERPL-CCNC: 32 U/L (ref 0–32)
BASOPHILS # BLD AUTO: 0.06 10*3/MM3 (ref 0–0.2)
BASOPHILS NFR BLD AUTO: 0.8 % (ref 0–1.5)
BILIRUB SERPL-MCNC: 0.4 MG/DL (ref 0.2–1.2)
BUN SERPL-MCNC: 13 MG/DL (ref 6–20)
BUN/CREAT SERPL: 12.7 (ref 7.3–30)
CALCIUM SPEC-SCNC: 9.9 MG/DL (ref 8.5–10.2)
CEA SERPL-MCNC: 4.66 NG/ML
CHLORIDE SERPL-SCNC: 103 MMOL/L (ref 98–107)
CO2 SERPL-SCNC: 29.3 MMOL/L (ref 22–29)
CREAT SERPL-MCNC: 1.02 MG/DL (ref 0.6–1.1)
DEPRECATED RDW RBC AUTO: 52.4 FL (ref 37–54)
EGFRCR SERPLBLD CKD-EPI 2021: 58.6 ML/MIN/1.73
EOSINOPHIL # BLD AUTO: 0.29 10*3/MM3 (ref 0–0.4)
EOSINOPHIL NFR BLD AUTO: 3.7 % (ref 0.3–6.2)
ERYTHROCYTE [DISTWIDTH] IN BLOOD BY AUTOMATED COUNT: 16.5 % (ref 12.3–15.4)
GLOBULIN UR ELPH-MCNC: 4 GM/DL (ref 1.8–3.5)
GLUCOSE SERPL-MCNC: 118 MG/DL (ref 74–124)
HCT VFR BLD AUTO: 41.6 % (ref 34–46.6)
HGB BLD-MCNC: 13.4 G/DL (ref 12–15.9)
IMM GRANULOCYTES # BLD AUTO: 0.05 10*3/MM3 (ref 0–0.05)
IMM GRANULOCYTES NFR BLD AUTO: 0.6 % (ref 0–0.5)
LYMPHOCYTES # BLD AUTO: 2.71 10*3/MM3 (ref 0.7–3.1)
LYMPHOCYTES NFR BLD AUTO: 34.4 % (ref 19.6–45.3)
MCH RBC QN AUTO: 28 PG (ref 26.6–33)
MCHC RBC AUTO-ENTMCNC: 32.2 G/DL (ref 31.5–35.7)
MCV RBC AUTO: 86.8 FL (ref 79–97)
MONOCYTES # BLD AUTO: 0.86 10*3/MM3 (ref 0.1–0.9)
MONOCYTES NFR BLD AUTO: 10.9 % (ref 5–12)
NEUTROPHILS NFR BLD AUTO: 3.91 10*3/MM3 (ref 1.7–7)
NEUTROPHILS NFR BLD AUTO: 49.6 % (ref 42.7–76)
NRBC BLD AUTO-RTO: 0 /100 WBC (ref 0–0.2)
PLATELET # BLD AUTO: 371 10*3/MM3 (ref 140–450)
PMV BLD AUTO: 8.9 FL (ref 6–12)
POTASSIUM SERPL-SCNC: 3.9 MMOL/L (ref 3.5–4.7)
PROT SERPL-MCNC: 7.9 G/DL (ref 6.3–8)
RBC # BLD AUTO: 4.79 10*6/MM3 (ref 3.77–5.28)
SODIUM SERPL-SCNC: 143 MMOL/L (ref 134–145)
WBC NRBC COR # BLD: 7.88 10*3/MM3 (ref 3.4–10.8)

## 2023-01-24 PROCEDURE — 99213 OFFICE O/P EST LOW 20 MIN: CPT | Performed by: INTERNAL MEDICINE

## 2023-01-24 PROCEDURE — 82378 CARCINOEMBRYONIC ANTIGEN: CPT | Performed by: INTERNAL MEDICINE

## 2023-01-24 PROCEDURE — 36415 COLL VENOUS BLD VENIPUNCTURE: CPT

## 2023-01-24 PROCEDURE — 80053 COMPREHEN METABOLIC PANEL: CPT

## 2023-01-24 PROCEDURE — 85025 COMPLETE CBC W/AUTO DIFF WBC: CPT

## 2023-01-24 NOTE — PROGRESS NOTES
Subjective     CHIEF COMPLAINT:      Chief Complaint   Patient presents with   • Follow-up     NO CONCERNS       HISTORY OF PRESENT ILLNESS:     Feli Del Toro is a 72 y.o. female patient who returns today for follow up on her colon cancer. She returns today for follow up reporting developing sudden onset pain in the back radiating to her LE. She went to the ER on 12/16/2022 and was diagnosed with sciatica. She was referred to physical therapy and her pain improved.     ROS:  Pertinent ROS is in the HPI.     Past medical, surgical, social and family history were reviewed.     MEDICATIONS:    Current Outpatient Medications:   •  Accu-Chek FastClix Lancets misc, , Disp: , Rfl:   •  allopurinol (ZYLOPRIM) 100 MG tablet, Take 1 tablet by mouth Daily., Disp: 90 tablet, Rfl: 0  •  aspirin 81 MG tablet, Take 81 mg by mouth Daily. FOLLOW MD GUIDELINES ON HOLDING FOR DOS, Disp: , Rfl:   •  atorvastatin (LIPITOR) 20 MG tablet, TAKE 1 TABLET DAILY (Patient taking differently: Take 40 mg by mouth Daily.), Disp: 90 tablet, Rfl: 3  •  calcium carbonate (TUMS) 500 MG chewable tablet, Chew 2 tablets As Needed., Disp: , Rfl:   •  cyclobenzaprine (FLEXERIL) 10 MG tablet, Take 0.5 tablets by mouth 2 (Two) Times a Day As Needed (pain)., Disp: 10 tablet, Rfl: 0  •  telmisartan-hydrochlorothiazide (MICARDIS HCT) 40-12.5 MG per tablet, TAKE 1 TABLET DAILY, Disp: 90 tablet, Rfl: 3  •  Toujeo SoloStar 300 UNIT/ML solution pen-injector injection, Inject 45 Units under the skin into the appropriate area as directed Every Night for 90 days., Disp: 13.5 mL, Rfl: 0  •  Xigduo XR 5-1000 MG tablet, TAKE 2 TABLETS DAILY, Disp: 180 tablet, Rfl: 1  •  chlorhexidine (PERIDEX) 0.12 % solution, , Disp: , Rfl:   •  fluticasone (FLONASE) 50 MCG/ACT nasal spray, 1 spray into the nostril(s) as directed by provider Daily As Needed. (Patient not taking: Reported on 12/16/2022), Disp: , Rfl:   •  HYDROcodone-acetaminophen (Norco) 5-325 MG per tablet, Take 1-2  "tablets by mouth every four hours as needed for pain (Patient not taking: Reported on 12/16/2022), Disp: 12 tablet, Rfl: 0  •  methylPREDNISolone (MEDROL) 4 MG dose pack, Take as directed on package instructions., Disp: 1 each, Rfl: 0  •  ondansetron (Zofran) 4 MG tablet, Take 1 tablet by mouth Every 6 (Six) Hours As Needed for Nausea or Vomiting for up to 10 doses. (Patient not taking: Reported on 12/16/2022), Disp: 10 tablet, Rfl: 1  Objective     VITAL SIGNS:     Vitals:    01/24/23 1453   BP: 133/77   Pulse: 88   Resp: 16   Temp: 97.1 °F (36.2 °C)   TempSrc: Temporal   SpO2: 97%   Weight: 113 kg (250 lb 1.6 oz)   Height: 175.3 cm (69.02\")   PainSc: 0-No pain     Body mass index is 36.92 kg/m².     Wt Readings from Last 5 Encounters:   01/24/23 113 kg (250 lb 1.6 oz)   12/16/22 109 kg (240 lb)   09/22/22 115 kg (252 lb 9.6 oz)   08/24/22 114 kg (251 lb)   07/28/22 114 kg (251 lb 4.8 oz)     PHYSICAL EXAMINATION:   GENERAL: The patient appears in good general condition, not in acute distress.   SKIN: No ecchymosis.  EYES: No jaundice. No pallor.  LYMPHATICS: No cervical lymphadenopathy.  CHEST: Normal respiratory effort. Lungs clear bilaterally. No added sounds.   CVS: Normal S1 and S2. No murmurs.  ABDOMEN: Soft. No tenderness. No Hepatomegaly. No Splenomegaly. No masses.    DIAGNOSTIC DATA:     Results from last 7 days   Lab Units 01/24/23  1436   WBC 10*3/mm3 7.88   NEUTROS ABS 10*3/mm3 3.91   HEMOGLOBIN g/dL 13.4   HEMATOCRIT % 41.6   PLATELETS 10*3/mm3 371     Results from last 7 days   Lab Units 01/24/23  1436   SODIUM mmol/L 143   POTASSIUM mmol/L 3.9   CHLORIDE mmol/L 103   CO2 mmol/L 29.3*   BUN mg/dL 13   CREATININE mg/dL 1.02   CALCIUM mg/dL 9.9   ALBUMIN g/dL 3.9   BILIRUBIN mg/dL 0.4   ALK PHOS U/L 152*   ALT (SGPT) U/L 28   AST (SGOT) U/L 32   GLUCOSE mg/dL 118     Lab Results   Component Value Date    CEA 4.66 01/24/2023    CEA 4.04 07/21/2022    CEA 4.16 03/29/2022    CEA 3.80 12/30/2021    CEA " 4.06 10/05/2021      Assessment & Plan    *Metastatic colon cancer. She had metastases to the liver.   · She received 12 cycles of FOLFOX-6 and then had resection of the primary tumor along with metastasectomy and splenectomy with thermal ablation of lesions in the liver in January 2014.   · This was followed by 12 cycles of the FOLFIRI regimen completed on 08/07/2014.   · CT scans on 05/28/2015 revealed a new lesion in the liver.  Dr. Ruano referred the patient to Radiation Therapy and she received CyberKnife radiation, completed on 07/14/2015.   · The CT scan from 5/5/17 revealed a new 2.2 cm lesion in the anterior hepatic segment.  CT guided microwave ablation of liver mass performed on 6/16/17 with CT evidence on 7/20/17 of a complete response.  · FOLFIRI ×12 cycles given between 6/29/17 and 11/29/17.   · CT scan from 3/11/2019 showed no evidence of lung metastasis.  No evidence of reaccumulation of the left pleural/pericardial effusion.  · CT scan of the abdomen on 5/9/2019 showed further decrease in the size of the liver metastasis.  No new lesions were seen.  · CT scan of the chest on 10/17/2019 showed no lung metastasis.  No recurrence of the pericardial or pleural effusions.   · CT scan on 5/21/2020 showed no evidence of lung metastasis.  · Colonoscopy on 10/9/2020 revealed tubular adenomas with no high-grade dysplasia or malignancy.  · CT chest on 12/1/2020 showed no evidence of recurrence.  · CT abdomen pelvis on 6/7/2021 showed no evidence of recurrence.  · CT scan on 12/30/2021 showed no evidence of recurrence or metastasis.  · CT scan on 7/21/2022 showed no evidence of recurrence or metastasis.  · CEA was 4.04 on 7/21/2022.  · CEA is stable at 4.66 today.   · No clinical evidence of recurrence on today's evaluation.   · I recommended repeating CT scans in 6 months.   · We reviewed the noted from GI. She was due to have a repeat colonoscopy in 2022 (due to poor colon prep on the 2021 colonoscopy) but  she did not have it done.     *Postsplenectomy state.    · She did not receive the Prevnar vaccine in the past.    · She was given Prevnar 13 vaccine 9/10/2020.    · Patient developed COVID-19 infection and had to receive the monoclonal antibody.   · Patient was given Pneumovax vaccine on 10/5/2021.  · She is up-to-date with the COVID-19 vaccines and she is going to receive the second shingles vaccine and influenza vaccine in the near future.    PLAN:    1.  Repeat CT scans in July 2023.    2.  I asked her to contact her GI office for follow-up colonoscopy.    3.  We will see her in follow up in August 2023, 1 week after the CT scan and we will obtain CBC CMP CEA.      Marivel Brown MD  01/24/23

## 2023-01-25 ENCOUNTER — PREP FOR SURGERY (OUTPATIENT)
Dept: OTHER | Facility: HOSPITAL | Age: 73
End: 2023-01-25
Payer: MEDICARE

## 2023-01-25 ENCOUNTER — TELEPHONE (OUTPATIENT)
Dept: GASTROENTEROLOGY | Facility: CLINIC | Age: 73
End: 2023-01-25
Payer: MEDICARE

## 2023-01-25 DIAGNOSIS — C18.9 PRIMARY MALIGNANT NEOPLASM OF COLON: Primary | ICD-10-CM

## 2023-03-16 ENCOUNTER — TELEPHONE (OUTPATIENT)
Dept: GASTROENTEROLOGY | Facility: CLINIC | Age: 73
End: 2023-03-16

## 2023-03-16 ENCOUNTER — TELEPHONE (OUTPATIENT)
Dept: GASTROENTEROLOGY | Facility: CLINIC | Age: 73
End: 2023-03-16
Payer: MEDICARE

## 2023-03-16 NOTE — TELEPHONE ENCOUNTER
Caller: Feli Del Toro    Relationship to patient: Self    Best call back number: [phone]    Patient is needing: PATIENT NEEDING TO SCHEDULE COLONOSCOPY.  PLEASE REACH OUT TO SCHEDULE. THANK YOU

## 2023-03-16 NOTE — TELEPHONE ENCOUNTER
OK FOR HUB TO READ   CALLED PT TO SCHEDULE SCOPE AND SHE STATED SHE WILL GIVE US A CALL WHEN SHE IS GOING TO SCHEDULE HER SCOPE

## 2023-04-24 DIAGNOSIS — Z79.4 TYPE 2 DIABETES MELLITUS WITH PROLIFERATIVE RETINOPATHY, WITH LONG-TERM CURRENT USE OF INSULIN, MACULAR EDEMA PRESENCE UNSPECIFIED, UNSPECIFIED LATERALITY, UNSPECIFIED PROLIFERATIVE RETINOPATHY*: ICD-10-CM

## 2023-04-24 DIAGNOSIS — E11.3599 TYPE 2 DIABETES MELLITUS WITH PROLIFERATIVE RETINOPATHY, WITH LONG-TERM CURRENT USE OF INSULIN, MACULAR EDEMA PRESENCE UNSPECIFIED, UNSPECIFIED LATERALITY, UNSPECIFIED PROLIFERATIVE RETINOPATHY*: ICD-10-CM

## 2023-04-24 RX ORDER — INSULIN GLARGINE 300 U/ML
INJECTION, SOLUTION SUBCUTANEOUS
Qty: 13.5 ML | Refills: 0 | Status: SHIPPED | OUTPATIENT
Start: 2023-04-24

## 2023-04-24 NOTE — TELEPHONE ENCOUNTER
Antihyperglycemics Protocol Failed 04/24/2023 01:33 AM   Protocol Details  HgA1c in past 6 months    Recent or future visit with authorizing provider         Antihyperglycemics Protocol Failed 04/24/2023 01:33 AM   Protocol Details  HgA1c in past 6 months    Recent or future visit with authorizing provider

## 2023-06-15 ENCOUNTER — OFFICE VISIT (OUTPATIENT)
Dept: ENDOCRINOLOGY | Age: 73
End: 2023-06-15
Payer: MEDICARE

## 2023-06-15 VITALS
DIASTOLIC BLOOD PRESSURE: 84 MMHG | HEART RATE: 97 BPM | WEIGHT: 249.4 LBS | OXYGEN SATURATION: 99 % | HEIGHT: 69 IN | BODY MASS INDEX: 36.94 KG/M2 | TEMPERATURE: 97.3 F | SYSTOLIC BLOOD PRESSURE: 140 MMHG

## 2023-06-15 DIAGNOSIS — Z79.4 TYPE 2 DIABETES MELLITUS WITH COMPLICATION, WITH LONG-TERM CURRENT USE OF INSULIN: ICD-10-CM

## 2023-06-15 DIAGNOSIS — Z79.4 TYPE 2 DIABETES MELLITUS WITH PROLIFERATIVE RETINOPATHY, WITH LONG-TERM CURRENT USE OF INSULIN, MACULAR EDEMA PRESENCE UNSPECIFIED, UNSPECIFIED LATERALITY, UNSPECIFIED PROLIFERATIVE RETINOPATHY*: Primary | ICD-10-CM

## 2023-06-15 DIAGNOSIS — E78.2 MIXED HYPERLIPIDEMIA: ICD-10-CM

## 2023-06-15 DIAGNOSIS — E11.3599 TYPE 2 DIABETES MELLITUS WITH PROLIFERATIVE RETINOPATHY, WITH LONG-TERM CURRENT USE OF INSULIN, MACULAR EDEMA PRESENCE UNSPECIFIED, UNSPECIFIED LATERALITY, UNSPECIFIED PROLIFERATIVE RETINOPATHY*: Primary | ICD-10-CM

## 2023-06-15 DIAGNOSIS — E11.8 TYPE 2 DIABETES MELLITUS WITH COMPLICATION, WITH LONG-TERM CURRENT USE OF INSULIN: ICD-10-CM

## 2023-06-15 RX ORDER — LANCETS
EACH MISCELLANEOUS
Qty: 180 EACH | Refills: 3 | Status: SHIPPED | OUTPATIENT
Start: 2023-06-15

## 2023-06-15 RX ORDER — BLOOD-GLUCOSE METER
KIT MISCELLANEOUS
Qty: 1 EACH | Refills: 0 | Status: SHIPPED | OUTPATIENT
Start: 2023-06-15

## 2023-06-15 RX ORDER — DAPAGLIFLOZIN AND METFORMIN HYDROCHLORIDE 5; 1000 MG/1; MG/1
1 TABLET, FILM COATED, EXTENDED RELEASE ORAL 2 TIMES DAILY
Qty: 180 TABLET | Refills: 1 | Status: SHIPPED | OUTPATIENT
Start: 2023-06-15

## 2023-06-15 RX ORDER — INSULIN GLARGINE 300 U/ML
45 INJECTION, SOLUTION SUBCUTANEOUS DAILY
Qty: 13.5 ML | Refills: 1 | Status: SHIPPED | OUTPATIENT
Start: 2023-06-15

## 2023-06-15 NOTE — PROGRESS NOTES
Chief complaint:  T2DM    HPI:   - 72 year old female here for management of diabetes mellitus type 2  - Has had diabetes for over 20 years  - Complications include retinopathy  - Is currently taking Xigduo 5-1000 mg bid and Toujeo 45 units qhs  - Denies hypoglycemia  - She states her BG's range from   - Is also on atorvastatin 40 mg daily  - She sees an ophthalmologist every 3 months      The following portions of the patient's history were reviewed and updated as appropriate: allergies, current medications, past family history, past medical history, past social history, past surgical history, and problem list.    Objective     Vitals:    06/15/23 0818   BP: 140/84   Pulse: 97   Temp: 97.3 °F (36.3 °C)   SpO2: 99%        Physical Exam  Constitutional:       Appearance: Normal appearance.   HENT:      Head: Normocephalic and atraumatic.   Eyes:      General: No scleral icterus.  Pulmonary:      Effort: Pulmonary effort is normal. No respiratory distress.   Neurological:      Mental Status: She is alert.      Gait: Gait normal.   Psychiatric:         Mood and Affect: Mood normal.         Behavior: Behavior normal.         Thought Content: Thought content normal.         Judgment: Judgment normal.       Labs/Imaging:  A1c was 6.8% in 10/2022    Assessment & Plan   T2DM, controlled, complicated by retinopathy  - Cont. Xigduo 5-1000 mg bid and Toujeo 45 units qhs    2. Hyperlipidemia  - On atorvastatin 40 mg daily    - Return to clinic in 3 months

## 2023-07-25 ENCOUNTER — HOSPITAL ENCOUNTER (OUTPATIENT)
Dept: CT IMAGING | Facility: HOSPITAL | Age: 73
Discharge: HOME | End: 2023-07-25
Admitting: INTERNAL MEDICINE
Payer: MEDICARE

## 2023-07-25 DIAGNOSIS — C18.9 PRIMARY MALIGNANT NEOPLASM OF COLON: ICD-10-CM

## 2023-07-25 DIAGNOSIS — C78.7 MALIGNANT NEOPLASM METASTATIC TO LIVER: ICD-10-CM

## 2023-07-25 PROCEDURE — 25510000001 IOPAMIDOL 61 % SOLUTION: Performed by: INTERNAL MEDICINE

## 2023-07-25 PROCEDURE — 71260 CT THORAX DX C+: CPT

## 2023-07-25 PROCEDURE — 74177 CT ABD & PELVIS W/CONTRAST: CPT

## 2023-07-25 RX ADMIN — IOPAMIDOL 100 ML: 612 INJECTION, SOLUTION INTRAVENOUS at 08:56

## 2023-07-27 DIAGNOSIS — E78.2 MIXED HYPERLIPIDEMIA: ICD-10-CM

## 2023-07-27 RX ORDER — ATORVASTATIN CALCIUM 20 MG/1
40 TABLET, FILM COATED ORAL DAILY
Qty: 180 TABLET | Refills: 0 | Status: SHIPPED | OUTPATIENT
Start: 2023-07-27 | End: 2023-10-25

## 2023-07-27 RX ORDER — ATORVASTATIN CALCIUM 20 MG/1
40 TABLET, FILM COATED ORAL DAILY
Qty: 180 TABLET | Refills: 0 | Status: SHIPPED | OUTPATIENT
Start: 2023-07-27 | End: 2023-07-27 | Stop reason: SDUPTHER

## 2023-08-08 ENCOUNTER — OFFICE VISIT (OUTPATIENT)
Dept: ONCOLOGY | Facility: CLINIC | Age: 73
End: 2023-08-08
Payer: MEDICARE

## 2023-08-08 ENCOUNTER — LAB (OUTPATIENT)
Dept: LAB | Facility: HOSPITAL | Age: 73
End: 2023-08-08
Payer: MEDICARE

## 2023-08-08 VITALS
BODY MASS INDEX: 36.92 KG/M2 | HEIGHT: 69 IN | HEART RATE: 86 BPM | WEIGHT: 249.3 LBS | TEMPERATURE: 97.3 F | DIASTOLIC BLOOD PRESSURE: 73 MMHG | SYSTOLIC BLOOD PRESSURE: 125 MMHG | OXYGEN SATURATION: 98 % | RESPIRATION RATE: 16 BRPM

## 2023-08-08 DIAGNOSIS — C78.7 MALIGNANT NEOPLASM METASTATIC TO LIVER: ICD-10-CM

## 2023-08-08 DIAGNOSIS — Z90.81 POST-SPLENECTOMY: ICD-10-CM

## 2023-08-08 DIAGNOSIS — C18.9 PRIMARY MALIGNANT NEOPLASM OF COLON: Primary | ICD-10-CM

## 2023-08-08 DIAGNOSIS — C18.9 PRIMARY MALIGNANT NEOPLASM OF COLON: ICD-10-CM

## 2023-08-08 LAB
ALBUMIN SERPL-MCNC: 3.8 G/DL (ref 3.5–5.2)
ALBUMIN/GLOB SERPL: 1 G/DL
ALP SERPL-CCNC: 133 U/L (ref 39–117)
ALT SERPL W P-5'-P-CCNC: 12 U/L (ref 1–33)
ANION GAP SERPL CALCULATED.3IONS-SCNC: 15 MMOL/L (ref 5–15)
AST SERPL-CCNC: 26 U/L (ref 1–32)
BASOPHILS # BLD AUTO: 0.03 10*3/MM3 (ref 0–0.2)
BASOPHILS NFR BLD AUTO: 0.4 % (ref 0–1.5)
BILIRUB SERPL-MCNC: 0.4 MG/DL (ref 0–1.2)
BUN SERPL-MCNC: 17 MG/DL (ref 8–23)
BUN/CREAT SERPL: 17.5 (ref 7–25)
CALCIUM SPEC-SCNC: 9.1 MG/DL (ref 8.6–10.5)
CEA SERPL-MCNC: 4.47 NG/ML
CHLORIDE SERPL-SCNC: 104 MMOL/L (ref 98–107)
CO2 SERPL-SCNC: 24 MMOL/L (ref 22–29)
CREAT SERPL-MCNC: 0.97 MG/DL (ref 0.6–1.1)
DEPRECATED RDW RBC AUTO: 51.9 FL (ref 37–54)
EGFRCR SERPLBLD CKD-EPI 2021: 62.2 ML/MIN/1.73
EOSINOPHIL # BLD AUTO: 0.28 10*3/MM3 (ref 0–0.4)
EOSINOPHIL NFR BLD AUTO: 3.9 % (ref 0.3–6.2)
ERYTHROCYTE [DISTWIDTH] IN BLOOD BY AUTOMATED COUNT: 16.1 % (ref 12.3–15.4)
GLOBULIN UR ELPH-MCNC: 3.7 GM/DL
GLUCOSE SERPL-MCNC: 78 MG/DL (ref 65–99)
HCT VFR BLD AUTO: 43.3 % (ref 34–46.6)
HGB BLD-MCNC: 14 G/DL (ref 12–15.9)
IMM GRANULOCYTES # BLD AUTO: 0.03 10*3/MM3 (ref 0–0.05)
IMM GRANULOCYTES NFR BLD AUTO: 0.4 % (ref 0–0.5)
LYMPHOCYTES # BLD AUTO: 2.78 10*3/MM3 (ref 0.7–3.1)
LYMPHOCYTES NFR BLD AUTO: 38.6 % (ref 19.6–45.3)
MCH RBC QN AUTO: 28.3 PG (ref 26.6–33)
MCHC RBC AUTO-ENTMCNC: 32.3 G/DL (ref 31.5–35.7)
MCV RBC AUTO: 87.7 FL (ref 79–97)
MONOCYTES # BLD AUTO: 0.91 10*3/MM3 (ref 0.1–0.9)
MONOCYTES NFR BLD AUTO: 12.6 % (ref 5–12)
NEUTROPHILS NFR BLD AUTO: 3.17 10*3/MM3 (ref 1.7–7)
NEUTROPHILS NFR BLD AUTO: 44.1 % (ref 42.7–76)
NRBC BLD AUTO-RTO: 0 /100 WBC (ref 0–0.2)
PLATELET # BLD AUTO: 363 10*3/MM3 (ref 140–450)
PMV BLD AUTO: 9.2 FL (ref 6–12)
POTASSIUM SERPL-SCNC: 4.4 MMOL/L (ref 3.5–5.2)
PROT SERPL-MCNC: 7.5 G/DL (ref 6–8.5)
RBC # BLD AUTO: 4.94 10*6/MM3 (ref 3.77–5.28)
SODIUM SERPL-SCNC: 143 MMOL/L (ref 136–145)
WBC NRBC COR # BLD: 7.2 10*3/MM3 (ref 3.4–10.8)

## 2023-08-08 PROCEDURE — 80053 COMPREHEN METABOLIC PANEL: CPT

## 2023-08-08 PROCEDURE — 99214 OFFICE O/P EST MOD 30 MIN: CPT | Performed by: INTERNAL MEDICINE

## 2023-08-08 PROCEDURE — 82378 CARCINOEMBRYONIC ANTIGEN: CPT | Performed by: INTERNAL MEDICINE

## 2023-08-08 PROCEDURE — 3074F SYST BP LT 130 MM HG: CPT | Performed by: INTERNAL MEDICINE

## 2023-08-08 PROCEDURE — 1160F RVW MEDS BY RX/DR IN RCRD: CPT | Performed by: INTERNAL MEDICINE

## 2023-08-08 PROCEDURE — 1126F AMNT PAIN NOTED NONE PRSNT: CPT | Performed by: INTERNAL MEDICINE

## 2023-08-08 PROCEDURE — 3078F DIAST BP <80 MM HG: CPT | Performed by: INTERNAL MEDICINE

## 2023-08-08 PROCEDURE — 1159F MED LIST DOCD IN RCRD: CPT | Performed by: INTERNAL MEDICINE

## 2023-08-08 PROCEDURE — 36415 COLL VENOUS BLD VENIPUNCTURE: CPT

## 2023-08-08 PROCEDURE — 85025 COMPLETE CBC W/AUTO DIFF WBC: CPT

## 2023-08-08 NOTE — PROGRESS NOTES
Subjective     CHIEF COMPLAINT:      Chief Complaint   Patient presents with    Follow-up     No concerns      HISTORY OF PRESENT ILLNESS:     Feli Del Toro is a 72 y.o. female patient who returns today for follow up on her colon cancer and liver metastasis.  She returns today for follow-up reporting no new symptoms.  No abdominal pain.  No blood in the stool.  She did not have any recent infections.    ROS:  Pertinent ROS is in the HPI.     Past medical, surgical, social and family history were reviewed.     MEDICATIONS:    Current Outpatient Medications:     Accu-Chek FastClix Lancets misc, Use 3 per day to check BG, E11.9, Disp: 180 each, Rfl: 3    allopurinol (ZYLOPRIM) 100 MG tablet, Take 1 tablet by mouth Daily., Disp: 90 tablet, Rfl: 0    aspirin 81 MG tablet, Take 1 tablet by mouth Daily. FOLLOW MD GUIDELINES ON HOLDING FOR DOS, Disp: , Rfl:     atorvastatin (LIPITOR) 20 MG tablet, Take 2 tablets by mouth Daily for 90 days., Disp: 180 tablet, Rfl: 0    calcium carbonate (TUMS) 500 MG chewable tablet, Chew 2 tablets As Needed., Disp: , Rfl:     glucose monitor monitoring kit, Use to check BG 3 times daily, E11.9, specific glucometer to be based on insurance coverage, Disp: 1 each, Rfl: 0    methylPREDNISolone (MEDROL) 4 MG dose pack, Take as directed on package instructions., Disp: 1 each, Rfl: 0    ondansetron (Zofran) 4 MG tablet, Take 1 tablet by mouth Every 6 (Six) Hours As Needed for Nausea or Vomiting for up to 10 doses., Disp: 10 tablet, Rfl: 1    telmisartan-hydrochlorothiazide (MICARDIS HCT) 40-12.5 MG per tablet, TAKE 1 TABLET DAILY, Disp: 90 tablet, Rfl: 3    Toujeo SoloStar 300 UNIT/ML solution pen-injector injection, Inject 45 Units under the skin into the appropriate area as directed Daily., Disp: 13.5 mL, Rfl: 1    Xigduo XR 5-1000 MG tablet, Take 1 tablet by mouth 2 (Two) Times a Day., Disp: 180 tablet, Rfl: 1    cyclobenzaprine (FLEXERIL) 10 MG tablet, Take 0.5 tablets by mouth 2 (Two) Times  "a Day As Needed (pain). (Patient not taking: Reported on 6/15/2023), Disp: 10 tablet, Rfl: 0    fluticasone (FLONASE) 50 MCG/ACT nasal spray, 1 spray into the nostril(s) as directed by provider Daily As Needed. (Patient not taking: Reported on 6/15/2023), Disp: , Rfl:     HYDROcodone-acetaminophen (Norco) 5-325 MG per tablet, Take 1-2 tablets by mouth every four hours as needed for pain (Patient not taking: Reported on 12/16/2022), Disp: 12 tablet, Rfl: 0  Objective     VITAL SIGNS:     Vitals:    08/08/23 1431   BP: 125/73   Pulse: 86   Resp: 16   Temp: 97.3 øF (36.3 øC)   TempSrc: Temporal   SpO2: 98%   Weight: 113 kg (249 lb 4.8 oz)   Height: 175.3 cm (69.02\")   PainSc: 0-No pain     Body mass index is 36.8 kg/mý.     Wt Readings from Last 5 Encounters:   08/08/23 113 kg (249 lb 4.8 oz)   06/15/23 113 kg (249 lb 6.4 oz)   01/24/23 113 kg (250 lb 1.6 oz)   12/16/22 109 kg (240 lb)   09/22/22 115 kg (252 lb 9.6 oz)     PHYSICAL EXAMINATION:   GENERAL: The patient appears in good general condition, not in acute distress.   SKIN: No ecchymosis.  EYES: No jaundice. No pallor.  LYMPHATICS: No cervical lymphadenopathy.  CHEST: Normal respiratory effort.  Lungs clear bilaterally.  No added sounds.  CVS: Normal S1-S2.  No murmurs.  ABDOMEN: Soft. No tenderness. No Hepatomegaly. No masses.  EXTREMITIES: No noted deformity.     DIAGNOSTIC DATA:     Results from last 7 days   Lab Units 08/08/23  1409   WBC 10*3/mm3 7.20   NEUTROS ABS 10*3/mm3 3.17   HEMOGLOBIN g/dL 14.0   HEMATOCRIT % 43.3   PLATELETS 10*3/mm3 363     Results from last 7 days   Lab Units 08/08/23  1409   SODIUM mmol/L 143   POTASSIUM mmol/L 4.4   CHLORIDE mmol/L 104   CO2 mmol/L 24.0   BUN mg/dL 17   CREATININE mg/dL 0.97   CALCIUM mg/dL 9.1   ALBUMIN g/dL 3.8   BILIRUBIN mg/dL 0.4   ALK PHOS U/L 133*   ALT (SGPT) U/L 12   AST (SGOT) U/L 26   GLUCOSE mg/dL 78     Lab Results   Component Value Date    CEA 4.47 08/08/2023    CEA 4.66 01/24/2023    CEA 4.04 " 07/21/2022    CEA 4.16 03/29/2022    CEA 3.80 12/30/2021      CT chest abdomen pelvis on 7/25/2023:  1. Interval decrease in the 4.3 x 4.0 cm residual low-attenuation lesion  at the dome of the liver. There are no new liver lesions and there is no  lymphadenopathy.  2. Focally thickened appearance of the mid transverse colon may be  artifactual. Please verify that the patient is up-to-date on follow-up  colonoscopy. Thickened appearance of the gastric antrum may represent  gastritis.  3. Stable findings within the chest. There is no lymphadenopathy or new  abnormality.    Assessment & Plan    *Metastatic colon cancer with metastases to the liver.   She received 12 cycles of FOLFOX-6 and then had resection of the primary tumor along with metastasectomy and splenectomy with thermal ablation of lesions in the liver in January 2014.   This was followed by 12 cycles of the FOLFIRI regimen completed on 08/07/2014.   CT scans on 05/28/2015 revealed a new lesion in the liver.  Dr. Ruano referred the patient to Radiation Therapy and she received CyberKnife radiation, completed on 07/14/2015.   The CT scan from 5/5/17 revealed a new 2.2 cm lesion in the anterior hepatic segment.  CT guided microwave ablation of liver mass performed on 6/16/17 with CT evidence on 7/20/17 of a complete response.  FOLFIRI x12 cycles given between 6/29/17 and 11/29/17.   CT scan from 3/11/2019 showed no evidence of lung metastasis.  No evidence of reaccumulation of the left pleural/pericardial effusion.  CT scan of the abdomen on 5/9/2019 showed further decrease in the size of the liver metastasis.  No new lesions were seen.  CT scan of the chest on 10/17/2019 showed no lung metastasis.  No recurrence of the pericardial or pleural effusions.   CT scan on 5/21/2020 showed no evidence of lung metastasis.  Colonoscopy on 10/9/2020 revealed tubular adenomas with no high-grade dysplasia or malignancy.  CT chest on 12/1/2020 showed no evidence of  recurrence.  CT abdomen pelvis on 6/7/2021 showed no evidence of recurrence.  CT scan on 12/30/2021 showed no evidence of recurrence or metastasis.  CT scan on 7/21/2022 showed no evidence of recurrence or metastasis.  CT on 7/25/2023 showed decrease in the size of a metastatic lesion in the dome of the liver.  Other lesions were stable.  There was thickness of the wall of the transverse colon.  8/8/2023: CEA 4.47.  Due to the thickness of the wall of the colon reported in the CT scan, I recommended the patient that she proceeds with a colonoscopy.    *Postsplenectomy state.    She did not receive the Prevnar vaccine in the past.    She was given Prevnar 13 vaccine 9/10/2020.    Patient developed COVID-19 infection and had to receive the monoclonal antibody.   Patient was given Pneumovax vaccine on 10/5/2021.  No recent infections.    PLAN:    1.  I recommended proceeding with colonoscopy.  She will contact her gastroenterologist office to schedule it.   2.  Follow-up in 6 months with CBC CMP CEA.   3.  Plan to repeat CT scans in 1 year but sooner if there are any changes.        Marivel Brown MD  08/08/23

## 2023-09-07 DIAGNOSIS — E11.3599 TYPE 2 DIABETES MELLITUS WITH PROLIFERATIVE RETINOPATHY, WITH LONG-TERM CURRENT USE OF INSULIN, MACULAR EDEMA PRESENCE UNSPECIFIED, UNSPECIFIED LATERALITY, UNSPECIFIED PROLIFERATIVE RETINOPATHY*: ICD-10-CM

## 2023-09-07 DIAGNOSIS — Z79.4 TYPE 2 DIABETES MELLITUS WITH PROLIFERATIVE RETINOPATHY, WITH LONG-TERM CURRENT USE OF INSULIN, MACULAR EDEMA PRESENCE UNSPECIFIED, UNSPECIFIED LATERALITY, UNSPECIFIED PROLIFERATIVE RETINOPATHY*: ICD-10-CM

## 2023-09-07 LAB — HBA1C MFR BLD: 6.1 % (ref 4.8–5.6)

## 2023-09-18 ENCOUNTER — OFFICE VISIT (OUTPATIENT)
Dept: ENDOCRINOLOGY | Age: 73
End: 2023-09-18
Payer: MEDICARE

## 2023-09-18 VITALS
HEIGHT: 69 IN | DIASTOLIC BLOOD PRESSURE: 80 MMHG | TEMPERATURE: 96.6 F | SYSTOLIC BLOOD PRESSURE: 150 MMHG | BODY MASS INDEX: 37.03 KG/M2 | HEART RATE: 81 BPM | WEIGHT: 250 LBS | OXYGEN SATURATION: 98 %

## 2023-09-18 DIAGNOSIS — E78.2 MIXED HYPERLIPIDEMIA: ICD-10-CM

## 2023-09-18 DIAGNOSIS — E66.9 OBESITY (BMI 35.0-39.9 WITHOUT COMORBIDITY): ICD-10-CM

## 2023-09-18 DIAGNOSIS — E11.3599 TYPE 2 DIABETES MELLITUS WITH PROLIFERATIVE RETINOPATHY, WITH LONG-TERM CURRENT USE OF INSULIN, MACULAR EDEMA PRESENCE UNSPECIFIED, UNSPECIFIED LATERALITY, UNSPECIFIED PROLIFERATIVE RETINOPATHY*: Primary | ICD-10-CM

## 2023-09-18 DIAGNOSIS — Z79.4 TYPE 2 DIABETES MELLITUS WITH PROLIFERATIVE RETINOPATHY, WITH LONG-TERM CURRENT USE OF INSULIN, MACULAR EDEMA PRESENCE UNSPECIFIED, UNSPECIFIED LATERALITY, UNSPECIFIED PROLIFERATIVE RETINOPATHY*: Primary | ICD-10-CM

## 2023-09-18 PROCEDURE — 3077F SYST BP >= 140 MM HG: CPT | Performed by: NURSE PRACTITIONER

## 2023-09-18 PROCEDURE — 3044F HG A1C LEVEL LT 7.0%: CPT | Performed by: NURSE PRACTITIONER

## 2023-09-18 PROCEDURE — 99214 OFFICE O/P EST MOD 30 MIN: CPT | Performed by: NURSE PRACTITIONER

## 2023-09-18 PROCEDURE — 3079F DIAST BP 80-89 MM HG: CPT | Performed by: NURSE PRACTITIONER

## 2023-09-18 NOTE — PROGRESS NOTES
"Chief Complaint  Diabetes    Subjective        Sontyrell Del Toro presents to [facility]  History of Present Illness    HPI:   - 72 year old female here for management of diabetes mellitus type 2  - Has had diabetes for over 20 years  - Complications include retinopathy, last eye exam 6/2023  - using weight watchers and walking on her treadmill/ stationary bike to help with weight loss  - follows with podiatry- boot in place while small wound heals    - currently on: Xigduo 5-1000 mg bid and Toujeo 45 units qhs- tolerating well   - Denies hypoglycemia- \" not lately\", checking BS 3x/day   - on atorvastatin 40 mg daily      Objective   Vital Signs:  /80   Pulse 81   Temp 96.6 °F (35.9 °C) (Temporal)   Ht 175.3 cm (69.02\")   Wt 113 kg (250 lb)   SpO2 98%   BMI 36.90 kg/m²   Estimated body mass index is 36.9 kg/m² as calculated from the following:    Height as of this encounter: 175.3 cm (69.02\").    Weight as of this encounter: 113 kg (250 lb).             Physical Exam  Vitals reviewed.   Constitutional:       General: She is not in acute distress.  HENT:      Head: Normocephalic and atraumatic.   Cardiovascular:      Rate and Rhythm: Normal rate.   Pulmonary:      Effort: Pulmonary effort is normal. No respiratory distress.   Musculoskeletal:         General: No signs of injury. Normal range of motion.      Cervical back: Normal range of motion and neck supple.   Skin:     General: Skin is warm and dry.   Neurological:      Mental Status: She is alert and oriented to person, place, and time. Mental status is at baseline.   Psychiatric:         Mood and Affect: Mood normal.         Behavior: Behavior normal.         Thought Content: Thought content normal.         Judgment: Judgment normal.      Result Review :  The following data was reviewed by: EMMA Hickman on 09/18/2023:  Common labs          6/23/2023    11:04 8/8/2023    14:09 9/7/2023    07:54   Common Labs "   Glucose  78     BUN  17     Creatinine  0.97     Sodium  143     Potassium  4.4     Chloride  104     Calcium  9.1     Albumin  3.8     Total Bilirubin  0.4     Alkaline Phosphatase  133     AST (SGOT)  26     ALT (SGPT)  12     WBC 7.18     7.20     Hemoglobin 13.1     14.0     Hematocrit 42.5     43.3     Platelets 399     363     Hemoglobin A1C 6.7      6.10       Details          This result is from an external source.                          Assessment and Plan   Diagnoses and all orders for this visit:    1. Type 2 diabetes mellitus with proliferative retinopathy, with long-term current use of insulin, macular edema presence unspecified, unspecified laterality, unspecified proliferative retinopathy* (Primary)  -     Basic Metabolic Panel; Future  -     Hemoglobin A1c; Future  -     Lipid Panel; Future    2. Mixed hyperlipidemia  -     Basic Metabolic Panel; Future  -     Hemoglobin A1c; Future  -     Lipid Panel; Future    3. Obesity (BMI 35.0-39.9 without comorbidity)  -     Basic Metabolic Panel; Future  -     Hemoglobin A1c; Future  -     Lipid Panel; Future             Follow Up   Return in about 6 months (around 3/18/2024).    A1c at goal, continue regimen as above  Continue statin   Continue with weight loss efforts     Patient was given instructions and counseling regarding her condition or for health maintenance advice. Please see specific information pulled into the AVS if appropriate.     EMMA Hickman

## 2023-11-27 DIAGNOSIS — I10 ESSENTIAL HYPERTENSION: ICD-10-CM

## 2023-11-27 RX ORDER — TELMISARTAN AND HYDROCHLORTHIAZIDE 40; 12.5 MG/1; MG/1
1 TABLET ORAL DAILY
Qty: 90 TABLET | Refills: 0 | OUTPATIENT
Start: 2023-11-27

## 2023-12-06 ENCOUNTER — TELEPHONE (OUTPATIENT)
Dept: ENDOCRINOLOGY | Age: 73
End: 2023-12-06
Payer: MEDICARE

## 2023-12-06 DIAGNOSIS — I10 ESSENTIAL HYPERTENSION: ICD-10-CM

## 2023-12-06 RX ORDER — TELMISARTAN AND HYDROCHLORTHIAZIDE 40; 12.5 MG/1; MG/1
1 TABLET ORAL DAILY
Qty: 90 TABLET | Refills: 0 | Status: SHIPPED | OUTPATIENT
Start: 2023-12-06

## 2023-12-06 NOTE — TELEPHONE ENCOUNTER
LUH FROM [facility] CALLED FOR A REFILL OF THE FOLLOWING MED FOR THE PT.      telmisartan-hydrochlorothiazide (MICARDIS HCT) 40-12.5 MG per tablet [78972]         GOING TO:    [facility] - [address] [phone] [facility] [phone] FX

## 2023-12-18 DIAGNOSIS — Z79.4 TYPE 2 DIABETES MELLITUS WITH COMPLICATION, WITH LONG-TERM CURRENT USE OF INSULIN: ICD-10-CM

## 2023-12-18 DIAGNOSIS — E11.8 TYPE 2 DIABETES MELLITUS WITH COMPLICATION, WITH LONG-TERM CURRENT USE OF INSULIN: ICD-10-CM

## 2023-12-18 RX ORDER — DAPAGLIFLOZIN AND METFORMIN HYDROCHLORIDE 5; 1000 MG/1; MG/1
1 TABLET, FILM COATED, EXTENDED RELEASE ORAL 2 TIMES DAILY
Qty: 180 TABLET | Refills: 0 | Status: SHIPPED | OUTPATIENT
Start: 2023-12-18

## 2023-12-18 NOTE — TELEPHONE ENCOUNTER
Rx Refill Note  Requested Prescriptions     Pending Prescriptions Disp Refills    Xigduo XR 5-1000 MG tablet [Pharmacy Med Name: XIGDUO XR TABS 5/1000MG] 180 tablet 3     Sig: TAKE 1 TABLET TWICE A DAY      Last office visit with prescribing clinician: 6/15/2023   Last telemedicine visit with prescribing clinician: Visit date not found   Next office visit with prescribing clinician: Visit date not found                         Would you like a call back once the refill request has been completed: [] Yes [] No    If the office needs to give you a call back, can they leave a voicemail: [] Yes [] No    Johnna Lopez  12/18/23, 08:46 EST

## 2023-12-27 DIAGNOSIS — E11.3599 TYPE 2 DIABETES MELLITUS WITH PROLIFERATIVE RETINOPATHY, WITH LONG-TERM CURRENT USE OF INSULIN, MACULAR EDEMA PRESENCE UNSPECIFIED, UNSPECIFIED LATERALITY, UNSPECIFIED PROLIFERATIVE RETINOPATHY*: ICD-10-CM

## 2023-12-27 DIAGNOSIS — Z79.4 TYPE 2 DIABETES MELLITUS WITH PROLIFERATIVE RETINOPATHY, WITH LONG-TERM CURRENT USE OF INSULIN, MACULAR EDEMA PRESENCE UNSPECIFIED, UNSPECIFIED LATERALITY, UNSPECIFIED PROLIFERATIVE RETINOPATHY*: ICD-10-CM

## 2023-12-27 RX ORDER — INSULIN GLARGINE 300 U/ML
45 INJECTION, SOLUTION SUBCUTANEOUS DAILY
Qty: 15 ML | Refills: 0 | Status: SHIPPED | OUTPATIENT
Start: 2023-12-27

## 2023-12-27 NOTE — TELEPHONE ENCOUNTER
Incoming Refill Request      Medication requested (name and dose): [facility]    Pharmacy where request should be sent: [facility]    Additional details provided by patient:     Best call back number: [phone]     Does the patient have less than a 3 day supply:  [] Yes  [] No    Tin Remy Rep  12/27/23, 08:55 EST

## 2024-02-21 DIAGNOSIS — I10 ESSENTIAL HYPERTENSION: ICD-10-CM

## 2024-02-21 RX ORDER — TELMISARTAN AND HYDROCHLORTHIAZIDE 40; 12.5 MG/1; MG/1
1 TABLET ORAL DAILY
Qty: 90 TABLET | Refills: 3 | OUTPATIENT
Start: 2024-02-21

## 2024-02-21 NOTE — TELEPHONE ENCOUNTER
Do you want to continue to fill this or have her PCP fill. I dont typically fill the ARBs with HCTZ

## 2024-02-21 NOTE — TELEPHONE ENCOUNTER
Rx Refill Note  Requested Prescriptions     Pending Prescriptions Disp Refills    telmisartan-hydrochlorothiazide (MICARDIS HCT) 40-12.5 MG per tablet [Pharmacy Med Name: TELMISARTAN/HCTZ TABS 40/12.5] 90 tablet 3     Sig: TAKE 1 TABLET DAILY      Last office visit with prescribing clinician: 6/15/2023   Last telemedicine visit with prescribing clinician: Visit date not found   Next office visit with prescribing clinician: Visit date not found                         Would you like a call back once the refill request has been completed: [] Yes [] No    If the office needs to give you a call back, can they leave a voicemail: [] Yes [] No    Lucie Lopez MA  02/21/24, 07:42 EST

## 2024-03-15 DIAGNOSIS — Z79.4 TYPE 2 DIABETES MELLITUS WITH COMPLICATION, WITH LONG-TERM CURRENT USE OF INSULIN: ICD-10-CM

## 2024-03-15 DIAGNOSIS — E11.8 TYPE 2 DIABETES MELLITUS WITH COMPLICATION, WITH LONG-TERM CURRENT USE OF INSULIN: ICD-10-CM

## 2024-03-15 RX ORDER — DAPAGLIFLOZIN AND METFORMIN HYDROCHLORIDE 5; 1000 MG/1; MG/1
1 TABLET, FILM COATED, EXTENDED RELEASE ORAL 2 TIMES DAILY
Qty: 60 TABLET | Refills: 0 | Status: SHIPPED | OUTPATIENT
Start: 2024-03-15

## 2024-03-15 NOTE — TELEPHONE ENCOUNTER
Rx Refill Note  Requested Prescriptions     Pending Prescriptions Disp Refills    Xigduo XR 5-1000 MG tablet [Pharmacy Med Name: XIGDUO XR TABS 5/1000MG] 180 tablet 3     Sig: TAKE 1 TABLET TWICE A DAY      Last office visit with prescribing clinician: 9/18/2023   Last telemedicine visit with prescribing clinician: Visit date not found   Next office visit with prescribing clinician: 3/25/2024                         Would you like a call back once the refill request has been completed: [] Yes [] No    If the office needs to give you a call back, can they leave a voicemail: [] Yes [] No    Lucie Lopez MA  03/15/24, 07:41 EDT

## 2024-03-26 DIAGNOSIS — Z79.4 TYPE 2 DIABETES MELLITUS WITH PROLIFERATIVE RETINOPATHY, WITH LONG-TERM CURRENT USE OF INSULIN, MACULAR EDEMA PRESENCE UNSPECIFIED, UNSPECIFIED LATERALITY, UNSPECIFIED PROLIFERATIVE RETINOPATHY TYPE: ICD-10-CM

## 2024-03-26 DIAGNOSIS — E11.3599 TYPE 2 DIABETES MELLITUS WITH PROLIFERATIVE RETINOPATHY, WITH LONG-TERM CURRENT USE OF INSULIN, MACULAR EDEMA PRESENCE UNSPECIFIED, UNSPECIFIED LATERALITY, UNSPECIFIED PROLIFERATIVE RETINOPATHY TYPE: ICD-10-CM

## 2024-03-26 RX ORDER — INSULIN GLARGINE 300 U/ML
45 INJECTION, SOLUTION SUBCUTANEOUS DAILY
Qty: 13.5 ML | Refills: 3 | OUTPATIENT
Start: 2024-03-26

## 2024-03-26 NOTE — TELEPHONE ENCOUNTER
Rx Refill Note  Requested Prescriptions     Pending Prescriptions Disp Refills    Toujeo SoloStar 300 UNIT/ML solution pen-injector injection [Pharmacy Med Name: TOUJEO SOLOSTAR PEN 1.5ML 3'S 300U/ML] 13.5 mL 3     Sig: INJECT 45 UNITS UNDER THE SKIN INTO THE APPROPRIATE AREA AS DIRECTED DAILY      Last office visit with prescribing clinician: 9/18/2023   Last telemedicine visit with prescribing clinician: Visit date not found   Next office visit with prescribing clinician: Visit date not found                         Would you like a call back once the refill request has been completed: [] Yes [] No    If the office needs to give you a call back, can they leave a voicemail: [] Yes [] No    Lucie Lopez MA  03/26/24, 07:46 EDT

## 2024-04-02 ENCOUNTER — TELEPHONE (OUTPATIENT)
Dept: ENDOCRINOLOGY | Age: 74
End: 2024-04-02
Payer: MEDICARE

## 2024-04-02 NOTE — TELEPHONE ENCOUNTER
Spoke with patient who said she did not come in for her appointment because she had foot surgery.She also said she received a letter from her insurance that [facility] is no longer in network, so will need to find another Endo.

## 2024-07-22 DIAGNOSIS — E11.3599 TYPE 2 DIABETES MELLITUS WITH PROLIFERATIVE RETINOPATHY, WITH LONG-TERM CURRENT USE OF INSULIN, MACULAR EDEMA PRESENCE UNSPECIFIED, UNSPECIFIED LATERALITY, UNSPECIFIED PROLIFERATIVE RETINOPATHY TYPE: ICD-10-CM

## 2024-07-22 DIAGNOSIS — Z79.4 TYPE 2 DIABETES MELLITUS WITH PROLIFERATIVE RETINOPATHY, WITH LONG-TERM CURRENT USE OF INSULIN, MACULAR EDEMA PRESENCE UNSPECIFIED, UNSPECIFIED LATERALITY, UNSPECIFIED PROLIFERATIVE RETINOPATHY TYPE: ICD-10-CM

## 2024-07-22 RX ORDER — INSULIN GLARGINE 300 U/ML
45 INJECTION, SOLUTION SUBCUTANEOUS DAILY
Qty: 15 ML | Refills: 0 | OUTPATIENT
Start: 2024-07-22

## 2024-07-22 NOTE — TELEPHONE ENCOUNTER
Caller: Feli Del Toro    Relationship: Self    Best call back number: [phone] - VM OKAY    Requested Prescriptions:   Requested Prescriptions     Pending Prescriptions Disp Refills    Toujeo SoloStar 300 UNIT/ML solution pen-injector injection 15 mL 0     Sig: Inject 45 Units under the skin into the appropriate area as directed Daily.        Pharmacy where request should be sent:    [id number] [address]  AT [facility] [phone] [facility] [phone]      Last office visit with prescribing clinician: 9/18/2023   Next office visit with prescribing clinician: 7/29/24 WITH HARSHIL COCHRAN    Additional details provided by patient: COMPLETELY OUT - LOOKING TO GET ENOUGH TO COVER UNTIL NEXT APPT.    Does the patient have less than a 3 day supply:  [x] Yes  [] No    Would you like a call back once the refill request has been completed: [] Yes [x] No    If the office needs to give you a call back, can they leave a voicemail: [] Yes [x] No    Tin Munoz Rep   07/22/24 14:22 EDT

## 2024-07-22 NOTE — TELEPHONE ENCOUNTER
Rx Refill Note  Requested Prescriptions     Pending Prescriptions Disp Refills    Toujeo SoloStar 300 UNIT/ML solution pen-injector injection 15 mL 0     Sig: Inject 45 Units under the skin into the appropriate area as directed Daily.      Last office visit with prescribing clinician: 9/18/2023   Last telemedicine visit with prescribing clinician: Visit date not found   Next office visit with prescribing clinician: Visit date not found                         Would you like a call back once the refill request has been completed: [] Yes [] No    If the office needs to give you a call back, can they leave a voicemail: [] Yes [] No    Johnna Lopez  07/22/24, 14:25 EDT

## 2024-07-29 ENCOUNTER — OFFICE VISIT (OUTPATIENT)
Dept: ENDOCRINOLOGY | Age: 74
End: 2024-07-29
Payer: MEDICARE

## 2024-07-29 VITALS
TEMPERATURE: 98.2 F | DIASTOLIC BLOOD PRESSURE: 80 MMHG | OXYGEN SATURATION: 94 % | WEIGHT: 244 LBS | SYSTOLIC BLOOD PRESSURE: 132 MMHG | BODY MASS INDEX: 36.14 KG/M2 | HEART RATE: 91 BPM | HEIGHT: 69 IN

## 2024-07-29 DIAGNOSIS — E11.8 TYPE 2 DIABETES MELLITUS WITH COMPLICATION, WITH LONG-TERM CURRENT USE OF INSULIN: ICD-10-CM

## 2024-07-29 DIAGNOSIS — Z79.4 TYPE 2 DIABETES MELLITUS WITH PROLIFERATIVE RETINOPATHY, WITH LONG-TERM CURRENT USE OF INSULIN, MACULAR EDEMA PRESENCE UNSPECIFIED, UNSPECIFIED LATERALITY, UNSPECIFIED PROLIFERATIVE RETINOPATHY TYPE: Primary | ICD-10-CM

## 2024-07-29 DIAGNOSIS — E11.3599 TYPE 2 DIABETES MELLITUS WITH PROLIFERATIVE RETINOPATHY, WITH LONG-TERM CURRENT USE OF INSULIN, MACULAR EDEMA PRESENCE UNSPECIFIED, UNSPECIFIED LATERALITY, UNSPECIFIED PROLIFERATIVE RETINOPATHY TYPE: Primary | ICD-10-CM

## 2024-07-29 DIAGNOSIS — E66.01 CLASS 2 SEVERE OBESITY DUE TO EXCESS CALORIES WITH SERIOUS COMORBIDITY AND BODY MASS INDEX (BMI) OF 36.0 TO 36.9 IN ADULT: ICD-10-CM

## 2024-07-29 DIAGNOSIS — E78.2 MIXED HYPERLIPIDEMIA: ICD-10-CM

## 2024-07-29 DIAGNOSIS — Z79.4 TYPE 2 DIABETES MELLITUS WITH COMPLICATION, WITH LONG-TERM CURRENT USE OF INSULIN: ICD-10-CM

## 2024-07-29 PROCEDURE — 3075F SYST BP GE 130 - 139MM HG: CPT | Performed by: NURSE PRACTITIONER

## 2024-07-29 PROCEDURE — 3079F DIAST BP 80-89 MM HG: CPT | Performed by: NURSE PRACTITIONER

## 2024-07-29 PROCEDURE — 99214 OFFICE O/P EST MOD 30 MIN: CPT | Performed by: NURSE PRACTITIONER

## 2024-07-29 RX ORDER — ATORVASTATIN CALCIUM 40 MG/1
40 TABLET, FILM COATED ORAL DAILY
COMMUNITY

## 2024-07-29 RX ORDER — INSULIN GLARGINE 300 U/ML
45 INJECTION, SOLUTION SUBCUTANEOUS DAILY
Qty: 15 ML | Refills: 1 | Status: SHIPPED | OUTPATIENT
Start: 2024-07-29

## 2024-07-29 NOTE — PROGRESS NOTES
"Chief Complaint  Diabetes    Subjective        Sontyrell Del Toro presents to [facility] ENDOCRINOLOGY  History of Present Illness    diabetes mellitus type 2, > 20 years   - known complications: retinopathy, last eye exam Summer 2024  - follows with podiatry every 9 weeks   - DM regimen: Xigduo 5-1000 mg bid and Toujeo 45 units qhs- tolerating well   - Denies hypoglycemia: denies   - checking BS 2-3x/day   - AM BS:   - CV:  atorvastatin 40 mg daily  - Renal: telmisartan     Objective   Vital Signs:  /80   Pulse 91   Temp 98.2 °F (36.8 °C) (Oral)   Ht 175.3 cm (69.02\")   Wt 111 kg (244 lb)   SpO2 94%   BMI 36.02 kg/m²   Estimated body mass index is 36.02 kg/m² as calculated from the following:    Height as of this encounter: 175.3 cm (69.02\").    Weight as of this encounter: 111 kg (244 lb).             Physical Exam  Vitals reviewed.   Constitutional:       General: She is not in acute distress.  HENT:      Head: Normocephalic and atraumatic.   Cardiovascular:      Rate and Rhythm: Normal rate.   Pulmonary:      Effort: Pulmonary effort is normal. No respiratory distress.   Musculoskeletal:         General: No signs of injury. Normal range of motion.      Cervical back: Normal range of motion and neck supple.   Skin:     General: Skin is warm and dry.   Neurological:      Mental Status: She is alert and oriented to person, place, and time. Mental status is at baseline.   Psychiatric:         Mood and Affect: Mood normal.         Behavior: Behavior normal.         Thought Content: Thought content normal.         Judgment: Judgment normal.          Result Review :    The following data was reviewed by: EMMA Hickman on 07/29/2024:  Common labs          8/8/2023    14:09 9/7/2023    07:54 2/1/2024    09:14   Common Labs   Glucose 78      BUN 17      Creatinine 0.97      Sodium 143      Potassium 4.4      Chloride 104      Calcium 9.1      Albumin 3.8      Total Bilirubin 0.4    "   Alkaline Phosphatase 133      AST (SGOT) 26      ALT (SGPT) 12      WBC 7.20      Hemoglobin 14.0      Hematocrit 43.3      Platelets 363      Hemoglobin A1C  6.10  6.5          Details          This result is from an external source.                          Assessment and Plan     Diagnoses and all orders for this visit:    1. Type 2 diabetes mellitus with proliferative retinopathy, with long-term current use of insulin, macular edema presence unspecified, unspecified laterality, unspecified proliferative retinopathy type (Primary)  -     Toujeo SoloStar 300 UNIT/ML solution pen-injector injection; Inject 45 Units under the skin into the appropriate area as directed Daily.  Dispense: 15 mL; Refill: 1  -     Lipid Panel  -     Hemoglobin A1c  -     Comprehensive Metabolic Panel  -     Microalbumin / Creatinine Urine Ratio - Urine, Clean Catch    2. Mixed hyperlipidemia    3. Type 2 diabetes mellitus with complication, with long-term current use of insulin    4. Class 2 severe obesity due to excess calories with serious comorbidity and body mass index (BMI) of 36.0 to 36.9 in adult             Follow Up     Return in about 6 months (around 1/29/2025).    Labs today  Consider cgm for early treatment and prevention of hypoglycemia   Continue statin and arb  Tolerating sglt-2 without urinary symptoms, denies fasting hypoglycemia on insulin  Further recommendations as needed based on results     Patient was given instructions and counseling regarding her condition or for health maintenance advice. Please see specific information pulled into the AVS if appropriate.     EMMA Hickman

## 2024-07-30 LAB
ALBUMIN SERPL-MCNC: NORMAL G/DL
ALBUMIN/CREAT UR: 135 MG/G CREAT (ref 0–29)
ALP SERPL-CCNC: NORMAL U/L
ALT SERPL-CCNC: NORMAL U/L
AST SERPL-CCNC: NORMAL U/L
BILIRUB SERPL-MCNC: NORMAL MG/DL
BUN SERPL-MCNC: NORMAL MG/DL
CALCIUM SERPL-MCNC: NORMAL MG/DL
CHLORIDE SERPL-SCNC: NORMAL MMOL/L
CHOLEST SERPL-MCNC: 153 MG/DL (ref 0–200)
CO2 SERPL-SCNC: NORMAL MMOL/L
CREAT SERPL-MCNC: NORMAL MG/DL
CREAT UR-MCNC: 39.3 MG/DL
GLUCOSE SERPL-MCNC: NORMAL MG/DL
HBA1C MFR BLD: 6.5 % (ref 4.8–5.6)
HDLC SERPL-MCNC: 69 MG/DL (ref 40–60)
IMP & REVIEW OF LAB RESULTS: NORMAL
LDLC SERPL CALC-MCNC: 72 MG/DL (ref 0–100)
MICROALBUMIN UR-MCNC: 53.2 UG/ML
POTASSIUM SERPL-SCNC: NORMAL MMOL/L
PROT SERPL-MCNC: NORMAL G/DL
SODIUM SERPL-SCNC: NORMAL MMOL/L
SPECIMEN STATUS: NORMAL
TRIGL SERPL-MCNC: 60 MG/DL (ref 0–150)
VLDLC SERPL CALC-MCNC: 12 MG/DL (ref 5–40)

## 2024-08-02 DIAGNOSIS — E11.3599 TYPE 2 DIABETES MELLITUS WITH PROLIFERATIVE RETINOPATHY, WITH LONG-TERM CURRENT USE OF INSULIN, MACULAR EDEMA PRESENCE UNSPECIFIED, UNSPECIFIED LATERALITY, UNSPECIFIED PROLIFERATIVE RETINOPATHY TYPE: Primary | ICD-10-CM

## 2024-08-02 DIAGNOSIS — E11.3599 TYPE 2 DIABETES MELLITUS WITH PROLIFERATIVE RETINOPATHY, WITH LONG-TERM CURRENT USE OF INSULIN, MACULAR EDEMA PRESENCE UNSPECIFIED, UNSPECIFIED LATERALITY, UNSPECIFIED PROLIFERATIVE RETINOPATHY TYPE: ICD-10-CM

## 2024-08-02 DIAGNOSIS — Z79.4 TYPE 2 DIABETES MELLITUS WITH PROLIFERATIVE RETINOPATHY, WITH LONG-TERM CURRENT USE OF INSULIN, MACULAR EDEMA PRESENCE UNSPECIFIED, UNSPECIFIED LATERALITY, UNSPECIFIED PROLIFERATIVE RETINOPATHY TYPE: Primary | ICD-10-CM

## 2024-08-02 DIAGNOSIS — Z79.4 TYPE 2 DIABETES MELLITUS WITH PROLIFERATIVE RETINOPATHY, WITH LONG-TERM CURRENT USE OF INSULIN, MACULAR EDEMA PRESENCE UNSPECIFIED, UNSPECIFIED LATERALITY, UNSPECIFIED PROLIFERATIVE RETINOPATHY TYPE: ICD-10-CM

## 2024-08-02 LAB
ALBUMIN SERPL-MCNC: 4 G/DL (ref 3.5–5.2)
ALBUMIN/GLOB SERPL: 1.5 G/DL
ALP SERPL-CCNC: 126 U/L (ref 39–117)
ALT SERPL-CCNC: 24 U/L (ref 1–33)
AST SERPL-CCNC: 26 U/L (ref 1–32)
BILIRUB SERPL-MCNC: 0.3 MG/DL (ref 0–1.2)
BUN SERPL-MCNC: 14 MG/DL (ref 8–23)
BUN/CREAT SERPL: 17.5 (ref 7–25)
CALCIUM SERPL-MCNC: 9.4 MG/DL (ref 8.6–10.5)
CHLORIDE SERPL-SCNC: 104 MMOL/L (ref 98–107)
CO2 SERPL-SCNC: 24.1 MMOL/L (ref 22–29)
CREAT SERPL-MCNC: 0.8 MG/DL (ref 0.57–1)
EGFRCR SERPLBLD CKD-EPI 2021: 77.9 ML/MIN/1.73
GLOBULIN SER CALC-MCNC: 2.7 GM/DL
GLUCOSE SERPL-MCNC: 108 MG/DL (ref 65–99)
POTASSIUM SERPL-SCNC: 4.2 MMOL/L (ref 3.5–5.2)
PROT SERPL-MCNC: 6.7 G/DL (ref 6–8.5)
SODIUM SERPL-SCNC: 139 MMOL/L (ref 136–145)

## 2024-11-01 DIAGNOSIS — Z79.4 TYPE 2 DIABETES MELLITUS WITH PROLIFERATIVE RETINOPATHY, WITH LONG-TERM CURRENT USE OF INSULIN, MACULAR EDEMA PRESENCE UNSPECIFIED, UNSPECIFIED LATERALITY, UNSPECIFIED PROLIFERATIVE RETINOPATHY TYPE: ICD-10-CM

## 2024-11-01 DIAGNOSIS — E11.3599 TYPE 2 DIABETES MELLITUS WITH PROLIFERATIVE RETINOPATHY, WITH LONG-TERM CURRENT USE OF INSULIN, MACULAR EDEMA PRESENCE UNSPECIFIED, UNSPECIFIED LATERALITY, UNSPECIFIED PROLIFERATIVE RETINOPATHY TYPE: ICD-10-CM

## 2024-11-01 DIAGNOSIS — I10 ESSENTIAL HYPERTENSION: ICD-10-CM

## 2024-11-01 RX ORDER — INSULIN GLARGINE 300 U/ML
45 INJECTION, SOLUTION SUBCUTANEOUS DAILY
Qty: 15 ML | Refills: 1 | Status: SHIPPED | OUTPATIENT
Start: 2024-11-01

## 2024-11-01 NOTE — TELEPHONE ENCOUNTER
Caller: Feli Del Toro    Relationship: Self    Best call back number: [phone]    Requested Prescriptions:   Requested Prescriptions     Pending Prescriptions Disp Refills    Toujeo SoloStar 300 UNIT/ML solution pen-injector injection 15 mL 1     Sig: Inject 45 Units under the skin into the appropriate area as directed Daily.        Pharmacy where request should be sent: [place] [id number] [facility] [phone] [facility] [phone]      Last office visit with prescribing clinician: 7/29/2024   Last telemedicine visit with prescribing clinician: Visit date not found   Next office visit with prescribing clinician: 1/29/2025       Does the patient have less than a 3 day supply:  [x] Yes  [] No    Would you like a call back once the refill request has been completed: [x] Yes [] No    If the office needs to give you a call back, can they leave a voicemail: [x] Yes [] No    Tin Dey Rep   11/01/24 13:04 EDT

## 2025-01-27 DIAGNOSIS — E11.3599 TYPE 2 DIABETES MELLITUS WITH PROLIFERATIVE RETINOPATHY, WITH LONG-TERM CURRENT USE OF INSULIN, MACULAR EDEMA PRESENCE UNSPECIFIED, UNSPECIFIED LATERALITY, UNSPECIFIED PROLIFERATIVE RETINOPATHY TYPE: ICD-10-CM

## 2025-01-27 DIAGNOSIS — Z79.4 TYPE 2 DIABETES MELLITUS WITH PROLIFERATIVE RETINOPATHY, WITH LONG-TERM CURRENT USE OF INSULIN, MACULAR EDEMA PRESENCE UNSPECIFIED, UNSPECIFIED LATERALITY, UNSPECIFIED PROLIFERATIVE RETINOPATHY TYPE: ICD-10-CM

## 2025-01-27 RX ORDER — INSULIN GLARGINE 300 U/ML
45 INJECTION, SOLUTION SUBCUTANEOUS DAILY
Qty: 13.5 ML | Refills: 0 | Status: SHIPPED | OUTPATIENT
Start: 2025-01-27 | End: 2025-01-29 | Stop reason: SDUPTHER

## 2025-01-27 NOTE — TELEPHONE ENCOUNTER
Rx Refill Note  Requested Prescriptions     Pending Prescriptions Disp Refills    Toujeo SoloStar 300 UNIT/ML solution pen-injector injection [Pharmacy Med Name: TOUJEO SOLOSTAR PEN 1.5ML 3'S 300U/ML] 13.5 mL 3     Sig: INJECT 45 UNITS UNDER THE SKIN INTO THE APPROPRIATE AREA AS DIRECTED DAILY      Last office visit with prescribing clinician: 7/29/2024   Last telemedicine visit with prescribing clinician: Visit date not found   Next office visit with prescribing clinician: 1/29/2025                         Would you like a call back once the refill request has been completed: [] Yes [] No    If the office needs to give you a call back, can they leave a voicemail: [] Yes [] No    Johnna Lopez  01/27/25, 08:04 EST

## 2025-01-29 ENCOUNTER — OFFICE VISIT (OUTPATIENT)
Dept: ENDOCRINOLOGY | Age: 75
End: 2025-01-29
Payer: MEDICARE

## 2025-01-29 VITALS
WEIGHT: 240.2 LBS | DIASTOLIC BLOOD PRESSURE: 82 MMHG | HEART RATE: 91 BPM | SYSTOLIC BLOOD PRESSURE: 136 MMHG | HEIGHT: 69 IN | BODY MASS INDEX: 35.58 KG/M2 | TEMPERATURE: 97.5 F | OXYGEN SATURATION: 98 %

## 2025-01-29 DIAGNOSIS — E11.3599 TYPE 2 DIABETES MELLITUS WITH PROLIFERATIVE RETINOPATHY, WITH LONG-TERM CURRENT USE OF INSULIN, MACULAR EDEMA PRESENCE UNSPECIFIED, UNSPECIFIED LATERALITY, UNSPECIFIED PROLIFERATIVE RETINOPATHY TYPE: ICD-10-CM

## 2025-01-29 DIAGNOSIS — E66.812 CLASS 2 SEVERE OBESITY DUE TO EXCESS CALORIES WITH SERIOUS COMORBIDITY AND BODY MASS INDEX (BMI) OF 35.0 TO 35.9 IN ADULT: ICD-10-CM

## 2025-01-29 DIAGNOSIS — Z79.4 TYPE 2 DIABETES MELLITUS WITH PROLIFERATIVE RETINOPATHY, WITH LONG-TERM CURRENT USE OF INSULIN, MACULAR EDEMA PRESENCE UNSPECIFIED, UNSPECIFIED LATERALITY, UNSPECIFIED PROLIFERATIVE RETINOPATHY TYPE: ICD-10-CM

## 2025-01-29 DIAGNOSIS — E11.8 TYPE 2 DIABETES MELLITUS WITH COMPLICATION, WITH LONG-TERM CURRENT USE OF INSULIN: Primary | ICD-10-CM

## 2025-01-29 DIAGNOSIS — E66.01 CLASS 2 SEVERE OBESITY DUE TO EXCESS CALORIES WITH SERIOUS COMORBIDITY AND BODY MASS INDEX (BMI) OF 35.0 TO 35.9 IN ADULT: ICD-10-CM

## 2025-01-29 DIAGNOSIS — Z79.4 TYPE 2 DIABETES MELLITUS WITH COMPLICATION, WITH LONG-TERM CURRENT USE OF INSULIN: Primary | ICD-10-CM

## 2025-01-29 DIAGNOSIS — E78.2 MIXED HYPERLIPIDEMIA: ICD-10-CM

## 2025-01-29 PROCEDURE — 3075F SYST BP GE 130 - 139MM HG: CPT | Performed by: NURSE PRACTITIONER

## 2025-01-29 PROCEDURE — 99214 OFFICE O/P EST MOD 30 MIN: CPT | Performed by: NURSE PRACTITIONER

## 2025-01-29 PROCEDURE — 3079F DIAST BP 80-89 MM HG: CPT | Performed by: NURSE PRACTITIONER

## 2025-01-29 RX ORDER — INSULIN GLARGINE 300 U/ML
45 INJECTION, SOLUTION SUBCUTANEOUS DAILY
Qty: 13.5 ML | Refills: 1 | Status: SHIPPED | OUTPATIENT
Start: 2025-01-29

## 2025-01-29 RX ORDER — ATORVASTATIN CALCIUM 40 MG/1
40 TABLET, FILM COATED ORAL DAILY
Qty: 90 TABLET | Refills: 1 | Status: SHIPPED | OUTPATIENT
Start: 2025-01-29

## 2025-01-29 RX ORDER — CHOLECALCIFEROL (VITAMIN D3) 25 MCG
2000 TABLET ORAL DAILY
COMMUNITY

## 2025-01-29 RX ORDER — DAPAGLIFLOZIN AND METFORMIN HYDROCHLORIDE 5; 1000 MG/1; MG/1
1 TABLET, FILM COATED, EXTENDED RELEASE ORAL 2 TIMES DAILY
Qty: 180 TABLET | Refills: 1 | Status: SHIPPED | OUTPATIENT
Start: 2025-01-29

## 2025-01-29 NOTE — PROGRESS NOTES
"Chief Complaint  Diabetes    Subjective        Sontyrell Del Toro presents to [facility] ENDOCRINOLOGY  History of Present Illness    diabetes mellitus type 2, > 20 years   - known complications: retinopathy  - DM regimen: Xigduo 5-1000 mg bid and Toujeo 45 units qhs  - has dexcom at home to start   - Eyes: has f/u next month   - Denies hypoglycemia: had one event after missing a meal, corrected easily with orange juice and shalonda cracker and peanut butter    - CV:  atorvastatin 40 mg daily  - Renal: telmisartan 40mg QD  - down 10 pounds with conscience diet changes     Objective   Vital Signs:  /82   Pulse 91   Temp 97.5 °F (36.4 °C) (Oral)   Ht 175.3 cm (69.02\")   Wt 109 kg (240 lb 3.2 oz)   SpO2 98%   BMI 35.45 kg/m²   Estimated body mass index is 35.45 kg/m² as calculated from the following:    Height as of this encounter: 175.3 cm (69.02\").    Weight as of this encounter: 109 kg (240 lb 3.2 oz).          Physical Exam  Vitals reviewed.   Constitutional:       General: She is not in acute distress.  HENT:      Head: Normocephalic and atraumatic.   Cardiovascular:      Rate and Rhythm: Normal rate.   Pulmonary:      Effort: Pulmonary effort is normal. No respiratory distress.   Musculoskeletal:         General: No signs of injury. Normal range of motion.      Cervical back: Normal range of motion and neck supple.   Skin:     General: Skin is warm and dry.   Neurological:      Mental Status: She is alert and oriented to person, place, and time. Mental status is at baseline.   Psychiatric:         Mood and Affect: Mood normal.         Behavior: Behavior normal.         Thought Content: Thought content normal.         Judgment: Judgment normal.        Result Review :  The following data was reviewed by: EMMA Hickman on 01/29/2025:  Common labs          7/29/2024    09:13 8/2/2024    10:32 8/5/2024    08:36   Common Labs   Glucose CANCELED  108     BUN CANCELED  14     Creatinine " CANCELED  0.80     Sodium CANCELED  139     Potassium CANCELED  4.2     Chloride CANCELED  104     Calcium CANCELED  9.4     Total Protein CANCELED  6.7     Albumin CANCELED  4.0     Total Bilirubin CANCELED  0.3     Alkaline Phosphatase CANCELED  126     AST (SGOT) CANCELED  26     ALT (SGPT) CANCELED  24     WBC   7.12       Hemoglobin   13.4       Hematocrit   43.6       Platelets   390       Total Cholesterol 153      Triglycerides 60      HDL Cholesterol 69      LDL Cholesterol  72      Hemoglobin A1C 6.50   6.6       Microalbumin, Urine 53.2         Details          This result is from an external source.                       Assessment and Plan   Diagnoses and all orders for this visit:    1. Type 2 diabetes mellitus with complication, with long-term current use of insulin (Primary)  -     Xigduo XR 5-1000 MG tablet; Take 1 tablet by mouth 2 (Two) Times a Day.  Dispense: 180 tablet; Refill: 1  -     atorvastatin (LIPITOR) 40 MG tablet; Take 1 tablet by mouth Daily.  Dispense: 90 tablet; Refill: 1  -     Toujeo SoloStar 300 UNIT/ML solution pen-injector injection; Inject 45 Units under the skin into the appropriate area as directed Daily.  Dispense: 13.5 mL; Refill: 1    2. Type 2 diabetes mellitus with proliferative retinopathy, with long-term current use of insulin, macular edema presence unspecified, unspecified laterality, unspecified proliferative retinopathy type  -     Toujeo SoloStar 300 UNIT/ML solution pen-injector injection; Inject 45 Units under the skin into the appropriate area as directed Daily.  Dispense: 13.5 mL; Refill: 1    3. Mixed hyperlipidemia    4. Class 2 severe obesity due to excess calories with serious comorbidity and body mass index (BMI) of 35.0 to 35.9 in adult             Follow Up   Return in about 6 months (around 7/29/2025).    Labs tomorrow at PCP   Continue xigduo and toujeo  Continue statin and arb  Additional recommendations to follow as needed based on lab results      Patient was given instructions and counseling regarding her condition or for health maintenance advice. Please see specific information pulled into the AVS if appropriate.       EMMA Hickman

## 2025-02-03 ENCOUNTER — TELEPHONE (OUTPATIENT)
Dept: ENDOCRINOLOGY | Age: 75
End: 2025-02-03

## 2025-02-03 NOTE — TELEPHONE ENCOUNTER
"  Caller: Feli Del Toro    Relationship: Self    Best call back number: [phone]         Who are you requesting to speak with (clinical staff, provider,  specific staff member): HARSHIL AGARWAL        What was the call regarding: LABS PT STATES THAT SHE WAS THERE AND DID GET \"STUCK\" TO GET GET HER LABS DONE. PT SAID TO CALL HER AND SHE CAN GIVE YOU THE DETAILS     "

## 2025-02-03 NOTE — TELEPHONE ENCOUNTER
I called the pcp they will be faxing the lipid panel and A1c.  Informed the patient that labs will bed faxed over.

## 2025-04-25 DIAGNOSIS — Z79.4 TYPE 2 DIABETES MELLITUS WITH COMPLICATION, WITH LONG-TERM CURRENT USE OF INSULIN: ICD-10-CM

## 2025-04-25 DIAGNOSIS — E11.3599 TYPE 2 DIABETES MELLITUS WITH PROLIFERATIVE RETINOPATHY, WITH LONG-TERM CURRENT USE OF INSULIN, MACULAR EDEMA PRESENCE UNSPECIFIED, UNSPECIFIED LATERALITY, UNSPECIFIED PROLIFERATIVE RETINOPATHY TYPE: ICD-10-CM

## 2025-04-25 DIAGNOSIS — Z79.4 TYPE 2 DIABETES MELLITUS WITH PROLIFERATIVE RETINOPATHY, WITH LONG-TERM CURRENT USE OF INSULIN, MACULAR EDEMA PRESENCE UNSPECIFIED, UNSPECIFIED LATERALITY, UNSPECIFIED PROLIFERATIVE RETINOPATHY TYPE: ICD-10-CM

## 2025-04-25 DIAGNOSIS — E11.8 TYPE 2 DIABETES MELLITUS WITH COMPLICATION, WITH LONG-TERM CURRENT USE OF INSULIN: ICD-10-CM

## 2025-04-25 RX ORDER — INSULIN GLARGINE 300 U/ML
INJECTION, SOLUTION SUBCUTANEOUS
Qty: 13.5 ML | Refills: 0 | Status: SHIPPED | OUTPATIENT
Start: 2025-04-25

## 2025-04-25 NOTE — TELEPHONE ENCOUNTER
Rx Refill Note  Requested Prescriptions     Pending Prescriptions Disp Refills    Toujeo SoloStar 300 UNIT/ML solution pen-injector injection [Pharmacy Med Name: TOUJEO SOLOSTAR PEN 1.5ML 3'S 300U/ML] 13.5 mL 3     Sig: INJECT 45 UNITS INTO THE APPROPRIATE AREA UNDER THE SKIN DAILY AS DIRECTED      Last office visit with prescribing clinician: Visit date not found   Last telemedicine visit with prescribing clinician: Visit date not found   Next office visit with prescribing clinician: Visit date not found                         Would you like a call back once the refill request has been completed: [] Yes [] No    If the office needs to give you a call back, can they leave a voicemail: [] Yes [] No    Johnna Lopez  04/25/25, 07:57 EDT

## 2025-06-10 DIAGNOSIS — Z79.4 TYPE 2 DIABETES MELLITUS WITH COMPLICATION, WITH LONG-TERM CURRENT USE OF INSULIN: ICD-10-CM

## 2025-06-10 DIAGNOSIS — E11.8 TYPE 2 DIABETES MELLITUS WITH COMPLICATION, WITH LONG-TERM CURRENT USE OF INSULIN: ICD-10-CM

## 2025-06-10 RX ORDER — ATORVASTATIN CALCIUM 40 MG/1
40 TABLET, FILM COATED ORAL DAILY
Qty: 90 TABLET | Refills: 0 | Status: SHIPPED | OUTPATIENT
Start: 2025-06-10

## 2025-06-10 NOTE — TELEPHONE ENCOUNTER
Rx Refill Note  Requested Prescriptions     Pending Prescriptions Disp Refills    atorvastatin (LIPITOR) 40 MG tablet [Pharmacy Med Name: Atorvastatin Calcium 40 MG Oral Tablet (LIPITOR)] 90 tablet 1     Sig: Take 1 tablet by mouth Daily.      Last office visit with prescribing clinician: 1/29/2025   Last telemedicine visit with prescribing clinician: Visit date not found   Next office visit with prescribing clinician: 7/25/2025                         Would you like a call back once the refill request has been completed: [] Yes [] No    If the office needs to give you a call back, can they leave a voicemail: [] Yes [] No    Jacquelyn Kevin MA  06/10/25, 09:12 EDT

## 2025-07-07 DIAGNOSIS — E11.8 TYPE 2 DIABETES MELLITUS WITH COMPLICATION, WITH LONG-TERM CURRENT USE OF INSULIN: ICD-10-CM

## 2025-07-07 DIAGNOSIS — Z79.4 TYPE 2 DIABETES MELLITUS WITH COMPLICATION, WITH LONG-TERM CURRENT USE OF INSULIN: ICD-10-CM

## 2025-07-07 RX ORDER — DAPAGLIFLOZIN AND METFORMIN HYDROCHLORIDE 5; 1000 MG/1; MG/1
1 TABLET, FILM COATED, EXTENDED RELEASE ORAL 2 TIMES DAILY
Qty: 60 TABLET | Refills: 0 | Status: SHIPPED | OUTPATIENT
Start: 2025-07-07

## 2025-07-07 NOTE — TELEPHONE ENCOUNTER
Rx Refill Note  Requested Prescriptions     Pending Prescriptions Disp Refills    Xigduo XR 5-1000 MG tablet 180 tablet 1     Sig: Take 1 tablet by mouth 2 (Two) Times a Day.      Last office visit with prescribing clinician: 1/29/2025   Last telemedicine visit with prescribing clinician: Visit date not found   Next office visit with prescribing clinician: 7/25/2025                         Would you like a call back once the refill request has been completed: [] Yes [] No    If the office needs to give you a call back, can they leave a voicemail: [] Yes [] No    Jacquelyn Kevin MA  07/07/25, 15:12 EDT

## 2025-07-24 DIAGNOSIS — Z79.4 TYPE 2 DIABETES MELLITUS WITH COMPLICATION, WITH LONG-TERM CURRENT USE OF INSULIN: ICD-10-CM

## 2025-07-24 DIAGNOSIS — E11.8 TYPE 2 DIABETES MELLITUS WITH COMPLICATION, WITH LONG-TERM CURRENT USE OF INSULIN: ICD-10-CM

## 2025-07-24 DIAGNOSIS — Z79.4 TYPE 2 DIABETES MELLITUS WITH PROLIFERATIVE RETINOPATHY, WITH LONG-TERM CURRENT USE OF INSULIN, MACULAR EDEMA PRESENCE UNSPECIFIED, UNSPECIFIED LATERALITY, UNSPECIFIED PROLIFERATIVE RETINOPATHY TYPE: ICD-10-CM

## 2025-07-24 DIAGNOSIS — E11.3599 TYPE 2 DIABETES MELLITUS WITH PROLIFERATIVE RETINOPATHY, WITH LONG-TERM CURRENT USE OF INSULIN, MACULAR EDEMA PRESENCE UNSPECIFIED, UNSPECIFIED LATERALITY, UNSPECIFIED PROLIFERATIVE RETINOPATHY TYPE: ICD-10-CM

## 2025-07-24 NOTE — TELEPHONE ENCOUNTER
Rx Refill Note  Requested Prescriptions     Pending Prescriptions Disp Refills    Toujeo SoloStar 300 UNIT/ML solution pen-injector injection [Pharmacy Med Name: TOUJEO SOLOSTAR PEN 1.5ML 3'S 300U/ML] 13.5 mL 3     Sig: INJECT 45 UNITS INTO THE APPROPRIATE AREA UNDER THE SKIN DAILY AS DIRECTED      Last office visit with prescribing clinician: 1/29/2025   Last telemedicine visit with prescribing clinician: Visit date not found   Next office visit with prescribing clinician: 7/25/2025                         Would you like a call back once the refill request has been completed: [] Yes [] No    If the office needs to give you a call back, can they leave a voicemail: [] Yes [] No    Jacquelyn Kevni MA  07/24/25, 08:30 EDT

## 2025-07-25 ENCOUNTER — OFFICE VISIT (OUTPATIENT)
Dept: ENDOCRINOLOGY | Age: 75
End: 2025-07-25
Payer: MEDICARE

## 2025-07-25 VITALS
WEIGHT: 233.8 LBS | HEART RATE: 90 BPM | OXYGEN SATURATION: 96 % | HEIGHT: 69 IN | SYSTOLIC BLOOD PRESSURE: 126 MMHG | DIASTOLIC BLOOD PRESSURE: 72 MMHG | BODY MASS INDEX: 34.63 KG/M2

## 2025-07-25 DIAGNOSIS — Z79.4 TYPE 2 DIABETES MELLITUS WITH PROLIFERATIVE RETINOPATHY, WITH LONG-TERM CURRENT USE OF INSULIN, MACULAR EDEMA PRESENCE UNSPECIFIED, UNSPECIFIED LATERALITY, UNSPECIFIED PROLIFERATIVE RETINOPATHY TYPE: Primary | ICD-10-CM

## 2025-07-25 DIAGNOSIS — E66.09 CLASS 1 OBESITY DUE TO EXCESS CALORIES WITH SERIOUS COMORBIDITY AND BODY MASS INDEX (BMI) OF 34.0 TO 34.9 IN ADULT: ICD-10-CM

## 2025-07-25 DIAGNOSIS — E11.3599 TYPE 2 DIABETES MELLITUS WITH PROLIFERATIVE RETINOPATHY, WITH LONG-TERM CURRENT USE OF INSULIN, MACULAR EDEMA PRESENCE UNSPECIFIED, UNSPECIFIED LATERALITY, UNSPECIFIED PROLIFERATIVE RETINOPATHY TYPE: Primary | ICD-10-CM

## 2025-07-25 DIAGNOSIS — E78.2 MIXED HYPERLIPIDEMIA: ICD-10-CM

## 2025-07-25 DIAGNOSIS — E11.8 TYPE 2 DIABETES MELLITUS WITH COMPLICATION, WITH LONG-TERM CURRENT USE OF INSULIN: ICD-10-CM

## 2025-07-25 DIAGNOSIS — E66.811 CLASS 1 OBESITY DUE TO EXCESS CALORIES WITH SERIOUS COMORBIDITY AND BODY MASS INDEX (BMI) OF 34.0 TO 34.9 IN ADULT: ICD-10-CM

## 2025-07-25 DIAGNOSIS — Z79.4 TYPE 2 DIABETES MELLITUS WITH COMPLICATION, WITH LONG-TERM CURRENT USE OF INSULIN: ICD-10-CM

## 2025-07-25 RX ORDER — INSULIN GLARGINE 300 U/ML
INJECTION, SOLUTION SUBCUTANEOUS
Qty: 13.5 ML | Refills: 1 | Status: SHIPPED | OUTPATIENT
Start: 2025-07-25

## 2025-07-25 RX ORDER — ATORVASTATIN CALCIUM 40 MG/1
40 TABLET, FILM COATED ORAL DAILY
Qty: 90 TABLET | Refills: 1 | Status: SHIPPED | OUTPATIENT
Start: 2025-07-25

## 2025-07-25 RX ORDER — DAPAGLIFLOZIN AND METFORMIN HYDROCHLORIDE 5; 1000 MG/1; MG/1
1 TABLET, FILM COATED, EXTENDED RELEASE ORAL 2 TIMES DAILY
Qty: 180 TABLET | Refills: 1 | Status: SHIPPED | OUTPATIENT
Start: 2025-07-25

## 2025-07-25 NOTE — PROGRESS NOTES
"Chief Complaint  Diabetes    Subjective        Sontyrell Del Toro presents to [facility] ENDOCRINOLOGY  History of Present Illness    A1c 5.9% 1/2025      diabetes mellitus type 2, > 20 years   - known complications: retinopathy  - DM regimen: Xigduo 5-1000 mg bid and Toujeo 45 units qhs  - never started dexcom, trying to use all her glucose test strips first checking 1-2x/day  - Eyes: 11/2024  - Denies hypoglycemia: doesn't check, just goes based on symptoms, one event   - CV:  atorvastatin 40 mg daily  - Renal: telmisartan 40mg QD  - right foot boot in place r/t callous on big toe, following with podiatry       Objective   Vital Signs:  /72   Pulse 90   Ht 175.3 cm (69.02\")   Wt 106 kg (233 lb 12.8 oz)   SpO2 96%   BMI 34.51 kg/m²   Estimated body mass index is 34.51 kg/m² as calculated from the following:    Height as of this encounter: 175.3 cm (69.02\").    Weight as of this encounter: 106 kg (233 lb 12.8 oz).          Physical Exam  Vitals reviewed.   Constitutional:       General: She is not in acute distress.  HENT:      Head: Normocephalic and atraumatic.   Cardiovascular:      Rate and Rhythm: Normal rate.   Pulmonary:      Effort: Pulmonary effort is normal. No respiratory distress.   Musculoskeletal:         General: No signs of injury. Normal range of motion.      Cervical back: Normal range of motion and neck supple.   Skin:     General: Skin is warm and dry.   Neurological:      Mental Status: She is alert and oriented to person, place, and time. Mental status is at baseline.   Psychiatric:         Mood and Affect: Mood normal.         Behavior: Behavior normal.         Thought Content: Thought content normal.         Judgment: Judgment normal.        Result Review :  The following data was reviewed by: EMMA Hickman on 07/25/2025:  Common labs          8/2/2024    10:32 8/5/2024    08:36   Common Labs   Glucose 108     BUN 14     Creatinine 0.80     Sodium 139   "   Potassium 4.2     Chloride 104     Calcium 9.4     Albumin 4.0     Total Bilirubin 0.3     Alkaline Phosphatase 126     AST (SGOT) 26     ALT (SGPT) 24     WBC  7.12       Hemoglobin  13.4       Hematocrit  43.6       Platelets  390       Hemoglobin A1C  6.6          Details          This result is from an external source.                       Assessment and Plan   Diagnoses and all orders for this visit:    1. Type 2 diabetes mellitus with proliferative retinopathy, with long-term current use of insulin, macular edema presence unspecified, unspecified laterality, unspecified proliferative retinopathy type (Primary)  -     Lipid Panel  -     Hemoglobin A1c  -     Comprehensive Metabolic Panel  -     Microalbumin / Creatinine Urine Ratio - Urine, Clean Catch    2. Type 2 diabetes mellitus with complication, with long-term current use of insulin  -     Xigduo XR 5-1000 MG tablet; Take 1 tablet by mouth 2 (Two) Times a Day.  Dispense: 180 tablet; Refill: 1  -     atorvastatin (LIPITOR) 40 MG tablet; Take 1 tablet by mouth Daily.  Dispense: 90 tablet; Refill: 1    3. Mixed hyperlipidemia    4. Class 1 obesity due to excess calories with serious comorbidity and body mass index (BMI) of 34.0 to 34.9 in adult             Follow Up   Return in about 6 months (around 1/25/2026).    Labs today  Consider starting Cgm for hypoglycemia prevention and keeping glucose monitoring for back up CGM monitoring  No questions or concerns today  Reports tolerating above plan without complaints  Additional recommendations to follow as needed    Patient was given instructions and counseling regarding her condition or for health maintenance advice. Please see specific information pulled into the AVS if appropriate.       EMMA Hickman

## 2025-07-26 LAB
ALBUMIN SERPL-MCNC: 4 G/DL (ref 3.8–4.8)
ALP SERPL-CCNC: 135 IU/L (ref 44–121)
ALT SERPL-CCNC: 14 IU/L (ref 0–32)
AST SERPL-CCNC: 19 IU/L (ref 0–40)
BILIRUB SERPL-MCNC: 0.5 MG/DL (ref 0–1.2)
BUN SERPL-MCNC: 11 MG/DL (ref 8–27)
BUN/CREAT SERPL: 13 (ref 12–28)
CALCIUM SERPL-MCNC: 9.8 MG/DL (ref 8.7–10.3)
CHLORIDE SERPL-SCNC: 102 MMOL/L (ref 96–106)
CHOLEST SERPL-MCNC: 146 MG/DL (ref 100–199)
CO2 SERPL-SCNC: 24 MMOL/L (ref 20–29)
CREAT SERPL-MCNC: 0.87 MG/DL (ref 0.57–1)
CREAT UR-MCNC: NORMAL MG/DL
EGFRCR SERPLBLD CKD-EPI 2021: 70 ML/MIN/1.73
GLOBULIN SER CALC-MCNC: 3.5 G/DL (ref 1.5–4.5)
GLUCOSE SERPL-MCNC: 73 MG/DL (ref 70–99)
HBA1C MFR BLD: 6.2 % (ref 4.8–5.6)
HDLC SERPL-MCNC: 75 MG/DL
IMP & REVIEW OF LAB RESULTS: NORMAL
LDLC SERPL CALC-MCNC: 60 MG/DL (ref 0–99)
MICROALBUMIN UR-MCNC: NORMAL
POTASSIUM SERPL-SCNC: 4 MMOL/L (ref 3.5–5.2)
PROT SERPL-MCNC: 7.5 G/DL (ref 6–8.5)
SODIUM SERPL-SCNC: 143 MMOL/L (ref 134–144)
TRIGL SERPL-MCNC: 52 MG/DL (ref 0–149)
VLDLC SERPL CALC-MCNC: 11 MG/DL (ref 5–40)

## 2025-08-19 DIAGNOSIS — Z79.4 TYPE 2 DIABETES MELLITUS WITH COMPLICATION, WITH LONG-TERM CURRENT USE OF INSULIN: ICD-10-CM

## 2025-08-19 DIAGNOSIS — E11.8 TYPE 2 DIABETES MELLITUS WITH COMPLICATION, WITH LONG-TERM CURRENT USE OF INSULIN: ICD-10-CM

## 2025-08-19 RX ORDER — DAPAGLIFLOZIN AND METFORMIN HYDROCHLORIDE 5; 1000 MG/1; MG/1
1 TABLET, FILM COATED, EXTENDED RELEASE ORAL 2 TIMES DAILY
Qty: 180 TABLET | Refills: 1 | Status: SHIPPED | OUTPATIENT
Start: 2025-08-19

## (undated) DEVICE — SINGLE-USE BIOPSY FORCEPS: Brand: RADIAL JAW 4

## (undated) DEVICE — ENDOPATH XCEL BLADELESS TROCARS WITH STABILITY SLEEVES: Brand: ENDOPATH XCEL

## (undated) DEVICE — TAPE MICROFM 2IN LF

## (undated) DEVICE — 3M™ IOBAN™ 2 ANTIMICROBIAL INCISE DRAPE 6650EZ: Brand: IOBAN™ 2

## (undated) DEVICE — KT ORCA ORCAPOD DISP STRL

## (undated) DEVICE — SYS PERFUS SEP PLATLT W TIPS CUST

## (undated) DEVICE — KT EXP CATH ONQ SILVERSOAKER 5IN

## (undated) DEVICE — ADAPT CLN BIOGUARD AIR/H2O DISP

## (undated) DEVICE — SKIN PREP TRAY W/CHG: Brand: MEDLINE INDUSTRIES, INC.

## (undated) DEVICE — SUT VIC 0 CT1 CR8 18IN J840D

## (undated) DEVICE — SUT VIC 5/0 PS2 18IN J495H

## (undated) DEVICE — THE TORRENT IRRIGATION SCOPE CONNECTOR IS USED WITH THE TORRENT IRRIGATION TUBING TO PROVIDE IRRIGATION FLUIDS SUCH AS STERILE WATER DURING GASTROINTESTINAL ENDOSCOPIC PROCEDURES WHEN USED IN CONJUNCTION WITH AN IRRIGATION PUMP (OR ELECTROSURGICAL UNIT).: Brand: TORRENT

## (undated) DEVICE — ELECTRD BLD EXT EDGE 1P COAT 6.5IN STRL

## (undated) DEVICE — TP APPL FIBRIJET TOPICAL 2/SPRY STRL

## (undated) DEVICE — APPL DURAPREP IODOPHOR APL 26ML

## (undated) DEVICE — TUBING, SUCTION, 1/4" X 10', STRAIGHT: Brand: MEDLINE

## (undated) DEVICE — VISUALIZATION SYSTEM: Brand: CLEARIFY

## (undated) DEVICE — KT FLTR GAS LN OXYGENATOR 1/4IN STRL

## (undated) DEVICE — PK PROC MINOR TOWER 40

## (undated) DEVICE — GW INQWIRE FC PTFE J/3MM .035 180

## (undated) DEVICE — SUT PROLN 1 CTX 30IN 8455H

## (undated) DEVICE — BLAKE CARDIO CONNECTOR 1:1: Brand: J-VAC

## (undated) DEVICE — SUT SILK 2/0 FS BLK 18IN 685G

## (undated) DEVICE — SUT VIC 3/0 SH 27IN J416H

## (undated) DEVICE — ANTIBACTERIAL UNDYED BRAIDED (POLYGLACTIN 910), SYNTHETIC ABSORBABLE SUTURE: Brand: COATED VICRYL

## (undated) DEVICE — TRAP,MUCUS SPECIMEN, 80CC: Brand: MEDLINE

## (undated) DEVICE — GOWN,NON-REINFORCED,SIRUS,SET IN SLV,XXL: Brand: MEDLINE

## (undated) DEVICE — SENSR O2 OXIMAX FNGR A/ 18IN NONSTR

## (undated) DEVICE — WOUND RETRACTOR AND PROTECTOR: Brand: ALEXIS WOUND PROTECTOR-RETRACTOR

## (undated) DEVICE — TRAP FLD MINIVAC MEGADYNE 100ML

## (undated) DEVICE — GLV SURG PREMIERPRO ORTHO LTX PF SZ7.5 BRN

## (undated) DEVICE — 3M™ STERI-STRIP™ REINFORCED ADHESIVE SKIN CLOSURES, R1547, 1/2 IN X 4 IN (12 MM X 100 MM), 6 STRIPS/ENVELOPE: Brand: 3M™ STERI-STRIP™

## (undated) DEVICE — THE SINGLE USE ETRAP – POLYP TRAP IS USED FOR SUCTION RETRIEVAL OF ENDOSCOPICALLY REMOVED POLYPS.: Brand: ETRAP

## (undated) DEVICE — TRY CATH PERICARDIOCENT PERIVAC 8.3F

## (undated) DEVICE — BG WAST DISPOSAL DEPOT W/TBG/LUER PVC 1400ML 72IN CLR

## (undated) DEVICE — ENCORE® LATEX ORTHO SIZE 8, STERILE LATEX POWDER-FREE SURGICAL GLOVE: Brand: ENCORE

## (undated) DEVICE — TUNNELER W/SHEATH 11GA 8IN

## (undated) DEVICE — SPNG GZ WOVN 4X4IN 12PLY 10/BX STRL

## (undated) DEVICE — TBG PENCL TELESCP MEGADYNE SMOKE EVAC 10FT

## (undated) DEVICE — PK PROC MAJ 40

## (undated) DEVICE — TOTAL TRAY, 16FR 10ML SIL FOLEY, URN: Brand: MEDLINE

## (undated) DEVICE — SNAR POLYP SENSATION STDOVL 27 240 BX40

## (undated) DEVICE — Device

## (undated) DEVICE — 3M™ STERI-STRIP™ COMPOUND BENZOIN TINCTURE 40 BAGS/CARTON 4 CARTONS/CASE C1544: Brand: 3M™ STERI-STRIP™

## (undated) DEVICE — DRSNG SURESITE WNDW 4X4.5

## (undated) DEVICE — VSI MICRO-INTRODUCER KITS ARE INTENDED FOR USE IN PERCUTANEOUS INTRODUCTION OF UP TO A 0.018 INCH OR 0.038 INCH GUIDEWIRE OR CATHETER INTO THE VASCULAR SYSTEM FOLLOWING A SMALL GAUGE NEEDLE STICK.: Brand: VSI MICRO-INTRODUCER KIT

## (undated) DEVICE — CANN O2 ETCO2 FITS ALL CONN CO2 SMPL A/ 7IN DISP LF

## (undated) DEVICE — ELECTRD BLD EZ CLN MOD XLNG 2.75IN

## (undated) DEVICE — ADHS SKIN SURG TISS VISC PREMIERPRO EXOFIN HI/VISC FAST/DRY

## (undated) DEVICE — LN SMPL CO2 SHTRM SD STREAM W/M LUER

## (undated) DEVICE — OASIS DRAIN, SINGLE, INLINE & ATS COMPATIBLE: Brand: OASIS

## (undated) DEVICE — SUT SILK 0 PSL 18IN 580H

## (undated) DEVICE — 28 FR STRAIGHT – SOFT PVC CATHETER: Brand: PVC THORACIC CATHETERS

## (undated) DEVICE — PK CATH CARD 40

## (undated) DEVICE — DRAIN,WOUND,ROUND,24FR,5/16",FULL-FLUTED: Brand: MEDLINE

## (undated) DEVICE — KT MANIFLD CARDIAC

## (undated) DEVICE — INSUFFLATION TUBING,LAPAROSCOPIC: Brand: DEROYAL

## (undated) DEVICE — SPNG DISECTOR KTNER XRAY COTN 1/4X9/16IN PK/5

## (undated) DEVICE — SUT VIC 2 TP1 54IN J880T

## (undated) DEVICE — INTENDED FOR TISSUE SEPARATION, AND OTHER PROCEDURES THAT REQUIRE A SHARP SURGICAL BLADE TO PUNCTURE OR CUT.: Brand: BARD-PARKER ® CARBON RIB-BACK BLADES

## (undated) DEVICE — LOU THORACIC: Brand: MEDLINE INDUSTRIES, INC.

## (undated) DEVICE — GOWN,NON-REINFORCED,SIRUS,SET IN SLV,XL: Brand: MEDLINE